# Patient Record
Sex: FEMALE | Race: WHITE | NOT HISPANIC OR LATINO
[De-identification: names, ages, dates, MRNs, and addresses within clinical notes are randomized per-mention and may not be internally consistent; named-entity substitution may affect disease eponyms.]

---

## 2017-05-01 ENCOUNTER — OTHER (OUTPATIENT)
Age: 77
End: 2017-05-01

## 2017-05-09 ENCOUNTER — APPOINTMENT (OUTPATIENT)
Dept: ORTHOPEDIC SURGERY | Facility: CLINIC | Age: 77
End: 2017-05-09

## 2017-05-09 VITALS
DIASTOLIC BLOOD PRESSURE: 86 MMHG | HEIGHT: 64 IN | WEIGHT: 168 LBS | BODY MASS INDEX: 28.68 KG/M2 | SYSTOLIC BLOOD PRESSURE: 147 MMHG | HEART RATE: 65 BPM

## 2017-05-09 RX ORDER — SIMVASTATIN 20 MG/1
20 TABLET, FILM COATED ORAL
Qty: 90 | Refills: 0 | Status: ACTIVE | COMMUNITY
Start: 2017-02-02

## 2017-05-15 ENCOUNTER — FORM ENCOUNTER (OUTPATIENT)
Age: 77
End: 2017-05-15

## 2017-05-16 ENCOUNTER — OUTPATIENT (OUTPATIENT)
Dept: OUTPATIENT SERVICES | Facility: HOSPITAL | Age: 77
LOS: 1 days | End: 2017-05-16
Payer: MEDICARE

## 2017-05-16 ENCOUNTER — APPOINTMENT (OUTPATIENT)
Dept: ULTRASOUND IMAGING | Facility: CLINIC | Age: 77
End: 2017-05-16

## 2017-05-16 DIAGNOSIS — Z00.8 ENCOUNTER FOR OTHER GENERAL EXAMINATION: ICD-10-CM

## 2017-05-16 DIAGNOSIS — Z96.652 PRESENCE OF LEFT ARTIFICIAL KNEE JOINT: Chronic | ICD-10-CM

## 2017-05-16 DIAGNOSIS — Z98.89 OTHER SPECIFIED POSTPROCEDURAL STATES: Chronic | ICD-10-CM

## 2017-05-16 DIAGNOSIS — Z41.1 ENCOUNTER FOR COSMETIC SURGERY: Chronic | ICD-10-CM

## 2017-05-16 DIAGNOSIS — Z90.49 ACQUIRED ABSENCE OF OTHER SPECIFIED PARTS OF DIGESTIVE TRACT: Chronic | ICD-10-CM

## 2017-05-16 DIAGNOSIS — R22.1 LOCALIZED SWELLING, MASS AND LUMP, NECK: Chronic | ICD-10-CM

## 2017-05-16 PROCEDURE — 93970 EXTREMITY STUDY: CPT

## 2017-05-23 ENCOUNTER — OTHER (OUTPATIENT)
Age: 77
End: 2017-05-23

## 2017-07-24 ENCOUNTER — APPOINTMENT (OUTPATIENT)
Dept: OBGYN | Facility: CLINIC | Age: 77
End: 2017-07-24

## 2017-07-24 VITALS
BODY MASS INDEX: 29.19 KG/M2 | WEIGHT: 171 LBS | DIASTOLIC BLOOD PRESSURE: 97 MMHG | HEIGHT: 64 IN | SYSTOLIC BLOOD PRESSURE: 184 MMHG

## 2017-07-28 DIAGNOSIS — N76.0 ACUTE VAGINITIS: ICD-10-CM

## 2017-07-28 DIAGNOSIS — B96.89 ACUTE VAGINITIS: ICD-10-CM

## 2017-07-28 LAB
BACTERIA GENITAL AEROBE CULT: ABNORMAL
CYTOLOGY CVX/VAG DOC THIN PREP: NORMAL

## 2017-08-22 ENCOUNTER — FORM ENCOUNTER (OUTPATIENT)
Age: 77
End: 2017-08-22

## 2017-08-23 ENCOUNTER — OUTPATIENT (OUTPATIENT)
Dept: OUTPATIENT SERVICES | Facility: HOSPITAL | Age: 77
LOS: 1 days | End: 2017-08-23
Payer: MEDICARE

## 2017-08-23 VITALS
RESPIRATION RATE: 16 BRPM | DIASTOLIC BLOOD PRESSURE: 90 MMHG | TEMPERATURE: 98 F | HEART RATE: 63 BPM | HEIGHT: 64 IN | WEIGHT: 169.76 LBS | SYSTOLIC BLOOD PRESSURE: 179 MMHG

## 2017-08-23 DIAGNOSIS — E78.5 HYPERLIPIDEMIA, UNSPECIFIED: ICD-10-CM

## 2017-08-23 DIAGNOSIS — Z96.652 PRESENCE OF LEFT ARTIFICIAL KNEE JOINT: Chronic | ICD-10-CM

## 2017-08-23 DIAGNOSIS — R22.1 LOCALIZED SWELLING, MASS AND LUMP, NECK: Chronic | ICD-10-CM

## 2017-08-23 DIAGNOSIS — Z01.818 ENCOUNTER FOR OTHER PREPROCEDURAL EXAMINATION: ICD-10-CM

## 2017-08-23 DIAGNOSIS — Z98.89 OTHER SPECIFIED POSTPROCEDURAL STATES: Chronic | ICD-10-CM

## 2017-08-23 DIAGNOSIS — Z41.1 ENCOUNTER FOR COSMETIC SURGERY: Chronic | ICD-10-CM

## 2017-08-23 DIAGNOSIS — Z90.49 ACQUIRED ABSENCE OF OTHER SPECIFIED PARTS OF DIGESTIVE TRACT: Chronic | ICD-10-CM

## 2017-08-23 DIAGNOSIS — M17.11 UNILATERAL PRIMARY OSTEOARTHRITIS, RIGHT KNEE: ICD-10-CM

## 2017-08-23 DIAGNOSIS — I10 ESSENTIAL (PRIMARY) HYPERTENSION: ICD-10-CM

## 2017-08-23 LAB
ANION GAP SERPL CALC-SCNC: 15 MMOL/L — SIGNIFICANT CHANGE UP (ref 5–17)
APTT BLD: 41.1 SEC — HIGH (ref 27.5–37.4)
BLD GP AB SCN SERPL QL: SIGNIFICANT CHANGE UP
BUN SERPL-MCNC: 23 MG/DL — HIGH (ref 8–20)
CALCIUM SERPL-MCNC: 10.8 MG/DL — HIGH (ref 8.6–10.2)
CHLORIDE SERPL-SCNC: 97 MMOL/L — LOW (ref 98–107)
CO2 SERPL-SCNC: 27 MMOL/L — SIGNIFICANT CHANGE UP (ref 22–29)
CREAT SERPL-MCNC: 0.6 MG/DL — SIGNIFICANT CHANGE UP (ref 0.5–1.3)
GLUCOSE SERPL-MCNC: 89 MG/DL — SIGNIFICANT CHANGE UP (ref 70–115)
HCT VFR BLD CALC: 41 % — SIGNIFICANT CHANGE UP (ref 37–47)
HGB BLD-MCNC: 14.3 G/DL — SIGNIFICANT CHANGE UP (ref 12–16)
INR BLD: 1.01 RATIO — SIGNIFICANT CHANGE UP (ref 0.88–1.16)
MCHC RBC-ENTMCNC: 30.2 PG — SIGNIFICANT CHANGE UP (ref 27–31)
MCHC RBC-ENTMCNC: 34.9 G/DL — SIGNIFICANT CHANGE UP (ref 32–36)
MCV RBC AUTO: 86.7 FL — SIGNIFICANT CHANGE UP (ref 81–99)
MRSA PCR RESULT.: SIGNIFICANT CHANGE UP
PLATELET # BLD AUTO: 193 K/UL — SIGNIFICANT CHANGE UP (ref 150–400)
POTASSIUM SERPL-MCNC: 5.3 MMOL/L — SIGNIFICANT CHANGE UP (ref 3.5–5.3)
POTASSIUM SERPL-SCNC: 5.3 MMOL/L — SIGNIFICANT CHANGE UP (ref 3.5–5.3)
PROTHROM AB SERPL-ACNC: 11.1 SEC — SIGNIFICANT CHANGE UP (ref 9.8–12.7)
RBC # BLD: 4.73 M/UL — SIGNIFICANT CHANGE UP (ref 4.4–5.2)
RBC # FLD: 12.5 % — SIGNIFICANT CHANGE UP (ref 11–15.6)
S AUREUS DNA NOSE QL NAA+PROBE: SIGNIFICANT CHANGE UP
SODIUM SERPL-SCNC: 139 MMOL/L — SIGNIFICANT CHANGE UP (ref 135–145)
TYPE + AB SCN PNL BLD: SIGNIFICANT CHANGE UP
WBC # BLD: 9 K/UL — SIGNIFICANT CHANGE UP (ref 4.8–10.8)
WBC # FLD AUTO: 9 K/UL — SIGNIFICANT CHANGE UP (ref 4.8–10.8)

## 2017-08-23 PROCEDURE — 71020: CPT | Mod: 26

## 2017-08-23 PROCEDURE — 93010 ELECTROCARDIOGRAM REPORT: CPT

## 2017-08-23 PROCEDURE — 36415 COLL VENOUS BLD VENIPUNCTURE: CPT

## 2017-08-23 PROCEDURE — 71046 X-RAY EXAM CHEST 2 VIEWS: CPT

## 2017-08-23 PROCEDURE — 85730 THROMBOPLASTIN TIME PARTIAL: CPT

## 2017-08-23 PROCEDURE — 87641 MR-STAPH DNA AMP PROBE: CPT

## 2017-08-23 PROCEDURE — 85027 COMPLETE CBC AUTOMATED: CPT

## 2017-08-23 PROCEDURE — 80048 BASIC METABOLIC PNL TOTAL CA: CPT

## 2017-08-23 PROCEDURE — G0463: CPT

## 2017-08-23 PROCEDURE — 85610 PROTHROMBIN TIME: CPT

## 2017-08-23 PROCEDURE — 93005 ELECTROCARDIOGRAM TRACING: CPT

## 2017-08-23 PROCEDURE — 87640 STAPH A DNA AMP PROBE: CPT

## 2017-08-23 PROCEDURE — 86900 BLOOD TYPING SEROLOGIC ABO: CPT

## 2017-08-23 PROCEDURE — 86850 RBC ANTIBODY SCREEN: CPT

## 2017-08-23 PROCEDURE — 86901 BLOOD TYPING SEROLOGIC RH(D): CPT

## 2017-08-23 NOTE — H&P PST ADULT - PSH
H/O total knee replacement, left  2003 revision 2014  History of foot surgery  left foot due to polio, 1953  Neck mass  excision of neck mass 1942  S/P cholecystectomy    S/P dilation and curettage  2001, 1965, 1972, 1978  S/P rhinoplasty

## 2017-08-23 NOTE — H&P PST ADULT - HISTORY OF PRESENT ILLNESS
76 year old female with right knee pain for the last 4-5 years which progressively got worse, she had steroid injection in the past but now its not helping.

## 2017-08-23 NOTE — PATIENT PROFILE ADULT. - PMH
DVT (deep venous thrombosis)  2016, was on xarelto for 6 months  Hiatal hernia    Hyperlipidemia    Hypertension    Osteoarthritis    Polio    TIA (transient ischemic attack)  2004

## 2017-08-23 NOTE — H&P PST ADULT - FAMILY HISTORY
Grandparent  Still living? Unknown  Family history of diabetes mellitus, Age at diagnosis: Age Unknown     Aunt  Still living? No  Family history of diabetes mellitus, Age at diagnosis: Age Unknown     Sibling  Still living? Yes, Estimated age: Age Unknown  Family history of heart disease, Age at diagnosis: Age Unknown

## 2017-08-23 NOTE — H&P PST ADULT - NSANTHOSAYNRD_GEN_A_CORE
No. MARK screening performed.  STOP BANG Legend: 0-2 = LOW Risk; 3-4 = INTERMEDIATE Risk; 5-8 = HIGH Risk

## 2017-08-23 NOTE — H&P PST ADULT - PMH
DVT (deep venous thrombosis)  2016, was on xarelto for 6 months  Hiatal hernia    Hyperlipidemia    Hypertension    Osteoarthritis    Polio DVT (deep venous thrombosis)  2016, was on xarelto for 6 months  Hiatal hernia    Hyperlipidemia    Hypertension    Osteoarthritis    Polio    TIA (transient ischemic attack)  2004

## 2017-09-13 ENCOUNTER — APPOINTMENT (OUTPATIENT)
Dept: ORTHOPEDIC SURGERY | Facility: HOSPITAL | Age: 77
End: 2017-09-13

## 2017-09-28 ENCOUNTER — APPOINTMENT (OUTPATIENT)
Dept: ORTHOPEDIC SURGERY | Facility: CLINIC | Age: 77
End: 2017-09-28

## 2017-10-25 ENCOUNTER — APPOINTMENT (OUTPATIENT)
Dept: ORTHOPEDIC SURGERY | Facility: CLINIC | Age: 77
End: 2017-10-25

## 2017-11-08 ENCOUNTER — OUTPATIENT (OUTPATIENT)
Dept: OUTPATIENT SERVICES | Facility: HOSPITAL | Age: 77
LOS: 1 days | End: 2017-11-08
Payer: MEDICARE

## 2017-11-08 VITALS
SYSTOLIC BLOOD PRESSURE: 159 MMHG | RESPIRATION RATE: 16 BRPM | TEMPERATURE: 97 F | DIASTOLIC BLOOD PRESSURE: 86 MMHG | HEART RATE: 70 BPM | HEIGHT: 63 IN | WEIGHT: 171.52 LBS

## 2017-11-08 DIAGNOSIS — R22.1 LOCALIZED SWELLING, MASS AND LUMP, NECK: Chronic | ICD-10-CM

## 2017-11-08 DIAGNOSIS — Z01.818 ENCOUNTER FOR OTHER PREPROCEDURAL EXAMINATION: ICD-10-CM

## 2017-11-08 DIAGNOSIS — M17.11 UNILATERAL PRIMARY OSTEOARTHRITIS, RIGHT KNEE: ICD-10-CM

## 2017-11-08 DIAGNOSIS — Z90.49 ACQUIRED ABSENCE OF OTHER SPECIFIED PARTS OF DIGESTIVE TRACT: Chronic | ICD-10-CM

## 2017-11-08 DIAGNOSIS — Z96.652 PRESENCE OF LEFT ARTIFICIAL KNEE JOINT: Chronic | ICD-10-CM

## 2017-11-08 DIAGNOSIS — Z98.89 OTHER SPECIFIED POSTPROCEDURAL STATES: Chronic | ICD-10-CM

## 2017-11-08 DIAGNOSIS — Z41.1 ENCOUNTER FOR COSMETIC SURGERY: Chronic | ICD-10-CM

## 2017-11-08 DIAGNOSIS — I10 ESSENTIAL (PRIMARY) HYPERTENSION: ICD-10-CM

## 2017-11-08 LAB
ALBUMIN SERPL ELPH-MCNC: 4.6 G/DL — SIGNIFICANT CHANGE UP (ref 3.3–5.2)
ALP SERPL-CCNC: 68 U/L — SIGNIFICANT CHANGE UP (ref 40–120)
ALT FLD-CCNC: 18 U/L — SIGNIFICANT CHANGE UP
ANION GAP SERPL CALC-SCNC: 15 MMOL/L — SIGNIFICANT CHANGE UP (ref 5–17)
APTT BLD: 38.5 SEC — HIGH (ref 27.5–37.4)
AST SERPL-CCNC: 24 U/L — SIGNIFICANT CHANGE UP
BASOPHILS # BLD AUTO: 0 K/UL — SIGNIFICANT CHANGE UP (ref 0–0.2)
BASOPHILS NFR BLD AUTO: 0.2 % — SIGNIFICANT CHANGE UP (ref 0–2)
BILIRUB SERPL-MCNC: 0.7 MG/DL — SIGNIFICANT CHANGE UP (ref 0.4–2)
BLD GP AB SCN SERPL QL: SIGNIFICANT CHANGE UP
BUN SERPL-MCNC: 26 MG/DL — HIGH (ref 8–20)
CALCIUM SERPL-MCNC: 10.1 MG/DL — SIGNIFICANT CHANGE UP (ref 8.6–10.2)
CHLORIDE SERPL-SCNC: 95 MMOL/L — LOW (ref 98–107)
CO2 SERPL-SCNC: 25 MMOL/L — SIGNIFICANT CHANGE UP (ref 22–29)
CREAT SERPL-MCNC: 0.7 MG/DL — SIGNIFICANT CHANGE UP (ref 0.5–1.3)
EOSINOPHIL # BLD AUTO: 0.1 K/UL — SIGNIFICANT CHANGE UP (ref 0–0.5)
EOSINOPHIL NFR BLD AUTO: 1.3 % — SIGNIFICANT CHANGE UP (ref 0–6)
GLUCOSE SERPL-MCNC: 80 MG/DL — SIGNIFICANT CHANGE UP (ref 70–115)
HCT VFR BLD CALC: 39.6 % — SIGNIFICANT CHANGE UP (ref 37–47)
HGB BLD-MCNC: 13.7 G/DL — SIGNIFICANT CHANGE UP (ref 12–16)
INR BLD: 1.06 RATIO — SIGNIFICANT CHANGE UP (ref 0.88–1.16)
LYMPHOCYTES # BLD AUTO: 2.1 K/UL — SIGNIFICANT CHANGE UP (ref 1–4.8)
LYMPHOCYTES # BLD AUTO: 22.6 % — SIGNIFICANT CHANGE UP (ref 20–55)
MCHC RBC-ENTMCNC: 30.4 PG — SIGNIFICANT CHANGE UP (ref 27–31)
MCHC RBC-ENTMCNC: 34.6 G/DL — SIGNIFICANT CHANGE UP (ref 32–36)
MCV RBC AUTO: 87.8 FL — SIGNIFICANT CHANGE UP (ref 81–99)
MONOCYTES # BLD AUTO: 0.8 K/UL — SIGNIFICANT CHANGE UP (ref 0–0.8)
MONOCYTES NFR BLD AUTO: 8.5 % — SIGNIFICANT CHANGE UP (ref 3–10)
MRSA PCR RESULT.: SIGNIFICANT CHANGE UP
NEUTROPHILS # BLD AUTO: 6.3 K/UL — SIGNIFICANT CHANGE UP (ref 1.8–8)
NEUTROPHILS NFR BLD AUTO: 67.3 % — SIGNIFICANT CHANGE UP (ref 37–73)
PLATELET # BLD AUTO: 188 K/UL — SIGNIFICANT CHANGE UP (ref 150–400)
POTASSIUM SERPL-MCNC: 4.7 MMOL/L — SIGNIFICANT CHANGE UP (ref 3.5–5.3)
POTASSIUM SERPL-SCNC: 4.7 MMOL/L — SIGNIFICANT CHANGE UP (ref 3.5–5.3)
PROT SERPL-MCNC: 7.1 G/DL — SIGNIFICANT CHANGE UP (ref 6.6–8.7)
PROTHROM AB SERPL-ACNC: 11.7 SEC — SIGNIFICANT CHANGE UP (ref 9.8–12.7)
RBC # BLD: 4.51 M/UL — SIGNIFICANT CHANGE UP (ref 4.4–5.2)
RBC # FLD: 13.1 % — SIGNIFICANT CHANGE UP (ref 11–15.6)
S AUREUS DNA NOSE QL NAA+PROBE: SIGNIFICANT CHANGE UP
SODIUM SERPL-SCNC: 135 MMOL/L — SIGNIFICANT CHANGE UP (ref 135–145)
TYPE + AB SCN PNL BLD: SIGNIFICANT CHANGE UP
WBC # BLD: 9.3 K/UL — SIGNIFICANT CHANGE UP (ref 4.8–10.8)
WBC # FLD AUTO: 9.3 K/UL — SIGNIFICANT CHANGE UP (ref 4.8–10.8)

## 2017-11-08 PROCEDURE — 36415 COLL VENOUS BLD VENIPUNCTURE: CPT

## 2017-11-08 PROCEDURE — 85610 PROTHROMBIN TIME: CPT

## 2017-11-08 PROCEDURE — 85027 COMPLETE CBC AUTOMATED: CPT

## 2017-11-08 PROCEDURE — 86901 BLOOD TYPING SEROLOGIC RH(D): CPT

## 2017-11-08 PROCEDURE — 85730 THROMBOPLASTIN TIME PARTIAL: CPT

## 2017-11-08 PROCEDURE — 86850 RBC ANTIBODY SCREEN: CPT

## 2017-11-08 PROCEDURE — 80053 COMPREHEN METABOLIC PANEL: CPT

## 2017-11-08 PROCEDURE — 87640 STAPH A DNA AMP PROBE: CPT

## 2017-11-08 PROCEDURE — 93005 ELECTROCARDIOGRAM TRACING: CPT

## 2017-11-08 PROCEDURE — 93010 ELECTROCARDIOGRAM REPORT: CPT

## 2017-11-08 PROCEDURE — 86900 BLOOD TYPING SEROLOGIC ABO: CPT

## 2017-11-08 PROCEDURE — G0463: CPT

## 2017-11-08 RX ORDER — SODIUM CHLORIDE 9 MG/ML
3 INJECTION INTRAMUSCULAR; INTRAVENOUS; SUBCUTANEOUS EVERY 8 HOURS
Qty: 0 | Refills: 0 | Status: DISCONTINUED | OUTPATIENT
Start: 2017-11-29 | End: 2017-12-01

## 2017-11-08 NOTE — PATIENT PROFILE ADULT. - LEARNING ASSESSMENT (PATIENT) ADDITIONAL COMMENTS
pre-op instructions, surgical wash, MRSA/MSSA & pain management reviewed. Pt verbalized understanding

## 2017-11-09 ENCOUNTER — OTHER (OUTPATIENT)
Age: 77
End: 2017-11-09

## 2017-11-15 ENCOUNTER — OTHER (OUTPATIENT)
Age: 77
End: 2017-11-15

## 2017-11-15 RX ORDER — ACETAMINOPHEN 500 MG
1000 TABLET ORAL ONCE
Qty: 0 | Refills: 0 | Status: DISCONTINUED | OUTPATIENT
Start: 2017-11-29 | End: 2017-12-01

## 2017-11-15 RX ORDER — CELECOXIB 200 MG/1
400 CAPSULE ORAL ONCE
Qty: 0 | Refills: 0 | Status: COMPLETED | OUTPATIENT
Start: 2017-11-29 | End: 2017-11-29

## 2017-11-15 RX ORDER — SCOPALAMINE 1 MG/3D
1.5 PATCH, EXTENDED RELEASE TRANSDERMAL ONCE
Qty: 0 | Refills: 0 | Status: COMPLETED | OUTPATIENT
Start: 2017-11-29 | End: 2017-11-29

## 2017-11-15 RX ORDER — GABAPENTIN 400 MG/1
300 CAPSULE ORAL ONCE
Qty: 0 | Refills: 0 | Status: COMPLETED | OUTPATIENT
Start: 2017-11-29 | End: 2017-11-29

## 2017-11-15 RX ORDER — CEFAZOLIN SODIUM 1 G
2000 VIAL (EA) INJECTION ONCE
Qty: 0 | Refills: 0 | Status: DISCONTINUED | OUTPATIENT
Start: 2017-11-29 | End: 2017-12-01

## 2017-11-29 ENCOUNTER — TRANSCRIPTION ENCOUNTER (OUTPATIENT)
Age: 77
End: 2017-11-29

## 2017-11-29 ENCOUNTER — RESULT REVIEW (OUTPATIENT)
Age: 77
End: 2017-11-29

## 2017-11-29 ENCOUNTER — INPATIENT (INPATIENT)
Facility: HOSPITAL | Age: 77
LOS: 1 days | Discharge: HOME | DRG: 470 | End: 2017-12-01
Attending: ORTHOPAEDIC SURGERY | Admitting: ORTHOPAEDIC SURGERY
Payer: MEDICARE

## 2017-11-29 ENCOUNTER — APPOINTMENT (OUTPATIENT)
Dept: ORTHOPEDIC SURGERY | Facility: HOSPITAL | Age: 77
End: 2017-11-29

## 2017-11-29 VITALS
RESPIRATION RATE: 14 BRPM | HEIGHT: 64 IN | SYSTOLIC BLOOD PRESSURE: 138 MMHG | WEIGHT: 169.98 LBS | DIASTOLIC BLOOD PRESSURE: 59 MMHG | TEMPERATURE: 99 F | OXYGEN SATURATION: 100 % | HEART RATE: 67 BPM

## 2017-11-29 DIAGNOSIS — M17.11 UNILATERAL PRIMARY OSTEOARTHRITIS, RIGHT KNEE: ICD-10-CM

## 2017-11-29 DIAGNOSIS — Z98.89 OTHER SPECIFIED POSTPROCEDURAL STATES: Chronic | ICD-10-CM

## 2017-11-29 DIAGNOSIS — R22.1 LOCALIZED SWELLING, MASS AND LUMP, NECK: Chronic | ICD-10-CM

## 2017-11-29 DIAGNOSIS — Z96.652 PRESENCE OF LEFT ARTIFICIAL KNEE JOINT: Chronic | ICD-10-CM

## 2017-11-29 DIAGNOSIS — Z90.49 ACQUIRED ABSENCE OF OTHER SPECIFIED PARTS OF DIGESTIVE TRACT: Chronic | ICD-10-CM

## 2017-11-29 DIAGNOSIS — Z41.1 ENCOUNTER FOR COSMETIC SURGERY: Chronic | ICD-10-CM

## 2017-11-29 LAB
BLD GP AB SCN SERPL QL: SIGNIFICANT CHANGE UP
TYPE + AB SCN PNL BLD: SIGNIFICANT CHANGE UP

## 2017-11-29 PROCEDURE — 88311 DECALCIFY TISSUE: CPT | Mod: 26

## 2017-11-29 PROCEDURE — 73560 X-RAY EXAM OF KNEE 1 OR 2: CPT | Mod: 26,RT

## 2017-11-29 PROCEDURE — 20985 CPTR-ASST DIR MS PX: CPT | Mod: AS

## 2017-11-29 PROCEDURE — 27447 TOTAL KNEE ARTHROPLASTY: CPT | Mod: AS,RT

## 2017-11-29 PROCEDURE — 27447 TOTAL KNEE ARTHROPLASTY: CPT | Mod: RT

## 2017-11-29 PROCEDURE — 88305 TISSUE EXAM BY PATHOLOGIST: CPT | Mod: 26

## 2017-11-29 PROCEDURE — 20985 CPTR-ASST DIR MS PX: CPT

## 2017-11-29 RX ORDER — ACETAMINOPHEN 500 MG
1000 TABLET ORAL
Qty: 0 | Refills: 0 | Status: COMPLETED | OUTPATIENT
Start: 2017-11-29 | End: 2017-11-29

## 2017-11-29 RX ORDER — ASPIRIN/CALCIUM CARB/MAGNESIUM 324 MG
81 TABLET ORAL DAILY
Qty: 0 | Refills: 0 | Status: DISCONTINUED | OUTPATIENT
Start: 2017-11-30 | End: 2017-12-01

## 2017-11-29 RX ORDER — ONDANSETRON 8 MG/1
4 TABLET, FILM COATED ORAL EVERY 4 HOURS
Qty: 0 | Refills: 0 | Status: DISCONTINUED | OUTPATIENT
Start: 2017-11-29 | End: 2017-12-01

## 2017-11-29 RX ORDER — ACETAMINOPHEN 500 MG
650 TABLET ORAL EVERY 6 HOURS
Qty: 0 | Refills: 0 | Status: DISCONTINUED | OUTPATIENT
Start: 2017-11-29 | End: 2017-12-01

## 2017-11-29 RX ORDER — LEVOTHYROXINE SODIUM 125 MCG
25 TABLET ORAL DAILY
Qty: 0 | Refills: 0 | Status: DISCONTINUED | OUTPATIENT
Start: 2017-11-29 | End: 2017-11-30

## 2017-11-29 RX ORDER — SODIUM CHLORIDE 9 MG/ML
1000 INJECTION INTRAMUSCULAR; INTRAVENOUS; SUBCUTANEOUS
Qty: 0 | Refills: 0 | Status: DISCONTINUED | OUTPATIENT
Start: 2017-11-29 | End: 2017-11-30

## 2017-11-29 RX ORDER — MAGNESIUM HYDROXIDE 400 MG/1
30 TABLET, CHEWABLE ORAL DAILY
Qty: 0 | Refills: 0 | Status: DISCONTINUED | OUTPATIENT
Start: 2017-11-29 | End: 2017-12-01

## 2017-11-29 RX ORDER — SIMVASTATIN 20 MG/1
20 TABLET, FILM COATED ORAL AT BEDTIME
Qty: 0 | Refills: 0 | Status: DISCONTINUED | OUTPATIENT
Start: 2017-11-29 | End: 2017-12-01

## 2017-11-29 RX ORDER — LOSARTAN POTASSIUM 100 MG/1
100 TABLET, FILM COATED ORAL DAILY
Qty: 0 | Refills: 0 | Status: DISCONTINUED | OUTPATIENT
Start: 2017-11-30 | End: 2017-11-30

## 2017-11-29 RX ORDER — TRIAMTERENE/HYDROCHLOROTHIAZID 75 MG-50MG
1 TABLET ORAL DAILY
Qty: 0 | Refills: 0 | Status: DISCONTINUED | OUTPATIENT
Start: 2017-11-30 | End: 2017-11-30

## 2017-11-29 RX ORDER — FENTANYL CITRATE 50 UG/ML
50 INJECTION INTRAVENOUS
Qty: 0 | Refills: 0 | Status: DISCONTINUED | OUTPATIENT
Start: 2017-11-29 | End: 2017-11-30

## 2017-11-29 RX ORDER — VANCOMYCIN HCL 1 G
1000 VIAL (EA) INTRAVENOUS ONCE
Qty: 0 | Refills: 0 | Status: COMPLETED | OUTPATIENT
Start: 2017-11-29 | End: 2017-11-29

## 2017-11-29 RX ORDER — CHOLECALCIFEROL (VITAMIN D3) 125 MCG
2000 CAPSULE ORAL DAILY
Qty: 0 | Refills: 0 | Status: DISCONTINUED | OUTPATIENT
Start: 2017-11-30 | End: 2017-12-01

## 2017-11-29 RX ORDER — ONDANSETRON 8 MG/1
4 TABLET, FILM COATED ORAL ONCE
Qty: 0 | Refills: 0 | Status: DISCONTINUED | OUTPATIENT
Start: 2017-11-29 | End: 2017-11-30

## 2017-11-29 RX ORDER — CEFAZOLIN SODIUM 1 G
2000 VIAL (EA) INJECTION
Qty: 0 | Refills: 0 | Status: COMPLETED | OUTPATIENT
Start: 2017-11-29 | End: 2017-11-30

## 2017-11-29 RX ORDER — VANCOMYCIN HCL 1 G
1000 VIAL (EA) INTRAVENOUS
Qty: 0 | Refills: 0 | Status: COMPLETED | OUTPATIENT
Start: 2017-11-29 | End: 2017-11-30

## 2017-11-29 RX ORDER — HYDROMORPHONE HYDROCHLORIDE 2 MG/ML
2 INJECTION INTRAMUSCULAR; INTRAVENOUS; SUBCUTANEOUS
Qty: 0 | Refills: 0 | Status: DISCONTINUED | OUTPATIENT
Start: 2017-11-29 | End: 2017-12-01

## 2017-11-29 RX ORDER — SODIUM CHLORIDE 9 MG/ML
1000 INJECTION, SOLUTION INTRAVENOUS
Qty: 0 | Refills: 0 | Status: DISCONTINUED | OUTPATIENT
Start: 2017-11-29 | End: 2017-11-30

## 2017-11-29 RX ORDER — SENNA PLUS 8.6 MG/1
2 TABLET ORAL AT BEDTIME
Qty: 0 | Refills: 0 | Status: DISCONTINUED | OUTPATIENT
Start: 2017-11-29 | End: 2017-12-01

## 2017-11-29 RX ORDER — HYDROMORPHONE HYDROCHLORIDE 2 MG/ML
4 INJECTION INTRAMUSCULAR; INTRAVENOUS; SUBCUTANEOUS
Qty: 0 | Refills: 0 | Status: DISCONTINUED | OUTPATIENT
Start: 2017-11-29 | End: 2017-12-01

## 2017-11-29 RX ORDER — HYDROMORPHONE HYDROCHLORIDE 2 MG/ML
0.5 INJECTION INTRAMUSCULAR; INTRAVENOUS; SUBCUTANEOUS
Qty: 0 | Refills: 0 | Status: DISCONTINUED | OUTPATIENT
Start: 2017-11-29 | End: 2017-12-01

## 2017-11-29 RX ORDER — ENOXAPARIN SODIUM 100 MG/ML
30 INJECTION SUBCUTANEOUS EVERY 12 HOURS
Qty: 0 | Refills: 0 | Status: DISCONTINUED | OUTPATIENT
Start: 2017-11-30 | End: 2017-12-01

## 2017-11-29 RX ADMIN — SCOPALAMINE 1.5 MILLIGRAM(S): 1 PATCH, EXTENDED RELEASE TRANSDERMAL at 10:01

## 2017-11-29 RX ADMIN — CELECOXIB 400 MILLIGRAM(S): 200 CAPSULE ORAL at 10:01

## 2017-11-29 RX ADMIN — Medication 1000 MILLIGRAM(S): at 22:12

## 2017-11-29 RX ADMIN — Medication 100 MILLIGRAM(S): at 21:15

## 2017-11-29 RX ADMIN — GABAPENTIN 300 MILLIGRAM(S): 400 CAPSULE ORAL at 10:01

## 2017-11-29 RX ADMIN — Medication 250 MILLIGRAM(S): at 10:13

## 2017-11-29 RX ADMIN — Medication 400 MILLIGRAM(S): at 22:12

## 2017-11-29 NOTE — BRIEF OPERATIVE NOTE - PROCEDURE
<<-----Click on this checkbox to enter Procedure Total knee arthroplasty  11/29/2017  Right computer assisted tKR  Active  LUCILA

## 2017-11-29 NOTE — PROGRESS NOTE ADULT - SUBJECTIVE AND OBJECTIVE BOX
PA - Note    Ortho Post Op Check    Name: PRADEEP DEL TORO    MR #: [id number]    Procedure: Right Total Knee Arthroplasty   Surgeon: Sharad Snell    Pt comfortable without complaints, pain controlled, found asleep in PACU, able to wake, doing well, hasn't had PT today  Denies CP, SOB, N/V, numbness/tingling     General Exam:  Vital Signs Last 24 Hrs  T(C): 36.7 (11-29-17 @ 21:15), Max: 36.7 (11-29-17 @ 21:15)  T(F): 98.1 (11-29-17 @ 21:15), Max: 98.1 (11-29-17 @ 21:15)  HR: 70 (11-29-17 @ 21:15) (57 - 70)  BP: 127/60 (11-29-17 @ 21:15) (119/48 - 132/52)  BP(mean): --  RR: 16 (11-29-17 @ 21:15) (15 - 22)  SpO2: 99% (11-29-17 @ 21:15) (99% - 100%)    General: Pt Alert and oriented, NAD, controlled pain.  Dressings C/D/I. No bleeding.  Pulses: 2+ dorsalis pedis pulse. Cap refill < 2 sec.  Sensation: Grossly intact to light touch without deficit.  Motor: + ROM of toes, + Dorsal/Plantar Flexion of foot    Post-op X-Ray:  EXAM:  KNEE-RIGHT                          PROCEDURE DATE:  11/29/2017          INTERPRETATION:  HISTORY: Postoperative  knee replacement.    Two views of the right knee are submitted.    Evaluation demonstrates the presence of a tricompartmental knee   replacement with the femoral, tibial and patellar components in proper   anatomic alignment. There is no fracture .       Impression:  Knee prosthetic components in proper anatomical alignment.                      KRIS BARRERA M.D., ATTENDING RADIOLOGIST  This document has been electronically signed. Nov 29 2017  8:42PM      A/P: 77yFemale POD#0 s/p Right Total Knee Arthroplasty  - Stable  - Pain Control  - DVT ppx: Lovenox, Aspirin, SCDs  - Post op abx: Ancef, Vancomycin  - PT eval pending  - Weight bearing status: WBAT  - D/C Planning (Rehab vs Home)

## 2017-11-30 ENCOUNTER — TRANSCRIPTION ENCOUNTER (OUTPATIENT)
Age: 77
End: 2017-11-30

## 2017-11-30 DIAGNOSIS — E78.5 HYPERLIPIDEMIA, UNSPECIFIED: ICD-10-CM

## 2017-11-30 DIAGNOSIS — I82.402 ACUTE EMBOLISM AND THROMBOSIS OF UNSPECIFIED DEEP VEINS OF LEFT LOWER EXTREMITY: ICD-10-CM

## 2017-11-30 DIAGNOSIS — Z29.9 ENCOUNTER FOR PROPHYLACTIC MEASURES, UNSPECIFIED: ICD-10-CM

## 2017-11-30 DIAGNOSIS — E03.9 HYPOTHYROIDISM, UNSPECIFIED: ICD-10-CM

## 2017-11-30 DIAGNOSIS — I10 ESSENTIAL (PRIMARY) HYPERTENSION: ICD-10-CM

## 2017-11-30 DIAGNOSIS — D62 ACUTE POSTHEMORRHAGIC ANEMIA: ICD-10-CM

## 2017-11-30 LAB
ANION GAP SERPL CALC-SCNC: 12 MMOL/L — SIGNIFICANT CHANGE UP (ref 5–17)
BUN SERPL-MCNC: 17 MG/DL — SIGNIFICANT CHANGE UP (ref 8–20)
CALCIUM SERPL-MCNC: 8.8 MG/DL — SIGNIFICANT CHANGE UP (ref 8.6–10.2)
CHLORIDE SERPL-SCNC: 103 MMOL/L — SIGNIFICANT CHANGE UP (ref 98–107)
CO2 SERPL-SCNC: 23 MMOL/L — SIGNIFICANT CHANGE UP (ref 22–29)
CREAT SERPL-MCNC: 0.61 MG/DL — SIGNIFICANT CHANGE UP (ref 0.5–1.3)
GLUCOSE SERPL-MCNC: 111 MG/DL — SIGNIFICANT CHANGE UP (ref 70–115)
HCT VFR BLD CALC: 32.8 % — LOW (ref 37–47)
HGB BLD-MCNC: 11.2 G/DL — LOW (ref 12–16)
MCHC RBC-ENTMCNC: 29.9 PG — SIGNIFICANT CHANGE UP (ref 27–31)
MCHC RBC-ENTMCNC: 34.1 G/DL — SIGNIFICANT CHANGE UP (ref 32–36)
MCV RBC AUTO: 87.5 FL — SIGNIFICANT CHANGE UP (ref 81–99)
PLATELET # BLD AUTO: 159 K/UL — SIGNIFICANT CHANGE UP (ref 150–400)
POTASSIUM SERPL-MCNC: 4 MMOL/L — SIGNIFICANT CHANGE UP (ref 3.5–5.3)
POTASSIUM SERPL-SCNC: 4 MMOL/L — SIGNIFICANT CHANGE UP (ref 3.5–5.3)
RBC # BLD: 3.75 M/UL — LOW (ref 4.4–5.2)
RBC # FLD: 12.9 % — SIGNIFICANT CHANGE UP (ref 11–15.6)
SODIUM SERPL-SCNC: 138 MMOL/L — SIGNIFICANT CHANGE UP (ref 135–145)
WBC # BLD: 5.7 K/UL — SIGNIFICANT CHANGE UP (ref 4.8–10.8)
WBC # FLD AUTO: 5.7 K/UL — SIGNIFICANT CHANGE UP (ref 4.8–10.8)

## 2017-11-30 PROCEDURE — 99222 1ST HOSP IP/OBS MODERATE 55: CPT | Mod: GC

## 2017-11-30 PROCEDURE — 99222 1ST HOSP IP/OBS MODERATE 55: CPT

## 2017-11-30 RX ORDER — LEVOTHYROXINE SODIUM 125 MCG
25 TABLET ORAL DAILY
Qty: 0 | Refills: 0 | Status: DISCONTINUED | OUTPATIENT
Start: 2017-11-30 | End: 2017-12-01

## 2017-11-30 RX ORDER — LEVOTHYROXINE SODIUM 125 MCG
25 TABLET ORAL
Qty: 0 | Refills: 0 | Status: DISCONTINUED | OUTPATIENT
Start: 2017-11-30 | End: 2017-12-01

## 2017-11-30 RX ORDER — LEVOTHYROXINE SODIUM 125 MCG
25 TABLET ORAL ONCE
Qty: 0 | Refills: 0 | Status: COMPLETED | OUTPATIENT
Start: 2017-11-30 | End: 2017-11-30

## 2017-11-30 RX ORDER — ENOXAPARIN SODIUM 100 MG/ML
30 INJECTION SUBCUTANEOUS
Qty: 0 | Refills: 0 | COMMUNITY
Start: 2017-11-30 | End: 2017-12-28

## 2017-11-30 RX ORDER — SIMVASTATIN 20 MG/1
20 TABLET, FILM COATED ORAL AT BEDTIME
Qty: 0 | Refills: 0 | Status: DISCONTINUED | OUTPATIENT
Start: 2017-11-30 | End: 2017-11-30

## 2017-11-30 RX ORDER — SODIUM CHLORIDE 9 MG/ML
1000 INJECTION INTRAMUSCULAR; INTRAVENOUS; SUBCUTANEOUS
Qty: 0 | Refills: 0 | Status: DISCONTINUED | OUTPATIENT
Start: 2017-11-30 | End: 2017-12-01

## 2017-11-30 RX ORDER — TRIAMTERENE/HYDROCHLOROTHIAZID 75 MG-50MG
1 TABLET ORAL DAILY
Qty: 0 | Refills: 0 | Status: DISCONTINUED | OUTPATIENT
Start: 2017-12-02 | End: 2017-12-01

## 2017-11-30 RX ORDER — ENOXAPARIN SODIUM 100 MG/ML
30 INJECTION SUBCUTANEOUS
Qty: 0 | Refills: 0 | COMMUNITY
Start: 2017-11-30 | End: 2017-12-14

## 2017-11-30 RX ORDER — LEVOTHYROXINE SODIUM 125 MCG
25 TABLET ORAL DAILY
Qty: 0 | Refills: 0 | Status: DISCONTINUED | OUTPATIENT
Start: 2017-11-30 | End: 2017-11-30

## 2017-11-30 RX ORDER — LOSARTAN POTASSIUM 100 MG/1
100 TABLET, FILM COATED ORAL DAILY
Qty: 0 | Refills: 0 | Status: DISCONTINUED | OUTPATIENT
Start: 2017-11-30 | End: 2017-12-01

## 2017-11-30 RX ADMIN — Medication 650 MILLIGRAM(S): at 18:32

## 2017-11-30 RX ADMIN — LOSARTAN POTASSIUM 100 MILLIGRAM(S): 100 TABLET, FILM COATED ORAL at 05:22

## 2017-11-30 RX ADMIN — Medication 650 MILLIGRAM(S): at 19:30

## 2017-11-30 RX ADMIN — Medication 25 MICROGRAM(S): at 12:24

## 2017-11-30 RX ADMIN — Medication 100 MILLIGRAM(S): at 03:07

## 2017-11-30 RX ADMIN — SIMVASTATIN 20 MILLIGRAM(S): 20 TABLET, FILM COATED ORAL at 21:41

## 2017-11-30 RX ADMIN — Medication 250 MILLIGRAM(S): at 03:07

## 2017-11-30 RX ADMIN — SODIUM CHLORIDE 3 MILLILITER(S): 9 INJECTION INTRAMUSCULAR; INTRAVENOUS; SUBCUTANEOUS at 05:16

## 2017-11-30 RX ADMIN — LOSARTAN POTASSIUM 100 MILLIGRAM(S): 100 TABLET, FILM COATED ORAL at 12:55

## 2017-11-30 RX ADMIN — ENOXAPARIN SODIUM 30 MILLIGRAM(S): 100 INJECTION SUBCUTANEOUS at 05:22

## 2017-11-30 RX ADMIN — Medication 1 CAPSULE(S): at 05:22

## 2017-11-30 RX ADMIN — Medication 650 MILLIGRAM(S): at 13:20

## 2017-11-30 RX ADMIN — Medication 81 MILLIGRAM(S): at 12:24

## 2017-11-30 RX ADMIN — Medication 2000 UNIT(S): at 12:29

## 2017-11-30 RX ADMIN — ENOXAPARIN SODIUM 30 MILLIGRAM(S): 100 INJECTION SUBCUTANEOUS at 18:31

## 2017-11-30 RX ADMIN — Medication 650 MILLIGRAM(S): at 12:24

## 2017-11-30 NOTE — PHYSICAL THERAPY INITIAL EVALUATION ADULT - ACTIVE RANGE OF MOTION EXAMINATION, REHAB EVAL
right shoulder AROM up to 90 degrees; right knee AROM 10 to 75 degrees/bilateral  lower extremity Active ROM was WFL (within functional limits)/deficits as listed below/bilateral upper extremity Active ROM was WFL (within functional limits)

## 2017-11-30 NOTE — DISCHARGE NOTE ADULT - PLAN OF CARE
Decrease Pain, Improve Ambulation and Activities of Daily Living The patient will be seen in the office between 2-3 weeks for wound check.  Patient may shower after post-op day #5. The dressing is to be removed on day #10 (12/10/2017).  The patient will contact the office if the wound becomes red, has increasing pain, develops bleeding or discharge, an injury occurs, or has other concerns. The patient will continue PT consistent with total knee replacement. The patient will continue LOVENOX and ASPIRIN 81mg Daily for 4 weeks and then resume her normal dosage of Eliqius for DVTP. The patient will take the prescribed medication for pain control and titrate according to prescription and patient needs. The patient is FULL weight bearing. Elevation of the lower leg is recommended to reduce swelling. The patient will be seen in the office between 2-3 weeks for wound check.  Patient may shower after post-op day #5, 11/29/2017. The dressing is to be removed on day #10 (12/10/2017).  The patient will contact the office if the wound becomes red, has increasing pain, develops bleeding or discharge, an injury occurs, or has other concerns. The patient will continue PT consistent with total knee replacement. The patient will continue LOVENOX and ASPIRIN 81mg Daily for 4 weeks. The patient will take the prescribed medication for pain control and titrate according to prescription and patient needs. The patient is FULL weight bearing. Elevation of the lower leg is recommended to reduce swelling.

## 2017-11-30 NOTE — DISCHARGE NOTE ADULT - HOSPITAL COURSE
The patient underwent a RIGHT TOTAL KNEE REPLACEMENT on 11/29/2017. The patient received antibiotics consistent with SCIP guidelines. The patient underwent the procedure and had no intra-operative complications. Post-operatively, the patient was seen by medicine and PT. The patient received LOVENOX and ASPIRIN for DVTP. The patient received pain medications per orthopedic pain management protocol and the pain was appropriately controlled. The patient did not have any post-operative medical complications. The patient was discharged in stable condition.

## 2017-11-30 NOTE — PHYSICAL THERAPY INITIAL EVALUATION ADULT - GAIT DEVIATIONS NOTED, PT EVAL
+right knee buckling with each step/decreased stride length/decreased weight-shifting ability/decreased charmaine/increased time in double stance/decreased step length

## 2017-11-30 NOTE — CONSULT NOTE ADULT - ASSESSMENT
78 y/o female with PMH HTN, HLD, hiatal hernia, DVT completed AC, polio, presents with right knee pain [id number], progressively worse, s/p injections with limited relief, controlled with Tylenol and Advil prn, s/p right TKA POD #1.

## 2017-11-30 NOTE — PROGRESS NOTE ADULT - SUBJECTIVE AND OBJECTIVE BOX
PRADEEP DEL TORO    [id number]    History: Patient seen and eval at bedside. Patient is doing well and is comfortable. The patient's pain is controlled using the prescribed pain medications. The patient is participating in physical therapy. Denies nausea, vomiting, chest pain, shortness of breath, abdominal pain or fever. No new complaints.                          11.2   5.7   )-----------( 159      ( 30 Nov 2017 06:51 )             32.8       PE: NAD, alert, awake  Right LE:   Knee dressing C/D/I, no drainage, no bleeding,   EHL/TA/FHL/GS intact, DP pulse intact vi doppler  Gross sensation to LT intact distally, Calf soft, NT B/L    Primary Orthopedic Assessment:  • s/p RIGHT total knee replacement POD#1    Plan:   ·	DVT prophylaxis as prescribed lov/asa, including use of compression devices and ankle pumps  ·	Continue physical therapy: Weightbearing as tolerated of the right lower extremity with assistance of a walker  ·	Incentive spirometry encouraged  ·	Pain control as clinically indicated  ·	Discharge planning: likely home tomorrow pending med and PT clearance  ·	D/w RN to DC hand IV  ·	f/u chem labs PRADEEP DEL TORO    [id number]    History: Patient seen and eval at bedside. Patient is doing well and is comfortable. The patient's pain is controlled using the prescribed pain medications. The patient is participating in physical therapy. Denies nausea, vomiting, chest pain, shortness of breath, abdominal pain or fever. No new complaints.    Vital Signs Last 24 Hrs  T(C): 36.6 (30 Nov 2017 04:27), Max: 37.2 (29 Nov 2017 09:38)  T(F): 97.9 (30 Nov 2017 04:27), Max: 99 (29 Nov 2017 09:38)  HR: 64 (30 Nov 2017 04:27) (54 - 70)  BP: 123/69 (30 Nov 2017 04:27) (117/65 - 138/59)  RR: 19 (30 Nov 2017 04:27) (14 - 22)  SpO2: 96% (30 Nov 2017 04:27) (96% - 100%)                        11.2   5.7   )-----------( 159      ( 30 Nov 2017 06:51 )             32.8     PE: NAD, alert, awake  Right LE:   Knee dressing C/D/I, no drainage, no bleeding,   EHL/TA/FHL/GS intact, DP pulse intact vi doppler  Gross sensation to LT intact distally, Calf soft, NT B/L    Primary Orthopedic Assessment:  • s/p RIGHT total knee replacement POD#1    Plan:   ·	DVT prophylaxis as prescribed lov/asa, including use of compression devices and ankle pumps  ·	Continue physical therapy: Weightbearing as tolerated of the right lower extremity with assistance of a walker  ·	Incentive spirometry encouraged  ·	Pain control as clinically indicated  ·	Discharge planning: likely home tomorrow pending med and PT clearance  ·	D/w RN to DC hand IV  ·	f/u chem labs

## 2017-11-30 NOTE — DISCHARGE NOTE ADULT - CARE PROVIDER_API CALL
Sharad Snell (MD), Orthopaedic Surgery  [address]  [place] [address]  Phone: [phone]  Fax: [phone]

## 2017-11-30 NOTE — DISCHARGE NOTE ADULT - PATIENT PORTAL LINK FT
“You can access the [employer] Patient Portal, offered by [facility], by registering with the following website: [email or web address]”

## 2017-11-30 NOTE — PROGRESS NOTE ADULT - SUBJECTIVE AND OBJECTIVE BOX
PRADEEP DEL TORO    [id number]    History: 77y Female is status post right total knee arthroplasty, POD # 1. Patient is doing well and is comfortable. The patient's pain is controlled using the prescribed pain medications. The patient is participating in physical therapy. Denies nausea, vomiting, chest pain, shortness of breath, abdominal pain or fever. No new complaints.                          11.2   5.7   )-----------( 159      ( 30 Nov 2017 06:51 )             32.8     MEDICATIONS  (STANDING):  acetaminophen   Tablet. 650 milliGRAM(s) Oral every 6 hours  acetaminophen  IVPB. 1000 milliGRAM(s) IV Intermittent once  aspirin  chewable 81 milliGRAM(s) Oral daily  ceFAZolin   IVPB 2000 milliGRAM(s) IV Intermittent once  cholecalciferol 2000 Unit(s) Oral daily  enoxaparin Injectable 30 milliGRAM(s) SubCutaneous every 12 hours  levothyroxine 25 MICROGram(s) Oral daily  losartan 100 milliGRAM(s) Oral daily  simvastatin 20 milliGRAM(s) Oral at bedtime  sodium chloride 0.9% lock flush 3 milliLiter(s) IV Push every 8 hours  sodium chloride 0.9%. 1000 milliLiter(s) (120 mL/Hr) IV Continuous <Continuous>  triamterene 37.5 mG/hydrochlorothiazide 25 mG Capsule 1 Capsule(s) Oral daily    MEDICATIONS  (PRN):  aluminum hydroxide/magnesium hydroxide/simethicone Suspension 30 milliLiter(s) Oral four times a day PRN Indigestion  HYDROmorphone   Tablet 2 milliGRAM(s) Oral every 3 hours PRN Moderate Pain (4 - 6)  HYDROmorphone   Tablet 4 milliGRAM(s) Oral every 3 hours PRN Severe Pain (7 - 10)  HYDROmorphone  Injectable 0.5 milliGRAM(s) IV Push every 3 hours PRN breakthrough pain control  magnesium hydroxide Suspension 30 milliLiter(s) Oral daily PRN Constipation  ondansetron Injectable 4 milliGRAM(s) IV Push every 4 hours PRN Nausea and/or Vomiting  senna 2 Tablet(s) Oral at bedtime PRN Constipation      Physical exam: The right knee dressing is clean, dry and intact. No drainage or discharge.  The calf is supple nontender. Passive range of motion is acceptable to due postoperative pain. No calf tenderness. Sensation to light touch is grossly intact distally. Motor function distally is 5/5. Extensor hallucis longus and flexor hallucis longus are intact. No foot drop. 2+ dorsalis pedis pulse. Capillary refill is less than 2 seconds. No cyanosis.    Primary Orthopedic Assessment:  • s/p RIGHT total knee replacement    Plan:   • DVT prophylaxis as prescribed - Lovenox x 4 weeks due to history of DVT, including use of compression devices and ankle pumps  • Continue physical therapy  • Weightbearing as tolerated of the right lower extremity with assistance of a walker  • Incentive spirometry encouraged  • Pain control as clinically indicated  • Discharge planning – anticipated discharge is Home vs subacute rehabilitation

## 2017-11-30 NOTE — CONSULT NOTE ADULT - SUBJECTIVE AND OBJECTIVE BOX
PMD: Dr. Anaya    CC: Right knee pain    HPI:  78 yo female      PAST MEDICAL & SURGICAL HISTORY:  TIA (transient ischemic attack): 2004  DVT (deep venous thrombosis): 2016, was on xarelto for 6 months  Polio  Hyperlipidemia  Hiatal hernia  Hypertension  Osteoarthritis  Neck mass: excision of neck mass 1942  History of foot surgery: left foot due to polio, 1953  S/P rhinoplasty  H/O total knee replacement, left: 2003 revision 2014  S/P dilation and curettage: 2001, 1965, 1972, 1978  S/P cholecystectomy    FAMILY HISTORY:  Family history of heart disease (Sibling)  Family history of diabetes mellitus (Grandparent, Aunt)    SOCIAL HISTORY:  Lives with  Tobacco -   ETOH -   Drug use -     ALLERGIES:  Cipro (hallucination)  Ampicillin (GI upset)  Narcotics (Severe vomiting and dizziness)    INTOLERANCES:  Augmentin (Abdominal pain, Diarrhea)    HOME MEDICATIONS:  MVI 1tab PO daily  Aleve 220mg PO [id number] PRN  Vitamin D3 2000IU PO daily  Dyazide 25/37.5mg PO daily  Synthroid 25mcg PO daily  Simvastatin 20mg PO qHS  Benicar 40mg PO daily prn  Ca+D daily  Co Q10 100mg PO daily  ASA 81mg PO daily    MEDICATIONS  (STANDING):  acetaminophen   Tablet. 650 milliGRAM(s) Oral every 6 hours  acetaminophen  IVPB. 1000 milliGRAM(s) IV Intermittent once  aspirin  chewable 81 milliGRAM(s) Oral daily  ceFAZolin   IVPB 2000 milliGRAM(s) IV Intermittent once  cholecalciferol 2000 Unit(s) Oral daily  enoxaparin Injectable 30 milliGRAM(s) SubCutaneous every 12 hours  levothyroxine 25 MICROGram(s) Oral daily  losartan 100 milliGRAM(s) Oral daily  simvastatin 20 milliGRAM(s) Oral at bedtime  sodium chloride 0.9% lock flush 3 milliLiter(s) IV Push every 8 hours  sodium chloride 0.9%. 1000 milliLiter(s) (120 mL/Hr) IV Continuous <Continuous>  triamterene 37.5 mG/hydrochlorothiazide 25 mG Capsule 1 Capsule(s) Oral daily    MEDICATIONS  (PRN):  aluminum hydroxide/magnesium hydroxide/simethicone Suspension 30 milliLiter(s) Oral four times a day PRN Indigestion  HYDROmorphone   Tablet 2 milliGRAM(s) Oral every 3 hours PRN Moderate Pain (4 - 6)  HYDROmorphone   Tablet 4 milliGRAM(s) Oral every 3 hours PRN Severe Pain (7 - 10)  HYDROmorphone  Injectable 0.5 milliGRAM(s) IV Push every 3 hours PRN breakthrough pain control  magnesium hydroxide Suspension 30 milliLiter(s) Oral daily PRN Constipation  ondansetron Injectable 4 milliGRAM(s) IV Push every 4 hours PRN Nausea and/or Vomiting  senna 2 Tablet(s) Oral at bedtime PRN Constipation      REVIEW OF SYSTEMS      General:	    Skin/Breast:  	  Ophthalmologic:  	  ENMT:	    Respiratory and Thorax:  	  Cardiovascular:	    Gastrointestinal:	    Genitourinary:	    Musculoskeletal:	    Neurological:	    Psychiatric:	    Hematology/Lymphatics:	    Endocrine:	    Allergic/Immunologic:	      Vital Signs Last 24 Hrs  T(C): 36.6 (30 Nov 2017 04:27), Max: 37.2 (29 Nov 2017 09:38)  T(F): 97.9 (30 Nov 2017 04:27), Max: 99 (29 Nov 2017 09:38)  HR: 64 (30 Nov 2017 04:27) (54 - 70)  BP: 123/69 (30 Nov 2017 04:27) (117/65 - 138/59)  BP(mean): --  RR: 19 (30 Nov 2017 04:27) (14 - 22)  SpO2: 96% (30 Nov 2017 04:27) (96% - 100%)    PHYSICAL EXAM:      Constitutional:    Eyes:    ENMT:    Neck:    Breasts:    Back:    Respiratory:    Cardiovascular:    Gastrointestinal:    Genitourinary:    Rectal:    Extremities:    Vascular:    Neurological:    Skin:    Lymph Nodes:    Musculoskeletal:    Psychiatric:        LABS:                        11.2   5.7   )-----------( 159      ( 30 Nov 2017 06:51 )             32.8     11-30    138  |  103  |  17.0  ----------------------------<  111  4.0   |  23.0  |  0.61    Ca    8.8      30 Nov 2017 06:51            RADIOLOGY & ADDITIONAL STUDIES:  < from: Xray Knee 1 or 2 Views, Right (11.29.17 @ 17:11) >   EXAM:  KNEE-RIGHT                          PROCEDURE DATE:  11/29/2017          INTERPRETATION:  HISTORY: Postoperative  knee replacement.    Two views of the right knee are submitted.    Evaluation demonstrates the presence of a tricompartmental knee   replacement with the femoral, tibial and patellar components in proper   anatomic alignment. There is no fracture .       Impression:  Knee prosthetic components in proper anatomical alignment.                      KRIS BARRERA M.D., ATTENDING RADIOLOGIST  This document has been electronically signed. Nov 30 2017  7:06AM                < end of copied text > PMD: Dr. Anaya  Urology: Dr. Zuñiga    CC: Right knee pain    HPI:  78 y/o female with PMH HTN, HLD, hiatal hernia, DVT completed AC, polio, presents with right knee pain [id number], progressively worse, s/p injections with limited relief, controlled with Tylenol and Advil prn, s/p right TKA POD #1.      PAST MEDICAL & SURGICAL HISTORY:  TIA (transient ischemic attack): 2004  DVT (deep venous thrombosis): 2016, was on xarelto for 6 months  Polio  Hyperlipidemia  Hiatal hernia  Hypertension  Osteoarthritis  Neck mass: excision of neck mass 1942  History of foot surgery: left foot due to polio, 1953  S/P rhinoplasty  H/O total knee replacement, left: 2003 revision 2014  S/P dilation and curettage: 2001, 1965, 1972, 1978  S/P cholecystectomy    FAMILY HISTORY:  Family history of heart disease (Sibling)  Family history of diabetes mellitus (Grandparent, Aunt)    SOCIAL HISTORY:    Lives with daughter  Tobacco - Denies  ETOH - Denies  Drug use - Denies    ALLERGIES:  Cipro/Levaquin (hallucination)  Ampicillin (GI upset)  Narcotics (Severe vomiting and dizziness)    INTOLERANCES:  Augmentin (Abdominal pain, Diarrhea)    HOME MEDICATIONS:  MVI 1tab PO daily  Aleve 220mg PO [id number] PRN  Vitamin D3 2000IU PO daily  Dyazide 37.5/25mg PO daily  Synthroid 25mcg PO daily  Simvastatin 20mg PO qHS  Benicar 40mg PO daily  Ca+D daily  Co Q10 100mg PO daily  ASA 81mg PO daily    MEDICATIONS  (STANDING):  acetaminophen   Tablet. 650 milliGRAM(s) Oral every 6 hours  acetaminophen  IVPB. 1000 milliGRAM(s) IV Intermittent once  aspirin  chewable 81 milliGRAM(s) Oral daily  ceFAZolin   IVPB 2000 milliGRAM(s) IV Intermittent once  cholecalciferol 2000 Unit(s) Oral daily  enoxaparin Injectable 30 milliGRAM(s) SubCutaneous every 12 hours  levothyroxine 25 MICROGram(s) Oral daily  losartan 100 milliGRAM(s) Oral daily  simvastatin 20 milliGRAM(s) Oral at bedtime  sodium chloride 0.9% lock flush 3 milliLiter(s) IV Push every 8 hours  sodium chloride 0.9%. 1000 milliLiter(s) (120 mL/Hr) IV Continuous <Continuous>  triamterene 37.5 mG/hydrochlorothiazide 25 mG Capsule 1 Capsule(s) Oral daily    MEDICATIONS  (PRN):  aluminum hydroxide/magnesium hydroxide/simethicone Suspension 30 milliLiter(s) Oral four times a day PRN Indigestion  HYDROmorphone   Tablet 2 milliGRAM(s) Oral every 3 hours PRN Moderate Pain (4 - 6)  HYDROmorphone   Tablet 4 milliGRAM(s) Oral every 3 hours PRN Severe Pain (7 - 10)  HYDROmorphone  Injectable 0.5 milliGRAM(s) IV Push every 3 hours PRN breakthrough pain control  magnesium hydroxide Suspension 30 milliLiter(s) Oral daily PRN Constipation  ondansetron Injectable 4 milliGRAM(s) IV Push every 4 hours PRN Nausea and/or Vomiting  senna 2 Tablet(s) Oral at bedtime PRN Constipation    REVIEW OF SYSTEMS:  CONSTITUTIONAL: No fever, weight loss, or fatigue  RESPIRATORY: No cough, wheezing, or chills; No shortness of breath  CARDIOVASCULAR: No chest pain, palpitations, dizziness, or leg swelling  GASTROINTESTINAL: No abdominal or epigastric pain. No nausea or vomiting; No diarrhea or constipation.   GENITOURINARY: No dysuria, frequency, hematuria, or incontinence  NEUROLOGICAL: No headaches, memory loss, loss of strength, numbness, or tremors  SKIN: No itching, burning, rashes, or lesions   MUSCULOSKELETAL: No joint swelling; No muscle or back pain; Right knee pain  PSYCHIATRIC: No depression, anxiety, mood swings, or difficulty sleeping      Vital Signs Last 24 Hrs  T(C): 36.6 (30 Nov 2017 04:27), Max: 37.2 (29 Nov 2017 09:38)  T(F): 97.9 (30 Nov 2017 04:27), Max: 99 (29 Nov 2017 09:38)  HR: 64 (30 Nov 2017 04:27) (54 - 70)  BP: 123/69 (30 Nov 2017 04:27) (117/65 - 138/59)  BP(mean): --  RR: 19 (30 Nov 2017 04:27) (14 - 22)  SpO2: 96% (30 Nov 2017 04:27) (96% - 100%)    PHYSICAL EXAM:  GENERAL: NAD, well-groomed, well-developed  HEAD:  Atraumatic, Normocephalic  NECK: Supple, No JVD, Normal thyroid  NERVOUS SYSTEM:  Alert & Oriented X3, Good concentration; Motor Strength 5/5 B/L upper and lower extremities  CHEST/LUNG: Clear to auscultation bilaterally; No rales, rhonchi, wheezing, or rubs  HEART: Regular rate and rhythm; No murmurs, rubs, or gallops  ABDOMEN: Soft, Nontender, Nondistended; Bowel sounds present  EXTREMITIES:  2+ Peripheral Pulses, No clubbing, cyanosis, or edema; Right knee with clean dressing and ACE wrap    LABS:                        11.2   5.7   )-----------( 159      ( 30 Nov 2017 06:51 )             32.8     11-30    138  |  103  |  17.0  ----------------------------<  111  4.0   |  23.0  |  0.61    Ca    8.8      30 Nov 2017 06:51            RADIOLOGY & ADDITIONAL STUDIES:  < from: Xray Knee 1 or 2 Views, Right (11.29.17 @ 17:11) >   EXAM:  KNEE-RIGHT                          PROCEDURE DATE:  11/29/2017          INTERPRETATION:  HISTORY: Postoperative  knee replacement.    Two views of the right knee are submitted.    Evaluation demonstrates the presence of a tricompartmental knee   replacement with the femoral, tibial and patellar components in proper   anatomic alignment. There is no fracture .       Impression:  Knee prosthetic components in proper anatomical alignment.                      KRIS BARRERA M.D., ATTENDING RADIOLOGIST  This document has been electronically signed. Nov 30 2017  7:06AM                < end of copied text >

## 2017-11-30 NOTE — CONSULT NOTE ADULT - SUBJECTIVE AND OBJECTIVE BOX
HPI:  Ms. Angulo is a 77 year old female with a PMHx noted below who presented to [facility] on 11/29 for elective right TKR after failing conservative measures (injections and PO pain medications) as an outpatient. Per patient, the pain has been ongoing and worsening over the last 2 years. She underwent right TKR by Dr. Snell with EBL = 50cc on 11/29. WBAT to right lower extremity. Started on Lovenox for DVT PPx post-operatively.    Today,    REVIEW OF SYSTEMS  Constitutional - No fever, No fatigue  HEENT - No visual disturbances, No difficulty hearing,  No neck pain  Respiratory - No cough, No wheezing, No shortness of breath  Cardiovascular - No chest pain, No palpitations  Gastrointestinal - No abdominal pain, No nausea, No vomiting, No diarrhea, No constipation  Genitourinary - No dysuria, No frequency, No hematuria, No incontinence  Neurological - No headaches, No loss of strength, No numbness  Skin - No itching, No rashes  Endocrine - No temperature intolerance  Musculoskeletal - No joint pain, No joint swelling, No muscle pain  Psychiatric - No depression, No anxiety  All other review of systems negative    PAST MEDICAL & SURGICAL HISTORY  Polio  Hiatal hernia  Hypothyroidism  H/O left deep vein thrombosis (completed treatement with AC)  Hyperlipidemia,  Essential hypertension  S/P foot surgery  S/P rhinoplasty  s/p left total knee replacement  S/P dilation and curettage  S/P cholecystectomy    SOCIAL HISTORY  Retired  Smoking - Denied  EtOH - Denied   Drugs - Denied    FUNCTIONAL HISTORY  _______ hand dominant  Lives with children in a private home with 2 EKTA without HR   Prior to hospitalization utilized RW for assistance with ambulation    CURRENT FUNCTIONAL STATUS  Bed mobility - Min A with bed rails  Transfers - Min/Mod A  Gait - 5' using RW requiring Min A  ADL's -    FAMILY HISTORY   Reviewed and non-contributory    ALLERGIES  ampicillin  Augmentin  Cipro     VITALS  T(C): 37.5 (11-30-17 @ 09:10)  T(F): 99.5 (11-30-17 @ 09:10), Max: 99.5 (11-30-17 @ 09:10)  HR: 93 (11-30-17 @ 09:10) (54 - 93)  BP: 125/58 (11-30-17 @ 09:10) (117/65 - 132/52)  RR:  (15 - 22)  SpO2:  (96% - 100%)  Wt(kg): --    PHYSICAL EXAM    RECENT LABS/IMAGING             11.2   5.7   )-----------( 159      ( 30 Nov 2017 06:51 )             32.8     138  |  103  |  17.0  ----------------------------<  111  4.0   |  23.0  |  0.61    Ca    8.8      30 Nov 2017 06:51    MEDICATIONS   MEDICATIONS  (STANDING):  acetaminophen   Tablet. 650 milliGRAM(s) Oral every 6 hours  acetaminophen  IVPB. 1000 milliGRAM(s) IV Intermittent once  aspirin  chewable 81 milliGRAM(s) Oral daily  ceFAZolin   IVPB 2000 milliGRAM(s) IV Intermittent once  cholecalciferol 2000 Unit(s) Oral daily  enoxaparin Injectable 30 milliGRAM(s) SubCutaneous every 12 hours  losartan 100 milliGRAM(s) Oral daily  simvastatin 20 milliGRAM(s) Oral at bedtime  sodium chloride 0.9%. 1000 milliLiter(s) (100 mL/Hr) IV Continuous <Continuous>    MEDICATIONS  (PRN):  aluminum hydroxide/magnesium hydroxide/simethicone Suspension 30 milliLiter(s) Oral four times a day PRN Indigestion  HYDROmorphone   Tablet 2 milliGRAM(s) Oral every 3 hours PRN Moderate Pain (4 - 6)  HYDROmorphone   Tablet 4 milliGRAM(s) Oral every 3 hours PRN Severe Pain (7 - 10)  HYDROmorphone  Injectable 0.5 milliGRAM(s) IV Push every 3 hours PRN breakthrough pain control  magnesium hydroxide Suspension 30 milliLiter(s) Oral daily PRN Constipation  ondansetron Injectable 4 milliGRAM(s) IV Push every 4 hours PRN Nausea and/or Vomiting  senna 2 Tablet(s) Oral at bedtime PRN Constipation    ASSESSMENT/PLAN  76 y/o female with right knee osteoarthritis s/p right TKR. She is now with functional, gait, ADL impairments.    Disposition -  PT - ROM, Bed mobility, Transfers, Ambulation with assistive device  OT - ADLs, ROM  Precautions - Falls, Cardiac  Pain control - Dilaudid PRN, Tylenol YANNICK  DVT Prophylaxis - Lovenox, SCD's  Weight bearing status - WBAT   Skin - Turn Q2hrs  Diet - Regular/thins HPI:  Ms. Angulo is a 77 year old female with a PMHx noted below who presented to [facility] on 11/29 for elective right TKR after failing conservative measures (injections and PO pain medications) as an outpatient. Per patient, the pain has been ongoing and worsening over the last 2 years. She underwent right TKR by Dr. Snell with EBL = 50cc on 11/29. WBAT to right lower extremity. Started on Lovenox for DVT PPx post-operatively.    Today, she reports that she has some pain in the right knee and feels like the ACE wrap is tight. She denies numbness/tingling. Worked with therapy today and tolerated well.    REVIEW OF SYSTEMS  Constitutional - No fever, No fatigue  HEENT - No visual disturbances  Respiratory - No cough, No wheezing, No shortness of breath  Cardiovascular - No chest pain, No palpitations  Gastrointestinal - No abdominal pain, No nausea, No vomiting, No diarrhea, No constipation  Genitourinary - No dysuria, No frequency, No hematuria, No incontinence  Neurological - No loss of strength, No numbness  Skin - No itching, No rashes  Endocrine - No temperature intolerance  Musculoskeletal - +joint pain, No joint swelling, +muscle pain  Psychiatric - No depression, No anxiety  All other review of systems negative    PAST MEDICAL & SURGICAL HISTORY  Polio  Hypothyroidism  H/O left deep vein thrombosis (about 3 years ago, completed treatement with AC)  Hyperlipidemia  Essential hypertension  S/P left foot surgery  S/P rhinoplasty  s/p left total knee replacement - 3 years ago  S/P dilation and curettage  S/P cholecystectomy    SOCIAL HISTORY    Retired  Smoking - Denied  EtOH - Denied   Drugs - Denied    FUNCTIONAL HISTORY  Right hand dominant  Lives alone in a private home with 2 EKTA without HR and flight of stairs inside but does not have to use   Prior to hospitalization utilized RW for assistance with ambulation, initially used SAC but right knee pain progressed requiring RW use    CURRENT FUNCTIONAL STATUS  Bed mobility - Min A with bed rails  Transfers - Min/Mod A  Gait - 5' using RW requiring Min A    FAMILY HISTORY   Reviewed and non-contributory    ALLERGIES  ampicillin  Augmentin  Cipro     VITALS  T(C): 37.5 (11-30-17 @ 09:10)  T(F): 99.5 (11-30-17 @ 09:10), Max: 99.5 (11-30-17 @ 09:10)  HR: 93 (11-30-17 @ 09:10) (54 - 93)  BP: 125/58 (11-30-17 @ 09:10) (117/65 - 132/52)  RR:  (15 - 22)  SpO2:  (96% - 100%)  Wt(kg): --    PHYSICAL EXAM  Constitutional - NAD, Comfortable  HEENT - NCAT, EOMI  Neck - Supple  Chest - CTA bilaterally  Cardiovascular - RRR, S1S2  Abdomen - BS+, Soft, NTND  Extremities - Right knee ACE wrap  Neurologic Exam -                    Cognitive - Awake, Alert, Oriented to self, place, date, year, situation     Cranial Nerves - CN 2-12 grossly intact     Motor -                     LEFT    UE - ShAB 5/5, EF 5/5, EE 5/5, WE 5/5,  5/5                    RIGHT UE - ShAB 5/5, EF 5/5, EE 5/5, WE 5/5,  5/5                    LEFT    LE - HF 4/5, KE 5/5, DF 5/5, PF 5/5                    RIGHT LE - HF 1/5, DF 4/5, PF 5/5        Sensory - Intact to light touch diffusely     Reflexes - DTR intact and symmetrical  Skin - Right knee ACE wrap  Psychiatric - Affect WNL    RECENT LABS/IMAGING             11.2   5.7   )-----------( 159      ( 30 Nov 2017 06:51 )             32.8     138  |  103  |  17.0  ----------------------------<  111  4.0   |  23.0  |  0.61    Ca    8.8      30 Nov 2017 06:51    MEDICATIONS   MEDICATIONS  (STANDING):  acetaminophen   Tablet. 650 milliGRAM(s) Oral every 6 hours  acetaminophen  IVPB. 1000 milliGRAM(s) IV Intermittent once  aspirin  chewable 81 milliGRAM(s) Oral daily  ceFAZolin   IVPB 2000 milliGRAM(s) IV Intermittent once  cholecalciferol 2000 Unit(s) Oral daily  enoxaparin Injectable 30 milliGRAM(s) SubCutaneous every 12 hours  losartan 100 milliGRAM(s) Oral daily  simvastatin 20 milliGRAM(s) Oral at bedtime  sodium chloride 0.9%. 1000 milliLiter(s) (100 mL/Hr) IV Continuous <Continuous>    MEDICATIONS  (PRN):  aluminum hydroxide/magnesium hydroxide/simethicone Suspension 30 milliLiter(s) Oral four times a day PRN Indigestion  HYDROmorphone   Tablet 2 milliGRAM(s) Oral every 3 hours PRN Moderate Pain (4 - 6)  HYDROmorphone   Tablet 4 milliGRAM(s) Oral every 3 hours PRN Severe Pain (7 - 10)  HYDROmorphone  Injectable 0.5 milliGRAM(s) IV Push every 3 hours PRN breakthrough pain control  magnesium hydroxide Suspension 30 milliLiter(s) Oral daily PRN Constipation  ondansetron Injectable 4 milliGRAM(s) IV Push every 4 hours PRN Nausea and/or Vomiting  senna 2 Tablet(s) Oral at bedtime PRN Constipation    ASSESSMENT/PLAN  76 y/o female with right knee osteoarthritis s/p right TKR. She is now with functional, gait, ADL impairments.    Disposition -   PT - ROM, Bed mobility, Transfers, Ambulation with assistive device  OT - ADLs, ROM  Precautions - Falls, Cardiac  Pain control - Dilaudid PRN, Tylenol YANNICK  DVT Prophylaxis - Lovenox, SCD's  Weight bearing status - WBAT right lower extremity  Skin - Turn Q2hrs  Diet - Regular/thins HPI:  Ms. Angulo is a 77 year old female with a PMHx noted below who presented to [facility] on 11/29 for elective right TKR after failing conservative measures (injections and PO pain medications) as an outpatient. Per patient, the pain has been ongoing and worsening over the last 2 years. She underwent right TKR by Dr. Snell with EBL = 50cc on 11/29. WBAT to right lower extremity. Started on Lovenox for DVT PPx post-operatively.    Today, she reports that she has some pain in the right knee and feels like the ACE wrap is tight. She denies numbness/tingling. Worked with therapy today and tolerated well.    REVIEW OF SYSTEMS  Constitutional - No fever, No fatigue  HEENT - No visual disturbances  Respiratory - No cough, No wheezing, No shortness of breath  Cardiovascular - No chest pain, No palpitations  Gastrointestinal - No abdominal pain, No nausea, No vomiting, No diarrhea, No constipation  Genitourinary - No dysuria, No frequency, No hematuria, No incontinence  Neurological - No loss of strength, No numbness  Skin - No itching, No rashes  Endocrine - No temperature intolerance  Musculoskeletal - +joint pain, No joint swelling, +muscle pain  Psychiatric - No depression, No anxiety  All other review of systems negative    PAST MEDICAL & SURGICAL HISTORY  Polio  Hypothyroidism  H/O left deep vein thrombosis (about 3 years ago, completed treatement with AC)  Hyperlipidemia  Essential hypertension  S/P left foot surgery  S/P rhinoplasty  s/p left total knee replacement - 3 years ago  S/P dilation and curettage  S/P cholecystectomy    SOCIAL HISTORY    Retired  Smoking - Denied  EtOH - Denied   Drugs - Denied    FUNCTIONAL HISTORY  Right hand dominant  Lives alone in a private home with 2 EKTA without HR and flight of stairs inside but does not have to use   Prior to hospitalization utilized RW for assistance with ambulation, initially used SAC but right knee pain progressed requiring RW use    CURRENT FUNCTIONAL STATUS  Bed mobility - Min A with bed rails  Transfers - Min/Mod A  Gait - 5' using RW requiring Min A    FAMILY HISTORY   Reviewed and non-contributory    ALLERGIES  ampicillin  Augmentin  Cipro     VITALS  T(C): 37.5 (11-30-17 @ 09:10)  T(F): 99.5 (11-30-17 @ 09:10), Max: 99.5 (11-30-17 @ 09:10)  HR: 93 (11-30-17 @ 09:10) (54 - 93)  BP: 125/58 (11-30-17 @ 09:10) (117/65 - 132/52)  RR:  (15 - 22)  SpO2:  (96% - 100%)  Wt(kg): --    PHYSICAL EXAM  Constitutional - NAD, Comfortable  HEENT - NCAT, EOMI  Neck - Supple  Chest - CTA bilaterally  Cardiovascular - RRR, S1S2  Abdomen - BS+, Soft, NTND  Extremities - Right knee ACE wrap  Neurologic Exam -                    Cognitive - Awake, Alert, Oriented to self, place, date, year, situation     Cranial Nerves - CN 2-12 grossly intact     Motor -                     LEFT    UE - ShAB 5/5, EF 5/5, EE 5/5, WE 5/5,  5/5                    RIGHT UE - ShAB 5/5, EF 5/5, EE 5/5, WE 5/5,  5/5                    LEFT    LE - HF 4/5, KE 5/5, DF 5/5, PF 5/5                    RIGHT LE - HF 2/5, DF 4/5, PF 5/5        Sensory - Intact to light touch diffusely     Reflexes - DTR intact and symmetrical  Skin - Right knee ACE wrap  Psychiatric - Affect WNL    RECENT LABS/IMAGING             11.2   5.7   )-----------( 159      ( 30 Nov 2017 06:51 )             32.8     138  |  103  |  17.0  ----------------------------<  111  4.0   |  23.0  |  0.61    Ca    8.8      30 Nov 2017 06:51    MEDICATIONS   MEDICATIONS  (STANDING):  acetaminophen   Tablet. 650 milliGRAM(s) Oral every 6 hours  acetaminophen  IVPB. 1000 milliGRAM(s) IV Intermittent once  aspirin  chewable 81 milliGRAM(s) Oral daily  ceFAZolin   IVPB 2000 milliGRAM(s) IV Intermittent once  cholecalciferol 2000 Unit(s) Oral daily  enoxaparin Injectable 30 milliGRAM(s) SubCutaneous every 12 hours  losartan 100 milliGRAM(s) Oral daily  simvastatin 20 milliGRAM(s) Oral at bedtime  sodium chloride 0.9%. 1000 milliLiter(s) (100 mL/Hr) IV Continuous <Continuous>    MEDICATIONS  (PRN):  aluminum hydroxide/magnesium hydroxide/simethicone Suspension 30 milliLiter(s) Oral four times a day PRN Indigestion  HYDROmorphone   Tablet 2 milliGRAM(s) Oral every 3 hours PRN Moderate Pain (4 - 6)  HYDROmorphone   Tablet 4 milliGRAM(s) Oral every 3 hours PRN Severe Pain (7 - 10)  HYDROmorphone  Injectable 0.5 milliGRAM(s) IV Push every 3 hours PRN breakthrough pain control  magnesium hydroxide Suspension 30 milliLiter(s) Oral daily PRN Constipation  ondansetron Injectable 4 milliGRAM(s) IV Push every 4 hours PRN Nausea and/or Vomiting  senna 2 Tablet(s) Oral at bedtime PRN Constipation    ASSESSMENT/PLAN  78 y/o female with right knee osteoarthritis s/p right TKR. She is now with functional, gait, ADL impairments.    Disposition - When medically cleared, recommend acute inpatient rehabilitation for the functional deficits consisting of 3 hours of therapy/day & 24 hour RN/daily PMR physician for comorbid medical management.   PT - ROM, Bed mobility, Transfers, Ambulation with assistive device  OT - ADLs, ROM  Precautions - Falls, Cardiac  Pain control - Dilaudid PRN, Tylenol YANNICK  DVT Prophylaxis - Lovenox, SCD's  Weight bearing status - WBAT right lower extremity  Skin - Turn Q2hrs  Diet - Regular/thins HPI:  Ms. Angulo is a 77 year old female with a PMHx noted below who presented to [facility] on 11/29 for elective right TKR after failing conservative measures (injections and PO pain medications) as an outpatient. Per patient, the pain has been ongoing and worsening over the last 2 years. She underwent right TKR by Dr. Snell with EBL = 50cc on 11/29. WBAT to right lower extremity. Started on Lovenox for DVT PPx post-operatively.    Today, she reports that she has some pain in the right knee and feels like the ACE wrap is tight. She denies numbness/tingling. Worked with therapy today and tolerated well.    REVIEW OF SYSTEMS  Constitutional - No fever, No fatigue  HEENT - No visual disturbances  Respiratory - No cough, No wheezing, No shortness of breath  Cardiovascular - No chest pain, No palpitations  Gastrointestinal - No abdominal pain,   Genitourinary - No dysuria, No frequency, No hematuria, No incontinence  Neurological - No loss of strength, No numbness  Skin - No itching, No rashes  Musculoskeletal - +joint pain, No joint swelling, +muscle pain  Psychiatric - No depression,  All other review of systems negative    PAST MEDICAL & SURGICAL HISTORY  Polio  Hypothyroidism  H/O left deep vein thrombosis (about 3 years ago, completed treatement with AC)  Hyperlipidemia  Essential hypertension  S/P left foot surgery  S/P rhinoplasty  s/p left total knee replacement - 3 years ago  S/P dilation and curettage  S/P cholecystectomy    SOCIAL HISTORY    Retired  Smoking - Denied  EtOH - Denied   Drugs - Denied    FUNCTIONAL HISTORY  Right hand dominant  Lives alone in a private home with 2 EKTA without HR and flight of stairs inside but does not have to use   Prior to hospitalization utilized RW for assistance with ambulation, initially used SAC but right knee pain progressed requiring RW use    CURRENT FUNCTIONAL STATUS  Bed mobility - Min A with bed rails  Transfers - Min/Mod A  Gait - 5' using RW requiring Min A    FAMILY HISTORY   Reviewed and non-contributory    ALLERGIES  ampicillin  Augmentin  Cipro     VITALS  T(C): 37.5 (11-30-17 @ 09:10)  T(F): 99.5 (11-30-17 @ 09:10), Max: 99.5 (11-30-17 @ 09:10)  HR: 93 (11-30-17 @ 09:10) (54 - 93)  BP: 125/58 (11-30-17 @ 09:10) (117/65 - 132/52)  RR:  (15 - 22)  SpO2:  (96% - 100%)  Wt(kg): --    PHYSICAL EXAM  Constitutional - NAD, Comfortable  HEENT - NCAT, EOMI  Neck - Supple  Chest - CTA bilaterally  Cardiovascular - RRR, S1S2  Abdomen - BS+, Soft, NTND  Extremities - Right knee ACE wrap  Neurologic Exam -                    Cognitive - Awake, Alert, Oriented to self, place, date, year, situation     Cranial Nerves - CN 2-12 grossly intact     Motor -                     LEFT    UE - ShAB 5/5, EF 5/5, EE 5/5, WE 5/5,  5/5                    RIGHT UE - ShAB 5/5, EF 5/5, EE 5/5, WE 5/5,  5/5                    LEFT    LE - HF 4/5, KE 5/5, DF 5/5, PF 5/5                    RIGHT LE - HF 2/5, DF 4/5, PF 5/5        Sensory - Intact to light touch diffusely     Reflexes - DTR intact and symmetrical  Skin - Right knee ACE wrap  Psychiatric - Affect WNL    RECENT LABS/IMAGING             11.2   5.7   )-----------( 159      ( 30 Nov 2017 06:51 )             32.8     138  |  103  |  17.0  ----------------------------<  111  4.0   |  23.0  |  0.61    Ca    8.8      30 Nov 2017 06:51    MEDICATIONS   MEDICATIONS  (STANDING):  acetaminophen   Tablet. 650 milliGRAM(s) Oral every 6 hours  acetaminophen  IVPB. 1000 milliGRAM(s) IV Intermittent once  aspirin  chewable 81 milliGRAM(s) Oral daily  ceFAZolin   IVPB 2000 milliGRAM(s) IV Intermittent once  cholecalciferol 2000 Unit(s) Oral daily  enoxaparin Injectable 30 milliGRAM(s) SubCutaneous every 12 hours  losartan 100 milliGRAM(s) Oral daily  simvastatin 20 milliGRAM(s) Oral at bedtime  sodium chloride 0.9%. 1000 milliLiter(s) (100 mL/Hr) IV Continuous <Continuous>    MEDICATIONS  (PRN):  aluminum hydroxide/magnesium hydroxide/simethicone Suspension 30 milliLiter(s) Oral four times a day PRN Indigestion  HYDROmorphone   Tablet 2 milliGRAM(s) Oral every 3 hours PRN Moderate Pain (4 - 6)  HYDROmorphone   Tablet 4 milliGRAM(s) Oral every 3 hours PRN Severe Pain (7 - 10)  HYDROmorphone  Injectable 0.5 milliGRAM(s) IV Push every 3 hours PRN breakthrough pain control  magnesium hydroxide Suspension 30 milliLiter(s) Oral daily PRN Constipation  ondansetron Injectable 4 milliGRAM(s) IV Push every 4 hours PRN Nausea and/or Vomiting  senna 2 Tablet(s) Oral at bedtime PRN Constipation    ASSESSMENT/PLAN  78 y/o female with right knee osteoarthritis s/p right TKR. She is now with functional, gait, ADL impairments.    Disposition - When medically cleared, recommend acute inpatient rehabilitation for the functional deficits consisting of 3 hours of therapy/day & 24 hour RN/daily PMR physician for comorbid medical management.   PT - ROM, Bed mobility, Transfers, Ambulation with assistive device  OT - ADLs, ROM  Precautions - Falls, Cardiac  Pain control - Dilaudid PRN, Tylenol YANNICK  DVT Prophylaxis - Lovenox, SCD's  Weight bearing status - WBAT right lower extremity  Skin - Turn Q2hrs  Diet - Regular/thins

## 2017-11-30 NOTE — DISCHARGE NOTE ADULT - MEDICATION SUMMARY - MEDICATIONS TO TAKE
I will START or STAY ON the medications listed below when I get home from the hospital:    Aspirin Enteric Coated 81 mg oral delayed release tablet  -- 1 tab(s) by mouth once a day  -- Indication: For Home med    Benicar 40 mg oral tablet  --  by mouth , As Needed  -- Indication: For Home med    enoxaparin  -- 30 milligram(s) subcutaneous 2 times a day  -- Indication: For Dvtp    simvastatin 20 mg oral tablet  -- 1 tab(s) by mouth once a day (at bedtime)  -- Indication: For Home med    hydrochlorothiazide-triamterene 25 mg-37.5 mg oral capsule  -- 1 cap(s) by mouth once a day  -- Indication: For Home med    Co Q-10 100 mg oral capsule  -- 1 cap(s) by mouth once a day  -- Indication: For Home med    Synthroid 25 mcg (0.025 mg) oral tablet  -- 1 tab(s) by mouth once a day  Mondays and thursdays double the dose  -- Indication: For Home med    Calcium 600+D 600 mg-200 units oral tablet  --  by mouth once a day  -- Indication: For Home med    multivitamin  -- 1   once a day  -- Indication: For Home med    Vitamin D3 2000 intl units oral capsule  -- 1 cap(s) by mouth once a day  -- Indication: For Home med

## 2017-11-30 NOTE — CONSULT NOTE ADULT - PROBLEM SELECTOR RECOMMENDATION 9
s/p Right TKA POD #1  Pain control.  PT eval.  Antibiotics, wound care, and DVT prophylaxis as per Ortho.  Incentive spirometry.  Avoid opioid induced constipation.

## 2017-11-30 NOTE — CONSULT NOTE ADULT - ATTENDING COMMENTS
77 year old female with medical comorbidities as above, now with right TKA for OA after failing conservative treatment.  Patient did better with this afternoon session with PT  Recommend acute rehab - PT/OT when medically stable.   May be transferred when medically stable and bed available  Thank you
Thank you  for the courtesy of this consult . Will follow .

## 2017-11-30 NOTE — DISCHARGE NOTE ADULT - NS AS ACTIVITY OBS
Showering allowed/Walking-Outdoors allowed/Stairs allowed/Walking-Indoors allowed/Do not make important decisions/Do not drive or operate machinery/No Heavy lifting/straining

## 2017-11-30 NOTE — DISCHARGE NOTE ADULT - CARE PLAN
Principal Discharge DX:	Primary osteoarthritis of right knee  Goal:	Decrease Pain, Improve Ambulation and Activities of Daily Living  Instructions for follow-up, activity and diet:	The patient will be seen in the office between 2-3 weeks for wound check.  Patient may shower after post-op day #5. The dressing is to be removed on day #10 (12/10/2017).  The patient will contact the office if the wound becomes red, has increasing pain, develops bleeding or discharge, an injury occurs, or has other concerns. The patient will continue PT consistent with total knee replacement. The patient will continue LOVENOX and ASPIRIN 81mg Daily for 4 weeks and then resume her normal dosage of Eliqius for DVTP. The patient will take the prescribed medication for pain control and titrate according to prescription and patient needs. The patient is FULL weight bearing. Elevation of the lower leg is recommended to reduce swelling. Principal Discharge DX:	Primary osteoarthritis of right knee  Goal:	Decrease Pain, Improve Ambulation and Activities of Daily Living  Instructions for follow-up, activity and diet:	The patient will be seen in the office between 2-3 weeks for wound check.  Patient may shower after post-op day #5, 11/29/2017. The dressing is to be removed on day #10 (12/10/2017).  The patient will contact the office if the wound becomes red, has increasing pain, develops bleeding or discharge, an injury occurs, or has other concerns. The patient will continue PT consistent with total knee replacement. The patient will continue LOVENOX and ASPIRIN 81mg Daily for 4 weeks. The patient will take the prescribed medication for pain control and titrate according to prescription and patient needs. The patient is FULL weight bearing. Elevation of the lower leg is recommended to reduce swelling.

## 2017-12-01 ENCOUNTER — INPATIENT (INPATIENT)
Facility: HOSPITAL | Age: 77
LOS: 10 days | Discharge: HOME | DRG: 561 | End: 2017-12-12
Attending: SPECIALIST | Admitting: SPECIALIST
Payer: MEDICARE

## 2017-12-01 VITALS
DIASTOLIC BLOOD PRESSURE: 62 MMHG | TEMPERATURE: 99 F | SYSTOLIC BLOOD PRESSURE: 113 MMHG | RESPIRATION RATE: 18 BRPM | HEART RATE: 62 BPM | OXYGEN SATURATION: 98 %

## 2017-12-01 VITALS
OXYGEN SATURATION: 98 % | HEIGHT: 64 IN | WEIGHT: 159.84 LBS | SYSTOLIC BLOOD PRESSURE: 114 MMHG | DIASTOLIC BLOOD PRESSURE: 72 MMHG | HEART RATE: 68 BPM | TEMPERATURE: 99 F | RESPIRATION RATE: 18 BRPM

## 2017-12-01 DIAGNOSIS — Z90.49 ACQUIRED ABSENCE OF OTHER SPECIFIED PARTS OF DIGESTIVE TRACT: Chronic | ICD-10-CM

## 2017-12-01 DIAGNOSIS — Z98.89 OTHER SPECIFIED POSTPROCEDURAL STATES: Chronic | ICD-10-CM

## 2017-12-01 DIAGNOSIS — Z41.1 ENCOUNTER FOR COSMETIC SURGERY: Chronic | ICD-10-CM

## 2017-12-01 DIAGNOSIS — Z51.89 ENCOUNTER FOR OTHER SPECIFIED AFTERCARE: ICD-10-CM

## 2017-12-01 DIAGNOSIS — Z96.652 PRESENCE OF LEFT ARTIFICIAL KNEE JOINT: Chronic | ICD-10-CM

## 2017-12-01 DIAGNOSIS — R22.1 LOCALIZED SWELLING, MASS AND LUMP, NECK: Chronic | ICD-10-CM

## 2017-12-01 PROCEDURE — 99232 SBSQ HOSP IP/OBS MODERATE 35: CPT

## 2017-12-01 RX ORDER — LEVOTHYROXINE SODIUM 125 MCG
25 TABLET ORAL
Qty: 0 | Refills: 0 | Status: DISCONTINUED | OUTPATIENT
Start: 2017-12-01 | End: 2017-12-04

## 2017-12-01 RX ORDER — ENOXAPARIN SODIUM 100 MG/ML
30 INJECTION SUBCUTANEOUS EVERY 12 HOURS
Qty: 0 | Refills: 0 | Status: DISCONTINUED | OUTPATIENT
Start: 2017-12-01 | End: 2017-12-07

## 2017-12-01 RX ORDER — TRAMADOL HYDROCHLORIDE 50 MG/1
50 TABLET ORAL EVERY 4 HOURS
Qty: 0 | Refills: 0 | Status: DISCONTINUED | OUTPATIENT
Start: 2017-12-01 | End: 2017-12-01

## 2017-12-01 RX ORDER — LOSARTAN POTASSIUM 100 MG/1
100 TABLET, FILM COATED ORAL DAILY
Qty: 0 | Refills: 0 | Status: DISCONTINUED | OUTPATIENT
Start: 2017-12-01 | End: 2017-12-11

## 2017-12-01 RX ORDER — KETOROLAC TROMETHAMINE 30 MG/ML
15 SYRINGE (ML) INJECTION ONCE
Qty: 0 | Refills: 0 | Status: DISCONTINUED | OUTPATIENT
Start: 2017-12-01 | End: 2017-12-01

## 2017-12-01 RX ORDER — CELECOXIB 200 MG/1
200 CAPSULE ORAL ONCE
Qty: 0 | Refills: 0 | Status: COMPLETED | OUTPATIENT
Start: 2017-12-01 | End: 2017-12-01

## 2017-12-01 RX ORDER — TRIAMTERENE/HYDROCHLOROTHIAZID 75 MG-50MG
1 TABLET ORAL DAILY
Qty: 0 | Refills: 0 | Status: DISCONTINUED | OUTPATIENT
Start: 2017-12-01 | End: 2017-12-12

## 2017-12-01 RX ORDER — LIDOCAINE 4 G/100G
1 CREAM TOPICAL DAILY
Qty: 0 | Refills: 0 | Status: DISCONTINUED | OUTPATIENT
Start: 2017-12-01 | End: 2017-12-12

## 2017-12-01 RX ORDER — CELECOXIB 200 MG/1
1 CAPSULE ORAL
Qty: 42 | Refills: 0 | OUTPATIENT
Start: 2017-12-01 | End: 2017-12-22

## 2017-12-01 RX ORDER — LEVOTHYROXINE SODIUM 125 MCG
25 TABLET ORAL DAILY
Qty: 0 | Refills: 0 | Status: DISCONTINUED | OUTPATIENT
Start: 2017-12-01 | End: 2017-12-04

## 2017-12-01 RX ORDER — ASPIRIN/CALCIUM CARB/MAGNESIUM 324 MG
1 TABLET ORAL
Qty: 56 | Refills: 0 | OUTPATIENT
Start: 2017-12-01 | End: 2017-12-29

## 2017-12-01 RX ORDER — SIMVASTATIN 20 MG/1
20 TABLET, FILM COATED ORAL AT BEDTIME
Qty: 0 | Refills: 0 | Status: DISCONTINUED | OUTPATIENT
Start: 2017-12-01 | End: 2017-12-09

## 2017-12-01 RX ORDER — ASPIRIN/CALCIUM CARB/MAGNESIUM 324 MG
81 TABLET ORAL DAILY
Qty: 0 | Refills: 0 | Status: DISCONTINUED | OUTPATIENT
Start: 2017-12-01 | End: 2017-12-07

## 2017-12-01 RX ORDER — ACETAMINOPHEN 500 MG
650 TABLET ORAL EVERY 6 HOURS
Qty: 0 | Refills: 0 | Status: DISCONTINUED | OUTPATIENT
Start: 2017-12-01 | End: 2017-12-02

## 2017-12-01 RX ORDER — CELECOXIB 200 MG/1
200 CAPSULE ORAL ONCE
Qty: 0 | Refills: 0 | Status: COMPLETED | OUTPATIENT
Start: 2017-12-01 | End: 2017-12-02

## 2017-12-01 RX ORDER — TRAMADOL HYDROCHLORIDE 50 MG/1
25 TABLET ORAL EVERY 4 HOURS
Qty: 0 | Refills: 0 | Status: DISCONTINUED | OUTPATIENT
Start: 2017-12-01 | End: 2017-12-01

## 2017-12-01 RX ADMIN — ENOXAPARIN SODIUM 30 MILLIGRAM(S): 100 INJECTION SUBCUTANEOUS at 21:19

## 2017-12-01 RX ADMIN — Medication 2000 UNIT(S): at 12:33

## 2017-12-01 RX ADMIN — Medication 25 MICROGRAM(S): at 06:05

## 2017-12-01 RX ADMIN — Medication 650 MILLIGRAM(S): at 13:30

## 2017-12-01 RX ADMIN — LOSARTAN POTASSIUM 100 MILLIGRAM(S): 100 TABLET, FILM COATED ORAL at 06:05

## 2017-12-01 RX ADMIN — Medication 81 MILLIGRAM(S): at 12:33

## 2017-12-01 RX ADMIN — Medication 650 MILLIGRAM(S): at 06:05

## 2017-12-01 RX ADMIN — CELECOXIB 200 MILLIGRAM(S): 200 CAPSULE ORAL at 12:33

## 2017-12-01 RX ADMIN — Medication 650 MILLIGRAM(S): at 12:32

## 2017-12-01 RX ADMIN — SIMVASTATIN 20 MILLIGRAM(S): 20 TABLET, FILM COATED ORAL at 21:19

## 2017-12-01 RX ADMIN — ENOXAPARIN SODIUM 30 MILLIGRAM(S): 100 INJECTION SUBCUTANEOUS at 06:05

## 2017-12-01 RX ADMIN — CELECOXIB 200 MILLIGRAM(S): 200 CAPSULE ORAL at 13:30

## 2017-12-01 NOTE — PROGRESS NOTE ADULT - ASSESSMENT
78 y/o female with PMH HTN, HLD, hiatal hernia, DVT completed AC, polio, presents with right knee pain [id number], progressively worse, s/p injections with limited relief, controlled with Tylenol and Advil prn, s/p right TKA POD #2.     Problem/Recommendation - 1:  Problem: Primary osteoarthritis of right knee. Recommendation: s/p Right TKA   Pain control.  PT eval.  Antibiotics, wound care, and DVT prophylaxis as per Ortho.  Incentive spirometry.  Avoid opioid induced constipation.     Problem/Recommendation - 2:  ·  Problem: Essential hypertension.  Recommendation: Continue home meds with parameters      Problem/Recommendation - 3:  ·  Problem: Hyperlipidemia, unspecified hyperlipidemia type.  Recommendation: Continue Statin.      Problem/Recommendation - 4:  ·  Problem: Deep vein thrombosis (DVT) of left lower extremity, unspecified chronicity, unspecified vein.  Recommendation: H/o DVT 2015 - treated with 6 months AC. Agree with Lovenox for DVT prophylaxis      Problem/Recommendation - 5:  ·  Problem: Acute blood loss as cause of postoperative anemia.  Recommendation: Asymptomatic.  Monitor CBC.      Problem/Recommendation - 6:  Problem: Prophylactic measure. Recommendation: Continue Lovenox.     Problem/Recommendation - 7:  Problem: Acquired hypothyroidism. Recommendation: Patient takes 0.025 mcg daily except M /Th 0.050 MCG - continue same dose .

## 2017-12-01 NOTE — PROGRESS NOTE ADULT - SUBJECTIVE AND OBJECTIVE BOX
PRADEEP DEL TORO    [id number]    History: 77y Female is status post right total knee arthroplasty on 11/29/2017, POD # 02. Patient is doing well c/o pain has not been OOB. The patient's pain is controlled using the prescribed pain medications, pt will not take narcotics.   Denies nausea, vomiting, chest pain, shortness of breath, abdominal pain or fever. No new complaints.        Vital Signs Last 24 Hrs  T(C): 36.9 (01 Dec 2017 04:56), Max: 37.5 (30 Nov 2017 09:10)  T(F): 98.4 (01 Dec 2017 04:56), Max: 99.5 (30 Nov 2017 09:10)  HR: 71 (01 Dec 2017 04:56) (63 - 93)  BP: 121/62 (01 Dec 2017 04:56) (110/58 - 125/58)  BP(mean): --  RR: 18 (01 Dec 2017 04:56) (18 - 18)  SpO2: 97% (01 Dec 2017 04:56) (96% - 99%)      Physical exam: The right knee dressing is clean, dry and intact. No drainage or discharge.  Dressing removed, No erythema is noted. No blistering. No ecchymosis.  New dressing placed.  The calf is supple nontender. Passive range of motion is acceptable to due postoperative pain. No calf tenderness. Sensation to light touch is grossly intact distally. Motor function distally is 5/5. Extensor hallucis longus and flexor hallucis longus are intact. No foot drop. 2+ dorsalis pedis pulse. Capillary refill is less than 2 seconds. No cyanosis.    Primary Orthopedic Assessment:  • s/p RIGHT total knee replacement pod #2    Plan:   • DVT prophylaxis lovenox, including use of compression devices and ankle pumps  • Continue physical therapy  • Weightbearing as tolerated of the right lower extremity with assistance of a walker  • Incentive spirometry encouraged  • Pain control as clinically indicated  • Discharge planning – anticipated discharge is  acute rehabilitation today/tomorrow

## 2017-12-01 NOTE — PROGRESS NOTE ADULT - SUBJECTIVE AND OBJECTIVE BOX
Patient seen and examined . S/p  R TKA  , POD # 2    CC : R knee pain           PAST MEDICAL & SURGICAL HISTORY:  TIA (transient ischemic attack): 2004  DVT (deep venous thrombosis): 2016, was on xarelto for 6 months  Polio  Hyperlipidemia  Hiatal hernia  Hypertension  Osteoarthritis  Neck mass: excision of neck mass 1942  History of foot surgery: left foot due to polio, 1953  S/P rhinoplasty  H/O total knee replacement, left: 2003 revision 2014  S/P dilation and curettage: 2001, 1965, 1972, 1978  S/P cholecystectomy      MEDICATIONS  (STANDING):  acetaminophen   Tablet. 650 milliGRAM(s) Oral every 6 hours  acetaminophen  IVPB. 1000 milliGRAM(s) IV Intermittent once  aspirin  chewable 81 milliGRAM(s) Oral daily  ceFAZolin   IVPB 2000 milliGRAM(s) IV Intermittent once  cholecalciferol 2000 Unit(s) Oral daily  enoxaparin Injectable 30 milliGRAM(s) SubCutaneous every 12 hours  ketorolac   Injectable 15 milliGRAM(s) IV Push once  levothyroxine 25 MICROGram(s) Oral daily  levothyroxine 25 MICROGram(s) Oral <User Schedule>  losartan 100 milliGRAM(s) Oral daily  simvastatin 20 milliGRAM(s) Oral at bedtime  sodium chloride 0.9% lock flush 3 milliLiter(s) IV Push every 8 hours  sodium chloride 0.9%. 1000 milliLiter(s) (100 mL/Hr) IV Continuous <Continuous>    MEDICATIONS  (PRN):  aluminum hydroxide/magnesium hydroxide/simethicone Suspension 30 milliLiter(s) Oral four times a day PRN Indigestion  HYDROmorphone   Tablet 2 milliGRAM(s) Oral every 3 hours PRN Moderate Pain (4 - 6)  HYDROmorphone   Tablet 4 milliGRAM(s) Oral every 3 hours PRN Severe Pain (7 - 10)  HYDROmorphone  Injectable 0.5 milliGRAM(s) IV Push every 3 hours PRN breakthrough pain control  magnesium hydroxide Suspension 30 milliLiter(s) Oral daily PRN Constipation  ondansetron Injectable 4 milliGRAM(s) IV Push every 4 hours PRN Nausea and/or Vomiting  senna 2 Tablet(s) Oral at bedtime PRN Constipation      LABS:                          11.2   5.7   )-----------( 159      ( 30 Nov 2017 06:51 )             32.8     11-30    138  |  103  |  17.0  ----------------------------<  111  4.0   |  23.0  |  0.61    Ca    8.8      30 Nov 2017 06:51        REVIEW OF SYSTEMS:    CONSTITUTIONAL: No fever, weight loss, or fatigue  EYES: No eye pain, visual disturbances, or discharge  ENMT:  No difficulty hearing, tinnitus, vertigo; No sinus or throat pain  NECK: No pain or stiffness  RESPIRATORY: No cough, wheezing, chills or hemoptysis; No shortness of breath  CARDIOVASCULAR: No chest pain, palpitations, dizziness, or leg swelling  GASTROINTESTINAL: No abdominal or epigastric pain. No nausea, vomiting, or hematemesis; No diarrhea or constipation. No melena or hematochezia.  GENITOURINARY: No dysuria, frequency, hematuria, or incontinence  NEUROLOGICAL: No headaches, memory loss, loss of strength, numbness, or tremors  SKIN: No itching, burning, rashes, or lesions   LYMPH NODES: No enlarged glands  ENDOCRINE: No heat or cold intolerance; No hair loss  MUSCULOSKELETAL: R knee pain   PSYCHIATRIC: No depression, anxiety, mood swings, or difficulty sleeping  HEME/LYMPH: No easy bruising, or bleeding gums  ALLERGY AND IMMUNOLOGIC: No hives or eczema    Vital Signs Last 24 Hrs  T(C): 37.1 (01 Dec 2017 08:41), Max: 37.1 (01 Dec 2017 08:41)  T(F): 98.7 (01 Dec 2017 08:41), Max: 98.7 (01 Dec 2017 08:41)  HR: 55 (01 Dec 2017 08:41) (55 - 93)  BP: 115/59 (01 Dec 2017 08:41) (110/58 - 121/62)  BP(mean): --  RR: 18 (01 Dec 2017 08:41) (18 - 18)  SpO2: 98% (01 Dec 2017 08:41) (96% - 98%)  PHYSICAL EXAM:    GENERAL: NAD, well-groomed, well-developed  HEAD:  Atraumatic, Normocephalic  EYES: EOMI, PERRLA, conjunctiva and sclera clear  NECK: Supple, No JVD, Normal thyroid  NERVOUS SYSTEM:  Alert & Oriented X3, no focal deficit  CHEST/LUNG: CTA b/l ,  no  rales, rhonchi, wheezing, or rubs  HEART: Regular rate and rhythm; No murmurs, rubs, or gallops  ABDOMEN: Soft, Nontender, Nondistended; Bowel sounds present  EXTREMITIES:  2+ Peripheral Pulses, No clubbing, cyanosis, or edema , R knee dressing + , clean and dry   LYMPH: No lymphadenopathy noted  SKIN: No rashes or lesions Patient seen and examined . S/p  R TKA  , POD # 2. C/O R knee pain increases with ambulation , no other complaints .    CC : R knee pain     PAST MEDICAL & SURGICAL HISTORY:  TIA (transient ischemic attack): 2004  DVT (deep venous thrombosis): 2016, was on xarelto for 6 months  Polio  Hyperlipidemia  Hiatal hernia  Hypertension  Osteoarthritis  Neck mass: excision of neck mass 1942  History of foot surgery: left foot due to polio, 1953  S/P rhinoplasty  H/O total knee replacement, left: 2003 revision 2014  S/P dilation and curettage: 2001, 1965, 1972, 1978  S/P cholecystectomy      MEDICATIONS  (STANDING):  acetaminophen   Tablet. 650 milliGRAM(s) Oral every 6 hours  acetaminophen  IVPB. 1000 milliGRAM(s) IV Intermittent once  aspirin  chewable 81 milliGRAM(s) Oral daily  ceFAZolin   IVPB 2000 milliGRAM(s) IV Intermittent once  cholecalciferol 2000 Unit(s) Oral daily  enoxaparin Injectable 30 milliGRAM(s) SubCutaneous every 12 hours  ketorolac   Injectable 15 milliGRAM(s) IV Push once  levothyroxine 25 MICROGram(s) Oral daily  levothyroxine 25 MICROGram(s) Oral <User Schedule>  losartan 100 milliGRAM(s) Oral daily  simvastatin 20 milliGRAM(s) Oral at bedtime  sodium chloride 0.9% lock flush 3 milliLiter(s) IV Push every 8 hours  sodium chloride 0.9%. 1000 milliLiter(s) (100 mL/Hr) IV Continuous <Continuous>    MEDICATIONS  (PRN):  aluminum hydroxide/magnesium hydroxide/simethicone Suspension 30 milliLiter(s) Oral four times a day PRN Indigestion  HYDROmorphone   Tablet 2 milliGRAM(s) Oral every 3 hours PRN Moderate Pain (4 - 6)  HYDROmorphone   Tablet 4 milliGRAM(s) Oral every 3 hours PRN Severe Pain (7 - 10)  HYDROmorphone  Injectable 0.5 milliGRAM(s) IV Push every 3 hours PRN breakthrough pain control  magnesium hydroxide Suspension 30 milliLiter(s) Oral daily PRN Constipation  ondansetron Injectable 4 milliGRAM(s) IV Push every 4 hours PRN Nausea and/or Vomiting  senna 2 Tablet(s) Oral at bedtime PRN Constipation      LABS:                          11.2   5.7   )-----------( 159      ( 30 Nov 2017 06:51 )             32.8     11-30    138  |  103  |  17.0  ----------------------------<  111  4.0   |  23.0  |  0.61    Ca    8.8      30 Nov 2017 06:51        REVIEW OF SYSTEMS:    CONSTITUTIONAL: No fever, weight loss, or fatigue  EYES: No eye pain, visual disturbances, or discharge  ENMT:  No difficulty hearing, tinnitus, vertigo; No sinus or throat pain  NECK: No pain or stiffness  RESPIRATORY: No cough, wheezing, chills or hemoptysis; No shortness of breath  CARDIOVASCULAR: No chest pain, palpitations, dizziness, or leg swelling  GASTROINTESTINAL: No abdominal or epigastric pain. No nausea, vomiting, or hematemesis; No diarrhea or constipation. No melena or hematochezia.  GENITOURINARY: No dysuria, frequency, hematuria, or incontinence  NEUROLOGICAL: No headaches, memory loss, loss of strength, numbness, or tremors  SKIN: No itching, burning, rashes, or lesions   LYMPH NODES: No enlarged glands  ENDOCRINE: No heat or cold intolerance; No hair loss  MUSCULOSKELETAL: R knee pain increases with activity   PSYCHIATRIC: No depression, anxiety, mood swings, or difficulty sleeping  HEME/LYMPH: No easy bruising, or bleeding gums  ALLERGY AND IMMUNOLOGIC: No hives or eczema    Vital Signs Last 24 Hrs  T(C): 37.1 (01 Dec 2017 08:41), Max: 37.1 (01 Dec 2017 08:41)  T(F): 98.7 (01 Dec 2017 08:41), Max: 98.7 (01 Dec 2017 08:41)  HR: 55 (01 Dec 2017 08:41) (55 - 93)  BP: 115/59 (01 Dec 2017 08:41) (110/58 - 121/62)  BP(mean): --  RR: 18 (01 Dec 2017 08:41) (18 - 18)  SpO2: 98% (01 Dec 2017 08:41) (96% - 98%)  PHYSICAL EXAM:    GENERAL: NAD, well-groomed, well-developed  HEAD:  Atraumatic, Normocephalic  EYES: EOMI, PERRLA, conjunctiva and sclera clear  NECK: Supple, No JVD, Normal thyroid  NERVOUS SYSTEM:  Alert & Oriented X3, no focal deficit  CHEST/LUNG: CTA b/l ,  no  rales, rhonchi, wheezing, or rubs  HEART: Regular rate and rhythm; No murmurs, rubs, or gallops  ABDOMEN: Soft, Nontender, Nondistended; Bowel sounds present  EXTREMITIES:  2+ Peripheral Pulses, No clubbing, cyanosis, or edema , R knee dressing + , clean and dry , b/l ankle swelling L > R chronic   LYMPH: No lymphadenopathy noted  SKIN: No rashes or lesions

## 2017-12-02 PROCEDURE — 99222 1ST HOSP IP/OBS MODERATE 55: CPT | Mod: AI,GC

## 2017-12-02 RX ORDER — ACETAMINOPHEN 500 MG
975 TABLET ORAL THREE TIMES A DAY
Qty: 0 | Refills: 0 | Status: DISCONTINUED | OUTPATIENT
Start: 2017-12-02 | End: 2017-12-12

## 2017-12-02 RX ADMIN — CELECOXIB 200 MILLIGRAM(S): 200 CAPSULE ORAL at 05:59

## 2017-12-02 RX ADMIN — Medication 975 MILLIGRAM(S): at 13:30

## 2017-12-02 RX ADMIN — CELECOXIB 200 MILLIGRAM(S): 200 CAPSULE ORAL at 06:30

## 2017-12-02 RX ADMIN — LOSARTAN POTASSIUM 100 MILLIGRAM(S): 100 TABLET, FILM COATED ORAL at 05:58

## 2017-12-02 RX ADMIN — LIDOCAINE 1 PATCH: 4 CREAM TOPICAL at 11:54

## 2017-12-02 RX ADMIN — SIMVASTATIN 20 MILLIGRAM(S): 20 TABLET, FILM COATED ORAL at 21:30

## 2017-12-02 RX ADMIN — Medication 81 MILLIGRAM(S): at 11:53

## 2017-12-02 RX ADMIN — Medication 1 TABLET(S): at 11:53

## 2017-12-02 RX ADMIN — ENOXAPARIN SODIUM 30 MILLIGRAM(S): 100 INJECTION SUBCUTANEOUS at 05:58

## 2017-12-02 RX ADMIN — ENOXAPARIN SODIUM 30 MILLIGRAM(S): 100 INJECTION SUBCUTANEOUS at 17:33

## 2017-12-02 RX ADMIN — Medication 975 MILLIGRAM(S): at 14:00

## 2017-12-02 RX ADMIN — Medication 1 CAPSULE(S): at 05:58

## 2017-12-02 RX ADMIN — Medication 25 MICROGRAM(S): at 05:58

## 2017-12-02 RX ADMIN — LIDOCAINE 1 PATCH: 4 CREAM TOPICAL at 23:40

## 2017-12-02 NOTE — PROGRESS NOTE ADULT - SUBJECTIVE AND OBJECTIVE BOX
HPI:77F admitted 11/29 for elective right TKR after failing conservative measures. Right knee pain ongoing and worsening over the last 2 years. s/p right TKR by Dr. Snell with EBL = 50cc on 11/29.    PMHx: Polio, Hypothyroidism, H/O left deep vein thrombosis (about 3 years ago, completed treatement with Xarelto), Hyperlipidemia, Hypertension    INTERVAL SUBJECTIVE & REVIEW OF SYMPTOMS:  No new issues overnight. Does not feel that she needs OT as she can do all those things by herself  Refused blood work this am, and also does not want lovenox, prefers eliquis. 2. c/o dysuria and feels that she has a UTI and wants abx      REVIEW OF SYSTEMS  [ x  ] Constitutional WNL  [ x  ] Cardio WNL  [ x  ] Resp WNL  [ x  ] GI WNL  [ x  ]  WNL  [   ] Heme WNL  [   ] Skin WNL  [   ] MSK WNL  [   ] Neuro WNL  [   ] Cognitive WNL  [   ] Psych WNL    VITAL SIGNS  Vital Signs Last 24 Hrs  T(C): 36.9 (02 Dec 2017 05:52), Max: 37.3 (01 Dec 2017 17:20)  T(F): 98.5 (02 Dec 2017 05:52), Max: 99.2 (01 Dec 2017 17:20)  HR: 63 (02 Dec 2017 05:52) (62 - 69)  BP: 124/69 (02 Dec 2017 05:52) (113/62 - 125/76)  BP(mean): --  RR: 18 (02 Dec 2017 05:52) (17 - 18)  SpO2: 96% (02 Dec 2017 05:52) (96% - 100%)    PHYSICAL EXAM  General: NAD  Cardio: S1S2+  Resp: clear  Abdomen: soft  Extrem: No calf tenderness  Skin: right knee honey comb dressing  Functional Exam: Eval today    MEDICATIONS  (STANDING):  aspirin enteric coated 81 milliGRAM(s) Oral daily  calcium carbonate 1250 mG + Vitamin D (OsCal 500 + D) 1 Tablet(s) Oral daily  enoxaparin Injectable 30 milliGRAM(s) SubCutaneous every 12 hours  levothyroxine 25 MICROGram(s) Oral daily  levothyroxine 25 MICROGram(s) Oral <User Schedule>  lidocaine   Patch 1 Patch Transdermal daily  losartan 100 milliGRAM(s) Oral daily  multivitamin 1 Tablet(s) Oral daily  simvastatin 20 milliGRAM(s) Oral at bedtime  triamterene 37.5 mG/hydrochlorothiazide 25 mG Capsule 1 Capsule(s) Oral daily    MEDICATIONS  (PRN):  acetaminophen   Tablet. 650 milliGRAM(s) Oral every 6 hours PRN Mild Pain (1 - 3)  bisacodyl 10 milliGRAM(s) Oral daily PRN No bowel movement GREATER THAN 2 day  sodium biphosphate Rectal Enema 1 Enema Rectal daily PRN No bowel movement GREATER THAN 2 day        RECENT LABS:  Patient refused am labs    RADIOLOGY/OTHER RESULTS:    A/P:  77 year old female with knee OA now s/p right TKA after failing conservative treatment. Patient with functional deficits following recent surgery    Begin full rehab program:PT/OT    DVT prophylaxis: on lovenox  q12h. Continue ASA 81MG( Patient with prior h/o DVT of left leg)    Pain: tylenol prn, lidoderm patch    Hyperlipidemia: on simvastatin    HTN: On dyazide    Hypothyroidism: on synthroid.     Bladder/bowel : toilet schedule prn, dulcolax prn    Diet: regular with thin, oscal, MVI

## 2017-12-03 DIAGNOSIS — M17.11 UNILATERAL PRIMARY OSTEOARTHRITIS, RIGHT KNEE: ICD-10-CM

## 2017-12-03 DIAGNOSIS — I10 ESSENTIAL (PRIMARY) HYPERTENSION: ICD-10-CM

## 2017-12-03 DIAGNOSIS — Z86.718 PERSONAL HISTORY OF OTHER VENOUS THROMBOSIS AND EMBOLISM: ICD-10-CM

## 2017-12-03 LAB
APPEARANCE UR: CLEAR — SIGNIFICANT CHANGE UP
BILIRUB UR-MCNC: NEGATIVE — SIGNIFICANT CHANGE UP
COLOR SPEC: YELLOW — SIGNIFICANT CHANGE UP
DIFF PNL FLD: NEGATIVE — SIGNIFICANT CHANGE UP
EPI CELLS # UR: SIGNIFICANT CHANGE UP
GLUCOSE UR QL: NEGATIVE MG/DL — SIGNIFICANT CHANGE UP
KETONES UR-MCNC: NEGATIVE — SIGNIFICANT CHANGE UP
LEUKOCYTE ESTERASE UR-ACNC: NEGATIVE — SIGNIFICANT CHANGE UP
NITRITE UR-MCNC: NEGATIVE — SIGNIFICANT CHANGE UP
PH UR: 6.5 — SIGNIFICANT CHANGE UP (ref 5–8)
PROT UR-MCNC: 15 MG/DL
RBC CASTS # UR COMP ASSIST: NEGATIVE /HPF — SIGNIFICANT CHANGE UP (ref 0–4)
SP GR SPEC: 1.01 — SIGNIFICANT CHANGE UP (ref 1.01–1.02)
UROBILINOGEN FLD QL: NEGATIVE MG/DL — SIGNIFICANT CHANGE UP
WBC UR QL: SIGNIFICANT CHANGE UP

## 2017-12-03 PROCEDURE — 99232 SBSQ HOSP IP/OBS MODERATE 35: CPT

## 2017-12-03 RX ORDER — CELECOXIB 200 MG/1
100 CAPSULE ORAL
Qty: 0 | Refills: 0 | Status: COMPLETED | OUTPATIENT
Start: 2017-12-03 | End: 2017-12-07

## 2017-12-03 RX ADMIN — LOSARTAN POTASSIUM 100 MILLIGRAM(S): 100 TABLET, FILM COATED ORAL at 05:48

## 2017-12-03 RX ADMIN — Medication 1 CAPSULE(S): at 05:48

## 2017-12-03 RX ADMIN — ENOXAPARIN SODIUM 30 MILLIGRAM(S): 100 INJECTION SUBCUTANEOUS at 16:44

## 2017-12-03 RX ADMIN — Medication 1 TABLET(S): at 11:31

## 2017-12-03 RX ADMIN — Medication 25 MICROGRAM(S): at 05:48

## 2017-12-03 RX ADMIN — CELECOXIB 100 MILLIGRAM(S): 200 CAPSULE ORAL at 12:36

## 2017-12-03 RX ADMIN — Medication 975 MILLIGRAM(S): at 05:49

## 2017-12-03 RX ADMIN — Medication 81 MILLIGRAM(S): at 11:32

## 2017-12-03 RX ADMIN — Medication 975 MILLIGRAM(S): at 06:19

## 2017-12-03 RX ADMIN — SIMVASTATIN 20 MILLIGRAM(S): 20 TABLET, FILM COATED ORAL at 22:22

## 2017-12-03 RX ADMIN — LIDOCAINE 1 PATCH: 4 CREAM TOPICAL at 16:50

## 2017-12-03 RX ADMIN — LIDOCAINE 1 PATCH: 4 CREAM TOPICAL at 07:27

## 2017-12-03 RX ADMIN — CELECOXIB 100 MILLIGRAM(S): 200 CAPSULE ORAL at 13:35

## 2017-12-03 RX ADMIN — ENOXAPARIN SODIUM 30 MILLIGRAM(S): 100 INJECTION SUBCUTANEOUS at 05:48

## 2017-12-03 NOTE — PROGRESS NOTE ADULT - SUBJECTIVE AND OBJECTIVE BOX
HPI:77F admitted 11/29 for elective right TKR after failing conservative measures. Right knee pain ongoing and worsening over the last 2 years. s/p right TKR by Dr. Snell with EBL = 50cc on 11/29.    PMHx: Polio, Hypothyroidism, H/O left deep vein thrombosis (about 3 years ago, completed treatement with Xarelto), Hyperlipidemia, Hypertension    INTERVAL SUBJECTIVE & REVIEW OF SYMPTOMS:  No new issues overnight. Denies any knee pain. Slept well. denies dysuria    REVIEW OF SYSTEMS  [ x  ] Constitutional WNL  [ x  ] Cardio WNL  [ x  ] Resp WNL  [ x  ] GI WNL  [ x  ]  WNL  [   ] Heme WNL  [   ] Skin WNL  [   ] MSK WNL  [   ] Neuro WNL  [   ] Cognitive WNL  [   ] Psych WNL    Vital Signs Last 24 Hrs  T(C): 36.7 (03 Dec 2017 05:59), Max: 36.9 (02 Dec 2017 14:34)  T(F): 98.1 (03 Dec 2017 05:59), Max: 98.4 (02 Dec 2017 14:34)  HR: 62 (03 Dec 2017 05:59) (59 - 62)  BP: 139/68 (03 Dec 2017 05:59) (105/69 - 139/68)  BP(mean): --  RR: 18 (03 Dec 2017 05:59) (16 - 18)  SpO2: 97% (03 Dec 2017 05:59) (96% - 98%)    PHYSICAL EXAM  General: NAD  Cardio: S1S2+  Resp: clear  Abdomen: soft  Extrem: No calf tenderness  Skin: right knee honey comb dressing  Functional Exam: Eval today    MEDICATIONS  (STANDING):  aspirin enteric coated 81 milliGRAM(s) Oral daily  calcium carbonate 1250 mG + Vitamin D (OsCal 500 + D) 1 Tablet(s) Oral daily  celecoxib 100 milliGRAM(s) Oral with breakfast  enoxaparin Injectable 30 milliGRAM(s) SubCutaneous every 12 hours  levothyroxine 25 MICROGram(s) Oral daily  levothyroxine 25 MICROGram(s) Oral <User Schedule>  lidocaine   Patch 1 Patch Transdermal daily  losartan 100 milliGRAM(s) Oral daily  multivitamin 1 Tablet(s) Oral daily  simvastatin 20 milliGRAM(s) Oral at bedtime  triamterene 37.5 mG/hydrochlorothiazide 25 mG Capsule 1 Capsule(s) Oral daily    MEDICATIONS  (PRN):  acetaminophen   Tablet. 975 milliGRAM(s) Oral three times a day PRN Mild Pain (1 - 3)  bisacodyl 10 milliGRAM(s) Oral daily PRN No bowel movement GREATER THAN 2 day  sodium biphosphate Rectal Enema 1 Enema Rectal daily PRN No bowel movement GREATER THAN 2 day      RECENT LABS:      RADIOLOGY/OTHER RESULTS:    A/P:  77 year old female with knee OA now s/p right TKA after failing conservative treatment. Patient with functional deficits following recent surgery    Continue full rehab program:PT/OT    DVT prophylaxis: on lovenox  q12h. Continue ASA 81MG( Patient with prior h/o DVT of left leg).     Pain: tylenol prn, lidoderm patch, Celebrex 12/3    Hyperlipidemia: on simvastatin    HTN: On dyazide    Hypothyroidism: on synthroid.     Bladder/bowel : toilet schedule prn, dulcolax prn    Diet: regular with thin, oscal, MVI HPI:77F admitted 11/29 for elective right TKR after failing conservative measures. Right knee pain ongoing and worsening over the last 2 years. s/p right TKR by Dr. Snell with EBL = 50cc on 11/29.    PMHx: Polio, Hypothyroidism, H/O left deep vein thrombosis (about 3 years ago, completed treatement with Xarelto), Hyperlipidemia, Hypertension    INTERVAL SUBJECTIVE & REVIEW OF SYMPTOMS:  No new issues overnight. Denies any knee pain. Slept well. denies dysuria    REVIEW OF SYSTEMS  [ x  ] Constitutional WNL  [ x  ] Cardio WNL  [ x  ] Resp WNL  [ x  ] GI WNL  [ x  ]  WNL  [   ] Heme WNL  [   ] Skin WNL  [   ] MSK WNL  [   ] Neuro WNL  [   ] Cognitive WNL  [   ] Psych WNL    Vital Signs Last 24 Hrs  T(C): 36.7 (03 Dec 2017 05:59), Max: 36.9 (02 Dec 2017 14:34)  T(F): 98.1 (03 Dec 2017 05:59), Max: 98.4 (02 Dec 2017 14:34)  HR: 62 (03 Dec 2017 05:59) (59 - 62)  BP: 139/68 (03 Dec 2017 05:59) (105/69 - 139/68)  BP(mean): --  RR: 18 (03 Dec 2017 05:59) (16 - 18)  SpO2: 97% (03 Dec 2017 05:59) (96% - 98%)    PHYSICAL EXAM  General: NAD  Cardio: S1S2+  Resp: clear  Abdomen: soft  Extrem: No calf tenderness  Skin: right knee honey comb dressing  Functional Exam: Eval today    MEDICATIONS  (STANDING):  aspirin enteric coated 81 milliGRAM(s) Oral daily  calcium carbonate 1250 mG + Vitamin D (OsCal 500 + D) 1 Tablet(s) Oral daily  celecoxib 100 milliGRAM(s) Oral with breakfast  enoxaparin Injectable 30 milliGRAM(s) SubCutaneous every 12 hours  levothyroxine 25 MICROGram(s) Oral daily  levothyroxine 25 MICROGram(s) Oral <User Schedule>  lidocaine   Patch 1 Patch Transdermal daily  losartan 100 milliGRAM(s) Oral daily  multivitamin 1 Tablet(s) Oral daily  simvastatin 20 milliGRAM(s) Oral at bedtime  triamterene 37.5 mG/hydrochlorothiazide 25 mG Capsule 1 Capsule(s) Oral daily    MEDICATIONS  (PRN):  acetaminophen   Tablet. 975 milliGRAM(s) Oral three times a day PRN Mild Pain (1 - 3)  bisacodyl 10 milliGRAM(s) Oral daily PRN No bowel movement GREATER THAN 2 day  sodium biphosphate Rectal Enema 1 Enema Rectal daily PRN No bowel movement GREATER THAN 2 day      RECENT LABS:  UA: negative      RADIOLOGY/OTHER RESULTS:    A/P:  77 year old female with knee OA now s/p right TKA after failing conservative treatment. Patient with functional deficits following recent surgery    Continue full rehab program:PT/OT    DVT prophylaxis: on lovenox  q12h. Continue ASA 81MG( Patient with prior h/o DVT of left leg).     Pain: tylenol prn, lidoderm patch, Celebrex 12/3    Hyperlipidemia: on simvastatin    HTN: On dyazide    Hypothyroidism: on synthroid.     Bladder/bowel : toilet schedule prn, dulcolax prn    Diet: regular with thin, oscal, MVI

## 2017-12-03 NOTE — PROGRESS NOTE ADULT - SUBJECTIVE AND OBJECTIVE BOX
Patient is a 77y old  Female s/p right knee  replacement seen in rehab , c/p discomfort on the right leg today   no other complaints tolerating PT     Vital Signs Last 24 Hrs  T(C): 36.1 (03 Dec 2017 11:36), Max: 36.9 (02 Dec 2017 14:34)  T(F): 97 (03 Dec 2017 11:36), Max: 98.4 (02 Dec 2017 14:34)  HR: 57 (03 Dec 2017 11:36) (57 - 62)  BP: 126/67 (03 Dec 2017 11:36) (105/69 - 139/68)  BP(mean): --  RR: 18 (03 Dec 2017 11:36) (16 - 18)  SpO2: 95% (03 Dec 2017 11:36) (95% - 98%)    PHYSICAL EXAM:    GENERAL: NAD, well-groomed, well-developed  HEART: Regular rate and rhythm; No murmurs, rubs, or gallops  ABDOMEN: Soft, Nontender, Nondistended; Bowel sounds present  EXTREMITIES:  2+ Peripheral Pulses, No clubbing, cyanosis, or edema  SKIN: No rashes or lesions      LABS:            Urinalysis Basic - ( 03 Dec 2017 00:37 )    Color: Yellow / Appearance: Clear / S.010 / pH: x  Gluc: x / Ketone: Negative  / Bili: Negative / Urobili: Negative mg/dL   Blood: x / Protein: 15 mg/dL / Nitrite: Negative   Leuk Esterase: Negative / RBC: Negative /HPF / WBC 0-2   Sq Epi: x / Non Sq Epi: Occasional / Bacteria: x        RADIOLOGY & ADDITIONAL TESTS:

## 2017-12-03 NOTE — PROGRESS NOTE ADULT - ASSESSMENT
77F admitted 11/29 for elective right TKR after failing conservative measures. Right knee pain ongoing and worsening over the last 2 years. s/p right TKR by Dr. Snell now in rehab

## 2017-12-04 LAB
ANION GAP SERPL CALC-SCNC: 12 MMOL/L — SIGNIFICANT CHANGE UP (ref 5–17)
BUN SERPL-MCNC: 27 MG/DL — HIGH (ref 8–20)
CALCIUM SERPL-MCNC: 9 MG/DL — SIGNIFICANT CHANGE UP (ref 8.6–10.2)
CHLORIDE SERPL-SCNC: 102 MMOL/L — SIGNIFICANT CHANGE UP (ref 98–107)
CO2 SERPL-SCNC: 25 MMOL/L — SIGNIFICANT CHANGE UP (ref 22–29)
CREAT SERPL-MCNC: 0.66 MG/DL — SIGNIFICANT CHANGE UP (ref 0.5–1.3)
GLUCOSE SERPL-MCNC: 101 MG/DL — SIGNIFICANT CHANGE UP (ref 70–115)
HCT VFR BLD CALC: 28.6 % — LOW (ref 37–47)
HGB BLD-MCNC: 9.6 G/DL — LOW (ref 12–16)
MCHC RBC-ENTMCNC: 29.7 PG — SIGNIFICANT CHANGE UP (ref 27–31)
MCHC RBC-ENTMCNC: 33.6 G/DL — SIGNIFICANT CHANGE UP (ref 32–36)
MCV RBC AUTO: 88.5 FL — SIGNIFICANT CHANGE UP (ref 81–99)
PLATELET # BLD AUTO: 203 K/UL — SIGNIFICANT CHANGE UP (ref 150–400)
POTASSIUM SERPL-MCNC: 4.2 MMOL/L — SIGNIFICANT CHANGE UP (ref 3.5–5.3)
POTASSIUM SERPL-SCNC: 4.2 MMOL/L — SIGNIFICANT CHANGE UP (ref 3.5–5.3)
RBC # BLD: 3.23 M/UL — LOW (ref 4.4–5.2)
RBC # FLD: 12.9 % — SIGNIFICANT CHANGE UP (ref 11–15.6)
SODIUM SERPL-SCNC: 139 MMOL/L — SIGNIFICANT CHANGE UP (ref 135–145)
WBC # BLD: 6.1 K/UL — SIGNIFICANT CHANGE UP (ref 4.8–10.8)
WBC # FLD AUTO: 6.1 K/UL — SIGNIFICANT CHANGE UP (ref 4.8–10.8)

## 2017-12-04 PROCEDURE — 99232 SBSQ HOSP IP/OBS MODERATE 35: CPT

## 2017-12-04 RX ORDER — LEVOTHYROXINE SODIUM 125 MCG
50 TABLET ORAL
Qty: 0 | Refills: 0 | Status: DISCONTINUED | OUTPATIENT
Start: 2017-12-04 | End: 2017-12-07

## 2017-12-04 RX ORDER — LEVOTHYROXINE SODIUM 125 MCG
25 TABLET ORAL
Qty: 0 | Refills: 0 | Status: DISCONTINUED | OUTPATIENT
Start: 2017-12-04 | End: 2017-12-07

## 2017-12-04 RX ADMIN — LOSARTAN POTASSIUM 100 MILLIGRAM(S): 100 TABLET, FILM COATED ORAL at 06:40

## 2017-12-04 RX ADMIN — CELECOXIB 100 MILLIGRAM(S): 200 CAPSULE ORAL at 09:57

## 2017-12-04 RX ADMIN — Medication 81 MILLIGRAM(S): at 11:28

## 2017-12-04 RX ADMIN — Medication 1 CAPSULE(S): at 06:40

## 2017-12-04 RX ADMIN — CELECOXIB 100 MILLIGRAM(S): 200 CAPSULE ORAL at 08:19

## 2017-12-04 RX ADMIN — Medication 50 MICROGRAM(S): at 06:44

## 2017-12-04 RX ADMIN — Medication 1 TABLET(S): at 11:29

## 2017-12-04 RX ADMIN — Medication 975 MILLIGRAM(S): at 09:20

## 2017-12-04 RX ADMIN — Medication 975 MILLIGRAM(S): at 10:00

## 2017-12-04 RX ADMIN — SIMVASTATIN 20 MILLIGRAM(S): 20 TABLET, FILM COATED ORAL at 21:34

## 2017-12-04 RX ADMIN — LIDOCAINE 1 PATCH: 4 CREAM TOPICAL at 08:19

## 2017-12-04 RX ADMIN — LIDOCAINE 1 PATCH: 4 CREAM TOPICAL at 20:00

## 2017-12-04 RX ADMIN — ENOXAPARIN SODIUM 30 MILLIGRAM(S): 100 INJECTION SUBCUTANEOUS at 06:40

## 2017-12-04 RX ADMIN — ENOXAPARIN SODIUM 30 MILLIGRAM(S): 100 INJECTION SUBCUTANEOUS at 17:15

## 2017-12-04 NOTE — PROGRESS NOTE ADULT - SUBJECTIVE AND OBJECTIVE BOX
Patient is a 77y old  Female s/p right knee  replacement seen in rehab , c/p discomfort on the right leg today   no other complaints tolerating PT/OT/pain mgmt  DVT prophylaxis- as per ortho  Abx as per SCIP  Incentive spirometry  Prophylaxis of opoid induced constipation     PAST MEDICAL & SURGICAL HISTORY:  TIA (transient ischemic attack):   DVT (deep venous thrombosis): , was on xarelto for 6 months  Polio  Hyperlipidemia  Hiatal hernia  Hypertension  Osteoarthritis  Neck mass: excision of neck mass   History of foot surgery: left foot due to polio,   S/P rhinoplasty  H/O total knee replacement, left:  revision   S/P dilation and curettage: , , ,   S/P cholecystectomy      MEDICATIONS  (STANDING):  aspirin enteric coated 81 milliGRAM(s) Oral daily  calcium carbonate 1250 mG + Vitamin D (OsCal 500 + D) 1 Tablet(s) Oral daily  celecoxib 100 milliGRAM(s) Oral with breakfast  enoxaparin Injectable 30 milliGRAM(s) SubCutaneous every 12 hours  levothyroxine 25 MICROGram(s) Oral <User Schedule>  levothyroxine 50 MICROGram(s) Oral <User Schedule>  lidocaine   Patch 1 Patch Transdermal daily  losartan 100 milliGRAM(s) Oral daily  multivitamin 1 Tablet(s) Oral daily  simvastatin 20 milliGRAM(s) Oral at bedtime  triamterene 37.5 mG/hydrochlorothiazide 25 mG Capsule 1 Capsule(s) Oral daily    MEDICATIONS  (PRN):  acetaminophen   Tablet. 975 milliGRAM(s) Oral three times a day PRN Mild Pain (1 - 3)  bisacodyl 10 milliGRAM(s) Oral daily PRN No bowel movement GREATER THAN 2 day  sodium biphosphate Rectal Enema 1 Enema Rectal daily PRN No bowel movement GREATER THAN 2 day      LABS:                          9.6    6.1   )-----------( 203      ( 04 Dec 2017 05:36 )             28.6     12-04    139  |  102  |  27.0<H>  ----------------------------<  101  4.2   |  25.0  |  0.66    Ca    9.0      04 Dec 2017 05:36        Urinalysis Basic - ( 03 Dec 2017 00:37 )    Color: Yellow / Appearance: Clear / S.010 / pH: x  Gluc: x / Ketone: Negative  / Bili: Negative / Urobili: Negative mg/dL   Blood: x / Protein: 15 mg/dL / Nitrite: Negative   Leuk Esterase: Negative / RBC: Negative /HPF / WBC 0-2   Sq Epi: x / Non Sq Epi: Occasional / Bacteria: x        RADIOLOGY & ADDITIONAL TESTS:      REVIEW OF SYSTEMS:    CONSTITUTIONAL: No fever, weight loss, or fatigue  EYES: No eye pain, visual disturbances, or discharge  ENMT:  No difficulty hearing, tinnitus, vertigo; No sinus or throat pain  NECK: No pain or stiffness  RESPIRATORY: No cough, wheezing, chills or hemoptysis; No shortness of breath  CARDIOVASCULAR: No chest pain, palpitations, dizziness, or leg swelling  GASTROINTESTINAL: No abdominal or epigastric pain. No nausea, vomiting, or hematemesis; No diarrhea or constipation. No melena or hematochezia.  GENITOURINARY: No dysuria, frequency, hematuria, or incontinence  NEUROLOGICAL: No headaches, memory loss, loss of strength, numbness, or tremors  SKIN: No itching, burning, rashes, or lesions   LYMPH NODES: No enlarged glands  ENDOCRINE: No heat or cold intolerance; No hair loss  MUSCULOSKELETAL:   PSYCHIATRIC: No depression, anxiety, mood swings, or difficulty sleeping  HEME/LYMPH: No easy bruising, or bleeding gums  ALLERGY AND IMMUNOLOGIC: No hives or eczema    Vital Signs Last 24 Hrs  T(C): 36.4 (04 Dec 2017 06:16), Max: 36.6 (03 Dec 2017 16:48)  T(F): 97.6 (04 Dec 2017 06:16), Max: 97.8 (03 Dec 2017 16:48)  HR: 65 (04 Dec 2017 06:16) (61 - 65)  BP: 152/80 (04 Dec 2017 06:16) (122/62 - 152/80)  BP(mean): --  RR: 18 (04 Dec 2017 06:16) (18 - 18)  SpO2: 98% (04 Dec 2017 06:16) (97% - 98%)  PHYSICAL EXAM:    GENERAL: NAD, well-groomed, well-developed  HEAD:  Atraumatic, Normocephalic  EYES: EOMI, PERRLA, conjunctiva and sclera clear  NECK: Supple, No JVD, Normal thyroid  NERVOUS SYSTEM:  Alert & Oriented X3, no focal deficit  CHEST/LUNG: CTA b/l ,  no  rales, rhonchi, wheezing, or rubs  HEART: Regular rate and rhythm; No murmurs, rubs, or gallops  ABDOMEN: Soft, Nontender, Nondistended; Bowel sounds present  EXTREMITIES:  2+ Peripheral Pulses, No clubbing, cyanosis, or edema  LYMPH: No lymphadenopathy noted  SKIN: No rashes or lesions Patient is a 77y old  Female s/p right knee  replacement . Doing well , pain well controlled , had episode of lightheadedness after physical therapy , now laying in the bed - asymptomatic .    CC: R knee pain - well controlled , episode of lightheadedness after physical therapy - now resolved     PMH :   TIA (transient ischemic attack):   DVT (deep venous thrombosis): , was on Xarelto  for 6 months  Polio  Hyperlipidemia  Hiatal hernia  Hypertension  Osteoarthritis  Neck mass: excision of neck mass   History of foot surgery: left foot due to polio,   S/P rhinoplasty  H/O total knee replacement, left:  revision   S/P dilation and curettage: , , ,   S/P cholecystectomy      MEDICATIONS  (STANDING):  aspirin enteric coated 81 milliGRAM(s) Oral daily  calcium carbonate 1250 mG + Vitamin D (OsCal 500 + D) 1 Tablet(s) Oral daily  celecoxib 100 milliGRAM(s) Oral with breakfast  enoxaparin Injectable 30 milliGRAM(s) SubCutaneous every 12 hours  levothyroxine 25 MICROGram(s) Oral <User Schedule>  levothyroxine 50 MICROGram(s) Oral <User Schedule>  lidocaine   Patch 1 Patch Transdermal daily  losartan 100 milliGRAM(s) Oral daily  multivitamin 1 Tablet(s) Oral daily  simvastatin 20 milliGRAM(s) Oral at bedtime  triamterene 37.5 mG/hydrochlorothiazide 25 mG Capsule 1 Capsule(s) Oral daily    MEDICATIONS  (PRN):  acetaminophen   Tablet. 975 milliGRAM(s) Oral three times a day PRN Mild Pain (1 - 3)  bisacodyl 10 milliGRAM(s) Oral daily PRN No bowel movement GREATER THAN 2 day  sodium biphosphate Rectal Enema 1 Enema Rectal daily PRN No bowel movement GREATER THAN 2 day      LABS:                          9.6    6.1   )-----------( 203      ( 04 Dec 2017 05:36 )             28.6     12-04    139  |  102  |  27.0<H>  ----------------------------<  101  4.2   |  25.0  |  0.66    Ca    9.0      04 Dec 2017 05:36        Urinalysis Basic - ( 03 Dec 2017 00:37 )    Color: Yellow / Appearance: Clear / S.010 / pH: x  Gluc: x / Ketone: Negative  / Bili: Negative / Urobili: Negative mg/dL   Blood: x / Protein: 15 mg/dL / Nitrite: Negative   Leuk Esterase: Negative / RBC: Negative /HPF / WBC 0-2   Sq Epi: x / Non Sq Epi: Occasional / Bacteria: x        REVIEW OF SYSTEMS:    R knee pain well controlled , lightheadedness episode after physical therapy - now resolved   Vital Signs Last 24 Hrs  T(C): 36.4 (04 Dec 2017 06:16), Max: 36.6 (03 Dec 2017 16:48)  T(F): 97.6 (04 Dec 2017 06:16), Max: 97.8 (03 Dec 2017 16:48)  HR: 65 (04 Dec 2017 06:16) (61 - 65)  BP: 152/80 (04 Dec 2017 06:16) (122/62 - 152/80)  BP(mean): --  RR: 18 (04 Dec 2017 06:16) (18 - 18)  SpO2: 98% (04 Dec 2017 06:16) (97% - 98%)    PHYSICAL EXAM:    GENERAL: NAD, well-groomed, well-developed  HEAD:  Atraumatic, Normocephalic  EYES: EOMI, PERRLA, conjunctiva and sclera clear  NECK: Supple, No JVD, Normal thyroid  NERVOUS SYSTEM:  Alert & Oriented X3, no focal deficit  CHEST/LUNG: CTA b/l ,  no  rales, rhonchi, wheezing, or rubs  HEART: Regular rate and rhythm; No murmurs, rubs, or gallops  ABDOMEN: Soft, Nontender, Nondistended; Bowel sounds present  EXTREMITIES:  2+ Peripheral Pulses, No clubbing, cyanosis, or edema, R knee dressing + , ecchymose  LYMPH: No lymphadenopathy noted  SKIN: No rashes or lesions

## 2017-12-04 NOTE — PROGRESS NOTE ADULT - ASSESSMENT
77F admitted 11/29 for elective right TKR after failing conservative measures. Right knee pain ongoing and worsening over the last 2 years. s/p right TKR by Dr. Snell now in rehab      Problem/Plan - 1:  ·  Problem: Primary osteoarthritis of right knee.  Plan: s/p knee arthroplasty , cont PT , pain meds   rehab.      Problem/Plan - 2:  ·  Problem: History of thrombosis of lower extremity.  Plan: DVT prophylaxis per ortho protocol.      Problem/Plan - 3:  ·  Problem: Essential hypertension.  Plan: cont losartan with holding parameters. 77F admitted 11/29 for elective right TKR after failing conservative measures. Right knee pain ongoing and worsening over the last 2 years. s/p right TKR by Dr. Snell now in rehab      Problem/Plan - 1:  ·  Problem: Primary osteoarthritis of right knee.  Plan: s/p knee arthroplasty , cont PT , pain meds   rehab.      Problem/Plan - 2:  ·  Problem: History of thrombosis of lower extremity.  Plan: DVT prophylaxis per ortho protocol - on Lovenox as per ortho      Problem/Plan - 3:  ·  Problem: Essential hypertension.  Plan: cont losartan with holding parameters.     Problem / Plan - 4: Episode of lightheadedness - patient advised to increase PO fluids .

## 2017-12-04 NOTE — PROGRESS NOTE ADULT - SUBJECTIVE AND OBJECTIVE BOX
HPI:77F admitted 11/29 for elective right TKR after failing conservative measures. Right knee pain ongoing and worsening over the last 2 years. s/p right TKR by Dr. Snell with EBL = 50cc on 11/29.    PMHx: Polio, Hypothyroidism, H/O left deep vein thrombosis (about 3 years ago, completed treatement with Xarelto), Hyperlipidemia, Hypertension    INTERVAL SUBJECTIVE & REVIEW OF SYMPTOMS:  Chart reviewed. States that she is doing well, but limited due to right knee pain    REVIEW OF SYSTEMS  [ x  ] Constitutional WNL  [ x  ] Cardio WNL  [ x  ] Resp WNL  [ x  ] GI WNL  [ x  ]  WNL  [   ] Heme WNL  [   ] Skin WNL  [   ] MSK WNL  [   ] Neuro WNL  [   ] Cognitive WNL  [   ] Psych WNL    Vital Signs Last 24 Hrs  T(C): 36.4 (04 Dec 2017 06:16), Max: 36.6 (03 Dec 2017 16:48)  T(F): 97.6 (04 Dec 2017 06:16), Max: 97.8 (03 Dec 2017 16:48)  HR: 65 (04 Dec 2017 06:16) (57 - 65)  BP: 152/80 (04 Dec 2017 06:16) (122/62 - 152/80)  BP(mean): --  RR: 18 (04 Dec 2017 06:16) (18 - 18)  SpO2: 98% (04 Dec 2017 06:16) (95% - 98%)    PHYSICAL EXAM  General: NAD  Cardio: S1S2+  Resp: clear  Abdomen: soft  Extrem: No calf tenderness  Skin: right knee honey comb dressing. Significant LE post op swelling and ecchymosis    Functional Exam:   Transfers: mod assist  Gait: min assist with RW  Tub transfer: min assist    MEDICATIONS  (STANDING):    MEDICATIONS  (STANDING):  aspirin enteric coated 81 milliGRAM(s) Oral daily  calcium carbonate 1250 mG + Vitamin D (OsCal 500 + D) 1 Tablet(s) Oral daily  celecoxib 100 milliGRAM(s) Oral with breakfast  enoxaparin Injectable 30 milliGRAM(s) SubCutaneous every 12 hours  levothyroxine 25 MICROGram(s) Oral <User Schedule>  levothyroxine 50 MICROGram(s) Oral <User Schedule>  lidocaine   Patch 1 Patch Transdermal daily  losartan 100 milliGRAM(s) Oral daily  multivitamin 1 Tablet(s) Oral daily  simvastatin 20 milliGRAM(s) Oral at bedtime  triamterene 37.5 mG/hydrochlorothiazide 25 mG Capsule 1 Capsule(s) Oral daily    MEDICATIONS  (PRN):  acetaminophen   Tablet. 975 milliGRAM(s) Oral three times a day PRN Mild Pain (1 - 3)  bisacodyl 10 milliGRAM(s) Oral daily PRN No bowel movement GREATER THAN 2 day  sodium biphosphate Rectal Enema 1 Enema Rectal daily PRN No bowel movement GREATER THAN 2 day      RECENT LABS:                        9.6    6.1   )-----------( 203      ( 04 Dec 2017 05:36 )             28.6     12-04    139  |  102  |  27.0<H>  ----------------------------<  101  4.2   |  25.0  |  0.66    Ca    9.0      04 Dec 2017 05:36          UA: negative      RADIOLOGY/OTHER RESULTS:    A/P:  77 year old female with knee OA now s/p right TKA after failing conservative treatment. Patient with functional deficits following recent surgery    Continue full rehab program:PT/OT    DVT prophylaxis: on lovenox  q12h. Continue ASA 81MG( Patient with prior h/o DVT of left leg). Patient states that she will not take lovenox at discharge and wants     Pain: tylenol prn, lidoderm patch, Celebrex 12/3    Hyperlipidemia: on simvastatin    HTN: On dyazide    Hypothyroidism: on synthroid.     Bladder/bowel : toilet schedule prn, dulcolax prn    Diet: regular with thin, oscal, MVI    Anemia: post op - will monitor. Patient asymptomatic at this time

## 2017-12-05 ENCOUNTER — OTHER (OUTPATIENT)
Age: 77
End: 2017-12-05

## 2017-12-05 PROCEDURE — 99232 SBSQ HOSP IP/OBS MODERATE 35: CPT

## 2017-12-05 RX ORDER — FERROUS SULFATE 325(65) MG
325 TABLET ORAL
Qty: 0 | Refills: 0 | Status: DISCONTINUED | OUTPATIENT
Start: 2017-12-05 | End: 2017-12-12

## 2017-12-05 RX ADMIN — CELECOXIB 100 MILLIGRAM(S): 200 CAPSULE ORAL at 00:00

## 2017-12-05 RX ADMIN — Medication 1 TABLET(S): at 11:22

## 2017-12-05 RX ADMIN — LIDOCAINE 1 PATCH: 4 CREAM TOPICAL at 20:37

## 2017-12-05 RX ADMIN — Medication 25 MICROGRAM(S): at 08:16

## 2017-12-05 RX ADMIN — SIMVASTATIN 20 MILLIGRAM(S): 20 TABLET, FILM COATED ORAL at 17:46

## 2017-12-05 RX ADMIN — Medication 1 CAPSULE(S): at 06:15

## 2017-12-05 RX ADMIN — LIDOCAINE 1 PATCH: 4 CREAM TOPICAL at 08:16

## 2017-12-05 RX ADMIN — Medication 975 MILLIGRAM(S): at 07:59

## 2017-12-05 RX ADMIN — Medication 325 MILLIGRAM(S): at 11:23

## 2017-12-05 RX ADMIN — Medication 975 MILLIGRAM(S): at 06:59

## 2017-12-05 RX ADMIN — ENOXAPARIN SODIUM 30 MILLIGRAM(S): 100 INJECTION SUBCUTANEOUS at 17:46

## 2017-12-05 RX ADMIN — CELECOXIB 100 MILLIGRAM(S): 200 CAPSULE ORAL at 08:16

## 2017-12-05 RX ADMIN — Medication 81 MILLIGRAM(S): at 11:22

## 2017-12-05 RX ADMIN — ENOXAPARIN SODIUM 30 MILLIGRAM(S): 100 INJECTION SUBCUTANEOUS at 06:15

## 2017-12-05 RX ADMIN — LOSARTAN POTASSIUM 100 MILLIGRAM(S): 100 TABLET, FILM COATED ORAL at 06:15

## 2017-12-05 NOTE — PROGRESS NOTE ADULT - SUBJECTIVE AND OBJECTIVE BOX
CC: "Right knee replacement" (01 Dec 2017 17:58)        INTERVAL HPI/OVERNIGHT EVENTS: C/O anxiety over pain, unable to give self injections for home lovenox. Denies chest pain, SOB, dizziness, lighthesdedness, nausea, vomiting, fever, chills.     Vital Signs Last 24 Hrs  T(C): 36.6 (05 Dec 2017 05:33), Max: 36.8 (04 Dec 2017 12:20)  T(F): 97.8 (05 Dec 2017 05:33), Max: 98.2 (04 Dec 2017 12:20)  HR: 58 (05 Dec 2017 05:33) (56 - 58)  BP: 131/72 (05 Dec 2017 05:33) (112/62 - 131/72)  BP(mean): --  RR: 18 (05 Dec 2017 05:33) (16 - 18)  SpO2: 97% (05 Dec 2017 05:33) (97% - 99%)  I&O's Detail    04 Dec 2017 07:01  -  05 Dec 2017 07:00  --------------------------------------------------------  IN:  Total IN: 0 mL    OUT:    Voided: 1 mL  Total OUT: 1 mL    Total NET: -1 mL      PHYSICAL EXAM:    GENERAL: NAD, well-groomed, well-developed  HEAD:  Atraumatic, Normocephalic  EYES: EOMI, PERRLA, conjunctiva and sclera clear  NECK: Supple, No JVD, Normal thyroid  NERVOUS SYSTEM:  Alert & Oriented X3, no focal deficit  CHEST/LUNG: CTA b/l ,  no  rales, rhonchi, wheezing, or rubs  HEART: Regular rate and rhythm; No murmurs, rubs, or gallops  ABDOMEN: Soft, Nontender, Nondistended; Bowel sounds present  EXTREMITIES:  2+ Peripheral Pulses, No clubbing, cyanosis, or edema, R knee dressing + , ecchymosis   LYMPH: No lymphadenopathy noted  SKIN: No rashes or lesions                                9.6    6.1   )-----------( 203      ( 04 Dec 2017 05:36 )             28.6     04 Dec 2017 05:36    139    |  102    |  27.0   ----------------------------<  101    4.2     |  25.0   |  0.66     Ca    9.0        04 Dec 2017 05:36        CAPILLARY BLOOD GLUCOSE              MEDICATIONS  (STANDING):  aspirin enteric coated 81 milliGRAM(s) Oral daily  calcium carbonate 1250 mG + Vitamin D (OsCal 500 + D) 1 Tablet(s) Oral daily  celecoxib 100 milliGRAM(s) Oral with breakfast  enoxaparin Injectable 30 milliGRAM(s) SubCutaneous every 12 hours  ferrous    sulfate 325 milliGRAM(s) Oral with lunch  levothyroxine 25 MICROGram(s) Oral <User Schedule>  levothyroxine 50 MICROGram(s) Oral <User Schedule>  lidocaine   Patch 1 Patch Transdermal daily  losartan 100 milliGRAM(s) Oral daily  multivitamin 1 Tablet(s) Oral daily  simvastatin 20 milliGRAM(s) Oral at bedtime  triamterene 37.5 mG/hydrochlorothiazide 25 mG Capsule 1 Capsule(s) Oral daily    MEDICATIONS  (PRN):  acetaminophen   Tablet. 975 milliGRAM(s) Oral three times a day PRN Mild Pain (1 - 3)  bisacodyl 10 milliGRAM(s) Oral daily PRN No bowel movement GREATER THAN 2 day  sodium biphosphate Rectal Enema 1 Enema Rectal daily PRN No bowel movement GREATER THAN 2 day      RADIOLOGY & ADDITIONAL TESTS: Patient seen and examined , pain well controlled , feels anxious , no other complaints .    CC: R knee pain well controlled     INTERVAL HPI/OVERNIGHT EVENTS: C/O anxiety over pain, unable to give self injections for home lovenox. Denies chest pain, SOB, dizziness, lightheadedness, nausea, vomiting, fever, chills.     Vital Signs Last 24 Hrs  T(C): 36.6 (05 Dec 2017 05:33), Max: 36.8 (04 Dec 2017 12:20)  T(F): 97.8 (05 Dec 2017 05:33), Max: 98.2 (04 Dec 2017 12:20)  HR: 58 (05 Dec 2017 05:33) (56 - 58)  BP: 131/72 (05 Dec 2017 05:33) (112/62 - 131/72)  BP(mean): --  RR: 18 (05 Dec 2017 05:33) (16 - 18)  SpO2: 97% (05 Dec 2017 05:33) (97% - 99%)  I&O's Detail    04 Dec 2017 07:01  -  05 Dec 2017 07:00  --------------------------------------------------------  IN:  Total IN: 0 mL    OUT:    Voided: 1 mL  Total OUT: 1 mL    Total NET: -1 mL      PHYSICAL EXAM:    GENERAL: NAD, well-groomed, well-developed  HEAD:  Atraumatic, Normocephalic  EYES: EOMI, PERRLA, conjunctiva and sclera clear  NECK: Supple, No JVD, Normal thyroid  NERVOUS SYSTEM:  Alert & Oriented X3, no focal deficit  CHEST/LUNG: CTA b/l ,  no  rales, rhonchi, wheezing, or rubs  HEART: Regular rate and rhythm; No murmurs, rubs, or gallops  ABDOMEN: Soft, Nontender, Nondistended; Bowel sounds present  EXTREMITIES:  2+ Peripheral Pulses, No clubbing, cyanosis, or edema, R knee dressing + , ecchymosis   LYMPH: No lymphadenopathy noted  SKIN: No rashes or lesions                                9.6    6.1   )-----------( 203      ( 04 Dec 2017 05:36 )             28.6     04 Dec 2017 05:36    139    |  102    |  27.0   ----------------------------<  101    4.2     |  25.0   |  0.66     Ca    9.0        04 Dec 2017 05:36        CAPILLARY BLOOD GLUCOSE              MEDICATIONS  (STANDING):  aspirin enteric coated 81 milliGRAM(s) Oral daily  calcium carbonate 1250 mG + Vitamin D (OsCal 500 + D) 1 Tablet(s) Oral daily  celecoxib 100 milliGRAM(s) Oral with breakfast  enoxaparin Injectable 30 milliGRAM(s) SubCutaneous every 12 hours  ferrous    sulfate 325 milliGRAM(s) Oral with lunch  levothyroxine 25 MICROGram(s) Oral <User Schedule>  levothyroxine 50 MICROGram(s) Oral <User Schedule>  lidocaine   Patch 1 Patch Transdermal daily  losartan 100 milliGRAM(s) Oral daily  multivitamin 1 Tablet(s) Oral daily  simvastatin 20 milliGRAM(s) Oral at bedtime  triamterene 37.5 mG/hydrochlorothiazide 25 mG Capsule 1 Capsule(s) Oral daily    MEDICATIONS  (PRN):  acetaminophen   Tablet. 975 milliGRAM(s) Oral three times a day PRN Mild Pain (1 - 3)  bisacodyl 10 milliGRAM(s) Oral daily PRN No bowel movement GREATER THAN 2 day  sodium biphosphate Rectal Enema 1 Enema Rectal daily PRN No bowel movement GREATER THAN 2 day      RADIOLOGY & ADDITIONAL TESTS:

## 2017-12-05 NOTE — PROGRESS NOTE ADULT - SUBJECTIVE AND OBJECTIVE BOX
HPI:77F admitted 11/29 for elective right TKR after failing conservative measures. Right knee pain ongoing and worsening over the last 2 years. s/p right TKR by Dr. Snell with EBL = 50cc on 11/29.    PMHx: Polio, Hypothyroidism, H/O left deep vein thrombosis (about 3 years ago, completed treatement with Xarelto), Hyperlipidemia, Hypertension    INTERVAL SUBJECTIVE & REVIEW OF SYMPTOMS:  Patient states that  she feels achy in all her joints - this happens to her at times when it rains.   ROS: Denies HA/CP/palpitations/abdominal pain/ dysuria    Vital Signs Last 24 Hrs  T(C): 36.6 (05 Dec 2017 05:33), Max: 36.8 (04 Dec 2017 12:20)  T(F): 97.8 (05 Dec 2017 05:33), Max: 98.2 (04 Dec 2017 12:20)  HR: 58 (05 Dec 2017 05:33) (56 - 58)  BP: 131/72 (05 Dec 2017 05:33) (112/62 - 131/72)  BP(mean): --  RR: 18 (05 Dec 2017 05:33) (16 - 18)  SpO2: 97% (05 Dec 2017 05:33) (97% - 99%)      PHYSICAL EXAM  General: NAD  Cardio: S1S2+  Resp: clear  Abdomen: soft  Extrem: No calf tenderness  Skin: right knee honey comb dressing. Significant LE post op swelling and ecchymosis.     Functional Exam:   Transfers: mod assist  Gait: min assist with RW  Tub transfer: min assist    MEDICATIONS  (STANDING):    MEDICATIONS  (STANDING):  aspirin enteric coated 81 milliGRAM(s) Oral daily  calcium carbonate 1250 mG + Vitamin D (OsCal 500 + D) 1 Tablet(s) Oral daily  celecoxib 100 milliGRAM(s) Oral with breakfast  enoxaparin Injectable 30 milliGRAM(s) SubCutaneous every 12 hours  levothyroxine 25 MICROGram(s) Oral <User Schedule>  levothyroxine 50 MICROGram(s) Oral <User Schedule>  lidocaine   Patch 1 Patch Transdermal daily  losartan 100 milliGRAM(s) Oral daily  multivitamin 1 Tablet(s) Oral daily  simvastatin 20 milliGRAM(s) Oral at bedtime  triamterene 37.5 mG/hydrochlorothiazide 25 mG Capsule 1 Capsule(s) Oral daily    MEDICATIONS  (PRN):  acetaminophen   Tablet. 975 milliGRAM(s) Oral three times a day PRN Mild Pain (1 - 3)  bisacodyl 10 milliGRAM(s) Oral daily PRN No bowel movement GREATER THAN 2 day  sodium biphosphate Rectal Enema 1 Enema Rectal daily PRN No bowel movement GREATER THAN 2 day    RECENT LABS:                        9.6    6.1   )-----------( 203      ( 04 Dec 2017 05:36 )             28.6     12-04    139  |  102  |  27.0<H>  ----------------------------<  101  4.2   |  25.0  |  0.66    Ca    9.0      04 Dec 2017 05:36          UA: negative      RADIOLOGY/OTHER RESULTS:    A/P:  77 year old female with knee OA now s/p right TKA after failing conservative treatment. Patient with functional deficits following recent surgery    Continue full rehab program:PT/OT    DVT prophylaxis: on lovenox  q12h. Continue ASA 81MG( Patient with prior h/o DVT of left leg). Patient states that she will not take lovenox at discharge and wants     Pain: tylenol prn, lidoderm patch, Celebrex 12/3    Hyperlipidemia: on simvastatin    HTN: On dyazide    Hypothyroidism: on synthroid.     Bladder/bowel : toilet schedule prn, dulcolax prn    Diet: regular with thin, oscal, MVI    Anemia: post op - will monitor. Patient asymptomatic at this time. discussed with patient that feosol will be added HPI:77F admitted 11/29 for elective right TKR after failing conservative measures. Right knee pain ongoing and worsening over the last 2 years. s/p right TKR by Dr. Snell with EBL = 50cc on 11/29.    PMHx: Polio, Hypothyroidism, H/O left deep vein thrombosis (about 3 years ago, completed treatement with Xarelto), Hyperlipidemia, Hypertension    INTERVAL SUBJECTIVE & REVIEW OF SYMPTOMS:  Patient states that  she feels achy in all her joints - this happens to her at times when it rains.   ROS: Denies HA/CP/palpitations/abdominal pain/ dysuria    Vital Signs Last 24 Hrs  T(C): 36.6 (05 Dec 2017 05:33), Max: 36.8 (04 Dec 2017 12:20)  T(F): 97.8 (05 Dec 2017 05:33), Max: 98.2 (04 Dec 2017 12:20)  HR: 58 (05 Dec 2017 05:33) (56 - 58)  BP: 131/72 (05 Dec 2017 05:33) (112/62 - 131/72)  BP(mean): --  RR: 18 (05 Dec 2017 05:33) (16 - 18)  SpO2: 97% (05 Dec 2017 05:33) (97% - 99%)      PHYSICAL EXAM  General: NAD  Cardio: S1S2+  Resp: clear  Abdomen: soft  Extrem: No calf tenderness  Skin: right knee honey comb dressing. Significant LE post op swelling and ecchymosis.     Functional Exam:   Transfers: mod assist  Gait: min assist with RW  Tub transfer: min assist    MEDICATIONS  (STANDING):    MEDICATIONS  (STANDING):  aspirin enteric coated 81 milliGRAM(s) Oral daily  calcium carbonate 1250 mG + Vitamin D (OsCal 500 + D) 1 Tablet(s) Oral daily  celecoxib 100 milliGRAM(s) Oral with breakfast  enoxaparin Injectable 30 milliGRAM(s) SubCutaneous every 12 hours  levothyroxine 25 MICROGram(s) Oral <User Schedule>  levothyroxine 50 MICROGram(s) Oral <User Schedule>  lidocaine   Patch 1 Patch Transdermal daily  losartan 100 milliGRAM(s) Oral daily  multivitamin 1 Tablet(s) Oral daily  simvastatin 20 milliGRAM(s) Oral at bedtime  triamterene 37.5 mG/hydrochlorothiazide 25 mG Capsule 1 Capsule(s) Oral daily    MEDICATIONS  (PRN):  acetaminophen   Tablet. 975 milliGRAM(s) Oral three times a day PRN Mild Pain (1 - 3)  bisacodyl 10 milliGRAM(s) Oral daily PRN No bowel movement GREATER THAN 2 day  sodium biphosphate Rectal Enema 1 Enema Rectal daily PRN No bowel movement GREATER THAN 2 day    RECENT LABS:                        9.6    6.1   )-----------( 203      ( 04 Dec 2017 05:36 )             28.6     12-04    139  |  102  |  27.0<H>  ----------------------------<  101  4.2   |  25.0  |  0.66    Ca    9.0      04 Dec 2017 05:36          UA: negative      RADIOLOGY/OTHER RESULTS:    A/P:  77 year old female with knee OA now s/p right TKA after failing conservative treatment. Patient with functional deficits following recent surgery    Continue full rehab program:PT/OT    DVT prophylaxis: on lovenox  q12h. Continue ASA 81MG( Patient with prior h/o DVT of left leg). Patient states that she will not take lovenox at discharge as unable to give herself the injections and no one else available.     Pain: tylenol prn, lidoderm patch, Celebrex 12/3    Hyperlipidemia: on simvastatin    HTN: On dyazide    Hypothyroidism: on synthroid.     Bladder/bowel : toilet schedule prn, dulcolax prn    Diet: regular with thin, oscal, MVI    Anemia: post op - will monitor. Patient asymptomatic at this time. discussed with patient that feosol will be added     Medical fu noted

## 2017-12-05 NOTE — PROGRESS NOTE ADULT - ASSESSMENT
77F admitted 11/29 for elective right TKR after failing conservative measures. Right knee pain ongoing and worsening over the last 2 years. s/p right TKR by Dr. Snell now in rehab      Problem/Plan - 1:  ·  Problem: Primary osteoarthritis of right knee.  Plan: s/p knee arthroplasty , cont PT , pain meds   rehab.      Problem/Plan - 2:  ·  Problem: History of thrombosis of lower extremity.  Plan: DVT prophylaxis per ortho protocol - on Lovenox as per ortho, however patient unwilling to self inject, Dr. Beltrán to discuss with Ortho options for discharge     Problem/Plan - 3:  ·  Problem: Essential hypertension.  Plan: cont losartan with holding parameters.     Problem / Plan - 4: Episode of lightheadedness - possibly related to anxiety. Discussed relaxation techniques, deep breathing.

## 2017-12-06 PROCEDURE — 99232 SBSQ HOSP IP/OBS MODERATE 35: CPT

## 2017-12-06 RX ADMIN — SIMVASTATIN 20 MILLIGRAM(S): 20 TABLET, FILM COATED ORAL at 17:33

## 2017-12-06 RX ADMIN — ENOXAPARIN SODIUM 30 MILLIGRAM(S): 100 INJECTION SUBCUTANEOUS at 05:58

## 2017-12-06 RX ADMIN — LIDOCAINE 1 PATCH: 4 CREAM TOPICAL at 11:56

## 2017-12-06 RX ADMIN — CELECOXIB 100 MILLIGRAM(S): 200 CAPSULE ORAL at 08:41

## 2017-12-06 RX ADMIN — Medication 325 MILLIGRAM(S): at 12:57

## 2017-12-06 RX ADMIN — Medication 1 TABLET(S): at 12:57

## 2017-12-06 RX ADMIN — ENOXAPARIN SODIUM 30 MILLIGRAM(S): 100 INJECTION SUBCUTANEOUS at 17:06

## 2017-12-06 RX ADMIN — LOSARTAN POTASSIUM 100 MILLIGRAM(S): 100 TABLET, FILM COATED ORAL at 05:58

## 2017-12-06 RX ADMIN — CELECOXIB 100 MILLIGRAM(S): 200 CAPSULE ORAL at 09:30

## 2017-12-06 RX ADMIN — Medication 25 MICROGRAM(S): at 05:58

## 2017-12-06 RX ADMIN — Medication 81 MILLIGRAM(S): at 12:57

## 2017-12-06 RX ADMIN — Medication 975 MILLIGRAM(S): at 05:59

## 2017-12-06 RX ADMIN — Medication 975 MILLIGRAM(S): at 06:33

## 2017-12-06 RX ADMIN — Medication 1 CAPSULE(S): at 05:58

## 2017-12-06 NOTE — PROGRESS NOTE ADULT - SUBJECTIVE AND OBJECTIVE BOX
HPI:77F admitted 11/29 for elective right TKR after failing conservative measures. Right knee pain ongoing and worsening over the last 2 years. s/p right TKR by Dr. Snell with EBL = 50cc on 11/29.    PMHx: Polio, Hypothyroidism, H/O left deep vein thrombosis (about 3 years ago, completed treatement with Xarelto), Hyperlipidemia, Hypertension    INTERVAL SUBJECTIVE & REVIEW OF SYMPTOMS:  No new issues overnight. Pain right knee - mainly at night  ROS: No HA/CP/dyspnea/abdominal pain/ nausea/ numbness    Vital Signs Last 24 Hrs  T(C): 36.6 (06 Dec 2017 05:33), Max: 36.7 (05 Dec 2017 12:35)  T(F): 97.8 (06 Dec 2017 05:33), Max: 98 (05 Dec 2017 12:35)  HR: 64 (06 Dec 2017 05:56) (58 - 64)  BP: 142/67 (06 Dec 2017 05:33) (140/69 - 146/83)  BP(mean): --  RR: 18 (06 Dec 2017 05:33) (18 - 18)  SpO2: 95% (06 Dec 2017 05:33) (95% - 99%)      PHYSICAL EXAM  General: NAD  Cardio: S1S2+  Resp: clear  Abdomen: soft  Extrem: No calf tenderness  Skin: right knee honey comb dressing. Significant LE post op swelling and ecchymosis.     Functional Exam:   Transfers: mod/min assist  Gait: min assist with RW  Tub transfer: min assist    MEDICATIONS  (STANDING):    MEDICATIONS  (STANDING):  aspirin enteric coated 81 milliGRAM(s) Oral daily  calcium carbonate 1250 mG + Vitamin D (OsCal 500 + D) 1 Tablet(s) Oral daily  celecoxib 100 milliGRAM(s) Oral with breakfast  enoxaparin Injectable 30 milliGRAM(s) SubCutaneous every 12 hours  ferrous    sulfate 325 milliGRAM(s) Oral with lunch  levothyroxine 25 MICROGram(s) Oral <User Schedule>  levothyroxine 50 MICROGram(s) Oral <User Schedule>  lidocaine   Patch 1 Patch Transdermal daily  losartan 100 milliGRAM(s) Oral daily  multivitamin 1 Tablet(s) Oral daily  simvastatin 20 milliGRAM(s) Oral at bedtime  triamterene 37.5 mG/hydrochlorothiazide 25 mG Capsule 1 Capsule(s) Oral daily    MEDICATIONS  (PRN):  acetaminophen   Tablet. 975 milliGRAM(s) Oral three times a day PRN Mild Pain (1 - 3)  bisacodyl 10 milliGRAM(s) Oral daily PRN No bowel movement GREATER THAN 2 day  sodium biphosphate Rectal Enema 1 Enema Rectal daily PRN No bowel movement GREATER THAN 2 day      RECENT LABS:                        9.6    6.1   )-----------( 203      ( 04 Dec 2017 05:36 )             28.6     12-04    139  |  102  |  27.0<H>  ----------------------------<  101  4.2   |  25.0  |  0.66    Ca    9.0      04 Dec 2017 05:36    UA: negative      RADIOLOGY/OTHER RESULTS:    A/P:  77 year old female with knee OA now s/p right TKA after failing conservative treatment. Patient with functional deficits following recent surgery    Continue full rehab program:PT/OT    DVT prophylaxis: on lovenox  q12h. Continue ASA 81MG( Patient with prior h/o DVT of left leg). Patient states that she will not take lovenox at discharge as unable to give herself the injections and no one else available.     Pain: tylenol prn, lidoderm patch, Celebrex 12/3    Hyperlipidemia: on simvastatin    HTN: On dyazide    Hypothyroidism: on synthroid.     Bladder/bowel : toilet schedule prn, dulcolax prn    Diet: regular with thin, oscal, MVI    Anemia: post op -feosol     Medical fu noted

## 2017-12-07 PROCEDURE — 99232 SBSQ HOSP IP/OBS MODERATE 35: CPT

## 2017-12-07 RX ORDER — LEVOTHYROXINE SODIUM 125 MCG
50 TABLET ORAL
Qty: 0 | Refills: 0 | Status: DISCONTINUED | OUTPATIENT
Start: 2017-12-07 | End: 2017-12-12

## 2017-12-07 RX ORDER — LEVOTHYROXINE SODIUM 125 MCG
25 TABLET ORAL
Qty: 0 | Refills: 0 | Status: DISCONTINUED | OUTPATIENT
Start: 2017-12-07 | End: 2017-12-12

## 2017-12-07 RX ORDER — RIVAROXABAN 15 MG-20MG
10 KIT ORAL DAILY
Qty: 0 | Refills: 0 | Status: DISCONTINUED | OUTPATIENT
Start: 2017-12-08 | End: 2017-12-12

## 2017-12-07 RX ADMIN — CELECOXIB 100 MILLIGRAM(S): 200 CAPSULE ORAL at 08:53

## 2017-12-07 RX ADMIN — Medication 1 TABLET(S): at 12:12

## 2017-12-07 RX ADMIN — ENOXAPARIN SODIUM 30 MILLIGRAM(S): 100 INJECTION SUBCUTANEOUS at 17:38

## 2017-12-07 RX ADMIN — LOSARTAN POTASSIUM 100 MILLIGRAM(S): 100 TABLET, FILM COATED ORAL at 05:37

## 2017-12-07 RX ADMIN — Medication 1 CAPSULE(S): at 05:37

## 2017-12-07 RX ADMIN — Medication 81 MILLIGRAM(S): at 12:12

## 2017-12-07 RX ADMIN — LIDOCAINE 1 PATCH: 4 CREAM TOPICAL at 00:54

## 2017-12-07 RX ADMIN — Medication 50 MICROGRAM(S): at 05:41

## 2017-12-07 RX ADMIN — LIDOCAINE 1 PATCH: 4 CREAM TOPICAL at 20:44

## 2017-12-07 RX ADMIN — Medication 975 MILLIGRAM(S): at 09:30

## 2017-12-07 RX ADMIN — SIMVASTATIN 20 MILLIGRAM(S): 20 TABLET, FILM COATED ORAL at 17:38

## 2017-12-07 RX ADMIN — Medication 975 MILLIGRAM(S): at 08:53

## 2017-12-07 RX ADMIN — LIDOCAINE 1 PATCH: 4 CREAM TOPICAL at 08:53

## 2017-12-07 RX ADMIN — CELECOXIB 100 MILLIGRAM(S): 200 CAPSULE ORAL at 09:30

## 2017-12-07 RX ADMIN — Medication 325 MILLIGRAM(S): at 12:12

## 2017-12-07 RX ADMIN — ENOXAPARIN SODIUM 30 MILLIGRAM(S): 100 INJECTION SUBCUTANEOUS at 05:37

## 2017-12-07 NOTE — PROGRESS NOTE ADULT - SUBJECTIVE AND OBJECTIVE BOX
HPI:77F admitted 11/29 for elective right TKR after failing conservative measures. Right knee pain ongoing and worsening over the last 2 years. s/p right TKR by Dr. Snell with EBL = 50cc on 11/29.    PMHx: Polio, Hypothyroidism, H/O left deep vein thrombosis (about 3 years ago, completed treatement with Xarelto), Hyperlipidemia, Hypertension    INTERVAL SUBJECTIVE & REVIEW OF SYMPTOMS:  No new issues overnight. Pain right knee improving slowly.    ROS: No HA/CP/dyspnea/abdominal pain/ nausea/ numbness. Denies urinary frequency    Vital Signs Last 24 Hrs  T(C): 36.1 (07 Dec 2017 05:19), Max: 36.2 (06 Dec 2017 11:53)  T(F): 96.9 (07 Dec 2017 05:19), Max: 97.2 (06 Dec 2017 11:53)  HR: 60 (07 Dec 2017 05:19) (50 - 60)  BP: 154/67 (07 Dec 2017 05:19) (143/70 - 161/78)  BP(mean): --  RR: 18 (07 Dec 2017 05:19) (16 - 18)  SpO2: 95% (07 Dec 2017 05:19) (95% - 100%)    PHYSICAL EXAM  General: NAD  Cardio: S1S2+  Resp: clear  Abdomen: soft  Extrem: No calf tenderness  Skin: right knee honey comb dressing.  LE post op swelling and ecchymosis improving     Functional Exam:   Transfers: mod/min assist  Gait: min assist with RW  Tub transfer: min assist    MEDICATIONS  (STANDING):    MEDICATIONS  (STANDING):  aspirin enteric coated 81 milliGRAM(s) Oral daily  calcium carbonate 1250 mG + Vitamin D (OsCal 500 + D) 1 Tablet(s) Oral daily  celecoxib 100 milliGRAM(s) Oral with breakfast  enoxaparin Injectable 30 milliGRAM(s) SubCutaneous every 12 hours  ferrous    sulfate 325 milliGRAM(s) Oral with lunch  levothyroxine 50 MICROGram(s) Oral <User Schedule>  levothyroxine 25 MICROGram(s) Oral <User Schedule>  lidocaine   Patch 1 Patch Transdermal daily  losartan 100 milliGRAM(s) Oral daily  multivitamin 1 Tablet(s) Oral daily  simvastatin 20 milliGRAM(s) Oral at bedtime  triamterene 37.5 mG/hydrochlorothiazide 25 mG Capsule 1 Capsule(s) Oral daily    MEDICATIONS  (PRN):  acetaminophen   Tablet. 975 milliGRAM(s) Oral three times a day PRN Mild Pain (1 - 3)  bisacodyl 10 milliGRAM(s) Oral daily PRN No bowel movement GREATER THAN 2 day  sodium biphosphate Rectal Enema 1 Enema Rectal daily PRN No bowel movement GREATER THAN 2 day      RECENT LABS:                        9.6    6.1   )-----------( 203      ( 04 Dec 2017 05:36 )             28.6     12-04    139  |  102  |  27.0<H>  ----------------------------<  101  4.2   |  25.0  |  0.66    Ca    9.0      04 Dec 2017 05:36    UA: negative      RADIOLOGY/OTHER RESULTS:    A/P:  77 year old female with knee OA now s/p right TKA after failing conservative treatment. Patient with functional deficits following recent surgery    Continue full rehab program:PT/OT    DVT prophylaxis: on lovenox  q12h. Continue ASA 81MG( Patient with prior h/o DVT of left leg). Discussed with Dr. nSell regarding patient unable/refusal for lovenox teaching. Agreed on eliquis 10mg once daily for 4 weeks. will start in am and dc ASA after today's dose    Pain: tylenol prn, lidoderm patch, Celebrex 12/3    Hyperlipidemia: on simvastatin    HTN: On dyazide    Hypothyroidism: on synthroid.     Bladder/bowel : toilet schedule prn, dulcolax prn    Diet: regular with thin, oscal, MVI    Anemia: post op -feosol

## 2017-12-08 PROCEDURE — 99232 SBSQ HOSP IP/OBS MODERATE 35: CPT

## 2017-12-08 RX ADMIN — LIDOCAINE 1 PATCH: 4 CREAM TOPICAL at 21:42

## 2017-12-08 RX ADMIN — Medication 1 TABLET(S): at 12:03

## 2017-12-08 RX ADMIN — SIMVASTATIN 20 MILLIGRAM(S): 20 TABLET, FILM COATED ORAL at 21:45

## 2017-12-08 RX ADMIN — RIVAROXABAN 10 MILLIGRAM(S): KIT at 12:03

## 2017-12-08 RX ADMIN — Medication 975 MILLIGRAM(S): at 06:56

## 2017-12-08 RX ADMIN — Medication 25 MICROGRAM(S): at 06:27

## 2017-12-08 RX ADMIN — LIDOCAINE 1 PATCH: 4 CREAM TOPICAL at 08:17

## 2017-12-08 RX ADMIN — Medication 1 CAPSULE(S): at 06:27

## 2017-12-08 RX ADMIN — LOSARTAN POTASSIUM 100 MILLIGRAM(S): 100 TABLET, FILM COATED ORAL at 08:17

## 2017-12-08 RX ADMIN — Medication 325 MILLIGRAM(S): at 12:03

## 2017-12-08 RX ADMIN — Medication 975 MILLIGRAM(S): at 06:27

## 2017-12-08 NOTE — PROGRESS NOTE ADULT - SUBJECTIVE AND OBJECTIVE BOX
HPI:77F admitted 11/29 for elective right TKR after failing conservative measures. Right knee pain ongoing and worsening over the last 2 years. s/p right TKR by Dr. Snell with EBL = 50cc on 11/29.    PMHx: Polio, Hypothyroidism, H/O left deep vein thrombosis (about 3 years ago, completed treatement with Xarelto), Hyperlipidemia, Hypertension    INTERVAL SUBJECTIVE & REVIEW OF SYMPTOMS:  Feels achy in all her joints. Otherwise feels well    ROS: as above  Denies HA/CP/dyspnea  Denies abdominal pain/nausea/ denies constipation    Vital Signs Last 24 Hrs  T(C): 36.1 (08 Dec 2017 06:20), Max: 36.4 (07 Dec 2017 20:07)  T(F): 97 (08 Dec 2017 06:20), Max: 97.5 (07 Dec 2017 20:07)  HR: 56 (08 Dec 2017 06:20) (48 - 59)  BP: 130/61 (08 Dec 2017 06:20) (100/63 - 152/70)  BP(mean): --  RR: 17 (08 Dec 2017 06:20) (17 - 18)  SpO2: 99% (08 Dec 2017 06:20) (97% - 99%)      PHYSICAL EXAM  General: NAD  Cardio: S1S2+  Resp: clear  Abdomen: soft,   Extrem: No calf tenderness  Skin: right knee honey comb dressing.  LE post op swelling and ecchymosis improving     Functional Exam:   Transfers: mod/min assist  Gait: min assist with RW  Tub transfer: min assist    MEDICATIONS  (STANDING):    MEDICATIONS  (STANDING):  calcium carbonate 1250 mG + Vitamin D (OsCal 500 + D) 1 Tablet(s) Oral daily  ferrous    sulfate 325 milliGRAM(s) Oral with lunch  levothyroxine 50 MICROGram(s) Oral <User Schedule>  levothyroxine 25 MICROGram(s) Oral <User Schedule>  lidocaine   Patch 1 Patch Transdermal daily  losartan 100 milliGRAM(s) Oral daily  multivitamin 1 Tablet(s) Oral daily  rivaroxaban 10 milliGRAM(s) Oral daily  simvastatin 20 milliGRAM(s) Oral at bedtime  triamterene 37.5 mG/hydrochlorothiazide 25 mG Capsule 1 Capsule(s) Oral daily    MEDICATIONS  (PRN):  acetaminophen   Tablet. 975 milliGRAM(s) Oral three times a day PRN Mild Pain (1 - 3)  bisacodyl 10 milliGRAM(s) Oral daily PRN No bowel movement GREATER THAN 2 day  sodium biphosphate Rectal Enema 1 Enema Rectal daily PRN No bowel movement GREATER THAN 2 day      RECENT LABS:                        9.6    6.1   )-----------( 203      ( 04 Dec 2017 05:36 )             28.6     12-04    139  |  102  |  27.0<H>  ----------------------------<  101  4.2   |  25.0  |  0.66    Ca    9.0      04 Dec 2017 05:36    UA: negative      RADIOLOGY/OTHER RESULTS:    A/P:  77 year old female with knee OA now s/p right TKA after failing conservative treatment.     Continue full rehab program: PT/OT    DVT prophylaxis: Eliquis 10mg once daily for 4 weeks.( started 12/8). Lovenox, ASA d/c     Pain: tylenol prn, lidoderm patch, d/c Celebrex     Hyperlipidemia: on simvastatin    HTN: On dyazide    Hypothyroidism: on synthroid.     Bladder/bowel : toilet schedule prn, dulcolax prn    Diet: regular with thin, oscal, MVI    Anemia: post op -feosol

## 2017-12-09 PROCEDURE — 99232 SBSQ HOSP IP/OBS MODERATE 35: CPT

## 2017-12-09 RX ORDER — SIMVASTATIN 20 MG/1
20 TABLET, FILM COATED ORAL
Qty: 0 | Refills: 0 | Status: DISCONTINUED | OUTPATIENT
Start: 2017-12-09 | End: 2017-12-12

## 2017-12-09 RX ADMIN — Medication 1 TABLET(S): at 11:11

## 2017-12-09 RX ADMIN — Medication 25 MICROGRAM(S): at 06:01

## 2017-12-09 RX ADMIN — Medication 975 MILLIGRAM(S): at 17:13

## 2017-12-09 RX ADMIN — Medication 1 CAPSULE(S): at 06:01

## 2017-12-09 RX ADMIN — LIDOCAINE 1 PATCH: 4 CREAM TOPICAL at 21:11

## 2017-12-09 RX ADMIN — SIMVASTATIN 20 MILLIGRAM(S): 20 TABLET, FILM COATED ORAL at 17:13

## 2017-12-09 RX ADMIN — LOSARTAN POTASSIUM 100 MILLIGRAM(S): 100 TABLET, FILM COATED ORAL at 07:58

## 2017-12-09 RX ADMIN — Medication 325 MILLIGRAM(S): at 11:15

## 2017-12-09 RX ADMIN — LIDOCAINE 1 PATCH: 4 CREAM TOPICAL at 07:58

## 2017-12-09 RX ADMIN — Medication 975 MILLIGRAM(S): at 06:28

## 2017-12-09 RX ADMIN — Medication 975 MILLIGRAM(S): at 06:01

## 2017-12-09 RX ADMIN — RIVAROXABAN 10 MILLIGRAM(S): KIT at 11:11

## 2017-12-09 NOTE — PROGRESS NOTE ADULT - SUBJECTIVE AND OBJECTIVE BOX
Patient complains of right knee pain and swelling.  Medications for pain are helping and is using ice for knee.    REVIEW OF SYSTEMS  Constitutional - No fever,  +fatigue  HEENT - No vertigo, No neck pain  Neurological - No headaches, No memory loss, +loss of strength, No numbness, No tremors  Skin - No rashes, No lesions   Musculoskeletal - +joint pain, +joint swelling, +muscle pain  Psychiatric - No depression, +anxiety    VITALS  T(C): 36.2 (12-09-17 @ 07:56), Max: 36.2 (12-09-17 @ 07:56)  HR: 58 (12-09-17 @ 07:56) (56 - 59)  BP: 128/75 (12-09-17 @ 07:56) (128/59 - 129/65)  RR: 18 (12-09-17 @ 07:56) (17 - 18)  SpO2: 96% (12-09-17 @ 07:56) (96% - 100%)  Wt(kg): --       MEDICATIONS   acetaminophen   Tablet. 975 milliGRAM(s) three times a day PRN  bisacodyl 10 milliGRAM(s) daily PRN  calcium carbonate 1250 mG + Vitamin D (OsCal 500 + D) 1 Tablet(s) daily  ferrous    sulfate 325 milliGRAM(s) with lunch  levothyroxine 50 MICROGram(s) <User Schedule>  levothyroxine 25 MICROGram(s) <User Schedule>  lidocaine   Patch 1 Patch daily  losartan 100 milliGRAM(s) daily  multivitamin 1 Tablet(s) daily  rivaroxaban 10 milliGRAM(s) daily  simvastatin 20 milliGRAM(s) <User Schedule>  sodium biphosphate Rectal Enema 1 Enema daily PRN  triamterene 37.5 mG/hydrochlorothiazide 25 mG Capsule 1 Capsule(s) daily      ---------  PHYSICAL EXAM  Constitutional - NAD, Comfortable  Pulm - Breathing comfortably, No wheezing  Extremities - No C/C, Right knee incision - +Dressing/CDI, Right knee swelling, no erythema, No calf tenderness  Neurologic Exam -                    Cognitive - AAOx3     Communication - Fluent     Motor - Right knee flexion deficits     Sensory - Intact to LT  Psychiatric - Mood WNL, Affect WNL    ASSESSMENT/PLAN  77y Female with functional deficits after right TKR  CAD - Zocor  HTN - Cozaar, Dyazide  Pain - Tylenol PRN, Lidoderms  DVT PPX - Xarelto    Continue 3hrs a day of comprehensive rehab program.

## 2017-12-10 PROCEDURE — 99232 SBSQ HOSP IP/OBS MODERATE 35: CPT

## 2017-12-10 RX ADMIN — SIMVASTATIN 20 MILLIGRAM(S): 20 TABLET, FILM COATED ORAL at 17:34

## 2017-12-10 RX ADMIN — RIVAROXABAN 10 MILLIGRAM(S): KIT at 12:00

## 2017-12-10 RX ADMIN — Medication 975 MILLIGRAM(S): at 08:32

## 2017-12-10 RX ADMIN — Medication 325 MILLIGRAM(S): at 12:00

## 2017-12-10 RX ADMIN — Medication 975 MILLIGRAM(S): at 09:30

## 2017-12-10 RX ADMIN — Medication 25 MICROGRAM(S): at 06:14

## 2017-12-10 RX ADMIN — LIDOCAINE 1 PATCH: 4 CREAM TOPICAL at 20:00

## 2017-12-10 RX ADMIN — LOSARTAN POTASSIUM 100 MILLIGRAM(S): 100 TABLET, FILM COATED ORAL at 08:32

## 2017-12-10 RX ADMIN — LIDOCAINE 1 PATCH: 4 CREAM TOPICAL at 08:32

## 2017-12-10 RX ADMIN — Medication 1 TABLET(S): at 12:00

## 2017-12-10 RX ADMIN — Medication 1 CAPSULE(S): at 06:13

## 2017-12-10 NOTE — CHART NOTE - NSCHARTNOTEFT_GEN_A_CORE
Source: Patient [ X]  Family [ ]   other [ ]    Current Diet: DASH/TLC    Patient reports [ ] nausea  [ ] vomiting [ ] diarrhea [ ] constipation  [ ]chewing problems [ ] swallowing issues  [ ] other:     PO intake:  < 50% [ ]   50-75%  [ ]   %  [ X]  other :    Source for PO intake [ X] Patient [ ] family [X ] chart [ ] staff [ ] other    Current Weight: No new weight available.    Pertinent Medications: MEDICATIONS  (STANDING):  calcium carbonate 1250 mG + Vitamin D (OsCal 500 + D) 1 Tablet(s) Oral daily  ferrous    sulfate 325 milliGRAM(s) Oral with lunch  levothyroxine 50 MICROGram(s) Oral <User Schedule>  levothyroxine 25 MICROGram(s) Oral <User Schedule>  lidocaine   Patch 1 Patch Transdermal daily  losartan 100 milliGRAM(s) Oral daily  multivitamin 1 Tablet(s) Oral daily  rivaroxaban 10 milliGRAM(s) Oral daily  simvastatin 20 milliGRAM(s) Oral <User Schedule>  triamterene 37.5 mG/hydrochlorothiazide 25 mG Capsule 1 Capsule(s) Oral daily    MEDICATIONS  (PRN):  acetaminophen   Tablet. 975 milliGRAM(s) Oral three times a day PRN Mild Pain (1 - 3)  bisacodyl 10 milliGRAM(s) Oral daily PRN No bowel movement GREATER THAN 2 day  sodium biphosphate Rectal Enema 1 Enema Rectal daily PRN No bowel movement GREATER THAN 2 day    Pertinent Labs: No new labs available.    Skin: Surgical wound C/D/I    Nutrition focused physical exam conducted - found signs of malnutrition [X ]absent [ ]present    Subcutaneous fat loss: [ ] Orbital fat pads region, [ ]Buccal fat region, [ ]Triceps region,  [ ]Ribs region    Muscle wasting: [ ]Temples region, [ ]Clavicle region, [ ]Shoulder region, [ ]Scapula region, [ ]Interosseous region,  [ ]thigh region, [ ]Calf region    Estimated Needs:   [X ] no change since previous assessment  [ ] recalculated:     Current Nutrition Diagnosis: Pt currently with good po intake, tolerating diet well at low nutritional risk.    Recommendations: Continue current nutrition plan of care.    Monitoring and Evaluation:   [X ] PO intake [X ] Tolerance to diet prescription [X] Weights  [X] Follow up per protocol [] Labs:

## 2017-12-11 PROCEDURE — 99232 SBSQ HOSP IP/OBS MODERATE 35: CPT

## 2017-12-11 RX ORDER — RIVAROXABAN 15 MG-20MG
1 KIT ORAL
Qty: 23 | Refills: 0
Start: 2017-12-11 | End: 2018-01-02

## 2017-12-11 RX ORDER — ACETAMINOPHEN 500 MG
3 TABLET ORAL
Qty: 0 | Refills: 0 | DISCHARGE
Start: 2017-12-11

## 2017-12-11 RX ORDER — ACETAMINOPHEN 500 MG
3 TABLET ORAL
Qty: 0 | Refills: 0 | COMMUNITY
Start: 2017-12-11

## 2017-12-11 RX ORDER — FERROUS SULFATE 325(65) MG
1 TABLET ORAL
Qty: 28 | Refills: 0
Start: 2017-12-11 | End: 2018-01-07

## 2017-12-11 RX ORDER — LOSARTAN POTASSIUM 100 MG/1
100 TABLET, FILM COATED ORAL
Qty: 0 | Refills: 0 | Status: DISCONTINUED | OUTPATIENT
Start: 2017-12-11 | End: 2017-12-12

## 2017-12-11 RX ORDER — LIDOCAINE 4 G/100G
1 CREAM TOPICAL
Qty: 14 | Refills: 0
Start: 2017-12-11

## 2017-12-11 RX ADMIN — LOSARTAN POTASSIUM 100 MILLIGRAM(S): 100 TABLET, FILM COATED ORAL at 12:09

## 2017-12-11 RX ADMIN — Medication 325 MILLIGRAM(S): at 12:09

## 2017-12-11 RX ADMIN — LIDOCAINE 1 PATCH: 4 CREAM TOPICAL at 22:43

## 2017-12-11 RX ADMIN — Medication 1 CAPSULE(S): at 05:56

## 2017-12-11 RX ADMIN — Medication 975 MILLIGRAM(S): at 06:51

## 2017-12-11 RX ADMIN — Medication 975 MILLIGRAM(S): at 06:00

## 2017-12-11 RX ADMIN — RIVAROXABAN 10 MILLIGRAM(S): KIT at 12:09

## 2017-12-11 RX ADMIN — Medication 1 TABLET(S): at 12:09

## 2017-12-11 RX ADMIN — SIMVASTATIN 20 MILLIGRAM(S): 20 TABLET, FILM COATED ORAL at 17:25

## 2017-12-11 RX ADMIN — LIDOCAINE 1 PATCH: 4 CREAM TOPICAL at 10:51

## 2017-12-11 RX ADMIN — Medication 50 MICROGRAM(S): at 05:55

## 2017-12-11 NOTE — PROGRESS NOTE ADULT - SUBJECTIVE AND OBJECTIVE BOX
HPI:77F admitted 11/29 for elective right TKR after failing conservative measures. Right knee pain ongoing and worsening over the last 2 years. s/p right TKR by Dr. Snell with EBL = 50cc on 11/29.    PMHx: Polio, Hypothyroidism, H/O left deep vein thrombosis (about 3 years ago, completed treatement with Xarelto), Hyperlipidemia, Hypertension    INTERVAL SUBJECTIVE & REVIEW OF SYMPTOMS:  No new issues overnight, would like losartan to be given around 900am. Right knee pain improving slowly    ROS: as above  Denies HA/CP/dyspnea  Denies abdominal pain/nausea/ denies constipation    Vital Signs Last 24 Hrs  T(C): 35.7 (11 Dec 2017 05:50), Max: 36.3 (10 Dec 2017 19:51)  T(F): 96.2 (11 Dec 2017 05:50), Max: 97.4 (10 Dec 2017 19:51)  HR: 60 (11 Dec 2017 05:50) (58 - 60)  BP: 143/72 (11 Dec 2017 05:50) (136/77 - 143/72)  BP(mean): --  RR: 18 (11 Dec 2017 05:50) (18 - 18)  SpO2: 98% (11 Dec 2017 05:50) (98% - 99%)    PHYSICAL EXAM  General: NAD  Cardio: S1S2+  Resp: clear  Abdomen: soft,   Extrem: No calf tenderness  Skin: right knee honey comb dressing.  LE post op swelling and ecchymosis improving     Functional Exam:   Transfers: supervision  Gait: supervision with RW, stairs: supervision  ADLs: supervision    MEDICATIONS  (STANDING):    MEDICATIONS  (STANDING):  calcium carbonate 1250 mG + Vitamin D (OsCal 500 + D) 1 Tablet(s) Oral daily  ferrous    sulfate 325 milliGRAM(s) Oral with lunch  levothyroxine 50 MICROGram(s) Oral <User Schedule>  levothyroxine 25 MICROGram(s) Oral <User Schedule>  lidocaine   Patch 1 Patch Transdermal daily  losartan 100 milliGRAM(s) Oral <User Schedule>  multivitamin 1 Tablet(s) Oral daily  rivaroxaban 10 milliGRAM(s) Oral daily  simvastatin 20 milliGRAM(s) Oral <User Schedule>  triamterene 37.5 mG/hydrochlorothiazide 25 mG Capsule 1 Capsule(s) Oral daily    MEDICATIONS  (PRN):  acetaminophen   Tablet. 975 milliGRAM(s) Oral three times a day PRN Mild Pain (1 - 3)  bisacodyl 10 milliGRAM(s) Oral daily PRN No bowel movement GREATER THAN 2 day  sodium biphosphate Rectal Enema 1 Enema Rectal daily PRN No bowel movement GREATER THAN 2 day      RECENT LABS:                        9.6    6.1   )-----------( 203      ( 04 Dec 2017 05:36 )             28.6     12-04    139  |  102  |  27.0<H>  ----------------------------<  101  4.2   |  25.0  |  0.66    Ca    9.0      04 Dec 2017 05:36    UA: negative      RADIOLOGY/OTHER RESULTS:    A/P:  77 year old female with knee OA now s/p right TKA after failing conservative treatment.     Continue full rehab program: PT/OT    DVT prophylaxis: Eliquis 10mg once daily for 4 weeks.( started 12/8).    Pain: tylenol prn, lidoderm patch, d/c Celebrex     Hyperlipidemia: on simvastatin    HTN: On dyazide/losartan    Hypothyroidism: on synthroid.     Bladder/bowel : toilet schedule prn, dulcolax prn    Diet: regular with thin, oscal, MVI    Anemia: post op -feosol     D/C home in am

## 2017-12-12 VITALS
SYSTOLIC BLOOD PRESSURE: 114 MMHG | RESPIRATION RATE: 18 BRPM | OXYGEN SATURATION: 97 % | DIASTOLIC BLOOD PRESSURE: 65 MMHG | TEMPERATURE: 97 F | HEART RATE: 64 BPM

## 2017-12-12 PROCEDURE — 99238 HOSP IP/OBS DSCHRG MGMT 30/<: CPT

## 2017-12-12 RX ADMIN — Medication 975 MILLIGRAM(S): at 12:16

## 2017-12-12 RX ADMIN — Medication 1 TABLET(S): at 12:16

## 2017-12-12 RX ADMIN — RIVAROXABAN 10 MILLIGRAM(S): KIT at 12:16

## 2017-12-12 RX ADMIN — Medication 975 MILLIGRAM(S): at 12:50

## 2017-12-12 RX ADMIN — LIDOCAINE 1 PATCH: 4 CREAM TOPICAL at 08:16

## 2017-12-12 RX ADMIN — Medication 1 CAPSULE(S): at 05:42

## 2017-12-12 RX ADMIN — LOSARTAN POTASSIUM 100 MILLIGRAM(S): 100 TABLET, FILM COATED ORAL at 09:48

## 2017-12-12 RX ADMIN — Medication 25 MICROGRAM(S): at 05:42

## 2017-12-12 RX ADMIN — Medication 325 MILLIGRAM(S): at 12:16

## 2017-12-12 NOTE — PROGRESS NOTE ADULT - SUBJECTIVE AND OBJECTIVE BOX
HPI:77F admitted 11/29 for elective right TKR after failing conservative measures. Right knee pain ongoing and worsening over the last 2 years. s/p right TKR by Dr. Snell with EBL = 50cc on 11/29.    PMHx: Polio, Hypothyroidism, H/O left deep vein thrombosis (about 3 years ago, completed treatement with Xarelto), Hyperlipidemia, Hypertension    INTERVAL SUBJECTIVE & REVIEW OF SYMPTOMS:  Patient states that she feels well.     ROS: as above  Denies HA/CP/dyspnea  Denies abdominal pain/nausea/ denies constipation    Vital Signs Last 24 Hrs  T(C): 36.6 (12 Dec 2017 05:37), Max: 36.6 (12 Dec 2017 05:37)  T(F): 97.8 (12 Dec 2017 05:37), Max: 97.8 (12 Dec 2017 05:37)  HR: 60 (12 Dec 2017 05:37) (60 - 65)  BP: 136/80 (12 Dec 2017 05:37) (113/76 - 152/74)  BP(mean): --  RR: 17 (12 Dec 2017 05:37) (17 - 18)  SpO2: 93% (12 Dec 2017 05:37) (93% - 99%)    PHYSICAL EXAM  General: NAD  Cardio: S1S2+  Resp: clear  Abdomen: soft,   Extrem: No calf tenderness  Skin: right knee honey comb dressing.  LE post op swelling and ecchymosis improving     Functional Exam:   mod I with ADLs and ambulation      MEDICATIONS  (STANDING):    MEDICATIONS  (STANDING):  calcium carbonate 1250 mG + Vitamin D (OsCal 500 + D) 1 Tablet(s) Oral daily  ferrous    sulfate 325 milliGRAM(s) Oral with lunch  levothyroxine 50 MICROGram(s) Oral <User Schedule>  levothyroxine 25 MICROGram(s) Oral <User Schedule>  lidocaine   Patch 1 Patch Transdermal daily  losartan 100 milliGRAM(s) Oral <User Schedule>  multivitamin 1 Tablet(s) Oral daily  rivaroxaban 10 milliGRAM(s) Oral daily  simvastatin 20 milliGRAM(s) Oral <User Schedule>  triamterene 37.5 mG/hydrochlorothiazide 25 mG Capsule 1 Capsule(s) Oral daily    MEDICATIONS  (PRN):  acetaminophen   Tablet. 975 milliGRAM(s) Oral three times a day PRN Mild Pain (1 - 3)  bisacodyl 10 milliGRAM(s) Oral daily PRN No bowel movement GREATER THAN 2 day  sodium biphosphate Rectal Enema 1 Enema Rectal daily PRN No bowel movement GREATER THAN 2 day      RECENT LABS:                        9.6    6.1   )-----------( 203      ( 04 Dec 2017 05:36 )             28.6     12-04    139  |  102  |  27.0<H>  ----------------------------<  101  4.2   |  25.0  |  0.66    Ca    9.0      04 Dec 2017 05:36    UA: negative      RADIOLOGY/OTHER RESULTS:    A/P:  77 year old female with knee OA now s/p right TKA after failing conservative treatment.     Continue full rehab program: PT/OT    DVT prophylaxis: Eliquis 10mg once daily for 4 weeks.( started 12/8).    Pain: tylenol prn, lidoderm patch, d/c Celebrex     Hyperlipidemia: on simvastatin    HTN: On dyazide/losartan    Hypothyroidism: on synthroid.     Bladder/bowel : toilet schedule prn, dulcolax prn    Diet: regular with thin, oscal, MVI    Anemia: post op -feosol     D/C home today with home care.  fu with PCP, ortho in 7-10 days HPI:77F admitted 11/29 for elective right TKR after failing conservative measures. Right knee pain ongoing and worsening over the last 2 years. s/p right TKR by Dr. Snell with EBL = 50cc on 11/29.    PMHx: Polio, Hypothyroidism, H/O left deep vein thrombosis (about 3 years ago, completed treatement with Xarelto), Hyperlipidemia, Hypertension    INTERVAL SUBJECTIVE & REVIEW OF SYMPTOMS:  Patient states that she feels well.     ROS: as above  Denies HA/CP/dyspnea  Denies abdominal pain/nausea/ denies constipation    Vital Signs Last 24 Hrs  T(C): 36.6 (12 Dec 2017 05:37), Max: 36.6 (12 Dec 2017 05:37)  T(F): 97.8 (12 Dec 2017 05:37), Max: 97.8 (12 Dec 2017 05:37)  HR: 60 (12 Dec 2017 05:37) (60 - 65)  BP: 136/80 (12 Dec 2017 05:37) (113/76 - 152/74)  BP(mean): --  RR: 17 (12 Dec 2017 05:37) (17 - 18)  SpO2: 93% (12 Dec 2017 05:37) (93% - 99%)    PHYSICAL EXAM  General: NAD  Cardio: S1S2+  Resp: clear  Abdomen: soft,   Extrem: No calf tenderness  Skin: right knee honey comb dressing.  LE post op swelling and ecchymosis improving     Functional Exam:   mod I with ADLs and ambulation      MEDICATIONS  (STANDING):    MEDICATIONS  (STANDING):  calcium carbonate 1250 mG + Vitamin D (OsCal 500 + D) 1 Tablet(s) Oral daily  ferrous    sulfate 325 milliGRAM(s) Oral with lunch  levothyroxine 50 MICROGram(s) Oral <User Schedule>  levothyroxine 25 MICROGram(s) Oral <User Schedule>  lidocaine   Patch 1 Patch Transdermal daily  losartan 100 milliGRAM(s) Oral <User Schedule>  multivitamin 1 Tablet(s) Oral daily  rivaroxaban 10 milliGRAM(s) Oral daily  simvastatin 20 milliGRAM(s) Oral <User Schedule>  triamterene 37.5 mG/hydrochlorothiazide 25 mG Capsule 1 Capsule(s) Oral daily    MEDICATIONS  (PRN):  acetaminophen   Tablet. 975 milliGRAM(s) Oral three times a day PRN Mild Pain (1 - 3)  bisacodyl 10 milliGRAM(s) Oral daily PRN No bowel movement GREATER THAN 2 day  sodium biphosphate Rectal Enema 1 Enema Rectal daily PRN No bowel movement GREATER THAN 2 day      RECENT LABS:                        9.6    6.1   )-----------( 203      ( 04 Dec 2017 05:36 )             28.6     12-04    139  |  102  |  27.0<H>  ----------------------------<  101  4.2   |  25.0  |  0.66    Ca    9.0      04 Dec 2017 05:36    UA: negative      RADIOLOGY/OTHER RESULTS:    A/P:  77 year old female with knee OA now s/p right TKA after failing conservative treatment.     Continue full rehab program: PT/OT    DVT prophylaxis: Eliquis 10mg once daily for 4 weeks.( started 12/8).    Pain: tylenol prn, lidoderm patch, off Celebrex     Hyperlipidemia: on simvastatin    HTN: On dyazide/losartan    Hypothyroidism: on synthroid.     Bladder/bowel : toilet schedule prn, dulcolax prn    Diet: regular with thin, oscal, MVI    Anemia: post op -feosol     D/C home today with home care.  fu with PCP, ortho in 7-10 days HPI:77F admitted 11/29 for elective right TKR after failing conservative measures. Right knee pain ongoing and worsening over the last 2 years. s/p right TKR by Dr. Snell with EBL = 50cc on 11/29.    PMHx: Polio, Hypothyroidism, H/O left deep vein thrombosis (about 3 years ago, completed treatement with Xarelto), Hyperlipidemia, Hypertension    INTERVAL SUBJECTIVE & REVIEW OF SYMPTOMS:  Patient states that she feels well.     ROS: as above  Denies HA/CP/dyspnea  Denies abdominal pain/nausea/ denies constipation    Vital Signs Last 24 Hrs  T(C): 36.6 (12 Dec 2017 05:37), Max: 36.6 (12 Dec 2017 05:37)  T(F): 97.8 (12 Dec 2017 05:37), Max: 97.8 (12 Dec 2017 05:37)  HR: 60 (12 Dec 2017 05:37) (60 - 65)  BP: 136/80 (12 Dec 2017 05:37) (113/76 - 152/74)  BP(mean): --  RR: 17 (12 Dec 2017 05:37) (17 - 18)  SpO2: 93% (12 Dec 2017 05:37) (93% - 99%)    PHYSICAL EXAM  General: NAD  Cardio: S1S2+  Resp: clear  Abdomen: soft,   Extrem: No calf tenderness  Skin: right knee honey comb dressing.  LE post op swelling and ecchymosis improving     Functional Exam:   mod I with ADLs and ambulation      MEDICATIONS  (STANDING):    MEDICATIONS  (STANDING):  calcium carbonate 1250 mG + Vitamin D (OsCal 500 + D) 1 Tablet(s) Oral daily  ferrous    sulfate 325 milliGRAM(s) Oral with lunch  levothyroxine 50 MICROGram(s) Oral <User Schedule>  levothyroxine 25 MICROGram(s) Oral <User Schedule>  lidocaine   Patch 1 Patch Transdermal daily  losartan 100 milliGRAM(s) Oral <User Schedule>  multivitamin 1 Tablet(s) Oral daily  rivaroxaban 10 milliGRAM(s) Oral daily  simvastatin 20 milliGRAM(s) Oral <User Schedule>  triamterene 37.5 mG/hydrochlorothiazide 25 mG Capsule 1 Capsule(s) Oral daily    MEDICATIONS  (PRN):  acetaminophen   Tablet. 975 milliGRAM(s) Oral three times a day PRN Mild Pain (1 - 3)  bisacodyl 10 milliGRAM(s) Oral daily PRN No bowel movement GREATER THAN 2 day  sodium biphosphate Rectal Enema 1 Enema Rectal daily PRN No bowel movement GREATER THAN 2 day      RECENT LABS:                        9.6    6.1   )-----------( 203      ( 04 Dec 2017 05:36 )             28.6     12-04    139  |  102  |  27.0<H>  ----------------------------<  101  4.2   |  25.0  |  0.66    Ca    9.0      04 Dec 2017 05:36    UA: negative      RADIOLOGY/OTHER RESULTS:    A/P:  77 year old female with knee OA now s/p right TKA after failing conservative treatment.     Continue full rehab program: PT/OT. Honey comb dressing removed today    DVT prophylaxis: Eliquis 10mg once daily for 4 weeks.( started 12/8).    Pain: tylenol prn, lidoderm patch, off Celebrex     Hyperlipidemia: on simvastatin    HTN: On dyazide/losartan    Hypothyroidism: on synthroid.     Bladder/bowel : toilet schedule prn, dulcolax prn    Diet: regular with thin, oscal, MVI    Anemia: post op -feosol     D/C home today with home care.  fu with PCP, ortho in 7-10 days

## 2017-12-12 NOTE — PROGRESS NOTE ADULT - PROVIDER SPECIALTY LIST ADULT
Hospitalist
Rehab Medicine

## 2017-12-14 ENCOUNTER — OTHER (OUTPATIENT)
Age: 77
End: 2017-12-14

## 2017-12-14 ENCOUNTER — APPOINTMENT (OUTPATIENT)
Dept: ORTHOPEDIC SURGERY | Facility: CLINIC | Age: 77
End: 2017-12-14

## 2017-12-15 LAB — SURGICAL PATHOLOGY FINAL REPORT - CH: SIGNIFICANT CHANGE UP

## 2017-12-18 ENCOUNTER — OTHER (OUTPATIENT)
Age: 77
End: 2017-12-18

## 2017-12-18 ENCOUNTER — APPOINTMENT (OUTPATIENT)
Dept: ORTHOPEDIC SURGERY | Facility: CLINIC | Age: 77
End: 2017-12-18
Payer: MEDICARE

## 2017-12-18 VITALS
SYSTOLIC BLOOD PRESSURE: 148 MMHG | HEIGHT: 64 IN | HEART RATE: 66 BPM | DIASTOLIC BLOOD PRESSURE: 91 MMHG | WEIGHT: 171 LBS | BODY MASS INDEX: 29.19 KG/M2

## 2017-12-18 DIAGNOSIS — Z96.651 AFTERCARE FOLLOWING JOINT REPLACEMENT SURGERY: ICD-10-CM

## 2017-12-18 DIAGNOSIS — Z96.659 PRESENCE OF UNSPECIFIED ARTIFICIAL KNEE JOINT: ICD-10-CM

## 2017-12-18 DIAGNOSIS — Z47.1 AFTERCARE FOLLOWING JOINT REPLACEMENT SURGERY: ICD-10-CM

## 2017-12-18 PROCEDURE — 99024 POSTOP FOLLOW-UP VISIT: CPT

## 2017-12-18 PROCEDURE — 73562 X-RAY EXAM OF KNEE 3: CPT | Mod: RT

## 2017-12-18 RX ORDER — CLINDAMYCIN PHOSPHATE 20 MG/G
2 CREAM VAGINAL
Qty: 40 | Refills: 0 | Status: DISCONTINUED | COMMUNITY
Start: 2017-07-31 | End: 2017-12-18

## 2017-12-19 PROCEDURE — 97163 PT EVAL HIGH COMPLEX 45 MIN: CPT

## 2017-12-19 PROCEDURE — 80048 BASIC METABOLIC PNL TOTAL CA: CPT

## 2017-12-19 PROCEDURE — 85027 COMPLETE CBC AUTOMATED: CPT

## 2017-12-19 PROCEDURE — C1776: CPT

## 2017-12-19 PROCEDURE — 97166 OT EVAL MOD COMPLEX 45 MIN: CPT

## 2017-12-19 PROCEDURE — 97530 THERAPEUTIC ACTIVITIES: CPT

## 2017-12-19 PROCEDURE — 97112 NEUROMUSCULAR REEDUCATION: CPT

## 2017-12-19 PROCEDURE — 97535 SELF CARE MNGMENT TRAINING: CPT

## 2017-12-19 PROCEDURE — 88311 DECALCIFY TISSUE: CPT

## 2017-12-19 PROCEDURE — C1713: CPT

## 2017-12-19 PROCEDURE — 97116 GAIT TRAINING THERAPY: CPT

## 2017-12-19 PROCEDURE — 36415 COLL VENOUS BLD VENIPUNCTURE: CPT

## 2017-12-19 PROCEDURE — 73560 X-RAY EXAM OF KNEE 1 OR 2: CPT

## 2017-12-19 PROCEDURE — 81001 URINALYSIS AUTO W/SCOPE: CPT

## 2017-12-19 PROCEDURE — 97110 THERAPEUTIC EXERCISES: CPT

## 2017-12-19 PROCEDURE — 86850 RBC ANTIBODY SCREEN: CPT

## 2017-12-19 PROCEDURE — 88305 TISSUE EXAM BY PATHOLOGIST: CPT

## 2017-12-19 PROCEDURE — 86900 BLOOD TYPING SEROLOGIC ABO: CPT

## 2017-12-19 PROCEDURE — 86901 BLOOD TYPING SEROLOGIC RH(D): CPT

## 2017-12-21 ENCOUNTER — APPOINTMENT (OUTPATIENT)
Dept: ORTHOPEDIC SURGERY | Facility: CLINIC | Age: 77
End: 2017-12-21

## 2018-01-03 ENCOUNTER — OTHER (OUTPATIENT)
Age: 78
End: 2018-01-03

## 2018-01-10 ENCOUNTER — APPOINTMENT (OUTPATIENT)
Dept: ORTHOPEDIC SURGERY | Facility: CLINIC | Age: 78
End: 2018-01-10

## 2018-03-19 ENCOUNTER — OTHER (OUTPATIENT)
Age: 78
End: 2018-03-19

## 2018-03-22 ENCOUNTER — OTHER (OUTPATIENT)
Age: 78
End: 2018-03-22

## 2018-03-26 ENCOUNTER — APPOINTMENT (OUTPATIENT)
Dept: ORTHOPEDIC SURGERY | Facility: CLINIC | Age: 78
End: 2018-03-26
Payer: MEDICARE

## 2018-03-26 VITALS
SYSTOLIC BLOOD PRESSURE: 182 MMHG | WEIGHT: 171 LBS | BODY MASS INDEX: 29.19 KG/M2 | HEIGHT: 64 IN | HEART RATE: 64 BPM | DIASTOLIC BLOOD PRESSURE: 81 MMHG

## 2018-03-26 PROCEDURE — 99213 OFFICE O/P EST LOW 20 MIN: CPT

## 2018-03-26 PROCEDURE — 73562 X-RAY EXAM OF KNEE 3: CPT | Mod: 50

## 2018-05-22 ENCOUNTER — APPOINTMENT (OUTPATIENT)
Dept: ORTHOPEDIC SURGERY | Facility: CLINIC | Age: 78
End: 2018-05-22
Payer: MEDICARE

## 2018-05-22 VITALS
SYSTOLIC BLOOD PRESSURE: 135 MMHG | HEART RATE: 61 BPM | WEIGHT: 170 LBS | DIASTOLIC BLOOD PRESSURE: 81 MMHG | HEIGHT: 64 IN | BODY MASS INDEX: 29.02 KG/M2

## 2018-05-22 DIAGNOSIS — Z96.652 PRESENCE OF LEFT ARTIFICIAL KNEE JOINT: ICD-10-CM

## 2018-05-22 PROCEDURE — 72100 X-RAY EXAM L-S SPINE 2/3 VWS: CPT

## 2018-05-22 PROCEDURE — 99214 OFFICE O/P EST MOD 30 MIN: CPT

## 2018-07-23 ENCOUNTER — APPOINTMENT (OUTPATIENT)
Dept: NEUROLOGY | Facility: CLINIC | Age: 78
End: 2018-07-23
Payer: MEDICARE

## 2018-07-23 VITALS
DIASTOLIC BLOOD PRESSURE: 86 MMHG | WEIGHT: 156 LBS | BODY MASS INDEX: 26.63 KG/M2 | HEIGHT: 64 IN | SYSTOLIC BLOOD PRESSURE: 160 MMHG

## 2018-07-23 DIAGNOSIS — Z86.39 PERSONAL HISTORY OF OTHER ENDOCRINE, NUTRITIONAL AND METABOLIC DISEASE: ICD-10-CM

## 2018-07-23 PROCEDURE — 99213 OFFICE O/P EST LOW 20 MIN: CPT

## 2018-08-13 ENCOUNTER — APPOINTMENT (OUTPATIENT)
Dept: OBGYN | Facility: CLINIC | Age: 78
End: 2018-08-13
Payer: MEDICARE

## 2018-08-13 VITALS
HEART RATE: 64 BPM | HEIGHT: 64 IN | DIASTOLIC BLOOD PRESSURE: 90 MMHG | SYSTOLIC BLOOD PRESSURE: 166 MMHG | WEIGHT: 156 LBS | BODY MASS INDEX: 26.63 KG/M2

## 2018-08-13 DIAGNOSIS — Z01.419 ENCOUNTER FOR GYNECOLOGICAL EXAMINATION (GENERAL) (ROUTINE) W/OUT ABNORMAL FINDINGS: ICD-10-CM

## 2018-08-13 DIAGNOSIS — M85.80 OTHER SPECIFIED DISORDERS OF BONE DENSITY AND STRUCTURE, UNSPECIFIED SITE: ICD-10-CM

## 2018-08-13 PROCEDURE — 99213 OFFICE O/P EST LOW 20 MIN: CPT

## 2018-08-13 PROCEDURE — 82270 OCCULT BLOOD FECES: CPT

## 2018-10-03 PROBLEM — I82.409 ACUTE EMBOLISM AND THROMBOSIS OF UNSPECIFIED DEEP VEINS OF UNSPECIFIED LOWER EXTREMITY: Chronic | Status: ACTIVE | Noted: 2017-08-23

## 2018-10-03 PROBLEM — G45.9 TRANSIENT CEREBRAL ISCHEMIC ATTACK, UNSPECIFIED: Chronic | Status: ACTIVE | Noted: 2017-08-23

## 2018-11-05 ENCOUNTER — OTHER (OUTPATIENT)
Age: 78
End: 2018-11-05

## 2018-11-05 DIAGNOSIS — M25.551 PAIN IN RIGHT HIP: ICD-10-CM

## 2018-11-05 DIAGNOSIS — M25.552 PAIN IN RIGHT HIP: ICD-10-CM

## 2018-11-12 ENCOUNTER — APPOINTMENT (OUTPATIENT)
Dept: ORTHOPEDIC SURGERY | Facility: CLINIC | Age: 78
End: 2018-11-12

## 2018-12-21 ENCOUNTER — OTHER (OUTPATIENT)
Age: 78
End: 2018-12-21

## 2018-12-23 ENCOUNTER — EMERGENCY (EMERGENCY)
Facility: HOSPITAL | Age: 78
LOS: 1 days | Discharge: HOME | End: 2018-12-23
Attending: EMERGENCY MEDICINE
Payer: MEDICARE

## 2018-12-23 VITALS
RESPIRATION RATE: 18 BRPM | HEART RATE: 62 BPM | SYSTOLIC BLOOD PRESSURE: 176 MMHG | WEIGHT: 167.99 LBS | DIASTOLIC BLOOD PRESSURE: 91 MMHG | HEIGHT: 64 IN | TEMPERATURE: 98 F | OXYGEN SATURATION: 96 %

## 2018-12-23 DIAGNOSIS — Z98.89 OTHER SPECIFIED POSTPROCEDURAL STATES: Chronic | ICD-10-CM

## 2018-12-23 DIAGNOSIS — R22.1 LOCALIZED SWELLING, MASS AND LUMP, NECK: Chronic | ICD-10-CM

## 2018-12-23 DIAGNOSIS — Z90.49 ACQUIRED ABSENCE OF OTHER SPECIFIED PARTS OF DIGESTIVE TRACT: Chronic | ICD-10-CM

## 2018-12-23 DIAGNOSIS — Z96.652 PRESENCE OF LEFT ARTIFICIAL KNEE JOINT: Chronic | ICD-10-CM

## 2018-12-23 DIAGNOSIS — Z41.1 ENCOUNTER FOR COSMETIC SURGERY: Chronic | ICD-10-CM

## 2018-12-23 LAB
ALBUMIN SERPL ELPH-MCNC: 4.1 G/DL — SIGNIFICANT CHANGE UP (ref 3.3–5.2)
ALP SERPL-CCNC: 55 U/L — SIGNIFICANT CHANGE UP (ref 40–120)
ALT FLD-CCNC: 15 U/L — SIGNIFICANT CHANGE UP
ANION GAP SERPL CALC-SCNC: 12 MMOL/L — SIGNIFICANT CHANGE UP (ref 5–17)
APTT BLD: 35.8 SEC — SIGNIFICANT CHANGE UP (ref 27.5–36.3)
AST SERPL-CCNC: 21 U/L — SIGNIFICANT CHANGE UP
BASOPHILS # BLD AUTO: 0 K/UL — SIGNIFICANT CHANGE UP (ref 0–0.2)
BASOPHILS NFR BLD AUTO: 0.4 % — SIGNIFICANT CHANGE UP (ref 0–2)
BILIRUB SERPL-MCNC: 0.9 MG/DL — SIGNIFICANT CHANGE UP (ref 0.4–2)
BUN SERPL-MCNC: 17 MG/DL — SIGNIFICANT CHANGE UP (ref 8–20)
CALCIUM SERPL-MCNC: 9.4 MG/DL — SIGNIFICANT CHANGE UP (ref 8.6–10.2)
CHLORIDE SERPL-SCNC: 102 MMOL/L — SIGNIFICANT CHANGE UP (ref 98–107)
CO2 SERPL-SCNC: 24 MMOL/L — SIGNIFICANT CHANGE UP (ref 22–29)
CREAT SERPL-MCNC: 0.54 MG/DL — SIGNIFICANT CHANGE UP (ref 0.5–1.3)
EOSINOPHIL # BLD AUTO: 0.1 K/UL — SIGNIFICANT CHANGE UP (ref 0–0.5)
EOSINOPHIL NFR BLD AUTO: 1.2 % — SIGNIFICANT CHANGE UP (ref 0–6)
GLUCOSE SERPL-MCNC: 97 MG/DL — SIGNIFICANT CHANGE UP (ref 70–115)
HCT VFR BLD CALC: 36.4 % — LOW (ref 37–47)
HGB BLD-MCNC: 12.3 G/DL — SIGNIFICANT CHANGE UP (ref 12–16)
INR BLD: 1.11 RATIO — SIGNIFICANT CHANGE UP (ref 0.88–1.16)
LYMPHOCYTES # BLD AUTO: 1.5 K/UL — SIGNIFICANT CHANGE UP (ref 1–4.8)
LYMPHOCYTES # BLD AUTO: 17.9 % — LOW (ref 20–55)
MCHC RBC-ENTMCNC: 30.5 PG — SIGNIFICANT CHANGE UP (ref 27–31)
MCHC RBC-ENTMCNC: 33.8 G/DL — SIGNIFICANT CHANGE UP (ref 32–36)
MCV RBC AUTO: 90.3 FL — SIGNIFICANT CHANGE UP (ref 81–99)
MONOCYTES # BLD AUTO: 0.9 K/UL — HIGH (ref 0–0.8)
MONOCYTES NFR BLD AUTO: 10.9 % — HIGH (ref 3–10)
NEUTROPHILS # BLD AUTO: 5.8 K/UL — SIGNIFICANT CHANGE UP (ref 1.8–8)
NEUTROPHILS NFR BLD AUTO: 69.4 % — SIGNIFICANT CHANGE UP (ref 37–73)
PLATELET # BLD AUTO: 175 K/UL — SIGNIFICANT CHANGE UP (ref 150–400)
POTASSIUM SERPL-MCNC: 4.8 MMOL/L — SIGNIFICANT CHANGE UP (ref 3.5–5.3)
POTASSIUM SERPL-SCNC: 4.8 MMOL/L — SIGNIFICANT CHANGE UP (ref 3.5–5.3)
PROT SERPL-MCNC: 6.4 G/DL — LOW (ref 6.6–8.7)
PROTHROM AB SERPL-ACNC: 12.8 SEC — SIGNIFICANT CHANGE UP (ref 10–12.9)
RBC # BLD: 4.03 M/UL — LOW (ref 4.4–5.2)
RBC # FLD: 12.7 % — SIGNIFICANT CHANGE UP (ref 11–15.6)
SODIUM SERPL-SCNC: 138 MMOL/L — SIGNIFICANT CHANGE UP (ref 135–145)
WBC # BLD: 8.4 K/UL — SIGNIFICANT CHANGE UP (ref 4.8–10.8)
WBC # FLD AUTO: 8.4 K/UL — SIGNIFICANT CHANGE UP (ref 4.8–10.8)

## 2018-12-23 PROCEDURE — 99284 EMERGENCY DEPT VISIT MOD MDM: CPT

## 2018-12-23 PROCEDURE — 73521 X-RAY EXAM HIPS BI 2 VIEWS: CPT | Mod: 26

## 2018-12-23 PROCEDURE — 73564 X-RAY EXAM KNEE 4 OR MORE: CPT | Mod: 26,50

## 2018-12-23 PROCEDURE — 73700 CT LOWER EXTREMITY W/O DYE: CPT | Mod: 26,LT

## 2018-12-23 RX ORDER — LOSARTAN POTASSIUM 100 MG/1
100 TABLET, FILM COATED ORAL DAILY
Qty: 0 | Refills: 0 | Status: DISCONTINUED | OUTPATIENT
Start: 2018-12-23 | End: 2018-12-28

## 2018-12-23 RX ORDER — KETOROLAC TROMETHAMINE 30 MG/ML
15 SYRINGE (ML) INJECTION ONCE
Qty: 0 | Refills: 0 | Status: DISCONTINUED | OUTPATIENT
Start: 2018-12-23 | End: 2018-12-23

## 2018-12-23 RX ORDER — SIMVASTATIN 20 MG/1
20 TABLET, FILM COATED ORAL AT BEDTIME
Qty: 0 | Refills: 0 | Status: DISCONTINUED | OUTPATIENT
Start: 2018-12-23 | End: 2018-12-28

## 2018-12-23 RX ORDER — TRIAMTERENE/HYDROCHLOROTHIAZID 75 MG-50MG
1 TABLET ORAL ONCE
Qty: 0 | Refills: 0 | Status: COMPLETED | OUTPATIENT
Start: 2018-12-23 | End: 2018-12-23

## 2018-12-23 RX ORDER — IBUPROFEN 200 MG
600 TABLET ORAL EVERY 6 HOURS
Qty: 0 | Refills: 0 | Status: DISCONTINUED | OUTPATIENT
Start: 2018-12-23 | End: 2018-12-28

## 2018-12-23 RX ORDER — IBUPROFEN 200 MG
600 TABLET ORAL ONCE
Qty: 0 | Refills: 0 | Status: COMPLETED | OUTPATIENT
Start: 2018-12-23 | End: 2018-12-23

## 2018-12-23 RX ORDER — LEVOTHYROXINE SODIUM 125 MCG
50 TABLET ORAL DAILY
Qty: 0 | Refills: 0 | Status: DISCONTINUED | OUTPATIENT
Start: 2018-12-23 | End: 2018-12-24

## 2018-12-23 RX ADMIN — Medication 600 MILLIGRAM(S): at 15:00

## 2018-12-23 RX ADMIN — SIMVASTATIN 20 MILLIGRAM(S): 20 TABLET, FILM COATED ORAL at 22:51

## 2018-12-23 RX ADMIN — Medication 600 MILLIGRAM(S): at 14:22

## 2018-12-23 NOTE — ED ADULT NURSE REASSESSMENT NOTE - NS ED NURSE REASSESS COMMENT FT1
patient refusing Iv - and refusing to have iv contrast done - states I don't like it and I don't like how I feel when I get it- MD made aware

## 2018-12-23 NOTE — ED PROVIDER NOTE - OBJECTIVE STATEMENT
Patient with PMH DVT (formerly on Xarleto x 6 months in 2016), HTN, HLD, TIA, OA w/ b/l TKR, polio with resulting truncal weakness presents complaining of bilateral knee and hip pain s/p a mechanical fall which occurred this morning around 8 am when she tripped over a chair when she was walking to her bathroom. She fell backwards, landing on her left knee and left hip and then hitting her head minimally on the tile floor. She did not lose consciousness, but was not able to get up on her own. She states her left knee took the brunt of the fall and is very swollen and painful to move. She denies HA, dizziness, blurry vision, nausea, vomiting or back pain. She only takes 81 mg of ASA. She notes she has severe arthritis and is scheduled for an evaluation for possible THR on the left with Dr. Snell on 1/3. She refuses narcotic medications due to hallucinations and vomiting.

## 2018-12-23 NOTE — ED PROVIDER NOTE - PROGRESS NOTE DETAILS
PT called to see patient who walks with a walker at home. Unable to reach PT. Patient still uncomfortable trying to walk, still doesn't want medication stronger that ibuprofen. I explained PT cannot see her today and she will be held in the ED until PT can see her in the morning to ensure a safe discharge. Patient ambulated with walker and me about 15 steps, bending both hips and knees, but complains of pain with weight bearing of the left knee. More evolved ecchymosis, but no increase in edema. Will CT with IV contrast to r/o expanding hematoma/hemoarthrosis and keep in the ED for PT to see in the morning. Regular home medications ordered. Patient refuses IV for IV contrast and IV contrast for CT scan to evaluate for expanding hematoma and active bleeding. Will obtain CT without contrast. Pt cleared for d/c with home care services

## 2018-12-23 NOTE — ED PROVIDER NOTE - NSCAREINITIATED _GEN_ER
Giulia Zhang(Attending) gait disturbance, visual disturbance/difficulty walking difficulty walking/gait disturbance, visual disturbance/paresthesias paresthesias/weakness/gait disturbance, visual disturbance/tremors/difficulty walking

## 2018-12-23 NOTE — ED ADULT NURSE NOTE - NSIMPLEMENTINTERV_GEN_ALL_ED
Implemented All Fall Risk Interventions:  [place] to call system. Call bell, personal items and telephone within reach. Instruct patient to call for assistance. Room bathroom lighting operational. Non-slip footwear when patient is off stretcher. Physically safe environment: no spills, clutter or unnecessary equipment. Stretcher in lowest position, wheels locked, appropriate side rails in place. Provide visual cue, wrist band, yellow gown, etc. Monitor gait and stability. Monitor for mental status changes and reorient to person, place, and time. Review medications for side effects contributing to fall risk. Reinforce activity limits and safety measures with patient and family.

## 2018-12-23 NOTE — ED ADULT NURSE REASSESSMENT NOTE - NS ED NURSE REASSESS COMMENT FT1
assumed care of patient from ongoing RN, charting as noted, returned from ct. Pt denies pain at this time, resting comfortably in bed, VSS. Patient refusing IV, MD aware. Awiaitng PT in AM assumed care of patient from ongoing RN, charting as noted, returned from ct. Pt denies pain at this time, resting comfortably in bed, VSS. Patient refusing IV, MD aware. Awaiting PT in AM. CT scans of knees done, patient has a hx of knee surgery, swelling noted to left knee. Bilateral pedal pulses +. Patient is alert and oriented x4, NAD noted at this time. Plan of care discussed with patient who verbalized understanding. MEal provided to patient. Safety maintained.

## 2018-12-23 NOTE — ED ADULT TRIAGE NOTE - CHIEF COMPLAINT QUOTE
Pt BIBA A&Ox3 c/o left knee pain and back pain s/p trip and fall, states she turned and lost her balance. Pt denies hitting head or LOC. Pt on ASA 81mg. No obvious trauma or injury present.

## 2018-12-23 NOTE — ED PROVIDER NOTE - MEDICAL DECISION MAKING DETAILS
Patient s/p mechanical fall at home, did not lose consciousness, complaining of left hip and knee pain, not on blood thinners. Left knee with edema and ecchymosis decreased ROM 2/2 pain. Will xr bilateral knees, pelvis and hips and check basic labs, give toradol for pain and reassess.

## 2018-12-23 NOTE — ED PROVIDER NOTE - PHYSICAL EXAMINATION
Const: Awake, alert and oriented. In no acute distress. Well appearing.  HEENT: NC/AT. No palpable skull defects, no ecchymosis or hematomas. Moist mucous membranes. Poor dentition.  Eyes: No scleral icterus. EOMI.  Neck:. Soft and supple. No midline TTP, no palpable step offs. Full ROM without pain.  Cardiac: Regular rate and regular rhythm. +S1/S2. No murmurs. Peripheral pulses 2+ and symmetric. No LE edema.  Resp: Speaking in full sentences. No evidence of respiratory distress. No wheezes, rales or rhonchi.  Abd: Soft, non-tender, non-distended. Normal bowel sounds in all 4 quadrants. No guarding or rebound.  Back: Scoliosis. Spine non-tender. No CVAT.  Skin: No rashes, abrasions or lacerations.  MSK: No chest wall TTP or crepitus. Pelvis stable. Left greater trochanteric TTP, left knee with large edema and ecchymosis, no abrasions, decreased ROM 2/2 pain. Right knee with no joint line TTP, full painless ROM.   Neuro: Awake, alert & oriented x 3. Normal sensation bilateral lower extremities.

## 2018-12-23 NOTE — ED PROVIDER NOTE - NS ED ROS FT
Const: Denies fever, chills  HEENT: Denies blurry vision, sore throat  Neck: Denies neck pain/stiffness  Resp: Denies coughing, SOB  Cardiovascular: Denies CP, palpitations, LE edema  GI: Denies nausea, vomiting, abdominal pain  : Denies urinary frequency/urgency/dysuria, hematuria  MSK: + bilateral knee pain, + bilateral hip pain. Denies back pain  Neuro: Denies LOC, HA, dizziness, numbness, weakness  Skin: Denies rashes.

## 2018-12-24 VITALS
OXYGEN SATURATION: 98 % | HEART RATE: 61 BPM | TEMPERATURE: 98 F | SYSTOLIC BLOOD PRESSURE: 142 MMHG | RESPIRATION RATE: 19 BRPM | DIASTOLIC BLOOD PRESSURE: 66 MMHG

## 2018-12-24 LAB
APPEARANCE UR: ABNORMAL
BACTERIA # UR AUTO: ABNORMAL
BILIRUB UR-MCNC: NEGATIVE — SIGNIFICANT CHANGE UP
COLOR SPEC: YELLOW — SIGNIFICANT CHANGE UP
DIFF PNL FLD: ABNORMAL
EPI CELLS # UR: ABNORMAL
GLUCOSE UR QL: NEGATIVE MG/DL — SIGNIFICANT CHANGE UP
KETONES UR-MCNC: NEGATIVE — SIGNIFICANT CHANGE UP
LEUKOCYTE ESTERASE UR-ACNC: ABNORMAL
NITRITE UR-MCNC: NEGATIVE — SIGNIFICANT CHANGE UP
PH UR: 7 — SIGNIFICANT CHANGE UP (ref 5–8)
PROT UR-MCNC: 15 MG/DL
RBC CASTS # UR COMP ASSIST: SIGNIFICANT CHANGE UP /HPF (ref 0–4)
SP GR SPEC: 1.01 — SIGNIFICANT CHANGE UP (ref 1.01–1.02)
UROBILINOGEN FLD QL: NEGATIVE MG/DL — SIGNIFICANT CHANGE UP
WBC UR QL: SIGNIFICANT CHANGE UP

## 2018-12-24 PROCEDURE — 73700 CT LOWER EXTREMITY W/O DYE: CPT

## 2018-12-24 PROCEDURE — 73521 X-RAY EXAM HIPS BI 2 VIEWS: CPT

## 2018-12-24 PROCEDURE — 81001 URINALYSIS AUTO W/SCOPE: CPT

## 2018-12-24 PROCEDURE — 85610 PROTHROMBIN TIME: CPT

## 2018-12-24 PROCEDURE — 99284 EMERGENCY DEPT VISIT MOD MDM: CPT

## 2018-12-24 PROCEDURE — 73564 X-RAY EXAM KNEE 4 OR MORE: CPT

## 2018-12-24 PROCEDURE — 36415 COLL VENOUS BLD VENIPUNCTURE: CPT

## 2018-12-24 PROCEDURE — 85027 COMPLETE CBC AUTOMATED: CPT

## 2018-12-24 PROCEDURE — 85730 THROMBOPLASTIN TIME PARTIAL: CPT

## 2018-12-24 PROCEDURE — 93005 ELECTROCARDIOGRAM TRACING: CPT

## 2018-12-24 PROCEDURE — 80053 COMPREHEN METABOLIC PANEL: CPT

## 2018-12-24 RX ORDER — LEVOTHYROXINE SODIUM 125 MCG
25 TABLET ORAL ONCE
Qty: 0 | Refills: 0 | Status: DISCONTINUED | OUTPATIENT
Start: 2018-12-24 | End: 2018-12-24

## 2018-12-24 RX ORDER — LEVOTHYROXINE SODIUM 125 MCG
50 TABLET ORAL ONCE
Qty: 0 | Refills: 0 | Status: COMPLETED | OUTPATIENT
Start: 2018-12-24 | End: 2018-12-24

## 2018-12-24 RX ADMIN — Medication 600 MILLIGRAM(S): at 00:11

## 2018-12-24 RX ADMIN — Medication 50 MICROGRAM(S): at 10:37

## 2018-12-24 NOTE — PHYSICAL THERAPY INITIAL EVALUATION ADULT - LIGHT TOUCH SENSATION, LUE, REHAB EVAL
Discrepancies:     In Epic, not on [facility]:  1. Meformin 500 mg-Take 2 tabs (1000 mg) by mouth two times daily.    Clarify:  1. If patient is on Novolog SS-not listed in either [facility]/Epic  2. Latanoprost .005% apply 1 gtt both eyes qd [facility], Bimatroprost .001% place 1 gtt into both eyes at HS.    Called patient and went through all the discrepancies. They have been updated on both Epic and [facility].   Patient would like to know if provider would dismiss her from home care services, she feels she is getting better.    Routed to provider to advise and sign form.  Elodia Perez RN  [facility]      within normal limits

## 2018-12-24 NOTE — ED ADULT NURSE REASSESSMENT NOTE - NS ED NURSE REASSESS COMMENT FT1
Patient noted to currently be ont he bedpan denies c/o of pain or discomfort at this time ; pt aware of pending PT consult and eval, safety maintained will continue to assess safety maintained

## 2018-12-24 NOTE — PROVIDER CONTACT NOTE (OTHER) - ASSESSMENT
Pt with no c/o pain before, 8/10 left knee pain during or after RX.  Pt will benefit from PT to maximize functional independence.  Will continue to follow.  Pt left supine in bed in no apparent distress and call bell within reach. Nurse aware.

## 2018-12-24 NOTE — PHYSICAL THERAPY INITIAL EVALUATION ADULT - ADDITIONAL COMMENTS
Pt lives in a basement apartment with no steps to enter.  Dtr and son-in-law live upstairs, dtr works, son-in-law is disabled but able to assist.  PTA, Modified Independent with all ADLs and self care.  Amb with RW.

## 2018-12-27 ENCOUNTER — OTHER (OUTPATIENT)
Age: 78
End: 2018-12-27

## 2019-01-03 ENCOUNTER — OTHER (OUTPATIENT)
Age: 79
End: 2019-01-03

## 2019-01-07 ENCOUNTER — OTHER (OUTPATIENT)
Age: 79
End: 2019-01-07

## 2019-01-14 ENCOUNTER — APPOINTMENT (OUTPATIENT)
Dept: ORTHOPEDIC SURGERY | Facility: CLINIC | Age: 79
End: 2019-01-14
Payer: MEDICARE

## 2019-01-14 VITALS
HEART RATE: 71 BPM | SYSTOLIC BLOOD PRESSURE: 167 MMHG | WEIGHT: 165 LBS | HEIGHT: 64 IN | BODY MASS INDEX: 28.17 KG/M2 | DIASTOLIC BLOOD PRESSURE: 93 MMHG

## 2019-01-14 DIAGNOSIS — Z96.651 PRESENCE OF RIGHT ARTIFICIAL KNEE JOINT: ICD-10-CM

## 2019-01-14 PROCEDURE — 73521 X-RAY EXAM HIPS BI 2 VIEWS: CPT

## 2019-01-14 PROCEDURE — 73562 X-RAY EXAM OF KNEE 3: CPT | Mod: 50

## 2019-01-14 PROCEDURE — 99214 OFFICE O/P EST MOD 30 MIN: CPT

## 2019-01-14 NOTE — HISTORY OF PRESENT ILLNESS
[de-identified] : The patient is a 78 year old female being seen for evaluation of her bilateral hips and knees. The patient is status post revision left TKR from November of 2014 and right TKR from November of 2017. She reports on December 23 she tripped falling off a high stool injuring her bilateral knees. She was taken to [facility] for evaluation. She had her bilateral hips x-rayed bilateral knees x-rayed and a CT scan of the left knee. All were negative for fracture. She presents today complaining of bilateral hip pain. When asked to point specifically to where the pain originates, patient points to the lower lumbar region. She reports pain will originate over the right paralumbar region with radiation into the left side. She denies radiation into the groin. She denies paresthesias. In addition she was noting bilateral knee pain. She reported significant bruising over the left knee after her fall. This is gradually resolving. She does continue to note problems with her low back. She is unable to stand for long periods of time. She has been taking Tylenol and Advil with no significant relief of her symptoms.

## 2019-01-14 NOTE — ADDENDUM
[FreeTextEntry1] : This note was authored by Saeed Brambila working as a medical scribe for Dr. Sharad Snell. The note was reviewed, edited, and revised by Dr. Sharad Snell whom is in agreement with the exam findings, imaging findings, and treatment plan. 01/14/2019.

## 2019-01-14 NOTE — PHYSICAL EXAM
[de-identified] : The patient appears well nourished  and in no apparent distress.  The patient is alert and oriented to person, place, and time.   Affect and mood appear normal. The head is normocephalic and atraumatic.  The eyes reveal normal sclera and extra ocular muscles are intact. The tongue is midline with no apparent lesions.  Skin shows normal turgor with no evidence of eczema or psoriasis.  No respiratory distress noted.  Sensation grossly intact.\par   [de-identified] : Exam of the left hip shows no pain with ROM\par Exam of the right hip shows no pain with ROM\par Exam of the left knee shows full extension, 120 degrees of flexion.\par Exam of the right knee shows -5 to 120 degrees of flexion. 5/5 motor strength bilaterally distally. Sensation intact distally.  [de-identified] : Xray- AP pelvis and 2 views bilateral hips shows mild arthritis of bilateral hips.\par \par Xray- 3 views of the left knee - well aligned and well fixed left knee replacement.\par  \par Xray- 3 views of the right knee - well aligned and well fixed right knee replacement.

## 2019-01-14 NOTE — REASON FOR VISIT
[Follow-Up Visit] : a follow-up visit for [Other: ____] : [unfilled] [FreeTextEntry2] : S/P Right computer-assisted TKR, DOS: 11/29/17.\par S/P Revision Left TKR, DOS: 11/14/14. \par

## 2019-01-14 NOTE — DISCUSSION/SUMMARY
[de-identified] : The patient is a 78 year old female 4-1/2 years s/p left revision TKR, 1 year and 3 months s/p right TKR with mild arthritis of bilateral hips. Conservative options were discussed. She was recommended to follow up with Dr. Anderson as most of her pain is likely related to severe degenerative scoliosis. Follow up in 2 years for radiographic surveillance of her knees

## 2019-01-18 ENCOUNTER — OTHER (OUTPATIENT)
Age: 79
End: 2019-01-18

## 2019-02-25 ENCOUNTER — APPOINTMENT (OUTPATIENT)
Dept: ORTHOPEDIC SURGERY | Facility: CLINIC | Age: 79
End: 2019-02-25
Payer: MEDICARE

## 2019-02-25 VITALS
BODY MASS INDEX: 28.17 KG/M2 | SYSTOLIC BLOOD PRESSURE: 166 MMHG | WEIGHT: 165 LBS | HEIGHT: 64 IN | DIASTOLIC BLOOD PRESSURE: 82 MMHG | HEART RATE: 61 BPM

## 2019-02-25 DIAGNOSIS — K44.9 GASTRO-ESOPHAGEAL REFLUX DISEASE W/OUT ESOPHAGITIS: ICD-10-CM

## 2019-02-25 DIAGNOSIS — M47.816 SPONDYLOSIS W/OUT MYELOPATHY OR RADICULOPATHY, LUMBAR REGION: ICD-10-CM

## 2019-02-25 DIAGNOSIS — R26.81 UNSTEADINESS ON FEET: ICD-10-CM

## 2019-02-25 DIAGNOSIS — K21.9 GASTRO-ESOPHAGEAL REFLUX DISEASE W/OUT ESOPHAGITIS: ICD-10-CM

## 2019-02-25 DIAGNOSIS — M25.511 PAIN IN RIGHT SHOULDER: ICD-10-CM

## 2019-02-25 DIAGNOSIS — M25.512 PAIN IN RIGHT SHOULDER: ICD-10-CM

## 2019-02-25 PROCEDURE — 72100 X-RAY EXAM L-S SPINE 2/3 VWS: CPT | Mod: TC

## 2019-02-25 PROCEDURE — 99214 OFFICE O/P EST MOD 30 MIN: CPT

## 2019-02-25 RX ORDER — RIVAROXABAN 2.5 MG/1
TABLET, FILM COATED ORAL
Refills: 0 | Status: COMPLETED | COMMUNITY
End: 2018-01-25

## 2019-02-25 NOTE — DISCUSSION/SUMMARY
[de-identified] : 78-year-old female with degenerative scoliosis, lumbar degenerative disc disease and gait and balance changes.  Will obtain MRIs of cervical thoracic and lumbar spine to evaluate for myelomalacia changes causing her unsteady gait and frequent falls. She was provided a prescription for physical therapy for her gait and balance training, upper and lower extremity strengthening. She will continue with Tylenol as needed for pain. She'll followup after MRI is complete

## 2019-02-25 NOTE — PHYSICAL EXAM
[de-identified] : CONSTITUTIONAL: Patient is a very pleasant individual who is well-nourished and appears stated age. \par PSYCHIATRIC: Alert and oriented times three and in no apparent distress, and participates with orthopedic evaluation well.\par HEAD: Atraumatic and nonsyndromic in appearance.\par EENT: No thyromegaly, EOMI.\par RESPIRATORY: Respiratory rate is regular, not dyspneic on examination.\par LYMPHATICS: There is no cervical or axillary lymphadenopathy.\par INTEGUMENTARY: Skin is clean, dry, and intact about the bilateral upper and lower extremities and cervical and lumbar spine. \par VASCULAR: There is brisk capillary refill about the bilateral upper and lower extremities and radial pulses are 2/4. b/l LE edema +3\par NEUROLOGIC: Negative L'hirmitte, negative Spurling's sign. There are no pathologic reflexes. There is no decrease in sensation of the bilateral upper and lower extremities on Wartenberg pinwheel examination. Deep tendon reflexes are well-maintained at +2/4 of the bilateral upper and lower extremities and are symmetric.\par MUSCULOSKELETAL: Scoliosis posture Manual motor strength is well maintained in the bilateral upper and lower extremities. Cervical and lumbar range of motion is well maintained. Pt walks with a walker.  Normal secondary orthopaedic exam of bilateral shoulders, elbows and hands. Elbow flexion and extension, wrist extension, finger flexion and abduction are well maintained. Negative tension sign and straight leg raise bilaterally. Quad extension, ankle dorsiflexion, EHL, plantar flexion, and ankle eversion are well preserved. Normal secondary orthopaedic exam of bilateral hips, greater trochanteric area, knees and ankle.  \par \par  [de-identified] : xrays of lumbar spine with degenerative scoliosis and multiple level of degenerative changes to spine

## 2019-02-25 NOTE — HISTORY OF PRESENT ILLNESS
[de-identified] : 78-year-old female presents with complaints of multiple joint pain. She states the entire back  pains describes as constant aches, b/l shoulders, bilateral hips, b/l knees.  she was evaluated in Dec 2018 due to a fall and was negative for fx.  She also localizes pain to bilateral lower extremity with swelling. She notes having difficulty with balance and sustaining frequent falls. Her pain is exacerbated with generalized activities and she is unable to walk for prolonged period of time due to pain. She walks with a walker hx bilateral TKRs. she reports having a DVT to the LLE which she was on xarelto.  she c/o of LE swelling.  She has been taking Tylenol for pain without any improvement and unable to take anti-inflammatories due to her history of hiatal hernia but does take advil intermittently without any pain control [Worsening] : worsening [9] : a current pain level of 9/10 [Constant] : ~He/She~ states the symptoms seem to be constant [Bending] : worsened by bending [Walking] : worsened by walking [Weight Bearing] : worsened by weight bearing [Rest] : relieved by rest [Ataxia] : ataxia [Incontinence] : no incontinence [Loss of Dexterity] : loss of dexterity [Urinary Ret.] : no urinary retention

## 2019-03-14 ENCOUNTER — OTHER (OUTPATIENT)
Age: 79
End: 2019-03-14

## 2019-03-26 ENCOUNTER — APPOINTMENT (OUTPATIENT)
Dept: ORTHOPEDIC SURGERY | Facility: CLINIC | Age: 79
End: 2019-03-26
Payer: MEDICARE

## 2019-03-26 VITALS
HEART RATE: 85 BPM | SYSTOLIC BLOOD PRESSURE: 106 MMHG | HEIGHT: 64 IN | WEIGHT: 165 LBS | BODY MASS INDEX: 28.17 KG/M2 | DIASTOLIC BLOOD PRESSURE: 67 MMHG

## 2019-03-26 DIAGNOSIS — M54.2 CERVICALGIA: ICD-10-CM

## 2019-03-26 DIAGNOSIS — E78.00 PURE HYPERCHOLESTEROLEMIA, UNSPECIFIED: ICD-10-CM

## 2019-03-26 DIAGNOSIS — A80.9 ACUTE POLIOMYELITIS, UNSPECIFIED: ICD-10-CM

## 2019-03-26 DIAGNOSIS — Z86.79 PERSONAL HISTORY OF OTHER DISEASES OF THE CIRCULATORY SYSTEM: ICD-10-CM

## 2019-03-26 DIAGNOSIS — M41.50 OTHER SECONDARY SCOLIOSIS, SITE UNSPECIFIED: ICD-10-CM

## 2019-03-26 PROCEDURE — 99214 OFFICE O/P EST MOD 30 MIN: CPT

## 2019-03-26 NOTE — ADDENDUM
[FreeTextEntry1] : Documented by Ryan Ocampo acting as a scribe for Dr. Mauro Anderson on 03/26/2019 . All medical record entries made by the Scribe were at my, Dr. Mauro Anderson, direction and personally dictated by me on 03/26/2019 . I have reviewed the chart and agree that the record accurately reflects my personal performance of the history, physical exam, assessment and plan. I have also personally directed, reviewed, and agreed with the chart.

## 2019-03-26 NOTE — HISTORY OF PRESENT ILLNESS
[de-identified] : 78 year old F presents with lumbar spine pain. She states she is in a lot of pain from her neck down. No c/o numbness or tingling. Her most pain is in the right side of her neck. She has not tried PT. Hemp oil provides pain relief for her neck. PSHx: b/l knee replacement, left knee twice. [Ataxia] : no ataxia [Incontinence] : no incontinence [Loss of Dexterity] : good dexterity [Urinary Ret.] : no urinary retention

## 2019-03-26 NOTE — PHYSICAL EXAM
[Poor Appearance] : well-appearing [Acute Distress] : not in acute distress [Obese] : not obese [de-identified] : CONSTITUTIONAL: Patient is a very pleasant individual who is well-nourished and appears stated age. \par PSYCHIATRIC: Alert and oriented times three and in no apparent distress, and participates with orthopedic evaluation well.\par HEAD: Atraumatic and nonsyndromic in appearance.\par EENT: No thyromegaly, EOMI.\par RESPIRATORY: Respiratory rate is regular, not dyspneic on examination.\par LYMPHATICS: There is no cervical or axillary lymphadenopathy.\par INTEGUMENTARY: Skin is clean, dry, and intact about the bilateral upper and lower extremities and cervical and lumbar spine. \par VASCULAR: There is brisk capillary refill about the bilateral upper and lower extremities and radial pulses are 2/4. \par NEUROLOGIC: Negative L'hirmitte, negative Spurling’s sign. There are no pathologic reflexes. There is no decrease in sensation of the bilateral upper and lower extremities on Wartenberg pinwheel examination. Deep tendon reflexes are well-maintained at +2/4 of the bilateral upper and lower extremities and are symmetric.\par MUSCULOSKELETAL: There is no visible muscular atrophy. Manual motor strength is well maintained in the bilateral upper and lower extremities. Cervical and lumbar range of motion is well maintained. Normal secondary orthopaedic exam of bilateral shoulders, elbows and hands. Elbow flexion and extension, wrist extension, finger flexion and abduction are well maintained.  Negative tension sign and straight leg raise bilaterally. Pt ambulates in a non-myelopathic manner. Quad extension, ankle dorsiflexion, EHL, plantar flexion, and ankle eversion are well preserved. Normal secondary orthopaedic exam of bilateral hips, greater trochanteric area, knees and ankles \par \par Mechanically orientated cervical, thoracic, and lumbar pain. mild neurogenic claudication.  [de-identified] : Cervical thoracic and lumbar spine MRI's show age appropriate degenerative changes and spondylosis. No myelomalacia changes visualized. In lumbar spine, she has lumbar stenosis L2, L3, L4.

## 2019-03-26 NOTE — DISCUSSION/SUMMARY
[de-identified] : I do not think she is a very good candidate for lumbar surgery because of risk of incomplete resolution of signs and symptoms and potential for further surgery. I have recommended that the pt continue with a conservative treatment plan. Pt has been referred to physical therapy for her neck and low back for decreased pain modalities, core strengthening modalities, soft tissue modalities including STIM, and physical modalities. Pt will be referred to pain management for injection therapy for her neck and low back. The patient will follow up in 4-6 months for a repeat clinical assessment.

## 2019-07-22 ENCOUNTER — APPOINTMENT (OUTPATIENT)
Dept: NEUROLOGY | Facility: CLINIC | Age: 79
End: 2019-07-22

## 2019-08-19 ENCOUNTER — APPOINTMENT (OUTPATIENT)
Dept: OBGYN | Facility: CLINIC | Age: 79
End: 2019-08-19
Payer: MEDICARE

## 2019-08-19 VITALS — DIASTOLIC BLOOD PRESSURE: 79 MMHG | HEIGHT: 64 IN | SYSTOLIC BLOOD PRESSURE: 134 MMHG | HEART RATE: 64 BPM

## 2019-08-19 DIAGNOSIS — Z78.0 ASYMPTOMATIC MENOPAUSAL STATE: ICD-10-CM

## 2019-08-19 DIAGNOSIS — Z12.31 ENCOUNTER FOR SCREENING MAMMOGRAM FOR MALIGNANT NEOPLASM OF BREAST: ICD-10-CM

## 2019-08-19 DIAGNOSIS — Z00.00 ENCOUNTER FOR GENERAL ADULT MEDICAL EXAMINATION W/OUT ABNORMAL FINDINGS: ICD-10-CM

## 2019-08-19 PROCEDURE — 82270 OCCULT BLOOD FECES: CPT

## 2019-08-19 PROCEDURE — G0101: CPT

## 2019-08-19 NOTE — PHYSICAL EXAM
[Alert] : alert [Awake] : awake [Acute Distress] : no acute distress [Mass] : no breast mass [Nipple Discharge] : no nipple discharge [Axillary LAD] : no axillary lymphadenopathy [Soft] : soft [Oriented x3] : oriented to person, place, and time [Tender] : non tender [Normal] : uterus [No Bleeding] : there was no active vaginal bleeding [Occult Blood] : occult blood test from digital rectal exam was negative [Uterine Adnexae] : were not tender and not enlarged [RRR, No Murmurs] : RRR, no murmurs [CTAB] : CTAB

## 2019-08-22 LAB — CYTOLOGY CVX/VAG DOC THIN PREP: ABNORMAL

## 2019-11-01 ENCOUNTER — EMERGENCY (EMERGENCY)
Facility: HOSPITAL | Age: 79
LOS: 1 days | Discharge: HOME | End: 2019-11-01
Attending: EMERGENCY MEDICINE
Payer: MEDICARE

## 2019-11-01 VITALS
WEIGHT: 169.98 LBS | SYSTOLIC BLOOD PRESSURE: 143 MMHG | HEART RATE: 73 BPM | DIASTOLIC BLOOD PRESSURE: 86 MMHG | HEIGHT: 64 IN | OXYGEN SATURATION: 100 % | RESPIRATION RATE: 20 BRPM | TEMPERATURE: 99 F

## 2019-11-01 DIAGNOSIS — Z98.89 OTHER SPECIFIED POSTPROCEDURAL STATES: Chronic | ICD-10-CM

## 2019-11-01 DIAGNOSIS — Z41.1 ENCOUNTER FOR COSMETIC SURGERY: Chronic | ICD-10-CM

## 2019-11-01 DIAGNOSIS — Z90.49 ACQUIRED ABSENCE OF OTHER SPECIFIED PARTS OF DIGESTIVE TRACT: Chronic | ICD-10-CM

## 2019-11-01 DIAGNOSIS — Z96.652 PRESENCE OF LEFT ARTIFICIAL KNEE JOINT: Chronic | ICD-10-CM

## 2019-11-01 DIAGNOSIS — R22.1 LOCALIZED SWELLING, MASS AND LUMP, NECK: Chronic | ICD-10-CM

## 2019-11-01 LAB
ALBUMIN SERPL ELPH-MCNC: 2.5 G/DL — LOW (ref 3.3–5.2)
ALP SERPL-CCNC: 146 U/L — HIGH (ref 40–120)
ALT FLD-CCNC: 111 U/L — HIGH
ANION GAP SERPL CALC-SCNC: 13 MMOL/L — SIGNIFICANT CHANGE UP (ref 5–17)
APPEARANCE UR: ABNORMAL
AST SERPL-CCNC: 82 U/L — HIGH
BASOPHILS # BLD AUTO: 0.02 K/UL — SIGNIFICANT CHANGE UP (ref 0–0.2)
BASOPHILS NFR BLD AUTO: 0.1 % — SIGNIFICANT CHANGE UP (ref 0–2)
BILIRUB SERPL-MCNC: 0.9 MG/DL — SIGNIFICANT CHANGE UP (ref 0.4–2)
BILIRUB UR-MCNC: NEGATIVE — SIGNIFICANT CHANGE UP
BUN SERPL-MCNC: 15 MG/DL — SIGNIFICANT CHANGE UP (ref 8–20)
CALCIUM SERPL-MCNC: 9.1 MG/DL — SIGNIFICANT CHANGE UP (ref 8.6–10.2)
CHLORIDE SERPL-SCNC: 95 MMOL/L — LOW (ref 98–107)
CO2 SERPL-SCNC: 24 MMOL/L — SIGNIFICANT CHANGE UP (ref 22–29)
COLOR SPEC: YELLOW — SIGNIFICANT CHANGE UP
CREAT SERPL-MCNC: 0.54 MG/DL — SIGNIFICANT CHANGE UP (ref 0.5–1.3)
DIFF PNL FLD: ABNORMAL
EOSINOPHIL # BLD AUTO: 0.01 K/UL — SIGNIFICANT CHANGE UP (ref 0–0.5)
EOSINOPHIL NFR BLD AUTO: 0.1 % — SIGNIFICANT CHANGE UP (ref 0–6)
EPI CELLS # UR: ABNORMAL
GLUCOSE SERPL-MCNC: 116 MG/DL — HIGH (ref 70–115)
GLUCOSE UR QL: NEGATIVE MG/DL — SIGNIFICANT CHANGE UP
HCT VFR BLD CALC: 30.3 % — LOW (ref 34.5–45)
HGB BLD-MCNC: 10.6 G/DL — LOW (ref 11.5–15.5)
IMM GRANULOCYTES NFR BLD AUTO: 0.7 % — SIGNIFICANT CHANGE UP (ref 0–1.5)
KETONES UR-MCNC: NEGATIVE — SIGNIFICANT CHANGE UP
LEUKOCYTE ESTERASE UR-ACNC: ABNORMAL
LYMPHOCYTES # BLD AUTO: 1.34 K/UL — SIGNIFICANT CHANGE UP (ref 1–3.3)
LYMPHOCYTES # BLD AUTO: 9.2 % — LOW (ref 13–44)
MCHC RBC-ENTMCNC: 30.2 PG — SIGNIFICANT CHANGE UP (ref 27–34)
MCHC RBC-ENTMCNC: 35 GM/DL — SIGNIFICANT CHANGE UP (ref 32–36)
MCV RBC AUTO: 86.3 FL — SIGNIFICANT CHANGE UP (ref 80–100)
MONOCYTES # BLD AUTO: 1.21 K/UL — HIGH (ref 0–0.9)
MONOCYTES NFR BLD AUTO: 8.3 % — SIGNIFICANT CHANGE UP (ref 2–14)
NEUTROPHILS # BLD AUTO: 11.85 K/UL — HIGH (ref 1.8–7.4)
NEUTROPHILS NFR BLD AUTO: 81.6 % — HIGH (ref 43–77)
NITRITE UR-MCNC: NEGATIVE — SIGNIFICANT CHANGE UP
PH UR: 6.5 — SIGNIFICANT CHANGE UP (ref 5–8)
PLATELET # BLD AUTO: 312 K/UL — SIGNIFICANT CHANGE UP (ref 150–400)
POTASSIUM SERPL-MCNC: 4 MMOL/L — SIGNIFICANT CHANGE UP (ref 3.5–5.3)
POTASSIUM SERPL-SCNC: 4 MMOL/L — SIGNIFICANT CHANGE UP (ref 3.5–5.3)
PROT SERPL-MCNC: 6.5 G/DL — LOW (ref 6.6–8.7)
PROT UR-MCNC: 30 MG/DL
RBC # BLD: 3.51 M/UL — LOW (ref 3.8–5.2)
RBC # FLD: 12.4 % — SIGNIFICANT CHANGE UP (ref 10.3–14.5)
RBC CASTS # UR COMP ASSIST: SIGNIFICANT CHANGE UP /HPF (ref 0–4)
SODIUM SERPL-SCNC: 132 MMOL/L — LOW (ref 135–145)
SP GR SPEC: 1.01 — SIGNIFICANT CHANGE UP (ref 1.01–1.02)
UROBILINOGEN FLD QL: NEGATIVE MG/DL — SIGNIFICANT CHANGE UP
WBC # BLD: 14.53 K/UL — HIGH (ref 3.8–10.5)
WBC # FLD AUTO: 14.53 K/UL — HIGH (ref 3.8–10.5)
WBC UR QL: SIGNIFICANT CHANGE UP

## 2019-11-01 PROCEDURE — 71045 X-RAY EXAM CHEST 1 VIEW: CPT

## 2019-11-01 PROCEDURE — 96375 TX/PRO/DX INJ NEW DRUG ADDON: CPT

## 2019-11-01 PROCEDURE — 93970 EXTREMITY STUDY: CPT | Mod: 26

## 2019-11-01 PROCEDURE — 99284 EMERGENCY DEPT VISIT MOD MDM: CPT

## 2019-11-01 PROCEDURE — 72170 X-RAY EXAM OF PELVIS: CPT | Mod: 26

## 2019-11-01 PROCEDURE — 80053 COMPREHEN METABOLIC PANEL: CPT

## 2019-11-01 PROCEDURE — 36415 COLL VENOUS BLD VENIPUNCTURE: CPT

## 2019-11-01 PROCEDURE — 99284 EMERGENCY DEPT VISIT MOD MDM: CPT | Mod: 25

## 2019-11-01 PROCEDURE — 71045 X-RAY EXAM CHEST 1 VIEW: CPT | Mod: 26

## 2019-11-01 PROCEDURE — 96374 THER/PROPH/DIAG INJ IV PUSH: CPT

## 2019-11-01 PROCEDURE — 73030 X-RAY EXAM OF SHOULDER: CPT

## 2019-11-01 PROCEDURE — 93970 EXTREMITY STUDY: CPT

## 2019-11-01 PROCEDURE — 85027 COMPLETE CBC AUTOMATED: CPT

## 2019-11-01 PROCEDURE — 73030 X-RAY EXAM OF SHOULDER: CPT | Mod: 26,LT

## 2019-11-01 PROCEDURE — 72170 X-RAY EXAM OF PELVIS: CPT

## 2019-11-01 PROCEDURE — 81001 URINALYSIS AUTO W/SCOPE: CPT

## 2019-11-01 RX ORDER — KETOROLAC TROMETHAMINE 30 MG/ML
15 SYRINGE (ML) INJECTION ONCE
Refills: 0 | Status: DISCONTINUED | OUTPATIENT
Start: 2019-11-01 | End: 2019-11-01

## 2019-11-01 RX ORDER — DEXAMETHASONE 0.5 MG/5ML
10 ELIXIR ORAL ONCE
Refills: 0 | Status: COMPLETED | OUTPATIENT
Start: 2019-11-01 | End: 2019-11-01

## 2019-11-01 RX ORDER — ACETAMINOPHEN 500 MG
1000 TABLET ORAL ONCE
Refills: 0 | Status: COMPLETED | OUTPATIENT
Start: 2019-11-01 | End: 2019-11-01

## 2019-11-01 RX ORDER — FAMOTIDINE 10 MG/ML
20 INJECTION INTRAVENOUS ONCE
Refills: 0 | Status: COMPLETED | OUTPATIENT
Start: 2019-11-01 | End: 2019-11-01

## 2019-11-01 RX ADMIN — Medication 102 MILLIGRAM(S): at 19:26

## 2019-11-01 RX ADMIN — Medication 15 MILLIGRAM(S): at 19:28

## 2019-11-01 RX ADMIN — FAMOTIDINE 20 MILLIGRAM(S): 10 INJECTION INTRAVENOUS at 19:23

## 2019-11-01 RX ADMIN — Medication 400 MILLIGRAM(S): at 19:31

## 2019-11-01 NOTE — ED PROVIDER NOTE - OBJECTIVE STATEMENT
Patient is a 79 year old female with history of DVT, chronic pain, OA presenting with "pain all over" Pt notes she always has pain in her neck and joints 2/2 OA. Usually takes tylenol. Becomes too nauseated for opiates. Stopped tylenol 2 days ago 2/2 pain in stomach. Today she lipped off her bed hitting her left shoulder. No head trauma. This caused exacerbation of her chronic pain and she called EMS. No fevers, chills, abd pain. No chest pain or sob. No Orthopnea. + cough and congestion for the past several day. Does not see pain management. Lives at home with her family. No HHA

## 2019-11-01 NOTE — ED ADULT TRIAGE NOTE - STATUS:
Low glycemic index diet  Exercise 30 minutes most days of the week  Make sure you get results on any labs or tests we ordered today  We discussed medications and how to take them as prescribed  Sleep 6-8 hours each night if possible  If you have not signed up for LookIt[employer], please activate your code ASAP from your After Visit Summary today    LDL goal <100  LDL goal if heart disease <70  HDL goal >60  Triglyceride goal <150  BP goal =<130/80  Fasting glucose <100    Contact office if your depression worsens   Go to ER if start to develop feelings for suicide  Take medication as prescribed  If you are having trouble tolerating your medication, contact office before abruptly stopping it       [employer]

## 2019-11-01 NOTE — ED PROVIDER NOTE - MUSCULOSKELETAL, MLM
b/l symmetric LE edema without redness, warmth, or calf ttp. Pain with ROM of all extremities. No ttp. Strong radial pulses b/l

## 2019-11-01 NOTE — ED ADULT TRIAGE NOTE - CHIEF COMPLAINT QUOTE
Pt presents to ED c/o severe chronic pain d/t osteoarthritis over a week today becoming worse, states she is allergic to opioids and other pain medications, also states she is more swollen than usual, AOx4

## 2019-11-01 NOTE — ED PROVIDER NOTE - CLINICAL SUMMARY MEDICAL DECISION MAKING FREE TEXT BOX
pt with acute on chronic pain 2/2 OA. will obtain labs, xrays of chest, shoulder, pelvis, sono of legs. pain control. reassess

## 2019-11-01 NOTE — ED PROVIDER NOTE - PROGRESS NOTE DETAILS
AJM; pt feeling improved. moving extremities well. tolerating PO. + leukocytosis but no signs of infection. possible 2/2 pain. stable for dc home with pmd follow up

## 2019-11-03 NOTE — ED POST DISCHARGE NOTE - RESULT SUMMARY
abnormal pelvis x-ray. unable to reach PT by phone. A letter was sent. may require additional imaging

## 2019-11-03 NOTE — ED POST DISCHARGE NOTE - ADDITIONAL DOCUMENTATION
Pt with renal mass which is being watch- denies abdominal pain. Pt instructed to f/u with PMD for repeat CT

## 2020-01-10 ENCOUNTER — OTHER (OUTPATIENT)
Age: 80
End: 2020-01-10

## 2020-01-16 ENCOUNTER — APPOINTMENT (OUTPATIENT)
Dept: NEUROLOGY | Facility: CLINIC | Age: 80
End: 2020-01-16

## 2020-07-15 ENCOUNTER — APPOINTMENT (OUTPATIENT)
Dept: UROLOGY | Facility: CLINIC | Age: 80
End: 2020-07-15
Payer: MEDICARE

## 2020-07-15 VITALS — HEART RATE: 64 BPM | TEMPERATURE: 98.2 F | SYSTOLIC BLOOD PRESSURE: 164 MMHG | DIASTOLIC BLOOD PRESSURE: 78 MMHG

## 2020-07-15 PROCEDURE — 99204 OFFICE O/P NEW MOD 45 MIN: CPT

## 2020-07-15 NOTE — LETTER BODY
[Dear  ___] : Dear  [unfilled], [Courtesy Letter:] : I had the pleasure of seeing your patient, [unfilled], in my office today. [Please see my note below.] : Please see my note below. [Sincerely,] : Sincerely, [FreeTextEntry3] : Ed\par \par Gary Aguila MD\par [facility]\par  of Urology\par [employer] [facility]\par

## 2020-07-15 NOTE — PHYSICAL EXAM
[General Appearance - Well Developed] : well developed [Normal Appearance] : normal appearance [General Appearance - Well Nourished] : well nourished [Well Groomed] : well groomed [General Appearance - In No Acute Distress] : no acute distress [Respiration, Rhythm And Depth] : normal respiratory rhythm and effort [Edema] : no peripheral edema [Exaggerated Use Of Accessory Muscles For Inspiration] : no accessory muscle use [Normal Station and Gait] : the gait and station were normal for the patient's age [No Focal Deficits] : no focal deficits [] : no rash [Affect] : the affect was normal [Oriented To Time, Place, And Person] : oriented to person, place, and time [Mood] : the mood was normal [Not Anxious] : not anxious

## 2020-07-15 NOTE — ASSESSMENT
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

## 2020-07-15 NOTE — HISTORY OF PRESENT ILLNESS
[FreeTextEntry1] : Patient here for recurrent UTI.  Has occurred in the last few years. 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 It apparently was a benign tumor. no flank pain.  Has a history of microhematuria.  no fevers or chills.  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

## 2020-07-21 ENCOUNTER — APPOINTMENT (OUTPATIENT)
Dept: ULTRASOUND IMAGING | Facility: CLINIC | Age: 80
End: 2020-07-21
Payer: MEDICARE

## 2020-07-21 ENCOUNTER — OUTPATIENT (OUTPATIENT)
Dept: OUTPATIENT SERVICES | Facility: HOSPITAL | Age: 80
LOS: 1 days | End: 2020-07-21
Payer: MEDICARE

## 2020-07-21 DIAGNOSIS — Z41.1 ENCOUNTER FOR COSMETIC SURGERY: Chronic | ICD-10-CM

## 2020-07-21 DIAGNOSIS — R22.1 LOCALIZED SWELLING, MASS AND LUMP, NECK: Chronic | ICD-10-CM

## 2020-07-21 DIAGNOSIS — Z96.652 PRESENCE OF LEFT ARTIFICIAL KNEE JOINT: Chronic | ICD-10-CM

## 2020-07-21 DIAGNOSIS — D17.71 BENIGN LIPOMATOUS NEOPLASM OF KIDNEY: ICD-10-CM

## 2020-07-21 DIAGNOSIS — Z98.89 OTHER SPECIFIED POSTPROCEDURAL STATES: Chronic | ICD-10-CM

## 2020-07-21 DIAGNOSIS — Z90.49 ACQUIRED ABSENCE OF OTHER SPECIFIED PARTS OF DIGESTIVE TRACT: Chronic | ICD-10-CM

## 2020-07-21 DIAGNOSIS — Z00.00 ENCOUNTER FOR GENERAL ADULT MEDICAL EXAMINATION WITHOUT ABNORMAL FINDINGS: ICD-10-CM

## 2020-07-21 PROCEDURE — 76775 US EXAM ABDO BACK WALL LIM: CPT

## 2020-07-21 PROCEDURE — 76775 US EXAM ABDO BACK WALL LIM: CPT | Mod: 26

## 2020-07-29 ENCOUNTER — APPOINTMENT (OUTPATIENT)
Dept: UROLOGY | Facility: CLINIC | Age: 80
End: 2020-07-29
Payer: MEDICARE

## 2020-07-29 VITALS — HEART RATE: 64 BPM | TEMPERATURE: 98 F | DIASTOLIC BLOOD PRESSURE: 82 MMHG | SYSTOLIC BLOOD PRESSURE: 163 MMHG

## 2020-07-29 PROCEDURE — 99213 OFFICE O/P EST LOW 20 MIN: CPT

## 2020-07-29 NOTE — HISTORY OF PRESENT ILLNESS
[FreeTextEntry1] : Patient presents in follow up.  no fevers or chills.  has some right side pain.  no hematuria or dysuria.  [id number]\par

## 2020-07-29 NOTE — PHYSICAL EXAM
[General Appearance - Well Developed] : well developed [Normal Appearance] : normal appearance [General Appearance - Well Nourished] : well nourished [Well Groomed] : well groomed [General Appearance - In No Acute Distress] : no acute distress [Skin Color & Pigmentation] : normal skin color and pigmentation [FreeTextEntry1] : Left ankle swelling.   [] : no respiratory distress [Exaggerated Use Of Accessory Muscles For Inspiration] : no accessory muscle use [Oriented To Time, Place, And Person] : oriented to person, place, and time [Respiration, Rhythm And Depth] : normal respiratory rhythm and effort [Not Anxious] : not anxious [Mood] : the mood was normal [Affect] : the affect was normal [Normal Station and Gait] : the gait and station were normal for the patient's age

## 2020-07-29 NOTE — ASSESSMENT
[FreeTextEntry1] : Impression:\par severe hydro left kidney \par lasix study pending.\par Incomplete bladder emptying. \par \par Plan;\par \par review lasix study\par followup with UA, PVR in 2 weeks.  \par Double voiding

## 2020-08-20 ENCOUNTER — OUTPATIENT (OUTPATIENT)
Dept: OUTPATIENT SERVICES | Facility: HOSPITAL | Age: 80
LOS: 1 days | End: 2020-08-20
Payer: MEDICARE

## 2020-08-20 DIAGNOSIS — Z96.652 PRESENCE OF LEFT ARTIFICIAL KNEE JOINT: Chronic | ICD-10-CM

## 2020-08-20 DIAGNOSIS — Z98.89 OTHER SPECIFIED POSTPROCEDURAL STATES: Chronic | ICD-10-CM

## 2020-08-20 DIAGNOSIS — Z41.1 ENCOUNTER FOR COSMETIC SURGERY: Chronic | ICD-10-CM

## 2020-08-20 DIAGNOSIS — R22.1 LOCALIZED SWELLING, MASS AND LUMP, NECK: Chronic | ICD-10-CM

## 2020-08-20 DIAGNOSIS — N13.30 UNSPECIFIED HYDRONEPHROSIS: ICD-10-CM

## 2020-08-20 DIAGNOSIS — Z90.49 ACQUIRED ABSENCE OF OTHER SPECIFIED PARTS OF DIGESTIVE TRACT: Chronic | ICD-10-CM

## 2020-08-20 PROCEDURE — A9562: CPT

## 2020-08-20 PROCEDURE — 78708 K FLOW/FUNCT IMAGE W/DRUG: CPT | Mod: 26

## 2020-08-20 PROCEDURE — 78708 K FLOW/FUNCT IMAGE W/DRUG: CPT

## 2020-08-20 RX ORDER — FUROSEMIDE 40 MG
40 TABLET ORAL ONCE
Refills: 0 | Status: COMPLETED | OUTPATIENT
Start: 2020-08-20 | End: 2020-08-20

## 2020-08-20 RX ORDER — SODIUM CHLORIDE 9 MG/ML
680 INJECTION INTRAMUSCULAR; INTRAVENOUS; SUBCUTANEOUS ONCE
Refills: 0 | Status: COMPLETED | OUTPATIENT
Start: 2020-08-20 | End: 2020-08-20

## 2020-08-20 RX ADMIN — SODIUM CHLORIDE 680 MILLILITER(S): 9 INJECTION INTRAMUSCULAR; INTRAVENOUS; SUBCUTANEOUS at 11:40

## 2020-08-20 RX ADMIN — Medication 40 MILLIGRAM(S): at 12:53

## 2020-09-02 NOTE — ED ADULT NURSE NOTE - NS ED NURSE DISCH DISPOSITION
"Nutrition-Related Diabetes Education    Time Spent: 10 minutes  Learners: Pt    Current HbA1c: 11.5  Is patient aware of their A1c and their goal A1c? Yes  Home diabetes medication(s): insulin, metformin    Nutrition Education with handouts: "Healthy Meals for Diabetes" added to discharge attachments    Comments: Pt not following a particular diet PTA, reports he eats "whatever tastes good." Encouraged adherence to diabetic diet and reviewed foods containing CHO, appropriate serving sizes, and healthy plate. Pt voiced understanding.    Barriers to Learning: Motivation to make dietary changes    Follow up: Yes  Please consult as needed.  Thank you!  " Discharged

## 2020-09-16 ENCOUNTER — APPOINTMENT (OUTPATIENT)
Dept: UROLOGY | Facility: CLINIC | Age: 80
End: 2020-09-16

## 2020-09-30 ENCOUNTER — APPOINTMENT (OUTPATIENT)
Dept: UROLOGY | Facility: CLINIC | Age: 80
End: 2020-09-30

## 2020-10-07 ENCOUNTER — APPOINTMENT (OUTPATIENT)
Dept: UROLOGY | Facility: CLINIC | Age: 80
End: 2020-10-07
Payer: MEDICARE

## 2020-10-07 VITALS
HEART RATE: 65 BPM | SYSTOLIC BLOOD PRESSURE: 155 MMHG | WEIGHT: 166 LBS | TEMPERATURE: 97.3 F | HEIGHT: 64 IN | BODY MASS INDEX: 28.34 KG/M2 | DIASTOLIC BLOOD PRESSURE: 83 MMHG | RESPIRATION RATE: 16 BRPM

## 2020-10-07 DIAGNOSIS — N13.5 CROSSING VESSEL AND STRICTURE OF URETER W/OUT HYDRONEPHROSIS: ICD-10-CM

## 2020-10-07 PROCEDURE — 99213 OFFICE O/P EST LOW 20 MIN: CPT

## 2020-10-07 NOTE — HISTORY OF PRESENT ILLNESS
[FreeTextEntry1] : Patient presents in follow up.  no dysuria or frequency.  She feels she does not want to have anything christofer.  no hematuria. no voiding issues.

## 2020-10-07 NOTE — LETTER BODY
[Dear  ___] : Dear  [unfilled], [Courtesy Letter:] : I had the pleasure of seeing your patient, [unfilled], in my office today. [Please see my note below.] : Please see my note below. [Sincerely,] : Sincerely, [FreeTextEntry3] : Ed\par \par Gary Aguila MD\par [facility]\par  of Urology\par [employer] at [facility]\par

## 2020-10-07 NOTE — ASSESSMENT
[FreeTextEntry1] : Impression:\par \par renal scan reviewed.  \par left obstruction\par \par patient does not want intervention.\par \par Plan:\par \par follow up with BMP in 3 months.

## 2020-11-01 ENCOUNTER — INPATIENT (INPATIENT)
Facility: HOSPITAL | Age: 80
LOS: 15 days | Discharge: HOME | DRG: 177 | End: 2020-11-17
Attending: HOSPITALIST | Admitting: HOSPITALIST
Payer: MEDICARE

## 2020-11-01 VITALS
SYSTOLIC BLOOD PRESSURE: 146 MMHG | HEIGHT: 64 IN | OXYGEN SATURATION: 95 % | HEART RATE: 74 BPM | TEMPERATURE: 98 F | DIASTOLIC BLOOD PRESSURE: 80 MMHG | RESPIRATION RATE: 18 BRPM

## 2020-11-01 DIAGNOSIS — Z96.652 PRESENCE OF LEFT ARTIFICIAL KNEE JOINT: Chronic | ICD-10-CM

## 2020-11-01 DIAGNOSIS — Z98.89 OTHER SPECIFIED POSTPROCEDURAL STATES: Chronic | ICD-10-CM

## 2020-11-01 DIAGNOSIS — Z90.49 ACQUIRED ABSENCE OF OTHER SPECIFIED PARTS OF DIGESTIVE TRACT: Chronic | ICD-10-CM

## 2020-11-01 DIAGNOSIS — R22.1 LOCALIZED SWELLING, MASS AND LUMP, NECK: Chronic | ICD-10-CM

## 2020-11-01 DIAGNOSIS — Z41.1 ENCOUNTER FOR COSMETIC SURGERY: Chronic | ICD-10-CM

## 2020-11-01 LAB
ALBUMIN SERPL ELPH-MCNC: 3.7 G/DL — SIGNIFICANT CHANGE UP (ref 3.3–5.2)
ALP SERPL-CCNC: 52 U/L — SIGNIFICANT CHANGE UP (ref 40–120)
ALT FLD-CCNC: 38 U/L — HIGH
ANION GAP SERPL CALC-SCNC: 20 MMOL/L — HIGH (ref 5–17)
APTT BLD: 29.9 SEC — SIGNIFICANT CHANGE UP (ref 27.5–35.5)
AST SERPL-CCNC: 63 U/L — HIGH
BASOPHILS # BLD AUTO: 0.01 K/UL — SIGNIFICANT CHANGE UP (ref 0–0.2)
BASOPHILS NFR BLD AUTO: 0.1 % — SIGNIFICANT CHANGE UP (ref 0–2)
BILIRUB SERPL-MCNC: 0.5 MG/DL — SIGNIFICANT CHANGE UP (ref 0.4–2)
BUN SERPL-MCNC: 48 MG/DL — HIGH (ref 8–20)
CALCIUM SERPL-MCNC: 9.3 MG/DL — SIGNIFICANT CHANGE UP (ref 8.6–10.2)
CHLORIDE SERPL-SCNC: 93 MMOL/L — LOW (ref 98–107)
CO2 SERPL-SCNC: 19 MMOL/L — LOW (ref 22–29)
CREAT SERPL-MCNC: 1.71 MG/DL — HIGH (ref 0.5–1.3)
EOSINOPHIL # BLD AUTO: 0.17 K/UL — SIGNIFICANT CHANGE UP (ref 0–0.5)
EOSINOPHIL NFR BLD AUTO: 2.1 % — SIGNIFICANT CHANGE UP (ref 0–6)
GLUCOSE SERPL-MCNC: 166 MG/DL — HIGH (ref 70–99)
HCT VFR BLD CALC: 38.7 % — SIGNIFICANT CHANGE UP (ref 34.5–45)
HGB BLD-MCNC: 13.2 G/DL — SIGNIFICANT CHANGE UP (ref 11.5–15.5)
IMM GRANULOCYTES NFR BLD AUTO: 0.5 % — SIGNIFICANT CHANGE UP (ref 0–1.5)
INR BLD: 1.28 RATIO — HIGH (ref 0.88–1.16)
LACTATE BLDV-MCNC: 2.8 MMOL/L — HIGH (ref 0.5–2)
LYMPHOCYTES # BLD AUTO: 0.61 K/UL — LOW (ref 1–3.3)
LYMPHOCYTES # BLD AUTO: 7.5 % — LOW (ref 13–44)
MCHC RBC-ENTMCNC: 30.3 PG — SIGNIFICANT CHANGE UP (ref 27–34)
MCHC RBC-ENTMCNC: 34.1 GM/DL — SIGNIFICANT CHANGE UP (ref 32–36)
MCV RBC AUTO: 88.8 FL — SIGNIFICANT CHANGE UP (ref 80–100)
MONOCYTES # BLD AUTO: 0.73 K/UL — SIGNIFICANT CHANGE UP (ref 0–0.9)
MONOCYTES NFR BLD AUTO: 8.9 % — SIGNIFICANT CHANGE UP (ref 2–14)
NEUTROPHILS # BLD AUTO: 6.6 K/UL — SIGNIFICANT CHANGE UP (ref 1.8–7.4)
NEUTROPHILS NFR BLD AUTO: 80.9 % — HIGH (ref 43–77)
PLATELET # BLD AUTO: 125 K/UL — LOW (ref 150–400)
POTASSIUM SERPL-MCNC: 4.4 MMOL/L — SIGNIFICANT CHANGE UP (ref 3.5–5.3)
POTASSIUM SERPL-SCNC: 4.4 MMOL/L — SIGNIFICANT CHANGE UP (ref 3.5–5.3)
PROT SERPL-MCNC: 6.8 G/DL — SIGNIFICANT CHANGE UP (ref 6.6–8.7)
PROTHROM AB SERPL-ACNC: 14.7 SEC — HIGH (ref 10.6–13.6)
RBC # BLD: 4.36 M/UL — SIGNIFICANT CHANGE UP (ref 3.8–5.2)
RBC # FLD: 12.5 % — SIGNIFICANT CHANGE UP (ref 10.3–14.5)
SODIUM SERPL-SCNC: 132 MMOL/L — LOW (ref 135–145)
WBC # BLD: 8.16 K/UL — SIGNIFICANT CHANGE UP (ref 3.8–10.5)
WBC # FLD AUTO: 8.16 K/UL — SIGNIFICANT CHANGE UP (ref 3.8–10.5)

## 2020-11-01 PROCEDURE — 99223 1ST HOSP IP/OBS HIGH 75: CPT | Mod: CS

## 2020-11-01 PROCEDURE — 99291 CRITICAL CARE FIRST HOUR: CPT | Mod: CS

## 2020-11-01 PROCEDURE — 71045 X-RAY EXAM CHEST 1 VIEW: CPT | Mod: 26

## 2020-11-01 PROCEDURE — 93010 ELECTROCARDIOGRAM REPORT: CPT

## 2020-11-01 RX ORDER — SODIUM CHLORIDE 9 MG/ML
3 INJECTION INTRAMUSCULAR; INTRAVENOUS; SUBCUTANEOUS EVERY 8 HOURS
Refills: 0 | Status: DISCONTINUED | OUTPATIENT
Start: 2020-11-01 | End: 2020-11-17

## 2020-11-01 RX ORDER — SODIUM CHLORIDE 9 MG/ML
1000 INJECTION INTRAMUSCULAR; INTRAVENOUS; SUBCUTANEOUS ONCE
Refills: 0 | Status: DISCONTINUED | OUTPATIENT
Start: 2020-11-01 | End: 2020-11-01

## 2020-11-01 RX ORDER — ACETAMINOPHEN 500 MG
650 TABLET ORAL ONCE
Refills: 0 | Status: COMPLETED | OUTPATIENT
Start: 2020-11-01 | End: 2020-11-01

## 2020-11-01 RX ORDER — KETOROLAC TROMETHAMINE 30 MG/ML
15 SYRINGE (ML) INJECTION ONCE
Refills: 0 | Status: DISCONTINUED | OUTPATIENT
Start: 2020-11-01 | End: 2020-11-01

## 2020-11-01 RX ORDER — SODIUM CHLORIDE 9 MG/ML
2200 INJECTION INTRAMUSCULAR; INTRAVENOUS; SUBCUTANEOUS ONCE
Refills: 0 | Status: COMPLETED | OUTPATIENT
Start: 2020-11-01 | End: 2020-11-01

## 2020-11-01 RX ADMIN — Medication 15 MILLIGRAM(S): at 22:57

## 2020-11-01 RX ADMIN — SODIUM CHLORIDE 2200 MILLILITER(S): 9 INJECTION INTRAMUSCULAR; INTRAVENOUS; SUBCUTANEOUS at 22:58

## 2020-11-01 RX ADMIN — SODIUM CHLORIDE 3 MILLILITER(S): 9 INJECTION INTRAMUSCULAR; INTRAVENOUS; SUBCUTANEOUS at 22:00

## 2020-11-01 RX ADMIN — Medication 650 MILLIGRAM(S): at 22:57

## 2020-11-01 NOTE — H&P ADULT - ASSESSMENT
80F hx HTN, HLD, hypothyroid, prior DVT (no longer on AC) presents with cough, fever, body aches, gen weakness found to have covid19, ELISA.     #COVID 19  -pt came back positive for covid 19 after positive symptoms and recent contact  -not currently hypoxic will hold off on steroids   -ID consult  -check ferritin, d-dimer, crp, esr, procalcitonin  -hold of on abx for now  -pt very weak, will c/w IV hydration  -pt consult      #Metabolic acidosis 2/2 elevated lactate  -likely 2/2 covid, however given increasing lactate with covid will check for other source  -pt has fever, but otherwise not meeting sepsis criteria  -f/u ct chest, abdomen, pelvix  -check ua  -f/u blood cultures  -will hold off on abx for now as likely due to viral infection  -c/w maintenance IVF  -continue to trend lactate    #ELISA  -likely 2/2 dehydration vs covid  -c/w ivf as above  -continue to trend creatinine  -avoid nephrotoxins    #Hyponatremia  -likely 2/2 decreased po intake  -c/w maitenance fluids  -goal correctin 8-10 meq in 24 hours    #HTN  -will hold hctz and losartan in setting of ELISA  -bp acceptable  -consider non-diuretic if becomes elevated    #HLD  -c/w statin    #hypothyroid  -c/w synthroid 25 mcg

## 2020-11-01 NOTE — ED PROVIDER NOTE - OBJECTIVE STATEMENT
79 y/o F pt with significant PMHx of DVT, hiatal hernia, HLD, HTN, OA, and TIA presents to the ED c/o join pain, neck pain, and back pain secondary to severe arthritis. Pt further reports productive cough, minimal appetite, congestion, and rhinorrhea. PSHx of knee replacements. Takes Tylenol but it "bothers" her hiatal hernia. PCP is Dr. Anaya. Denies ever receiving Cortizone injections. Pt states she does not want to receive Prednisone or opiates. No further complaints at this time.

## 2020-11-01 NOTE — ED ADULT NURSE NOTE - OBJECTIVE STATEMENT
Pt A&Ox4. As per pt for the past 2 days pt has been complaining of weakness and pain.  Pt has hx of arthritis.  PT also complaining of diarrhea x2days.  Pt stated that she has been feeling increasingly weak. As per pt she has aides at home one of which tested positive for covid. No SOB. Will continue to monitor

## 2020-11-01 NOTE — ED PROVIDER NOTE - PROGRESS NOTE DETAILS
Spoke to pt with daughter at bedside. She states that mother has been weak and has trouble ambulating at home. Couple episodes of incontinence because of generalized weakness. Offered possible rehab as treatment modality. Endorses that she has aides in the house daily. One of the aides were exposed to someone who has COVID.

## 2020-11-01 NOTE — H&P ADULT - NSICDXPASTMEDICALHX_GEN_ALL_CORE_FT
PAST MEDICAL HISTORY:  DVT (deep venous thrombosis) 2016, was on xarelto for 6 months    Hiatal hernia     Hyperlipidemia     Hypertension     Osteoarthritis     Polio     TIA (transient ischemic attack) 2004

## 2020-11-01 NOTE — ED PROVIDER NOTE - CARE PLAN
Principal Discharge DX:	Dehydration  Secondary Diagnosis:	Acute renal failure, unspecified acute renal failure type

## 2020-11-01 NOTE — H&P ADULT - NSICDXPASTSURGICALHX_GEN_ALL_CORE_FT
PAST SURGICAL HISTORY:  H/O total knee replacement, left 2003 revision 2014    History of foot surgery left foot due to polio, 1953    Neck mass excision of neck mass 1942    S/P cholecystectomy     S/P dilation and curettage 2001, 1965, 1972, 1978    S/P rhinoplasty

## 2020-11-01 NOTE — H&P ADULT - NSICDXFAMILYHX_GEN_ALL_CORE_FT
FAMILY HISTORY:  Sibling  Still living? Yes, Estimated age: Age Unknown  Family history of heart disease, Age at diagnosis: Age Unknown    Grandparent  Still living? Unknown  Family history of diabetes mellitus, Age at diagnosis: Age Unknown    Aunt  Still living? No  Family history of diabetes mellitus, Age at diagnosis: Age Unknown

## 2020-11-01 NOTE — ED ADULT TRIAGE NOTE - CHIEF COMPLAINT QUOTE
Patient c/o arthritic pain and diarrhea x2 days. daughter called 911, pt states she did not wish to be brought in. alert and oriented x4, c/o cold symptoms with phlegm production.

## 2020-11-01 NOTE — H&P ADULT - HISTORY OF PRESENT ILLNESS
80F hx HTN, HLD, hypothyroid, prior DVT (no longer on AC) presents with cough, fever, body aches, gen weakness. Patient states that 4 days ago developed fever and diffuse body aches. She then started develop productive cough. She states she's has some soft stool as well. Her symptoms continued to progress to point where have difficulty ambulating and felt very weak today prompting her to come to ED. 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 She denies chest pain, abdominal pain 80F hx HTN, HLD, hypothyroid, prior DVT (no longer on AC) presents with cough, fever, body aches, gen weakness. Patient states that 4 days ago developed fever and diffuse body aches. She then started develop productive cough. She states she's hasn't been able to eat or drink much and has some soft stool as well. Her symptoms continued to progress to point where have difficulty ambulating and felt very weak today prompting her to come to ED. 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 She denies chest pain, abdominal pain, vomiting or headache.    In ED, T- 100.9, patient breathing 97% on RA. Lactate was 2.7, but increased to 2.9 after 2L bolus.

## 2020-11-01 NOTE — H&P ADULT - NSHPLABSRESULTS_GEN_ALL_CORE
13.2   8.16  )-----------( 125      ( 01 Nov 2020 22:46 )             38.7     11-01    132<L>  |  93<L>  |  48.0<H>  ----------------------------<  166<H>  4.4   |  19.0<L>  |  1.71<H>    Ca    9.3      01 Nov 2020 22:46    TPro  6.8  /  Alb  3.7  /  TBili  0.5  /  DBili  x   /  AST  63<H>  /  ALT  38<H>  /  AlkPhos  52  11-01          PT/INR - ( 01 Nov 2020 23:17 )   PT: 14.7 sec;   INR: 1.28 ratio       PTT - ( 01 Nov 2020 23:17 )  PTT:29.9 sec    Lactate Trend    CAPILLARY BLOOD GLUCOSE    RADIOLOGY, EKG & ADDITIONAL TESTS: Reviewed.     CXR- possible rll haziness, otherwise clear

## 2020-11-01 NOTE — H&P ADULT - NSHPREVIEWOFSYSTEMS_GEN_ALL_CORE
REVIEW OF SYSTEMS:    CONSTITUTIONAL: +weakness +fever  EYES/ENT: No visual changes;  No vertigo or throat pain   NECK: No pain or stiffness  RESPIRATORY: No wheezing, hemoptysis; No shortness of breath +cough  CARDIOVASCULAR: No chest pain or palpitations  GASTROINTESTINAL: No abdominal or epigastric pain. No nausea, vomiting, or hematemesis; No constipation. No melena or hematochezia. +decreased po intake  GENITOURINARY: No dysuria, frequency or hematuria  NEUROLOGICAL: No numbness or weakness  SKIN: No itching, burning, rashes, or lesions   All other review of systems is negative unless indicated above.

## 2020-11-01 NOTE — H&P ADULT - NSHPPHYSICALEXAM_GEN_ALL_CORE
Vital Signs Last 24 Hrs  T(C): 38.3 (01 Nov 2020 22:24), Max: 38.3 (01 Nov 2020 22:24)  T(F): 100.9 (01 Nov 2020 22:24), Max: 100.9 (01 Nov 2020 22:24)  HR: 84 (01 Nov 2020 23:24) (74 - 84)  BP: 141/75 (01 Nov 2020 23:24) (141/72 - 148/85)  BP(mean): --  RR: 18 (01 Nov 2020 23:24) (18 - 18)  SpO2: 97% (01 Nov 2020 23:24) (95% - 98%)    GENERAL: NAD, lethargic  HEENT:  Atraumatic, Normocephalic, MMM, no oropharyngeal lesions  EYES: EOMI, PERRLA, conjunctiva and sclera clear  NECK: Supple, No JVD, no throat tenderness.  CHEST/LUNG: Clear to auscultation bilaterally; No wheezes, rales, or rhonchi  HEART: Regular rate and rhythm; No murmurs, rubs, or gallops  ABDOMEN: Soft, Nontender, Nondistended; Bowel sounds present  EXTREMITIES:  2+ Peripheral Pulses, No clubbing, cyanosis, or edema  PSYCH: AAOx3, normal affect  NEUROLOGY: moves all extremities spontaneously. no sensory deficits  SKIN: No rashes or lesions

## 2020-11-02 DIAGNOSIS — E86.0 DEHYDRATION: ICD-10-CM

## 2020-11-02 LAB
24R-OH-CALCIDIOL SERPL-MCNC: 73 NG/ML — SIGNIFICANT CHANGE UP (ref 30–80)
ALBUMIN SERPL ELPH-MCNC: 3.2 G/DL — LOW (ref 3.3–5.2)
ALP SERPL-CCNC: 47 U/L — SIGNIFICANT CHANGE UP (ref 40–120)
ALT FLD-CCNC: 35 U/L — HIGH
ANION GAP SERPL CALC-SCNC: 15 MMOL/L — SIGNIFICANT CHANGE UP (ref 5–17)
APPEARANCE UR: CLEAR — SIGNIFICANT CHANGE UP
AST SERPL-CCNC: 72 U/L — HIGH
BACTERIA # UR AUTO: ABNORMAL
BASOPHILS # BLD AUTO: 0.01 K/UL — SIGNIFICANT CHANGE UP (ref 0–0.2)
BASOPHILS NFR BLD AUTO: 0.2 % — SIGNIFICANT CHANGE UP (ref 0–2)
BILIRUB SERPL-MCNC: 0.5 MG/DL — SIGNIFICANT CHANGE UP (ref 0.4–2)
BILIRUB UR-MCNC: NEGATIVE — SIGNIFICANT CHANGE UP
BUN SERPL-MCNC: 41 MG/DL — HIGH (ref 8–20)
CALCIUM SERPL-MCNC: 8.2 MG/DL — LOW (ref 8.6–10.2)
CHLORIDE SERPL-SCNC: 99 MMOL/L — SIGNIFICANT CHANGE UP (ref 98–107)
CO2 SERPL-SCNC: 22 MMOL/L — SIGNIFICANT CHANGE UP (ref 22–29)
COLOR SPEC: YELLOW — SIGNIFICANT CHANGE UP
COMMENT - URINE: SIGNIFICANT CHANGE UP
CREAT ?TM UR-MCNC: 72 MG/DL — SIGNIFICANT CHANGE UP
CREAT SERPL-MCNC: 1.14 MG/DL — SIGNIFICANT CHANGE UP (ref 0.5–1.3)
CRP SERPL-MCNC: 5.91 MG/DL — HIGH (ref 0–0.4)
D DIMER BLD IA.RAPID-MCNC: 2552 NG/ML DDU — HIGH
DIFF PNL FLD: ABNORMAL
EOSINOPHIL # BLD AUTO: 0 K/UL — SIGNIFICANT CHANGE UP (ref 0–0.5)
EOSINOPHIL NFR BLD AUTO: 0 % — SIGNIFICANT CHANGE UP (ref 0–6)
EPI CELLS # UR: SIGNIFICANT CHANGE UP
ERYTHROCYTE [SEDIMENTATION RATE] IN BLOOD: 35 MM/HR — HIGH (ref 0–20)
FERRITIN SERPL-MCNC: 609 NG/ML — HIGH (ref 15–150)
GLUCOSE SERPL-MCNC: 114 MG/DL — HIGH (ref 70–99)
GLUCOSE UR QL: NEGATIVE MG/DL — SIGNIFICANT CHANGE UP
HCT VFR BLD CALC: 35 % — SIGNIFICANT CHANGE UP (ref 34.5–45)
HGB BLD-MCNC: 12 G/DL — SIGNIFICANT CHANGE UP (ref 11.5–15.5)
IMM GRANULOCYTES NFR BLD AUTO: 0.4 % — SIGNIFICANT CHANGE UP (ref 0–1.5)
KETONES UR-MCNC: ABNORMAL
LACTATE BLDV-MCNC: 2.1 MMOL/L — HIGH (ref 0.5–2)
LACTATE BLDV-MCNC: 2.9 MMOL/L — HIGH (ref 0.5–2)
LEUKOCYTE ESTERASE UR-ACNC: ABNORMAL
LYMPHOCYTES # BLD AUTO: 1.28 K/UL — SIGNIFICANT CHANGE UP (ref 1–3.3)
LYMPHOCYTES # BLD AUTO: 25.4 % — SIGNIFICANT CHANGE UP (ref 13–44)
MAGNESIUM SERPL-MCNC: 1.6 MG/DL — LOW (ref 1.8–2.6)
MCHC RBC-ENTMCNC: 30.3 PG — SIGNIFICANT CHANGE UP (ref 27–34)
MCHC RBC-ENTMCNC: 34.3 GM/DL — SIGNIFICANT CHANGE UP (ref 32–36)
MCV RBC AUTO: 88.4 FL — SIGNIFICANT CHANGE UP (ref 80–100)
MONOCYTES # BLD AUTO: 0.3 K/UL — SIGNIFICANT CHANGE UP (ref 0–0.9)
MONOCYTES NFR BLD AUTO: 6 % — SIGNIFICANT CHANGE UP (ref 2–14)
NEUTROPHILS # BLD AUTO: 3.43 K/UL — SIGNIFICANT CHANGE UP (ref 1.8–7.4)
NEUTROPHILS NFR BLD AUTO: 68 % — SIGNIFICANT CHANGE UP (ref 43–77)
NITRITE UR-MCNC: NEGATIVE — SIGNIFICANT CHANGE UP
PH UR: 6 — SIGNIFICANT CHANGE UP (ref 5–8)
PHOSPHATE SERPL-MCNC: 2.2 MG/DL — LOW (ref 2.4–4.7)
PLATELET # BLD AUTO: 121 K/UL — LOW (ref 150–400)
POTASSIUM SERPL-MCNC: 3.4 MMOL/L — LOW (ref 3.5–5.3)
POTASSIUM SERPL-SCNC: 3.4 MMOL/L — LOW (ref 3.5–5.3)
PROCALCITONIN SERPL-MCNC: 0.22 NG/ML — HIGH (ref 0.02–0.1)
PROT SERPL-MCNC: 5.8 G/DL — LOW (ref 6.6–8.7)
PROT UR-MCNC: 30 MG/DL
RAPID RVP RESULT: DETECTED
RBC # BLD: 3.96 M/UL — SIGNIFICANT CHANGE UP (ref 3.8–5.2)
RBC # FLD: 12.3 % — SIGNIFICANT CHANGE UP (ref 10.3–14.5)
RBC CASTS # UR COMP ASSIST: ABNORMAL /HPF (ref 0–4)
SARS-COV-2 RNA SPEC QL NAA+PROBE: DETECTED
SARS-COV-2 RNA SPEC QL NAA+PROBE: DETECTED
SODIUM SERPL-SCNC: 136 MMOL/L — SIGNIFICANT CHANGE UP (ref 135–145)
SODIUM UR-SCNC: 51 MMOL/L — SIGNIFICANT CHANGE UP
SP GR SPEC: 1.02 — SIGNIFICANT CHANGE UP (ref 1.01–1.02)
TSH SERPL-MCNC: 1.99 UIU/ML — SIGNIFICANT CHANGE UP (ref 0.27–4.2)
UROBILINOGEN FLD QL: NEGATIVE MG/DL — SIGNIFICANT CHANGE UP
WBC # BLD: 5.04 K/UL — SIGNIFICANT CHANGE UP (ref 3.8–10.5)
WBC # FLD AUTO: 5.04 K/UL — SIGNIFICANT CHANGE UP (ref 3.8–10.5)
WBC UR QL: ABNORMAL

## 2020-11-02 PROCEDURE — 99222 1ST HOSP IP/OBS MODERATE 55: CPT | Mod: CS

## 2020-11-02 PROCEDURE — 99233 SBSQ HOSP IP/OBS HIGH 50: CPT | Mod: CS

## 2020-11-02 PROCEDURE — 74176 CT ABD & PELVIS W/O CONTRAST: CPT | Mod: 26

## 2020-11-02 PROCEDURE — 71250 CT THORAX DX C-: CPT | Mod: 26

## 2020-11-02 RX ORDER — FLUTICASONE PROPIONATE 50 MCG
1 SPRAY, SUSPENSION NASAL
Refills: 0 | Status: DISCONTINUED | OUTPATIENT
Start: 2020-11-02 | End: 2020-11-08

## 2020-11-02 RX ORDER — SIMVASTATIN 20 MG/1
20 TABLET, FILM COATED ORAL AT BEDTIME
Refills: 0 | Status: DISCONTINUED | OUTPATIENT
Start: 2020-11-02 | End: 2020-11-17

## 2020-11-02 RX ORDER — MULTIVIT-MIN/FERROUS GLUCONATE 9 MG/15 ML
1 LIQUID (ML) ORAL DAILY
Refills: 0 | Status: DISCONTINUED | OUTPATIENT
Start: 2020-11-02 | End: 2020-11-17

## 2020-11-02 RX ORDER — MAGNESIUM SULFATE 500 MG/ML
2 VIAL (ML) INJECTION ONCE
Refills: 0 | Status: DISCONTINUED | OUTPATIENT
Start: 2020-11-02 | End: 2020-11-02

## 2020-11-02 RX ORDER — ACETAMINOPHEN 500 MG
650 TABLET ORAL EVERY 6 HOURS
Refills: 0 | Status: DISCONTINUED | OUTPATIENT
Start: 2020-11-02 | End: 2020-11-03

## 2020-11-02 RX ORDER — ASCORBIC ACID 60 MG
500 TABLET,CHEWABLE ORAL
Refills: 0 | Status: DISCONTINUED | OUTPATIENT
Start: 2020-11-02 | End: 2020-11-08

## 2020-11-02 RX ORDER — SODIUM CHLORIDE 9 MG/ML
1000 INJECTION INTRAMUSCULAR; INTRAVENOUS; SUBCUTANEOUS
Refills: 0 | Status: DISCONTINUED | OUTPATIENT
Start: 2020-11-02 | End: 2020-11-03

## 2020-11-02 RX ORDER — ZINC SULFATE TAB 220 MG (50 MG ZINC EQUIVALENT) 220 (50 ZN) MG
220 TAB ORAL DAILY
Refills: 0 | Status: COMPLETED | OUTPATIENT
Start: 2020-11-02 | End: 2020-11-11

## 2020-11-02 RX ORDER — CHOLECALCIFEROL (VITAMIN D3) 125 MCG
2000 CAPSULE ORAL DAILY
Refills: 0 | Status: DISCONTINUED | OUTPATIENT
Start: 2020-11-02 | End: 2020-11-17

## 2020-11-02 RX ORDER — HEPARIN SODIUM 5000 [USP'U]/ML
5000 INJECTION INTRAVENOUS; SUBCUTANEOUS EVERY 8 HOURS
Refills: 0 | Status: DISCONTINUED | OUTPATIENT
Start: 2020-11-02 | End: 2020-11-16

## 2020-11-02 RX ORDER — SODIUM,POTASSIUM PHOSPHATES 278-250MG
1 POWDER IN PACKET (EA) ORAL
Refills: 0 | Status: COMPLETED | OUTPATIENT
Start: 2020-11-02 | End: 2020-11-03

## 2020-11-02 RX ORDER — MAGNESIUM OXIDE 400 MG ORAL TABLET 241.3 MG
400 TABLET ORAL
Refills: 0 | Status: COMPLETED | OUTPATIENT
Start: 2020-11-02 | End: 2020-11-03

## 2020-11-02 RX ORDER — LEVOTHYROXINE SODIUM 125 MCG
25 TABLET ORAL DAILY
Refills: 0 | Status: DISCONTINUED | OUTPATIENT
Start: 2020-11-02 | End: 2020-11-17

## 2020-11-02 RX ORDER — INFLUENZA VIRUS VACCINE 15; 15; 15; 15 UG/.5ML; UG/.5ML; UG/.5ML; UG/.5ML
0.5 SUSPENSION INTRAMUSCULAR ONCE
Refills: 0 | Status: COMPLETED | OUTPATIENT
Start: 2020-11-02 | End: 2020-11-17

## 2020-11-02 RX ORDER — POTASSIUM CHLORIDE 20 MEQ
40 PACKET (EA) ORAL ONCE
Refills: 0 | Status: COMPLETED | OUTPATIENT
Start: 2020-11-02 | End: 2020-11-02

## 2020-11-02 RX ADMIN — Medication 40 MILLIEQUIVALENT(S): at 17:37

## 2020-11-02 RX ADMIN — Medication 1 SPRAY(S): at 17:37

## 2020-11-02 RX ADMIN — SODIUM CHLORIDE 3 MILLILITER(S): 9 INJECTION INTRAMUSCULAR; INTRAVENOUS; SUBCUTANEOUS at 12:27

## 2020-11-02 RX ADMIN — Medication 2000 UNIT(S): at 12:26

## 2020-11-02 RX ADMIN — ZINC SULFATE TAB 220 MG (50 MG ZINC EQUIVALENT) 220 MILLIGRAM(S): 220 (50 ZN) TAB at 12:26

## 2020-11-02 RX ADMIN — HEPARIN SODIUM 5000 UNIT(S): 5000 INJECTION INTRAVENOUS; SUBCUTANEOUS at 12:26

## 2020-11-02 RX ADMIN — Medication 650 MILLIGRAM(S): at 03:52

## 2020-11-02 RX ADMIN — HEPARIN SODIUM 5000 UNIT(S): 5000 INJECTION INTRAVENOUS; SUBCUTANEOUS at 06:26

## 2020-11-02 RX ADMIN — SODIUM CHLORIDE 125 MILLILITER(S): 9 INJECTION INTRAMUSCULAR; INTRAVENOUS; SUBCUTANEOUS at 06:32

## 2020-11-02 RX ADMIN — HEPARIN SODIUM 5000 UNIT(S): 5000 INJECTION INTRAVENOUS; SUBCUTANEOUS at 21:27

## 2020-11-02 RX ADMIN — Medication 1 TABLET(S): at 12:26

## 2020-11-02 RX ADMIN — Medication 500 MILLIGRAM(S): at 17:37

## 2020-11-02 RX ADMIN — SODIUM CHLORIDE 3 MILLILITER(S): 9 INJECTION INTRAMUSCULAR; INTRAVENOUS; SUBCUTANEOUS at 05:59

## 2020-11-02 RX ADMIN — SIMVASTATIN 20 MILLIGRAM(S): 20 TABLET, FILM COATED ORAL at 21:27

## 2020-11-02 RX ADMIN — Medication 15 MILLIGRAM(S): at 03:52

## 2020-11-02 RX ADMIN — Medication 1 PACKET(S): at 17:36

## 2020-11-02 RX ADMIN — Medication 25 MICROGRAM(S): at 06:26

## 2020-11-02 RX ADMIN — MAGNESIUM OXIDE 400 MG ORAL TABLET 400 MILLIGRAM(S): 241.3 TABLET ORAL at 17:36

## 2020-11-02 NOTE — CONSULT NOTE ADULT - SUBJECTIVE AND OBJECTIVE BOX
[facility]  INFECTIOUS DISEASES AND INTERNAL MEDICINE at [place]  =======================================================  Travis Lees MD  Diplomates American Board of Internal Medicine and Infectious Diseases  Tel  [phone]  Fax [phone]  =======================================================    [facility]-[id number]  [place]O   HPI:  This 80F hx HTN, HLD, hypothyroid, prior DVT (no longer on AC) presents with cough, fever, body aches, gen weakness. Patient states that 4 days ago developed fever and diffuse body aches. She then started develop productive cough. She states she's hasn't been able to eat or drink much and has some soft stool as well. Her symptoms continued to progress to point where have difficulty ambulating and felt very weak today prompting her to come to ED. 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 She denies chest pain, abdominal pain, vomiting or headache.  In ED, T- 100.9, patient breathing 97% on RA. Lactate was 2.7, but increased to 2.9 after 2L bolus.  (01 Nov 2020 23:55)    patient has no travels. no other sick contacts.     Seen in isolation room. reports some mild cough. No real SOB.   cough is nonproductive.   Not currently requiring oxygen.       I have personally reviewed the labs and data; pertinent labs and data are listed in this note; please see below.   =======================================================  Past Medical & Surgical Hx:  =====================  PAST MEDICAL & SURGICAL HISTORY:  TIA (transient ischemic attack)  2004    DVT (deep venous thrombosis)  2016, was on xarelto for 6 months    Polio  Hyperlipidemia  Hiatal hernia  Hypertension  Osteoarthritis  Neck mass  excision of neck mass 1942  History of foot surgery  left foot due to polio, 1953  S/P rhinoplasty  H/O total knee replacement, left  2003 revision 2014  S/P dilation and curettage  2001, 1965, 1972, 1978  S/P cholecystectomy    Problem List:  ==========  HEALTH ISSUES - PROBLEM Dx:        Social Hx:  =======  no toxic habits currently    FAMILY HISTORY:  Family history of heart disease (Sibling)    Family history of diabetes mellitus (Grandparent, Aunt)    no significant family history of immunosuppressive disorders in mother or father   =======================================================  REVIEW OF SYSTEMS:  CONSTITUTIONAL:  No Fever or chills  HEENT:  No diplopia or blurred vision.  No earache, sore throat or runny nose.  CARDIOVASCULAR:  No pressure, squeezing, strangling, tightness, heaviness or aching about the chest, neck, axilla or epigastrium.  RESPIRATORY:  COUGH  GASTROINTESTINAL:  No nausea, vomiting or diarrhea.  GENITOURINARY:  No dysuria, frequency or urgency. No Blood in urine  MUSCULOSKELETAL:  no joint aches, no muscle pain  SKIN:  No change in skin, hair or nails.  NEUROLOGIC:  No Headaches, seizures or weakness.  PSYCHIATRIC:  No disorder of thought or mood.  ENDOCRINE:  No heat or cold intolerance  HEMATOLOGICAL:  No easy bruising or bleeding.   =======================================================  Allergies    all narcotics give severe vomiting and dizziness (Other; Vomiting)  ampicillin (Stomach Upset)  Bactrim (Rash)  Cipro (Other)  Levaquin (Unknown)    Intolerances    Augmentin (Diarrhea)  Antibiotics:    Other medications:  ascorbic acid 500 milliGRAM(s) Oral two times a day  cholecalciferol 2000 Unit(s) Oral daily  fluticasone propionate 50 MICROgram(s)/spray Nasal Spray 1 Spray(s) Both Nostrils two times a day  heparin   Injectable 5000 Unit(s) SubCutaneous every 8 hours  influenza   Vaccine 0.5 milliLiter(s) IntraMuscular once  levothyroxine 25 MICROGram(s) Oral daily  magnesium oxide 400 milliGRAM(s) Oral three times a day with meals  multivitamin/minerals 1 Tablet(s) Oral daily  potassium chloride    Tablet ER 40 milliEquivalent(s) Oral once  potassium phosphate / sodium phosphate Powder (PHOS-NaK) 1 Packet(s) Oral two times a day  simvastatin 20 milliGRAM(s) Oral at bedtime  sodium chloride 0.9% lock flush 3 milliLiter(s) IV Push every 8 hours  sodium chloride 0.9%. 1000 milliLiter(s) IV Continuous <Continuous>  zinc sulfate 220 milliGRAM(s) Oral daily       ======================================================  Physical Exam:  ============  T(F): 99.1 (02 Nov 2020 12:00), Max: 100.9 (01 Nov 2020 22:24)  HR: 96 (02 Nov 2020 12:00)  BP: 157/84 (02 Nov 2020 12:00)  RR: 18 (02 Nov 2020 12:00)  SpO2: 98% (02 Nov 2020 12:00) (95% - 98%)  temp max in last 48H T(F): , Max: 100.9 (11-01-20 @ 22:24)Height (cm): 162.6 (11-01-20 @ 21:28)    General:  No acute distress.  Eye: Pupils are equal, round and reactive to light, Extraocular movements are intact, Normal conjunctiva.  HENT: Normocephalic, Oral mucosa is moist, No pharyngeal erythema, No sinus tenderness.  Neck: Supple, No lymphadenopathy.  Respiratory: Lungs WITH FAIR air entry  Cardiovascular: Normal rate, Regular rhythm,   Gastrointestinal: Soft, Non-tender, Non-distended, Normal bowel sounds.  Genitourinary: No costovertebral angle tenderness.  Lymphatics: No lymphadenopathy neck,   Musculoskeletal: Normal range of motion, Normal strength.  Integumentary: No rash.  Neurologic: Alert, Oriented, No focal deficits, Cranial Nerves II-XII are grossly intact.  Psychiatric: Appropriate mood & affect.    =======================================================  Labs:                        12.0   5.04  )-----------( 121      ( 02 Nov 2020 10:52 )             35.0      11-02    136  |  99  |  41.0<H>  ----------------------------<  114<H>  3.4<L>   |  22.0  |  1.14    Ca    8.2<L>      02 Nov 2020 10:52  Phos  2.2     11-02  Mg     1.6     11-02    TPro  5.8<L>  /  Alb  3.2<L>  /  TBili  0.5  /  DBili  x   /  AST  72<H>  /  ALT  35<H>  /  AlkPhos  47  11-02      Creatinine, Serum: 1.14 mg/dL (11-02-20 @ 10:52)  Creatinine, Serum: 1.71 mg/dL (11-01-20 @ 22:46)    C-Reactive Protein, Serum: 5.91 mg/dL (11-02-20 @ 10:52)    Sedimentation Rate, Erythrocyte: 35 mm/hr (11-02-20 @ 10:52)    Procalcitonin, Serum: 0.22 ng/mL (11-02-20 @ 10:52)      COVID-19 PCR: Detected (11-01-20 @ 23:24)       < from: CT Chest No Cont (11.02.20 @ 04:08) >   EXAM:  CT ABDOMEN AND PELVIS                         EXAM:  CT CHEST                          PROCEDURE DATE:  11/02/2020          INTERPRETATION:  CT of the chest, abdomen and pelvis    Indication: Cough and elevated lactate    Technique: Axial images were obtained from the thoracic inlet through pubic symphysis without oral or IV contrast. Reformatted coronal and sagittal images were submitted.    Comparison: CT of March 18, 2015    FINDINGS:    Evaluation of the solid abdominal organs is limited without contrast.    The visualized neck, axilla and subcutaneous tissues are unremarkable.    The tracheobronchial tree is patent centrally.  There is no mediastinal hematoma.  The great vessels are not enlarged. Coronary atherosclerosis. Thereis no significant mediastinal or hilar adenopathy.    The heart is not enlarged.  There is no pericardial effusion.    Lungs: Patchy areas of groundglass opacity bilaterally suggesting atypical viral infection including Covid-19.    Pleura: Unremarkable.  There is no free air or focal collection.  No free fluid.    Liver: Normal.  Spleen: Normal.  Gallbladder: Cholecystectomy.  Biliary tree: Left peripelvic cysts. No hydroureter.  Adrenal glands: Normal.  Pancreas: Fatty atrophy.  Kidneys: Left peripelvic cysts or severe hydronephrosis with transition point at the renal pelvic junction.    Bowel:  There is no small bowel obstruction. The appendix is not visualized. The colon is under distended. There is no significant fecal load.    Bladder: Moderately distended.  Pelvic organs: No pelvic masses.    There is no significant adenopathy.  Vasculature: The aorta is not dilated. Moderate atherosclerotic vascular calcification.    Retroperitoneum: There is no mass.  Bones: Heterotopic ossification about the bilateral shoulders. Mild levoscoliosis of the thoracolumbar spine.  Subcutaneous tissues: Unremarkable.    IMPRESSION:    Patchy areas of bilateral groundglass opacity suggests COVID-19 infection.  No acute findings in the abdomen or pelvis.  Cholecystectomy.           INDER ARORA MD; Attending Radiologist  This document has been electronically signed. Nov 2 2020  4:39AM    < end of copied text >

## 2020-11-02 NOTE — PROGRESS NOTE ADULT - SUBJECTIVE AND OBJECTIVE BOX
CC: covid19, generalized weakness , anson (01 Nov 2020 23:55)    HPI:  80F hx HTN, HLD, hypothyroid, prior DVT (no longer on AC) presents with cough, fever, body aches, generalized weakness.   In ED, T- 100.9, patient breathing 97% on RA. Lactate was 2.7, but increased to 2.9 after 2L bolus.  (01 Nov 2020 23:55)    INTERVAL HPI/OVERNIGHT EVENTS: Patient seen and examined lying in bed.  Patient denies any headache, dizziness, SOB, CP, abdominal pain, nausea, vomiting, dysuria.  Other ROS reviewed and are negative.    Vital Signs Last 24 Hrs  T(C): 37.3 (02 Nov 2020 12:00), Max: 38.3 (01 Nov 2020 22:24)  T(F): 99.1 (02 Nov 2020 12:00), Max: 100.9 (01 Nov 2020 22:24)  HR: 96 (02 Nov 2020 12:00) (57 - 96)  BP: 157/84 (02 Nov 2020 12:00) (124/63 - 157/84)  BP(mean): --  RR: 18 (02 Nov 2020 12:00) (18 - 18)  SpO2: 98% (02 Nov 2020 12:00) (95% - 98%)  I&O's Detail    PHYSICAL EXAM:  GENERAL: NAD  HEAD:  Atraumatic, Normocephalic  NECK: Supple, No JVD, Normal thyroid  NERVOUS SYSTEM:  Alert & Oriented X3, Good concentration; generalized weakness  CHEST/LUNG: Clear to auscultation bilaterally; No rales, rhonchi, wheezing, or rubs  HEART: Regular rate and rhythm; No murmurs, rubs, or gallops  ABDOMEN: Soft, Nontender, Nondistended; Bowel sounds present  EXTREMITIES:  2+ Peripheral Pulses, No clubbing, cyanosis, or edema                              12.0   5.04  )-----------( 121      ( 02 Nov 2020 10:52 )             35.0     02 Nov 2020 10:52    136    |  99     |  41.0   ----------------------------<  114    3.4     |  22.0   |  1.14     Ca    8.2        02 Nov 2020 10:52  Phos  2.2       02 Nov 2020 10:52  Mg     1.6       02 Nov 2020 10:52    TPro  5.8    /  Alb  3.2    /  TBili  0.5    /  DBili  x      /  AST  72     /  ALT  35     /  AlkPhos  47     02 Nov 2020 10:52    PT/INR - ( 01 Nov 2020 23:17 )   PT: 14.7 sec;   INR: 1.28 ratio         PTT - ( 01 Nov 2020 23:17 )  PTT:29.9 sec      LIVER FUNCTIONS - ( 02 Nov 2020 10:52 )  Alb: 3.2 g/dL / Pro: 5.8 g/dL / ALK PHOS: 47 U/L / ALT: 35 U/L / AST: 72 U/L / GGT: x               MEDICATIONS  (STANDING):  ascorbic acid 500 milliGRAM(s) Oral two times a day  cholecalciferol 2000 Unit(s) Oral daily  fluticasone propionate 50 MICROgram(s)/spray Nasal Spray 1 Spray(s) Both Nostrils two times a day  heparin   Injectable 5000 Unit(s) SubCutaneous every 8 hours  influenza   Vaccine 0.5 milliLiter(s) IntraMuscular once  levothyroxine 25 MICROGram(s) Oral daily  magnesium oxide 400 milliGRAM(s) Oral three times a day with meals  multivitamin/minerals 1 Tablet(s) Oral daily  potassium chloride    Tablet ER 40 milliEquivalent(s) Oral once  potassium phosphate / sodium phosphate Powder (PHOS-NaK) 1 Packet(s) Oral two times a day  simvastatin 20 milliGRAM(s) Oral at bedtime  sodium chloride 0.9% lock flush 3 milliLiter(s) IV Push every 8 hours  sodium chloride 0.9%. 1000 milliLiter(s) (125 mL/Hr) IV Continuous <Continuous>  zinc sulfate 220 milliGRAM(s) Oral daily    MEDICATIONS  (PRN):  acetaminophen   Tablet .. 650 milliGRAM(s) Oral every 6 hours PRN Temp greater or equal to 38C (100.4F), Mild Pain (1 - 3), Moderate Pain (4 - 6)      RADIOLOGY & ADDITIONAL TESTS:  < from: CT Abdomen and Pelvis No Cont (11.02.20 @ 04:08) >   EXAM:  CT ABDOMEN AND PELVIS                         EXAM:  CT CHEST                          PROCEDURE DATE:  11/02/2020          INTERPRETATION:  CT of the chest, abdomen and pelvis    Indication: Cough and elevated lactate    Technique: Axial images were obtained from the thoracic inlet through pubic symphysis without oral or IV contrast. Reformatted coronal and sagittal images were submitted.    Comparison: CT of March 18, 2015    FINDINGS:    Evaluation of the solid abdominal organs is limited without contrast.    The visualized neck, axilla and subcutaneous tissues are unremarkable.    The tracheobronchial tree is patent centrally.  There is no mediastinal hematoma.  The great vessels are not enlarged. Coronary atherosclerosis. Thereis no significant mediastinal or hilar adenopathy.    The heart is not enlarged.  There is no pericardial effusion.    Lungs: Patchy areas of groundglass opacity bilaterally suggesting atypical viral infection including Covid-19.    Pleura: Unremarkable.  There is no free air or focal collection.  No free fluid.    Liver: Normal.  Spleen: Normal.  Gallbladder: Cholecystectomy.  Biliary tree: Left peripelvic cysts. No hydroureter.  Adrenal glands: Normal.  Pancreas: Fatty atrophy.  Kidneys: Left peripelvic cysts or severe hydronephrosis with transition point at the renal pelvic junction.    Bowel:  There is no small bowel obstruction. The appendix is not visualized. The colon is under distended. There is no significant fecal load.    Bladder: Moderately distended.  Pelvic organs: No pelvic masses.    There is no significant adenopathy.  Vasculature: The aorta is not dilated. Moderate atherosclerotic vascular calcification.    Retroperitoneum: There is no mass.  Bones: Heterotopic ossification about the bilateral shoulders. Mild levoscoliosis of the thoracolumbar spine.  Subcutaneous tissues: Unremarkable.    IMPRESSION:    Patchy areas of bilateral groundglass opacity suggests COVID-19 infection.  No acute findings in the abdomen or pelvis.  Cholecystectomy.              INDER ARORA MD; Attending Radiologist  This document has been electronically signed. Nov 2 2020  4:39AM    < end of copied text >   CC: covid19, generalized weakness , anson (01 Nov 2020 23:55)    HPI:  80F hx HTN, HLD, hypothyroid, prior DVT (no longer on AC) presents with cough, fever, body aches, generalized weakness.   In ED, T- 100.9, patient breathing 97% on RA. Lactate was 2.7, but increased to 2.9 after 2L bolus.  (01 Nov 2020 23:55)    INTERVAL HPI/OVERNIGHT EVENTS: Patient seen and examined lying in bed.  Patient denies any headache, dizziness, SOB, CP, abdominal pain, nausea, vomiting, dysuria.  Other ROS reviewed and are negative.    Vital Signs Last 24 Hrs  T(C): 37.3 (02 Nov 2020 12:00), Max: 38.3 (01 Nov 2020 22:24)  T(F): 99.1 (02 Nov 2020 12:00), Max: 100.9 (01 Nov 2020 22:24)  HR: 96 (02 Nov 2020 12:00) (57 - 96)  BP: 157/84 (02 Nov 2020 12:00) (124/63 - 157/84)   RR: 18 (02 Nov 2020 12:00) (18 - 18)  SpO2: 98% (02 Nov 2020 12:00) (95% - 98%)  I&O's Detail    PHYSICAL EXAM:  GENERAL: NAD  HEAD:  Atraumatic, Normocephalic  NECK: Supple, No JVD, Normal thyroid  NERVOUS SYSTEM:  Alert & Oriented X3, Good concentration; generalized weakness  CHEST/LUNG: Clear to auscultation bilaterally; No rales, rhonchi, wheezing, or rubs  HEART: Regular rate and rhythm; No murmurs, rubs, or gallops  ABDOMEN: Soft, Nontender, Nondistended; Bowel sounds present  EXTREMITIES:  2+ Peripheral Pulses, No clubbing, cyanosis, or edema                              12.0   5.04  )-----------( 121      ( 02 Nov 2020 10:52 )             35.0     02 Nov 2020 10:52    136    |  99     |  41.0   ----------------------------<  114    3.4     |  22.0   |  1.14     Ca    8.2        02 Nov 2020 10:52  Phos  2.2       02 Nov 2020 10:52  Mg     1.6       02 Nov 2020 10:52    TPro  5.8    /  Alb  3.2    /  TBili  0.5    /  DBili  x      /  AST  72     /  ALT  35     /  AlkPhos  47     02 Nov 2020 10:52    PT/INR - ( 01 Nov 2020 23:17 )   PT: 14.7 sec;   INR: 1.28 ratio         PTT - ( 01 Nov 2020 23:17 )  PTT:29.9 sec      LIVER FUNCTIONS - ( 02 Nov 2020 10:52 )  Alb: 3.2 g/dL / Pro: 5.8 g/dL / ALK PHOS: 47 U/L / ALT: 35 U/L / AST: 72 U/L / GGT: x               MEDICATIONS  (STANDING):  ascorbic acid 500 milliGRAM(s) Oral two times a day  cholecalciferol 2000 Unit(s) Oral daily  fluticasone propionate 50 MICROgram(s)/spray Nasal Spray 1 Spray(s) Both Nostrils two times a day  heparin   Injectable 5000 Unit(s) SubCutaneous every 8 hours  influenza   Vaccine 0.5 milliLiter(s) IntraMuscular once  levothyroxine 25 MICROGram(s) Oral daily  magnesium oxide 400 milliGRAM(s) Oral three times a day with meals  multivitamin/minerals 1 Tablet(s) Oral daily  potassium chloride    Tablet ER 40 milliEquivalent(s) Oral once  potassium phosphate / sodium phosphate Powder (PHOS-NaK) 1 Packet(s) Oral two times a day  simvastatin 20 milliGRAM(s) Oral at bedtime  sodium chloride 0.9% lock flush 3 milliLiter(s) IV Push every 8 hours  sodium chloride 0.9%. 1000 milliLiter(s) (125 mL/Hr) IV Continuous <Continuous>  zinc sulfate 220 milliGRAM(s) Oral daily    MEDICATIONS  (PRN):  acetaminophen   Tablet .. 650 milliGRAM(s) Oral every 6 hours PRN Temp greater or equal to 38C (100.4F), Mild Pain (1 - 3), Moderate Pain (4 - 6)      RADIOLOGY & ADDITIONAL TESTS:  < from: CT Abdomen and Pelvis No Cont (11.02.20 @ 04:08) >   EXAM:  CT ABDOMEN AND PELVIS                         EXAM:  CT CHEST                          PROCEDURE DATE:  11/02/2020          INTERPRETATION:  CT of the chest, abdomen and pelvis    Indication: Cough and elevated lactate    Technique: Axial images were obtained from the thoracic inlet through pubic symphysis without oral or IV contrast. Reformatted coronal and sagittal images were submitted.    Comparison: CT of March 18, 2015    FINDINGS:    Evaluation of the solid abdominal organs is limited without contrast.    The visualized neck, axilla and subcutaneous tissues are unremarkable.    The tracheobronchial tree is patent centrally.  There is no mediastinal hematoma.  The great vessels are not enlarged. Coronary atherosclerosis. There is no significant mediastinal or hilar adenopathy.    The heart is not enlarged.  There is no pericardial effusion.    Lungs: Patchy areas of groundglass opacity bilaterally suggesting atypical viral infection including Covid-19.    Pleura: Unremarkable.  There is no free air or focal collection.  No free fluid.    Liver: Normal.  Spleen: Normal.  Gallbladder: Cholecystectomy.  Biliary tree: Left peripelvic cysts. No hydroureter.  Adrenal glands: Normal.  Pancreas: Fatty atrophy.  Kidneys: Left peripelvic cysts or severe hydronephrosis with transition point at the renal pelvic junction.    Bowel:  There is no small bowel obstruction. The appendix is not visualized. The colon is under distended. There is no significant fecal load.    Bladder: Moderately distended.  Pelvic organs: No pelvic masses.    There is no significant adenopathy.  Vasculature: The aorta is not dilated. Moderate atherosclerotic vascular calcification.    Retroperitoneum: There is no mass.  Bones: Heterotopic ossification about the bilateral shoulders. Mild levoscoliosis of the thoracolumbar spine.  Subcutaneous tissues: Unremarkable.    IMPRESSION:    Patchy areas of bilateral groundglass opacity suggests COVID-19 infection.  No acute findings in the abdomen or pelvis.  Cholecystectomy.              INDER ARORA MD; Attending Radiologist  This document has been electronically signed. Nov 2 2020  4:39AM    < end of copied text >

## 2020-11-02 NOTE — PHYSICAL THERAPY INITIAL EVALUATION ADULT - GENERAL OBSERVATIONS, REHAB EVAL
pt received semi barr position in bed, NAD, agreeable to PT pt received semi barr position in bed, NAD, agreeable to PT, primafit, cardiac monitor

## 2020-11-02 NOTE — PHYSICAL THERAPY INITIAL EVALUATION ADULT - CRITERIA FOR SKILLED THERAPEUTIC INTERVENTIONS
predicted duration of therapy intervention/functional limitations in following categories/anticipated discharge recommendation/rehab potential/therapy frequency/impairments found

## 2020-11-02 NOTE — PROGRESS NOTE ADULT - ATTENDING COMMENTS
80 year old female with PMH HTN, Dyslipidemia, LLE DV and Hypothyroidism presented with cough, fever and bodyaches x4 days and admitted for COVID pneumonia, KI and hyponatremia.  No hypoxia so far. ELISA and hyponatremia improved  Add Flonase for 'sinus issues'

## 2020-11-02 NOTE — PHYSICAL THERAPY INITIAL EVALUATION ADULT - ADDITIONAL COMMENTS
Pt lives in a basement apartment with no steps to enter.  Dtr and son-in-law live upstairs, dtr works, son-in-law is disabled but able to assist.  PTA, Modified Independent with all ADLs and self care.  Amb with RW. has HHA a few hours a day. owns and uses a RW

## 2020-11-02 NOTE — CONSULT NOTE ADULT - ASSESSMENT
This 80F hx HTN, HLD, hypothyroid, prior DVT (no longer on AC) presents with cough, fever, body aches, gen weakness. Patient states that 4 days ago developed fever and diffuse body aches. She then started develop productive cough. She states she's hasn't been able to eat or drink much and has some soft stool as well. Her symptoms continued to progress to point where have difficulty ambulating and felt very weak today prompting her to come to ED. 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 She denies chest pain, abdominal pain, vomiting or headache.  In ED, T- 100.9, patient breathing 97% on RA. Lactate was 2.7, but increased to 2.9 after 2L bolus.  (01 Nov 2020 23:55)    patient has no travels. no other sick contacts.     Seen in isolation room. reports some mild cough. No real SOB.   cough is nonproductive.   Not currently requiring oxygen.         Impression:  COVID-19 Pneumonia  multifocal Pneumonia  cough  fevers      Plan:  - Clinically stable  - DEFER remdesevir for now      Continue supportive care measures  - continue Oxygen PRN     continue to trend inflammatory markers.   If procalcitonin starts to rise, may need to consider treating for secondary bacterial infections    - trend CBC with diff, CMP with LFT's  - trend Inflammatory markers (ESR, CRP, Ferritin, LDH,  procalcitonin)  q48h.  D dimer, lactate, troponin, CK, PT/PTT x 1      Will follow with you.       IF stable in next 48 hours, would consider discharge to community.

## 2020-11-02 NOTE — PROGRESS NOTE ADULT - ASSESSMENT
80F hx HTN, HLD, hypothyroid, prior DVT (no longer on AC) presents with cough, fever, body aches, generalized weakness found to have covid19, ELISA.     #COVID 19  -pt came back positive for covid 19 after positive symptoms and recent contact  -not currently hypoxic will hold off on steroids and Remdesivir   -ID consulted  -Monitor ferritin, d-dimer, crp, esr, procalcitonin; check RVP to r/o other viral infection  -hold off on abx for now  -PT consulted      #Metabolic acidosis 2/2 elevated lactate  -likely 2/2 covid, however given increasing lactate with covid will check for other source  -pt has fever, but otherwise not meeting sepsis criteria  -ct chest, abdomen, pelvis - c/w covid  -f/u blood cultures  -will hold off on abx for now as likely due to viral infection  -continue to trend lactate - improving    #ELISA  -likely 2/2 dehydration vs covid  -S/p ivf  - improving  -monitor BMP.  -avoid nephrotoxins    #Hyponatremia  -likely 2/2 decreased po intake  -S/p IVF   - Monitor bmp    #HTN  -will hold hctz and losartan in setting of ELISA  -Will start Amlodipine 5mg given elevated bp.    #HLD  -c/w statin    #hypothyroid  -c/w synthroid 25 mcg    -check TSH    #Hypomagnesemia/hypokalemia/hypophosphatemia  - Supplemented    #DVT ppx  - Heparin SQ

## 2020-11-02 NOTE — PHYSICAL THERAPY INITIAL EVALUATION ADULT - BED MOBILITY LIMITATIONS, REHAB EVAL
pt declining further mobility due to fatigue despite encouragement, pt able to maintain sitting position for ~3.5minutes

## 2020-11-03 LAB
ALBUMIN SERPL ELPH-MCNC: 2.9 G/DL — LOW (ref 3.3–5.2)
ALP SERPL-CCNC: 46 U/L — SIGNIFICANT CHANGE UP (ref 40–120)
ALT FLD-CCNC: 33 U/L — HIGH
ANION GAP SERPL CALC-SCNC: 13 MMOL/L — SIGNIFICANT CHANGE UP (ref 5–17)
AST SERPL-CCNC: 78 U/L — HIGH
BASOPHILS # BLD AUTO: 0.01 K/UL — SIGNIFICANT CHANGE UP (ref 0–0.2)
BASOPHILS NFR BLD AUTO: 0.2 % — SIGNIFICANT CHANGE UP (ref 0–2)
BILIRUB SERPL-MCNC: 0.5 MG/DL — SIGNIFICANT CHANGE UP (ref 0.4–2)
BUN SERPL-MCNC: 24 MG/DL — HIGH (ref 8–20)
CALCIUM SERPL-MCNC: 8.4 MG/DL — LOW (ref 8.6–10.2)
CHLORIDE SERPL-SCNC: 102 MMOL/L — SIGNIFICANT CHANGE UP (ref 98–107)
CO2 SERPL-SCNC: 19 MMOL/L — LOW (ref 22–29)
CREAT SERPL-MCNC: 0.77 MG/DL — SIGNIFICANT CHANGE UP (ref 0.5–1.3)
EOSINOPHIL # BLD AUTO: 0.02 K/UL — SIGNIFICANT CHANGE UP (ref 0–0.5)
EOSINOPHIL NFR BLD AUTO: 0.4 % — SIGNIFICANT CHANGE UP (ref 0–6)
GLUCOSE SERPL-MCNC: 94 MG/DL — SIGNIFICANT CHANGE UP (ref 70–99)
HCT VFR BLD CALC: 37 % — SIGNIFICANT CHANGE UP (ref 34.5–45)
HGB BLD-MCNC: 12.5 G/DL — SIGNIFICANT CHANGE UP (ref 11.5–15.5)
IMM GRANULOCYTES NFR BLD AUTO: 1.3 % — SIGNIFICANT CHANGE UP (ref 0–1.5)
LYMPHOCYTES # BLD AUTO: 1.23 K/UL — SIGNIFICANT CHANGE UP (ref 1–3.3)
LYMPHOCYTES # BLD AUTO: 26.6 % — SIGNIFICANT CHANGE UP (ref 13–44)
MCHC RBC-ENTMCNC: 29.8 PG — SIGNIFICANT CHANGE UP (ref 27–34)
MCHC RBC-ENTMCNC: 33.8 GM/DL — SIGNIFICANT CHANGE UP (ref 32–36)
MCV RBC AUTO: 88.1 FL — SIGNIFICANT CHANGE UP (ref 80–100)
MONOCYTES # BLD AUTO: 0.31 K/UL — SIGNIFICANT CHANGE UP (ref 0–0.9)
MONOCYTES NFR BLD AUTO: 6.7 % — SIGNIFICANT CHANGE UP (ref 2–14)
NEUTROPHILS # BLD AUTO: 3 K/UL — SIGNIFICANT CHANGE UP (ref 1.8–7.4)
NEUTROPHILS NFR BLD AUTO: 64.8 % — SIGNIFICANT CHANGE UP (ref 43–77)
PLATELET # BLD AUTO: 74 K/UL — LOW (ref 150–400)
POTASSIUM SERPL-MCNC: 4.1 MMOL/L — SIGNIFICANT CHANGE UP (ref 3.5–5.3)
POTASSIUM SERPL-SCNC: 4.1 MMOL/L — SIGNIFICANT CHANGE UP (ref 3.5–5.3)
PROT SERPL-MCNC: 6 G/DL — LOW (ref 6.6–8.7)
RBC # BLD: 4.2 M/UL — SIGNIFICANT CHANGE UP (ref 3.8–5.2)
RBC # FLD: 12.3 % — SIGNIFICANT CHANGE UP (ref 10.3–14.5)
SODIUM SERPL-SCNC: 134 MMOL/L — LOW (ref 135–145)
UUN UR-MCNC: 662 MG/DL — SIGNIFICANT CHANGE UP
WBC # BLD: 4.63 K/UL — SIGNIFICANT CHANGE UP (ref 3.8–10.5)
WBC # FLD AUTO: 4.63 K/UL — SIGNIFICANT CHANGE UP (ref 3.8–10.5)

## 2020-11-03 PROCEDURE — 99233 SBSQ HOSP IP/OBS HIGH 50: CPT | Mod: CS

## 2020-11-03 PROCEDURE — 99232 SBSQ HOSP IP/OBS MODERATE 35: CPT | Mod: CS

## 2020-11-03 RX ORDER — ACETAMINOPHEN 500 MG
975 TABLET ORAL EVERY 8 HOURS
Refills: 0 | Status: DISCONTINUED | OUTPATIENT
Start: 2020-11-03 | End: 2020-11-17

## 2020-11-03 RX ORDER — ACETAMINOPHEN 500 MG
650 TABLET ORAL EVERY 6 HOURS
Refills: 0 | Status: DISCONTINUED | OUTPATIENT
Start: 2020-11-03 | End: 2020-11-17

## 2020-11-03 RX ADMIN — Medication 500 MILLIGRAM(S): at 05:17

## 2020-11-03 RX ADMIN — ZINC SULFATE TAB 220 MG (50 MG ZINC EQUIVALENT) 220 MILLIGRAM(S): 220 (50 ZN) TAB at 13:08

## 2020-11-03 RX ADMIN — HEPARIN SODIUM 5000 UNIT(S): 5000 INJECTION INTRAVENOUS; SUBCUTANEOUS at 05:17

## 2020-11-03 RX ADMIN — Medication 1 PACKET(S): at 05:17

## 2020-11-03 RX ADMIN — MAGNESIUM OXIDE 400 MG ORAL TABLET 400 MILLIGRAM(S): 241.3 TABLET ORAL at 10:05

## 2020-11-03 RX ADMIN — Medication 1 TABLET(S): at 13:08

## 2020-11-03 RX ADMIN — HEPARIN SODIUM 5000 UNIT(S): 5000 INJECTION INTRAVENOUS; SUBCUTANEOUS at 13:08

## 2020-11-03 RX ADMIN — Medication 650 MILLIGRAM(S): at 10:22

## 2020-11-03 RX ADMIN — Medication 25 MICROGRAM(S): at 05:17

## 2020-11-03 RX ADMIN — Medication 2000 UNIT(S): at 13:08

## 2020-11-03 RX ADMIN — Medication 650 MILLIGRAM(S): at 01:11

## 2020-11-03 RX ADMIN — Medication 1 SPRAY(S): at 05:17

## 2020-11-03 RX ADMIN — Medication 500 MILLIGRAM(S): at 17:54

## 2020-11-03 RX ADMIN — MAGNESIUM OXIDE 400 MG ORAL TABLET 400 MILLIGRAM(S): 241.3 TABLET ORAL at 13:08

## 2020-11-03 RX ADMIN — SODIUM CHLORIDE 3 MILLILITER(S): 9 INJECTION INTRAMUSCULAR; INTRAVENOUS; SUBCUTANEOUS at 21:42

## 2020-11-03 RX ADMIN — SIMVASTATIN 20 MILLIGRAM(S): 20 TABLET, FILM COATED ORAL at 22:17

## 2020-11-03 RX ADMIN — HEPARIN SODIUM 5000 UNIT(S): 5000 INJECTION INTRAVENOUS; SUBCUTANEOUS at 22:16

## 2020-11-03 NOTE — PROGRESS NOTE ADULT - ASSESSMENT
80F hx HTN, HLD, hypothyroid, prior DVT (no longer on AC) presents with cough, fever, body aches, generalized weakness found to have covid19, ELISA.     #COVID 19  -pt came back positive for covid 19 after positive symptoms and recent contact  -not currently hypoxic will hold off on steroids and Remdesivir   -ID consult appreciated - if remains asymptomatic discharge in 48 hours  -Monitor ferritin, d-dimer, crp, esr, procalcitonin;   -hold off on abx for now  -PT consulted      #Metabolic acidosis 2/2 elevated lactate - resolved  -likely 2/2 covid, however given increasing lactate with covid will check for other source  -pt has fever, but otherwise not meeting sepsis criteria  -ct chest, abdomen, pelvis - c/w covid  -f/u blood cultures  -will hold off on abx for now as likely due to viral infection  -continue to trend lactate - improving    #ELISA - resolved  -likely 2/2 dehydration vs covid  -S/p ivf  - improving  -monitor BMP.  -avoid nephrotoxins    #Hyponatremia - improved  -likely 2/2 decreased po intake  -S/p IVF; discontinued  - Monitor bmp    #HTN  -will hold hctz and losartan in setting of ELISA  - Continue Amlodipine 5mg      #HLD  -c/w statin    #hypothyroid - normal TSH  -c/w synthroid 25 mcg        #Hypomagnesemia/hypokalemia/hypophosphatemia  - Supplemented    OA   - Tylenol 975 PO q8    #DVT ppx  - Heparin SQ      Disposition - If remains asymptomatic consider discharge in 48 hours. Family requesting [facility]    Discussed with son Scott ([phone])

## 2020-11-03 NOTE — PROGRESS NOTE ADULT - SUBJECTIVE AND OBJECTIVE BOX
HOSPITALIST PROGRESS NOTE    PRADEEP FONTAINECARTER  [id number]  80yFemale    Patient is a 80y old  Female who presents with a chief complaint of covid19, generalized weakness , elisa (2020 15:38)      SUBJECTIVE:   Chart reviewed since last visit.  Patient seen and examined at bedside earlier in day ~ 845am for COVID, ELISA, hyponatremia  Yelling expletives initially then calmed down (upset over yesterdays encounter when provider suggested that generalized bodyaches may be due to COVID-19 - patient stated that due to OA, but doesnt take any medications besides ASA occasional due to hiatal hernia; eventually agreeable to having Tylenol)  Patient speaking to son on Phone during initial part of interview - provider also discussed with son who was profusely apologetic over his mother behaviour - wants her to go to Rehab upon discharge)  Denies any fever or chills, dyspnea or chest pain.  Denies any nausea, vomiting, abdominal pain or diarrhea      OBJECTIVE:  Vital Signs Last 24 Hrs  T(C): 36.4 (2020 16:47), Max: 37.3 (2020 21:57)  T(F): 97.6 (2020 16:47), Max: 99.1 (2020 21:57)  HR: 68 (2020 18:08) (65 - 77)  BP: 112/62 (2020 16:47) (112/62 - 141/73)   RR: 20 (2020 16:47) (18 - 20)  SpO2: 97% (2020 18:08) (93% - 100%)    PHYSICAL EXAMINATION  General: Obese, lying in bed, comfortable  HEENT:  dark teeth  NECK:  supple  CVS: regular rate and rhythm S1 S2  RESP:  CTAB  GI:  Soft nondistended nontender BS+  : No suprapubic tenderness  MSK:  No edema. FROM. Joint swelling. Bilateral TKR scars well healed  CNS:  No gross focal or global deficit noted  INTEG:  warm dry skin  PSYCH:  Labile mood    MONITOR:  CAPILLARY BLOOD GLUCOSE            I&O's Summary    2020 07:01  -  2020 07:00  --------------------------------------------------------  IN: 1340 mL / OUT: 750 mL / NET: 590 mL                            12.5   4.63  )-----------( 74       ( 2020 09:01 )             37.0     PT/INR - ( 2020 23:17 )   PT: 14.7 sec;   INR: 1.28 ratio         PTT - ( 2020 23:17 )  PTT:29.9 sec      134<L>  |  102  |  24.0<H>  ----------------------------<  94  4.1   |  19.0<L>  |  0.77    Ca    8.4<L>      2020 09:01  Phos  2.2     1102  Mg     1.6         TPro  6.0<L>  /  Alb  2.9<L>  /  TBili  0.5  /  DBili  x   /  AST  78<H>  /  ALT  33<H>  /  AlkPhos  46  11-03        Urinalysis Basic - ( 2020 21:04 )    Color: Yellow / Appearance: Clear / S.020 / pH: x  Gluc: x / Ketone: Trace  / Bili: Negative / Urobili: Negative mg/dL   Blood: x / Protein: 30 mg/dL / Nitrite: Negative   Leuk Esterase: Moderate / RBC: 6-10 /HPF / WBC 26-50   Sq Epi: x / Non Sq Epi: Few / Bacteria: Occasional        Culture:    TTE:    RADIOLOGY        MEDICATIONS  (STANDING):  ascorbic acid 500 milliGRAM(s) Oral two times a day  cholecalciferol 2000 Unit(s) Oral daily  fluticasone propionate 50 MICROgram(s)/spray Nasal Spray 1 Spray(s) Both Nostrils two times a day  heparin   Injectable 5000 Unit(s) SubCutaneous every 8 hours  influenza   Vaccine 0.5 milliLiter(s) IntraMuscular once  levothyroxine 25 MICROGram(s) Oral daily  multivitamin/minerals 1 Tablet(s) Oral daily  simvastatin 20 milliGRAM(s) Oral at bedtime  sodium chloride 0.9% lock flush 3 milliLiter(s) IV Push every 8 hours  zinc sulfate 220 milliGRAM(s) Oral daily      MEDICATIONS  (PRN):

## 2020-11-03 NOTE — PROGRESS NOTE ADULT - ASSESSMENT
This 80F hx HTN, HLD, hypothyroid, prior DVT (no longer on AC) presents with cough, fever, body aches, gen weakness. Patient states that 4 days ago developed fever and diffuse body aches. She then started develop productive cough. She states she's hasn't been able to eat or drink much and has some soft stool as well. Her symptoms continued to progress to point where have difficulty ambulating and felt very weak today prompting her to come to ED. 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 She denies chest pain, abdominal pain, vomiting or headache.  In ED, T- 100.9, patient breathing 97% on RA. Lactate was 2.7, but increased to 2.9 after 2L bolus.  (01 Nov 2020 23:55)    patient has no travels. no other sick contacts.     Seen in isolation room. reports some mild cough. No real SOB.   cough is nonproductive.   Not currently requiring oxygen.       Impression:  COVID-19 Pneumonia  multifocal Pneumonia  cough  fevers  abnormal LFTs    Plan:  - Clinically stable  - DEFER remdesevir for now      Continue supportive care measures  - continue Oxygen PRN     LFT mildly elevated  - likely from COVID-19, will watch.     continue to trend inflammatory markers.   If procalcitonin starts to rise, may need to consider treating for secondary bacterial infections    - trend CBC with diff, CMP with LFT's  - trend Inflammatory markers (ESR, CRP, Ferritin, LDH,  procalcitonin)  q48h.  D dimer, lactate, troponin, CK, PT/PTT x 1      Will follow with you.       IF stable in next 48 hours, would consider discharge to community.

## 2020-11-03 NOTE — PROGRESS NOTE ADULT - SUBJECTIVE AND OBJECTIVE BOX
[facility]  INFECTIOUS DISEASES AND INTERNAL MEDICINE at [place]  =======================================================  Travis Lees MD  Diplomates American Board of Internal Medicine and Infectious Diseases  Tel  [phone]  Fax [phone]  =======================================================    [facility]-[id number]  PRADEEP DEL TORO   follow up for COVID-19    no new complaints.   remains off oxygen.   still with fevers this AM  LFTs reviewed. mild elevated      =======================================================  REVIEW OF SYSTEMS:  CONSTITUTIONAL:  No Fever or chills  HEENT:  No diplopia or blurred vision.  No earache, sore throat or runny nose.  CARDIOVASCULAR:  No pressure, squeezing, strangling, tightness, heaviness or aching about the chest, neck, axilla or epigastrium.  RESPIRATORY:  COUGH  GASTROINTESTINAL:  No nausea, vomiting or diarrhea.  GENITOURINARY:  No dysuria, frequency or urgency. No Blood in urine  MUSCULOSKELETAL:  no joint aches, no muscle pain  SKIN:  No change in skin, hair or nails.  NEUROLOGIC:  No Headaches, seizures or weakness.  PSYCHIATRIC:  No disorder of thought or mood.  ENDOCRINE:  No heat or cold intolerance  HEMATOLOGICAL:  No easy bruising or bleeding.   =======================================================  Allergies    all narcotics give severe vomiting and dizziness (Other; Vomiting)  ampicillin (Stomach Upset)  Bactrim (Rash)  Cipro (Other)  Levaquin (Unknown)       ======================================================  Physical Exam:  ============     General:  No acute distress.  Eye: Pupils are equal, round and reactive to light, Extraocular movements are intact, Normal conjunctiva.  HENT: Normocephalic, Oral mucosa is moist, No pharyngeal erythema, No sinus tenderness.  Neck: Supple, No lymphadenopathy.  Respiratory: Lungs WITH FAIR air entry  Cardiovascular: Normal rate, Regular rhythm,   Gastrointestinal: Soft, Non-tender, Non-distended, Normal bowel sounds.  Genitourinary: No costovertebral angle tenderness.  Lymphatics: No lymphadenopathy neck,   Musculoskeletal: Normal range of motion, Normal strength.  Integumentary: No rash.  Neurologic: Alert, Oriented, No focal deficits, Cranial Nerves II-XII are grossly intact.  Psychiatric: Appropriate mood & affect.    =======================================================    Vitals:  ============  T(F): 96.9 (03 Nov 2020 11:47), Max: 101.3 (02 Nov 2020 17:56)  HR: 66 (03 Nov 2020 15:56)  BP: 125/67 (03 Nov 2020 11:47)  RR: 20 (03 Nov 2020 11:47)  SpO2: 97% (03 Nov 2020 15:56) (93% - 100%)  temp max in last 48H T(F): , Max: 101.3 (11-02-20 @ 17:56)    =======================================================  Current Antibiotics:    Other medications:  ascorbic acid 500 milliGRAM(s) Oral two times a day  cholecalciferol 2000 Unit(s) Oral daily  fluticasone propionate 50 MICROgram(s)/spray Nasal Spray 1 Spray(s) Both Nostrils two times a day  heparin   Injectable 5000 Unit(s) SubCutaneous every 8 hours  influenza   Vaccine 0.5 milliLiter(s) IntraMuscular once  levothyroxine 25 MICROGram(s) Oral daily  multivitamin/minerals 1 Tablet(s) Oral daily  simvastatin 20 milliGRAM(s) Oral at bedtime  sodium chloride 0.9% lock flush 3 milliLiter(s) IV Push every 8 hours  sodium chloride 0.9%. 1000 milliLiter(s) IV Continuous <Continuous>  zinc sulfate 220 milliGRAM(s) Oral daily      =======================================================  Labs:                        12.5   4.63  )-----------( 74       ( 03 Nov 2020 09:01 )             37.0      11-03    134<L>  |  102  |  24.0<H>  ----------------------------<  94  4.1   |  19.0<L>  |  0.77    Ca    8.4<L>      03 Nov 2020 09:01  Phos  2.2     11-02  Mg     1.6     11-02    TPro  6.0<L>  /  Alb  2.9<L>  /  TBili  0.5  /  DBili  x   /  AST  78<H>  /  ALT  33<H>  /  AlkPhos  46  11-03      Creatinine, Serum: 0.77 mg/dL (11-03-20 @ 09:01)  Creatinine, Serum: 1.14 mg/dL (11-02-20 @ 10:52)  Creatinine, Serum: 1.71 mg/dL (11-01-20 @ 22:46)    Procalcitonin, Serum: 0.22 ng/mL (11-02-20 @ 10:52)    D-Dimer Assay, Quantitative: 2552 ng/mL DDU (11-02-20 @ 10:52)    Ferritin, Serum: 609 ng/mL (11-02-20 @ 10:52)    C-Reactive Protein, Serum: 5.91 mg/dL (11-02-20 @ 10:52)    WBC Count: 4.63 K/uL (11-03-20 @ 09:01)  WBC Count: 5.04 K/uL (11-02-20 @ 10:52)  WBC Count: 8.16 K/uL (11-01-20 @ 22:46)    Rapid RVP Result: Detected (11-02-20 @ 13:22)    COVID-19 PCR: Detected (11-01-20 @ 23:24)      Alkaline Phosphatase, Serum: 46 U/L (11-03-20 @ 09:01)  Alkaline Phosphatase, Serum: 47 U/L (11-02-20 @ 10:52)  Alkaline Phosphatase, Serum: 52 U/L (11-01-20 @ 22:46)  Alanine Aminotransferase (ALT/SGPT): 33 U/L (11-03-20 @ 09:01)  Alanine Aminotransferase (ALT/SGPT): 35 U/L (11-02-20 @ 10:52)  Alanine Aminotransferase (ALT/SGPT): 38 U/L (11-01-20 @ 22:46)  Aspartate Aminotransferase (AST/SGOT): 78 U/L (11-03-20 @ 09:01)  Aspartate Aminotransferase (AST/SGOT): 72 U/L (11-02-20 @ 10:52)  Aspartate Aminotransferase (AST/SGOT): 63 U/L (11-01-20 @ 22:46)  Bilirubin Total, Serum: 0.5 mg/dL (11-03-20 @ 09:01)  Bilirubin Total, Serum: 0.5 mg/dL (11-02-20 @ 10:52)  Bilirubin Total, Serum: 0.5 mg/dL (11-01-20 @ 22:46)      < from: CT Chest No Cont (11.02.20 @ 04:08) >   EXAM:  CT ABDOMEN AND PELVIS                         EXAM:  CT CHEST                          PROCEDURE DATE:  11/02/2020          INTERPRETATION:  CT of the chest, abdomen and pelvis    Indication: Cough and elevated lactate    Technique: Axial images were obtained from the thoracic inlet through pubic symphysis without oral or IV contrast. Reformatted coronal and sagittal images were submitted.    Comparison: CT of March 18, 2015    FINDINGS:    Evaluation of the solid abdominal organs is limited without contrast.    The visualized neck, axilla and subcutaneous tissues are unremarkable.    The tracheobronchial tree is patent centrally.  There is no mediastinal hematoma.  The great vessels are not enlarged. Coronary atherosclerosis. Thereis no significant mediastinal or hilar adenopathy.    The heart is not enlarged.  There is no pericardial effusion.    Lungs: Patchy areas of groundglass opacity bilaterally suggesting atypical viral infection including Covid-19.    Pleura: Unremarkable.  There is no free air or focal collection.  No free fluid.    Liver: Normal.  Spleen: Normal.  Gallbladder: Cholecystectomy.  Biliary tree: Left peripelvic cysts. No hydroureter.  Adrenal glands: Normal.  Pancreas: Fatty atrophy.  Kidneys: Left peripelvic cysts or severe hydronephrosis with transition point at the renal pelvic junction.    Bowel:  There is no small bowel obstruction. The appendix is not visualized. The colon is under distended. There is no significant fecal load.    Bladder: Moderately distended.  Pelvic organs: No pelvic masses.    There is no significant adenopathy.  Vasculature: The aorta is not dilated. Moderate atherosclerotic vascular calcification.    Retroperitoneum: There is no mass.  Bones: Heterotopic ossification about the bilateral shoulders. Mild levoscoliosis of the thoracolumbar spine.  Subcutaneous tissues: Unremarkable.    IMPRESSION:    Patchy areas of bilateral groundglass opacity suggests COVID-19 infection.  No acute findings in the abdomen or pelvis.  Cholecystectomy.           INDER ARORA MD; Attending Radiologist  This document has been electronically signed. Nov 2 2020  4:39AM    < end of copied text >

## 2020-11-04 PROCEDURE — 99232 SBSQ HOSP IP/OBS MODERATE 35: CPT | Mod: CS

## 2020-11-04 PROCEDURE — 99233 SBSQ HOSP IP/OBS HIGH 50: CPT | Mod: CS

## 2020-11-04 RX ORDER — CEFTRIAXONE 500 MG/1
1000 INJECTION, POWDER, FOR SOLUTION INTRAMUSCULAR; INTRAVENOUS EVERY 24 HOURS
Refills: 0 | Status: COMPLETED | OUTPATIENT
Start: 2020-11-04 | End: 2020-11-09

## 2020-11-04 RX ADMIN — HEPARIN SODIUM 5000 UNIT(S): 5000 INJECTION INTRAVENOUS; SUBCUTANEOUS at 21:17

## 2020-11-04 RX ADMIN — CEFTRIAXONE 100 MILLIGRAM(S): 500 INJECTION, POWDER, FOR SOLUTION INTRAMUSCULAR; INTRAVENOUS at 17:41

## 2020-11-04 RX ADMIN — SODIUM CHLORIDE 3 MILLILITER(S): 9 INJECTION INTRAMUSCULAR; INTRAVENOUS; SUBCUTANEOUS at 05:19

## 2020-11-04 RX ADMIN — SODIUM CHLORIDE 3 MILLILITER(S): 9 INJECTION INTRAMUSCULAR; INTRAVENOUS; SUBCUTANEOUS at 21:17

## 2020-11-04 RX ADMIN — ZINC SULFATE TAB 220 MG (50 MG ZINC EQUIVALENT) 220 MILLIGRAM(S): 220 (50 ZN) TAB at 13:00

## 2020-11-04 RX ADMIN — HEPARIN SODIUM 5000 UNIT(S): 5000 INJECTION INTRAVENOUS; SUBCUTANEOUS at 13:05

## 2020-11-04 RX ADMIN — Medication 1 TABLET(S): at 13:00

## 2020-11-04 RX ADMIN — Medication 25 MICROGRAM(S): at 05:29

## 2020-11-04 RX ADMIN — SODIUM CHLORIDE 3 MILLILITER(S): 9 INJECTION INTRAMUSCULAR; INTRAVENOUS; SUBCUTANEOUS at 13:06

## 2020-11-04 RX ADMIN — Medication 500 MILLIGRAM(S): at 05:28

## 2020-11-04 RX ADMIN — SIMVASTATIN 20 MILLIGRAM(S): 20 TABLET, FILM COATED ORAL at 21:17

## 2020-11-04 RX ADMIN — HEPARIN SODIUM 5000 UNIT(S): 5000 INJECTION INTRAVENOUS; SUBCUTANEOUS at 05:29

## 2020-11-04 RX ADMIN — Medication 975 MILLIGRAM(S): at 13:01

## 2020-11-04 RX ADMIN — Medication 975 MILLIGRAM(S): at 13:40

## 2020-11-04 RX ADMIN — Medication 2000 UNIT(S): at 13:00

## 2020-11-04 NOTE — PROGRESS NOTE ADULT - SUBJECTIVE AND OBJECTIVE BOX
HOSPITALIST PROGRESS NOTE    PRADEEP DEL TORO  [id number]  80yFemale    Patient is a 80y old  Female who presents with a chief complaint of covid19, generalized weakness , anson (03 Nov 2020 20:08)      SUBJECTIVE:   Chart reviewed since last visit.  Patient seen and examined at bedside for COVID  Denies any dyspnea, fever or chills      OBJECTIVE:  Vital Signs Last 24 Hrs  T(C): 36.2 (04 Nov 2020 17:42), Max: 37.5 (04 Nov 2020 05:20)  T(F): 97.1 (04 Nov 2020 17:42), Max: 99.5 (04 Nov 2020 05:20)  HR: 58 (04 Nov 2020 17:42) (58 - 68)  BP: 121/70 (04 Nov 2020 17:42) (121/70 - 134/68)   RR: 18 (04 Nov 2020 17:42) (18 - 19)  SpO2: 98% (04 Nov 2020 17:42) (92% - 98%)    PHYSICAL EXAMINATION  General: Obese, lying in bed, comfortable  HEENT:  dark teeth  NECK:  supple  CVS: regular rate and rhythm S1 S2  RESP:  CTAB  GI:  Soft nondistended nontender BS+  : No suprapubic tenderness  MSK:  No edema. FROM. Joint swelling. Bilateral TKR scars well healed  CNS:  No gross focal or global deficit noted  INTEG:  warm dry skin  PSYCH:  Labile mood    MONITOR:  CAPILLARY BLOOD GLUCOSE            I&O's Summary    03 Nov 2020 07:01  -  04 Nov 2020 07:00  --------------------------------------------------------  IN: 0 mL / OUT: 900 mL / NET: -900 mL                            12.5   4.63  )-----------( 74       ( 03 Nov 2020 09:01 )             37.0       11-03    134<L>  |  102  |  24.0<H>  ----------------------------<  94  4.1   |  19.0<L>  |  0.77    Ca    8.4<L>      03 Nov 2020 09:01    TPro  6.0<L>  /  Alb  2.9<L>  /  TBili  0.5  /  DBili  x   /  AST  78<H>  /  ALT  33<H>  /  AlkPhos  46  11-03            Culture:    TTE:    RADIOLOGY        MEDICATIONS  (STANDING):  ascorbic acid 500 milliGRAM(s) Oral two times a day  cefTRIAXone   IVPB 1000 milliGRAM(s) IV Intermittent every 24 hours  cholecalciferol 2000 Unit(s) Oral daily  fluticasone propionate 50 MICROgram(s)/spray Nasal Spray 1 Spray(s) Both Nostrils two times a day  heparin   Injectable 5000 Unit(s) SubCutaneous every 8 hours  influenza   Vaccine 0.5 milliLiter(s) IntraMuscular once  levothyroxine 25 MICROGram(s) Oral daily  multivitamin/minerals 1 Tablet(s) Oral daily  simvastatin 20 milliGRAM(s) Oral at bedtime  sodium chloride 0.9% lock flush 3 milliLiter(s) IV Push every 8 hours  zinc sulfate 220 milliGRAM(s) Oral daily      MEDICATIONS  (PRN):  acetaminophen   Tablet .. 650 milliGRAM(s) Oral every 6 hours PRN Temp greater or equal to 38C (100.4F), Mild Pain (1 - 3)  acetaminophen   Tablet .. 975 milliGRAM(s) Oral every 8 hours PRN Moderate Pain (4 - 6)

## 2020-11-04 NOTE — PROGRESS NOTE ADULT - ASSESSMENT
80F hx HTN, HLD, hypothyroid, prior DVT (no longer on AC) presents with cough, fever, body aches, generalized weakness found to have covid19, ELISA.     #COVID 19  -pt came back positive for covid 19 after positive symptoms and recent contact  -not currently hypoxic will hold off on steroids and Remdesivir   -ID consult appreciated - if remains asymptomatic discharge in 48 hours  -Monitor ferritin, d-dimer, crp, esr, procalcitonin;   -PT consulted     UTI  - Rocephin    #Metabolic acidosis 2/2 elevated lactate - resolved  -likely 2/2 covid, however given increasing lactate with covid will check for other source  -pt has fever, but otherwise not meeting sepsis criteria  -ct chest, abdomen, pelvis - c/w covid  -f/u blood cultures  -will hold off on abx for now as likely due to viral infection  -continue to trend lactate - improving    #ELISA - resolved  -likely 2/2 dehydration vs covid  -S/p ivf  - improving  -monitor BMP.  -avoid nephrotoxins    #Hyponatremia - improved  -likely 2/2 decreased po intake  -S/p IVF; discontinued  - Monitor bmp    #HTN  -will hold hctz and losartan in setting of ELISA  - Continue Amlodipine 5mg      #HLD  -c/w statin    #hypothyroid - normal TSH  -c/w synthroid 25 mcg        #Hypomagnesemia/hypokalemia/hypophosphatemia  - Supplemented    OA   - Tylenol 975 PO q8    #DVT ppx  - Heparin SQ      Disposition - If remains asymptomatic consider discharge in  24hours. Family requesting ANITHA

## 2020-11-04 NOTE — PROGRESS NOTE ADULT - ASSESSMENT
This 80F hx HTN, HLD, hypothyroid, prior DVT (no longer on AC) presents with cough, fever, body aches, gen weakness. Patient states that 4 days ago developed fever and diffuse body aches. She then started develop productive cough. She states she's hasn't been able to eat or drink much and has some soft stool as well. Her symptoms continued to progress to point where have difficulty ambulating and felt very weak today prompting her to come to ED. 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 She denies chest pain, abdominal pain, vomiting or headache.  In ED, T- 100.9, patient breathing 97% on RA. Lactate was 2.7, but increased to 2.9 after 2L bolus.  (01 Nov 2020 23:55)    patient has no travels. no other sick contacts.     Seen in isolation room. reports some mild cough. No real SOB.   cough is nonproductive.   Not currently requiring oxygen.       Impression:  COVID-19 Pneumonia  multifocal Pneumonia  cough  fevers  abnormal LFTs    Plan:  - Clinically stable  - DEFER remdesevir for now      Continue supportive care measures  - continue Oxygen PRN     LFT mildly elevated  - likely from COVID-19, will watch.     continue to trend inflammatory markers.   If procalcitonin starts to rise, may need to consider treating for secondary bacterial infections    - trend CBC with diff, CMP with LFT's  - trend Inflammatory markers (ESR, CRP, Ferritin, LDH,  procalcitonin)  q48h.  D dimer, lactate, troponin, CK, PT/PTT x 1    Will follow with you.

## 2020-11-04 NOTE — PROGRESS NOTE ADULT - SUBJECTIVE AND OBJECTIVE BOX
[facility]  INFECTIOUS DISEASES AND INTERNAL MEDICINE at [place]  =======================================================  Travis Lees MD  Diplomates American Board of Internal Medicine and Infectious Diseases  Tel  [phone]  Fax [phone]  =======================================================    [facility]-[id number]  PRADEEP DEL TORO   follow up for COVID-19    no new complaints.   remains off oxygen.     =======================================================  REVIEW OF SYSTEMS:  CONSTITUTIONAL:  No Fever or chills  HEENT:  No diplopia or blurred vision.  No earache, sore throat or runny nose.  CARDIOVASCULAR:  No pressure, squeezing, strangling, tightness, heaviness or aching about the chest, neck, axilla or epigastrium.  RESPIRATORY:  COUGH  GASTROINTESTINAL:  No nausea, vomiting or diarrhea.  GENITOURINARY:  No dysuria, frequency or urgency. No Blood in urine  MUSCULOSKELETAL:  no joint aches, no muscle pain  SKIN:  No change in skin, hair or nails.  NEUROLOGIC:  No Headaches, seizures or weakness.  PSYCHIATRIC:  No disorder of thought or mood.  ENDOCRINE:  No heat or cold intolerance  HEMATOLOGICAL:  No easy bruising or bleeding.   =======================================================  Allergies  all narcotics give severe vomiting and dizziness (Other; Vomiting)  ampicillin (Stomach Upset)  Bactrim (Rash)  Cipro (Other)  Levaquin (Unknown)     ======================================================  Physical Exam:  ============     General:  No acute distress.  Eye: Pupils are equal, round and reactive to light, Extraocular movements are intact, Normal conjunctiva.  HENT: Normocephalic, Oral mucosa is moist, No pharyngeal erythema, No sinus tenderness.  Neck: Supple, No lymphadenopathy.  Respiratory: Lungs WITH FAIR air entry  Cardiovascular: Normal rate, Regular rhythm,   Gastrointestinal: Soft, Non-tender, Non-distended, Normal bowel sounds.  Genitourinary: No costovertebral angle tenderness.  Lymphatics: No lymphadenopathy neck,   Musculoskeletal: Normal range of motion, Normal strength.  Integumentary: No rash.  Neurologic: Alert, Oriented, No focal deficits, Cranial Nerves II-XII are grossly intact.  Psychiatric: Appropriate mood & affect.    =======================================================  Vitals:  ============  T(F): 98.2 (04 Nov 2020 09:47), Max: 99.5 (04 Nov 2020 05:20)  HR: 64 (04 Nov 2020 09:47)  BP: 132/75 (04 Nov 2020 09:47)  RR: 18 (04 Nov 2020 09:47)  SpO2: 96% (04 Nov 2020 09:47) (92% - 97%)  temp max in last 48H T(F): , Max: 101.3 (11-02-20 @ 17:56)    =======================================================  Current Antibiotics:    Other medications:  ascorbic acid 500 milliGRAM(s) Oral two times a day  cholecalciferol 2000 Unit(s) Oral daily  fluticasone propionate 50 MICROgram(s)/spray Nasal Spray 1 Spray(s) Both Nostrils two times a day  heparin   Injectable 5000 Unit(s) SubCutaneous every 8 hours  influenza   Vaccine 0.5 milliLiter(s) IntraMuscular once  levothyroxine 25 MICROGram(s) Oral daily  multivitamin/minerals 1 Tablet(s) Oral daily  simvastatin 20 milliGRAM(s) Oral at bedtime  sodium chloride 0.9% lock flush 3 milliLiter(s) IV Push every 8 hours  zinc sulfate 220 milliGRAM(s) Oral daily      =======================================================  Labs:                        12.5   4.63  )-----------( 74       ( 03 Nov 2020 09:01 )             37.0      11-03    134<L>  |  102  |  24.0<H>  ----------------------------<  94  4.1   |  19.0<L>  |  0.77    Ca    8.4<L>      03 Nov 2020 09:01    TPro  6.0<L>  /  Alb  2.9<L>  /  TBili  0.5  /  DBili  x   /  AST  78<H>  /  ALT  33<H>  /  AlkPhos  46  11-03      Culture - Blood (collected 11-01-20 @ 22:47)  Source: .Blood Blood-Peripheral    Culture - Blood (collected 11-01-20 @ 22:47)  Source: .Blood Blood-Peripheral      Creatinine, Serum: 0.77 mg/dL (11-03-20 @ 09:01)  Creatinine, Serum: 1.14 mg/dL (11-02-20 @ 10:52)  Creatinine, Serum: 1.71 mg/dL (11-01-20 @ 22:46)    Procalcitonin, Serum: 0.22 ng/mL (11-02-20 @ 10:52)    D-Dimer Assay, Quantitative: 2552 ng/mL DDU (11-02-20 @ 10:52)    Ferritin, Serum: 609 ng/mL (11-02-20 @ 10:52)    C-Reactive Protein, Serum: 5.91 mg/dL (11-02-20 @ 10:52)    WBC Count: 4.63 K/uL (11-03-20 @ 09:01)  WBC Count: 5.04 K/uL (11-02-20 @ 10:52)  WBC Count: 8.16 K/uL (11-01-20 @ 22:46)    Rapid RVP Result: Detected (11-02-20 @ 13:22)    COVID-19 PCR: Detected (11-01-20 @ 23:24)    Alkaline Phosphatase, Serum: 46 U/L (11-03-20 @ 09:01)  Alkaline Phosphatase, Serum: 47 U/L (11-02-20 @ 10:52)  Alkaline Phosphatase, Serum: 52 U/L (11-01-20 @ 22:46)  Alanine Aminotransferase (ALT/SGPT): 33 U/L (11-03-20 @ 09:01)  Alanine Aminotransferase (ALT/SGPT): 35 U/L (11-02-20 @ 10:52)  Alanine Aminotransferase (ALT/SGPT): 38 U/L (11-01-20 @ 22:46)  Aspartate Aminotransferase (AST/SGOT): 78 U/L (11-03-20 @ 09:01)  Aspartate Aminotransferase (AST/SGOT): 72 U/L (11-02-20 @ 10:52)  Aspartate Aminotransferase (AST/SGOT): 63 U/L (11-01-20 @ 22:46)  Bilirubin Total, Serum: 0.5 mg/dL (11-03-20 @ 09:01)  Bilirubin Total, Serum: 0.5 mg/dL (11-02-20 @ 10:52)  Bilirubin Total, Serum: 0.5 mg/dL (11-01-20 @ 22:46)        < from: CT Chest No Cont (11.02.20 @ 04:08) >   EXAM:  CT ABDOMEN AND PELVIS                         EXAM:  CT CHEST                          PROCEDURE DATE:  11/02/2020          INTERPRETATION:  CT of the chest, abdomen and pelvis    Indication: Cough and elevated lactate    Technique: Axial images were obtained from the thoracic inlet through pubic symphysis without oral or IV contrast. Reformatted coronal and sagittal images were submitted.    Comparison: CT of March 18, 2015    FINDINGS:    Evaluation of the solid abdominal organs is limited without contrast.    The visualized neck, axilla and subcutaneous tissues are unremarkable.    The tracheobronchial tree is patent centrally.  There is no mediastinal hematoma.  The great vessels are not enlarged. Coronary atherosclerosis. Thereis no significant mediastinal or hilar adenopathy.    The heart is not enlarged.  There is no pericardial effusion.    Lungs: Patchy areas of groundglass opacity bilaterally suggesting atypical viral infection including Covid-19.    Pleura: Unremarkable.  There is no free air or focal collection.  No free fluid.    Liver: Normal.  Spleen: Normal.  Gallbladder: Cholecystectomy.  Biliary tree: Left peripelvic cysts. No hydroureter.  Adrenal glands: Normal.  Pancreas: Fatty atrophy.  Kidneys: Left peripelvic cysts or severe hydronephrosis with transition point at the renal pelvic junction.    Bowel:  There is no small bowel obstruction. The appendix is not visualized. The colon is under distended. There is no significant fecal load.    Bladder: Moderately distended.  Pelvic organs: No pelvic masses.    There is no significant adenopathy.  Vasculature: The aorta is not dilated. Moderate atherosclerotic vascular calcification.    Retroperitoneum: There is no mass.  Bones: Heterotopic ossification about the bilateral shoulders. Mild levoscoliosis of the thoracolumbar spine.  Subcutaneous tissues: Unremarkable.    IMPRESSION:    Patchy areas of bilateral groundglass opacity suggests COVID-19 infection.  No acute findings in the abdomen or pelvis.  Cholecystectomy.           INEDR ARORA MD; Attending Radiologist  This document has been electronically signed. Nov 2 2020  4:39AM    < end of copied text >

## 2020-11-05 PROCEDURE — 99232 SBSQ HOSP IP/OBS MODERATE 35: CPT | Mod: CS

## 2020-11-05 RX ADMIN — Medication 25 MICROGRAM(S): at 05:38

## 2020-11-05 RX ADMIN — HEPARIN SODIUM 5000 UNIT(S): 5000 INJECTION INTRAVENOUS; SUBCUTANEOUS at 05:39

## 2020-11-05 RX ADMIN — SIMVASTATIN 20 MILLIGRAM(S): 20 TABLET, FILM COATED ORAL at 21:42

## 2020-11-05 RX ADMIN — Medication 975 MILLIGRAM(S): at 14:00

## 2020-11-05 RX ADMIN — SODIUM CHLORIDE 3 MILLILITER(S): 9 INJECTION INTRAMUSCULAR; INTRAVENOUS; SUBCUTANEOUS at 05:33

## 2020-11-05 RX ADMIN — SODIUM CHLORIDE 3 MILLILITER(S): 9 INJECTION INTRAMUSCULAR; INTRAVENOUS; SUBCUTANEOUS at 14:00

## 2020-11-05 RX ADMIN — ZINC SULFATE TAB 220 MG (50 MG ZINC EQUIVALENT) 220 MILLIGRAM(S): 220 (50 ZN) TAB at 13:02

## 2020-11-05 RX ADMIN — Medication 1 TABLET(S): at 13:02

## 2020-11-05 RX ADMIN — Medication 1 SPRAY(S): at 17:26

## 2020-11-05 RX ADMIN — CEFTRIAXONE 100 MILLIGRAM(S): 500 INJECTION, POWDER, FOR SOLUTION INTRAMUSCULAR; INTRAVENOUS at 17:26

## 2020-11-05 RX ADMIN — Medication 975 MILLIGRAM(S): at 13:12

## 2020-11-05 RX ADMIN — Medication 2000 UNIT(S): at 13:02

## 2020-11-05 RX ADMIN — HEPARIN SODIUM 5000 UNIT(S): 5000 INJECTION INTRAVENOUS; SUBCUTANEOUS at 21:42

## 2020-11-05 RX ADMIN — SODIUM CHLORIDE 3 MILLILITER(S): 9 INJECTION INTRAMUSCULAR; INTRAVENOUS; SUBCUTANEOUS at 21:06

## 2020-11-05 RX ADMIN — HEPARIN SODIUM 5000 UNIT(S): 5000 INJECTION INTRAVENOUS; SUBCUTANEOUS at 13:03

## 2020-11-05 NOTE — PROGRESS NOTE ADULT - ASSESSMENT
80F hx HTN, HLD, hypothyroid, prior DVT (no longer on AC) presents with cough, fever, body aches, generalized weakness found to have covid19, ELISA.     #COVID 19  -pt came back positive for covid 19 after positive symptoms and recent contact  -not currently hypoxic will hold off on steroids and Remdesivir   -ID consult appreciated - if remains asymptomatic discharge in 48 hours  -Monitor ferritin, d-dimer, crp, esr, procalcitonin;   -PT consulted     UTI - improved symptomatically  - Rocephin x 3 days.     #Metabolic acidosis 2/2 elevated lactate - resolved  -likely 2/2 covid, however given increasing lactate with covid will check for other source  -pt has fever, but otherwise not meeting sepsis criteria  -ct chest, abdomen, pelvis - c/w covid  -f/u blood cultures  -continue to trend lactate - improving    #ELISA - resolved  -likely 2/2 dehydration vs covid  -S/p ivf  - improving  -monitor BMP.  -avoid nephrotoxins    #Hyponatremia - improved  -likely 2/2 decreased po intake  -S/p IVF; discontinued  - Monitor bmp    #HTN  -will hold hctz and losartan in setting of ELISA  - Continue Amlodipine 5mg      #HLD  -c/w statin    #hypothyroid - normal TSH  -c/w synthroid 25 mcg        #Hypomagnesemia/hypokalemia/hypophosphatemia  - Supplemented    OA   - Tylenol 975 PO q8    #DVT ppx  - Heparin SQ      Disposition - Cleared by ID for discharge; PT recommendations for and Family requesting ANITHA; however patient wants to go home

## 2020-11-05 NOTE — PROGRESS NOTE ADULT - ASSESSMENT
This 80F hx HTN, HLD, hypothyroid, prior DVT (no longer on AC) presents with cough, fever, body aches, gen weakness. Patient states that 4 days ago developed fever and diffuse body aches. She then started develop productive cough. She states she's hasn't been able to eat or drink much and has some soft stool as well. Her symptoms continued to progress to point where have difficulty ambulating and felt very weak today prompting her to come to ED. 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 She denies chest pain, abdominal pain, vomiting or headache.  In ED, T- 100.9, patient breathing 97% on RA. Lactate was 2.7, but increased to 2.9 after 2L bolus.  (01 Nov 2020 23:55)    patient has no travels. no other sick contacts.     Seen in isolation room. reports some mild cough. No real SOB.   cough is nonproductive.   Not currently requiring oxygen.       Impression:  COVID-19 Pneumonia  multifocal Pneumonia  cough  fevers - resolved  abnormal LFTs    Plan:  - Clinically stable  - DEFER remdesevir       Continue supportive care measures  - continue Oxygen PRN     LFT mildly elevated  - likely from COVID-19, will watch.          - consider discharge planning.

## 2020-11-05 NOTE — PROGRESS NOTE ADULT - SUBJECTIVE AND OBJECTIVE BOX
[facility]  INFECTIOUS DISEASES AND INTERNAL MEDICINE at [place]  =======================================================  Travis Lees MD  Diplomates American Board of Internal Medicine and Infectious Diseases  Tel  [phone]  Fax [phone]  =======================================================    [facility]-[id number]  PRADEEP DEL TORO   follow up for COVID-19    no new complaints.   remains off oxygen.     feeling energetic    =======================================================  REVIEW OF SYSTEMS:  CONSTITUTIONAL:  No Fever or chills  HEENT:  No diplopia or blurred vision.  No earache, sore throat or runny nose.  CARDIOVASCULAR:  No pressure, squeezing, strangling, tightness, heaviness or aching about the chest, neck, axilla or epigastrium.  RESPIRATORY:  COUGH  GASTROINTESTINAL:  No nausea, vomiting or diarrhea.  GENITOURINARY:  No dysuria, frequency or urgency. No Blood in urine  MUSCULOSKELETAL:  no joint aches, no muscle pain  SKIN:  No change in skin, hair or nails.  NEUROLOGIC:  No Headaches, seizures or weakness.  PSYCHIATRIC:  No disorder of thought or mood.  ENDOCRINE:  No heat or cold intolerance  HEMATOLOGICAL:  No easy bruising or bleeding.   =======================================================  Allergies  all narcotics give severe vomiting and dizziness (Other; Vomiting)  ampicillin (Stomach Upset)  Bactrim (Rash)  Cipro (Other)  Levaquin (Unknown)     ======================================================  Physical Exam:  ============     General:  No acute distress.  Eye: Pupils are equal, round and reactive to light, Extraocular movements are intact, Normal conjunctiva.  HENT: Normocephalic, Oral mucosa is moist, No pharyngeal erythema, No sinus tenderness.  Neck: Supple, No lymphadenopathy.  Respiratory: Lungs WITH FAIR air entry  Cardiovascular: Normal rate, Regular rhythm,   Gastrointestinal: Soft, Non-tender, Non-distended, Normal bowel sounds.  Genitourinary: No costovertebral angle tenderness.  Lymphatics: No lymphadenopathy neck,   Musculoskeletal: Normal range of motion, Normal strength.  Integumentary: No rash.  Neurologic: Alert, Oriented, No focal deficits, Cranial Nerves II-XII are grossly intact.  Psychiatric: Appropriate mood & affect.    =======================================================      Vitals:  ============  T(F): 98 (05 Nov 2020 05:50), Max: 98 (05 Nov 2020 05:50)  HR: 69 (05 Nov 2020 05:50)  BP: 135/66 (05 Nov 2020 05:50)  RR: 18 (05 Nov 2020 05:50)  SpO2: 95% (05 Nov 2020 05:50) (95% - 98%)  temp max in last 48H T(F): , Max: 99.5 (11-04-20 @ 05:20)    =======================================================  Current Antibiotics:  cefTRIAXone   IVPB 1000 milliGRAM(s) IV Intermittent every 24 hours    Other medications:  ascorbic acid 500 milliGRAM(s) Oral two times a day  cholecalciferol 2000 Unit(s) Oral daily  fluticasone propionate 50 MICROgram(s)/spray Nasal Spray 1 Spray(s) Both Nostrils two times a day  heparin   Injectable 5000 Unit(s) SubCutaneous every 8 hours  influenza   Vaccine 0.5 milliLiter(s) IntraMuscular once  levothyroxine 25 MICROGram(s) Oral daily  multivitamin/minerals 1 Tablet(s) Oral daily  simvastatin 20 milliGRAM(s) Oral at bedtime  sodium chloride 0.9% lock flush 3 milliLiter(s) IV Push every 8 hours  zinc sulfate 220 milliGRAM(s) Oral daily      =======================================================  Labs:             Culture - Blood (collected 11-01-20 @ 22:47)  Source: .Blood Blood-Peripheral    Culture - Blood (collected 11-01-20 @ 22:47)  Source: .Blood Blood-Peripheral      Creatinine, Serum: 0.77 mg/dL (11-03-20 @ 09:01)  Creatinine, Serum: 1.14 mg/dL (11-02-20 @ 10:52)  Creatinine, Serum: 1.71 mg/dL (11-01-20 @ 22:46)    Procalcitonin, Serum: 0.22 ng/mL (11-02-20 @ 10:52)    D-Dimer Assay, Quantitative: 2552 ng/mL DDU (11-02-20 @ 10:52)    Ferritin, Serum: 609 ng/mL (11-02-20 @ 10:52)    C-Reactive Protein, Serum: 5.91 mg/dL (11-02-20 @ 10:52)    WBC Count: 4.63 K/uL (11-03-20 @ 09:01)  WBC Count: 5.04 K/uL (11-02-20 @ 10:52)  WBC Count: 8.16 K/uL (11-01-20 @ 22:46)    Rapid RVP Result: Detected (11-02-20 @ 13:22)    COVID-19 PCR: Detected (11-01-20 @ 23:24)      Alkaline Phosphatase, Serum: 46 U/L (11-03-20 @ 09:01)  Alkaline Phosphatase, Serum: 47 U/L (11-02-20 @ 10:52)  Alkaline Phosphatase, Serum: 52 U/L (11-01-20 @ 22:46)  Alanine Aminotransferase (ALT/SGPT): 33 U/L (11-03-20 @ 09:01)  Alanine Aminotransferase (ALT/SGPT): 35 U/L (11-02-20 @ 10:52)  Alanine Aminotransferase (ALT/SGPT): 38 U/L (11-01-20 @ 22:46)  Aspartate Aminotransferase (AST/SGOT): 78 U/L (11-03-20 @ 09:01)  Aspartate Aminotransferase (AST/SGOT): 72 U/L (11-02-20 @ 10:52)  Aspartate Aminotransferase (AST/SGOT): 63 U/L (11-01-20 @ 22:46)  Bilirubin Total, Serum: 0.5 mg/dL (11-03-20 @ 09:01)  Bilirubin Total, Serum: 0.5 mg/dL (11-02-20 @ 10:52)  Bilirubin Total, Serum: 0.5 mg/dL (11-01-20 @ 22:46)

## 2020-11-06 LAB
CULTURE RESULTS: SIGNIFICANT CHANGE UP
CULTURE RESULTS: SIGNIFICANT CHANGE UP
SARS-COV-2 RNA SPEC QL NAA+PROBE: DETECTED
SPECIMEN SOURCE: SIGNIFICANT CHANGE UP
SPECIMEN SOURCE: SIGNIFICANT CHANGE UP

## 2020-11-06 PROCEDURE — 99232 SBSQ HOSP IP/OBS MODERATE 35: CPT | Mod: CS

## 2020-11-06 RX ADMIN — HEPARIN SODIUM 5000 UNIT(S): 5000 INJECTION INTRAVENOUS; SUBCUTANEOUS at 05:17

## 2020-11-06 RX ADMIN — SIMVASTATIN 20 MILLIGRAM(S): 20 TABLET, FILM COATED ORAL at 22:37

## 2020-11-06 RX ADMIN — Medication 2000 UNIT(S): at 10:42

## 2020-11-06 RX ADMIN — Medication 1 TABLET(S): at 10:43

## 2020-11-06 RX ADMIN — ZINC SULFATE TAB 220 MG (50 MG ZINC EQUIVALENT) 220 MILLIGRAM(S): 220 (50 ZN) TAB at 10:43

## 2020-11-06 RX ADMIN — SODIUM CHLORIDE 3 MILLILITER(S): 9 INJECTION INTRAMUSCULAR; INTRAVENOUS; SUBCUTANEOUS at 13:12

## 2020-11-06 RX ADMIN — CEFTRIAXONE 100 MILLIGRAM(S): 500 INJECTION, POWDER, FOR SOLUTION INTRAMUSCULAR; INTRAVENOUS at 17:00

## 2020-11-06 RX ADMIN — SODIUM CHLORIDE 3 MILLILITER(S): 9 INJECTION INTRAMUSCULAR; INTRAVENOUS; SUBCUTANEOUS at 22:27

## 2020-11-06 RX ADMIN — Medication 25 MICROGRAM(S): at 05:18

## 2020-11-06 RX ADMIN — SODIUM CHLORIDE 3 MILLILITER(S): 9 INJECTION INTRAMUSCULAR; INTRAVENOUS; SUBCUTANEOUS at 05:18

## 2020-11-06 RX ADMIN — HEPARIN SODIUM 5000 UNIT(S): 5000 INJECTION INTRAVENOUS; SUBCUTANEOUS at 13:12

## 2020-11-06 RX ADMIN — HEPARIN SODIUM 5000 UNIT(S): 5000 INJECTION INTRAVENOUS; SUBCUTANEOUS at 22:37

## 2020-11-06 NOTE — PROGRESS NOTE ADULT - ASSESSMENT
This 80F hx HTN, HLD, hypothyroid, prior DVT (no longer on AC) presents with cough, fever, body aches, gen weakness. Patient states that 4 days ago developed fever and diffuse body aches. She then started develop productive cough. She states she's hasn't been able to eat or drink much and has some soft stool as well. Her symptoms continued to progress to point where have difficulty ambulating and felt very weak today prompting her to come to ED. 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 She denies chest pain, abdominal pain, vomiting or headache.  In ED, T- 100.9, patient breathing 97% on RA. Lactate was 2.7, but increased to 2.9 after 2L bolus.  (01 Nov 2020 23:55)    patient has no travels. no other sick contacts.     Seen in isolation room. reports some mild cough. No real SOB.   cough is nonproductive.   Not currently requiring oxygen.       Impression:  COVID-19 Pneumonia  multifocal Pneumonia  cough  fevers - resolved  abnormal LFTs  abnormal UA    Plan:  - Clinically stable from pulm standpoint, not on oxygen, DEFER remdesevir     Continue supportive care measures  - continue Oxygen PRN     LFT mildly elevated  - likely from COVID-19, will watch.       Abnormal UA  - possible UTI  - on ceftriaxone  - awaiting cx results  - plan for 5 days Abx        Clinically improving  consider discharge soon

## 2020-11-06 NOTE — PROGRESS NOTE ADULT - ASSESSMENT
80F hx HTN, HLD, hypothyroid, prior DVT (no longer on AC) presents with cough, fever, body aches, generalized weakness found to have covid19, ELISA.     #COVID 19  -pt came back positive for covid 19 after positive symptoms and recent contact  -not currently hypoxic will hold off on steroids and Remdesivir   -ID consult appreciated - if remains asymptomatic discharge   -Monitor ferritin, d-dimer, crp, esr, procalcitonin;   -PT consulted with recommendations for  ANITHA     UTI - improved symptomatically. Blood cultures negative  - Rocephin x 3 days.     #Metabolic acidosis 2/2 elevated lactate - resolved  -likely 2/2 covid, however given increasing lactate with covid will check for other source  -pt has fever, but otherwise not meeting sepsis criteria  -ct chest, abdomen, pelvis - c/w covid  -f/u blood cultures  -continue to trend lactate - improving    #ELISA - resolved  -likely 2/2 dehydration vs covid  -S/p ivf  - improving  -monitor BMP.  -avoid nephrotoxins    #Hyponatremia - improved  -likely 2/2 decreased po intake  -S/p IVF; discontinued  - Monitor bmp    #HTN  -will hold hctz and losartan in setting of ELISA  - Continue Amlodipine 5mg      #HLD  -c/w statin    #hypothyroid - normal TSH  -c/w synthroid 25 mcg        #Hypomagnesemia/hypokalemia/hypophosphatemia  - Supplemented    OA   - Tylenol 975 PO q8    #DVT ppx  - Heparin SQ      Disposition - Cleared by ID for discharge; PT recommendations for and Family requesting ANITHA; however patient wants to go home

## 2020-11-06 NOTE — PROGRESS NOTE ADULT - SUBJECTIVE AND OBJECTIVE BOX
[facility]  INFECTIOUS DISEASES AND INTERNAL MEDICINE at [place]  =======================================================  Travis Lees MD  Diplomates American Board of Internal Medicine and Infectious Diseases  Tel  [phone]  Fax [phone]  =======================================================    [id number]  PRADEEP DEL TORO   follow up for COVID-19, UTI    still with some dysuria.   feeling better overall  no SOB    =======================================================  REVIEW OF SYSTEMS:  CONSTITUTIONAL:  No Fever or chills  HEENT:  No diplopia or blurred vision.  No earache, sore throat or runny nose.  CARDIOVASCULAR:  No pressure, squeezing, strangling, tightness, heaviness or aching about the chest, neck, axilla or epigastrium.  RESPIRATORY:  COUGH  GASTROINTESTINAL:  No nausea, vomiting or diarrhea.  GENITOURINARY:  No dysuria, frequency or urgency. No Blood in urine  MUSCULOSKELETAL:  no joint aches, no muscle pain  SKIN:  No change in skin, hair or nails.  NEUROLOGIC:  No Headaches, seizures or weakness.  PSYCHIATRIC:  No disorder of thought or mood.  ENDOCRINE:  No heat or cold intolerance  HEMATOLOGICAL:  No easy bruising or bleeding.   =======================================================  Allergies  all narcotics give severe vomiting and dizziness (Other; Vomiting)  ampicillin (Stomach Upset)  Bactrim (Rash)  Cipro (Other)  Levaquin (Unknown)     ======================================================  Physical Exam:  ============     General:  No acute distress.  Eye: Pupils are equal, round and reactive to light, Extraocular movements are intact, Normal conjunctiva.  HENT: Normocephalic, Oral mucosa is moist, No pharyngeal erythema, No sinus tenderness.  Neck: Supple, No lymphadenopathy.  Respiratory: Lungs WITH FAIR air entry  Cardiovascular: Normal rate, Regular rhythm,   Gastrointestinal: Soft, Non-tender, Non-distended, Normal bowel sounds.  Genitourinary: No costovertebral angle tenderness.  Lymphatics: No lymphadenopathy neck,   Musculoskeletal: Normal range of motion, Normal strength.  Integumentary: No rash.  Neurologic: Alert, Oriented, No focal deficits, Cranial Nerves II-XII are grossly intact.  Psychiatric: Appropriate mood & affect.    =======================================================      Vitals:  ============  T(F): 98.2 (06 Nov 2020 10:36), Max: 98.8 (05 Nov 2020 21:54)  HR: 72 (06 Nov 2020 10:36)  BP: 124/80 (06 Nov 2020 10:36)  RR: 18 (06 Nov 2020 10:36)  SpO2: 95% (06 Nov 2020 10:36) (95% - 97%)  temp max in last 48H T(F): , Max: 98.8 (11-05-20 @ 21:54)    =======================================================  Current Antibiotics:  cefTRIAXone   IVPB 1000 milliGRAM(s) IV Intermittent every 24 hours    Other medications:  ascorbic acid 500 milliGRAM(s) Oral two times a day  cholecalciferol 2000 Unit(s) Oral daily  fluticasone propionate 50 MICROgram(s)/spray Nasal Spray 1 Spray(s) Both Nostrils two times a day  heparin   Injectable 5000 Unit(s) SubCutaneous every 8 hours  influenza   Vaccine 0.5 milliLiter(s) IntraMuscular once  levothyroxine 25 MICROGram(s) Oral daily  multivitamin/minerals 1 Tablet(s) Oral daily  simvastatin 20 milliGRAM(s) Oral at bedtime  sodium chloride 0.9% lock flush 3 milliLiter(s) IV Push every 8 hours  zinc sulfate 220 milliGRAM(s) Oral daily      =======================================================  Labs:            Culture - Urine (collected 11-04-20 @ 22:17)  Source: .Urine Catheterized    Culture - Blood (collected 11-01-20 @ 22:47)  Source: .Blood Blood-Peripheral    Culture - Blood (collected 11-01-20 @ 22:47)  Source: .Blood Blood-Peripheral      Creatinine, Serum: 0.77 mg/dL (11-03-20 @ 09:01)  Creatinine, Serum: 1.14 mg/dL (11-02-20 @ 10:52)  Creatinine, Serum: 1.71 mg/dL (11-01-20 @ 22:46)    Procalcitonin, Serum: 0.22 ng/mL (11-02-20 @ 10:52)    D-Dimer Assay, Quantitative: 2552 ng/mL DDU (11-02-20 @ 10:52)    Ferritin, Serum: 609 ng/mL (11-02-20 @ 10:52)    C-Reactive Protein, Serum: 5.91 mg/dL (11-02-20 @ 10:52)    WBC Count: 4.63 K/uL (11-03-20 @ 09:01)  WBC Count: 5.04 K/uL (11-02-20 @ 10:52)  WBC Count: 8.16 K/uL (11-01-20 @ 22:46)    Rapid RVP Result: Detected (11-02-20 @ 13:22)    COVID-19 PCR: Detected (11-01-20 @ 23:24)      Alkaline Phosphatase, Serum: 46 U/L (11-03-20 @ 09:01)  Alanine Aminotransferase (ALT/SGPT): 33 U/L (11-03-20 @ 09:01)  Aspartate Aminotransferase (AST/SGOT): 78 U/L (11-03-20 @ 09:01)  Bilirubin Total, Serum: 0.5 mg/dL (11-03-20 @ 09:01)

## 2020-11-06 NOTE — PROGRESS NOTE ADULT - SUBJECTIVE AND OBJECTIVE BOX
HOSPITALIST PROGRESS NOTE    PRADEEP DEL TORO  [id number]  80yFemale    Patient is a 80y old  Female who presents with a chief complaint of covid19, generalized weakness , anson (06 Nov 2020 13:16)      SUBJECTIVE:   Chart reviewed since last visit.  Patient seen and examined at bedside for COVID, UTI, OA    Denies any fever, chills, dyspnea, chest pain or dizziness.  States her OA is acting up - doesn't want to try any medication for pain except Tylenol    Doesn't want to go to Rehab      OBJECTIVE:  Vital Signs Last 24 Hrs  T(C): 36.8 (06 Nov 2020 10:36), Max: 37.1 (05 Nov 2020 21:54)  T(F): 98.2 (06 Nov 2020 10:36), Max: 98.8 (05 Nov 2020 21:54)  HR: 72 (06 Nov 2020 10:36) (65 - 72)  BP: 124/80 (06 Nov 2020 10:36) (115/67 - 140/72)   RR: 18 (06 Nov 2020 10:36) (18 - 18)  SpO2: 95% (06 Nov 2020 10:36) (95% - 97%)    PHYSICAL EXAMINATION  General: Obese, lying in bed, comfortable  HEENT:  dark teeth  NECK:  supple  CVS: regular rate and rhythm S1 S2  RESP:  CTAB  GI:  Soft nondistended nontender BS+  : No suprapubic tenderness  MSK:  No edema. FROM. Joint swelling. Bilateral TKR scars well healed  CNS:  No gross focal or global deficit noted  INTEG:  warm dry skin  PSYCH:  Labile mood    MONITOR:  CAPILLARY BLOOD GLUCOSE            I&O's Summary    05 Nov 2020 07:01  -  06 Nov 2020 07:00  --------------------------------------------------------  IN: 0 mL / OUT: 250 mL / NET: -250 mL                        Culture:    Culture - Urine (11.04.20 @ 22:17)   Specimen Source: .Urine Catheterized   Culture Results:   >100,000 CFU/ml Escherichia coli         ulture - Blood (11.01.20 @ 22:47)   Specimen Source: .Blood Blood-Peripheral   Culture Results:   No growth at 48 hours     Culture - Blood (11.01.20 @ 22:47)   Specimen Source: .Blood Blood-Peripheral   Culture Results:   No growth at 48 hours       TTE:    RADIOLOGY        MEDICATIONS  (STANDING):  ascorbic acid 500 milliGRAM(s) Oral two times a day  cefTRIAXone   IVPB 1000 milliGRAM(s) IV Intermittent every 24 hours  cholecalciferol 2000 Unit(s) Oral daily  fluticasone propionate 50 MICROgram(s)/spray Nasal Spray 1 Spray(s) Both Nostrils two times a day  heparin   Injectable 5000 Unit(s) SubCutaneous every 8 hours  influenza   Vaccine 0.5 milliLiter(s) IntraMuscular once  levothyroxine 25 MICROGram(s) Oral daily  multivitamin/minerals 1 Tablet(s) Oral daily  simvastatin 20 milliGRAM(s) Oral at bedtime  sodium chloride 0.9% lock flush 3 milliLiter(s) IV Push every 8 hours  zinc sulfate 220 milliGRAM(s) Oral daily      MEDICATIONS  (PRN):  acetaminophen   Tablet .. 650 milliGRAM(s) Oral every 6 hours PRN Temp greater or equal to 38C (100.4F), Mild Pain (1 - 3)  acetaminophen   Tablet .. 975 milliGRAM(s) Oral every 8 hours PRN Moderate Pain (4 - 6)

## 2020-11-06 NOTE — DIETITIAN INITIAL EVALUATION ADULT. - OTHER INFO
Patient is a 80y old  Female who presents with a chief complaint of covid19, generalized weakness, ELISA. Pt with poor po intake. Unable to perform NFPE. Weight hx not known. Will provide gelatein BID.

## 2020-11-07 PROCEDURE — 99232 SBSQ HOSP IP/OBS MODERATE 35: CPT | Mod: CS

## 2020-11-07 RX ORDER — GUAIFENESIN/DEXTROMETHORPHAN 600MG-30MG
10 TABLET, EXTENDED RELEASE 12 HR ORAL ONCE
Refills: 0 | Status: COMPLETED | OUTPATIENT
Start: 2020-11-07 | End: 2020-11-08

## 2020-11-07 RX ADMIN — Medication 25 MICROGRAM(S): at 05:15

## 2020-11-07 RX ADMIN — ZINC SULFATE TAB 220 MG (50 MG ZINC EQUIVALENT) 220 MILLIGRAM(S): 220 (50 ZN) TAB at 11:22

## 2020-11-07 RX ADMIN — Medication 2000 UNIT(S): at 11:21

## 2020-11-07 RX ADMIN — HEPARIN SODIUM 5000 UNIT(S): 5000 INJECTION INTRAVENOUS; SUBCUTANEOUS at 22:39

## 2020-11-07 RX ADMIN — SODIUM CHLORIDE 3 MILLILITER(S): 9 INJECTION INTRAMUSCULAR; INTRAVENOUS; SUBCUTANEOUS at 22:11

## 2020-11-07 RX ADMIN — CEFTRIAXONE 100 MILLIGRAM(S): 500 INJECTION, POWDER, FOR SOLUTION INTRAMUSCULAR; INTRAVENOUS at 16:53

## 2020-11-07 RX ADMIN — HEPARIN SODIUM 5000 UNIT(S): 5000 INJECTION INTRAVENOUS; SUBCUTANEOUS at 11:21

## 2020-11-07 RX ADMIN — Medication 975 MILLIGRAM(S): at 11:22

## 2020-11-07 RX ADMIN — SODIUM CHLORIDE 3 MILLILITER(S): 9 INJECTION INTRAMUSCULAR; INTRAVENOUS; SUBCUTANEOUS at 05:11

## 2020-11-07 RX ADMIN — Medication 1 TABLET(S): at 11:22

## 2020-11-07 RX ADMIN — HEPARIN SODIUM 5000 UNIT(S): 5000 INJECTION INTRAVENOUS; SUBCUTANEOUS at 05:15

## 2020-11-07 RX ADMIN — Medication 975 MILLIGRAM(S): at 12:23

## 2020-11-07 RX ADMIN — SIMVASTATIN 20 MILLIGRAM(S): 20 TABLET, FILM COATED ORAL at 22:39

## 2020-11-07 RX ADMIN — SODIUM CHLORIDE 3 MILLILITER(S): 9 INJECTION INTRAMUSCULAR; INTRAVENOUS; SUBCUTANEOUS at 11:22

## 2020-11-07 NOTE — PROGRESS NOTE ADULT - SUBJECTIVE AND OBJECTIVE BOX
HOSPITALIST PROGRESS NOTE    PRADEEP DEL TORO  [id number]  80yFemale    Patient is a 80y old  Female who presents with a chief complaint of covid19, generalized weakness , anson (06 Nov 2020 14:31)      SUBJECTIVE:   Chart reviewed since last visit.  Patient seen and examined at bedside for COVID, UTI  Angry, yelling about not wanting Nasonex as dries up her nostril - urged not to yell and be polite and that Flonase and not Nasonex was ordered. Will discontinue Flonase.      OBJECTIVE:  Vital Signs Last 24 Hrs  T(C): 36.7 (07 Nov 2020 10:26), Max: 37.2 (07 Nov 2020 04:50)  T(F): 98.1 (07 Nov 2020 10:26), Max: 98.9 (07 Nov 2020 04:50)  HR: 61 (07 Nov 2020 10:26) (61 - 68)  BP: 156/74 (07 Nov 2020 10:26) (120/73 - 156/74)   RR: 18 (07 Nov 2020 10:26) (18 - 20)  SpO2: 96% (07 Nov 2020 10:26) (93% - 96%)    PHYSICAL EXAMINATION  General: Obese, lying in bed, comfortable  HEENT:  dark teeth  NECK:  supple  CVS: regular rate and rhythm S1 S2  RESP:  CTAB  GI:  Soft nondistended nontender BS+  : No suprapubic tenderness  MSK:  No edema. FROM. Joint swelling. Bilateral TKR scars well healed  CNS:  No gross focal or global deficit noted  INTEG:  warm dry skin  PSYCH:  Labile mood       MONITOR:  CAPILLARY BLOOD GLUCOSE            I&O's Summary    06 Nov 2020 07:01  -  07 Nov 2020 07:00  --------------------------------------------------------  IN: 0 mL / OUT: 250 mL / NET: -250 mL         Culture:  COVID-19 PCR: Detected: known positive           TTE:    RADIOLOGY        MEDICATIONS  (STANDING):  ascorbic acid 500 milliGRAM(s) Oral two times a day  cefTRIAXone   IVPB 1000 milliGRAM(s) IV Intermittent every 24 hours  cholecalciferol 2000 Unit(s) Oral daily  fluticasone propionate 50 MICROgram(s)/spray Nasal Spray 1 Spray(s) Both Nostrils two times a day  heparin   Injectable 5000 Unit(s) SubCutaneous every 8 hours  influenza   Vaccine 0.5 milliLiter(s) IntraMuscular once  levothyroxine 25 MICROGram(s) Oral daily  multivitamin/minerals 1 Tablet(s) Oral daily  simvastatin 20 milliGRAM(s) Oral at bedtime  sodium chloride 0.9% lock flush 3 milliLiter(s) IV Push every 8 hours  zinc sulfate 220 milliGRAM(s) Oral daily      MEDICATIONS  (PRN):  acetaminophen   Tablet .. 650 milliGRAM(s) Oral every 6 hours PRN Temp greater or equal to 38C (100.4F), Mild Pain (1 - 3)  acetaminophen   Tablet .. 975 milliGRAM(s) Oral every 8 hours PRN Moderate Pain (4 - 6)

## 2020-11-07 NOTE — PROGRESS NOTE ADULT - ASSESSMENT
80F hx HTN, HLD, hypothyroid, prior DVT (no longer on AC) presents with cough, fever, body aches, generalized weakness found to have covid19, ELISA.     #COVID 19  -pt came back positive for covid 19 after positive symptoms and recent contact  -not currently hypoxic will hold off on steroids and Remdesivir   -ID consult appreciated - if remains asymptomatic discharge   -Monitor ferritin, d-dimer, crp, esr, procalcitonin;   -PT consulted with recommendations for  ANITAH;      UTI - improved symptomatically. Blood cultures negative. Urine cultures with E. coli  - Rocephin x 5 days.     #Metabolic acidosis 2/2 elevated lactate - resolved  -likely 2/2 covid, however given increasing lactate with covid will check for other source  -pt has fever, but otherwise not meeting sepsis criteria  -ct chest, abdomen, pelvis - c/w covid  -f/u blood cultures  -continue to trend lactate - improving    #ELISA - resolved  -likely 2/2 dehydration vs covid  -S/p ivf  - improving  -monitor BMP.  -avoid nephrotoxins    #Hyponatremia - improved  -likely 2/2 decreased po intake  -S/p IVF; discontinued  - Monitor bmp    #HTN  -will hold hctz and losartan in setting of ELISA  - Continue Amlodipine 5mg      #HLD  -c/w statin    #hypothyroid - normal TSH  -c/w synthroid 25 mcg        #Hypomagnesemia/hypokalemia/hypophosphatemia  - Supplemented    OA   - Tylenol 975 PO q8    #DVT ppx  - Heparin SQ      Disposition - Cleared by ID for discharge; PT recommendations for and Family requesting ANITHA; however patient wants to go home

## 2020-11-08 LAB
ANION GAP SERPL CALC-SCNC: 11 MMOL/L — SIGNIFICANT CHANGE UP (ref 5–17)
BUN SERPL-MCNC: 18 MG/DL — SIGNIFICANT CHANGE UP (ref 8–20)
CALCIUM SERPL-MCNC: 8.9 MG/DL — SIGNIFICANT CHANGE UP (ref 8.6–10.2)
CHLORIDE SERPL-SCNC: 105 MMOL/L — SIGNIFICANT CHANGE UP (ref 98–107)
CO2 SERPL-SCNC: 21 MMOL/L — LOW (ref 22–29)
CREAT SERPL-MCNC: 0.54 MG/DL — SIGNIFICANT CHANGE UP (ref 0.5–1.3)
GLUCOSE SERPL-MCNC: 114 MG/DL — HIGH (ref 70–99)
HCT VFR BLD CALC: 37.5 % — SIGNIFICANT CHANGE UP (ref 34.5–45)
HGB BLD-MCNC: 12.6 G/DL — SIGNIFICANT CHANGE UP (ref 11.5–15.5)
MCHC RBC-ENTMCNC: 30.1 PG — SIGNIFICANT CHANGE UP (ref 27–34)
MCHC RBC-ENTMCNC: 33.6 GM/DL — SIGNIFICANT CHANGE UP (ref 32–36)
MCV RBC AUTO: 89.7 FL — SIGNIFICANT CHANGE UP (ref 80–100)
PLATELET # BLD AUTO: 262 K/UL — SIGNIFICANT CHANGE UP (ref 150–400)
POTASSIUM SERPL-MCNC: 3.9 MMOL/L — SIGNIFICANT CHANGE UP (ref 3.5–5.3)
POTASSIUM SERPL-SCNC: 3.9 MMOL/L — SIGNIFICANT CHANGE UP (ref 3.5–5.3)
RBC # BLD: 4.18 M/UL — SIGNIFICANT CHANGE UP (ref 3.8–5.2)
RBC # FLD: 12.6 % — SIGNIFICANT CHANGE UP (ref 10.3–14.5)
SODIUM SERPL-SCNC: 137 MMOL/L — SIGNIFICANT CHANGE UP (ref 135–145)
WBC # BLD: 5.37 K/UL — SIGNIFICANT CHANGE UP (ref 3.8–10.5)
WBC # FLD AUTO: 5.37 K/UL — SIGNIFICANT CHANGE UP (ref 3.8–10.5)

## 2020-11-08 PROCEDURE — 99232 SBSQ HOSP IP/OBS MODERATE 35: CPT | Mod: CS

## 2020-11-08 RX ORDER — TRIAMTERENE/HYDROCHLOROTHIAZID 75 MG-50MG
1 TABLET ORAL DAILY
Refills: 0 | Status: DISCONTINUED | OUTPATIENT
Start: 2020-11-08 | End: 2020-11-17

## 2020-11-08 RX ORDER — LOSARTAN POTASSIUM 100 MG/1
100 TABLET, FILM COATED ORAL DAILY
Refills: 0 | Status: DISCONTINUED | OUTPATIENT
Start: 2020-11-08 | End: 2020-11-17

## 2020-11-08 RX ADMIN — Medication 1 TABLET(S): at 12:02

## 2020-11-08 RX ADMIN — Medication 25 MICROGRAM(S): at 05:44

## 2020-11-08 RX ADMIN — ZINC SULFATE TAB 220 MG (50 MG ZINC EQUIVALENT) 220 MILLIGRAM(S): 220 (50 ZN) TAB at 12:02

## 2020-11-08 RX ADMIN — SODIUM CHLORIDE 3 MILLILITER(S): 9 INJECTION INTRAMUSCULAR; INTRAVENOUS; SUBCUTANEOUS at 22:43

## 2020-11-08 RX ADMIN — SODIUM CHLORIDE 3 MILLILITER(S): 9 INJECTION INTRAMUSCULAR; INTRAVENOUS; SUBCUTANEOUS at 20:19

## 2020-11-08 RX ADMIN — LOSARTAN POTASSIUM 100 MILLIGRAM(S): 100 TABLET, FILM COATED ORAL at 17:56

## 2020-11-08 RX ADMIN — Medication 2000 UNIT(S): at 12:01

## 2020-11-08 RX ADMIN — HEPARIN SODIUM 5000 UNIT(S): 5000 INJECTION INTRAVENOUS; SUBCUTANEOUS at 22:35

## 2020-11-08 RX ADMIN — Medication 10 MILLILITER(S): at 05:44

## 2020-11-08 RX ADMIN — HEPARIN SODIUM 5000 UNIT(S): 5000 INJECTION INTRAVENOUS; SUBCUTANEOUS at 12:01

## 2020-11-08 RX ADMIN — CEFTRIAXONE 100 MILLIGRAM(S): 500 INJECTION, POWDER, FOR SOLUTION INTRAMUSCULAR; INTRAVENOUS at 17:50

## 2020-11-08 RX ADMIN — SIMVASTATIN 20 MILLIGRAM(S): 20 TABLET, FILM COATED ORAL at 22:35

## 2020-11-08 RX ADMIN — HEPARIN SODIUM 5000 UNIT(S): 5000 INJECTION INTRAVENOUS; SUBCUTANEOUS at 05:44

## 2020-11-08 RX ADMIN — SODIUM CHLORIDE 3 MILLILITER(S): 9 INJECTION INTRAMUSCULAR; INTRAVENOUS; SUBCUTANEOUS at 12:02

## 2020-11-08 NOTE — PROGRESS NOTE ADULT - SUBJECTIVE AND OBJECTIVE BOX
HOSPITALIST PROGRESS NOTE    PRADEEP DEL TORO  [id number]  80yFemale    Patient is a 80y old  Female who presents with a chief complaint of covid19, generalized weakness , anson (07 Nov 2020 12:30)      SUBJECTIVE:   Chart reviewed since last visit.  Patient seen and examined at bedside for COVID-19, UTI  Complaining of cough - wants medication: amenable to Hycodan  Denies any fever, chills, dyspnea, dizziness or chest pain  Denies any nausea, vomiting or dysuria      OBJECTIVE:  Vital Signs Last 24 Hrs  T(C): 36.7 (08 Nov 2020 12:00), Max: 36.8 (07 Nov 2020 22:43)  T(F): 98.1 (08 Nov 2020 12:00), Max: 98.3 (07 Nov 2020 22:43)  HR: 71 (08 Nov 2020 12:00) (60 - 71)  BP: 177/78 (08 Nov 2020 12:00) (129/74 - 177/78)   RR: 18 (08 Nov 2020 12:00) (18 - 18)  SpO2: 96% (08 Nov 2020 12:00) (95% - 97%)    PHYSICAL EXAMINATION  General: Obese, lying in bed, comfortable  HEENT:  dark teeth  NECK:  supple  CVS: regular rate and rhythm S1 S2  RESP:  CTAB  GI:  Soft nondistended nontender BS+  : No suprapubic tenderness  MSK:  No edema. FROM. Joint swelling. Bilateral TKR scars well healed  CNS:  No gross focal or global deficit noted  INTEG:  warm dry skin  PSYCH:  Labile mood        MONITOR:  CAPILLARY BLOOD GLUCOSE            I&O's Summary                          12.6   5.37  )-----------( 262      ( 08 Nov 2020 11:59 )             37.5       11-08    137  |  105  |  18.0  ----------------------------<  114<H>  3.9   |  21.0<L>  |  0.54    Ca    8.9      08 Nov 2020 11:59              Culture:  Culture - Urine (11.04.20 @ 22:17)   - Amikacin: S <=16   - Amoxicillin/Clavulanic Acid: S <=8/4   - Ampicillin: S <=8 These ampicillin results predict results for amoxicillin   - Ampicillin/Sulbactam: S <=4/2 Enterobacter, Citrobacter, and Serratia may develop resistance during prolonged therapy (3-4 days)   - Aztreonam: S <=4   - Cefazolin: S <=2 (MIC_CL_COM_ENTERIC_CEFAZU) For uncomplicated UTI with K. pneumoniae, E. coli, or P. mirablis: EDWIN <=16 is sensitive and EDWIN >=32 is resistant. This also predicts results for oral agents cefaclor, cefdinir, cefpodoxime, cefprozil, cefuroxime axetil, cephalexin and locarbef for uncomplicated UTI. Note that some isolates may be susceptible to these agents while testing resistant to cefazolin.   - Cefepime: S <=2   - Cefoxitin: S <=8   - Ceftriaxone: S <=1 Enterobacter, Citrobacter, and Serratia may develop resistance during prolonged therapy   - Ciprofloxacin: S <=0.25   - Ertapenem: S <=0.5   - Gentamicin: S <=2   - Imipenem: S <=1   - Levofloxacin: S <=0.5   - Meropenem: S <=1   - Nitrofurantoin: S <=32 Should not be used to treat pyelonephritis   - Piperacillin/Tazobactam: S <=8   - Tigecycline: S <=2   - Tobramycin: S <=2   - Trimethoprim/Sulfamethoxazole: S <=0.5/9.5   Specimen Source: .Urine Catheterized   Culture Results:   >100,000 CFU/ml Escherichia coli   TTE:    RADIOLOGY        MEDICATIONS  (STANDING):  cefTRIAXone   IVPB 1000 milliGRAM(s) IV Intermittent every 24 hours  cholecalciferol 2000 Unit(s) Oral daily  heparin   Injectable 5000 Unit(s) SubCutaneous every 8 hours  influenza   Vaccine 0.5 milliLiter(s) IntraMuscular once  levothyroxine 25 MICROGram(s) Oral daily  losartan 100 milliGRAM(s) Oral daily  multivitamin/minerals 1 Tablet(s) Oral daily  simvastatin 20 milliGRAM(s) Oral at bedtime  sodium chloride 0.9% lock flush 3 milliLiter(s) IV Push every 8 hours  triamterene 37.5 mG/hydrochlorothiazide 25 mG Tablet 1 Tablet(s) Oral daily  zinc sulfate 220 milliGRAM(s) Oral daily      MEDICATIONS  (PRN):  acetaminophen   Tablet .. 650 milliGRAM(s) Oral every 6 hours PRN Temp greater or equal to 38C (100.4F), Mild Pain (1 - 3)  acetaminophen   Tablet .. 975 milliGRAM(s) Oral every 8 hours PRN Moderate Pain (4 - 6)  hydrocodone/homatropine Syrup 5 milliLiter(s) Oral every 4 hours PRN Cough

## 2020-11-08 NOTE — PROGRESS NOTE ADULT - ASSESSMENT
80F hx HTN, HLD, hypothyroid, prior DVT (no longer on AC) presents with cough, fever, body aches, generalized weakness found to have covid19, ELISA.     #COVID 19  -pt came back positive for covid 19 after positive symptoms and recent contact  -not currently hypoxic will hold off on steroids and Remdesivir   - Added Hycodan for Cough  -ID consult appreciated - if remains asymptomatic discharge   -Monitor ferritin, d-dimer, crp, esr, procalcitonin;   -PT consulted with recommendations for  ANITHA;      UTI - improved symptomatically. Blood cultures negative. Urine cultures with E. coli  - Rocephin x 5 days.     #Metabolic acidosis 2/2 elevated lactate - resolved     #ELISA - resolved  - Resume home Dyazide and ARB (Losartan instead of Olmesartan)    #Hyponatremia - improved  -likely 2/2 decreased po intake  -S/p IVF; discontinued  - Monitor bmp    #HTN  - Continue Amlodipine 5mg    - Resume home diuretics and ARB    #Dyslipidemia  - Continue statin    #hypothyroid - normal TSH  - Continue  synthroid 25 mcg      #Hypomagnesemia/hypokalemia/hypophosphatemia  - Supplemented    OA   - Tylenol 975 PO q8 PRN. Refusing other medications like NSAID (hiatal hernia), opioids as well as Gabapentin    #DVT ppx  - Heparin SQ      Disposition - Cleared by ID for discharge; PT recommendations for and Family requesting ANITHA; however patient wants to go home. Unable to get HHA due to positive COVID status

## 2020-11-09 LAB
SARS-COV-2 RNA SPEC QL NAA+PROBE: DETECTED
SARS-COV-2 RNA SPEC QL NAA+PROBE: SIGNIFICANT CHANGE UP

## 2020-11-09 PROCEDURE — 99232 SBSQ HOSP IP/OBS MODERATE 35: CPT | Mod: CS

## 2020-11-09 PROCEDURE — 99233 SBSQ HOSP IP/OBS HIGH 50: CPT | Mod: CS

## 2020-11-09 RX ADMIN — Medication 975 MILLIGRAM(S): at 13:49

## 2020-11-09 RX ADMIN — SODIUM CHLORIDE 3 MILLILITER(S): 9 INJECTION INTRAMUSCULAR; INTRAVENOUS; SUBCUTANEOUS at 21:10

## 2020-11-09 RX ADMIN — HEPARIN SODIUM 5000 UNIT(S): 5000 INJECTION INTRAVENOUS; SUBCUTANEOUS at 05:52

## 2020-11-09 RX ADMIN — CEFTRIAXONE 100 MILLIGRAM(S): 500 INJECTION, POWDER, FOR SOLUTION INTRAMUSCULAR; INTRAVENOUS at 17:27

## 2020-11-09 RX ADMIN — Medication 25 MICROGRAM(S): at 05:52

## 2020-11-09 RX ADMIN — SODIUM CHLORIDE 3 MILLILITER(S): 9 INJECTION INTRAMUSCULAR; INTRAVENOUS; SUBCUTANEOUS at 15:32

## 2020-11-09 RX ADMIN — HEPARIN SODIUM 5000 UNIT(S): 5000 INJECTION INTRAVENOUS; SUBCUTANEOUS at 21:04

## 2020-11-09 RX ADMIN — Medication 1 TABLET(S): at 09:30

## 2020-11-09 RX ADMIN — LOSARTAN POTASSIUM 100 MILLIGRAM(S): 100 TABLET, FILM COATED ORAL at 05:52

## 2020-11-09 RX ADMIN — Medication 2000 UNIT(S): at 09:30

## 2020-11-09 RX ADMIN — Medication 1 TABLET(S): at 05:52

## 2020-11-09 RX ADMIN — SODIUM CHLORIDE 3 MILLILITER(S): 9 INJECTION INTRAMUSCULAR; INTRAVENOUS; SUBCUTANEOUS at 05:47

## 2020-11-09 RX ADMIN — HEPARIN SODIUM 5000 UNIT(S): 5000 INJECTION INTRAVENOUS; SUBCUTANEOUS at 15:06

## 2020-11-09 RX ADMIN — ZINC SULFATE TAB 220 MG (50 MG ZINC EQUIVALENT) 220 MILLIGRAM(S): 220 (50 ZN) TAB at 09:30

## 2020-11-09 RX ADMIN — SIMVASTATIN 20 MILLIGRAM(S): 20 TABLET, FILM COATED ORAL at 21:04

## 2020-11-09 NOTE — PROGRESS NOTE ADULT - ASSESSMENT
80F hx HTN, HLD, hypothyroid, prior DVT (no longer on AC) presenting with Covid-19 and ELISA.     #COVID-19  -repeat PCR negative. Will repeat as negative x2 needed for ANITHA  -continue Hycodan for Cough  -ID following, preparing for dischrge   -Monitor ferritin, d-dimer, crp, esr, procalcitonin;   -PT consulted with recommendations for  ANITHA;      UTI  - improved symptomatically. Blood cultures negative. Urine cultures with E. coli  - s/p Rocephin x 5 days.     #Metabolic acidosis 2/2 elevated lactate - resolved     #ELISA - resolved  - Resume home Dyazide and ARB (Losartan instead of Olmesartan)    #Hyponatremia - resolved  - likely 2/2 decreased po intake  - s/p IVF; discontinued  - will Monitor bmp    #HTN  - Continue Amlodipine 5mg    - Resume home diuretics and ARB    #Dyslipidemia  - Continue statin    #hypothyroid - normal TSH  - Continue  synthroid 25 mcg      #Hypomagnesemia/hypokalemia/hypophosphatemia  - Supplemented    OA   - Tylenol 975 PO q8 PRN. Refusing other medications like NSAID (hiatal hernia), opioids as well as Gabapentin    #DVT ppx  - Heparin SQ      Disposition - Cleared by ID for discharge; PT recommendations for and Family requesting ANITHA; however patient wants to go home. Awaiting second Covid test for pending ANITHA placement and d/c isolation precautions.

## 2020-11-09 NOTE — PROGRESS NOTE ADULT - ASSESSMENT
This 80F hx HTN, HLD, hypothyroid, prior DVT (no longer on AC) presents with cough, fever, body aches, gen weakness. Patient states that 4 days ago developed fever and diffuse body aches. She then started develop productive cough. She states she's hasn't been able to eat or drink much and has some soft stool as well. Her symptoms continued to progress to point where have difficulty ambulating and felt very weak today prompting her to come to ED. 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 She denies chest pain, abdominal pain, vomiting or headache.  In ED, T- 100.9, patient breathing 97% on RA. Lactate was 2.7, but increased to 2.9 after 2L bolus.  (01 Nov 2020 23:55)    patient has no travels. no other sick contacts.     Seen in isolation room. reports some mild cough. No real SOB.   cough is nonproductive.   Not currently requiring oxygen.       Impression:  COVID-19 Pneumonia  multifocal Pneumonia  cough  fevers - resolved  abnormal LFTs  abnormal UA    Plan:  - Clinically stable from pulm standpoint, not on oxygen, DEFER remdesevir     Continue supportive care measures  - continue Oxygen PRN     LFT mildly elevated  - likely from COVID-19, will watch.       Abnormal UA  - possible UTI  - on ceftriaxone  - awaiting cx results  - urine culture with no growth.   now completed course of Ceftriaxone      Clinically improving    Consider discharge to community from ID point of view

## 2020-11-09 NOTE — PROGRESS NOTE ADULT - SUBJECTIVE AND OBJECTIVE BOX
[facility]  INFECTIOUS DISEASES AND INTERNAL MEDICINE at [place]  =======================================================  Travis Lees MD  Diplomates American Board of Internal Medicine and Infectious Diseases  Tel  [phone]  Fax [phone]  =======================================================    [id number]  PRADEEP DEL TORO   follow up for COVID-19, UTI    still with some dysuria.    no SOB    =======================================================  REVIEW OF SYSTEMS:  CONSTITUTIONAL:  No Fever or chills  HEENT:  No diplopia or blurred vision.  No earache, sore throat or runny nose.  CARDIOVASCULAR:  No pressure, squeezing, strangling, tightness, heaviness or aching about the chest, neck, axilla or epigastrium.  RESPIRATORY:  COUGH  GASTROINTESTINAL:  No nausea, vomiting or diarrhea.  GENITOURINARY:  No dysuria, frequency or urgency. No Blood in urine  MUSCULOSKELETAL:  no joint aches, no muscle pain  SKIN:  No change in skin, hair or nails.  NEUROLOGIC:  No Headaches, seizures or weakness.  PSYCHIATRIC:  No disorder of thought or mood.  ENDOCRINE:  No heat or cold intolerance  HEMATOLOGICAL:  No easy bruising or bleeding.   =======================================================  Allergies  all narcotics give severe vomiting and dizziness (Other; Vomiting)  ampicillin (Stomach Upset)  Bactrim (Rash)  Cipro (Other)  Levaquin (Unknown)     ======================================================  Physical Exam:  ============     General:  No acute distress.  Eye: Pupils are equal, round and reactive to light, Extraocular movements are intact, Normal conjunctiva.  HENT: Normocephalic, Oral mucosa is moist, No pharyngeal erythema, No sinus tenderness.  Neck: Supple, No lymphadenopathy.  Respiratory: Lungs WITH FAIR air entry  Cardiovascular: Normal rate, Regular rhythm,   Gastrointestinal: Soft, Non-tender, Non-distended, Normal bowel sounds.  Genitourinary: No costovertebral angle tenderness.  Lymphatics: No lymphadenopathy neck,   Musculoskeletal: Normal range of motion, Normal strength.  Integumentary: No rash.  Neurologic: Alert, Oriented, No focal deficits, Cranial Nerves II-XII are grossly intact.  Psychiatric: Appropriate mood & affect.    =======================================================   Vitals:  ============  T(F): 97.9 (09 Nov 2020 05:09), Max: 98 (08 Nov 2020 22:33)  HR: 68 (09 Nov 2020 05:09)  BP: 136/76 (09 Nov 2020 05:09)  RR: 18 (09 Nov 2020 05:09)  SpO2: 97% (09 Nov 2020 05:09) (97% - 99%)  temp max in last 48H T(F): , Max: 98.3 (11-07-20 @ 22:43)    =======================================================  Current Antibiotics:  cefTRIAXone   IVPB 1000 milliGRAM(s) IV Intermittent every 24 hours    Other medications:  cholecalciferol 2000 Unit(s) Oral daily  heparin   Injectable 5000 Unit(s) SubCutaneous every 8 hours  influenza   Vaccine 0.5 milliLiter(s) IntraMuscular once  levothyroxine 25 MICROGram(s) Oral daily  losartan 100 milliGRAM(s) Oral daily  multivitamin/minerals 1 Tablet(s) Oral daily  simvastatin 20 milliGRAM(s) Oral at bedtime  sodium chloride 0.9% lock flush 3 milliLiter(s) IV Push every 8 hours  triamterene 37.5 mG/hydrochlorothiazide 25 mG Tablet 1 Tablet(s) Oral daily  zinc sulfate 220 milliGRAM(s) Oral daily      =======================================================  Labs:                        12.6   5.37  )-----------( 262      ( 08 Nov 2020 11:59 )             37.5      11-08    137  |  105  |  18.0  ----------------------------<  114<H>  3.9   |  21.0<L>  |  0.54    Ca    8.9      08 Nov 2020 11:59        Culture - Urine (collected 11-04-20 @ 22:17)  Source: .Urine Catheterized  Final Report (11-07-20 @ 11:01):    >100,000 CFU/ml Escherichia coli  Organism: Escherichia coli (11-07-20 @ 11:01)  Organism: Escherichia coli (11-07-20 @ 11:01)    Sensitivities:      -  Amikacin: S <=16      -  Amoxicillin/Clavulanic Acid: S <=8/4      -  Ampicillin: S <=8 These ampicillin results predict results for amoxicillin      -  Ampicillin/Sulbactam: S <=4/2 Enterobacter, Citrobacter, and Serratia may develop resistance during prolonged therapy (3-4 days)      -  Aztreonam: S <=4      -  Cefazolin: S <=2 (MIC_CL_COM_ENTERIC_CEFAZU) For uncomplicated UTI with K. pneumoniae, E. coli, or P. mirablis: EDWIN <=16 is sensitive and EDWIN >=32 is resistant. This also predicts results for oral agents cefaclor, cefdinir, cefpodoxime, cefprozil, cefuroxime axetil, cephalexin and locarbef for uncomplicated UTI. Note that some isolates may be susceptible to these agents while testing resistant to cefazolin.      -  Cefepime: S <=2      -  Cefoxitin: S <=8      -  Ceftriaxone: S <=1 Enterobacter, Citrobacter, and Serratia may develop resistance during prolonged therapy      -  Ciprofloxacin: S <=0.25      -  Ertapenem: S <=0.5      -  Gentamicin: S <=2      -  Imipenem: S <=1      -  Levofloxacin: S <=0.5      -  Meropenem: S <=1      -  Nitrofurantoin: S <=32 Should not be used to treat pyelonephritis      -  Piperacillin/Tazobactam: S <=8      -  Tigecycline: S <=2      -  Tobramycin: S <=2      -  Trimethoprim/Sulfamethoxazole: S <=0.5/9.5      Method Type: EDWIN    Culture - Blood (collected 11-01-20 @ 22:47)  Source: .Blood Blood-Peripheral  Final Report (11-06-20 @ 23:03):    No growth at 5 days.    Culture - Blood (collected 11-01-20 @ 22:47)  Source: .Blood Blood-Peripheral  Final Report (11-06-20 @ 23:03):    No growth at 5 days.      Creatinine, Serum: 0.54 mg/dL (11-08-20 @ 11:59)    Procalcitonin, Serum: 0.22 ng/mL (11-02-20 @ 10:52)    D-Dimer Assay, Quantitative: 2552 ng/mL DDU (11-02-20 @ 10:52)    Ferritin, Serum: 609 ng/mL (11-02-20 @ 10:52)    C-Reactive Protein, Serum: 5.91 mg/dL (11-02-20 @ 10:52)    WBC Count: 5.37 K/uL (11-08-20 @ 11:59)    Rapid RVP Result: Detected (11-02-20 @ 13:22)    COVID-19 PCR: NotDetec (11-08-20 @ 23:52)  COVID-19 PCR: Detected (11-06-20 @ 12:07)  COVID-19 PCR: Detected (11-01-20 @ 23:24)

## 2020-11-10 LAB — SARS-COV-2 RNA SPEC QL NAA+PROBE: SIGNIFICANT CHANGE UP

## 2020-11-10 PROCEDURE — 99233 SBSQ HOSP IP/OBS HIGH 50: CPT | Mod: CS

## 2020-11-10 RX ADMIN — Medication 975 MILLIGRAM(S): at 16:00

## 2020-11-10 RX ADMIN — SODIUM CHLORIDE 3 MILLILITER(S): 9 INJECTION INTRAMUSCULAR; INTRAVENOUS; SUBCUTANEOUS at 13:23

## 2020-11-10 RX ADMIN — Medication 1 TABLET(S): at 09:52

## 2020-11-10 RX ADMIN — LOSARTAN POTASSIUM 100 MILLIGRAM(S): 100 TABLET, FILM COATED ORAL at 05:30

## 2020-11-10 RX ADMIN — HEPARIN SODIUM 5000 UNIT(S): 5000 INJECTION INTRAVENOUS; SUBCUTANEOUS at 21:07

## 2020-11-10 RX ADMIN — HEPARIN SODIUM 5000 UNIT(S): 5000 INJECTION INTRAVENOUS; SUBCUTANEOUS at 05:29

## 2020-11-10 RX ADMIN — HEPARIN SODIUM 5000 UNIT(S): 5000 INJECTION INTRAVENOUS; SUBCUTANEOUS at 14:16

## 2020-11-10 RX ADMIN — SODIUM CHLORIDE 3 MILLILITER(S): 9 INJECTION INTRAMUSCULAR; INTRAVENOUS; SUBCUTANEOUS at 21:07

## 2020-11-10 RX ADMIN — Medication 1 TABLET(S): at 05:30

## 2020-11-10 RX ADMIN — Medication 2000 UNIT(S): at 09:52

## 2020-11-10 RX ADMIN — Medication 25 MICROGRAM(S): at 05:30

## 2020-11-10 RX ADMIN — ZINC SULFATE TAB 220 MG (50 MG ZINC EQUIVALENT) 220 MILLIGRAM(S): 220 (50 ZN) TAB at 09:52

## 2020-11-10 RX ADMIN — SIMVASTATIN 20 MILLIGRAM(S): 20 TABLET, FILM COATED ORAL at 21:08

## 2020-11-10 RX ADMIN — SODIUM CHLORIDE 3 MILLILITER(S): 9 INJECTION INTRAMUSCULAR; INTRAVENOUS; SUBCUTANEOUS at 05:28

## 2020-11-10 RX ADMIN — Medication 975 MILLIGRAM(S): at 14:49

## 2020-11-10 NOTE — PROGRESS NOTE ADULT - ASSESSMENT
80F hx HTN, HLD, hypothyroid, prior DVT (no longer on AC) presenting with Covid-19 and ELISA.     #COVID-19  -repeat PCR positive. Will repeat in 2 days as negative x2 needed for ANITHA  -continue Hycodan for Cough  -ID following, stable for discharge from ID perspective   -Monitoring ferritin, d-dimer, crp, esr, procalcitonin;   -PT consulted with recommendations for ANITHA;      UTI  - improved symptomatically. Blood cultures negative. Urine cultures with E. coli  - s/p Rocephin x 5 days.     #Metabolic acidosis 2/2 elevated lactate - resolved     #ELISA - resolved  - Resume home Dyazide and ARB (Losartan instead of Olmesartan)    #Hyponatremia - resolved  - likely 2/2 decreased po intake  - will Monitor bmp    #HTN  - Continue Amlodipine 5mg    - Resume home diuretics and ARB    #Dyslipidemia  - Continue statin    #hypothyroid - normal TSH  - Continue  synthroid 25 mcg      #Hypomagnesemia/hypokalemia/hypophosphatemia  - Supplemented, will monitor    OA   - Tylenol 975 PO q8 PRN. Refusing other medications like NSAID (hiatal hernia), opioids as well as Gabapentin    #DVT ppx  - Heparin SQ      Disposition - Cleared by ID for discharge; PT recommendations for and Family requesting ANITHA; however patient wants to go home. Awaiting negative Covid test for pending ANITHA placement and d/c isolation precautions.

## 2020-11-10 NOTE — PROGRESS NOTE ADULT - SUBJECTIVE AND OBJECTIVE BOX
Patient is a 80y old  Female who presents with a chief complaint of covid19, generalized weakness , anson (09 Nov 2020 23:00)      INTERVAL HPI/OVERNIGHT EVENTS:  Patient seen awake in bed. She reports feeling angry because she wants to go home and not to [facility]. Provider explained to patient that she needs to test negative before her home aides can resume care. Patient reports feeling well, denies chest pain or SOB or cough, reports tolerating room air and walked around in room with PT yesterday. Overnight covid test positive. Denies dysuria or hematuria. Interviewed in PPE and isolation.    MEDICATIONS  (STANDING):  cholecalciferol 2000 Unit(s) Oral daily  heparin   Injectable 5000 Unit(s) SubCutaneous every 8 hours  influenza   Vaccine 0.5 milliLiter(s) IntraMuscular once  levothyroxine 25 MICROGram(s) Oral daily  losartan 100 milliGRAM(s) Oral daily  multivitamin/minerals 1 Tablet(s) Oral daily  simvastatin 20 milliGRAM(s) Oral at bedtime  sodium chloride 0.9% lock flush 3 milliLiter(s) IV Push every 8 hours  triamterene 37.5 mG/hydrochlorothiazide 25 mG Tablet 1 Tablet(s) Oral daily  zinc sulfate 220 milliGRAM(s) Oral daily    MEDICATIONS  (PRN):  acetaminophen   Tablet .. 650 milliGRAM(s) Oral every 6 hours PRN Temp greater or equal to 38C (100.4F), Mild Pain (1 - 3)  acetaminophen   Tablet .. 975 milliGRAM(s) Oral every 8 hours PRN Moderate Pain (4 - 6)  hydrocodone/homatropine Syrup 5 milliLiter(s) Oral every 4 hours PRN Cough      Allergies    all narcotics give severe vomitting and dizziness (Other; Vomiting)  ampicillin (Stomach Upset)  Bactrim (Rash)  Cipro (Other)  Levaquin (Unknown)    Intolerances    Augmentin (Diarrhea)        REVIEW OF SYSTEMS:  CONSTITUTIONAL: No fever, weight loss, or fatigue  EYES: No eye pain, visual disturbances, or discharge  ENMT:  No difficulty hearing, tinnitus, vertigo; No sinus or throat pain  NECK: No pain or stiffness  RESPIRATORY: No cough, wheezing, chills or hemoptysis; No shortness of breath  CARDIOVASCULAR: No chest pain, palpitations, or lightheadedness  GASTROINTESTINAL: No abdominal or epigastric pain. No nausea, vomiting, or hematemesis; No diarrhea or constipation. No melena or hematochezia.  GENITOURINARY: No dysuria, frequency, hematuria, or incontinence  NEUROLOGICAL: No headaches, vertigo, memory loss, loss of strength, numbness, or tremors  SKIN: No itching, burning, rashes, or lesions   LYMPH NODES: No enlarged glands  ENDOCRINE: No heat or cold intolerance; No hair loss; No polydipsia or polyuria  MUSCULOSKELETAL: No back pain      PHYSICAL EXAM:  GENERAL: NAD, well-groomed, well-developed  HEAD:  Atraumatic, Normocephalic  EYES: EOMI, PERRLA, conjunctiva and sclera clear  ENMT: Moist mucous membranes, Good dentition   NECK: Supple, No JVD appreciated  NERVOUS SYSTEM:  Alert & Oriented X3, Good concentration; Bilateral LE mobile, sensation to light touch intact  CHEST/LUNG: Clear to auscultation bilaterally; No rales, rhonchi, wheezing, or rubs  HEART: Regular rate and rhythm; No murmurs, rubs, or gallops  ABDOMEN: Soft, Nontender, Nondistended; Bowel sounds present  EXTREMITIES:  2+ Peripheral Pulses, No clubbing or cyanosis  LYMPH: No lymphadenopathy noted  SKIN: No rashes or lesions appreciated      Vital Signs Last 24 Hrs  T(C): 36.8 (10 Nov 2020 17:10), Max: 36.9 (10 Nov 2020 11:17)  T(F): 98.2 (10 Nov 2020 17:10), Max: 98.5 (10 Nov 2020 11:17)  HR: 61 (10 Nov 2020 17:10) (60 - 83)  BP: 141/72 (10 Nov 2020 17:10) (129/66 - 145/78)  BP(mean): --  RR: 18 (10 Nov 2020 17:10) (18 - 18)  SpO2: 97% (10 Nov 2020 17:10) (94% - 97%)    LABS:              CAPILLARY BLOOD GLUCOSE               [x] Consultant(s) Notes Reviewed

## 2020-11-11 LAB — SARS-COV-2 RNA SPEC QL NAA+PROBE: DETECTED

## 2020-11-11 PROCEDURE — 99233 SBSQ HOSP IP/OBS HIGH 50: CPT | Mod: CS

## 2020-11-11 RX ADMIN — Medication 25 MICROGRAM(S): at 05:06

## 2020-11-11 RX ADMIN — HEPARIN SODIUM 5000 UNIT(S): 5000 INJECTION INTRAVENOUS; SUBCUTANEOUS at 13:08

## 2020-11-11 RX ADMIN — SIMVASTATIN 20 MILLIGRAM(S): 20 TABLET, FILM COATED ORAL at 21:54

## 2020-11-11 RX ADMIN — Medication 975 MILLIGRAM(S): at 13:10

## 2020-11-11 RX ADMIN — HEPARIN SODIUM 5000 UNIT(S): 5000 INJECTION INTRAVENOUS; SUBCUTANEOUS at 05:06

## 2020-11-11 RX ADMIN — SODIUM CHLORIDE 3 MILLILITER(S): 9 INJECTION INTRAMUSCULAR; INTRAVENOUS; SUBCUTANEOUS at 12:01

## 2020-11-11 RX ADMIN — SODIUM CHLORIDE 3 MILLILITER(S): 9 INJECTION INTRAMUSCULAR; INTRAVENOUS; SUBCUTANEOUS at 05:07

## 2020-11-11 RX ADMIN — LOSARTAN POTASSIUM 100 MILLIGRAM(S): 100 TABLET, FILM COATED ORAL at 05:06

## 2020-11-11 RX ADMIN — Medication 975 MILLIGRAM(S): at 14:10

## 2020-11-11 RX ADMIN — Medication 1 TABLET(S): at 05:06

## 2020-11-11 RX ADMIN — HEPARIN SODIUM 5000 UNIT(S): 5000 INJECTION INTRAVENOUS; SUBCUTANEOUS at 21:54

## 2020-11-11 RX ADMIN — Medication 1 TABLET(S): at 13:08

## 2020-11-11 RX ADMIN — Medication 2000 UNIT(S): at 13:08

## 2020-11-11 RX ADMIN — SODIUM CHLORIDE 3 MILLILITER(S): 9 INJECTION INTRAMUSCULAR; INTRAVENOUS; SUBCUTANEOUS at 21:54

## 2020-11-11 RX ADMIN — ZINC SULFATE TAB 220 MG (50 MG ZINC EQUIVALENT) 220 MILLIGRAM(S): 220 (50 ZN) TAB at 13:08

## 2020-11-11 NOTE — PROGRESS NOTE ADULT - ASSESSMENT
80F hx HTN, HLD, hypothyroid, prior DVT (no longer on AC) presenting with Covid-19 and ELISA.     #COVID-19  -repeat PCR positive. Will repeat in 2 days as negative x2 needed for [facility]  -continue Hycodan for Cough  -ID following, stable for discharge from ID perspective   -Monitoring ferritin, d-dimer, crp, esr, procalcitonin;   -PT consulted with recommendations for [facility];      UTI  - improved symptomatically. Blood cultures negative. Urine cultures with E. coli  - s/p Rocephin x 5 days.     #Metabolic acidosis 2/2 elevated lactate - resolved     #ELISA - resolved  - Resume home Dyazide and ARB (Losartan instead of Olmesartan)    #Hyponatremia - resolved  - likely 2/2 decreased po intake  - will Monitor bmp    #HTN  - Continue Amlodipine 5mg    - Resume home diuretics and ARB    #Dyslipidemia  - Continue statin    #hypothyroid - normal TSH  - Continue  synthroid 25 mcg      #Hypomagnesemia/hypokalemia/hypophosphatemia  - Supplemented, will monitor    OA   - Tylenol 975 PO q8 PRN. Refusing other medications like NSAID (hiatal hernia), opioids as well as Gabapentin    #DVT ppx  - Heparin SQ      Disposition - Cleared by ID for discharge; PT recommendations for and Family requesting [facility]; however patient wants to go home. Awaiting negative Covid test for pending [facility] placement and d/c isolation precautions.     80F hx HTN, HLD, hypothyroid, prior DVT (no longer on AC) presenting with Covid-19 and ELISA.     #COVID-19  -repeat PCR positive. Given alternating positive and negative tests, will repeat tomorrow as negative x2 needed for [facility] or home care  -continue Hycodan for Cough  -ID following, stable for discharge from ID perspective   -Monitoring ferritin, d-dimer, crp, esr, procalcitonin;   -PT consulted with recommendations for [facility];      UTI  - improved symptomatically. Blood cultures negative. Urine cultures with E. coli  - s/p Rocephin x 5 days.     #Metabolic acidosis 2/2 elevated lactate - resolved     #ELISA - resolved  - Resume home Dyazide and ARB (Losartan instead of Olmesartan)    #Hyponatremia - resolved  - likely 2/2 decreased po intake  - will Monitor bmp    #HTN  - Continue Amlodipine 5mg    - Resume home diuretics and ARB    #Dyslipidemia  - Continue statin    #hypothyroid - normal TSH  - Continue  synthroid 25 mcg      #Hypomagnesemia/hypokalemia/hypophosphatemia  - Supplemented, will monitor    OA   - Tylenol 975 PO q8 PRN. Refusing other medications like NSAID (hiatal hernia), opioids as well as Gabapentin    #DVT ppx  - Heparin SQ      Disposition - Cleared by ID for discharge; PT recommendations for and Family requesting [facility]; however patient wants to go home. Awaiting negative Covid test for pending [facility] placement and d/c isolation precautions.

## 2020-11-11 NOTE — PROGRESS NOTE ADULT - SUBJECTIVE AND OBJECTIVE BOX
Patient is a 80y old  Female who presents with a chief complaint of covid19, generalized weakness , anson (10 Nov 2020 19:27)      INTERVAL HPI/OVERNIGHT EVENTS:      MEDICATIONS  (STANDING):  cholecalciferol 2000 Unit(s) Oral daily  heparin   Injectable 5000 Unit(s) SubCutaneous every 8 hours  influenza   Vaccine 0.5 milliLiter(s) IntraMuscular once  levothyroxine 25 MICROGram(s) Oral daily  losartan 100 milliGRAM(s) Oral daily  multivitamin/minerals 1 Tablet(s) Oral daily  simvastatin 20 milliGRAM(s) Oral at bedtime  sodium chloride 0.9% lock flush 3 milliLiter(s) IV Push every 8 hours  triamterene 37.5 mG/hydrochlorothiazide 25 mG Tablet 1 Tablet(s) Oral daily    MEDICATIONS  (PRN):  acetaminophen   Tablet .. 650 milliGRAM(s) Oral every 6 hours PRN Temp greater or equal to 38C (100.4F), Mild Pain (1 - 3)  acetaminophen   Tablet .. 975 milliGRAM(s) Oral every 8 hours PRN Moderate Pain (4 - 6)  hydrocodone/homatropine Syrup 5 milliLiter(s) Oral every 4 hours PRN Cough      Allergies    all narcotics give severe vomitting and dizziness (Other; Vomiting)  ampicillin (Stomach Upset)  Bactrim (Rash)  Cipro (Other)  Levaquin (Unknown)    Intolerances    Augmentin (Diarrhea)      REVIEW OF SYSTEMS:  CONSTITUTIONAL: No fever, weight loss, or fatigue  EYES: No eye pain, visual disturbances, or discharge  ENMT:  No difficulty hearing, tinnitus, vertigo; No sinus or throat pain  NECK: No pain or stiffness  RESPIRATORY: No cough, wheezing, chills or hemoptysis; No shortness of breath  CARDIOVASCULAR: No chest pain, palpitations, or lightheadedness  GASTROINTESTINAL: No abdominal or epigastric pain. No nausea, vomiting, or hematemesis; No diarrhea or constipation. No melena or hematochezia.  GENITOURINARY: No dysuria, frequency, hematuria, or incontinence  NEUROLOGICAL: No headaches, vertigo, memory loss, loss of strength, numbness, or tremors  SKIN: No itching, burning, rashes, or lesions   LYMPH NODES: No enlarged glands  ENDOCRINE: No heat or cold intolerance; No hair loss; No polydipsia or polyuria  MUSCULOSKELETAL: No back pain  PSYCHIATRIC: No depression, anxiety, or mood swings  HEME/LYMPH: No easy bruising, or bleeding gums  ALLERGY AND IMMUNOLOGIC: No hives or eczema    PHYSICAL EXAM:  GENERAL: NAD, well-groomed, well-developed  HEAD:  Atraumatic, Normocephalic  EYES: EOMI, PERRLA, conjunctiva and sclera clear  ENMT: Moist mucous membranes, Good dentition, No lesions; No tonsillar erythema, exudates, or enlargement   NECK: Supple, No JVD appreciated, Normal thyroid  NERVOUS SYSTEM:  Alert & Oriented X3, Good concentration; Bilateral LE mobile, sensation to light touch intact  CHEST/LUNG: Clear to auscultation bilaterally; No rales, rhonchi, wheezing, or rubs  HEART: Regular rate and rhythm; No murmurs, rubs, or gallops  ABDOMEN: Soft, Nontender, Nondistended; Bowel sounds present  EXTREMITIES:  2+ Peripheral Pulses, No clubbing or cyanosis  LYMPH: No lymphadenopathy noted  SKIN: No rashes or lesions  INCISION:  Dressing dry and intact    Vital Signs Last 24 Hrs  T(C): 37.1 (11 Nov 2020 22:41), Max: 37.1 (11 Nov 2020 22:41)  T(F): 98.7 (11 Nov 2020 22:41), Max: 98.7 (11 Nov 2020 22:41)  HR: 66 (11 Nov 2020 22:41) (60 - 66)  BP: 151/66 (11 Nov 2020 22:41) (136/62 - 153/84)  BP(mean): --  RR: 19 (11 Nov 2020 22:41) (18 - 20)  SpO2: 97% (11 Nov 2020 22:41) (96% - 99%)    LABS:              CAPILLARY BLOOD GLUCOSE               [x] Consultant(s) Notes Reviewed Patient is a 80y old  Female who presents with a chief complaint of covid19, generalized weakness , anson (10 Nov 2020 19:27)      INTERVAL HPI/OVERNIGHT EVENTS:  Patient seen awake in bed. Patient reports feeling well, denies chest pain or SOB or cough, reports tolerating room air and walked around in room with PT yesterday. Overnight covid test positive. Denies dysuria or hematuria. Interviewed in PPE and isolation.      MEDICATIONS  (STANDING):  cholecalciferol 2000 Unit(s) Oral daily  heparin   Injectable 5000 Unit(s) SubCutaneous every 8 hours  influenza   Vaccine 0.5 milliLiter(s) IntraMuscular once  levothyroxine 25 MICROGram(s) Oral daily  losartan 100 milliGRAM(s) Oral daily  multivitamin/minerals 1 Tablet(s) Oral daily  simvastatin 20 milliGRAM(s) Oral at bedtime  sodium chloride 0.9% lock flush 3 milliLiter(s) IV Push every 8 hours  triamterene 37.5 mG/hydrochlorothiazide 25 mG Tablet 1 Tablet(s) Oral daily    MEDICATIONS  (PRN):  acetaminophen   Tablet .. 650 milliGRAM(s) Oral every 6 hours PRN Temp greater or equal to 38C (100.4F), Mild Pain (1 - 3)  acetaminophen   Tablet .. 975 milliGRAM(s) Oral every 8 hours PRN Moderate Pain (4 - 6)  hydrocodone/homatropine Syrup 5 milliLiter(s) Oral every 4 hours PRN Cough      Allergies    all narcotics give severe vomitting and dizziness (Other; Vomiting)  ampicillin (Stomach Upset)  Bactrim (Rash)  Cipro (Other)  Levaquin (Unknown)    Intolerances    Augmentin (Diarrhea)        REVIEW OF SYSTEMS:  CONSTITUTIONAL: No fever, weight loss, or fatigue  EYES: No eye pain, visual disturbances, or discharge  ENMT:  No difficulty hearing, tinnitus, vertigo; No sinus or throat pain  NECK: No pain or stiffness  RESPIRATORY: No cough, wheezing, chills or hemoptysis; No shortness of breath  CARDIOVASCULAR: No chest pain, palpitations, or lightheadedness  GASTROINTESTINAL: No abdominal or epigastric pain. No nausea, vomiting, or hematemesis; No diarrhea or constipation. No melena or hematochezia.  GENITOURINARY: No dysuria, frequency, hematuria, or incontinence  NEUROLOGICAL: No headaches, vertigo, memory loss, loss of strength, numbness, or tremors  SKIN: No itching, burning, rashes, or lesions   LYMPH NODES: No enlarged glands  ENDOCRINE: No heat or cold intolerance; No hair loss; No polydipsia or polyuria  MUSCULOSKELETAL: No back pain      PHYSICAL EXAM:  GENERAL: NAD, well-groomed, well-developed  HEAD:  Atraumatic, Normocephalic  EYES: EOMI, PERRLA, conjunctiva and sclera clear  ENMT: Moist mucous membranes, Good dentition   NECK: Supple, No JVD appreciated  NERVOUS SYSTEM:  Alert & Oriented X3, Good concentration; Bilateral LE mobile, sensation to light touch intact  CHEST/LUNG: Clear to auscultation bilaterally; No rales, rhonchi, wheezing, or rubs  HEART: Regular rate and rhythm; No murmurs, rubs, or gallops  ABDOMEN: Soft, Nontender, Nondistended; Bowel sounds present  EXTREMITIES:  2+ Peripheral Pulses, No clubbing or cyanosis  LYMPH: No lymphadenopathy noted  SKIN: No rashes or lesions appreciated      Vital Signs Last 24 Hrs  T(C): 37.1 (11 Nov 2020 22:41), Max: 37.1 (11 Nov 2020 22:41)  T(F): 98.7 (11 Nov 2020 22:41), Max: 98.7 (11 Nov 2020 22:41)  HR: 66 (11 Nov 2020 22:41) (60 - 66)  BP: 151/66 (11 Nov 2020 22:41) (136/62 - 153/84)  BP(mean): --  RR: 19 (11 Nov 2020 22:41) (18 - 20)  SpO2: 97% (11 Nov 2020 22:41) (96% - 99%)    LABS:              CAPILLARY BLOOD GLUCOSE               [x] Consultant(s) Notes Reviewed

## 2020-11-12 LAB
ALBUMIN SERPL ELPH-MCNC: 2.9 G/DL — LOW (ref 3.3–5.2)
ALP SERPL-CCNC: 63 U/L — SIGNIFICANT CHANGE UP (ref 40–120)
ALT FLD-CCNC: 49 U/L — HIGH
ANION GAP SERPL CALC-SCNC: 14 MMOL/L — SIGNIFICANT CHANGE UP (ref 5–17)
ANISOCYTOSIS BLD QL: SLIGHT — SIGNIFICANT CHANGE UP
AST SERPL-CCNC: 31 U/L — SIGNIFICANT CHANGE UP
BASOPHILS # BLD AUTO: 0 K/UL — SIGNIFICANT CHANGE UP (ref 0–0.2)
BASOPHILS NFR BLD AUTO: 0 % — SIGNIFICANT CHANGE UP (ref 0–2)
BILIRUB SERPL-MCNC: 0.4 MG/DL — SIGNIFICANT CHANGE UP (ref 0.4–2)
BUN SERPL-MCNC: 34 MG/DL — HIGH (ref 8–20)
CALCIUM SERPL-MCNC: 9.4 MG/DL — SIGNIFICANT CHANGE UP (ref 8.6–10.2)
CHLORIDE SERPL-SCNC: 104 MMOL/L — SIGNIFICANT CHANGE UP (ref 98–107)
CO2 SERPL-SCNC: 17 MMOL/L — LOW (ref 22–29)
CREAT SERPL-MCNC: 0.59 MG/DL — SIGNIFICANT CHANGE UP (ref 0.5–1.3)
ELLIPTOCYTES BLD QL SMEAR: SLIGHT — SIGNIFICANT CHANGE UP
EOSINOPHIL # BLD AUTO: 0.12 K/UL — SIGNIFICANT CHANGE UP (ref 0–0.5)
EOSINOPHIL NFR BLD AUTO: 1.8 % — SIGNIFICANT CHANGE UP (ref 0–6)
GLUCOSE SERPL-MCNC: 94 MG/DL — SIGNIFICANT CHANGE UP (ref 70–99)
HCT VFR BLD CALC: 34.8 % — SIGNIFICANT CHANGE UP (ref 34.5–45)
HGB BLD-MCNC: 11.2 G/DL — LOW (ref 11.5–15.5)
LYMPHOCYTES # BLD AUTO: 1.32 K/UL — SIGNIFICANT CHANGE UP (ref 1–3.3)
LYMPHOCYTES # BLD AUTO: 19.5 % — SIGNIFICANT CHANGE UP (ref 13–44)
MACROCYTES BLD QL: SLIGHT — SIGNIFICANT CHANGE UP
MAGNESIUM SERPL-MCNC: 1.7 MG/DL — LOW (ref 1.8–2.6)
MANUAL SMEAR VERIFICATION: SIGNIFICANT CHANGE UP
MCHC RBC-ENTMCNC: 29.5 PG — SIGNIFICANT CHANGE UP (ref 27–34)
MCHC RBC-ENTMCNC: 32.2 GM/DL — SIGNIFICANT CHANGE UP (ref 32–36)
MCV RBC AUTO: 91.6 FL — SIGNIFICANT CHANGE UP (ref 80–100)
MICROCYTES BLD QL: SLIGHT — SIGNIFICANT CHANGE UP
MONOCYTES # BLD AUTO: 0.59 K/UL — SIGNIFICANT CHANGE UP (ref 0–0.9)
MONOCYTES NFR BLD AUTO: 8.8 % — SIGNIFICANT CHANGE UP (ref 2–14)
MYELOCYTES NFR BLD: 0.9 % — HIGH (ref 0–0)
NEUTROPHILS # BLD AUTO: 4.48 K/UL — SIGNIFICANT CHANGE UP (ref 1.8–7.4)
NEUTROPHILS NFR BLD AUTO: 66.4 % — SIGNIFICANT CHANGE UP (ref 43–77)
OVALOCYTES BLD QL SMEAR: SLIGHT — SIGNIFICANT CHANGE UP
PLAT MORPH BLD: NORMAL — SIGNIFICANT CHANGE UP
PLATELET # BLD AUTO: 176 K/UL — SIGNIFICANT CHANGE UP (ref 150–400)
PLATELET CLUMP BLD QL SMEAR: SLIGHT
PLATELET COUNT - ESTIMATE: NORMAL — SIGNIFICANT CHANGE UP
POIKILOCYTOSIS BLD QL AUTO: SLIGHT — SIGNIFICANT CHANGE UP
POLYCHROMASIA BLD QL SMEAR: SLIGHT — SIGNIFICANT CHANGE UP
POTASSIUM SERPL-MCNC: 5 MMOL/L — SIGNIFICANT CHANGE UP (ref 3.5–5.3)
POTASSIUM SERPL-SCNC: 5 MMOL/L — SIGNIFICANT CHANGE UP (ref 3.5–5.3)
PROT SERPL-MCNC: 6.3 G/DL — LOW (ref 6.6–8.7)
RBC # BLD: 3.8 M/UL — SIGNIFICANT CHANGE UP (ref 3.8–5.2)
RBC # FLD: 12.9 % — SIGNIFICANT CHANGE UP (ref 10.3–14.5)
RBC BLD AUTO: NORMAL — SIGNIFICANT CHANGE UP
SODIUM SERPL-SCNC: 135 MMOL/L — SIGNIFICANT CHANGE UP (ref 135–145)
VARIANT LYMPHS # BLD: 2.6 % — SIGNIFICANT CHANGE UP (ref 0–6)
WBC # BLD: 6.75 K/UL — SIGNIFICANT CHANGE UP (ref 3.8–10.5)
WBC # FLD AUTO: 6.75 K/UL — SIGNIFICANT CHANGE UP (ref 3.8–10.5)

## 2020-11-12 PROCEDURE — 99232 SBSQ HOSP IP/OBS MODERATE 35: CPT | Mod: CS

## 2020-11-12 RX ORDER — MAGNESIUM OXIDE 400 MG ORAL TABLET 241.3 MG
400 TABLET ORAL ONCE
Refills: 0 | Status: COMPLETED | OUTPATIENT
Start: 2020-11-12 | End: 2020-11-12

## 2020-11-12 RX ADMIN — LOSARTAN POTASSIUM 100 MILLIGRAM(S): 100 TABLET, FILM COATED ORAL at 09:21

## 2020-11-12 RX ADMIN — SIMVASTATIN 20 MILLIGRAM(S): 20 TABLET, FILM COATED ORAL at 19:18

## 2020-11-12 RX ADMIN — SODIUM CHLORIDE 3 MILLILITER(S): 9 INJECTION INTRAMUSCULAR; INTRAVENOUS; SUBCUTANEOUS at 05:31

## 2020-11-12 RX ADMIN — HEPARIN SODIUM 5000 UNIT(S): 5000 INJECTION INTRAVENOUS; SUBCUTANEOUS at 13:09

## 2020-11-12 RX ADMIN — HEPARIN SODIUM 5000 UNIT(S): 5000 INJECTION INTRAVENOUS; SUBCUTANEOUS at 05:31

## 2020-11-12 RX ADMIN — Medication 975 MILLIGRAM(S): at 13:09

## 2020-11-12 RX ADMIN — MAGNESIUM OXIDE 400 MG ORAL TABLET 400 MILLIGRAM(S): 241.3 TABLET ORAL at 18:51

## 2020-11-12 RX ADMIN — Medication 975 MILLIGRAM(S): at 13:44

## 2020-11-12 RX ADMIN — Medication 1 TABLET(S): at 09:21

## 2020-11-12 RX ADMIN — HEPARIN SODIUM 5000 UNIT(S): 5000 INJECTION INTRAVENOUS; SUBCUTANEOUS at 22:13

## 2020-11-12 RX ADMIN — SODIUM CHLORIDE 3 MILLILITER(S): 9 INJECTION INTRAMUSCULAR; INTRAVENOUS; SUBCUTANEOUS at 22:15

## 2020-11-12 RX ADMIN — Medication 25 MICROGRAM(S): at 05:31

## 2020-11-12 RX ADMIN — Medication 2000 UNIT(S): at 09:21

## 2020-11-12 RX ADMIN — SODIUM CHLORIDE 3 MILLILITER(S): 9 INJECTION INTRAMUSCULAR; INTRAVENOUS; SUBCUTANEOUS at 12:42

## 2020-11-12 NOTE — PROGRESS NOTE ADULT - SUBJECTIVE AND OBJECTIVE BOX
Patient is a 80y old  Female who presents with a chief complaint of covid19, generalized weakness , anson (10 Nov 2020 19:27)      INTERVAL HPI/OVERNIGHT EVENTS:  Patient seen awake in bed. Patient reports feeling well, denies chest pain or SOB or cough, reports tolerating room air and walked around in room with PT yesterday. Overnight covid test positive. Denies dysuria or hematuria. Interviewed in PPE and isolation. Nurse reports pt complaining of burning pain on urination.      MEDICATIONS  (STANDING):  cholecalciferol 2000 Unit(s) Oral daily  heparin   Injectable 5000 Unit(s) SubCutaneous every 8 hours  influenza   Vaccine 0.5 milliLiter(s) IntraMuscular once  levothyroxine 25 MICROGram(s) Oral daily  losartan 100 milliGRAM(s) Oral daily  multivitamin/minerals 1 Tablet(s) Oral daily  simvastatin 20 milliGRAM(s) Oral at bedtime  sodium chloride 0.9% lock flush 3 milliLiter(s) IV Push every 8 hours  triamterene 37.5 mG/hydrochlorothiazide 25 mG Tablet 1 Tablet(s) Oral daily    MEDICATIONS  (PRN):  acetaminophen   Tablet .. 650 milliGRAM(s) Oral every 6 hours PRN Temp greater or equal to 38C (100.4F), Mild Pain (1 - 3)  acetaminophen   Tablet .. 975 milliGRAM(s) Oral every 8 hours PRN Moderate Pain (4 - 6)  hydrocodone/homatropine Syrup 5 milliLiter(s) Oral every 4 hours PRN Cough      Allergies    all narcotics give severe vomitting and dizziness (Other; Vomiting)  ampicillin (Stomach Upset)  Bactrim (Rash)  Cipro (Other)  Levaquin (Unknown)    Intolerances    Augmentin (Diarrhea)        REVIEW OF SYSTEMS:  CONSTITUTIONAL: No fever, weight loss, or fatigue  EYES: No eye pain, visual disturbances, or discharge  ENMT:  No difficulty hearing, tinnitus, vertigo; No sinus or throat pain  NECK: No pain or stiffness  RESPIRATORY: No cough, wheezing, chills or hemoptysis; No shortness of breath  CARDIOVASCULAR: No chest pain, palpitations, or lightheadedness  GASTROINTESTINAL: No abdominal or epigastric pain. No nausea, vomiting, or hematemesis; No diarrhea or constipation. No melena or hematochezia.  GENITOURINARY: No dysuria, frequency, hematuria, or incontinence  NEUROLOGICAL: No headaches, vertigo, memory loss, loss of strength, numbness, or tremors  SKIN: No itching, burning, rashes, or lesions   LYMPH NODES: No enlarged glands  ENDOCRINE: No heat or cold intolerance; No hair loss; No polydipsia or polyuria  MUSCULOSKELETAL: No back pain      PHYSICAL EXAM:  GENERAL: NAD, well-groomed, well-developed  HEAD:  Atraumatic, Normocephalic  EYES: EOMI, PERRLA, conjunctiva and sclera clear  ENMT: Moist mucous membranes, Good dentition   NECK: Supple, No JVD appreciated  NERVOUS SYSTEM:  Alert & Oriented X3, Good concentration; Bilateral LE mobile, sensation to light touch intact  CHEST/LUNG: Clear to auscultation bilaterally; No rales, rhonchi, wheezing, or rubs  HEART: Regular rate and rhythm; No murmurs, rubs, or gallops  ABDOMEN: Soft, Nontender, Nondistended; Bowel sounds present  EXTREMITIES:  2+ Peripheral Pulses, No clubbing or cyanosis  LYMPH: No lymphadenopathy noted  SKIN: No rashes or lesions appreciated          Vital Signs Last 24 Hrs  T(C): 36.1 (12 Nov 2020 22:11), Max: 36.4 (12 Nov 2020 16:40)  T(F): 97 (12 Nov 2020 22:11), Max: 97.6 (12 Nov 2020 16:40)  HR: 65 (12 Nov 2020 22:11) (61 - 67)  BP: 131/71 (12 Nov 2020 22:11) (97/63 - 153/77)  BP(mean): --  RR: 20 (12 Nov 2020 22:11) (18 - 20)  SpO2: 97% (12 Nov 2020 22:11) (94% - 97%)    LABS:                        11.2   6.75  )-----------( 176      ( 12 Nov 2020 08:40 )             34.8     12 Nov 2020 08:40    135    |  104    |  34.0   ----------------------------<  94     5.0     |  17.0   |  0.59     Ca    9.4        12 Nov 2020 08:40  Mg     1.7       12 Nov 2020 08:40    TPro  6.3    /  Alb  2.9    /  TBili  0.4    /  DBili  x      /  AST  31     /  ALT  49     /  AlkPhos  63     12 Nov 2020 08:40        CAPILLARY BLOOD GLUCOSE               [x] Consultant(s) Notes Reviewed

## 2020-11-12 NOTE — PROGRESS NOTE ADULT - ASSESSMENT
80F hx HTN, HLD, hypothyroid, prior DVT (no longer on AC) presenting with Covid-19 and ELSIA.     #COVID-19  -repeat PCR positive. Given alternating positive and negative tests, will repeat tomorrow as negative x2 needed for [facility] or home care  -continue Hycodan for Cough  -ID following, stable for discharge from ID perspective   -Monitoring ferritin, d-dimer, crp, esr, procalcitonin;   -PT consulted with recommendations for [facility];      UTI  - Blood cultures negative. Urine cultures with E. coli  - repeat UA ordered given pt concern, and inconsistent reporting of symptoms  - s/p Rocephin x 5 days.     #Metabolic acidosis 2/2 elevated lactate - resolved     #ELISA - resolved  - Resume home Dyazide and ARB (Losartan instead of Olmesartan)    #Hyponatremia - resolved  - likely 2/2 decreased po intake  - will Monitor bmp    #HTN  - Continue Amlodipine 5mg    - Resume home diuretics and ARB    #Dyslipidemia  - Continue statin    #hypothyroid - normal TSH  - Continue  synthroid 25 mcg      #Hypomagnesemia/hypokalemia/hypophosphatemia  - Supplemented, will monitor    OA   - Tylenol 975 PO q8 PRN. Refusing other medications like NSAID (hiatal hernia), opioids as well as Gabapentin    #DVT ppx  - Heparin SQ      Disposition - Cleared by ID for discharge; PT recommendations for and Family requesting [facility]; however patient wants to go home. Awaiting negative Covid test for pending [facility] placement and d/c isolation precautions.    Estimated LOS - 48 hours

## 2020-11-13 LAB
SARS-COV-2 RNA SPEC QL NAA+PROBE: DETECTED
SARS-COV-2 RNA SPEC QL NAA+PROBE: DETECTED

## 2020-11-13 PROCEDURE — 99232 SBSQ HOSP IP/OBS MODERATE 35: CPT | Mod: CS

## 2020-11-13 RX ORDER — FLUTICASONE PROPIONATE 50 MCG
1 SPRAY, SUSPENSION NASAL
Refills: 0 | Status: DISCONTINUED | OUTPATIENT
Start: 2020-11-13 | End: 2020-11-14

## 2020-11-13 RX ADMIN — Medication 650 MILLIGRAM(S): at 15:00

## 2020-11-13 RX ADMIN — HEPARIN SODIUM 5000 UNIT(S): 5000 INJECTION INTRAVENOUS; SUBCUTANEOUS at 20:28

## 2020-11-13 RX ADMIN — SODIUM CHLORIDE 3 MILLILITER(S): 9 INJECTION INTRAMUSCULAR; INTRAVENOUS; SUBCUTANEOUS at 09:08

## 2020-11-13 RX ADMIN — Medication 2000 UNIT(S): at 09:07

## 2020-11-13 RX ADMIN — Medication 1 SPRAY(S): at 22:10

## 2020-11-13 RX ADMIN — Medication 1 TABLET(S): at 09:09

## 2020-11-13 RX ADMIN — HEPARIN SODIUM 5000 UNIT(S): 5000 INJECTION INTRAVENOUS; SUBCUTANEOUS at 05:30

## 2020-11-13 RX ADMIN — LOSARTAN POTASSIUM 100 MILLIGRAM(S): 100 TABLET, FILM COATED ORAL at 09:07

## 2020-11-13 RX ADMIN — Medication 25 MICROGRAM(S): at 05:30

## 2020-11-13 RX ADMIN — Medication 1 TABLET(S): at 09:07

## 2020-11-13 RX ADMIN — HEPARIN SODIUM 5000 UNIT(S): 5000 INJECTION INTRAVENOUS; SUBCUTANEOUS at 13:59

## 2020-11-13 RX ADMIN — Medication 650 MILLIGRAM(S): at 13:59

## 2020-11-13 RX ADMIN — SODIUM CHLORIDE 3 MILLILITER(S): 9 INJECTION INTRAMUSCULAR; INTRAVENOUS; SUBCUTANEOUS at 14:01

## 2020-11-13 RX ADMIN — SIMVASTATIN 20 MILLIGRAM(S): 20 TABLET, FILM COATED ORAL at 20:28

## 2020-11-13 RX ADMIN — SODIUM CHLORIDE 3 MILLILITER(S): 9 INJECTION INTRAMUSCULAR; INTRAVENOUS; SUBCUTANEOUS at 20:28

## 2020-11-13 NOTE — PROGRESS NOTE ADULT - ASSESSMENT
80F hx HTN, HLD, hypothyroid, prior DVT (no longer on AC) presenting with Covid-19 and ELISA.     #COVID-19  -repeat PCR positive. Given alternating positive and negative tests, will repeat tomorrow as negative x2 needed for [facility] or home care  -continue Hycodan for Cough  -ID following, stable for discharge from ID perspective   -Monitoring ferritin, d-dimer, crp, esr, procalcitonin;   -PT consulted with recommendations for [facility];      UTI  - Blood cultures negative. Urine cultures with E. coli  - repeat UA ordered given pt concern, and inconsistent reporting of symptoms  - s/p Rocephin x 5 days.     #Metabolic acidosis 2/2 elevated lactate - resolved     #ELISA - resolved  - Resume home Dyazide and ARB (Losartan instead of Olmesartan)    #Hyponatremia - resolved  - likely 2/2 decreased po intake  - will Monitor bmp    #HTN  - Continue Amlodipine 5mg    - Resume home diuretics and ARB    #Dyslipidemia  - Continue statin    #hypothyroid - normal TSH  - Continue  synthroid 25 mcg      #Hypomagnesemia/hypokalemia/hypophosphatemia  - Supplemented, will monitor    OA   - Tylenol 975 PO q8 PRN. Refusing other medications like NSAID (hiatal hernia), opioids as well as Gabapentin    #DVT ppx  - Heparin SQ      Disposition - Cleared by ID for discharge; PT recommendations for and Family requesting [facility]; however patient wants to go home. Awaiting negative Covid test for pending [facility] placement and d/c isolation precautions.    Estimated LOS - 48 hours 80F hx HTN, HLD, hypothyroid, prior DVT (no longer on AC) presenting with Covid-19 and ELISA.     #COVID-19  -repeat PCR positive. Given alternating positive and negative tests, will repeat tomorrow. Will check with care coordinator if 1 or 2 negative tests is needed.  -continue Hycodan for Cough  -ID following, stable for discharge from ID perspective   -Monitoring ferritin, d-dimer, crp, esr, procalcitonin;   -PT consulted with recommendations for [facility];      UTI  - Blood cultures negative. Urine cultures with E. coli  - repeat UA ordered given pt concern, and inconsistent reporting of symptoms  - s/p Rocephin x 5 days    Sinusitis  - nasal saline PRN, pt refusing flonase    #Metabolic acidosis 2/2 elevated lactate - resolved     #ELISA - resolved  - Resume home Dyazide and ARB (Losartan instead of Olmesartan)    #Hyponatremia - resolved  - likely 2/2 decreased po intake  - will Monitor bmp    #HTN  - Continue Amlodipine 5mg    - Resume home diuretics and ARB    #Dyslipidemia  - Continue statin    #hypothyroid - normal TSH  - Continue  synthroid 25 mcg      #Hypomagnesemia/hypokalemia/hypophosphatemia  - Supplemented, will monitor    OA   - Tylenol 975 PO q8 PRN. Refusing other medications like NSAID (hiatal hernia), opioids as well as Gabapentin    #DVT ppx  - Heparin SQ      Disposition - Cleared by ID for discharge; PT recommendations for and Family requesting [facility]; however patient wants to go home. Awaiting negative Covid test for pending [facility] placement and d/c isolation precautions. If pt refuses [facility], will plan for home health aide and home PT    Estimated LOS - 48 hours more 80F hx HTN, HLD, hypothyroid, prior DVT (no longer on AC) presenting with Covid-19 and ELISA.     #COVID-19  -repeat PCR positive. Given alternating positive and negative tests, will repeat tomorrow. Will check with care coordinator if 1 or 2 negative tests is needed.  -continue Hycodan for Cough  -ID following, stable for discharge from ID perspective   -Monitoring ferritin, d-dimer, crp, esr, procalcitonin;   -PT consulted with recommendations for [facility]  - refusing lab draw, will discuss in future     UTI  - Blood cultures negative. Urine cultures with E. coli  - repeat UA ordered given pt concern, and inconsistent reporting of symptoms  - s/p Rocephin x 5 days    Sinusitis  - nasal saline PRN, pt refusing flonase    #Metabolic acidosis 2/2 elevated lactate - resolved     #ELISA - resolved  - Resume home Dyazide and ARB (Losartan instead of Olmesartan)    #Hyponatremia - resolved  - likely 2/2 decreased po intake  - will Monitor bmp    #HTN  - Continue Amlodipine 5mg    - Resume home diuretics and ARB    #Dyslipidemia  - Continue statin    #hypothyroid - normal TSH  - Continue  synthroid 25 mcg      #Hypomagnesemia/hypokalemia/hypophosphatemia  - Supplemented, will monitor    OA   - Tylenol 975 PO q8 PRN. Refusing other medications like NSAID (hiatal hernia), opioids as well as Gabapentin    #DVT ppx  - Heparin SQ      Disposition - Cleared by ID for discharge; PT recommendations for and Family requesting [facility]; however patient wants to go home. Awaiting negative Covid test for pending [facility] placement and d/c isolation precautions. If pt refuses [facility], will plan for home health aide and home PT    Estimated LOS - 48 hours more

## 2020-11-13 NOTE — CHART NOTE - NSCHARTNOTEFT_GEN_A_CORE
Source: Patient [ ]  Family [ ]   other [x ]    Current Diet: Diet, DASH/TLC:   Sodium & Cholesterol Restricted (11-02-20 @ 02:00)      Patient reports [ ] nausea  [ ] vomiting [ ] diarrhea [ ] constipation  [ ]chewing problems [ ] swallowing issues  [ ] other:     PO intake:  < 50% [ ]   50-75%  [ ]   %  [x ]  other :    Source for PO intake [ ] Patient [ ] family [ x] chart [ ] staff [ ] other    Current Weight:   (11/7) 171 lbs  (11/3) 177 lbs  ? accuracy of weights, no edema documented, continue to trend and maintain strict Is&Os     Pertinent Medications: MEDICATIONS  (STANDING):  cholecalciferol 2000 Unit(s) Oral daily  heparin   Injectable 5000 Unit(s) SubCutaneous every 8 hours  influenza   Vaccine 0.5 milliLiter(s) IntraMuscular once  levothyroxine 25 MICROGram(s) Oral daily  losartan 100 milliGRAM(s) Oral daily  multivitamin/minerals 1 Tablet(s) Oral daily  simvastatin 20 milliGRAM(s) Oral at bedtime  sodium chloride 0.9% lock flush 3 milliLiter(s) IV Push every 8 hours  triamterene 37.5 mG/hydrochlorothiazide 25 mG Tablet 1 Tablet(s) Oral daily    MEDICATIONS  (PRN):  acetaminophen   Tablet .. 650 milliGRAM(s) Oral every 6 hours PRN Temp greater or equal to 38C (100.4F), Mild Pain (1 - 3)  acetaminophen   Tablet .. 975 milliGRAM(s) Oral every 8 hours PRN Moderate Pain (4 - 6)  hydrocodone/homatropine Syrup 5 milliLiter(s) Oral every 4 hours PRN Cough    Pertinent Labs: CBC Full  -  ( 12 Nov 2020 08:40 )  WBC Count : 6.75 K/uL  RBC Count : 3.80 M/uL  Hemoglobin : 11.2 g/dL  Hematocrit : 34.8 %  Platelet Count - Automated : 176 K/uL  Mean Cell Volume : 91.6 fl  Mean Cell Hemoglobin : 29.5 pg  Mean Cell Hemoglobin Concentration : 32.2 gm/dL  Auto Neutrophil # : 4.48 K/uL  Auto Lymphocyte # : 1.32 K/uL  Auto Monocyte # : 0.59 K/uL  Auto Eosinophil # : 0.12 K/uL  Auto Basophil # : 0.00 K/uL  Auto Neutrophil % : 66.4 %  Auto Lymphocyte % : 19.5 %  Auto Monocyte % : 8.8 %  Auto Eosinophil % : 1.8 %  Auto Basophil % : 0.0 %    Skin: Intact per documentation    Nutrition focused physical exam not conducted at this time due to COVID isolation precautions- found signs of malnutrition [ ]absent [ ]present    Subcutaneous fat loss: [ ] Orbital fat pads region, [ ]Buccal fat region, [ ]Triceps region,  [ ]Ribs region    Muscle wasting: [ ]Temples region, [ ]Clavicle region, [ ]Shoulder region, [ ]Scapula region, [ ]Interosseous region,  [ ]thigh region, [ ]Calf region    Estimated Needs:   [ x] no change since previous assessment  [ ] recalculated:     Current Nutrition Diagnosis: Pt remains at nutrition risk secondary to increased nutrient needs related to increased physiological demand for nutrient as evidenced by UTI and COVID-19+. Pt with good po intake per documentation, consuming % of meals. Last BM 11/12 noted.     Recommendations:   1) Continue diet as tolerated.   2) Add Ensure Enlive TID to maximize nutrition status and provide an additional 350 kcal and 20g protein per serving.  3) Continue MVI and vitamin D supplementation, add vitamin C 500mg daily.  4) Encourage po intake, monitor diet tolerance, and provide assistance at meals as needed.   5) Obtain daily weights to monitor trends.     Monitoring and Evaluation:   [x ] PO intake [x ] Tolerance to diet prescription [X] Weights  [X] Follow up per protocol [X] Labs

## 2020-11-13 NOTE — PROGRESS NOTE ADULT - SUBJECTIVE AND OBJECTIVE BOX
Patient is a 80y old  Female who presents with a chief complaint of covid19, generalized weakness , anson (12 Nov 2020 23:59)      INTERVAL HPI/OVERNIGHT EVENTS:      MEDICATIONS  (STANDING):  cholecalciferol 2000 Unit(s) Oral daily  fluticasone propionate 50 MICROgram(s)/spray Nasal Spray 1 Spray(s) Both Nostrils two times a day  heparin   Injectable 5000 Unit(s) SubCutaneous every 8 hours  influenza   Vaccine 0.5 milliLiter(s) IntraMuscular once  levothyroxine 25 MICROGram(s) Oral daily  losartan 100 milliGRAM(s) Oral daily  multivitamin/minerals 1 Tablet(s) Oral daily  simvastatin 20 milliGRAM(s) Oral at bedtime  sodium chloride 0.9% lock flush 3 milliLiter(s) IV Push every 8 hours  triamterene 37.5 mG/hydrochlorothiazide 25 mG Tablet 1 Tablet(s) Oral daily    MEDICATIONS  (PRN):  acetaminophen   Tablet .. 650 milliGRAM(s) Oral every 6 hours PRN Temp greater or equal to 38C (100.4F), Mild Pain (1 - 3)  acetaminophen   Tablet .. 975 milliGRAM(s) Oral every 8 hours PRN Moderate Pain (4 - 6)  hydrocodone/homatropine Syrup 5 milliLiter(s) Oral every 4 hours PRN Cough      Allergies    all narcotics give severe vomitting and dizziness (Other; Vomiting)  ampicillin (Stomach Upset)  Bactrim (Rash)  Cipro (Other)  Levaquin (Unknown)    Intolerances    Augmentin (Diarrhea)      REVIEW OF SYSTEMS:  CONSTITUTIONAL: No fever, weight loss, or fatigue  EYES: No eye pain, visual disturbances, or discharge  ENMT:  No difficulty hearing, tinnitus, vertigo; No sinus or throat pain  NECK: No pain or stiffness  RESPIRATORY: No cough, wheezing, chills or hemoptysis; No shortness of breath  CARDIOVASCULAR: No chest pain, palpitations, or lightheadedness  GASTROINTESTINAL: No abdominal or epigastric pain. No nausea, vomiting, or hematemesis; No diarrhea or constipation. No melena or hematochezia.  GENITOURINARY: No dysuria, frequency, hematuria, or incontinence  NEUROLOGICAL: No headaches, vertigo, memory loss, loss of strength, numbness, or tremors  SKIN: No itching, burning, rashes, or lesions   LYMPH NODES: No enlarged glands  ENDOCRINE: No heat or cold intolerance; No hair loss; No polydipsia or polyuria  MUSCULOSKELETAL: No back pain  PSYCHIATRIC: No depression, anxiety, or mood swings  HEME/LYMPH: No easy bruising, or bleeding gums  ALLERGY AND IMMUNOLOGIC: No hives or eczema    PHYSICAL EXAM:  GENERAL: NAD, well-groomed, well-developed  HEAD:  Atraumatic, Normocephalic  EYES: EOMI, PERRLA, conjunctiva and sclera clear  ENMT: Moist mucous membranes, Good dentition, No lesions; No tonsillar erythema, exudates, or enlargement   NECK: Supple, No JVD appreciated, Normal thyroid  NERVOUS SYSTEM:  Alert & Oriented X3, Good concentration; Bilateral LE mobile, sensation to light touch intact  CHEST/LUNG: Clear to auscultation bilaterally; No rales, rhonchi, wheezing, or rubs  HEART: Regular rate and rhythm; No murmurs, rubs, or gallops  ABDOMEN: Soft, Nontender, Nondistended; Bowel sounds present  EXTREMITIES:  2+ Peripheral Pulses, No clubbing or cyanosis  LYMPH: No lymphadenopathy noted  SKIN: No rashes or lesions  INCISION:  Dressing dry and intact    Vital Signs Last 24 Hrs  T(C): 36.6 (13 Nov 2020 18:43), Max: 36.8 (13 Nov 2020 10:30)  T(F): 97.9 (13 Nov 2020 18:43), Max: 98.2 (13 Nov 2020 10:30)  HR: 72 (13 Nov 2020 18:43) (58 - 72)  BP: 147/83 (13 Nov 2020 18:43) (108/65 - 147/83)  BP(mean): --  RR: 18 (13 Nov 2020 18:43) (17 - 18)  SpO2: 100% (13 Nov 2020 18:43) (96% - 100%)    LABS:      Ca    9.4        12 Nov 2020 08:40          CAPILLARY BLOOD GLUCOSE               [x] Consultant(s) Notes Reviewed Patient is a 80y old  Female who presents with a chief complaint of covid19, generalized weakness , anson (12 Nov 2020 23:59)      INTERVAL HPI/OVERNIGHT EVENTS:  Patient seen awake in room. She reports ambulating with PT. Denies F/C, denies N/V/D. Denies cough or SOB. Interivew in isolation and with full PPE.      MEDICATIONS  (STANDING):  cholecalciferol 2000 Unit(s) Oral daily  fluticasone propionate 50 MICROgram(s)/spray Nasal Spray 1 Spray(s) Both Nostrils two times a day  heparin   Injectable 5000 Unit(s) SubCutaneous every 8 hours  influenza   Vaccine 0.5 milliLiter(s) IntraMuscular once  levothyroxine 25 MICROGram(s) Oral daily  losartan 100 milliGRAM(s) Oral daily  multivitamin/minerals 1 Tablet(s) Oral daily  simvastatin 20 milliGRAM(s) Oral at bedtime  sodium chloride 0.9% lock flush 3 milliLiter(s) IV Push every 8 hours  triamterene 37.5 mG/hydrochlorothiazide 25 mG Tablet 1 Tablet(s) Oral daily    MEDICATIONS  (PRN):  acetaminophen   Tablet .. 650 milliGRAM(s) Oral every 6 hours PRN Temp greater or equal to 38C (100.4F), Mild Pain (1 - 3)  acetaminophen   Tablet .. 975 milliGRAM(s) Oral every 8 hours PRN Moderate Pain (4 - 6)  hydrocodone/homatropine Syrup 5 milliLiter(s) Oral every 4 hours PRN Cough      Allergies    all narcotics give severe vomitting and dizziness (Other; Vomiting)  ampicillin (Stomach Upset)  Bactrim (Rash)  Cipro (Other)  Levaquin (Unknown)    Intolerances    Augmentin (Diarrhea)      REVIEW OF SYSTEMS:  CONSTITUTIONAL: No fever, weight loss, or fatigue  EYES: No eye pain, visual disturbances, or discharge  ENMT:  No difficulty hearing, tinnitus, vertigo; No sinus or throat pain  NECK: No pain or stiffness  RESPIRATORY: No cough, wheezing, chills or hemoptysis; No shortness of breath  CARDIOVASCULAR: No chest pain, palpitations, or lightheadedness  GASTROINTESTINAL: No abdominal or epigastric pain. No nausea, vomiting, or hematemesis; No diarrhea or constipation. No melena or hematochezia.  GENITOURINARY: No dysuria, frequency, hematuria, or incontinence  NEUROLOGICAL: No headaches, vertigo, memory loss, loss of strength, numbness, or tremors  SKIN: No itching, burning, rashes, or lesions   LYMPH NODES: No enlarged glands  ENDOCRINE: No heat or cold intolerance; No hair loss; No polydipsia or polyuria  MUSCULOSKELETAL: No back pain      PHYSICAL EXAM:  GENERAL: NAD, well-groomed, well-developed  HEAD:  Atraumatic, Normocephalic  EYES: EOMI, PERRLA, conjunctiva and sclera clear  ENMT: Moist mucous membranes, Good dentition   NECK: Supple, No JVD appreciated  NERVOUS SYSTEM:  Alert & Oriented X3, Good concentration; Bilateral LE mobile, sensation to light touch intact  CHEST/LUNG: Clear to auscultation bilaterally; No rales, rhonchi, wheezing, or rubs  HEART: Regular rate and rhythm; No murmurs, rubs, or gallops  ABDOMEN: Soft, Nontender, Nondistended; Bowel sounds present  EXTREMITIES:  2+ Peripheral Pulses, No clubbing or cyanosis  LYMPH: No lymphadenopathy noted  SKIN: No rashes or lesions appreciated      Vital Signs Last 24 Hrs  T(C): 36.6 (13 Nov 2020 18:43), Max: 36.8 (13 Nov 2020 10:30)  T(F): 97.9 (13 Nov 2020 18:43), Max: 98.2 (13 Nov 2020 10:30)  HR: 72 (13 Nov 2020 18:43) (58 - 72)  BP: 147/83 (13 Nov 2020 18:43) (108/65 - 147/83)  BP(mean): --  RR: 18 (13 Nov 2020 18:43) (17 - 18)  SpO2: 100% (13 Nov 2020 18:43) (96% - 100%)    LABS:      Ca    9.4        12 Nov 2020 08:40          CAPILLARY BLOOD GLUCOSE               [x] Consultant(s) Notes Reviewed

## 2020-11-14 LAB
APPEARANCE UR: CLEAR — SIGNIFICANT CHANGE UP
BACTERIA # UR AUTO: ABNORMAL
BILIRUB UR-MCNC: NEGATIVE — SIGNIFICANT CHANGE UP
COLOR SPEC: YELLOW — SIGNIFICANT CHANGE UP
DIFF PNL FLD: ABNORMAL
EPI CELLS # UR: SIGNIFICANT CHANGE UP
GLUCOSE UR QL: NEGATIVE MG/DL — SIGNIFICANT CHANGE UP
KETONES UR-MCNC: NEGATIVE — SIGNIFICANT CHANGE UP
LEUKOCYTE ESTERASE UR-ACNC: ABNORMAL
NITRITE UR-MCNC: NEGATIVE — SIGNIFICANT CHANGE UP
PH UR: 6 — SIGNIFICANT CHANGE UP (ref 5–8)
PROT UR-MCNC: 15 MG/DL
RBC CASTS # UR COMP ASSIST: ABNORMAL /HPF (ref 0–4)
SARS-COV-2 RNA SPEC QL NAA+PROBE: SIGNIFICANT CHANGE UP
SP GR SPEC: 1.01 — SIGNIFICANT CHANGE UP (ref 1.01–1.02)
UROBILINOGEN FLD QL: NEGATIVE MG/DL — SIGNIFICANT CHANGE UP
WBC UR QL: ABNORMAL

## 2020-11-14 PROCEDURE — 99233 SBSQ HOSP IP/OBS HIGH 50: CPT | Mod: CS

## 2020-11-14 RX ORDER — SODIUM CHLORIDE 0.65 %
1 AEROSOL, SPRAY (ML) NASAL THREE TIMES A DAY
Refills: 0 | Status: DISCONTINUED | OUTPATIENT
Start: 2020-11-14 | End: 2020-11-17

## 2020-11-14 RX ADMIN — SODIUM CHLORIDE 3 MILLILITER(S): 9 INJECTION INTRAMUSCULAR; INTRAVENOUS; SUBCUTANEOUS at 21:04

## 2020-11-14 RX ADMIN — HEPARIN SODIUM 5000 UNIT(S): 5000 INJECTION INTRAVENOUS; SUBCUTANEOUS at 12:40

## 2020-11-14 RX ADMIN — Medication 1 TABLET(S): at 08:42

## 2020-11-14 RX ADMIN — HEPARIN SODIUM 5000 UNIT(S): 5000 INJECTION INTRAVENOUS; SUBCUTANEOUS at 21:02

## 2020-11-14 RX ADMIN — SODIUM CHLORIDE 3 MILLILITER(S): 9 INJECTION INTRAMUSCULAR; INTRAVENOUS; SUBCUTANEOUS at 13:09

## 2020-11-14 RX ADMIN — SODIUM CHLORIDE 3 MILLILITER(S): 9 INJECTION INTRAMUSCULAR; INTRAVENOUS; SUBCUTANEOUS at 05:24

## 2020-11-14 RX ADMIN — HEPARIN SODIUM 5000 UNIT(S): 5000 INJECTION INTRAVENOUS; SUBCUTANEOUS at 06:03

## 2020-11-14 RX ADMIN — Medication 1 TABLET(S): at 12:44

## 2020-11-14 RX ADMIN — LOSARTAN POTASSIUM 100 MILLIGRAM(S): 100 TABLET, FILM COATED ORAL at 08:42

## 2020-11-14 RX ADMIN — SIMVASTATIN 20 MILLIGRAM(S): 20 TABLET, FILM COATED ORAL at 18:49

## 2020-11-14 RX ADMIN — Medication 2000 UNIT(S): at 12:43

## 2020-11-14 RX ADMIN — Medication 25 MICROGRAM(S): at 06:03

## 2020-11-14 RX ADMIN — Medication 975 MILLIGRAM(S): at 13:42

## 2020-11-14 RX ADMIN — Medication 975 MILLIGRAM(S): at 12:42

## 2020-11-14 NOTE — PROGRESS NOTE ADULT - SUBJECTIVE AND OBJECTIVE BOX
Patient is a 80y old  Female who presents with a chief complaint of covid19, generalized weakness , anson (2020 23:57)    Patient seen and examined at bedside.     ALLERGIES:  all narcotics give severe vomitting and dizziness (Other; Vomiting)  ampicillin (Stomach Upset)  Augmentin (Diarrhea)  Bactrim (Rash)  Cipro (Other)  Levaquin (Unknown)    MEDICATIONS  (STANDING):  cholecalciferol 2000 Unit(s) Oral daily  heparin   Injectable 5000 Unit(s) SubCutaneous every 8 hours  influenza   Vaccine 0.5 milliLiter(s) IntraMuscular once  levothyroxine 25 MICROGram(s) Oral daily  losartan 100 milliGRAM(s) Oral daily  multivitamin/minerals 1 Tablet(s) Oral daily  simvastatin 20 milliGRAM(s) Oral at bedtime  sodium chloride 0.9% lock flush 3 milliLiter(s) IV Push every 8 hours  triamterene 37.5 mG/hydrochlorothiazide 25 mG Tablet 1 Tablet(s) Oral daily    MEDICATIONS  (PRN):  acetaminophen   Tablet .. 650 milliGRAM(s) Oral every 6 hours PRN Temp greater or equal to 38C (100.4F), Mild Pain (1 - 3)  acetaminophen   Tablet .. 975 milliGRAM(s) Oral every 8 hours PRN Moderate Pain (4 - 6)  hydrocodone/homatropine Syrup 5 milliLiter(s) Oral every 4 hours PRN Cough  sodium chloride 0.65% Nasal 1 Spray(s) Both Nostrils three times a day PRN Nasal Congestion    Vital Signs Last 24 Hrs  T(F): 97.6 (2020 08:09), Max: 98.2 (2020 10:30)  HR: 62 (2020 08:09) (58 - 72)  BP: 138/64 (2020 08:09) (108/65 - 147/83)  RR: 18 (2020 08:09) (17 - 18)  SpO2: 96% (2020 08:09) (93% - 100%)  I&O's Summary    2020 07:01  -  2020 07:00  --------------------------------------------------------  IN: 0 mL / OUT: 650 mL / NET: -650 mL    PHYSICAL EXAM:  General: NAD, A/O x 3  ENT: MMM, no thrush  Neck: Supple, No JVD  Lungs: Clear to auscultation bilaterally, good air entry, non-labored breathing  Cardio: +s1/s2  Abdomen: Soft, Nontender, Nondistended; Bowel sounds present  Extremities: No calf tenderness    LABS:                        11.2   6.75  )-----------( 176      ( 2020 08:40 )             34.8         135  |  104  |  34.0  ----------------------------<  94  5.0   |  17.0  |  0.59    Ca    9.4      2020 08:40  Mg     1.7         TPro  6.3  /  Alb  2.9  /  TBili  0.4  /  DBili  x   /  AST  31  /  ALT  49  /  AlkPhos  63      eGFR if Non African American: 88 mL/min/1.73M2 (20 @ 08:40)  eGFR if : 102 mL/min/1.73M2 (20 @ 08:40)    Urinalysis Basic - ( 2020 02:31 )  Color: Yellow / Appearance: Clear / S.015 / pH: x  Gluc: x / Ketone: Negative  / Bili: Negative / Urobili: Negative mg/dL   Blood: x / Protein: 15 mg/dL / Nitrite: Negative   Leuk Esterase: Moderate / RBC: 3-5 /HPF / WBC 11-25   Sq Epi: x / Non Sq Epi: Occasional / Bacteria: Few    RADIOLOGY & ADDITIONAL TESTS:  < from: CT Chest/ Abdomen and Pelvis No Cont (20 @ 04:08) >  IMPRESSION:  Patchy areas of bilateral groundglass opacity suggests COVID-19 infection.  No acute findings in the abdomen or pelvis.  Cholecystectomy.  < end of copied text >    < from: Xray Chest 1 View-PORTABLE IMMEDIATE (20 @ 23:37) >  IMPRESSION: Noacute cardiopulmonary disease process.  < end of copied text >

## 2020-11-14 NOTE — PROGRESS NOTE ADULT - ASSESSMENT
80F hx HTN, HLD, hypothyroid, prior DVT (no longer on AC) presenting with Covid-19 and ELISA.     #COVID-19  - w/ debiliation post covid   - hycodan prn for cough   - pcr remains to be positive   - ID Consult appreciated    - PT Consult appreciated- awaiting negative test for ANITHA   - isolation precautions     #UTI  - s/p rocephin x 5 days   - ID consult appreciated   - urine culture 11/4- Ecoli >[id number]    #Sinusitis  - nasal saline PRN    #Hyponatremia - resolved  - likely 2/2 decreased po intake  - will Monitor bmp    #HTN- Essential  - maxzide, losartan  - monitor blood pressure     #HLD  - statin    #hypothyroid   - synthroid     #OA   - Tylenol PRN    #DVT prophylaxis  - Heparin SC

## 2020-11-15 PROCEDURE — 99233 SBSQ HOSP IP/OBS HIGH 50: CPT | Mod: CS

## 2020-11-15 RX ORDER — DIPHENHYDRAMINE HCL 50 MG
50 CAPSULE ORAL ONCE
Refills: 0 | Status: COMPLETED | OUTPATIENT
Start: 2020-11-15 | End: 2020-11-15

## 2020-11-15 RX ADMIN — Medication 1 TABLET(S): at 13:59

## 2020-11-15 RX ADMIN — Medication 975 MILLIGRAM(S): at 14:47

## 2020-11-15 RX ADMIN — LOSARTAN POTASSIUM 100 MILLIGRAM(S): 100 TABLET, FILM COATED ORAL at 09:47

## 2020-11-15 RX ADMIN — Medication 25 MICROGRAM(S): at 05:24

## 2020-11-15 RX ADMIN — HEPARIN SODIUM 5000 UNIT(S): 5000 INJECTION INTRAVENOUS; SUBCUTANEOUS at 21:24

## 2020-11-15 RX ADMIN — Medication 50 MILLIGRAM(S): at 06:14

## 2020-11-15 RX ADMIN — Medication 975 MILLIGRAM(S): at 15:34

## 2020-11-15 RX ADMIN — SODIUM CHLORIDE 3 MILLILITER(S): 9 INJECTION INTRAMUSCULAR; INTRAVENOUS; SUBCUTANEOUS at 05:29

## 2020-11-15 RX ADMIN — SODIUM CHLORIDE 3 MILLILITER(S): 9 INJECTION INTRAMUSCULAR; INTRAVENOUS; SUBCUTANEOUS at 14:00

## 2020-11-15 RX ADMIN — SIMVASTATIN 20 MILLIGRAM(S): 20 TABLET, FILM COATED ORAL at 18:33

## 2020-11-15 RX ADMIN — SODIUM CHLORIDE 3 MILLILITER(S): 9 INJECTION INTRAMUSCULAR; INTRAVENOUS; SUBCUTANEOUS at 21:25

## 2020-11-15 RX ADMIN — Medication 2000 UNIT(S): at 13:59

## 2020-11-15 RX ADMIN — HEPARIN SODIUM 5000 UNIT(S): 5000 INJECTION INTRAVENOUS; SUBCUTANEOUS at 05:24

## 2020-11-15 RX ADMIN — HEPARIN SODIUM 5000 UNIT(S): 5000 INJECTION INTRAVENOUS; SUBCUTANEOUS at 13:59

## 2020-11-15 RX ADMIN — Medication 1 TABLET(S): at 09:47

## 2020-11-15 NOTE — PROGRESS NOTE ADULT - ASSESSMENT
80F hx HTN, HLD, hypothyroid, prior DVT (no longer on AC) presenting with Covid-19 and ELISA.     #COVID-19  - w/ debiliation post covid   - hycodan prn for cough   - pcr remains to be positive   - ID Consult appreciated    - PT Consult appreciated- awaiting negative test for ANITHA   - isolation precautions     #UTI  - s/p rocephin x 5 days   - ID consult appreciated   - urine culture 11/4- Ecoli >[id number]    #Sinusitis  - nasal saline PRN    #Hyponatremia - resolved  - likely 2/2 decreased po intake  - will Monitor bmp    #HTN- Essential  - maxzide, losartan  - monitor blood pressure     #HLD  - statin    #hypothyroid   - synthroid     #OA   - Tylenol PRN    #DVT prophylaxis  - Heparin SC 80F hx HTN, HLD, hypothyroid, prior DVT (no longer on AC) presenting with Covid-19 and ELISA.     #COVID-19  - w/ debiliation post covid   - hycodan prn for cough   - pcr remains to be positive   - ID Consult appreciated    - PT Consult appreciated- awaiting negative test for ANITHA   - isolation precautions     #UTI  - s/p rocephin x 5 days   - ID consult appreciated   - urine culture 11/4- Ecoli >[id number]    #Sinusitis  - nasal saline PRN    #Hyponatremia - resolved  - likely 2/2 decreased po intake  - will Monitor bmp    #HTN- Essential  - maxzide, losartan  - monitor blood pressure     #HLD  - statin    #hypothyroid   - synthroid     #OA   - Tylenol PRN    #DVT prophylaxis  - Heparin SC    Dispo- home with home pt and aides when set up by sw/ case mgmt

## 2020-11-15 NOTE — PROGRESS NOTE ADULT - SUBJECTIVE AND OBJECTIVE BOX
Patient is a 80y old  Female who presents with a chief complaint of covid19, generalized weakness , anson (2020 09:32)    Patient seen and examined at bedside.     ALLERGIES:  all narcotics give severe vomitting and dizziness (Other; Vomiting)  ampicillin (Stomach Upset)  Augmentin (Diarrhea)  Bactrim (Rash)  Cipro (Other)  Levaquin (Unknown)    MEDICATIONS  (STANDING):  cholecalciferol 2000 Unit(s) Oral daily  heparin   Injectable 5000 Unit(s) SubCutaneous every 8 hours  influenza   Vaccine 0.5 milliLiter(s) IntraMuscular once  levothyroxine 25 MICROGram(s) Oral daily  losartan 100 milliGRAM(s) Oral daily  multivitamin/minerals 1 Tablet(s) Oral daily  simvastatin 20 milliGRAM(s) Oral at bedtime  sodium chloride 0.9% lock flush 3 milliLiter(s) IV Push every 8 hours  triamterene 37.5 mG/hydrochlorothiazide 25 mG Tablet 1 Tablet(s) Oral daily    MEDICATIONS  (PRN):  acetaminophen   Tablet .. 650 milliGRAM(s) Oral every 6 hours PRN Temp greater or equal to 38C (100.4F), Mild Pain (1 - 3)  acetaminophen   Tablet .. 975 milliGRAM(s) Oral every 8 hours PRN Moderate Pain (4 - 6)  hydrocodone/homatropine Syrup 5 milliLiter(s) Oral every 4 hours PRN Cough  sodium chloride 0.65% Nasal 1 Spray(s) Both Nostrils three times a day PRN Nasal Congestion    Vital Signs Last 24 Hrs  T(F): 98 (15 Nov 2020 08:18), Max: 98.2 (15 Nov 2020 05:25)  HR: 53 (15 Nov 2020 08:18) (53 - 64)  BP: 129/77 (15 Nov 2020 08:18) (115/62 - 129/77)  RR: 19 (15 Nov 2020 08:18) (18 - 19)  SpO2: 94% (15 Nov 2020 08:18) (94% - 95%)  I&O's Summary    2020 07:01  -  15 Nov 2020 07:00  --------------------------------------------------------  IN: 0 mL / OUT: 900 mL / NET: -900 mL      PHYSICAL EXAM:  General: NAD, A/O x 3  ENT: MMM, no thrush  Neck: Supple, No JVD  Lungs: Clear to auscultation bilaterally, good air entry, non-labored breathing  Cardio: +s1/s2  Abdomen: Soft, Nontender, Nondistended; Bowel sounds present  Extremities: No calf tenderness      LABS:  - patient refusing labs    Urinalysis Basic - ( 2020 02:31 )  Color: Yellow / Appearance: Clear / S.015 / pH: x  Gluc: x / Ketone: Negative  / Bili: Negative / Urobili: Negative mg/dL   Blood: x / Protein: 15 mg/dL / Nitrite: Negative   Leuk Esterase: Moderate / RBC: 3-5 /HPF / WBC 11-25   Sq Epi: x / Non Sq Epi: Occasional / Bacteria: Few    RADIOLOGY & ADDITIONAL TESTS:  < from: CT Chest/ Abdomen and Pelvis No Cont (20 @ 04:08) >  IMPRESSION:  Patchy areas of bilateral groundglass opacity suggests COVID-19 infection.  No acute findings in the abdomen or pelvis.  Cholecystectomy.  < end of copied text >    < from: Xray Chest 1 View-PORTABLE IMMEDIATE (20 @ 23:37) >  IMPRESSION: Noacute cardiopulmonary disease process.  < end of copied text >

## 2020-11-16 ENCOUNTER — TRANSCRIPTION ENCOUNTER (OUTPATIENT)
Age: 80
End: 2020-11-16

## 2020-11-16 PROCEDURE — 99239 HOSP IP/OBS DSCHRG MGMT >30: CPT | Mod: CS

## 2020-11-16 RX ORDER — ACETAMINOPHEN 500 MG
3 TABLET ORAL
Qty: 0 | Refills: 0 | DISCHARGE
Start: 2020-11-16

## 2020-11-16 RX ORDER — RIVAROXABAN 15 MG-20MG
1 KIT ORAL
Qty: 30 | Refills: 0
Start: 2020-11-16

## 2020-11-16 RX ORDER — MULTIVIT-MIN/FERROUS GLUCONATE 9 MG/15 ML
1 LIQUID (ML) ORAL
Qty: 0 | Refills: 0 | DISCHARGE
Start: 2020-11-16

## 2020-11-16 RX ORDER — SODIUM CHLORIDE 0.65 %
2 AEROSOL, SPRAY (ML) NASAL
Qty: 1 | Refills: 0
Start: 2020-11-16

## 2020-11-16 RX ORDER — CHOLECALCIFEROL (VITAMIN D3) 125 MCG
2000 CAPSULE ORAL
Qty: 0 | Refills: 0 | DISCHARGE
Start: 2020-11-16

## 2020-11-16 RX ORDER — RIVAROXABAN 15 MG-20MG
10 KIT ORAL DAILY
Refills: 0 | Status: DISCONTINUED | OUTPATIENT
Start: 2020-11-16 | End: 2020-11-17

## 2020-11-16 RX ADMIN — SODIUM CHLORIDE 3 MILLILITER(S): 9 INJECTION INTRAMUSCULAR; INTRAVENOUS; SUBCUTANEOUS at 13:17

## 2020-11-16 RX ADMIN — Medication 975 MILLIGRAM(S): at 14:00

## 2020-11-16 RX ADMIN — SODIUM CHLORIDE 3 MILLILITER(S): 9 INJECTION INTRAMUSCULAR; INTRAVENOUS; SUBCUTANEOUS at 06:12

## 2020-11-16 RX ADMIN — LOSARTAN POTASSIUM 100 MILLIGRAM(S): 100 TABLET, FILM COATED ORAL at 09:28

## 2020-11-16 RX ADMIN — Medication 25 MICROGRAM(S): at 06:11

## 2020-11-16 RX ADMIN — SIMVASTATIN 20 MILLIGRAM(S): 20 TABLET, FILM COATED ORAL at 22:38

## 2020-11-16 RX ADMIN — HEPARIN SODIUM 5000 UNIT(S): 5000 INJECTION INTRAVENOUS; SUBCUTANEOUS at 06:11

## 2020-11-16 RX ADMIN — Medication 2000 UNIT(S): at 13:16

## 2020-11-16 RX ADMIN — HEPARIN SODIUM 5000 UNIT(S): 5000 INJECTION INTRAVENOUS; SUBCUTANEOUS at 13:17

## 2020-11-16 RX ADMIN — Medication 975 MILLIGRAM(S): at 13:19

## 2020-11-16 RX ADMIN — SODIUM CHLORIDE 3 MILLILITER(S): 9 INJECTION INTRAMUSCULAR; INTRAVENOUS; SUBCUTANEOUS at 22:00

## 2020-11-16 RX ADMIN — Medication 1 TABLET(S): at 09:28

## 2020-11-16 RX ADMIN — Medication 1 TABLET(S): at 13:17

## 2020-11-16 NOTE — DISCHARGE NOTE PROVIDER - NSDCMRMEDTOKEN_GEN_ALL_CORE_FT
acetaminophen 325 mg oral tablet: 3 tab(s) orally every 8 hours, As needed,  Pain (4 - 6)  Benicar 40 mg oral tablet: 40 milligram(s) orally once a day  hydrochlorothiazide-triamterene 25 mg-37.5 mg oral capsule: 1 cap(s) orally once a day  Multiple Vitamins with Minerals oral tablet: 1 tab(s) orally once a day  simvastatin 20 mg oral tablet: 1 tab(s) orally once a day (at bedtime)  sodium chloride 0.65% nasal spray: 2 spray(s) nasal 4 times a day, As Needed   Synthroid 25 mcg (0.025 mg) oral tablet: 1 tab(s) orally once a day  Mondays and thursdays double the dose   acetaminophen 325 mg oral tablet: 3 tab(s) orally every 8 hours, As needed,  Pain (4 - 6)  Benicar 40 mg oral tablet: 40 milligram(s) orally once a day  hydrochlorothiazide-triamterene 25 mg-37.5 mg oral capsule: 1 cap(s) orally once a day  Multiple Vitamins with Minerals oral tablet: 1 tab(s) orally once a day  rivaroxaban 10 mg oral tablet: 1 tab(s) orally once a day  simvastatin 20 mg oral tablet: 1 tab(s) orally once a day (at bedtime)  sodium chloride 0.65% nasal spray: 2 spray(s) nasal 4 times a day, As Needed   Synthroid 25 mcg (0.025 mg) oral tablet: 1 tab(s) orally once a day  Mondays and thursdays double the dose

## 2020-11-16 NOTE — DISCHARGE NOTE NURSING/CASE MANAGEMENT/SOCIAL WORK - NSDCVIVACCINE_GEN_ALL_CORE_FT
Influenza , 2014/11/0  , Gisel Chamberlain (RN)  Pneumococcal , 2009/10/0  , Gisel Chamberlain (JACE)   Influenza , 2014/11/0  , Gisel Chamberlain (RN)  Influenza , 2020/11/17 16:50 , Juan J Quintanilla (RN)  Pneumococcal , 2009/10/0  , Gisel Chamberlain (RN)

## 2020-11-16 NOTE — DISCHARGE NOTE NURSING/CASE MANAGEMENT/SOCIAL WORK - NSDCCRNAME_GEN_ALL_CORE_FT
[facility] [phone] & [facility] [phone]

## 2020-11-16 NOTE — DISCHARGE NOTE PROVIDER - CARE PROVIDER_API CALL
Niels Anaya AND MIS AMAYA [place] [address] [place] [address]  Phone: [phone]  Fax: [phone]  Follow Up Time:

## 2020-11-16 NOTE — DISCHARGE NOTE PROVIDER - NSDCACTIVITY_GEN_ALL_CORE
Stairs allowed/Showering allowed/Do not drive or operate machinery/Walking - Outdoors allowed/Bathing allowed/Walking - Indoors allowed/No heavy lifting/straining

## 2020-11-16 NOTE — DISCHARGE NOTE PROVIDER - HOSPITAL COURSE
80F hx HTN, HLD, hypothyroid, prior DVT (no longer on AC) presents with cough, fever, body aches, generalized weakness found to have covid19, ELISA.     Patient wasn't  currently hypoxic so held off on steroids and Remdesivir. Treated with Hycodan for Cough. She has been stable but unable to be discharge due to positive COVID PCR, until 11/14/2020 and being discharged home with A. Her ELISA resolved and she was resumed on her home Dyazide and ARB for HTN; amlodipine added. She did have mild hyponatremia at admission which has also resolved. She also had some Hypomagnesemia/hypokalemia/hypophosphatemia which were replaced.    During her stay she complained of dysuria and was treated with Rocephin x 5 days for culture proven E. coli UTI. Symptoms resolved. Continued on statin for  dyslipidemia and Synthroid for hypothyroidism - normal TSH.    Significant complaints of OA; treated with Tylenol 975 PO q8 PRN. Refusing other medications like NSAID (hiatal hernia), opioids as well as Gabapentin       Medically stable for discharge home with home care. 41 minutes

## 2020-11-16 NOTE — DISCHARGE NOTE PROVIDER - NSDCCPCAREPLAN_GEN_ALL_CORE_FT
PRINCIPAL DISCHARGE DIAGNOSIS  Diagnosis: COVID-19 virus infection  Assessment and Plan of Treatment: Continue Xarelto x 1 month      SECONDARY DISCHARGE DIAGNOSES  Diagnosis: E-coli UTI  Assessment and Plan of Treatment: Completed treatment    Diagnosis: Hypothyroidism  Assessment and Plan of Treatment: Continue home medications    Diagnosis: Hypomagnesemia  Assessment and Plan of Treatment: replaced    Diagnosis: Dehydration  Assessment and Plan of Treatment: Resolved    Diagnosis: Hypokalemia  Assessment and Plan of Treatment: Replaced    Diagnosis: HTN (hypertension)  Assessment and Plan of Treatment: Continue diet and medications as prescribed    Diagnosis: Osteoarthritis  Assessment and Plan of Treatment: Continue Tylenol    Diagnosis: Acute renal failure, unspecified acute renal failure type  Assessment and Plan of Treatment: Resolved    Diagnosis: Hyponatremia  Assessment and Plan of Treatment: Resolved

## 2020-11-16 NOTE — DISCHARGE NOTE NURSING/CASE MANAGEMENT/SOCIAL WORK - PATIENT PORTAL LINK FT
You can access the FollowMyHealth Patient Portal offered by [facility] by registering at the following website: [email or web address]. By joining [facility] FollowMyHealth portal, you will also be able to view your health information using other applications (apps) compatible with our system.

## 2020-11-17 VITALS
TEMPERATURE: 98 F | DIASTOLIC BLOOD PRESSURE: 73 MMHG | OXYGEN SATURATION: 99 % | SYSTOLIC BLOOD PRESSURE: 133 MMHG | RESPIRATION RATE: 18 BRPM | HEART RATE: 65 BPM

## 2020-11-17 PROCEDURE — 81001 URINALYSIS AUTO W/SCOPE: CPT

## 2020-11-17 PROCEDURE — 99285 EMERGENCY DEPT VISIT HI MDM: CPT | Mod: 25

## 2020-11-17 PROCEDURE — 97110 THERAPEUTIC EXERCISES: CPT

## 2020-11-17 PROCEDURE — 71045 X-RAY EXAM CHEST 1 VIEW: CPT

## 2020-11-17 PROCEDURE — 96374 THER/PROPH/DIAG INJ IV PUSH: CPT

## 2020-11-17 PROCEDURE — 97530 THERAPEUTIC ACTIVITIES: CPT

## 2020-11-17 PROCEDURE — 82570 ASSAY OF URINE CREATININE: CPT

## 2020-11-17 PROCEDURE — 85652 RBC SED RATE AUTOMATED: CPT

## 2020-11-17 PROCEDURE — 84145 PROCALCITONIN (PCT): CPT

## 2020-11-17 PROCEDURE — 85379 FIBRIN DEGRADATION QUANT: CPT

## 2020-11-17 PROCEDURE — 83605 ASSAY OF LACTIC ACID: CPT

## 2020-11-17 PROCEDURE — 82306 VITAMIN D 25 HYDROXY: CPT

## 2020-11-17 PROCEDURE — 85730 THROMBOPLASTIN TIME PARTIAL: CPT

## 2020-11-17 PROCEDURE — 84540 ASSAY OF URINE/UREA-N: CPT

## 2020-11-17 PROCEDURE — 90686 IIV4 VACC NO PRSV 0.5 ML IM: CPT

## 2020-11-17 PROCEDURE — 94640 AIRWAY INHALATION TREATMENT: CPT

## 2020-11-17 PROCEDURE — 85610 PROTHROMBIN TIME: CPT

## 2020-11-17 PROCEDURE — 99232 SBSQ HOSP IP/OBS MODERATE 35: CPT | Mod: CS

## 2020-11-17 PROCEDURE — 84443 ASSAY THYROID STIM HORMONE: CPT

## 2020-11-17 PROCEDURE — 85027 COMPLETE CBC AUTOMATED: CPT

## 2020-11-17 PROCEDURE — 87086 URINE CULTURE/COLONY COUNT: CPT

## 2020-11-17 PROCEDURE — 97163 PT EVAL HIGH COMPLEX 45 MIN: CPT

## 2020-11-17 PROCEDURE — 71250 CT THORAX DX C-: CPT

## 2020-11-17 PROCEDURE — 80048 BASIC METABOLIC PNL TOTAL CA: CPT

## 2020-11-17 PROCEDURE — 97116 GAIT TRAINING THERAPY: CPT

## 2020-11-17 PROCEDURE — 87186 SC STD MICRODIL/AGAR DIL: CPT

## 2020-11-17 PROCEDURE — 80053 COMPREHEN METABOLIC PANEL: CPT

## 2020-11-17 PROCEDURE — U0003: CPT

## 2020-11-17 PROCEDURE — 85025 COMPLETE CBC W/AUTO DIFF WBC: CPT

## 2020-11-17 PROCEDURE — 36415 COLL VENOUS BLD VENIPUNCTURE: CPT

## 2020-11-17 PROCEDURE — 82728 ASSAY OF FERRITIN: CPT

## 2020-11-17 PROCEDURE — 93005 ELECTROCARDIOGRAM TRACING: CPT

## 2020-11-17 PROCEDURE — 87040 BLOOD CULTURE FOR BACTERIA: CPT

## 2020-11-17 PROCEDURE — 84100 ASSAY OF PHOSPHORUS: CPT

## 2020-11-17 PROCEDURE — 0225U NFCT DS DNA&RNA 21 SARSCOV2: CPT

## 2020-11-17 PROCEDURE — 74176 CT ABD & PELVIS W/O CONTRAST: CPT

## 2020-11-17 PROCEDURE — 84300 ASSAY OF URINE SODIUM: CPT

## 2020-11-17 PROCEDURE — 83735 ASSAY OF MAGNESIUM: CPT

## 2020-11-17 PROCEDURE — 86140 C-REACTIVE PROTEIN: CPT

## 2020-11-17 RX ADMIN — SIMVASTATIN 20 MILLIGRAM(S): 20 TABLET, FILM COATED ORAL at 17:47

## 2020-11-17 RX ADMIN — INFLUENZA VIRUS VACCINE 0.5 MILLILITER(S): 15; 15; 15; 15 SUSPENSION INTRAMUSCULAR at 16:50

## 2020-11-17 RX ADMIN — Medication 1 TABLET(S): at 13:04

## 2020-11-17 RX ADMIN — Medication 975 MILLIGRAM(S): at 14:03

## 2020-11-17 RX ADMIN — Medication 1 TABLET(S): at 09:15

## 2020-11-17 RX ADMIN — Medication 2000 UNIT(S): at 13:04

## 2020-11-17 RX ADMIN — LOSARTAN POTASSIUM 100 MILLIGRAM(S): 100 TABLET, FILM COATED ORAL at 09:16

## 2020-11-17 RX ADMIN — Medication 25 MICROGRAM(S): at 05:03

## 2020-11-17 RX ADMIN — RIVAROXABAN 10 MILLIGRAM(S): KIT at 13:04

## 2020-11-17 RX ADMIN — SODIUM CHLORIDE 3 MILLILITER(S): 9 INJECTION INTRAMUSCULAR; INTRAVENOUS; SUBCUTANEOUS at 13:08

## 2020-11-17 RX ADMIN — Medication 975 MILLIGRAM(S): at 13:04

## 2020-11-17 RX ADMIN — SODIUM CHLORIDE 3 MILLILITER(S): 9 INJECTION INTRAMUSCULAR; INTRAVENOUS; SUBCUTANEOUS at 05:01

## 2020-11-17 NOTE — PROGRESS NOTE ADULT - NSHPATTENDINGPLANDISCUSS_GEN_ALL_CORE
Patient, JACE Gordon,   CCC GitzingerG
patient and team
patient and team
patient, [facility]
team
Patient, JACE Fleming and MARNI Streeter

## 2020-11-17 NOTE — PROGRESS NOTE ADULT - ASSESSMENT
80F hx HTN, HLD, hypothyroid, prior DVT (no longer on AC) presenting with Covid-19 and ELISA.     #COVID-19  - w/ debiliation post covid   - hycodan prn for cough   - pcr remains to be positive   - ID Consult appreciated    - PT Consult appreciated- awaiting negative test for ANITHA   - isolation precautions     #UTI  - s/p rocephin x 5 days   - ID consult appreciated   - urine culture 11/4- Ecoli >[id number]    #Sinusitis  - nasal saline PRN    #Hyponatremia - resolved  - likely 2/2 decreased po intake  - will Monitor bmp    #HTN- Essential  - maxzide, losartan  - monitor blood pressure     #HLD  - statin    #hypothyroid   - synthroid     #OA   - Tylenol PRN    #DVT prophylaxis  - Heparin SC    Dispo- home with home pt and aides

## 2020-11-17 NOTE — PROGRESS NOTE ADULT - REASON FOR ADMISSION
covid19, generalized weakness , anson

## 2020-11-17 NOTE — PROGRESS NOTE ADULT - SUBJECTIVE AND OBJECTIVE BOX
HOSPITALIST PROGRESS NOTE    PRADEEP DEL TORO  [id number]  80yFemale    Patient is a 80y old  Female who presents with a chief complaint of covid19, generalized weakness , anson (16 Nov 2020 14:23)      SUBJECTIVE:   Chart reviewed since last visit.  Patient seen and examined at bedside for COVID - discharged home yesterday - awaiting home care arrangements.  Denies any symptoms other than her chronic arthritis pain      OBJECTIVE:  Vital Signs Last 24 Hrs  T(C): 36.7 (17 Nov 2020 07:50), Max: 36.7 (17 Nov 2020 07:50)  T(F): 98.1 (17 Nov 2020 07:50), Max: 98.1 (17 Nov 2020 07:50)  HR: 63 (17 Nov 2020 07:50) (59 - 63)  BP: 109/69 (17 Nov 2020 07:50) (109/67 - 109/69)   RR: 20 (17 Nov 2020 07:50) (18 - 20)  SpO2: 96% (17 Nov 2020 04:58) (96% - 96%)    General: Obese, sitting in chair, comfortable  HEENT:  dark teeth  NECK:  supple  CVS: regular rate and rhythm S1 S2  RESP:  CTAB  GI:  Soft nondistended nontender BS+  : No suprapubic tenderness  MSK:  No edema. FROM. Joint swelling. Bilateral TKR scars well healed  CNS:  No gross focal or global deficit noted  INTEG:  warm dry skin  PSYCH:  Labile mood    MONITOR:  CAPILLARY BLOOD GLUCOSE            I&O's Summary    17 Nov 2020 07:01  -  17 Nov 2020 16:37  --------------------------------------------------------  IN: 0 mL / OUT: 500 mL / NET: -500 mL                        Culture:    TTE:    RADIOLOGY        MEDICATIONS  (STANDING):  cholecalciferol 2000 Unit(s) Oral daily  influenza   Vaccine 0.5 milliLiter(s) IntraMuscular once  levothyroxine 25 MICROGram(s) Oral daily  losartan 100 milliGRAM(s) Oral daily  multivitamin/minerals 1 Tablet(s) Oral daily  rivaroxaban 10 milliGRAM(s) Oral daily  simvastatin 20 milliGRAM(s) Oral at bedtime  sodium chloride 0.9% lock flush 3 milliLiter(s) IV Push every 8 hours  triamterene 37.5 mG/hydrochlorothiazide 25 mG Tablet 1 Tablet(s) Oral daily      MEDICATIONS  (PRN):  acetaminophen   Tablet .. 650 milliGRAM(s) Oral every 6 hours PRN Temp greater or equal to 38C (100.4F), Mild Pain (1 - 3)  acetaminophen   Tablet .. 975 milliGRAM(s) Oral every 8 hours PRN Moderate Pain (4 - 6)  sodium chloride 0.65% Nasal 1 Spray(s) Both Nostrils three times a day PRN Nasal Congestion

## 2020-12-08 NOTE — CHART NOTE - NSCHARTNOTEFT_GEN_A_CORE
Discussed with Dr. Tanner - Palliative consult currently not needed.   To d/c consult. Patient is being contacted for non-compliance with the [place].    Spoke to patient? Yes   Did patient report any medical issues or concerns? No     Did the patient report any technical difficulties with breana, portal or equipment? Yes, portal:  Yes, patient is having breana or portal difficulties. Provided portal support number and transferred to portal support.    Does patient want to continue in the program? Yes, patient instructed to continue to complete daily symptom tracker.       Informed patient to call back number if they have any questions, or to call 911 for emergencies.

## 2020-12-15 PROBLEM — N76.0 BACTERIAL VAGINITIS: Status: RESOLVED | Noted: 2017-07-31 | Resolved: 2020-12-15

## 2021-01-06 ENCOUNTER — APPOINTMENT (OUTPATIENT)
Dept: UROLOGY | Facility: CLINIC | Age: 81
End: 2021-01-06
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
## 2021-12-30 NOTE — PROGRESS NOTE ADULT - SUBJECTIVE AND OBJECTIVE BOX
HOSPITALIST PROGRESS NOTE    PRADEEP FONTAINECARTER  [id number]  80yFemale    Patient is a 80y old  Female who presents with a chief complaint of covid19, generalized weakness , anson (04 Nov 2020 22:28)      SUBJECTIVE:   Chart reviewed since last visit.  Patient seen and examined at bedside for COVID infection, UTI    Denies any dyspnea, chest pain, palpitations, fever or chills  Denies any abdominal pain, dysuria, nausea or vomiting    Weak and tired - doesnt want to go to Rehab wants to go home  Refuse PT earlier on - later with minimal involvement  Complaining of OA pain - agreed to hae Tylenol      OBJECTIVE:  Vital Signs Last 24 Hrs  T(C): 36.6 (05 Nov 2020 16:32), Max: 36.7 (05 Nov 2020 05:50)  T(F): 97.8 (05 Nov 2020 16:32), Max: 98 (05 Nov 2020 05:50)  HR: 71 (05 Nov 2020 16:32) (58 - 71)  BP: 115/67 (05 Nov 2020 16:32) (115/67 - 135/66)   RR: 18 (05 Nov 2020 16:32) (18 - 18)  SpO2: 97% (05 Nov 2020 16:32) (95% - 98%)    PHYSICAL EXAMINATION  General: Obese, lying in bed, comfortable  HEENT:  dark teeth  NECK:  supple  CVS: regular rate and rhythm S1 S2  RESP:  CTAB  GI:  Soft nondistended nontender BS+  : No suprapubic tenderness  MSK:  No edema. FROM. Joint swelling. Bilateral TKR scars well healed  CNS:  No gross focal or global deficit noted  INTEG:  warm dry skin  PSYCH:  Labile mood    MONITOR:  CAPILLARY BLOOD GLUCOSE            I&O's Summary                      Culture:  Culture - Blood (11.01.20 @ 22:47)   Specimen Source: .Blood Blood-Peripheral   Culture Results:   No growth at 48 hours       Culture - Blood (11.01.20 @ 22:47)   Specimen Source: .Blood Blood-Peripheral   Culture Results:   No growth at 48 hours   TTE:    RADIOLOGY        MEDICATIONS  (STANDING):  ascorbic acid 500 milliGRAM(s) Oral two times a day  cefTRIAXone   IVPB 1000 milliGRAM(s) IV Intermittent every 24 hours  cholecalciferol 2000 Unit(s) Oral daily  fluticasone propionate 50 MICROgram(s)/spray Nasal Spray 1 Spray(s) Both Nostrils two times a day  heparin   Injectable 5000 Unit(s) SubCutaneous every 8 hours  influenza   Vaccine 0.5 milliLiter(s) IntraMuscular once  levothyroxine 25 MICROGram(s) Oral daily  multivitamin/minerals 1 Tablet(s) Oral daily  simvastatin 20 milliGRAM(s) Oral at bedtime  sodium chloride 0.9% lock flush 3 milliLiter(s) IV Push every 8 hours  zinc sulfate 220 milliGRAM(s) Oral daily      MEDICATIONS  (PRN):  acetaminophen   Tablet .. 650 milliGRAM(s) Oral every 6 hours PRN Temp greater or equal to 38C (100.4F), Mild Pain (1 - 3)  acetaminophen   Tablet .. 975 milliGRAM(s) Oral every 8 hours PRN Moderate Pain (4 - 6)   Calcipotriene Counseling:  I discussed with the patient the risks of calcipotriene including but not limited to erythema, scaling, itching, and irritation.

## 2022-06-09 NOTE — PATIENT PROFILE ADULT. - PAIN LOCATION, PROFILE
[FreeTextEntry1] : 38-year-old male seen on 12/11/2019 for reevaluation of his chronic erectile dysfunction. He has had good response to Cialis 5 mg on an as-needed basis. He has tried to do a fill in the past but has not found this to be as effective. He denies any other urinary symptoms. He has microscopic hematuria today and this is asymptomatic. We will follow up with a urine culture and cytology today and I notified the patient of this.\par \par The patient tells me that he smokes a marijuana on a regular basis for chronic joint pain from an injury and also to help him with anxiety and sleep. We had a discussion as to alternatives to deal with anxiety and insomnia and I strongly recommended that he pursue these rather than continuing to use marijuana even with the medical clearance he has. He denies use of any regular cigarettes.\par \par Patient seen 4/5/2021 to revisit his ED. He told me that he has been using brand Cialis 5 mg, taking 2 PRN with good result. This works better than sildenafil. He has been hesitant to try the generic tadalafil for fear of less efficacy. Patient denies any LUTS, dysuria, hematuria. \par \par We also discussed self testicular examination and he confirms that he has continued to perform this regularly and has found no masses.  I reviewed this with him and recommended that he continue to do this on a regular basis.\par \par Patient seen TODAY 6/9/2022 to reassess. The tadalafil works well as 5 - 10 mg / dose and he requests a refill. He is out of medication. Urinates well with no issues, no FH cancer. \par UA  moderate WBC\par KHUSHI 13 off medication.\par \par No other medication and NKMA.  The patient denies fevers, chills, nausea and or vomiting and no unexplained weight loss. \par All pertinent parts of the patient PFSH (past medical, family and social histories), laboratory, radiological studies and physician notes were reviewed prior to starting the face to face portion of the telemedicine visit. Questionnaire results were discussed with patient.\par \par 
right knee

## 2022-08-15 NOTE — H&P PST ADULT - NSANTHSNORERD_ENT_A_CORE
For information on Fall & Injury Prevention, visit: [email or web address] OR  [email or web address] OR  https://www.cdc.gov/steadi/patient.html
No

## 2023-01-08 NOTE — INPATIENT CERTIFICATION FOR MEDICARE PATIENTS - RISKS OF ADVERSE EVENTS
[facility]  HOSPITAL MEDICINE PROGRESS NOTE   Patient: Anup Duarte  Today's Date: 1/8/2023    YOB: 1948  Admission Date: 12/13/2022    MRN: [id number]  Inpatient LOS: 26    Room: [address]  Hospital Day: Hospital Day: 27    Subjective   HISTORY AND SUBJECTIVE COMPLAINTS     Chief Complaint:   Encephalopathy    Interval History / Subjective:   Showed on NG tube last night and therefore removed  I saw him today around, breakfast still on the table, minimal intake  Reports no complaints, wants “real food”    Hospital Course:  75 yo M admitted to the ICU with encephalopathy.  The patient was treated for acute bacterial meningitis with a lumbar puncture that was grossly purulent.  ID was consulted to manage antimicrobials which have been completed during this hospitalization.  Although his mental status improved throughout the hospitalization, he required activation of his healthcare power of .  Nephrology was consulted for severe kidney disease that progressed to the point where the patient required renal replacement therapy.  The patient was on a restricted diet with an NG tube placed.  The patient began to make urine, and nephrology held his dialysis with the hope that he would not require ongoing outpatient dialysis.  The patient had difficulty eating enough calories throughout his hospitalization and was continually evaluated by speech therapy.  The patient was very reluctant to have a PEG placed as he felt he would be able to eat enough calories if he could be given food that he liked.  The  assisted with arranging placement for the patient.      ROS:  Pertinent systems negative except as above.    Objective   PHYSICAL EXAMINATION     Physical Examination:  Vitals:   Vitals:    01/07/23 1738 01/07/23 1745 01/07/23 1959 01/08/23 0217   BP: 126/89 125/47 129/63 132/70   BP Location: LUE - Left upper extremity  LUE - Left upper extremity LUE - Left upper  extremity   Patient Position:   Semi-Pérez's Left side lying   Pulse: 78 98 (!) 107 84   Resp:   16 15   Temp: 98.1 °F (36.7 °C)  98.4 °F (36.9 °C)    TempSrc: Oral  Oral    SpO2: 95%  92%    Weight:       Height:           Constitutional: nontoxic appearance, well-groomed, well-developed  Eyes: extraocular movements intact, pupils react to light appropriately, conjunctivae and eyelids appear normal  Ears, Nose, Mouth and Throat:  hearing appears normal, oropharynx moist and clear  Neck: no masses noted, thyroid does not appear enlarged  Respiratory: No use of accessory muscles, clear to auscultation bilaterally  Cardiovascular: Normal rate, normal rhythm, no lower extremity edema  Gastrointestinal: nontender, no hepatomegaly noted, no splenomegaly noted, +NG tube  Genitourinary: +Rios  Lymphatic: no lymphadenopathy noted in cervical or supraclavicular zones  Musculoskeletal: range of motion RUE, LUE, RLE, LLE intact, diminished strength RUE, LUE, RLE, LLE  Skin: no rashes or lesions noted, no tightening of skin noted  Neurologic: CN II-XII intact, sensation to touch intact  Psychiatric: alert and oriented to person, place. mood and affect appear normal      TEST RESULTS             Radiology: Imaging studies have been reviewed and pertinent findings discussed in the Assessment and Plan.  Results for orders placed or performed during the hospital encounter of 12/13/22 (from the past 48 hour(s))   XR TUBE CHECK    Impression    Findings/impression: Transesophageal enteric tube tip projects over the  left upper quadrant.    Some linear retrocardiac opacities are similar to prior.    Right central venous catheter with tip projecting over the expected  location of the superior cavoatrial junction/SVC.        ANCILLARY ORDERS     Diet:  Nursing To Pass Tray - Constant Supervision  Tube Feeding Diet Vital Af 1.2 Jesse/peptide Based 1.2 Calorie Formula; Plus Diet Tray; Dysphagia/mech Soft  Daily W Lunch; Nepro With  Carbsteady/renal Supplement Oral Nutrition Supplement  Telemetry: On  Consults:    PHARMACY TO DOSE AND MONITOR VANCOMYCIN  IP CONSULT TO INFECTIOUS DISEASES  IP CONSULT TO NEPHROLOGY  IP CONSULT TO NUTRITIONAL SERVICES - TUBE FEEDING  IP CONSULT TO CARDIOLOGY  IP CONSULT TO NEUROLOGY  IP CONSULT TO NEUROSURGERY  IP CONSULT TO OTOLARYNGOLOGY  IP CONSULT TO ELECTROPHYSIOLOGY  IP CONSULT TO SOCIAL WORK  IP CONSULT TO PSYCHOLOGY  Therapy Orders:   PT and OT Orders Placed this Encounter   Procedures   • Occupational Therapy   • Occupational Therapy   • Physical Therapy   • Physical Therapy       Advance Care Planning    ADVANCED DIRECTIVES     Code Status: Full Resuscitation          ASSESSMENT AND PLAN     Streptococcal meningitis, ventriculitis, mastoiditis  Acute toxic/metabolic encephalopathy, improved  Acute on chronic anemia  Acute hypoxic respiratory failure, resolved  CRISTINA on CKD 4, requiring RRT  Hypertension   Hyperlipidemia  Diabetes  CHF   Afib  Septic shock, acute, resolved  Dysphagia  Severe protein calorie malnutrition  - required ICU stay  - Neurosurgery on consult, recommending no surgical intervention  - Neurology consult  - received blood transfusion  - completed ceftriaxone/vancomycin IV 1/3, continue oral vancomycin b.i.d. through 1/10 per ID recommendation  - Amlodipine, hydralazine for hypertension  - Lantus, ssi for diabetes  - Continue Rios  - Nephrology consulted for RRT, planning for HD today, still making urine, may need ongoing dialysis on discharge  - EP evaluated, to arrange f/u  - Continue BB for afib, consider AC outpatient  - Therapy following  - remains on tube feeds and modified diet, appetite remains poor.  Replace NG tube today, discussed with RN.  - KALEIGH/DERIK assisting with discharge planning, to arrange for ongoing outpatient dialysis  - Activated HCPOA: Victor Manuel Duarte (son) called      Nutrition status: appropriate  Body mass index is 24.55 kg/m². - appropriate weight BMI  18.5-24  DVT Prophylaxis: Heparin 5000 units sub q tid         DISCHARGE PLANNING     The patient's treatment plans were discussed with patient and family and RN.     Recommendations for Discharge   SW     PT Post-acute facility with therapy needs   OT Post-acute facility with therapy needs   SLP        Anticipated discharge destination: Skilled Nursing Facility SNF  Expected Discharge Date:  01/10/2023  Barriers to Discharge: Pending Identification of a bed at an appropriate post-acute facility and/or Insurance authorization for the post-acute bed.        Luis Ortiz MD  Hospitalist  1/8/2023  1:42 PM        Concern for worsening infectious process

## 2023-09-14 ENCOUNTER — APPOINTMENT (OUTPATIENT)
Dept: UROLOGY | Facility: CLINIC | Age: 83
End: 2023-09-14
Payer: MEDICARE

## 2023-09-14 ENCOUNTER — RESULT REVIEW (OUTPATIENT)
Age: 83
End: 2023-09-14

## 2023-09-14 VITALS
HEIGHT: 64 IN | WEIGHT: 155 LBS | HEART RATE: 76 BPM | BODY MASS INDEX: 26.46 KG/M2 | SYSTOLIC BLOOD PRESSURE: 103 MMHG | DIASTOLIC BLOOD PRESSURE: 67 MMHG

## 2023-09-14 LAB
BILIRUB UR QL STRIP: NORMAL
CLARITY UR: CLEAR
COLLECTION METHOD: NORMAL
GLUCOSE UR-MCNC: NORMAL
HCG UR QL: 0.2 EU/DL
HGB UR QL STRIP.AUTO: ABNORMAL
KETONES UR-MCNC: NORMAL
LEUKOCYTE ESTERASE UR QL STRIP: ABNORMAL
NITRITE UR QL STRIP: NORMAL
PH UR STRIP: 6.5
PROT UR STRIP-MCNC: ABNORMAL
SP GR UR STRIP: 1.01

## 2023-09-14 PROCEDURE — 99214 OFFICE O/P EST MOD 30 MIN: CPT

## 2023-09-18 LAB — BACTERIA UR CULT: NORMAL

## 2023-09-19 ENCOUNTER — OUTPATIENT (OUTPATIENT)
Dept: OUTPATIENT SERVICES | Facility: HOSPITAL | Age: 83
LOS: 1 days | End: 2023-09-19
Payer: MEDICARE

## 2023-09-19 ENCOUNTER — APPOINTMENT (OUTPATIENT)
Dept: ULTRASOUND IMAGING | Facility: CLINIC | Age: 83
End: 2023-09-19
Payer: MEDICARE

## 2023-09-19 DIAGNOSIS — Z41.1 ENCOUNTER FOR COSMETIC SURGERY: Chronic | ICD-10-CM

## 2023-09-19 DIAGNOSIS — R22.1 LOCALIZED SWELLING, MASS AND LUMP, NECK: Chronic | ICD-10-CM

## 2023-09-19 DIAGNOSIS — Z98.89 OTHER SPECIFIED POSTPROCEDURAL STATES: Chronic | ICD-10-CM

## 2023-09-19 DIAGNOSIS — Z96.652 PRESENCE OF LEFT ARTIFICIAL KNEE JOINT: Chronic | ICD-10-CM

## 2023-09-19 DIAGNOSIS — Z90.49 ACQUIRED ABSENCE OF OTHER SPECIFIED PARTS OF DIGESTIVE TRACT: Chronic | ICD-10-CM

## 2023-09-19 DIAGNOSIS — D17.71 BENIGN LIPOMATOUS NEOPLASM OF KIDNEY: ICD-10-CM

## 2023-09-19 PROCEDURE — 76770 US EXAM ABDO BACK WALL COMP: CPT

## 2023-09-19 PROCEDURE — 76770 US EXAM ABDO BACK WALL COMP: CPT | Mod: 26

## 2023-09-28 ENCOUNTER — APPOINTMENT (OUTPATIENT)
Dept: UROLOGY | Facility: CLINIC | Age: 83
End: 2023-09-28
Payer: MEDICARE

## 2023-09-28 VITALS — HEART RATE: 80 BPM | SYSTOLIC BLOOD PRESSURE: 117 MMHG | DIASTOLIC BLOOD PRESSURE: 79 MMHG

## 2023-09-28 DIAGNOSIS — D17.71 BENIGN LIPOMATOUS NEOPLASM OF KIDNEY: ICD-10-CM

## 2023-09-28 DIAGNOSIS — N13.39 OTHER HYDRONEPHROSIS: ICD-10-CM

## 2023-09-28 DIAGNOSIS — N13.30 UNSPECIFIED HYDRONEPHROSIS: ICD-10-CM

## 2023-09-28 DIAGNOSIS — N39.0 URINARY TRACT INFECTION, SITE NOT SPECIFIED: ICD-10-CM

## 2023-09-28 PROCEDURE — 99214 OFFICE O/P EST MOD 30 MIN: CPT

## 2023-10-02 LAB — BACTERIA UR CULT: NORMAL

## 2023-11-02 ENCOUNTER — APPOINTMENT (OUTPATIENT)
Dept: UROLOGY | Facility: CLINIC | Age: 83
End: 2023-11-02

## 2023-12-26 ENCOUNTER — INPATIENT (INPATIENT)
Facility: HOSPITAL | Age: 83
LOS: 2 days | Discharge: NURSING, REHAB OR LONG-TERM CARE | DRG: 682 | End: 2023-12-29
Attending: STUDENT IN AN ORGANIZED HEALTH CARE EDUCATION/TRAINING PROGRAM | Admitting: INTERNAL MEDICINE
Payer: MEDICARE

## 2023-12-26 VITALS
HEART RATE: 69 BPM | WEIGHT: 154.1 LBS | TEMPERATURE: 98 F | RESPIRATION RATE: 20 BRPM | SYSTOLIC BLOOD PRESSURE: 110 MMHG | DIASTOLIC BLOOD PRESSURE: 60 MMHG | OXYGEN SATURATION: 98 %

## 2023-12-26 DIAGNOSIS — Z90.49 ACQUIRED ABSENCE OF OTHER SPECIFIED PARTS OF DIGESTIVE TRACT: Chronic | ICD-10-CM

## 2023-12-26 DIAGNOSIS — Z96.652 PRESENCE OF LEFT ARTIFICIAL KNEE JOINT: Chronic | ICD-10-CM

## 2023-12-26 DIAGNOSIS — A41.9 SEPSIS, UNSPECIFIED ORGANISM: ICD-10-CM

## 2023-12-26 DIAGNOSIS — Z98.89 OTHER SPECIFIED POSTPROCEDURAL STATES: Chronic | ICD-10-CM

## 2023-12-26 DIAGNOSIS — R22.1 LOCALIZED SWELLING, MASS AND LUMP, NECK: Chronic | ICD-10-CM

## 2023-12-26 DIAGNOSIS — Z41.1 ENCOUNTER FOR COSMETIC SURGERY: Chronic | ICD-10-CM

## 2023-12-26 LAB
ALBUMIN SERPL ELPH-MCNC: 2.6 G/DL — LOW (ref 3.3–5.2)
ALBUMIN SERPL ELPH-MCNC: 2.6 G/DL — LOW (ref 3.3–5.2)
ALP SERPL-CCNC: 78 U/L — SIGNIFICANT CHANGE UP (ref 40–120)
ALP SERPL-CCNC: 78 U/L — SIGNIFICANT CHANGE UP (ref 40–120)
ALT FLD-CCNC: 31 U/L — SIGNIFICANT CHANGE UP
ALT FLD-CCNC: 31 U/L — SIGNIFICANT CHANGE UP
ANION GAP SERPL CALC-SCNC: 18 MMOL/L — HIGH (ref 5–17)
ANION GAP SERPL CALC-SCNC: 18 MMOL/L — HIGH (ref 5–17)
ANISOCYTOSIS BLD QL: SLIGHT — SIGNIFICANT CHANGE UP
ANISOCYTOSIS BLD QL: SLIGHT — SIGNIFICANT CHANGE UP
APPEARANCE UR: ABNORMAL
APPEARANCE UR: ABNORMAL
APTT BLD: 30.4 SEC — SIGNIFICANT CHANGE UP (ref 24.5–35.6)
APTT BLD: 30.4 SEC — SIGNIFICANT CHANGE UP (ref 24.5–35.6)
AST SERPL-CCNC: 36 U/L — HIGH
AST SERPL-CCNC: 36 U/L — HIGH
BACTERIA # UR AUTO: ABNORMAL /HPF
BACTERIA # UR AUTO: ABNORMAL /HPF
BASE EXCESS BLDV CALC-SCNC: -13.9 MMOL/L — LOW (ref -2–3)
BASE EXCESS BLDV CALC-SCNC: -13.9 MMOL/L — LOW (ref -2–3)
BASOPHILS # BLD AUTO: 0 K/UL — SIGNIFICANT CHANGE UP (ref 0–0.2)
BASOPHILS # BLD AUTO: 0 K/UL — SIGNIFICANT CHANGE UP (ref 0–0.2)
BASOPHILS NFR BLD AUTO: 0 % — SIGNIFICANT CHANGE UP (ref 0–2)
BASOPHILS NFR BLD AUTO: 0 % — SIGNIFICANT CHANGE UP (ref 0–2)
BILIRUB SERPL-MCNC: 0.4 MG/DL — SIGNIFICANT CHANGE UP (ref 0.4–2)
BILIRUB SERPL-MCNC: 0.4 MG/DL — SIGNIFICANT CHANGE UP (ref 0.4–2)
BILIRUB UR-MCNC: NEGATIVE — SIGNIFICANT CHANGE UP
BILIRUB UR-MCNC: NEGATIVE — SIGNIFICANT CHANGE UP
BLD GP AB SCN SERPL QL: SIGNIFICANT CHANGE UP
BLD GP AB SCN SERPL QL: SIGNIFICANT CHANGE UP
BUN SERPL-MCNC: 68.8 MG/DL — HIGH (ref 8–20)
BUN SERPL-MCNC: 68.8 MG/DL — HIGH (ref 8–20)
BURR CELLS BLD QL SMEAR: PRESENT — SIGNIFICANT CHANGE UP
BURR CELLS BLD QL SMEAR: PRESENT — SIGNIFICANT CHANGE UP
CA-I SERPL-SCNC: 1.2 MMOL/L — SIGNIFICANT CHANGE UP (ref 1.15–1.33)
CA-I SERPL-SCNC: 1.2 MMOL/L — SIGNIFICANT CHANGE UP (ref 1.15–1.33)
CALCIUM SERPL-MCNC: 8.9 MG/DL — SIGNIFICANT CHANGE UP (ref 8.4–10.5)
CALCIUM SERPL-MCNC: 8.9 MG/DL — SIGNIFICANT CHANGE UP (ref 8.4–10.5)
CAST: 25 /LPF — HIGH (ref 0–4)
CAST: 25 /LPF — HIGH (ref 0–4)
CHLORIDE BLDV-SCNC: 101 MMOL/L — SIGNIFICANT CHANGE UP (ref 96–108)
CHLORIDE BLDV-SCNC: 101 MMOL/L — SIGNIFICANT CHANGE UP (ref 96–108)
CHLORIDE SERPL-SCNC: 96 MMOL/L — SIGNIFICANT CHANGE UP (ref 96–108)
CHLORIDE SERPL-SCNC: 96 MMOL/L — SIGNIFICANT CHANGE UP (ref 96–108)
CO2 SERPL-SCNC: 15 MMOL/L — LOW (ref 22–29)
CO2 SERPL-SCNC: 15 MMOL/L — LOW (ref 22–29)
COLOR SPEC: YELLOW — SIGNIFICANT CHANGE UP
COLOR SPEC: YELLOW — SIGNIFICANT CHANGE UP
CREAT SERPL-MCNC: 3.59 MG/DL — HIGH (ref 0.5–1.3)
CREAT SERPL-MCNC: 3.59 MG/DL — HIGH (ref 0.5–1.3)
DACRYOCYTES BLD QL SMEAR: SLIGHT — SIGNIFICANT CHANGE UP
DACRYOCYTES BLD QL SMEAR: SLIGHT — SIGNIFICANT CHANGE UP
DIFF PNL FLD: ABNORMAL
DIFF PNL FLD: ABNORMAL
EGFR: 12 ML/MIN/1.73M2 — LOW
EGFR: 12 ML/MIN/1.73M2 — LOW
EOSINOPHIL # BLD AUTO: 0.08 K/UL — SIGNIFICANT CHANGE UP (ref 0–0.5)
EOSINOPHIL # BLD AUTO: 0.08 K/UL — SIGNIFICANT CHANGE UP (ref 0–0.5)
EOSINOPHIL NFR BLD AUTO: 0.9 % — SIGNIFICANT CHANGE UP (ref 0–6)
EOSINOPHIL NFR BLD AUTO: 0.9 % — SIGNIFICANT CHANGE UP (ref 0–6)
GAS PNL BLDV: 128 MMOL/L — LOW (ref 136–145)
GAS PNL BLDV: 128 MMOL/L — LOW (ref 136–145)
GAS PNL BLDV: SIGNIFICANT CHANGE UP
GAS PNL BLDV: SIGNIFICANT CHANGE UP
GIANT PLATELETS BLD QL SMEAR: PRESENT — SIGNIFICANT CHANGE UP
GIANT PLATELETS BLD QL SMEAR: PRESENT — SIGNIFICANT CHANGE UP
GLUCOSE BLDV-MCNC: 180 MG/DL — HIGH (ref 70–99)
GLUCOSE BLDV-MCNC: 180 MG/DL — HIGH (ref 70–99)
GLUCOSE SERPL-MCNC: 176 MG/DL — HIGH (ref 70–99)
GLUCOSE SERPL-MCNC: 176 MG/DL — HIGH (ref 70–99)
GLUCOSE UR QL: NEGATIVE MG/DL — SIGNIFICANT CHANGE UP
GLUCOSE UR QL: NEGATIVE MG/DL — SIGNIFICANT CHANGE UP
HCO3 BLDV-SCNC: 14 MMOL/L — LOW (ref 22–29)
HCO3 BLDV-SCNC: 14 MMOL/L — LOW (ref 22–29)
HCT VFR BLD CALC: 28.6 % — LOW (ref 34.5–45)
HCT VFR BLD CALC: 28.6 % — LOW (ref 34.5–45)
HCT VFR BLDA CALC: 30 % — SIGNIFICANT CHANGE UP
HCT VFR BLDA CALC: 30 % — SIGNIFICANT CHANGE UP
HGB BLD CALC-MCNC: 9.9 G/DL — LOW (ref 11.7–16.1)
HGB BLD CALC-MCNC: 9.9 G/DL — LOW (ref 11.7–16.1)
HGB BLD-MCNC: 9.2 G/DL — LOW (ref 11.5–15.5)
HGB BLD-MCNC: 9.2 G/DL — LOW (ref 11.5–15.5)
INR BLD: 1.51 RATIO — HIGH (ref 0.85–1.18)
INR BLD: 1.51 RATIO — HIGH (ref 0.85–1.18)
KETONES UR-MCNC: NEGATIVE MG/DL — SIGNIFICANT CHANGE UP
KETONES UR-MCNC: NEGATIVE MG/DL — SIGNIFICANT CHANGE UP
LACTATE BLDV-MCNC: 2.7 MMOL/L — HIGH (ref 0.5–2)
LACTATE BLDV-MCNC: 2.7 MMOL/L — HIGH (ref 0.5–2)
LEUKOCYTE ESTERASE UR-ACNC: ABNORMAL
LEUKOCYTE ESTERASE UR-ACNC: ABNORMAL
LYMPHOCYTES # BLD AUTO: 2.44 K/UL — SIGNIFICANT CHANGE UP (ref 1–3.3)
LYMPHOCYTES # BLD AUTO: 2.44 K/UL — SIGNIFICANT CHANGE UP (ref 1–3.3)
LYMPHOCYTES # BLD AUTO: 27 % — SIGNIFICANT CHANGE UP (ref 13–44)
LYMPHOCYTES # BLD AUTO: 27 % — SIGNIFICANT CHANGE UP (ref 13–44)
MANUAL SMEAR VERIFICATION: SIGNIFICANT CHANGE UP
MANUAL SMEAR VERIFICATION: SIGNIFICANT CHANGE UP
MCHC RBC-ENTMCNC: 29.1 PG — SIGNIFICANT CHANGE UP (ref 27–34)
MCHC RBC-ENTMCNC: 29.1 PG — SIGNIFICANT CHANGE UP (ref 27–34)
MCHC RBC-ENTMCNC: 32.2 GM/DL — SIGNIFICANT CHANGE UP (ref 32–36)
MCHC RBC-ENTMCNC: 32.2 GM/DL — SIGNIFICANT CHANGE UP (ref 32–36)
MCV RBC AUTO: 90.5 FL — SIGNIFICANT CHANGE UP (ref 80–100)
MCV RBC AUTO: 90.5 FL — SIGNIFICANT CHANGE UP (ref 80–100)
MONOCYTES # BLD AUTO: 1.18 K/UL — HIGH (ref 0–0.9)
MONOCYTES # BLD AUTO: 1.18 K/UL — HIGH (ref 0–0.9)
MONOCYTES NFR BLD AUTO: 13 % — SIGNIFICANT CHANGE UP (ref 2–14)
MONOCYTES NFR BLD AUTO: 13 % — SIGNIFICANT CHANGE UP (ref 2–14)
NEUTROPHILS # BLD AUTO: 5.11 K/UL — SIGNIFICANT CHANGE UP (ref 1.8–7.4)
NEUTROPHILS # BLD AUTO: 5.11 K/UL — SIGNIFICANT CHANGE UP (ref 1.8–7.4)
NEUTROPHILS NFR BLD AUTO: 55.6 % — SIGNIFICANT CHANGE UP (ref 43–77)
NEUTROPHILS NFR BLD AUTO: 55.6 % — SIGNIFICANT CHANGE UP (ref 43–77)
NEUTS BAND # BLD: 0.9 % — SIGNIFICANT CHANGE UP (ref 0–8)
NEUTS BAND # BLD: 0.9 % — SIGNIFICANT CHANGE UP (ref 0–8)
NITRITE UR-MCNC: NEGATIVE — SIGNIFICANT CHANGE UP
NITRITE UR-MCNC: NEGATIVE — SIGNIFICANT CHANGE UP
OB PNL STL: NEGATIVE — SIGNIFICANT CHANGE UP
OB PNL STL: NEGATIVE — SIGNIFICANT CHANGE UP
OVALOCYTES BLD QL SMEAR: SLIGHT — SIGNIFICANT CHANGE UP
OVALOCYTES BLD QL SMEAR: SLIGHT — SIGNIFICANT CHANGE UP
PCO2 BLDV: 35 MMHG — LOW (ref 39–42)
PCO2 BLDV: 35 MMHG — LOW (ref 39–42)
PH BLDV: 7.21 — LOW (ref 7.32–7.43)
PH BLDV: 7.21 — LOW (ref 7.32–7.43)
PH UR: 6.5 — SIGNIFICANT CHANGE UP (ref 5–8)
PH UR: 6.5 — SIGNIFICANT CHANGE UP (ref 5–8)
PLAT MORPH BLD: NORMAL — SIGNIFICANT CHANGE UP
PLAT MORPH BLD: NORMAL — SIGNIFICANT CHANGE UP
PLATELET # BLD AUTO: 295 K/UL — SIGNIFICANT CHANGE UP (ref 150–400)
PLATELET # BLD AUTO: 295 K/UL — SIGNIFICANT CHANGE UP (ref 150–400)
PO2 BLDV: 42 MMHG — SIGNIFICANT CHANGE UP (ref 25–45)
PO2 BLDV: 42 MMHG — SIGNIFICANT CHANGE UP (ref 25–45)
POIKILOCYTOSIS BLD QL AUTO: SIGNIFICANT CHANGE UP
POIKILOCYTOSIS BLD QL AUTO: SIGNIFICANT CHANGE UP
POLYCHROMASIA BLD QL SMEAR: SLIGHT — SIGNIFICANT CHANGE UP
POLYCHROMASIA BLD QL SMEAR: SLIGHT — SIGNIFICANT CHANGE UP
POTASSIUM BLDV-SCNC: 3.7 MMOL/L — SIGNIFICANT CHANGE UP (ref 3.5–5.1)
POTASSIUM BLDV-SCNC: 3.7 MMOL/L — SIGNIFICANT CHANGE UP (ref 3.5–5.1)
POTASSIUM SERPL-MCNC: 3.9 MMOL/L — SIGNIFICANT CHANGE UP (ref 3.5–5.3)
POTASSIUM SERPL-MCNC: 3.9 MMOL/L — SIGNIFICANT CHANGE UP (ref 3.5–5.3)
POTASSIUM SERPL-SCNC: 3.9 MMOL/L — SIGNIFICANT CHANGE UP (ref 3.5–5.3)
POTASSIUM SERPL-SCNC: 3.9 MMOL/L — SIGNIFICANT CHANGE UP (ref 3.5–5.3)
PROT SERPL-MCNC: 6 G/DL — LOW (ref 6.6–8.7)
PROT SERPL-MCNC: 6 G/DL — LOW (ref 6.6–8.7)
PROT UR-MCNC: 300 MG/DL
PROT UR-MCNC: 300 MG/DL
PROTHROM AB SERPL-ACNC: 16.6 SEC — HIGH (ref 9.5–13)
PROTHROM AB SERPL-ACNC: 16.6 SEC — HIGH (ref 9.5–13)
RBC # BLD: 3.16 M/UL — LOW (ref 3.8–5.2)
RBC # BLD: 3.16 M/UL — LOW (ref 3.8–5.2)
RBC # FLD: 14.5 % — SIGNIFICANT CHANGE UP (ref 10.3–14.5)
RBC # FLD: 14.5 % — SIGNIFICANT CHANGE UP (ref 10.3–14.5)
RBC BLD AUTO: ABNORMAL
RBC BLD AUTO: ABNORMAL
RBC CASTS # UR COMP ASSIST: 78 /HPF — HIGH (ref 0–4)
RBC CASTS # UR COMP ASSIST: 78 /HPF — HIGH (ref 0–4)
SAO2 % BLDV: 64.4 % — SIGNIFICANT CHANGE UP
SAO2 % BLDV: 64.4 % — SIGNIFICANT CHANGE UP
SODIUM SERPL-SCNC: 129 MMOL/L — LOW (ref 135–145)
SODIUM SERPL-SCNC: 129 MMOL/L — LOW (ref 135–145)
SP GR SPEC: 1.01 — SIGNIFICANT CHANGE UP (ref 1–1.03)
SP GR SPEC: 1.01 — SIGNIFICANT CHANGE UP (ref 1–1.03)
SQUAMOUS # UR AUTO: 3 /HPF — SIGNIFICANT CHANGE UP (ref 0–5)
SQUAMOUS # UR AUTO: 3 /HPF — SIGNIFICANT CHANGE UP (ref 0–5)
UROBILINOGEN FLD QL: 1 MG/DL — SIGNIFICANT CHANGE UP (ref 0.2–1)
UROBILINOGEN FLD QL: 1 MG/DL — SIGNIFICANT CHANGE UP (ref 0.2–1)
VARIANT LYMPHS # BLD: 2.6 % — SIGNIFICANT CHANGE UP (ref 0–6)
VARIANT LYMPHS # BLD: 2.6 % — SIGNIFICANT CHANGE UP (ref 0–6)
WBC # BLD: 9.04 K/UL — SIGNIFICANT CHANGE UP (ref 3.8–10.5)
WBC # BLD: 9.04 K/UL — SIGNIFICANT CHANGE UP (ref 3.8–10.5)
WBC # FLD AUTO: 9.04 K/UL — SIGNIFICANT CHANGE UP (ref 3.8–10.5)
WBC # FLD AUTO: 9.04 K/UL — SIGNIFICANT CHANGE UP (ref 3.8–10.5)
WBC UR QL: >998 /HPF — HIGH (ref 0–5)
WBC UR QL: >998 /HPF — HIGH (ref 0–5)

## 2023-12-26 PROCEDURE — 99223 1ST HOSP IP/OBS HIGH 75: CPT

## 2023-12-26 PROCEDURE — 36000 PLACE NEEDLE IN VEIN: CPT

## 2023-12-26 PROCEDURE — 99285 EMERGENCY DEPT VISIT HI MDM: CPT | Mod: 25

## 2023-12-26 PROCEDURE — 74176 CT ABD & PELVIS W/O CONTRAST: CPT | Mod: 26,MA

## 2023-12-26 PROCEDURE — 99497 ADVNCD CARE PLAN 30 MIN: CPT | Mod: 25

## 2023-12-26 RX ORDER — SODIUM CHLORIDE 9 MG/ML
1000 INJECTION INTRAMUSCULAR; INTRAVENOUS; SUBCUTANEOUS ONCE
Refills: 0 | Status: COMPLETED | OUTPATIENT
Start: 2023-12-26 | End: 2023-12-26

## 2023-12-26 RX ORDER — CEFTRIAXONE 500 MG/1
1000 INJECTION, POWDER, FOR SOLUTION INTRAMUSCULAR; INTRAVENOUS ONCE
Refills: 0 | Status: DISCONTINUED | OUTPATIENT
Start: 2023-12-26 | End: 2023-12-26

## 2023-12-26 RX ORDER — LANOLIN ALCOHOL/MO/W.PET/CERES
3 CREAM (GRAM) TOPICAL AT BEDTIME
Refills: 0 | Status: DISCONTINUED | OUTPATIENT
Start: 2023-12-26 | End: 2023-12-29

## 2023-12-26 RX ORDER — ONDANSETRON 8 MG/1
4 TABLET, FILM COATED ORAL EVERY 8 HOURS
Refills: 0 | Status: DISCONTINUED | OUTPATIENT
Start: 2023-12-26 | End: 2023-12-29

## 2023-12-26 RX ORDER — MULTIVIT-MIN/FERROUS GLUCONATE 9 MG/15 ML
1 LIQUID (ML) ORAL DAILY
Refills: 0 | Status: DISCONTINUED | OUTPATIENT
Start: 2023-12-26 | End: 2023-12-29

## 2023-12-26 RX ORDER — LIDOCAINE 4 G/100G
1 CREAM TOPICAL DAILY
Refills: 0 | Status: DISCONTINUED | OUTPATIENT
Start: 2023-12-26 | End: 2023-12-29

## 2023-12-26 RX ORDER — ACETAMINOPHEN 500 MG
650 TABLET ORAL EVERY 6 HOURS
Refills: 0 | Status: DISCONTINUED | OUTPATIENT
Start: 2023-12-26 | End: 2023-12-29

## 2023-12-26 RX ORDER — HEPARIN SODIUM 5000 [USP'U]/ML
5000 INJECTION INTRAVENOUS; SUBCUTANEOUS EVERY 8 HOURS
Refills: 0 | Status: DISCONTINUED | OUTPATIENT
Start: 2023-12-26 | End: 2023-12-29

## 2023-12-26 RX ORDER — CEFTRIAXONE 500 MG/1
1000 INJECTION, POWDER, FOR SOLUTION INTRAMUSCULAR; INTRAVENOUS ONCE
Refills: 0 | Status: COMPLETED | OUTPATIENT
Start: 2023-12-26 | End: 2023-12-26

## 2023-12-26 RX ORDER — OLMESARTAN MEDOXOMIL 5 MG/1
40 TABLET, FILM COATED ORAL
Qty: 0 | Refills: 0 | DISCHARGE

## 2023-12-26 RX ORDER — CHOLECALCIFEROL (VITAMIN D3) 125 MCG
2000 CAPSULE ORAL DAILY
Refills: 0 | Status: DISCONTINUED | OUTPATIENT
Start: 2023-12-26 | End: 2023-12-29

## 2023-12-26 RX ORDER — SODIUM CHLORIDE 9 MG/ML
1000 INJECTION, SOLUTION INTRAVENOUS
Refills: 0 | Status: DISCONTINUED | OUTPATIENT
Start: 2023-12-26 | End: 2023-12-27

## 2023-12-26 RX ORDER — SIMVASTATIN 20 MG/1
20 TABLET, FILM COATED ORAL AT BEDTIME
Refills: 0 | Status: DISCONTINUED | OUTPATIENT
Start: 2023-12-26 | End: 2023-12-29

## 2023-12-26 RX ORDER — LEVOTHYROXINE SODIUM 125 MCG
1 TABLET ORAL
Refills: 0 | DISCHARGE

## 2023-12-26 RX ORDER — CEFTRIAXONE 500 MG/1
1000 INJECTION, POWDER, FOR SOLUTION INTRAMUSCULAR; INTRAVENOUS EVERY 24 HOURS
Refills: 0 | Status: DISCONTINUED | OUTPATIENT
Start: 2023-12-27 | End: 2023-12-28

## 2023-12-26 RX ADMIN — CEFTRIAXONE 1000 MILLIGRAM(S): 500 INJECTION, POWDER, FOR SOLUTION INTRAMUSCULAR; INTRAVENOUS at 16:47

## 2023-12-26 RX ADMIN — SODIUM CHLORIDE 1000 MILLILITER(S): 9 INJECTION INTRAMUSCULAR; INTRAVENOUS; SUBCUTANEOUS at 17:20

## 2023-12-26 RX ADMIN — SODIUM CHLORIDE 1000 MILLILITER(S): 9 INJECTION INTRAMUSCULAR; INTRAVENOUS; SUBCUTANEOUS at 12:06

## 2023-12-26 RX ADMIN — Medication 650 MILLIGRAM(S): at 22:55

## 2023-12-26 RX ADMIN — SIMVASTATIN 20 MILLIGRAM(S): 20 TABLET, FILM COATED ORAL at 22:56

## 2023-12-26 RX ADMIN — Medication 650 MILLIGRAM(S): at 23:45

## 2023-12-26 RX ADMIN — SODIUM CHLORIDE 1000 MILLILITER(S): 9 INJECTION INTRAMUSCULAR; INTRAVENOUS; SUBCUTANEOUS at 16:47

## 2023-12-26 RX ADMIN — SODIUM CHLORIDE 1000 MILLILITER(S): 9 INJECTION INTRAMUSCULAR; INTRAVENOUS; SUBCUTANEOUS at 13:33

## 2023-12-26 NOTE — H&P ADULT - NSICDXPASTSURGICALHX_GEN_ALL_CORE_FT
PAST SURGICAL HISTORY:  H/O total knee replacement, left 2003 revision 2014    History of foot surgery left foot due to polio, 1953    Neck mass excision of neck mass 1942    S/P cholecystectomy     S/P dilation and curettage 2001, 1965, 1972, 1978    S/P rhinoplasty     
detailed exam

## 2023-12-26 NOTE — ED CLERICAL - NS ED CLERK UNITS
TELE ISO TELE R/O CDIFF TELE [id number] [facility]/5TWR [id number] [facility]/5TWR

## 2023-12-26 NOTE — H&P ADULT - NSHPPHYSICALEXAM_GEN_ALL_CORE
Gen : Non toxic appearing, obese female lying in bed   HEENT : NCAT, MMM, No cervical lymphadenopathy, no JVD  CVS : S1S2, regular rate and rhythm, no murmurs  Resp : CTA b/l, no rhonchi or wheezes appreciated   Abd : Soft, moderately distended, BS +ve   MSK : b/l OA changes in all digits, Left knee mild effusion, b/l pitting edema   Neuro : CN II-XII intact, no focal motor or sensory deficits   Psych : Pleasant, no anxiety, irritable affect  Skin : b/l knee incisions wealing well

## 2023-12-26 NOTE — H&P ADULT - CONVERSATION DETAILS
d/w pt regarding any prior ACP discussions she has had with fan=merari or PCP. She does not recall. Wishes to defer to Hansa at bedside who reports she knows her mothers wishes and that she would no wish to be kept alive if it meant mechnical support. Pt verbalizes NOT wanting any Bipap or intubation. Pt and daughter wish for son Jame to make final decisions as he is a doctor    FOr now full code     ACzP time : 35 mins

## 2023-12-26 NOTE — CONSULT NOTE ADULT - SUBJECTIVE AND OBJECTIVE BOX
HPI:   83 year old female with HTN, HLD, Hypothyroidism, hiatal hernia, severe OA of hands , knees, Hip as wel as  h/o TIA and DVT (2016 no longer on AC), recent development of recurrent UTIs (follows with Urology as an outpt) presenting with generalized weakness. Approx. 1 week prior she had developed profuse diarrhea intermixed with blood. Per daughter at baseline pt has urinary incontinence and as such tries to drink max 1 cup of green tea to reduce need to have urinary issues. On arrival to the ER she was responsive and oriented but only with aggressive external stimulation (suspect may have been encephalopathic 2/2 renal failure). Following aggressive IVF in ER now back to baseline mental status, complinas of pain in lower back and knees (ch)        PAST MEDICAL & SURGICAL HISTORY:  Osteoarthritis      Hypertension      Hiatal hernia      Hyperlipidemia      Polio      DVT (deep venous thrombosis)  , was on xarelto for 6 months      TIA (transient ischemic attack)        S/P cholecystectomy      S/P dilation and curettage  , , ,       H/O total knee replacement, left  2003 revision 2014      S/P rhinoplasty      History of foot surgery  left foot due to polio,       Neck mass  excision of neck mass           REVIEW OF SYSTEMS      General:	    Skin/Breast:  	  Ophthalmologic:  	  ENMT:	    Respiratory and Thorax:  	  Cardiovascular:	    Gastrointestinal:	    Genitourinary:	    Musculoskeletal:	    Neurological:	    Psychiatric:	    Hematology/Lymphatics:	    Endocrine:	    Allergic/Immunologic:	    MEDICATIONS  (STANDING):  cholecalciferol 2000 Unit(s) Oral daily  lidocaine   4% Patch 1 Patch Transdermal daily  multivitamin/minerals 1 Tablet(s) Oral daily  simvastatin 20 milliGRAM(s) Oral at bedtime  sodium chloride 0.45% 1000 milliLiter(s) (125 mL/Hr) IV Continuous <Continuous>    MEDICATIONS  (PRN):  acetaminophen     Tablet .. 650 milliGRAM(s) Oral every 6 hours PRN Temp greater or equal to 38C (100.4F), Mild Pain (1 - 3)  aluminum hydroxide/magnesium hydroxide/simethicone Suspension 30 milliLiter(s) Oral every 4 hours PRN Dyspepsia  melatonin 3 milliGRAM(s) Oral at bedtime PRN Insomnia  ondansetron Injectable 4 milliGRAM(s) IV Push every 8 hours PRN Nausea and/or Vomiting      Allergies    ampicillin (Stomach Upset)  Bactrim (Rash)  Cipro (Other)  all narcotics give severe vomitting and dizziness (Other; Vomiting)  Levaquin (Unknown)    Intolerances    Augmentin (Diarrhea)      SOCIAL HISTORY:    FAMILY HISTORY:  Family history of diabetes mellitus (Grandparent, Aunt)    Family history of heart disease (Sibling)        Vital Signs Last 24 Hrs  T(C): 36.3 (26 Dec 2023 20:14), Max: 36.7 (26 Dec 2023 11:05)  T(F): 97.4 (26 Dec 2023 20:14), Max: 98 (26 Dec 2023 11:05)  HR: 76 (26 Dec 2023 20:14) (69 - 79)  BP: 104/43 (26 Dec 2023 20:14) (93/55 - 110/60)  BP(mean): --  RR: 20 (26 Dec 2023 16:21) (20 - 20)  SpO2: 100% (26 Dec 2023 16:21) (98% - 100%)    Parameters below as of 26 Dec 2023 16:21  Patient On (Oxygen Delivery Method): room air        PHYSICAL EXAM:      Constitutional:    Eyes:    ENMT:    Neck:    Breasts:    Back:    Respiratory:    Cardiovascular:    Gastrointestinal:    Genitourinary:    Rectal:    Extremities:    Vascular:    Neurological:    Skin:    Lymph Nodes:    Musculoskeletal:    Psychiatric:        LABS:                        9.2    9.04  )-----------( 295      ( 26 Dec 2023 11:07 )             28.6     12-    129<L>  |  96  |  68.8<H>  ----------------------------<  176<H>  3.9   |  15.0<L>  |  3.59<H>    Ca    8.9      26 Dec 2023 11:07    TPro  6.0<L>  /  Alb  2.6<L>  /  TBili  0.4  /  DBili  x   /  AST  36<H>  /  ALT  31  /  AlkPhos  78  12-    PT/INR - ( 26 Dec 2023 11:07 )   PT: 16.6 sec;   INR: 1.51 ratio         PTT - ( 26 Dec 2023 11:07 )  PTT:30.4 sec  Urinalysis Basic - ( 26 Dec 2023 13:48 )    Color: Yellow / Appearance: Turbid / S.015 / pH: x  Gluc: x / Ketone: Negative mg/dL  / Bili: Negative / Urobili: 1.0 mg/dL   Blood: x / Protein: 300 mg/dL / Nitrite: Negative   Leuk Esterase: Large / RBC: 78 /HPF / WBC >998 /HPF   Sq Epi: x / Non Sq Epi: 3 /HPF / Bacteria: Few /HPF        RADIOLOGY & ADDITIONAL STUDIES: HPI:   83 year old female with HTN, HLD, Hypothyroidism, hiatal hernia, severe OA of hands , knees, Hip as wel as  h/o TIA and DVT (2016 no longer on AC), recent development of recurrent UTIs (follows with Urology as an outpt) presenting with generalized weakness. Approx. 1 week prior she had developed profuse diarrhea intermixed with blood. Per daughter at baseline pt has urinary incontinence and as such tries to drink max 1 cup of green tea to reduce need to have urinary issues. On arrival to the ER she was responsive and oriented but only with aggressive external stimulation (suspect may have been encephalopathic 2/2 renal failure). Following aggressive IVF in ER now back to baseline mental status, complinas of pain in lower back and knees (ch)        PAST MEDICAL & SURGICAL HISTORY:  Osteoarthritis      Hypertension      Hiatal hernia      Hyperlipidemia      Polio      DVT (deep venous thrombosis)  , was on xarelto for 6 months      TIA (transient ischemic attack)        S/P cholecystectomy      S/P dilation and curettage  , , ,       H/O total knee replacement, left  2003 revision 2014      S/P rhinoplasty      History of foot surgery  left foot due to polio,       Neck mass  excision of neck mass       MEDICATIONS  (STANDING):  cholecalciferol 2000 Unit(s) Oral daily  lidocaine   4% Patch 1 Patch Transdermal daily  multivitamin/minerals 1 Tablet(s) Oral daily  simvastatin 20 milliGRAM(s) Oral at bedtime  sodium chloride 0.45% 1000 milliLiter(s) (125 mL/Hr) IV Continuous <Continuous>    MEDICATIONS  (PRN):  acetaminophen     Tablet .. 650 milliGRAM(s) Oral every 6 hours PRN Temp greater or equal to 38C (100.4F), Mild Pain (1 - 3)  aluminum hydroxide/magnesium hydroxide/simethicone Suspension 30 milliLiter(s) Oral every 4 hours PRN Dyspepsia  melatonin 3 milliGRAM(s) Oral at bedtime PRN Insomnia  ondansetron Injectable 4 milliGRAM(s) IV Push every 8 hours PRN Nausea and/or Vomiting      Allergies    ampicillin (Stomach Upset)  Bactrim (Rash)  Cipro (Other)  all narcotics give severe vomitting and dizziness (Other; Vomiting)  Levaquin (Unknown)    Intolerances    Augmentin (Diarrhea)      SOCIAL HISTORY:    FAMILY HISTORY:  Family history of diabetes mellitus (Grandparent, Aunt)    Family history of heart disease (Sibling)        Vital Signs Last 24 Hrs  T(C): 36.3 (26 Dec 2023 20:14), Max: 36.7 (26 Dec 2023 11:05)  T(F): 97.4 (26 Dec 2023 20:14), Max: 98 (26 Dec 2023 11:05)  HR: 76 (26 Dec 2023 20:14) (69 - 79)  BP: 104/43 (26 Dec 2023 20:14) (93/55 - 110/60)  BP(mean): --  RR: 20 (26 Dec 2023 16:21) (20 - 20)  SpO2: 100% (26 Dec 2023 16:21) (98% - 100%)    Parameters below as of 26 Dec 2023 16:21  Patient On (Oxygen Delivery Method): room air        PHYSICAL EXAM:  Constitutional: in no distress    Genitourinary: walters draining         LABS:                        9.2    9.04  )-----------( 295      ( 26 Dec 2023 11:07 )             28.6     12-    129<L>  |  96  |  68.8<H>  ----------------------------<  176<H>  3.9   |  15.0<L>  |  3.59<H>    Ca    8.9      26 Dec 2023 11:07    TPro  6.0<L>  /  Alb  2.6<L>  /  TBili  0.4  /  DBili  x   /  AST  36<H>  /  ALT  31  /  AlkPhos  78  12-26    PT/INR - ( 26 Dec 2023 11:07 )   PT: 16.6 sec;   INR: 1.51 ratio         PTT - ( 26 Dec 2023 11:07 )  PTT:30.4 sec  Urinalysis Basic - ( 26 Dec 2023 13:48 )    Color: Yellow / Appearance: Turbid / S.015 / pH: x  Gluc: x / Ketone: Negative mg/dL  / Bili: Negative / Urobili: 1.0 mg/dL   Blood: x / Protein: 300 mg/dL / Nitrite: Negative   Leuk Esterase: Large / RBC: 78 /HPF / WBC >998 /HPF   Sq Epi: x / Non Sq Epi: 3 /HPF / Bacteria: Few /HPF        RADIOLOGY & ADDITIONAL STUDIES:

## 2023-12-26 NOTE — ED ADULT NURSE REASSESSMENT NOTE - NS ED NURSE REASSESS COMMENT FT1
pt resting comfortably showing no signs of respiratory distress or pain, the pt is calm and cooperative, pt on cardiac monitor in NSR pt resting comfortably showing no signs of respiratory distress or pain, the pt is calm and cooperative, pt on cardiac monitor in afib

## 2023-12-26 NOTE — ED PROVIDER NOTE - NS ED ROS FT
Const:  + Fatigue, + lethargy. Denies fever, chills  HEENT: Denies blurry vision, sore throat  Neck: Denies neck pain/stiffness  Resp: Denies coughing, SOB  Cardiovascular: Denies CP, palpitations, LE edema  GI:  + Diarrhea (resolved),  + Bloody stool (resolved).  Denies nausea, vomiting, abdominal pain, constipation  : Denies urinary frequency/urgency/dysuria, hematuria  MSK: Denies back pain  Neuro: Denies HA, dizziness, numbness, weakness  Skin: Denies rashes.

## 2023-12-26 NOTE — CONSULT NOTE ADULT - ASSESSMENT
3 year old female with HTN, HLD, Hypothyroidism, hiatal hernia, severe OA of hands , knees, Hip as wel as  h/o TIA and DVT (2016 no longer on AC), recent development of recurrent UTIs (follows with Urology as an outpt) presenting with generalized weakness and persistent diarrhea. Patient follows with Dr. Aguila  as an outptient, last appointment was in september 2023 for followup for a UTI. Has known severe left hydronephrosis since 2020, with persistent UTI's and retention. Workup significant for a  CT of the abdomen and pelvis showing again Severe left-sided hydroureteronephrosis as noted on prior exams.Left hydroureter, without evidence of obstructive calculus. Labs with an anson of 3.59 likely from urinary retention with dehydration and + UA.     Thickened trabeculated urinary bladder with perivesical stranding, correlate for cystitis  Plan:  needs good IV hydration  continue walters catheter, should be discharged with the walters, no other urologic intervention is needed as of now for the hydronephrosis   monitor for post-obstructive diuresis  trend electrolytes closely  trend bun/creatinine  f/u urine culture, consider suppressive antibiotic therapy for UTI  consider ID consult  f/u with urology as an outpatient

## 2023-12-26 NOTE — ED ADULT NURSE REASSESSMENT NOTE - NS ED NURSE REASSESS COMMENT FT1
Assumed care of patient at this time from dayshift JACE Arita, patient is awake, calm, RR even and unlabored, denies sob, denies chest pain, nsr on cm, aware of plan of care, waiting for hr bed, bed locked in lowest position, call bell placed within reach. Assumed care of patient at this time from dayshift RN Juan J, charting as noted, patient is awake, calm, RR even and unlabored, denies sob, denies chest pain, nsr on cm, aware of plan of care, waiting for hr bed, bed locked in lowest position, call bell placed within reach.

## 2023-12-26 NOTE — H&P ADULT - ASSESSMENT
83 year old female with HTN, HLD, Hypothyroidism, hiatal hernia, severe OA of hands , knees, Hip as wel as  h/o TIA and DVT (2016 no longer on AC), recent development of recurrent UTIs (follows with Urology as an outpt) presenting with generalized weakness. Approx. 1 week prior she had developed profuse diarrhea intermixed with blood. Per daughter at baseline pt has urinary incontinence and as such tries to drink max 1 cup of green tea to reduce need to have urinary issues. On arrival to the ER she was responsive and oriented but only with aggressive external stimulation (suspect may have been encephalopathic 2/2 renal failure).       Admit to Medicine   Telemetry x 24 hours  Metabolic acidosis 2/2 ELISA with UTI   U A noted, Urine culture sent   Significant allergy history listed however has tolerated Ceftriaxone w.o issues   Continue Ceftriaxone empirically for now   s/p Walters. d/w Urology, will need to continue walters for ongoing decompression (left hydroureteronephrosis doees not apear acute)  s/p 3 NS. Change IVF to 1/2 SN with 75meq NaBicarb  Renal consult in am       Profuse diarrhea  Now improved since arrival to ER  Last Abx use for UTI > 1 month ago  GI panel and C Diff. F/u results       HLD  Continue Statin     HTN   Hold Olmesartan 20  Normotensive at present, Hydralazine 5 mg PRN SBP > 140    Morbid obesity   Weight loss counseling done   Send A1C   Abd US     h/o DVT   No longer on AC  Dopplers LE b/l       Diet : DASH w/constsent carb for now    DVT ppx L Heparin s/c         83 year old female with HTN, HLD, Hypothyroidism, hiatal hernia, severe OA of hands , knees, Hip as wel as  h/o TIA and DVT (2016 no longer on AC), recent development of recurrent UTIs (follows with Urology as an outpt) presenting with generalized weakness. Approx. 1 week prior she had developed profuse diarrhea intermixed with blood. Per daughter at baseline pt has urinary incontinence and as such tries to drink max 1 cup of green tea to reduce need to have urinary issues. On arrival to the ER she was responsive and oriented but only with aggressive external stimulation (suspect may have been encephalopathic 2/2 renal failure).       Admit to Medicine   Telemetry x 24 hours  Metabolic acidosis 2/2 ELISA with UTI   U A noted, Urine culture sent   Significant allergy history listed however has tolerated Ceftriaxone w.o issues   Continue Ceftriaxone empirically for now   s/p Walters. d/w Urology, will need to continue walters for ongoing decompression (left hydroureteronephrosis doees not apear acute)  s/p 3 NS. Change IVF to 1/2 SN with 75meq NaBicarb  Renal consult in am       Profuse diarrhea  Now improved since arrival to ER  Last Abx use for UTI > 1 month ago  GI panel and C Diff. F/u results     GERD with hiatal hernia  PPI Daily    HLD  Continue Statin     HTN   Hold Olmesartan 20  Normotensive at present, Hydralazine 5 mg PRN SBP > 140    Morbid obesity   Weight loss counseling done   Send A1C   Abd US     h/o DVT   No longer on AC  Dopplers LE b/l       Diet : DASH w/constsent carb for now    DVT ppx L Heparin s/c

## 2023-12-26 NOTE — ED PROCEDURE NOTE - PROCEDURE DATE TIME, MLM
26-Dec-2023 12:22
decreased weight-shifting ability/stand pivot/decreased step length/decreased safety awareness

## 2023-12-26 NOTE — H&P ADULT - HISTORY OF PRESENT ILLNESS
83 year old female with HTN, HLD, Hypothyroidism, hiatal hernia, severe OA of hands , knees, Hip as wel as  h/o TIA and DVT (2016 no longer on AC), recent development of recurrent UTIs (follows with Urology as an outpt) presenting with generalized weakness. Approx. 1 week prior she had developed profuse diarrhea intermixed with blood. Per daughter at baseline pt has urinary incontinence and as such tries to drink max 1 cup of green tea to reduce need to have urinary issues. On arrival to the ER she was responsive and oriented but only with aggressive external stimulation (suspect may have been encephalopathic 2/2 renal failure). Following aggressive IVF in ER now back to baseline mental status, complinas of pain in lower back and knees (ch)

## 2023-12-26 NOTE — ED ADULT NURSE NOTE - OBJECTIVE STATEMENT
Assumed care of pt at 1140. Pt A&Ox4 c/o weakness, the pt states that she has had lower back pain but denies N/V/D/CP/SOB/chills/fever, pt is pale resting comfortably showing no signs of respiratory distress or pain, the pt is calm and cooperative Assumed care of pt at 1140. Pt A&Ox4 c/o weakness, the pt states that she has had lower back pain but denies N/V/D/CP/SOB/chills/fever, pt is pale resting comfortably showing no signs of respiratory distress or pain, the pt is calm and cooperative, pt on cardiac monitor in NSR Assumed care of pt at 1140. Pt A&Ox4 c/o weakness, the pt states that she has had lower back pain but denies N/V/D/CP/SOB/chills/fever, pt is pale resting comfortably showing no signs of respiratory distress or pain, the pt is calm and cooperative, pt on cardiac monitor in afib

## 2023-12-26 NOTE — ED ADULT NURSE NOTE - EXTENSIONS OF SELF_ADULT
Called preferred pharmacy. Per pharmacist, she will contact Medicare, so that they can fax us a CMN form (they do not carry any CMN forms in store).    None

## 2023-12-26 NOTE — ED PROVIDER NOTE - CLINICAL SUMMARY MEDICAL DECISION MAKING FREE TEXT BOX
83-year-old female presents for lethargy after a diarrheal illness last week.  Abdomen soft, nontender, nondistended, yellow stool in rectal vault.  Patient blames bloody stool during that illness on hemorrhoids, but there were no hemorrhoids present on my rectal exam.  Fecal occult sent, will check labs, IV hydration, reassess.

## 2023-12-26 NOTE — H&P ADULT - NSHPLABSRESULTS_GEN_ALL_CORE
IMPRESSION:    Severe left-sided hydroureteronephrosis as noted on prior exams.  Left hydroureter, without evidence of obstructive calculus.    Thickened trabeculated urinary bladder with perivesical stranding,   correlate for cystitis.    Other findings as discussed above.

## 2023-12-26 NOTE — ED PROVIDER NOTE - PHYSICAL EXAMINATION
Const: Awake, alert and oriented. In no acute distress. Ill appearing. Having difficulty staying awake.  HEENT: NC/AT.  dry mucous membranes.  Eyes: No scleral icterus. EOMI.  Neck:. Soft and supple. Full ROM without pain.  Cardiac: Regular rate and regular rhythm. +S1/S2. No murmurs. Peripheral pulses 2+ and symmetric. No LE edema.  Resp: Speaking in full sentences. No evidence of respiratory distress. No wheezes, rales or rhonchi.  Abd: Soft, non-tender, non-distended. Normal bowel sounds in all 4 quadrants. No guarding or rebound.  Rectal: Female chaperone RN Ashley Lombardi. Normal external rectum without lesions or hemorrhoids. Normal tone. Normal yellowish stool in rectal vault without blood. No internal hemorrhoids appreciated. Normal rectal tone.   Back: Spine midline and non-tender. No CVAT.  Skin: Pale. No rashes, abrasions or lacerations.  Neuro: Awake, alert & oriented x 3. Moves all extremities symmetrically.

## 2023-12-26 NOTE — ED ADULT TRIAGE NOTE - CHIEF COMPLAINT QUOTE
Patient presents to ER C/O weakness X 1 week and lower back pain, denies chills/fever, no CP, resp even/unlabored, reports poor appetite.

## 2023-12-26 NOTE — ED PROVIDER NOTE - OBJECTIVE STATEMENT
83-year-old female with past medical history hypertension, TIA, DVT in 2016 (no longer on blood thinners) presents for generalized weakness.  She states that 1 week ago she had a diarrheal illness with profuse, bloody diarrhea, and since then has had decreased appetite and generalized fatigue.  She denies fevers, nausea, vomiting.  She is no longer having diarrhea.  She believes the blood was from hemorrhoids, has never had bloody diarrhea in the past.  She is not on any blood thinners, only takes 81 mg aspirin.  She is having difficulty staying awake during my interview.

## 2023-12-27 LAB
ALBUMIN SERPL ELPH-MCNC: 1.7 G/DL — LOW (ref 3.3–5.2)
ALBUMIN SERPL ELPH-MCNC: 1.7 G/DL — LOW (ref 3.3–5.2)
ALP SERPL-CCNC: 63 U/L — SIGNIFICANT CHANGE UP (ref 40–120)
ALP SERPL-CCNC: 63 U/L — SIGNIFICANT CHANGE UP (ref 40–120)
ALT FLD-CCNC: 36 U/L — HIGH
ALT FLD-CCNC: 36 U/L — HIGH
ANION GAP SERPL CALC-SCNC: 13 MMOL/L — SIGNIFICANT CHANGE UP (ref 5–17)
ANION GAP SERPL CALC-SCNC: 13 MMOL/L — SIGNIFICANT CHANGE UP (ref 5–17)
AST SERPL-CCNC: 50 U/L — HIGH
AST SERPL-CCNC: 50 U/L — HIGH
BILIRUB SERPL-MCNC: <0.2 MG/DL — LOW (ref 0.4–2)
BILIRUB SERPL-MCNC: <0.2 MG/DL — LOW (ref 0.4–2)
BUN SERPL-MCNC: 47 MG/DL — HIGH (ref 8–20)
BUN SERPL-MCNC: 47 MG/DL — HIGH (ref 8–20)
CALCIUM SERPL-MCNC: 7.9 MG/DL — LOW (ref 8.4–10.5)
CALCIUM SERPL-MCNC: 7.9 MG/DL — LOW (ref 8.4–10.5)
CHLORIDE SERPL-SCNC: 113 MMOL/L — HIGH (ref 96–108)
CHLORIDE SERPL-SCNC: 113 MMOL/L — HIGH (ref 96–108)
CO2 SERPL-SCNC: 14 MMOL/L — LOW (ref 22–29)
CO2 SERPL-SCNC: 14 MMOL/L — LOW (ref 22–29)
CREAT SERPL-MCNC: 1.5 MG/DL — HIGH (ref 0.5–1.3)
CREAT SERPL-MCNC: 1.5 MG/DL — HIGH (ref 0.5–1.3)
CULTURE RESULTS: NO GROWTH — SIGNIFICANT CHANGE UP
CULTURE RESULTS: NO GROWTH — SIGNIFICANT CHANGE UP
EGFR: 35 ML/MIN/1.73M2 — LOW
EGFR: 35 ML/MIN/1.73M2 — LOW
GLUCOSE SERPL-MCNC: 87 MG/DL — SIGNIFICANT CHANGE UP (ref 70–99)
GLUCOSE SERPL-MCNC: 87 MG/DL — SIGNIFICANT CHANGE UP (ref 70–99)
HCT VFR BLD CALC: 26.7 % — LOW (ref 34.5–45)
HCT VFR BLD CALC: 26.7 % — LOW (ref 34.5–45)
HGB BLD-MCNC: 8.3 G/DL — LOW (ref 11.5–15.5)
HGB BLD-MCNC: 8.3 G/DL — LOW (ref 11.5–15.5)
LACTATE SERPL-SCNC: 1.1 MMOL/L — SIGNIFICANT CHANGE UP (ref 0.5–2)
LACTATE SERPL-SCNC: 1.1 MMOL/L — SIGNIFICANT CHANGE UP (ref 0.5–2)
MAGNESIUM SERPL-MCNC: 1.2 MG/DL — LOW (ref 1.6–2.6)
MAGNESIUM SERPL-MCNC: 1.2 MG/DL — LOW (ref 1.6–2.6)
MCHC RBC-ENTMCNC: 27.9 PG — SIGNIFICANT CHANGE UP (ref 27–34)
MCHC RBC-ENTMCNC: 27.9 PG — SIGNIFICANT CHANGE UP (ref 27–34)
MCHC RBC-ENTMCNC: 31.1 GM/DL — LOW (ref 32–36)
MCHC RBC-ENTMCNC: 31.1 GM/DL — LOW (ref 32–36)
MCV RBC AUTO: 89.9 FL — SIGNIFICANT CHANGE UP (ref 80–100)
MCV RBC AUTO: 89.9 FL — SIGNIFICANT CHANGE UP (ref 80–100)
PLATELET # BLD AUTO: 284 K/UL — SIGNIFICANT CHANGE UP (ref 150–400)
PLATELET # BLD AUTO: 284 K/UL — SIGNIFICANT CHANGE UP (ref 150–400)
POTASSIUM SERPL-MCNC: 3.4 MMOL/L — LOW (ref 3.5–5.3)
POTASSIUM SERPL-MCNC: 3.4 MMOL/L — LOW (ref 3.5–5.3)
POTASSIUM SERPL-SCNC: 3.4 MMOL/L — LOW (ref 3.5–5.3)
POTASSIUM SERPL-SCNC: 3.4 MMOL/L — LOW (ref 3.5–5.3)
PROCALCITONIN SERPL-MCNC: 0.28 NG/ML — HIGH (ref 0.02–0.1)
PROCALCITONIN SERPL-MCNC: 0.28 NG/ML — HIGH (ref 0.02–0.1)
PROT SERPL-MCNC: 5.1 G/DL — LOW (ref 6.6–8.7)
PROT SERPL-MCNC: 5.1 G/DL — LOW (ref 6.6–8.7)
RBC # BLD: 2.97 M/UL — LOW (ref 3.8–5.2)
RBC # BLD: 2.97 M/UL — LOW (ref 3.8–5.2)
RBC # FLD: 14.4 % — SIGNIFICANT CHANGE UP (ref 10.3–14.5)
RBC # FLD: 14.4 % — SIGNIFICANT CHANGE UP (ref 10.3–14.5)
SODIUM SERPL-SCNC: 140 MMOL/L — SIGNIFICANT CHANGE UP (ref 135–145)
SODIUM SERPL-SCNC: 140 MMOL/L — SIGNIFICANT CHANGE UP (ref 135–145)
SPECIMEN SOURCE: SIGNIFICANT CHANGE UP
SPECIMEN SOURCE: SIGNIFICANT CHANGE UP
WBC # BLD: 6.8 K/UL — SIGNIFICANT CHANGE UP (ref 3.8–10.5)
WBC # BLD: 6.8 K/UL — SIGNIFICANT CHANGE UP (ref 3.8–10.5)
WBC # FLD AUTO: 6.8 K/UL — SIGNIFICANT CHANGE UP (ref 3.8–10.5)
WBC # FLD AUTO: 6.8 K/UL — SIGNIFICANT CHANGE UP (ref 3.8–10.5)

## 2023-12-27 PROCEDURE — 93970 EXTREMITY STUDY: CPT | Mod: 26

## 2023-12-27 PROCEDURE — 99233 SBSQ HOSP IP/OBS HIGH 50: CPT

## 2023-12-27 PROCEDURE — 76700 US EXAM ABDOM COMPLETE: CPT | Mod: 26

## 2023-12-27 RX ORDER — SODIUM CHLORIDE 9 MG/ML
1000 INJECTION INTRAMUSCULAR; INTRAVENOUS; SUBCUTANEOUS
Refills: 0 | Status: DISCONTINUED | OUTPATIENT
Start: 2023-12-27 | End: 2023-12-28

## 2023-12-27 RX ORDER — LEVOTHYROXINE SODIUM 125 MCG
50 TABLET ORAL
Refills: 0 | Status: DISCONTINUED | OUTPATIENT
Start: 2023-12-27 | End: 2023-12-29

## 2023-12-27 RX ORDER — MAGNESIUM SULFATE 500 MG/ML
2 VIAL (ML) INJECTION ONCE
Refills: 0 | Status: COMPLETED | OUTPATIENT
Start: 2023-12-27 | End: 2023-12-27

## 2023-12-27 RX ORDER — LEVOTHYROXINE SODIUM 125 MCG
25 TABLET ORAL
Refills: 0 | Status: DISCONTINUED | OUTPATIENT
Start: 2023-12-27 | End: 2023-12-29

## 2023-12-27 RX ADMIN — SODIUM CHLORIDE 75 MILLILITER(S): 9 INJECTION INTRAMUSCULAR; INTRAVENOUS; SUBCUTANEOUS at 16:00

## 2023-12-27 RX ADMIN — HEPARIN SODIUM 5000 UNIT(S): 5000 INJECTION INTRAVENOUS; SUBCUTANEOUS at 16:02

## 2023-12-27 RX ADMIN — HEPARIN SODIUM 5000 UNIT(S): 5000 INJECTION INTRAVENOUS; SUBCUTANEOUS at 21:50

## 2023-12-27 RX ADMIN — LIDOCAINE 1 PATCH: 4 CREAM TOPICAL at 16:01

## 2023-12-27 RX ADMIN — Medication 1 TABLET(S): at 16:06

## 2023-12-27 RX ADMIN — Medication 2000 UNIT(S): at 16:01

## 2023-12-27 RX ADMIN — Medication 25 GRAM(S): at 16:00

## 2023-12-27 RX ADMIN — Medication 25 MICROGRAM(S): at 21:50

## 2023-12-27 RX ADMIN — SIMVASTATIN 20 MILLIGRAM(S): 20 TABLET, FILM COATED ORAL at 21:50

## 2023-12-27 RX ADMIN — CEFTRIAXONE 1000 MILLIGRAM(S): 500 INJECTION, POWDER, FOR SOLUTION INTRAMUSCULAR; INTRAVENOUS at 02:44

## 2023-12-27 NOTE — ED ADULT NURSE NOTE - NSFALLHARMRISKINTERV_ED_ALL_ED
Additional History: The patient has no areas of concern today. Assistance OOB with selected safe patient handling equipment if applicable/Communicate risk of Fall with Harm to all staff, patient, and family/Provide visual cue: red socks, yellow wristband, yellow gown, etc/Reinforce activity limits and safety measures with patient and family/Bed in lowest position, wheels locked, appropriate side rails in place/Call bell, personal items and telephone in reach/Instruct patient to call for assistance before getting out of bed/chair/stretcher/Non-slip footwear applied when patient is off stretcher/[place] to call system/Physically safe environment - no spills, clutter or unnecessary equipment/Purposeful Proactive Rounding/Room/bathroom lighting operational, light cord in reach Assistance OOB with selected safe patient handling equipment if applicable/Communicate risk of Fall with Harm to all staff, patient, and family/Provide visual cue: red socks, yellow wristband, yellow gown, etc/Reinforce activity limits and safety measures with patient and family/Bed in lowest position, wheels locked, appropriate side rails in place/Call bell, personal items and telephone in reach/Instruct patient to call for assistance before getting out of bed/chair/stretcher/Non-slip footwear applied when patient is off stretcher/[place] to call system/Physically safe environment - no spills, clutter or unnecessary equipment/Purposeful Proactive Rounding/Room/bathroom lighting operational, light cord in reach

## 2023-12-27 NOTE — PROGRESS NOTE ADULT - SUBJECTIVE AND OBJECTIVE BOX
[facility] Division of Hospital Medicine  Simone Kline MD    Chief Complaint:  Patient is a 83y old  Female who presents with a chief complaint of     SUBJECTIVE / OVERNIGHT EVENTS:  Patient seen and examined at bedside. No acute events reported overnight. No new complaints.    MEDICATIONS  (STANDING):  cefTRIAXone Injectable. 1000 milliGRAM(s) IV Push every 24 hours  cholecalciferol 2000 Unit(s) Oral daily  heparin   Injectable 5000 Unit(s) SubCutaneous every 8 hours  lidocaine   4% Patch 1 Patch Transdermal daily  magnesium sulfate  IVPB 2 Gram(s) IV Intermittent once  multivitamin/minerals 1 Tablet(s) Oral daily  simvastatin 20 milliGRAM(s) Oral at bedtime  sodium chloride 0.9%. 1000 milliLiter(s) (75 mL/Hr) IV Continuous <Continuous>    MEDICATIONS  (PRN):  acetaminophen     Tablet .. 650 milliGRAM(s) Oral every 6 hours PRN Temp greater or equal to 38C (100.4F), Mild Pain (1 - 3)  aluminum hydroxide/magnesium hydroxide/simethicone Suspension 30 milliLiter(s) Oral every 4 hours PRN Dyspepsia  melatonin 3 milliGRAM(s) Oral at bedtime PRN Insomnia  ondansetron Injectable 4 milliGRAM(s) IV Push every 8 hours PRN Nausea and/or Vomiting        I&O's Summary    26 Dec 2023 07:01  -  27 Dec 2023 07:00  --------------------------------------------------------  IN: 0 mL / OUT: 890 mL / NET: -890 mL        PHYSICAL EXAM:  Vital Signs Last 24 Hrs  T(C): 36.6 (27 Dec 2023 07:27), Max: 36.7 (26 Dec 2023 11:05)  T(F): 97.9 (27 Dec 2023 07:27), Max: 98 (26 Dec 2023 11:05)  HR: 60 (27 Dec 2023 07:27) (60 - 79)  BP: 111/68 (27 Dec 2023 07:27) (93/55 - 115/62)  BP(mean): --  RR: 20 (27 Dec 2023 07:27) (20 - 20)  SpO2: 97% (27 Dec 2023 07:27) (96% - 100%)    Parameters below as of 27 Dec 2023 07:27  Patient On (Oxygen Delivery Method): room air            CONSTITUTIONAL: NAD  HEENT: NC/AT, PERRL, no JVD  RESPIRATORY: CTA bilaterally, normal effort  CARDIOVASCULAR: RRR, S1/S2+, no m/g/r  ABDOMEN: Nontender to palpation, normoactive bowel sounds, no rebound/guarding  MUSCULOSKELETAL: No edema, cyanosis or deformities.  PSYCH: Calm, affect appropriate.  NEUROLOGY: Awake, alert, no focal neurological deficits.   SKIN: No rashes; no palpable lesions  VASC: Distal pulses palpable    LABS:                        8.3    6.80  )-----------( 284      ( 27 Dec 2023 07:03 )             26.7     12-27    140  |  113<H>  |  47.0<H>  ----------------------------<  87  3.4<L>   |  14.0<L>  |  1.50<H>    Ca    7.9<L>      27 Dec 2023 07:03  Mg     1.2     12-27    TPro  5.1<L>  /  Alb  1.7<L>  /  TBili  <0.2<L>  /  DBili  x   /  AST  50<H>  /  ALT  36<H>  /  AlkPhos  63  12-27    PT/INR - ( 26 Dec 2023 11:07 )   PT: 16.6 sec;   INR: 1.51 ratio         PTT - ( 26 Dec 2023 11:07 )  PTT:30.4 sec      Urinalysis Basic - ( 27 Dec 2023 07:03 )    Color: x / Appearance: x / SG: x / pH: x  Gluc: 87 mg/dL / Ketone: x  / Bili: x / Urobili: x   Blood: x / Protein: x / Nitrite: x   Leuk Esterase: x / RBC: x / WBC x   Sq Epi: x / Non Sq Epi: x / Bacteria: x        CAPILLARY BLOOD GLUCOSE            RADIOLOGY & ADDITIONAL TESTS:  Results Reviewed:   Imaging Personally Reviewed:  Electrocardiogram Personally Reviewed:                                           [facility] Division of Hospital Medicine  Simone Kline MD    Chief Complaint:  Patient is a 83y old  Female who presents with a chief complaint of     SUBJECTIVE / OVERNIGHT EVENTS:  Patient seen and examined at bedside. No acute events reported overnight. No new complaints.    MEDICATIONS  (STANDING):  cefTRIAXone Injectable. 1000 milliGRAM(s) IV Push every 24 hours  cholecalciferol 2000 Unit(s) Oral daily  heparin   Injectable 5000 Unit(s) SubCutaneous every 8 hours  lidocaine   4% Patch 1 Patch Transdermal daily  magnesium sulfate  IVPB 2 Gram(s) IV Intermittent once  multivitamin/minerals 1 Tablet(s) Oral daily  simvastatin 20 milliGRAM(s) Oral at bedtime  sodium chloride 0.9%. 1000 milliLiter(s) (75 mL/Hr) IV Continuous <Continuous>    MEDICATIONS  (PRN):  acetaminophen     Tablet .. 650 milliGRAM(s) Oral every 6 hours PRN Temp greater or equal to 38C (100.4F), Mild Pain (1 - 3)  aluminum hydroxide/magnesium hydroxide/simethicone Suspension 30 milliLiter(s) Oral every 4 hours PRN Dyspepsia  melatonin 3 milliGRAM(s) Oral at bedtime PRN Insomnia  ondansetron Injectable 4 milliGRAM(s) IV Push every 8 hours PRN Nausea and/or Vomiting        I&O's Summary    26 Dec 2023 07:01  -  27 Dec 2023 07:00  --------------------------------------------------------  IN: 0 mL / OUT: 890 mL / NET: -890 mL        PHYSICAL EXAM:  Vital Signs Last 24 Hrs  T(C): 36.6 (27 Dec 2023 07:27), Max: 36.7 (26 Dec 2023 11:05)  T(F): 97.9 (27 Dec 2023 07:27), Max: 98 (26 Dec 2023 11:05)  HR: 60 (27 Dec 2023 07:27) (60 - 79)  BP: 111/68 (27 Dec 2023 07:27) (93/55 - 115/62)  BP(mean): --  RR: 20 (27 Dec 2023 07:27) (20 - 20)  SpO2: 97% (27 Dec 2023 07:27) (96% - 100%)    Parameters below as of 27 Dec 2023 07:27  Patient On (Oxygen Delivery Method): room air            CONSTITUTIONAL: NAD  HEENT: NC/AT, PERRL, no JVD  RESPIRATORY: CTA bilaterally, normal effort  CARDIOVASCULAR: RRR, S1/S2+, no m/g/r  ABDOMEN: Nontender to palpation, normoactive bowel sounds, no rebound/guarding  MUSCULOSKELETAL: No edema, cyanosis or deformities.  PSYCH: Calm, affect appropriate.  NEUROLOGY: Awake, alert, no focal neurological deficits.   SKIN: No rashes; no palpable lesions  VASC: Distal pulses palpable    LABS:                        8.3    6.80  )-----------( 284      ( 27 Dec 2023 07:03 )             26.7     12-27    140  |  113<H>  |  47.0<H>  ----------------------------<  87  3.4<L>   |  14.0<L>  |  1.50<H>    Ca    7.9<L>      27 Dec 2023 07:03  Mg     1.2     12-27    TPro  5.1<L>  /  Alb  1.7<L>  /  TBili  <0.2<L>  /  DBili  x   /  AST  50<H>  /  ALT  36<H>  /  AlkPhos  63  12-27    PT/INR - ( 26 Dec 2023 11:07 )   PT: 16.6 sec;   INR: 1.51 ratio         PTT - ( 26 Dec 2023 11:07 )  PTT:30.4 sec      Urinalysis Basic - ( 27 Dec 2023 07:03 )    Color: x / Appearance: x / SG: x / pH: x  Gluc: 87 mg/dL / Ketone: x  / Bili: x / Urobili: x   Blood: x / Protein: x / Nitrite: x   Leuk Esterase: x / RBC: x / WBC x   Sq Epi: x / Non Sq Epi: x / Bacteria: x        CAPILLARY BLOOD GLUCOSE            RADIOLOGY & ADDITIONAL TESTS:  Results Reviewed:   Imaging Personally Reviewed:  Electrocardiogram Personally Reviewed:

## 2023-12-27 NOTE — ED ADULT NURSE REASSESSMENT NOTE - NS ED NURSE REASSESS COMMENT FT1
RN attempted to contact provider listed for patient, no answer, patient has no complaints, no episodes of diarrhea noted on shift.

## 2023-12-28 LAB
ALBUMIN SERPL ELPH-MCNC: 1.5 G/DL — LOW (ref 3.3–5.2)
ALBUMIN SERPL ELPH-MCNC: 1.5 G/DL — LOW (ref 3.3–5.2)
ALP SERPL-CCNC: 66 U/L — SIGNIFICANT CHANGE UP (ref 40–120)
ALP SERPL-CCNC: 66 U/L — SIGNIFICANT CHANGE UP (ref 40–120)
ALT FLD-CCNC: 41 U/L — HIGH
ALT FLD-CCNC: 41 U/L — HIGH
ANION GAP SERPL CALC-SCNC: 11 MMOL/L — SIGNIFICANT CHANGE UP (ref 5–17)
ANION GAP SERPL CALC-SCNC: 11 MMOL/L — SIGNIFICANT CHANGE UP (ref 5–17)
AST SERPL-CCNC: 47 U/L — HIGH
AST SERPL-CCNC: 47 U/L — HIGH
BILIRUB SERPL-MCNC: 0.2 MG/DL — LOW (ref 0.4–2)
BILIRUB SERPL-MCNC: 0.2 MG/DL — LOW (ref 0.4–2)
BUN SERPL-MCNC: 30.8 MG/DL — HIGH (ref 8–20)
BUN SERPL-MCNC: 30.8 MG/DL — HIGH (ref 8–20)
CALCIUM SERPL-MCNC: 7.9 MG/DL — LOW (ref 8.4–10.5)
CALCIUM SERPL-MCNC: 7.9 MG/DL — LOW (ref 8.4–10.5)
CHLORIDE SERPL-SCNC: 109 MMOL/L — HIGH (ref 96–108)
CHLORIDE SERPL-SCNC: 109 MMOL/L — HIGH (ref 96–108)
CO2 SERPL-SCNC: 15 MMOL/L — LOW (ref 22–29)
CO2 SERPL-SCNC: 15 MMOL/L — LOW (ref 22–29)
CREAT SERPL-MCNC: 1.1 MG/DL — SIGNIFICANT CHANGE UP (ref 0.5–1.3)
CREAT SERPL-MCNC: 1.1 MG/DL — SIGNIFICANT CHANGE UP (ref 0.5–1.3)
EGFR: 50 ML/MIN/1.73M2 — LOW
EGFR: 50 ML/MIN/1.73M2 — LOW
GLUCOSE SERPL-MCNC: 95 MG/DL — SIGNIFICANT CHANGE UP (ref 70–99)
GLUCOSE SERPL-MCNC: 95 MG/DL — SIGNIFICANT CHANGE UP (ref 70–99)
HCT VFR BLD CALC: 27.9 % — LOW (ref 34.5–45)
HCT VFR BLD CALC: 27.9 % — LOW (ref 34.5–45)
HGB BLD-MCNC: 8.9 G/DL — LOW (ref 11.5–15.5)
HGB BLD-MCNC: 8.9 G/DL — LOW (ref 11.5–15.5)
MAGNESIUM SERPL-MCNC: 1.6 MG/DL — SIGNIFICANT CHANGE UP (ref 1.6–2.6)
MAGNESIUM SERPL-MCNC: 1.6 MG/DL — SIGNIFICANT CHANGE UP (ref 1.6–2.6)
MCHC RBC-ENTMCNC: 29.8 PG — SIGNIFICANT CHANGE UP (ref 27–34)
MCHC RBC-ENTMCNC: 29.8 PG — SIGNIFICANT CHANGE UP (ref 27–34)
MCHC RBC-ENTMCNC: 31.9 GM/DL — LOW (ref 32–36)
MCHC RBC-ENTMCNC: 31.9 GM/DL — LOW (ref 32–36)
MCV RBC AUTO: 93.3 FL — SIGNIFICANT CHANGE UP (ref 80–100)
MCV RBC AUTO: 93.3 FL — SIGNIFICANT CHANGE UP (ref 80–100)
PHOSPHATE SERPL-MCNC: 2.4 MG/DL — SIGNIFICANT CHANGE UP (ref 2.4–4.7)
PHOSPHATE SERPL-MCNC: 2.4 MG/DL — SIGNIFICANT CHANGE UP (ref 2.4–4.7)
PLATELET # BLD AUTO: 225 K/UL — SIGNIFICANT CHANGE UP (ref 150–400)
PLATELET # BLD AUTO: 225 K/UL — SIGNIFICANT CHANGE UP (ref 150–400)
POTASSIUM SERPL-MCNC: 3.8 MMOL/L — SIGNIFICANT CHANGE UP (ref 3.5–5.3)
POTASSIUM SERPL-MCNC: 3.8 MMOL/L — SIGNIFICANT CHANGE UP (ref 3.5–5.3)
POTASSIUM SERPL-SCNC: 3.8 MMOL/L — SIGNIFICANT CHANGE UP (ref 3.5–5.3)
POTASSIUM SERPL-SCNC: 3.8 MMOL/L — SIGNIFICANT CHANGE UP (ref 3.5–5.3)
PROT SERPL-MCNC: 5.2 G/DL — LOW (ref 6.6–8.7)
PROT SERPL-MCNC: 5.2 G/DL — LOW (ref 6.6–8.7)
RBC # BLD: 2.99 M/UL — LOW (ref 3.8–5.2)
RBC # BLD: 2.99 M/UL — LOW (ref 3.8–5.2)
RBC # FLD: 14.7 % — HIGH (ref 10.3–14.5)
RBC # FLD: 14.7 % — HIGH (ref 10.3–14.5)
SODIUM SERPL-SCNC: 135 MMOL/L — SIGNIFICANT CHANGE UP (ref 135–145)
SODIUM SERPL-SCNC: 135 MMOL/L — SIGNIFICANT CHANGE UP (ref 135–145)
WBC # BLD: 6.49 K/UL — SIGNIFICANT CHANGE UP (ref 3.8–10.5)
WBC # BLD: 6.49 K/UL — SIGNIFICANT CHANGE UP (ref 3.8–10.5)
WBC # FLD AUTO: 6.49 K/UL — SIGNIFICANT CHANGE UP (ref 3.8–10.5)
WBC # FLD AUTO: 6.49 K/UL — SIGNIFICANT CHANGE UP (ref 3.8–10.5)

## 2023-12-28 PROCEDURE — 99232 SBSQ HOSP IP/OBS MODERATE 35: CPT

## 2023-12-28 RX ORDER — SODIUM BICARBONATE 1 MEQ/ML
1300 SYRINGE (ML) INTRAVENOUS THREE TIMES A DAY
Refills: 0 | Status: DISCONTINUED | OUTPATIENT
Start: 2023-12-28 | End: 2023-12-29

## 2023-12-28 RX ORDER — MAGNESIUM SULFATE 500 MG/ML
2 VIAL (ML) INJECTION ONCE
Refills: 0 | Status: COMPLETED | OUTPATIENT
Start: 2023-12-28 | End: 2023-12-28

## 2023-12-28 RX ORDER — SODIUM BICARBONATE 1 MEQ/ML
0.09 SYRINGE (ML) INTRAVENOUS
Qty: 75 | Refills: 0 | Status: DISCONTINUED | OUTPATIENT
Start: 2023-12-28 | End: 2023-12-28

## 2023-12-28 RX ORDER — POLYETHYLENE GLYCOL 3350 17 G/17G
17 POWDER, FOR SOLUTION ORAL ONCE
Refills: 0 | Status: COMPLETED | OUTPATIENT
Start: 2023-12-28 | End: 2023-12-29

## 2023-12-28 RX ADMIN — Medication 1 TABLET(S): at 09:19

## 2023-12-28 RX ADMIN — Medication 1300 MILLIGRAM(S): at 14:32

## 2023-12-28 RX ADMIN — Medication 25 GRAM(S): at 09:19

## 2023-12-28 RX ADMIN — Medication 88 MEQ/KG/HR: at 12:10

## 2023-12-28 RX ADMIN — HEPARIN SODIUM 5000 UNIT(S): 5000 INJECTION INTRAVENOUS; SUBCUTANEOUS at 23:31

## 2023-12-28 RX ADMIN — Medication 50 MICROGRAM(S): at 06:18

## 2023-12-28 RX ADMIN — LIDOCAINE 1 PATCH: 4 CREAM TOPICAL at 04:00

## 2023-12-28 RX ADMIN — Medication 650 MILLIGRAM(S): at 21:08

## 2023-12-28 RX ADMIN — HEPARIN SODIUM 5000 UNIT(S): 5000 INJECTION INTRAVENOUS; SUBCUTANEOUS at 12:09

## 2023-12-28 RX ADMIN — Medication 650 MILLIGRAM(S): at 22:08

## 2023-12-28 RX ADMIN — Medication 650 MILLIGRAM(S): at 06:23

## 2023-12-28 RX ADMIN — Medication 1300 MILLIGRAM(S): at 23:28

## 2023-12-28 RX ADMIN — HEPARIN SODIUM 5000 UNIT(S): 5000 INJECTION INTRAVENOUS; SUBCUTANEOUS at 06:18

## 2023-12-28 RX ADMIN — Medication 2000 UNIT(S): at 09:19

## 2023-12-28 RX ADMIN — CEFTRIAXONE 1000 MILLIGRAM(S): 500 INJECTION, POWDER, FOR SOLUTION INTRAMUSCULAR; INTRAVENOUS at 06:18

## 2023-12-28 RX ADMIN — SIMVASTATIN 20 MILLIGRAM(S): 20 TABLET, FILM COATED ORAL at 23:28

## 2023-12-28 NOTE — PROGRESS NOTE ADULT - ASSESSMENT
83 year old female with HTN, HLD, Hypothyroidism, hiatal hernia, severe OA of hands , knees, Hip as wel as  h/o TIA and DVT (2016 no longer on AC), recent development of recurrent UTIs (follows with Urology as an outpt) presenting with generalized weakness. Approx. 1 week prior she had developed profuse diarrhea intermixed with blood. Per daughter at baseline pt has urinary incontinence and as such tries to drink max 1 cup of green tea to reduce need to have urinary issues. On arrival to the ER she was responsive and oriented but only with aggressive external stimulation (suspect may have been encephalopathic 2/2 renal failure).     metabolic acidosis  ELISA  chronic left hydronephrosis    s/p walters, maintain walters on dc    dc abx as negative cultures  - Significant allergy history listed however has tolerated Ceftriaxone w.o issues     Profuse diarrhea: resolved   - Now improved since arrival to ER  - C. diff/ gi pcr pending, no BMs. will d/c    GERD with hiatal hernia  - PPI    HLD  - Statin    HTN   - Hold Olmesartan 20  - Normotensive at present, Hydralazine 5 mg PRN SBP > 140    Hx of DVT  - No longer on AC  - Dopplers LE b/l negative       DVT ppx Heparin s/c     PT eval  dc planning  spoke to daughter at bedside       
 83 year old female with HTN, HLD, Hypothyroidism, hiatal hernia, severe OA of hands , knees, Hip as wel as  h/o TIA and DVT (2016 no longer on AC), recent development of recurrent UTIs (follows with Urology as an outpt) presenting with generalized weakness. Approx. 1 week prior she had developed profuse diarrhea intermixed with blood. Per daughter at baseline pt has urinary incontinence and as such tries to drink max 1 cup of green tea to reduce need to have urinary issues. On arrival to the ER she was responsive and oriented but only with aggressive external stimulation (suspect may have been encephalopathic 2/2 renal failure).     Metabolic acidosis 2/2 ELISA with UTI   - U A noted, Urine culture pending  - Significant allergy history listed however has tolerated Ceftriaxone w.o issues   - Continue Ceftriaxone, follow snesiitivites  - s/p Walters. d/w Urology, will need to continue walters for ongoing decompression (left hydroureteronephrosis doees not apear acute)  - Continue IVFs  - renal function improving, monitor BMP    Profuse diarrhea  - Now improved since arrival to ER  - Last Abx use for UTI > 1 month ago  - GI PCR pending  - C. diff pending, no BMs. will d/c    GERD with hiatal hernia  - PPI    HLD  - Statin    HTN   - Hold Olmesartan 20  - Normotensive at present, Hydralazine 5 mg PRN SBP > 140    Morbid obesity   - Weight loss counseling done   - Abd US     Hx of DVT  - No longer on AC  - Dopplers LE b/l pending       DVT ppx Heparin s/c         
84 yo female with chronic left hydronephrosis, ELISA  - renal fcn normalized  - cont walters for adequate bladder drainage  - may f/u as OP  - no  intervention required at this time

## 2023-12-28 NOTE — PROGRESS NOTE ADULT - SUBJECTIVE AND OBJECTIVE BOX
seen for ARF    no further diarrhea  tolerating diet  ros negative     MEDICATIONS  (STANDING):  cholecalciferol 2000 Unit(s) Oral daily  heparin   Injectable 5000 Unit(s) SubCutaneous every 8 hours  levothyroxine 25 MICROGram(s) Oral <User Schedule>  levothyroxine 50 MICROGram(s) Oral <User Schedule>  lidocaine   4% Patch 1 Patch Transdermal daily  multivitamin/minerals 1 Tablet(s) Oral daily  simvastatin 20 milliGRAM(s) Oral at bedtime  sodium bicarbonate  Infusion 0.094 mEq/kG/Hr (88 mL/Hr) IV Continuous <Continuous>    MEDICATIONS  (PRN):  acetaminophen     Tablet .. 650 milliGRAM(s) Oral every 6 hours PRN Temp greater or equal to 38C (100.4F), Mild Pain (1 - 3)  aluminum hydroxide/magnesium hydroxide/simethicone Suspension 30 milliLiter(s) Oral every 4 hours PRN Dyspepsia  melatonin 3 milliGRAM(s) Oral at bedtime PRN Insomnia  ondansetron Injectable 4 milliGRAM(s) IV Push every 8 hours PRN Nausea and/or Vomiting      Allergies    ampicillin (Stomach Upset)  Bactrim (Rash)  Cipro (Other)  all narcotics give severe vomitting and dizziness (Other; Vomiting)  Levaquin (Unknown)    Intolerances    Augmentin (Diarrhea)      Vital Signs Last 24 Hrs  T(C): 36.3 (28 Dec 2023 11:12), Max: 36.9 (27 Dec 2023 23:38)  T(F): 97.4 (28 Dec 2023 11:12), Max: 98.5 (27 Dec 2023 23:38)  HR: 69 (28 Dec 2023 11:12) (69 - 90)  BP: 122/71 (28 Dec 2023 11:12) (100/61 - 145/62)  BP(mean): --  RR: 16 (28 Dec 2023 11:12) (16 - 18)  SpO2: 98% (28 Dec 2023 11:12) (95% - 98%)    Parameters below as of 28 Dec 2023 11:12  Patient On (Oxygen Delivery Method): room air        PHYSICAL EXAM:    GENERAL: NAD  CHEST/LUNG: Clear to ausculation bilaterally;  HEART: Regular rate and rhythm; S1 S2;  ABDOMEN: Soft, Nontender,  Bowel sounds present  EXTREMITIES:  no edema  gu walters   NERVOUS SYSTEM:  Alert & Oriented X3, nonfocal gen weakness   LABS:                        8.9    6.49  )-----------( 225      ( 28 Dec 2023 03:05 )             27.9     12-28    135  |  109<H>  |  30.8<H>  ----------------------------<  95  3.8   |  15.0<L>  |  1.10    Ca    7.9<L>      28 Dec 2023 03:05  Phos  2.4     12-28  Mg     1.6     12-28    TPro  5.2<L>  /  Alb  1.5<L>  /  TBili  0.2<L>  /  DBili  x   /  AST  47<H>  /  ALT  41<H>  /  AlkPhos  66  12-28      Urinalysis Basic - ( 28 Dec 2023 03:05 )    Color: x / Appearance: x / SG: x / pH: x  Gluc: 95 mg/dL / Ketone: x  / Bili: x / Urobili: x   Blood: x / Protein: x / Nitrite: x   Leuk Esterase: x / RBC: x / WBC x   Sq Epi: x / Non Sq Epi: x / Bacteria: x        CAPILLARY BLOOD GLUCOSE            RADIOLOGY & ADDITIONAL TESTS:

## 2023-12-28 NOTE — PROGRESS NOTE ADULT - SUBJECTIVE AND OBJECTIVE BOX
Subjective:83yFemale with chronic left hydronephrosis, ELISA on admission.  Smart in place, draining well.    Smart: yellow    Vital Signs Last 24 Hrs  T(C): 36.8 (28 Dec 2023 07:58), Max: 36.9 (27 Dec 2023 23:38)  T(F): 98.3 (28 Dec 2023 07:58), Max: 98.5 (27 Dec 2023 23:38)  HR: 80 (28 Dec 2023 07:58) (74 - 94)  BP: 119/71 (28 Dec 2023 07:58) (100/61 - 145/62)  BP(mean): --  RR: 16 (28 Dec 2023 07:58) (16 - 18)  SpO2: 95% (28 Dec 2023 07:58) (93% - 96%)    Parameters below as of 28 Dec 2023 07:58  Patient On (Oxygen Delivery Method): room air      I&O's Detail      Labs:                        8.9    6.49  )-----------( 225      ( 28 Dec 2023 03:05 )             27.9     12-28    135  |  109<H>  |  30.8<H>  ----------------------------<  95  3.8   |  15.0<L>  |  1.10    Ca    7.9<L>      28 Dec 2023 03:05  Phos  2.4     12-28  Mg     1.6     12-28    TPro  5.2<L>  /  Alb  1.5<L>  /  TBili  0.2<L>  /  DBili  x   /  AST  47<H>  /  ALT  41<H>  /  AlkPhos  66  12-28    PT/INR - ( 26 Dec 2023 11:07 )   PT: 16.6 sec;   INR: 1.51 ratio         PTT - ( 26 Dec 2023 11:07 )  PTT:30.4 sec      Culture - Urine (collected 26 Dec 2023 13:48)  Source: Clean Catch Clean Catch (Midstream)  Final Report (27 Dec 2023 16:18):    No growth

## 2023-12-29 ENCOUNTER — TRANSCRIPTION ENCOUNTER (OUTPATIENT)
Age: 83
End: 2023-12-29

## 2023-12-29 VITALS
SYSTOLIC BLOOD PRESSURE: 134 MMHG | RESPIRATION RATE: 17 BRPM | DIASTOLIC BLOOD PRESSURE: 52 MMHG | TEMPERATURE: 98 F | HEART RATE: 60 BPM | OXYGEN SATURATION: 96 %

## 2023-12-29 LAB
ALBUMIN SERPL ELPH-MCNC: 1.9 G/DL — LOW (ref 3.3–5.2)
ALBUMIN SERPL ELPH-MCNC: 1.9 G/DL — LOW (ref 3.3–5.2)
ALP SERPL-CCNC: 87 U/L — SIGNIFICANT CHANGE UP (ref 40–120)
ALP SERPL-CCNC: 87 U/L — SIGNIFICANT CHANGE UP (ref 40–120)
ALT FLD-CCNC: 35 U/L — HIGH
ALT FLD-CCNC: 35 U/L — HIGH
ANION GAP SERPL CALC-SCNC: 13 MMOL/L — SIGNIFICANT CHANGE UP (ref 5–17)
ANION GAP SERPL CALC-SCNC: 13 MMOL/L — SIGNIFICANT CHANGE UP (ref 5–17)
AST SERPL-CCNC: 45 U/L — HIGH
AST SERPL-CCNC: 45 U/L — HIGH
BILIRUB SERPL-MCNC: 0.3 MG/DL — LOW (ref 0.4–2)
BILIRUB SERPL-MCNC: 0.3 MG/DL — LOW (ref 0.4–2)
BUN SERPL-MCNC: 24 MG/DL — HIGH (ref 8–20)
BUN SERPL-MCNC: 24 MG/DL — HIGH (ref 8–20)
CALCIUM SERPL-MCNC: 8.1 MG/DL — LOW (ref 8.4–10.5)
CALCIUM SERPL-MCNC: 8.1 MG/DL — LOW (ref 8.4–10.5)
CHLORIDE SERPL-SCNC: 105 MMOL/L — SIGNIFICANT CHANGE UP (ref 96–108)
CHLORIDE SERPL-SCNC: 105 MMOL/L — SIGNIFICANT CHANGE UP (ref 96–108)
CO2 SERPL-SCNC: 17 MMOL/L — LOW (ref 22–29)
CO2 SERPL-SCNC: 17 MMOL/L — LOW (ref 22–29)
CREAT SERPL-MCNC: 1.03 MG/DL — SIGNIFICANT CHANGE UP (ref 0.5–1.3)
CREAT SERPL-MCNC: 1.03 MG/DL — SIGNIFICANT CHANGE UP (ref 0.5–1.3)
EGFR: 55 ML/MIN/1.73M2 — LOW
EGFR: 55 ML/MIN/1.73M2 — LOW
GLUCOSE SERPL-MCNC: 100 MG/DL — HIGH (ref 70–99)
GLUCOSE SERPL-MCNC: 100 MG/DL — HIGH (ref 70–99)
HCT VFR BLD CALC: 30.6 % — LOW (ref 34.5–45)
HCT VFR BLD CALC: 30.6 % — LOW (ref 34.5–45)
HGB BLD-MCNC: 9.6 G/DL — LOW (ref 11.5–15.5)
HGB BLD-MCNC: 9.6 G/DL — LOW (ref 11.5–15.5)
MAGNESIUM SERPL-MCNC: 1.6 MG/DL — SIGNIFICANT CHANGE UP (ref 1.6–2.6)
MAGNESIUM SERPL-MCNC: 1.6 MG/DL — SIGNIFICANT CHANGE UP (ref 1.6–2.6)
MCHC RBC-ENTMCNC: 28.8 PG — SIGNIFICANT CHANGE UP (ref 27–34)
MCHC RBC-ENTMCNC: 28.8 PG — SIGNIFICANT CHANGE UP (ref 27–34)
MCHC RBC-ENTMCNC: 31.4 GM/DL — LOW (ref 32–36)
MCHC RBC-ENTMCNC: 31.4 GM/DL — LOW (ref 32–36)
MCV RBC AUTO: 91.9 FL — SIGNIFICANT CHANGE UP (ref 80–100)
MCV RBC AUTO: 91.9 FL — SIGNIFICANT CHANGE UP (ref 80–100)
PHOSPHATE SERPL-MCNC: 2.4 MG/DL — SIGNIFICANT CHANGE UP (ref 2.4–4.7)
PHOSPHATE SERPL-MCNC: 2.4 MG/DL — SIGNIFICANT CHANGE UP (ref 2.4–4.7)
PLATELET # BLD AUTO: 277 K/UL — SIGNIFICANT CHANGE UP (ref 150–400)
PLATELET # BLD AUTO: 277 K/UL — SIGNIFICANT CHANGE UP (ref 150–400)
POTASSIUM SERPL-MCNC: 5 MMOL/L — SIGNIFICANT CHANGE UP (ref 3.5–5.3)
POTASSIUM SERPL-MCNC: 5 MMOL/L — SIGNIFICANT CHANGE UP (ref 3.5–5.3)
POTASSIUM SERPL-SCNC: 5 MMOL/L — SIGNIFICANT CHANGE UP (ref 3.5–5.3)
POTASSIUM SERPL-SCNC: 5 MMOL/L — SIGNIFICANT CHANGE UP (ref 3.5–5.3)
PROT SERPL-MCNC: 5.4 G/DL — LOW (ref 6.6–8.7)
PROT SERPL-MCNC: 5.4 G/DL — LOW (ref 6.6–8.7)
RBC # BLD: 3.33 M/UL — LOW (ref 3.8–5.2)
RBC # BLD: 3.33 M/UL — LOW (ref 3.8–5.2)
RBC # FLD: 14.7 % — HIGH (ref 10.3–14.5)
RBC # FLD: 14.7 % — HIGH (ref 10.3–14.5)
SODIUM SERPL-SCNC: 135 MMOL/L — SIGNIFICANT CHANGE UP (ref 135–145)
SODIUM SERPL-SCNC: 135 MMOL/L — SIGNIFICANT CHANGE UP (ref 135–145)
WBC # BLD: 10.36 K/UL — SIGNIFICANT CHANGE UP (ref 3.8–10.5)
WBC # BLD: 10.36 K/UL — SIGNIFICANT CHANGE UP (ref 3.8–10.5)
WBC # FLD AUTO: 10.36 K/UL — SIGNIFICANT CHANGE UP (ref 3.8–10.5)
WBC # FLD AUTO: 10.36 K/UL — SIGNIFICANT CHANGE UP (ref 3.8–10.5)

## 2023-12-29 PROCEDURE — 86900 BLOOD TYPING SEROLOGIC ABO: CPT

## 2023-12-29 PROCEDURE — 84145 PROCALCITONIN (PCT): CPT

## 2023-12-29 PROCEDURE — 82947 ASSAY GLUCOSE BLOOD QUANT: CPT

## 2023-12-29 PROCEDURE — 82330 ASSAY OF CALCIUM: CPT

## 2023-12-29 PROCEDURE — 83605 ASSAY OF LACTIC ACID: CPT

## 2023-12-29 PROCEDURE — 36415 COLL VENOUS BLD VENIPUNCTURE: CPT

## 2023-12-29 PROCEDURE — 84295 ASSAY OF SERUM SODIUM: CPT

## 2023-12-29 PROCEDURE — 87086 URINE CULTURE/COLONY COUNT: CPT

## 2023-12-29 PROCEDURE — 85027 COMPLETE CBC AUTOMATED: CPT

## 2023-12-29 PROCEDURE — 93970 EXTREMITY STUDY: CPT

## 2023-12-29 PROCEDURE — 82272 OCCULT BLD FECES 1-3 TESTS: CPT

## 2023-12-29 PROCEDURE — 85025 COMPLETE CBC W/AUTO DIFF WBC: CPT

## 2023-12-29 PROCEDURE — 76700 US EXAM ABDOM COMPLETE: CPT

## 2023-12-29 PROCEDURE — 81001 URINALYSIS AUTO W/SCOPE: CPT

## 2023-12-29 PROCEDURE — 74176 CT ABD & PELVIS W/O CONTRAST: CPT | Mod: MA

## 2023-12-29 PROCEDURE — 84132 ASSAY OF SERUM POTASSIUM: CPT

## 2023-12-29 PROCEDURE — 84100 ASSAY OF PHOSPHORUS: CPT

## 2023-12-29 PROCEDURE — 85610 PROTHROMBIN TIME: CPT

## 2023-12-29 PROCEDURE — 82435 ASSAY OF BLOOD CHLORIDE: CPT

## 2023-12-29 PROCEDURE — 85014 HEMATOCRIT: CPT

## 2023-12-29 PROCEDURE — 96374 THER/PROPH/DIAG INJ IV PUSH: CPT

## 2023-12-29 PROCEDURE — 85018 HEMOGLOBIN: CPT

## 2023-12-29 PROCEDURE — 99285 EMERGENCY DEPT VISIT HI MDM: CPT

## 2023-12-29 PROCEDURE — 86850 RBC ANTIBODY SCREEN: CPT

## 2023-12-29 PROCEDURE — 86901 BLOOD TYPING SEROLOGIC RH(D): CPT

## 2023-12-29 PROCEDURE — 83735 ASSAY OF MAGNESIUM: CPT

## 2023-12-29 PROCEDURE — 97163 PT EVAL HIGH COMPLEX 45 MIN: CPT

## 2023-12-29 PROCEDURE — 85730 THROMBOPLASTIN TIME PARTIAL: CPT

## 2023-12-29 PROCEDURE — 80053 COMPREHEN METABOLIC PANEL: CPT

## 2023-12-29 PROCEDURE — 82803 BLOOD GASES ANY COMBINATION: CPT

## 2023-12-29 PROCEDURE — 99239 HOSP IP/OBS DSCHRG MGMT >30: CPT

## 2023-12-29 RX ORDER — PHENYLEPHRINE-SHARK LIVER OIL-MINERAL OIL-PETROLATUM RECTAL OINTMENT
1 OINTMENT (GRAM) RECTAL DAILY
Refills: 0 | Status: DISCONTINUED | OUTPATIENT
Start: 2023-12-29 | End: 2023-12-29

## 2023-12-29 RX ADMIN — Medication 1 TABLET(S): at 13:40

## 2023-12-29 RX ADMIN — HEPARIN SODIUM 5000 UNIT(S): 5000 INJECTION INTRAVENOUS; SUBCUTANEOUS at 13:40

## 2023-12-29 RX ADMIN — HEPARIN SODIUM 5000 UNIT(S): 5000 INJECTION INTRAVENOUS; SUBCUTANEOUS at 06:36

## 2023-12-29 RX ADMIN — LIDOCAINE 1 PATCH: 4 CREAM TOPICAL at 13:40

## 2023-12-29 RX ADMIN — Medication 2000 UNIT(S): at 13:40

## 2023-12-29 RX ADMIN — POLYETHYLENE GLYCOL 3350 17 GRAM(S): 17 POWDER, FOR SOLUTION ORAL at 06:36

## 2023-12-29 RX ADMIN — Medication 25 MICROGRAM(S): at 06:43

## 2023-12-29 RX ADMIN — Medication 1300 MILLIGRAM(S): at 13:40

## 2023-12-29 RX ADMIN — Medication 1300 MILLIGRAM(S): at 06:36

## 2023-12-29 NOTE — DISCHARGE NOTE NURSING/CASE MANAGEMENT/SOCIAL WORK - NSDCPEFALRISK_GEN_ALL_CORE
For information on Fall & Injury Prevention, visit: [email or web address] OR  [email or web address] OR  https://www.cdc.gov/steadi/patient.html For information on Fall & Injury Prevention, visit: [email or web address] OR  [email or web address] OR  https://www.cdc.gov/steadi/patient.html

## 2023-12-29 NOTE — PHYSICAL THERAPY INITIAL EVALUATION ADULT - DIAGNOSIS, PT EVAL
Pt demonstrates functional limitations in transfers and ambulation due to weakness, decreased balance, and pain.

## 2023-12-29 NOTE — DISCHARGE NOTE PROVIDER - PROVIDER TOKENS
PROVIDER:[TOKEN:[[id number]]] PROVIDER:[TOKEN:[[id number]]] PROVIDER:[TOKEN:[[id number]],FOLLOWUP:[1 week]] PROVIDER:[TOKEN:[[id number]],FOLLOWUP:[1 week]]

## 2023-12-29 NOTE — PHYSICAL THERAPY INITIAL EVALUATION ADULT - ADDITIONAL COMMENTS
Pt reports she lives in an apartment within her daughters private home, and pts granddaughter lives with her in the apartment.  Pt reports the home has 2 platform steps to enter with on HR and 0 interior steps.  Prior to this hospitalization pt required assistance for bathing and dressing ADLs.  Pt also required assistance for transfers, ambulation with RW, and pt used a chair-glider on the stairs to go up to her daughters part of the house.  Pt reports she had HHAs for 4 hours/day x 7 days/week. Pt states she has recently been requesting to increase her HHA hours.  DME: pt owns a RW, w/c, and chair-glider.

## 2023-12-29 NOTE — DISCHARGE NOTE NURSING/CASE MANAGEMENT/SOCIAL WORK - NSDCVIVACCINE_GEN_ALL_CORE_FT
Influenza, seasonal, injectable; 2014/11/0 ; Gisel Chamberlain (RN);   influenza, injectable, quadrivalent, preservative free; 17-Nov-2020 16:50; Juan J Quintanilla (RN); [employer]; [id number] (Exp. Date: 30-Jun-2021); IntraMuscular; Deltoid Left.; 0.5 milliLiter(s); VIS (VIS Published: 15-Aug-2019, VIS Presented: 17-Nov-2020);   pneumococcal polysaccharide PPV23; 2009/10/0 ; Gisel Chamberlain (RN);    Influenza, seasonal, injectable; 2014/11/0 ; Gisel Chamberlain (RN);   influenza, injectable, quadrivalent, preservative free; 17-Nov-2020 16:50; Juan J Quintanilla (RN); [employer]; [id number] (Exp. Date: 30-Jun-2021); IntraMuscular; Deltoid Left.; 0.5 milliLiter(s); VIS (VIS Published: 15-Aug-2019, VIS Presented: 17-Nov-2020);   pneumococcal polysaccharide PPV23; 2009/10/0 ; Gisel Chamberlain (RN);

## 2023-12-29 NOTE — DISCHARGE NOTE PROVIDER - CARE PROVIDER_API CALL
Gary Aguila  Urology  [address] [place] [address]  Phone: [phone]  Fax: [phone]  Follow Up Time:    Gary Aguila  Urology  [address] [place] [address]  Phone: [phone]  Fax: [phone]  Follow Up Time:    Gary Aguila  Urology  [address] [place] [address]  Phone: [phone]  Fax: [phone]  Follow Up Time: 1 week   Gary Aguila  Urology  [address] [place] [address]  Phone: [phone]  Fax: [phone]  Follow Up Time: 1 week

## 2023-12-29 NOTE — PHYSICAL THERAPY INITIAL EVALUATION ADULT - CRITERIA FOR SKILLED THERAPEUTIC INTERVENTIONS
ANITHA/impairments found/functional limitations in following categories/anticipated discharge recommendation

## 2023-12-29 NOTE — PHYSICAL THERAPY INITIAL EVALUATION ADULT - PERTINENT HX OF CURRENT PROBLEM, REHAB EVAL
PMH: HTN, HLD, hypothyroidism, hiatal hernia, OA, TIA, DVT, recurrent UTIs    Dx: acute renal failure, ELISA, metabolic acidosis

## 2023-12-29 NOTE — DISCHARGE NOTE PROVIDER - NSDCMRMEDTOKEN_GEN_ALL_CORE_FT
acetaminophen 325 mg oral tablet: 3 tab(s) orally every 8 hours, As needed,  Pain (4 - 6)  levothyroxine 25 mcg (0.025 mg) oral tablet: 1 tab(s) orally 5 times a week  levothyroxine 50 mcg (0.05 mg) oral tablet: 1 tab(s) orally 2 times a week  Multiple Vitamins with Minerals oral tablet: 1 tab(s) orally once a day  olmesartan 20 mg oral tablet: 1 tab(s) orally once a day  simvastatin 20 mg oral tablet: 1 tab(s) orally once a day (at bedtime)  Vitafusion Vitamin D3 Extra Strength Gummies 37.5 mcg (1500 intl units) oral tablet, chewable: 2 tab(s) chewed once a day

## 2023-12-29 NOTE — PHYSICAL THERAPY INITIAL EVALUATION ADULT - WEIGHT-BEARING RESTRICTIONS: STAND/SIT, REHAB EVAL
Detail Level: Zone Quality 431: Preventive Care And Screening: Unhealthy Alcohol Use - Screening: Patient screened for unhealthy alcohol use using a single question and scores less than 2 times per year Quality 226: Preventive Care And Screening: Tobacco Use: Screening And Cessation Intervention: Patient screened for tobacco use and is an ex/non-smoker Quality 130: Documentation Of Current Medications In The Medical Record: Current Medications Documented full weight-bearing

## 2023-12-29 NOTE — PHYSICAL THERAPY INITIAL EVALUATION ADULT - GAIT DEVIATIONS NOTED, PT EVAL
antalgic gait, shortened right step length/decreased charmaine/decreased step length/decreased weight-shifting ability

## 2023-12-29 NOTE — PHYSICAL THERAPY INITIAL EVALUATION ADULT - GENERAL OBSERVATIONS, REHAB EVAL
Pt received reclining in bedside chair, CBWR, chair alarm on, telemonitor intact, and walters in place. Pt agreeable to PT evaluation.

## 2023-12-29 NOTE — DISCHARGE NOTE PROVIDER - CARE PROVIDERS DIRECT ADDRESSES
[email or web address]

## 2023-12-29 NOTE — CDI QUERY NOTE - NSCDIOTHERTXTBX_GEN_ALL_CORE_HH
“encephalopathic 2/2 renal failure” was noted in hospitalist progress notes. Please specify the type of encephalopathy.  -	Metabolic Encephalopathy   -	Toxic metabolic encephalopathy  -	Other, please specify      Supporting Documentation:        H&P Adult-GOC- Attending [Charted Location: [facility]] [Authored: 26-Dec-2023 20:07]  On arrival to the ER she was responsive and oriented but only with aggressive external stimulation (suspect may have been encephalopathic 2/2 renal failure). Following aggressive IVF in ER now back to baseline mental status, complinas of pain in lower back and knees (ch)        Progress Note Adult-Internal Medicine Attending [Charted Location: [facility]] [Authored: 28-Dec-2023 12:31]  PHYSICAL EXAM:  NERVOUS SYSTEM:  Alert & Oriented X3, nonfocal gen weakness    Assessment and Plan:  On arrival to the ER she was responsive and oriented but only with aggressive external stimulation (suspect may have been encephalopathic 2/2 renal failure). “encephalopathic 2/2 renal failure” was noted in hospitalist progress notes. Please specify the type of encephalopathy.  -	Metabolic Encephalopathy   -	Toxic metabolic encephalopathy  -	Other, please specify      Supporting Documentation:        H&P Adult-GOC- Attending [Charted Location: [facility]] [Authored: 26-Dec-2023 20:07]  On arrival to the ER she was responsive and oriented but only with aggressive external stimulation (suspect may have been encephalopathic 2/2 renal failure). Following aggressive IVF in ER now back to baseline mental status, complinas of pain in lower back and knees (ch)        Progress Note Adult-Internal Medicine Attending [Charted Location: [facility]] [Authored: 28-Dec-2023 12:31]  PHYSICAL EXAM:  NERVOUS SYSTEM:  Alert & Oriented X3, nonfocal gen weakness    Assessment and Plan:  On arrival to the ER she was responsive and oriented but only with aggressive external stimulation (suspect may have been encephalopathic 2/2 renal failure).

## 2023-12-29 NOTE — DISCHARGE NOTE PROVIDER - NSDCCPCAREPLAN_GEN_ALL_CORE_FT
PRINCIPAL DISCHARGE DIAGNOSIS  Diagnosis: Hydroureteronephrosis  Assessment and Plan of Treatment: - Keep awlters in place  - Follow up with urology outpatient     PRINCIPAL DISCHARGE DIAGNOSIS  Diagnosis: Hydroureteronephrosis  Assessment and Plan of Treatment: - Keep walters in place  - Follow up with urology outpatient

## 2023-12-29 NOTE — DISCHARGE NOTE NURSING/CASE MANAGEMENT/SOCIAL WORK - NSDCCRNAME_GEN_ALL_CORE_FT
[facility]  [address] [place] [address]  Phone: [phone]   [facility]  [address] [place] [address]  Phone: [phone]   no recurring infections

## 2023-12-29 NOTE — DISCHARGE NOTE NURSING/CASE MANAGEMENT/SOCIAL WORK - PATIENT PORTAL LINK FT
You can access the FollowMyHealth Patient Portal offered by [facility] by registering at the following website: [email or web address]. By joining [employer]’s FollowMyHealth portal, you will also be able to view your health information using other applications (apps) compatible with our system. You can access the FollowMyHealth Patient Portal offered by [facility] by registering at the following website: [email or web address]. By joining [employer]’s FollowMyHealth portal, you will also be able to view your health information using other applications (apps) compatible with our system.

## 2023-12-29 NOTE — DISCHARGE NOTE NURSING/CASE MANAGEMENT/SOCIAL WORK - SOCIAL WORKER'S NAME
Miriam Mancilla [facility] [phone]   Miriam Mancilla [facility] [phone]

## 2023-12-29 NOTE — PHYSICAL THERAPY INITIAL EVALUATION ADULT - MANUAL MUSCLE TESTING RESULTS, REHAB EVAL
LLE: ankle dorsiflexion 4/5, knee extension 3+/5, hip flexors at least 3/5.  RLE: ankle dorsiflexion 5/5, knee extension 4+/5, hip flexors at least 3/5.

## 2023-12-29 NOTE — DISCHARGE NOTE PROVIDER - HOSPITAL COURSE
83F with PMHx of HTN, HLD, hypothyroidism, hiatal hernia, severe OA of hands, knees, hip, h/o TIA and DVT (2016 no longer on AC), recent development of recurrent UTIs (follows with Urology as an outpatient) admitted for severe chronic left-sided hydroureteronephrosis. Ultrasound abdomen and CT abd/pelvis revealed left hydroureteronephrosis. Ultrasound of LE was negative for DVT. Renal function normalized. Urology consulted, recommended to continue walters for adequate bladder drainage and no genitourinary intervention required at this time. Per urology, may follow up outpatient.

## 2024-03-31 ENCOUNTER — INPATIENT (INPATIENT)
Facility: HOSPITAL | Age: 84
LOS: 14 days | Discharge: HOME | DRG: 872 | End: 2024-04-15
Attending: STUDENT IN AN ORGANIZED HEALTH CARE EDUCATION/TRAINING PROGRAM | Admitting: INTERNAL MEDICINE
Payer: MEDICARE

## 2024-03-31 VITALS
OXYGEN SATURATION: 98 % | HEART RATE: 82 BPM | DIASTOLIC BLOOD PRESSURE: 72 MMHG | WEIGHT: 179.9 LBS | RESPIRATION RATE: 20 BRPM | SYSTOLIC BLOOD PRESSURE: 98 MMHG | TEMPERATURE: 101 F

## 2024-03-31 DIAGNOSIS — I48.91 UNSPECIFIED ATRIAL FIBRILLATION: ICD-10-CM

## 2024-03-31 DIAGNOSIS — E78.5 HYPERLIPIDEMIA, UNSPECIFIED: ICD-10-CM

## 2024-03-31 DIAGNOSIS — I10 ESSENTIAL (PRIMARY) HYPERTENSION: ICD-10-CM

## 2024-03-31 DIAGNOSIS — Z96.652 PRESENCE OF LEFT ARTIFICIAL KNEE JOINT: Chronic | ICD-10-CM

## 2024-03-31 DIAGNOSIS — Z41.1 ENCOUNTER FOR COSMETIC SURGERY: Chronic | ICD-10-CM

## 2024-03-31 DIAGNOSIS — R22.1 LOCALIZED SWELLING, MASS AND LUMP, NECK: Chronic | ICD-10-CM

## 2024-03-31 DIAGNOSIS — Z98.89 OTHER SPECIFIED POSTPROCEDURAL STATES: Chronic | ICD-10-CM

## 2024-03-31 DIAGNOSIS — Z90.49 ACQUIRED ABSENCE OF OTHER SPECIFIED PARTS OF DIGESTIVE TRACT: Chronic | ICD-10-CM

## 2024-03-31 DIAGNOSIS — A41.9 SEPSIS, UNSPECIFIED ORGANISM: ICD-10-CM

## 2024-03-31 LAB
ALBUMIN SERPL ELPH-MCNC: 2.9 G/DL — LOW (ref 3.3–5.2)
ALP SERPL-CCNC: 124 U/L — HIGH (ref 40–120)
ALT FLD-CCNC: 64 U/L — HIGH
ANION GAP SERPL CALC-SCNC: 17 MMOL/L — SIGNIFICANT CHANGE UP (ref 5–17)
APPEARANCE UR: ABNORMAL
APTT BLD: 39 SEC — HIGH (ref 24.5–35.6)
AST SERPL-CCNC: 65 U/L — HIGH
BACTERIA # UR AUTO: ABNORMAL /HPF
BASE EXCESS BLDV CALC-SCNC: -8.4 MMOL/L — LOW (ref -2–3)
BASOPHILS # BLD AUTO: 0.12 K/UL — SIGNIFICANT CHANGE UP (ref 0–0.2)
BASOPHILS NFR BLD AUTO: 0.9 % — SIGNIFICANT CHANGE UP (ref 0–2)
BILIRUB SERPL-MCNC: 0.3 MG/DL — LOW (ref 0.4–2)
BILIRUB UR-MCNC: NEGATIVE — SIGNIFICANT CHANGE UP
BUN SERPL-MCNC: 41.3 MG/DL — HIGH (ref 8–20)
BURR CELLS BLD QL SMEAR: PRESENT — SIGNIFICANT CHANGE UP
CA-I SERPL-SCNC: 1.17 MMOL/L — SIGNIFICANT CHANGE UP (ref 1.15–1.33)
CALCIUM SERPL-MCNC: 9.4 MG/DL — SIGNIFICANT CHANGE UP (ref 8.4–10.5)
CAST: 2 /LPF — SIGNIFICANT CHANGE UP (ref 0–4)
CHLORIDE BLDV-SCNC: 97 MMOL/L — SIGNIFICANT CHANGE UP (ref 96–108)
CHLORIDE SERPL-SCNC: 93 MMOL/L — LOW (ref 96–108)
CO2 SERPL-SCNC: 18 MMOL/L — LOW (ref 22–29)
COLOR SPEC: YELLOW — SIGNIFICANT CHANGE UP
CREAT SERPL-MCNC: 1.81 MG/DL — HIGH (ref 0.5–1.3)
DIFF PNL FLD: ABNORMAL
EGFR: 28 ML/MIN/1.73M2 — LOW
EOSINOPHIL # BLD AUTO: 0.12 K/UL — SIGNIFICANT CHANGE UP (ref 0–0.5)
EOSINOPHIL NFR BLD AUTO: 0.9 % — SIGNIFICANT CHANGE UP (ref 0–6)
FLUAV AG NPH QL: SIGNIFICANT CHANGE UP
FLUBV AG NPH QL: SIGNIFICANT CHANGE UP
GAS PNL BLDV: 126 MMOL/L — LOW (ref 136–145)
GAS PNL BLDV: SIGNIFICANT CHANGE UP
GIANT PLATELETS BLD QL SMEAR: PRESENT — SIGNIFICANT CHANGE UP
GLUCOSE BLDV-MCNC: 188 MG/DL — HIGH (ref 70–99)
GLUCOSE SERPL-MCNC: 183 MG/DL — HIGH (ref 70–99)
GLUCOSE UR QL: NEGATIVE MG/DL — SIGNIFICANT CHANGE UP
HCO3 BLDV-SCNC: 18 MMOL/L — LOW (ref 22–29)
HCT VFR BLD CALC: 33.4 % — LOW (ref 34.5–45)
HCT VFR BLDA CALC: 33 % — SIGNIFICANT CHANGE UP
HGB BLD CALC-MCNC: 10.9 G/DL — LOW (ref 11.7–16.1)
HGB BLD-MCNC: 10.9 G/DL — LOW (ref 11.5–15.5)
INR BLD: 1.7 RATIO — HIGH (ref 0.85–1.18)
KETONES UR-MCNC: NEGATIVE MG/DL — SIGNIFICANT CHANGE UP
LACTATE BLDV-MCNC: 3.2 MMOL/L — HIGH (ref 0.5–2)
LACTATE BLDV-MCNC: 5 MMOL/L — CRITICAL HIGH (ref 0.5–2)
LEUKOCYTE ESTERASE UR-ACNC: ABNORMAL
LYMPHOCYTES # BLD AUTO: 17.4 % — SIGNIFICANT CHANGE UP (ref 13–44)
LYMPHOCYTES # BLD AUTO: 2.38 K/UL — SIGNIFICANT CHANGE UP (ref 1–3.3)
MANUAL SMEAR VERIFICATION: SIGNIFICANT CHANGE UP
MCHC RBC-ENTMCNC: 28.6 PG — SIGNIFICANT CHANGE UP (ref 27–34)
MCHC RBC-ENTMCNC: 32.6 GM/DL — SIGNIFICANT CHANGE UP (ref 32–36)
MCV RBC AUTO: 87.7 FL — SIGNIFICANT CHANGE UP (ref 80–100)
MONOCYTES # BLD AUTO: 1.42 K/UL — HIGH (ref 0–0.9)
MONOCYTES NFR BLD AUTO: 10.4 % — SIGNIFICANT CHANGE UP (ref 2–14)
NEUTROPHILS # BLD AUTO: 9.52 K/UL — HIGH (ref 1.8–7.4)
NEUTROPHILS NFR BLD AUTO: 69.5 % — SIGNIFICANT CHANGE UP (ref 43–77)
NITRITE UR-MCNC: NEGATIVE — SIGNIFICANT CHANGE UP
PCO2 BLDV: 39 MMHG — SIGNIFICANT CHANGE UP (ref 39–42)
PH BLDV: 7.28 — LOW (ref 7.32–7.43)
PH UR: 6.5 — SIGNIFICANT CHANGE UP (ref 5–8)
PLAT MORPH BLD: NORMAL — SIGNIFICANT CHANGE UP
PLATELET # BLD AUTO: 433 K/UL — HIGH (ref 150–400)
PO2 BLDV: <42 MMHG — SIGNIFICANT CHANGE UP (ref 25–45)
POIKILOCYTOSIS BLD QL AUTO: SLIGHT — SIGNIFICANT CHANGE UP
POLYCHROMASIA BLD QL SMEAR: SLIGHT — SIGNIFICANT CHANGE UP
POTASSIUM BLDV-SCNC: 5.2 MMOL/L — HIGH (ref 3.5–5.1)
POTASSIUM SERPL-MCNC: 5.1 MMOL/L — SIGNIFICANT CHANGE UP (ref 3.5–5.3)
POTASSIUM SERPL-SCNC: 5.1 MMOL/L — SIGNIFICANT CHANGE UP (ref 3.5–5.3)
PROT SERPL-MCNC: 6.7 G/DL — SIGNIFICANT CHANGE UP (ref 6.6–8.7)
PROT UR-MCNC: 100 MG/DL
PROTHROM AB SERPL-ACNC: 18.6 SEC — HIGH (ref 9.5–13)
RBC # BLD: 3.81 M/UL — SIGNIFICANT CHANGE UP (ref 3.8–5.2)
RBC # FLD: 17.4 % — HIGH (ref 10.3–14.5)
RBC BLD AUTO: ABNORMAL
RBC CASTS # UR COMP ASSIST: 42 /HPF — HIGH (ref 0–4)
RSV RNA NPH QL NAA+NON-PROBE: SIGNIFICANT CHANGE UP
SAO2 % BLDV: 58.8 % — SIGNIFICANT CHANGE UP
SARS-COV-2 RNA SPEC QL NAA+PROBE: SIGNIFICANT CHANGE UP
SODIUM SERPL-SCNC: 128 MMOL/L — LOW (ref 135–145)
SP GR SPEC: 1.01 — SIGNIFICANT CHANGE UP (ref 1–1.03)
SQUAMOUS # UR AUTO: 5 /HPF — SIGNIFICANT CHANGE UP (ref 0–5)
UROBILINOGEN FLD QL: 1 MG/DL — SIGNIFICANT CHANGE UP (ref 0.2–1)
VARIANT LYMPHS # BLD: 0.9 % — SIGNIFICANT CHANGE UP (ref 0–6)
WBC # BLD: 13.7 K/UL — HIGH (ref 3.8–10.5)
WBC # FLD AUTO: 13.7 K/UL — HIGH (ref 3.8–10.5)
WBC UR QL: >998 /HPF — HIGH (ref 0–5)

## 2024-03-31 PROCEDURE — 71045 X-RAY EXAM CHEST 1 VIEW: CPT | Mod: 26

## 2024-03-31 PROCEDURE — 99285 EMERGENCY DEPT VISIT HI MDM: CPT

## 2024-03-31 PROCEDURE — 93010 ELECTROCARDIOGRAM REPORT: CPT

## 2024-03-31 RX ORDER — APIXABAN 2.5 MG/1
2.5 TABLET, FILM COATED ORAL
Refills: 0 | Status: DISCONTINUED | OUTPATIENT
Start: 2024-03-31 | End: 2024-04-01

## 2024-03-31 RX ORDER — VANCOMYCIN HCL 1 G
1000 VIAL (EA) INTRAVENOUS ONCE
Refills: 0 | Status: COMPLETED | OUTPATIENT
Start: 2024-03-31 | End: 2024-03-31

## 2024-03-31 RX ORDER — MULTIVIT-MIN/FERROUS GLUCONATE 9 MG/15 ML
1 LIQUID (ML) ORAL DAILY
Refills: 0 | Status: DISCONTINUED | OUTPATIENT
Start: 2024-03-31 | End: 2024-04-15

## 2024-03-31 RX ORDER — CEFTRIAXONE 500 MG/1
2000 INJECTION, POWDER, FOR SOLUTION INTRAMUSCULAR; INTRAVENOUS ONCE
Refills: 0 | Status: DISCONTINUED | OUTPATIENT
Start: 2024-03-31 | End: 2024-03-31

## 2024-03-31 RX ORDER — SODIUM CHLORIDE 9 MG/ML
1000 INJECTION INTRAMUSCULAR; INTRAVENOUS; SUBCUTANEOUS ONCE
Refills: 0 | Status: COMPLETED | OUTPATIENT
Start: 2024-03-31 | End: 2024-03-31

## 2024-03-31 RX ORDER — CHOLECALCIFEROL (VITAMIN D3) 125 MCG
2000 CAPSULE ORAL DAILY
Refills: 0 | Status: DISCONTINUED | OUTPATIENT
Start: 2024-03-31 | End: 2024-04-15

## 2024-03-31 RX ORDER — CEFTRIAXONE 500 MG/1
1000 INJECTION, POWDER, FOR SOLUTION INTRAMUSCULAR; INTRAVENOUS ONCE
Refills: 0 | Status: DISCONTINUED | OUTPATIENT
Start: 2024-03-31 | End: 2024-03-31

## 2024-03-31 RX ORDER — SODIUM CHLORIDE 9 MG/ML
1000 INJECTION, SOLUTION INTRAVENOUS ONCE
Refills: 0 | Status: COMPLETED | OUTPATIENT
Start: 2024-03-31 | End: 2024-04-01

## 2024-03-31 RX ORDER — SIMVASTATIN 20 MG/1
20 TABLET, FILM COATED ORAL AT BEDTIME
Refills: 0 | Status: DISCONTINUED | OUTPATIENT
Start: 2024-03-31 | End: 2024-04-15

## 2024-03-31 RX ORDER — CEFTRIAXONE 500 MG/1
2000 INJECTION, POWDER, FOR SOLUTION INTRAMUSCULAR; INTRAVENOUS ONCE
Refills: 0 | Status: COMPLETED | OUTPATIENT
Start: 2024-03-31 | End: 2024-03-31

## 2024-03-31 RX ORDER — DILTIAZEM HCL 120 MG
10 CAPSULE, EXT RELEASE 24 HR ORAL ONCE
Refills: 0 | Status: COMPLETED | OUTPATIENT
Start: 2024-03-31 | End: 2024-03-31

## 2024-03-31 RX ORDER — ACETAMINOPHEN 500 MG
1000 TABLET ORAL ONCE
Refills: 0 | Status: COMPLETED | OUTPATIENT
Start: 2024-03-31 | End: 2024-03-31

## 2024-03-31 RX ORDER — PHENYLEPHRINE HYDROCHLORIDE 10 MG/ML
0.1 INJECTION INTRAVENOUS
Qty: 40 | Refills: 0 | Status: DISCONTINUED | OUTPATIENT
Start: 2024-03-31 | End: 2024-04-01

## 2024-03-31 RX ADMIN — Medication 400 MILLIGRAM(S): at 20:34

## 2024-03-31 RX ADMIN — SODIUM CHLORIDE 1000 MILLILITER(S): 9 INJECTION INTRAMUSCULAR; INTRAVENOUS; SUBCUTANEOUS at 22:14

## 2024-03-31 RX ADMIN — PHENYLEPHRINE HYDROCHLORIDE 3.06 MICROGRAM(S)/KG/MIN: 10 INJECTION INTRAVENOUS at 21:25

## 2024-03-31 RX ADMIN — Medication 250 MILLIGRAM(S): at 21:27

## 2024-03-31 RX ADMIN — SODIUM CHLORIDE 1000 MILLILITER(S): 9 INJECTION INTRAMUSCULAR; INTRAVENOUS; SUBCUTANEOUS at 21:36

## 2024-03-31 RX ADMIN — SODIUM CHLORIDE 1000 MILLILITER(S): 9 INJECTION INTRAMUSCULAR; INTRAVENOUS; SUBCUTANEOUS at 22:49

## 2024-03-31 RX ADMIN — SODIUM CHLORIDE 1000 MILLILITER(S): 9 INJECTION INTRAMUSCULAR; INTRAVENOUS; SUBCUTANEOUS at 21:55

## 2024-03-31 RX ADMIN — Medication 10 MILLIGRAM(S): at 21:36

## 2024-03-31 RX ADMIN — CEFTRIAXONE 2000 MILLIGRAM(S): 500 INJECTION, POWDER, FOR SOLUTION INTRAMUSCULAR; INTRAVENOUS at 21:10

## 2024-03-31 NOTE — ED PROCEDURE NOTE - CPROC ED INFUS LINE DETAIL1
The location was identified, and the area was draped and prepped./The catheter was placed using sterile technique./Ultrasound guidance was used.
The location was identified, and the area was draped and prepped./The catheter was placed using sterile technique./Ultrasound guidance was used.

## 2024-03-31 NOTE — ED PROVIDER NOTE - CARE PLAN
1 Principal Discharge DX:	Septic shock  Secondary Diagnosis:	Atrial fibrillation with RVR  Secondary Diagnosis:	UTI (urinary tract infection)

## 2024-03-31 NOTE — H&P ADULT - CRITICAL CARE ATTENDING COMMENT
Pt seen w PA and agree w above. PMHx noted above.  Pt presents from SNF for weakness and ams.  Pt w recurrent UTI.  Upon eval in the ed, pt found to be hypotensive and a-fib w RVR. HR controlled w diltiazem and pt required neosynephrine for BP support. Pt after receiving 3L IVF now liberated from neosynephrine. continue abx.  f/u pan cx.Continue AC for a-fib.

## 2024-03-31 NOTE — ED PROVIDER NOTE - PROGRESS NOTE DETAILS
Judy REED: Patient signed out to me.  Additional liter of IV fluids given, attempted to return off pressors but patient came hypotensive again, pressors restarted.  Patient accepted to MICU.  Patient seen by cardiology for new A-fib with RVR.

## 2024-03-31 NOTE — ED PROVIDER NOTE - CLINICAL SUMMARY MEDICAL DECISION MAKING FREE TEXT BOX
Will do sepsis workup. Will check cbc, cmp, coags, cxr, UA. ECG. Will check vbg/lac, swab, cultures. Given IVF and 2g Ceftriaxone. Symptom management and reassess.

## 2024-03-31 NOTE — CONSULT NOTE ADULT - PROBLEM SELECTOR RECOMMENDATION 2
Assess for target organ damage (CKD, papilledema, encephalopathy) BP goal<130/80mmHg  - lifestyle modifications (DASH diet, daily exercise encouraged, weight loss, limit ETOH intake, avoid NSAIDs)   - VS as per unit protocol  - pharmacologic options: Thiazide (Chlorthalidone) with normal GFR, (Loops w/ GFR<30ml/min), Lopressor 25mg oral 2x daily with strict parameters  - low Na diet

## 2024-03-31 NOTE — ED ADULT NURSE NOTE - NSFALLHARMRISKINTERV_ED_ALL_ED

## 2024-03-31 NOTE — H&P ADULT - NSHPPHYSICALEXAM_GEN_ALL_CORE
Physical Examination:    General: No acute distress.  Alert and Interactive.     HEENT: Pupils equal, reactive to light.  Symmetric.    PULM: Clear to auscultation bilaterally    CVS: Irregular irregular     ABD: Soft, nondistended, nontender    EXT: No edema, nontender    SKIN: Warm and well perfused, no rashes noted.

## 2024-03-31 NOTE — ED PROCEDURE NOTE - ATTENDING CONTRIBUTION TO CARE
Peripheral IV access obtained by resident using ultrasound guidance  without difficulty.  Patient tolerated procedure well
Peripheral IV access obtained by resident using ultrasound guidance  without difficulty.  Patient tolerated procedure well

## 2024-03-31 NOTE — ED PROCEDURE NOTE - CPROC ED POST PROC CARE GUIDE1
Verbal/written post procedure instructions were given to patient/caregiver./Instructed patient/caregiver to follow-up with primary care physician.
Verbal/written post procedure instructions were given to patient/caregiver./Instructed patient/caregiver to follow-up with primary care physician.

## 2024-03-31 NOTE — ED ADULT NURSE NOTE - ED STAT RN HANDOFF DETAILS
HPI, labs, plan of care given to Elba RN, transport upstairs was delayed secondary to a multi trauma arrival, patient brought to icu bed 1 at appr 259, aniyare had pending cts, I picked patient up from ICU and brought patient to CT scan to complete Emergency Department course of treatment, transported back up to icu without incident, vitals at drop off at 320 91/74, 120 HR afib on monitor, pulse oximetry 100%, resp 22

## 2024-03-31 NOTE — CONSULT NOTE ADULT - NS ATTEND AMEND GEN_ALL_CORE FT
atrial fibrillation   initiate metoprolol 25 mg BID if BP tolerated.   anticoagulation with eliquis if no bleeding.   rate controlled.    will sign off . no further cardiac work up at this time,

## 2024-03-31 NOTE — H&P ADULT - ASSESSMENT
82 y/o female with pmhx of recurrent UTIs, hypertension, hyperlipidemia, hypothyroidism, hiatal hernia, OA, TIA and DVT (2016 no longer on AC) admitted to MICU for:     1. Urosepsis   2. Afib RVR   3. ELISA, mild hyponatremia   4. Lactic acidosis   5. Metabolic encephalopathy     Neuro: Mentation has improved since receiving abx and fluids.   Cardio: Heart rate seems to be improving with fluid resuscitation. Ordered additional liter fluid as she appears to be volume down. Titrating phenyleprine for MAP>65. Added midodrine to facilitate wean from vasopressor. Checking TSH, T4, troponin and lactate. Official echo in morning.   Resp: Supplemental O2 as needed. No consolidation appreciated on CXR. Ordered procal level. Pending CT chest   GI: No active issues. Advance diet as tolerated now that her mentation is improving.   : ELISA and hyponatremia likely from dehydration. Optimizing renal perfusion with above.   ID: Pan cultures sent. Received ceftriaxone and vanco in ED. Will broaden coverage with zosyn.   Endo: Resumed synthroid Maintain BS between 110-180. Consider stress dose steroids if pressor requirements are escalated.   Heme: Starting Eliquis   82 y/o female with pmhx of recurrent UTIs, hypertension, hyperlipidemia, hypothyroidism, hiatal hernia, OA, TIA and DVT (2016 no longer on AC) admitted to MICU for:     1. Urosepsis   2. Afib RVR   3. ELISA, mild hyponatremia   4. Lactic acidosis   5. Metabolic encephalopathy     Neuro: Mentation has improved since receiving abx and fluids. As per daughter, patient is usually A&Ox3.   Cardio: Heart rate seems to be improving with fluid resuscitation. Ordered additional liter fluid as she appears to be volume down. Titrating phenyleprine for MAP>65. Added midodrine to facilitate wean from vasopressor. Checking TSH, T4, troponin and lactate. Official echo in morning.   Resp: Supplemental O2 as needed. No consolidation appreciated on CXR. Ordered procal level. Pending CT chest   GI: No active issues. Advance diet as tolerated now that her mentation is improving. CT abdomen/pelvis ordered.   : ELISA and hyponatremia likely from dehydration. Optimizing renal perfusion with above.   ID: Pan cultures sent. Received ceftriaxone and vanco in ED. Will broaden coverage with zosyn. Trend wbc and fever curve.   Endo: Resumed synthroid. Maintain glucose levels between 110-180. Consider stress dose steroids if pressor requirements are escalated.   Heme: Starting Eliquis as per cardiology team recs. No active signs of bleeding. Monitor cbc/platelet count.     CRITICAL CARE TIME SPENT: 45 minutes    Time spent evaluating/treating patient with medical issues that pose a high risk for life threatening deterioration, and/or end-organ damage, reviewing data/labs/imaging, discussing case with multidisciplinary team, discussing plan/goals of care with patient/family. Non-inclusive of procedure time. Date of entry of this note is equal to the date of services rendered.     Case Discussed with ICU Attending, Dr. Mcginnis  84 y/o female with pmhx of recurrent UTIs, hypertension, hyperlipidemia, hypothyroidism, hiatal hernia, OA, TIA and DVT (2016 no longer on AC) admitted to MICU for:     1. Urosepsis   2. Afib RVR   3. ELISA, mild hyponatremia   4. Lactic acidosis   5. Metabolic encephalopathy     Neuro: Mentation has improved since receiving abx and fluids. As per daughter, patient is usually A&Ox3.   Cardio: Heart rate seems to be improving with fluid resuscitation. Ordered additional liter fluid as she appears to be volume down. Titrating phenyleprine for MAP>65. Added midodrine to facilitate wean from vasopressor. Checking TSH, T4, troponin and lactate. Official echo in morning.   Resp: Supplemental O2 as needed. No consolidation appreciated on CXR. Ordered procal level. Pending CT chest   GI: No active issues. Advance diet as tolerated now that her mentation is improving. CT abdomen/pelvis ordered.   : ELISA and hyponatremia likely from dehydration. Optimizing renal perfusion with above.   ID: Pan cultures sent. As per chart review, previous cultures with E.coli. No history of ESBL.Trend wbc and fever curve.   Endo: Resumed synthroid. Maintain glucose levels between 110-180. Consider stress dose steroids if pressor requirements are escalated.   Heme: Starting Eliquis as per cardiology team recs. No active signs of bleeding. Monitor cbc/platelet count.     CRITICAL CARE TIME SPENT: 45 minutes    Time spent evaluating/treating patient with medical issues that pose a high risk for life threatening deterioration, and/or end-organ damage, reviewing data/labs/imaging, discussing case with multidisciplinary team, discussing plan/goals of care with patient/family. Non-inclusive of procedure time. Date of entry of this note is equal to the date of services rendered.     Case Discussed with ICU Attending, Dr. Mcginnis  84 y/o female with pmhx of recurrent UTIs, hypertension, hyperlipidemia, hypothyroidism, hiatal hernia, OA, TIA and DVT (2016 no longer on AC) admitted to MICU for:     1. Urosepsis   2. Afib RVR   3. ELISA, mild hyponatremia   4. Lactic acidosis   5. Metabolic encephalopathy     Neuro: Mentation has improved since receiving abx and fluids. As per daughter, patient is usually A&Ox3.   Cardio: Heart rate seems to be improving with fluid resuscitation. Ordered additional liter fluid as she appears to be volume down. Titrating phenyleprine for MAP>65. Added midodrine to facilitate wean from vasopressor. Checking TSH, T4, BNP, troponin and lactate. Official echo in morning.   Resp: Supplemental O2 as needed. No consolidation appreciated on CXR. Ordered procal level. Pending CT chest   GI: No active issues. Advance diet as tolerated now that her mentation is improving. CT abdomen/pelvis ordered.   : ELISA and hyponatremia likely from dehydration. Optimizing renal perfusion with above.   ID: Pan cultures sent. As per chart review, previous cultures with E.coli. No history of ESBL.C/w broadspectrum abx.Trend wbc and fever curve.   Endo: Resumed synthroid. Maintain glucose levels between 110-180. Consider stress dose steroids if pressor requirements are escalated.   Heme: Starting Eliquis as per cardiology team recs. No active signs of bleeding.     CRITICAL CARE TIME SPENT: 45 minutes    Time spent evaluating/treating patient with medical issues that pose a high risk for life threatening deterioration, and/or end-organ damage, reviewing data/labs/imaging, discussing case with multidisciplinary team, discussing plan/goals of care with patient/family. Non-inclusive of procedure time. Date of entry of this note is equal to the date of services rendered.     Case Discussed with ICU Attending, Dr. Mcginnis  84 y/o female with pmhx of recurrent UTIs, chronic left sided hydronephrosis, hypertension, hyperlipidemia, hypothyroidism, hiatal hernia, OA, TIA and DVT (2016 no longer on AC) admitted to MICU for:     1. Urosepsis   2. Afib RVR   3. ELISA, mild hyponatremia   4. Lactic acidosis   5. Metabolic encephalopathy     Neuro: Mentation has improved since receiving abx and fluids. As per daughter, patient is usually A&Ox3.   Cardio: Heart rate seems to be improving with fluid resuscitation. Ordered additional liter fluid as she appears to be volume down. Titrating phenyleprine for MAP>65. Added midodrine to facilitate wean from vasopressor. Checking TSH, T4, BNP, troponin and lactate. Official echo in morning.   Resp: Supplemental O2 as needed. No consolidation appreciated on CXR. Ordered procal level. Pending CT chest   GI: No active issues. Advance diet as tolerated now that her mentation is improving. CT abdomen/pelvis ordered.   : ELISA and hyponatremia likely from dehydration. Optimizing renal perfusion with above. Hx of recurrent UTIs and chronic left sided hydronephrosis. Would obtain urology consult.   ID: Pan cultures sent. As per chart review, previous urine cultures with E.coli. No history of ESBL.C/w broadspectrum abx.Trend wbc and fever curve.   Endo: Resumed synthroid. Maintain glucose levels between 110-180. Consider stress dose steroids if pressor requirements are escalated.   Heme: Starting Eliquis as per cardiology team recs. No active signs of bleeding.     CRITICAL CARE TIME SPENT: 45 minutes    Time spent evaluating/treating patient with medical issues that pose a high risk for life threatening deterioration, and/or end-organ damage, reviewing data/labs/imaging, discussing case with multidisciplinary team, discussing plan/goals of care with patient/family. Non-inclusive of procedure time. Date of entry of this note is equal to the date of services rendered.     Case Discussed with ICU Attending, Dr. Mcginnis  82 y/o female with pmhx of recurrent UTIs, chronic left sided hydronephrosis, hypertension, hyperlipidemia, diabetes, hypothyroidism, hiatal hernia, OA, TIA and DVT (2016 no longer on AC) admitted to MICU for:     1. Urosepsis   2. Afib RVR   3. ELISA, mild hyponatremia   4. Lactic acidosis   5. Metabolic encephalopathy     Neuro: Mentation has improved since receiving abx and fluids. As per daughter, patient is usually A&Ox3.   Cardio: Heart rate seems to be improving with fluid resuscitation. Ordered additional liter fluid as she appears to be volume down. Titrating phenyleprine for MAP>65. Added midodrine to facilitate wean from vasopressor. Checking TSH, T4, BNP, troponin and lactate. Official echo in morning.   Resp: Supplemental O2 as needed. No consolidation appreciated on CXR. Ordered procal level. Pending CT chest   GI: No active issues. Advance diet as tolerated now that her mentation is improving. CT abdomen/pelvis ordered.   : ELISA and hyponatremia likely from dehydration. Optimizing renal perfusion with above. Hx of recurrent UTIs and chronic left sided hydronephrosis. Would obtain urology consult.   ID: Pan cultures sent. As per chart review, previous urine cultures with E.coli. No history of ESBL.C/w broadspectrum abx.Trend wbc and fever curve.   Endo: Resumed synthroid. Maintain glucose levels between 110-180. Consider stress dose steroids if pressor requirements are escalated.   Heme: Starting Eliquis as per cardiology team recs. No active signs of bleeding.     CRITICAL CARE TIME SPENT: 45 minutes    Time spent evaluating/treating patient with medical issues that pose a high risk for life threatening deterioration, and/or end-organ damage, reviewing data/labs/imaging, discussing case with multidisciplinary team, discussing plan/goals of care with patient/family. Non-inclusive of procedure time. Date of entry of this note is equal to the date of services rendered.     Case Discussed with ICU Attending, Dr. Mcginnis  82 y/o female with pmhx of recurrent UTIs, chronic left sided hydronephrosis, hypertension, hyperlipidemia, diabetes, hypothyroidism, hiatal hernia, OA, TIA and DVT (2016 no longer on AC) admitted to MICU for:     1. Urosepsis   2. Afib RVR   3. ELISA, mild hyponatremia   4. Lactic acidosis   5. Metabolic encephalopathy     Neuro: Mentation has improved since receiving abx and fluids. As per daughter, patient is usually A&Ox3.   Cardio: Heart rate seems to be improving with fluid resuscitation. Ordered additional liter fluid as she appears to be volume down. Titrating phenyleprine for MAP>65. Added midodrine to facilitate wean from vasopressor. Checking TSH, T4, BNP, troponin and lactate. Official echo in morning.   Resp: Supplemental O2 as needed. No consolidation appreciated on CXR. Ordered procal level. Pending CT chest   GI: No active issues. Advance diet as tolerated now that her mentation is improving. CT abdomen/pelvis ordered.   : ELISA and hyponatremia likely from dehydration. Optimizing renal perfusion with above. Hx of recurrent UTIs and chronic left sided hydronephrosis. Would obtain urology consult.   ID: Pan cultures sent. S/p vancomycin and ceftriaxone in ED. As per chart review, previous urine cultures with E.coli. No history of ESBL. Will continue with ceftriaxone for now as she has penicillin allergy and has tolerated it previously. Trend wbc and fever curve.   Endo: Resumed synthroid. Maintain glucose levels between 110-180. Consider stress dose steroids if pressor requirements are escalated.   Heme: Starting Eliquis as per cardiology team recs. No active signs of bleeding.     CRITICAL CARE TIME SPENT: 45 minutes    Time spent evaluating/treating patient with medical issues that pose a high risk for life threatening deterioration, and/or end-organ damage, reviewing data/labs/imaging, discussing case with multidisciplinary team, discussing plan/goals of care with patient/family. Non-inclusive of procedure time. Date of entry of this note is equal to the date of services rendered.     Case Discussed with ICU Attending, Dr. Mcginnis

## 2024-03-31 NOTE — ED PROVIDER NOTE - PHYSICAL EXAMINATION
Const: Awake, alert and oriented. In no acute distress. Well appearing.  HEENT: NC/AT. Moist mucous membranes.  Eyes: No scleral icterus. EOMI.  Neck:. Soft and supple. Full ROM without pain.  Cardiac: Irregular irreg rhythm. +S1/S2. No murmurs. Peripheral pulses 2+ and symmetric. No LE edema.  Resp: No evidence of respiratory distress. No wheezes, rales or rhonchi.  Abd: Soft, non-tender, non-distended. Normal bowel sounds in all 4 quadrants. No guarding or rebound.  Back: Spine midline and non-tender. No CVAT.  Neuro: no gross deficits, moving all extremities, normal speech  Skin: No rashes, abrasions or lacerations.

## 2024-03-31 NOTE — ED ADULT NURSE REASSESSMENT NOTE - NS ED NURSE REASSESS COMMENT FT1
patient awake and alert, daughter at bedside, patient afib on cardiac monitor, Phenylephrine infusing at 0.1mcg/kg/min via 20G in right a/c, has vancomycin running over an hour in iv in left arm, patient with wet cough, lung sounds diminished, abd soft, nontender, primafit in place, pateint with let sided deficits from prior stroke several weeks ago with some pressured speech. patient awake and alert, daughter at bedside, patient afib on cardiac monitor, Phenylephrine infusing at 0.1mcg/kg/min via 20G in right a/c, has vancomycin running over an hour in iv in left a/c, patient with wet cough, lung sounds diminished, abd soft, nontender, primafit in place, pateint with let sided deficits from prior stroke several weeks ago with some pressured speech.

## 2024-03-31 NOTE — H&P ADULT - HISTORY OF PRESENT ILLNESS
82 y/o female with pmhx of recurrent UTIs, hypertension, hyperlipidemia, hypothyroidism, hiatal hernia, OA, TIA and DVT (2016 no longer on AC) who presented to ED from nursing facility for fever, altered mental status and shortness of breath x 1 day. Upon arrival, she was found to be febrile (102.3 rectally), tachypneic and in afib RVR with rates to 160s-180s. Code sepsis activated. Was given 10mg IV push cardizem for afib, however afterwards became hypotensive to 70s requiring additional fluids and phenylephrine. Labs with leukocytosis, mild hyponatremia, ELISA and lactic acidosis. Admitted to MICU for urosepsis and afib RVR.  82 y/o female with pmhx of recurrent UTIs, hypertension, hyperlipidemia, hypothyroidism, hiatal hernia, OA, TIA and DVT (2016 no longer on AC) who presented to ED from nursing facility for fever, altered mental status and shortness of breath x 1 day. Upon arrival, she was found to be febrile (102.3 rectally), tachypneic and in afib RVR with rates to 160s-180s. Code sepsis activated. UA positive. Was given 10mg IV Cardizem for afib, however afterwards became hypotensive to 70s requiring additional fluids and phenylephrine. Labs with leukocytosis, mild hyponatremia, ELISA and lactic acidosis. Admitted to MICU for urosepsis and afib RVR.  84 y/o female with pmhx of recurrent UTIs, chronic left sided hydronephrosis, hypertension, hyperlipidemia, hypothyroidism, hiatal hernia, OA, TIA and DVT (2016 no longer on AC) who presented to ED from nursing facility for fever, altered mental status and shortness of breath x 1 day. Upon arrival, she was found to be febrile (102.3 rectally), tachypneic and in afib RVR with rates to 160s-180s. Code sepsis activated. UA positive. Was given 10mg IV Cardizem for afib, however afterwards became hypotensive to 70s requiring additional fluids and phenylephrine. Labs with leukocytosis, mild hyponatremia, ELISA and lactic acidosis. Admitted to MICU for urosepsis and afib RVR.  84 y/o female with pmhx of recurrent UTIs, chronic left sided hydronephrosis, hypertension, hyperlipidemia, diabetes, hypothyroidism, hiatal hernia, OA, TIA and DVT (2016 no longer on AC) who presented to ED from nursing facility for fever, altered mental status and shortness of breath x 1 day. Upon arrival, she was found to be febrile (102.3 rectally), tachypneic and in afib RVR with rates to 160s-180s. Code sepsis activated. UA positive. Was given 10mg IV Cardizem for afib, however afterwards became hypotensive to 70s requiring additional fluids and phenylephrine. Labs with leukocytosis, mild hyponatremia, ELISA and lactic acidosis. Admitted to MICU for urosepsis and afib RVR.

## 2024-03-31 NOTE — CONSULT NOTE ADULT - PROBLEM SELECTOR RECOMMENDATION 9
Admit to Tele, follow labs including TFTs, FLP, A1C, hsT-T and ECG, patient asymptomatic and stable w/o complaints  - [id number]= 6 (F, HTN, age, TIA)   - start Eliquis 2.5mg oral 2x daily, monitor daily CBC and Plt count  - Lopressor 25mg oral 2x daily for rate control, on Tele monitoring  - awaiting TTE in AM  - keep K>4.0 and Mg>2.0   - currently on IVF and IV Abx for sepsis management

## 2024-03-31 NOTE — ED PROVIDER NOTE - ATTENDING CONTRIBUTION TO CARE
83-year-old female presents the ED transferred from nursing home for further evaluation of elevated fever with altered mental status and   shortness of breath.   on arrival patient with fever 102.3 rectally tachypneic with noted heart rate 160s to 180s irregular irregular.  As per patient's daughter no reported history of history of A-fib but patient has had recurrent UTIs in the past on exam patient arousable tachypneic and ill-appearing, PERRL, mucosa membranes  dry,  neck supple heart tachycardic irregular regular, lungs tachypneic with few scattered rhonchi bilaterally,  abdomen soft no localized tenderness,  extremities no cyanosis, neuro no gross focal deficits.  Code sepsis activated will check labs panculture start empiric antibiotics patient's urine noted to be purulent,  will give fluids and attempt to control heart rate and consult MICU as needed guarding new onset A-fib in setting of sepsis

## 2024-03-31 NOTE — ED ADULT NURSE NOTE - OBJECTIVE STATEMENT
PT A&Ox2. +tachypnea. Skin is very dry and warm. PT brought in from [facility] for AMS, fever and hypoxia. PT at [facility] for CVA rehab. PT with very cloud dark urine. Stage 2 pressure wound noted to buttocks. PT tachycardic on arrival. RLE residual weakness  meeting sepsis protocol. In CC room

## 2024-03-31 NOTE — CONSULT NOTE ADULT - PROBLEM SELECTOR RECOMMENDATION 3
Low cholesterol diet, daily exercise and optimal weight management reinforced  - continue home Statin therapy, follow LFTs  - check FLP in AM    case d/w Dr. Youssef

## 2024-03-31 NOTE — ED ADULT TRIAGE NOTE - CHIEF COMPLAINT QUOTE
pt presents c/o altered mental status & difficulty breathing. daughter states pt is normally A&Ox3, today found altered & having a fever. hx of stroke. pt from [facility]

## 2024-04-01 ENCOUNTER — RESULT REVIEW (OUTPATIENT)
Age: 84
End: 2024-04-01

## 2024-04-01 LAB
ALBUMIN SERPL ELPH-MCNC: 2.2 G/DL — LOW (ref 3.3–5.2)
ALBUMIN SERPL ELPH-MCNC: 2.3 G/DL — LOW (ref 3.3–5.2)
ALP SERPL-CCNC: 100 U/L — SIGNIFICANT CHANGE UP (ref 40–120)
ALP SERPL-CCNC: 93 U/L — SIGNIFICANT CHANGE UP (ref 40–120)
ALT FLD-CCNC: 44 U/L — HIGH
ALT FLD-CCNC: 49 U/L — HIGH
ANION GAP SERPL CALC-SCNC: 14 MMOL/L — SIGNIFICANT CHANGE UP (ref 5–17)
ANION GAP SERPL CALC-SCNC: 16 MMOL/L — SIGNIFICANT CHANGE UP (ref 5–17)
ANISOCYTOSIS BLD QL: SLIGHT — SIGNIFICANT CHANGE UP
APTT BLD: 25.3 SEC — SIGNIFICANT CHANGE UP (ref 24.5–35.6)
AST SERPL-CCNC: 33 U/L — HIGH
AST SERPL-CCNC: 45 U/L — HIGH
BASOPHILS # BLD AUTO: 0 K/UL — SIGNIFICANT CHANGE UP (ref 0–0.2)
BASOPHILS # BLD AUTO: 0 K/UL — SIGNIFICANT CHANGE UP (ref 0–0.2)
BASOPHILS NFR BLD AUTO: 0 % — SIGNIFICANT CHANGE UP (ref 0–2)
BASOPHILS NFR BLD AUTO: 0 % — SIGNIFICANT CHANGE UP (ref 0–2)
BILIRUB SERPL-MCNC: 0.2 MG/DL — LOW (ref 0.4–2)
BILIRUB SERPL-MCNC: <0.2 MG/DL — LOW (ref 0.4–2)
BUN SERPL-MCNC: 31.9 MG/DL — HIGH (ref 8–20)
BUN SERPL-MCNC: 34 MG/DL — HIGH (ref 8–20)
BURR CELLS BLD QL SMEAR: PRESENT — SIGNIFICANT CHANGE UP
CALCIUM SERPL-MCNC: 8.4 MG/DL — SIGNIFICANT CHANGE UP (ref 8.4–10.5)
CALCIUM SERPL-MCNC: 8.7 MG/DL — SIGNIFICANT CHANGE UP (ref 8.4–10.5)
CHLORIDE SERPL-SCNC: 101 MMOL/L — SIGNIFICANT CHANGE UP (ref 96–108)
CHLORIDE SERPL-SCNC: 99 MMOL/L — SIGNIFICANT CHANGE UP (ref 96–108)
CO2 SERPL-SCNC: 16 MMOL/L — LOW (ref 22–29)
CO2 SERPL-SCNC: 18 MMOL/L — LOW (ref 22–29)
CREAT SERPL-MCNC: 1.12 MG/DL — SIGNIFICANT CHANGE UP (ref 0.5–1.3)
CREAT SERPL-MCNC: 1.33 MG/DL — HIGH (ref 0.5–1.3)
CULTURE RESULTS: SIGNIFICANT CHANGE UP
EGFR: 40 ML/MIN/1.73M2 — LOW
EGFR: 49 ML/MIN/1.73M2 — LOW
ELLIPTOCYTES BLD QL SMEAR: SLIGHT — SIGNIFICANT CHANGE UP
EOSINOPHIL # BLD AUTO: 0.11 K/UL — SIGNIFICANT CHANGE UP (ref 0–0.5)
EOSINOPHIL # BLD AUTO: 0.14 K/UL — SIGNIFICANT CHANGE UP (ref 0–0.5)
EOSINOPHIL NFR BLD AUTO: 0.9 % — SIGNIFICANT CHANGE UP (ref 0–6)
EOSINOPHIL NFR BLD AUTO: 0.9 % — SIGNIFICANT CHANGE UP (ref 0–6)
GLUCOSE BLDC GLUCOMTR-MCNC: 103 MG/DL — HIGH (ref 70–99)
GLUCOSE BLDC GLUCOMTR-MCNC: 110 MG/DL — HIGH (ref 70–99)
GLUCOSE BLDC GLUCOMTR-MCNC: 141 MG/DL — HIGH (ref 70–99)
GLUCOSE BLDC GLUCOMTR-MCNC: 145 MG/DL — HIGH (ref 70–99)
GLUCOSE BLDC GLUCOMTR-MCNC: 146 MG/DL — HIGH (ref 70–99)
GLUCOSE SERPL-MCNC: 131 MG/DL — HIGH (ref 70–99)
GLUCOSE SERPL-MCNC: 152 MG/DL — HIGH (ref 70–99)
HCT VFR BLD CALC: 29.7 % — LOW (ref 34.5–45)
HCT VFR BLD CALC: 30.8 % — LOW (ref 34.5–45)
HGB BLD-MCNC: 9.8 G/DL — LOW (ref 11.5–15.5)
HGB BLD-MCNC: 9.8 G/DL — LOW (ref 11.5–15.5)
INR BLD: 2.12 RATIO — HIGH (ref 0.85–1.18)
LACTATE SERPL-SCNC: 2.3 MMOL/L — HIGH (ref 0.5–2)
LACTATE SERPL-SCNC: 3.7 MMOL/L — HIGH (ref 0.5–2)
LYMPHOCYTES # BLD AUTO: 1.34 K/UL — SIGNIFICANT CHANGE UP (ref 1–3.3)
LYMPHOCYTES # BLD AUTO: 2.6 K/UL — SIGNIFICANT CHANGE UP (ref 1–3.3)
LYMPHOCYTES # BLD AUTO: 20.5 % — SIGNIFICANT CHANGE UP (ref 13–44)
LYMPHOCYTES # BLD AUTO: 8.7 % — LOW (ref 13–44)
MAGNESIUM SERPL-MCNC: 0.8 MG/DL — CRITICAL LOW (ref 1.6–2.6)
MAGNESIUM SERPL-MCNC: 2.2 MG/DL — SIGNIFICANT CHANGE UP (ref 1.8–2.6)
MANUAL SMEAR VERIFICATION: SIGNIFICANT CHANGE UP
MCHC RBC-ENTMCNC: 28.2 PG — SIGNIFICANT CHANGE UP (ref 27–34)
MCHC RBC-ENTMCNC: 28.6 PG — SIGNIFICANT CHANGE UP (ref 27–34)
MCHC RBC-ENTMCNC: 31.8 GM/DL — LOW (ref 32–36)
MCHC RBC-ENTMCNC: 33 GM/DL — SIGNIFICANT CHANGE UP (ref 32–36)
MCV RBC AUTO: 86.6 FL — SIGNIFICANT CHANGE UP (ref 80–100)
MCV RBC AUTO: 88.8 FL — SIGNIFICANT CHANGE UP (ref 80–100)
MICROCYTES BLD QL: SLIGHT — SIGNIFICANT CHANGE UP
MONOCYTES # BLD AUTO: 0.26 K/UL — SIGNIFICANT CHANGE UP (ref 0–0.9)
MONOCYTES # BLD AUTO: 0.79 K/UL — SIGNIFICANT CHANGE UP (ref 0–0.9)
MONOCYTES NFR BLD AUTO: 1.7 % — LOW (ref 2–14)
MONOCYTES NFR BLD AUTO: 6.2 % — SIGNIFICANT CHANGE UP (ref 2–14)
MRSA PCR RESULT.: SIGNIFICANT CHANGE UP
NEUTROPHILS # BLD AUTO: 13.42 K/UL — HIGH (ref 1.8–7.4)
NEUTROPHILS # BLD AUTO: 8.97 K/UL — HIGH (ref 1.8–7.4)
NEUTROPHILS NFR BLD AUTO: 66.1 % — SIGNIFICANT CHANGE UP (ref 43–77)
NEUTROPHILS NFR BLD AUTO: 87 % — HIGH (ref 43–77)
NT-PROBNP SERPL-SCNC: 857 PG/ML — HIGH (ref 0–300)
OVALOCYTES BLD QL SMEAR: SLIGHT — SIGNIFICANT CHANGE UP
PHOSPHATE SERPL-MCNC: 2.9 MG/DL — SIGNIFICANT CHANGE UP (ref 2.4–4.7)
PLAT MORPH BLD: NORMAL — SIGNIFICANT CHANGE UP
PLATELET # BLD AUTO: 338 K/UL — SIGNIFICANT CHANGE UP (ref 150–400)
PLATELET # BLD AUTO: 352 K/UL — SIGNIFICANT CHANGE UP (ref 150–400)
POIKILOCYTOSIS BLD QL AUTO: SIGNIFICANT CHANGE UP
POLYCHROMASIA BLD QL SMEAR: SLIGHT — SIGNIFICANT CHANGE UP
POTASSIUM SERPL-MCNC: 4.7 MMOL/L — SIGNIFICANT CHANGE UP (ref 3.5–5.3)
POTASSIUM SERPL-MCNC: 5 MMOL/L — SIGNIFICANT CHANGE UP (ref 3.5–5.3)
POTASSIUM SERPL-SCNC: 4.7 MMOL/L — SIGNIFICANT CHANGE UP (ref 3.5–5.3)
POTASSIUM SERPL-SCNC: 5 MMOL/L — SIGNIFICANT CHANGE UP (ref 3.5–5.3)
PROCALCITONIN SERPL-MCNC: 0.25 NG/ML — HIGH (ref 0.02–0.1)
PROT SERPL-MCNC: 5.6 G/DL — LOW (ref 6.6–8.7)
PROT SERPL-MCNC: 5.7 G/DL — LOW (ref 6.6–8.7)
PROTHROM AB SERPL-ACNC: 23 SEC — HIGH (ref 9.5–13)
RBC # BLD: 3.43 M/UL — LOW (ref 3.8–5.2)
RBC # BLD: 3.47 M/UL — LOW (ref 3.8–5.2)
RBC # FLD: 17.2 % — HIGH (ref 10.3–14.5)
RBC # FLD: 17.6 % — HIGH (ref 10.3–14.5)
RBC BLD AUTO: ABNORMAL
S AUREUS DNA NOSE QL NAA+PROBE: SIGNIFICANT CHANGE UP
SCHISTOCYTES BLD QL AUTO: SLIGHT — SIGNIFICANT CHANGE UP
SMUDGE CELLS # BLD: PRESENT — SIGNIFICANT CHANGE UP
SODIUM SERPL-SCNC: 131 MMOL/L — LOW (ref 135–145)
SODIUM SERPL-SCNC: 133 MMOL/L — LOW (ref 135–145)
SPECIMEN SOURCE: SIGNIFICANT CHANGE UP
T4 AB SER-ACNC: 6.1 UG/DL — SIGNIFICANT CHANGE UP (ref 4.5–12)
TROPONIN T, HIGH SENSITIVITY RESULT: 36 NG/L — SIGNIFICANT CHANGE UP (ref 0–51)
TSH SERPL-MCNC: 2.15 UIU/ML — SIGNIFICANT CHANGE UP (ref 0.27–4.2)
VARIANT LYMPHS # BLD: 1.7 % — SIGNIFICANT CHANGE UP (ref 0–6)
WBC # BLD: 12.7 K/UL — HIGH (ref 3.8–10.5)
WBC # BLD: 15.42 K/UL — HIGH (ref 3.8–10.5)
WBC # FLD AUTO: 12.7 K/UL — HIGH (ref 3.8–10.5)
WBC # FLD AUTO: 15.42 K/UL — HIGH (ref 3.8–10.5)

## 2024-04-01 PROCEDURE — 93306 TTE W/DOPPLER COMPLETE: CPT | Mod: 26

## 2024-04-01 PROCEDURE — 99233 SBSQ HOSP IP/OBS HIGH 50: CPT

## 2024-04-01 PROCEDURE — 99233 SBSQ HOSP IP/OBS HIGH 50: CPT | Mod: GC

## 2024-04-01 PROCEDURE — 99223 1ST HOSP IP/OBS HIGH 75: CPT | Mod: FS

## 2024-04-01 PROCEDURE — 71250 CT THORAX DX C-: CPT | Mod: 26

## 2024-04-01 PROCEDURE — 74176 CT ABD & PELVIS W/O CONTRAST: CPT | Mod: 26

## 2024-04-01 RX ORDER — SODIUM CHLORIDE 9 MG/ML
1000 INJECTION, SOLUTION INTRAVENOUS
Refills: 0 | Status: DISCONTINUED | OUTPATIENT
Start: 2024-04-01 | End: 2024-04-02

## 2024-04-01 RX ORDER — GUAIFENESIN/DEXTROMETHORPHAN 600MG-30MG
10 TABLET, EXTENDED RELEASE 12 HR ORAL EVERY 4 HOURS
Refills: 0 | Status: COMPLETED | OUTPATIENT
Start: 2024-04-01 | End: 2024-04-04

## 2024-04-01 RX ORDER — MEROPENEM 1 G/30ML
1000 INJECTION INTRAVENOUS EVERY 12 HOURS
Refills: 0 | Status: COMPLETED | OUTPATIENT
Start: 2024-04-02 | End: 2024-04-05

## 2024-04-01 RX ORDER — INSULIN LISPRO 100/ML
VIAL (ML) SUBCUTANEOUS
Refills: 0 | Status: DISCONTINUED | OUTPATIENT
Start: 2024-04-01 | End: 2024-04-15

## 2024-04-01 RX ORDER — CEFTRIAXONE 500 MG/1
1000 INJECTION, POWDER, FOR SOLUTION INTRAMUSCULAR; INTRAVENOUS EVERY 24 HOURS
Refills: 0 | Status: DISCONTINUED | OUTPATIENT
Start: 2024-04-01 | End: 2024-04-01

## 2024-04-01 RX ORDER — INFLUENZA VIRUS VACCINE 15; 15; 15; 15 UG/.5ML; UG/.5ML; UG/.5ML; UG/.5ML
0.7 SUSPENSION INTRAMUSCULAR ONCE
Refills: 0 | Status: DISCONTINUED | OUTPATIENT
Start: 2024-04-01 | End: 2024-04-15

## 2024-04-01 RX ORDER — DEXTROSE 50 % IN WATER 50 %
12.5 SYRINGE (ML) INTRAVENOUS ONCE
Refills: 0 | Status: DISCONTINUED | OUTPATIENT
Start: 2024-04-01 | End: 2024-04-15

## 2024-04-01 RX ORDER — DEXTROSE 50 % IN WATER 50 %
25 SYRINGE (ML) INTRAVENOUS ONCE
Refills: 0 | Status: DISCONTINUED | OUTPATIENT
Start: 2024-04-01 | End: 2024-04-15

## 2024-04-01 RX ORDER — APIXABAN 2.5 MG/1
2.5 TABLET, FILM COATED ORAL
Refills: 0 | Status: DISCONTINUED | OUTPATIENT
Start: 2024-04-01 | End: 2024-04-15

## 2024-04-01 RX ORDER — MIDODRINE HYDROCHLORIDE 2.5 MG/1
10 TABLET ORAL EVERY 8 HOURS
Refills: 0 | Status: DISCONTINUED | OUTPATIENT
Start: 2024-04-01 | End: 2024-04-05

## 2024-04-01 RX ORDER — PANTOPRAZOLE SODIUM 20 MG/1
40 TABLET, DELAYED RELEASE ORAL
Refills: 0 | Status: DISCONTINUED | OUTPATIENT
Start: 2024-04-01 | End: 2024-04-15

## 2024-04-01 RX ORDER — CEFEPIME 1 G/1
INJECTION, POWDER, FOR SOLUTION INTRAMUSCULAR; INTRAVENOUS
Refills: 0 | Status: DISCONTINUED | OUTPATIENT
Start: 2024-04-01 | End: 2024-04-01

## 2024-04-01 RX ORDER — INSULIN LISPRO 100/ML
VIAL (ML) SUBCUTANEOUS AT BEDTIME
Refills: 0 | Status: DISCONTINUED | OUTPATIENT
Start: 2024-04-01 | End: 2024-04-15

## 2024-04-01 RX ORDER — DILTIAZEM HCL 120 MG
20 CAPSULE, EXT RELEASE 24 HR ORAL ONCE
Refills: 0 | Status: COMPLETED | OUTPATIENT
Start: 2024-04-01 | End: 2024-04-01

## 2024-04-01 RX ORDER — MEROPENEM 1 G/30ML
1000 INJECTION INTRAVENOUS ONCE
Refills: 0 | Status: COMPLETED | OUTPATIENT
Start: 2024-04-01 | End: 2024-04-01

## 2024-04-01 RX ORDER — SODIUM CHLORIDE 9 MG/ML
1000 INJECTION, SOLUTION INTRAVENOUS ONCE
Refills: 0 | Status: COMPLETED | OUTPATIENT
Start: 2024-04-01 | End: 2024-04-01

## 2024-04-01 RX ORDER — GLUCAGON INJECTION, SOLUTION 0.5 MG/.1ML
1 INJECTION, SOLUTION SUBCUTANEOUS ONCE
Refills: 0 | Status: DISCONTINUED | OUTPATIENT
Start: 2024-04-01 | End: 2024-04-02

## 2024-04-01 RX ORDER — MEROPENEM 1 G/30ML
INJECTION INTRAVENOUS
Refills: 0 | Status: DISCONTINUED | OUTPATIENT
Start: 2024-04-01 | End: 2024-04-01

## 2024-04-01 RX ORDER — MEROPENEM 1 G/30ML
INJECTION INTRAVENOUS
Refills: 0 | Status: COMPLETED | OUTPATIENT
Start: 2024-04-01 | End: 2024-04-05

## 2024-04-01 RX ORDER — MAGNESIUM SULFATE 500 MG/ML
4 VIAL (ML) INJECTION ONCE
Refills: 0 | Status: COMPLETED | OUTPATIENT
Start: 2024-04-01 | End: 2024-04-01

## 2024-04-01 RX ORDER — LEVOTHYROXINE SODIUM 125 MCG
25 TABLET ORAL DAILY
Refills: 0 | Status: DISCONTINUED | OUTPATIENT
Start: 2024-04-01 | End: 2024-04-15

## 2024-04-01 RX ORDER — DEXTROSE 50 % IN WATER 50 %
15 SYRINGE (ML) INTRAVENOUS ONCE
Refills: 0 | Status: DISCONTINUED | OUTPATIENT
Start: 2024-04-01 | End: 2024-04-02

## 2024-04-01 RX ADMIN — Medication 1000 MILLIGRAM(S): at 00:07

## 2024-04-01 RX ADMIN — SIMVASTATIN 20 MILLIGRAM(S): 20 TABLET, FILM COATED ORAL at 21:39

## 2024-04-01 RX ADMIN — MEROPENEM 1000 MILLIGRAM(S): 1 INJECTION INTRAVENOUS at 16:19

## 2024-04-01 RX ADMIN — APIXABAN 2.5 MILLIGRAM(S): 2.5 TABLET, FILM COATED ORAL at 01:17

## 2024-04-01 RX ADMIN — SODIUM CHLORIDE 1000 MILLILITER(S): 9 INJECTION, SOLUTION INTRAVENOUS at 00:05

## 2024-04-01 RX ADMIN — APIXABAN 2.5 MILLIGRAM(S): 2.5 TABLET, FILM COATED ORAL at 17:28

## 2024-04-01 RX ADMIN — MIDODRINE HYDROCHLORIDE 10 MILLIGRAM(S): 2.5 TABLET ORAL at 13:12

## 2024-04-01 RX ADMIN — Medication 2000 UNIT(S): at 11:48

## 2024-04-01 RX ADMIN — MIDODRINE HYDROCHLORIDE 10 MILLIGRAM(S): 2.5 TABLET ORAL at 21:39

## 2024-04-01 RX ADMIN — Medication 1 TABLET(S): at 11:48

## 2024-04-01 RX ADMIN — Medication 25 GRAM(S): at 05:34

## 2024-04-01 RX ADMIN — MIDODRINE HYDROCHLORIDE 10 MILLIGRAM(S): 2.5 TABLET ORAL at 05:34

## 2024-04-01 RX ADMIN — SODIUM CHLORIDE 1000 MILLILITER(S): 9 INJECTION, SOLUTION INTRAVENOUS at 16:07

## 2024-04-01 RX ADMIN — Medication 20 MILLIGRAM(S): at 06:24

## 2024-04-01 RX ADMIN — Medication 25 MICROGRAM(S): at 05:34

## 2024-04-01 NOTE — PROGRESS NOTE ADULT - ASSESSMENT
Septic shock 2/2 UTI   - Weaned off pressor support w/ midodrine   - BP remains very soft, will resume gentle IVFs for today but if needed will increase midodrine   - Leukocytosis trending up today despite being on rocephin, has low grade fevers and remains in AF RVR w/ soft SBP in 80's to 90's despite midodrine therefore will broaden abx to merrem   - Retaining urine while in MICU, walters placed today and drained 1800cc of purulent urine   - Hx of recurring UTIs from urinary retention and follows with urology   - CT a/p reviewed and noted to have findings consistent w/ cystitis as well as a new moderate Rt and severe chronic L hydroureteronephrosis with out obvious cause of obstruction  - UA w/ pyuria and cultures pending   - Blood cultures pending   - Flu/rsv/covid negative   - MRSA pcr negative   -  could be due to sepsis vs AF rvr   - Urology consulted     New onset AF rvr likely 2/2 above   - Still in RVR   - c/w eliquis 2.5mg bid   - CHADSVASC = 6  - Not on rate controlling medication due to borderline hypotension   - Once BP can tolerate start on BB  - TSH normal   - Monitor on telemetry  - Cardiology consulted       AGMA 2/2 sepsis/LA and uremia  - Corrected AG 18 and Serum HCO3 low   - c/w IVLR for gentle hydration   - LA improving since admission   - Will broaden abx regimen and c/w gentle IVF and anticipate AG will improve      Anemia   - At baseline Hb of 9-10  - Hemodynamically unstable but from sepsis not anemia   - No concern for active bleeding at this time   - Smear reviewed and slight schistocytes reported; will repeat in AM to trend  - c/w protonix for GI cytoprotection   - Monitor CBC and transfuse for Hb<8      Mild transaminitis   - Likely 2/2 shock on admission   - Down trending since BP improved   - Benign abd exam and asymptomatic   - Normal Tbili and ALP  - Trend LFTs periodically       Recent ischemic cerebellar stroke   -    82 y/o female with pmhx of recurrent UTIs, chronic left sided hydronephrosis, hypertension, hyperlipidemia, diabetes, hypothyroidism, hiatal hernia, OA, TIA/CVA and DVT (2016 no longer on AC) who presented to ED from nursing facility for fever, altered mental status and shortness of breath x 1 day.  Pt admitted to micu for septic shock 2/2 UTI.  Hospital course complicated by AF rvr and cardio called but unable to place on rate control meds thus far due to hypotension.  Pt weaned off pressors this morning w/ midodrine and BP remains soft.  Pt retaining urine and required walters and drained purulent appearing urine.  Pt will be down graded to SDU.        Septic shock 2/2 UTI   - Weaned off pressor support w/ midodrine   - BP remains very soft, will resume gentle IVFs for today but if needed will increase midodrine   - Leukocytosis trending up today despite being on rocephin, has low grade fevers and remains in AF RVR w/ soft SBP in 80's to 90's despite midodrine therefore will broaden abx to merrem   - Retaining urine while in MICU, walters placed today and drained 1800cc of purulent urine   - Hx of recurring UTIs from urinary retention and follows with urology   - CT a/p reviewed and noted to have findings consistent w/ cystitis as well as a new moderate Rt and severe chronic L hydroureteronephrosis with out obvious cause of obstruction  - UA w/ pyuria and cultures pending   - Blood cultures pending   - Flu/rsv/covid negative   - MRSA pcr negative   -  could be due to sepsis vs AF rvr   - Urology consulted       New onset AF rvr likely 2/2 above   - Still in RVR   - c/w eliquis 2.5mg bid   - CHADSVASC = 6  - Not on rate controlling medication due to borderline hypotension   - Once BP can tolerate start on BB  - TSH normal   - Monitor on telemetry  - Cardiology consulted       AGMA 2/2 sepsis/LA and uremia  - Corrected AG 18 and Serum HCO3 low   - c/w IVLR for gentle hydration   - LA improving since admission   - Will broaden abx regimen and c/w gentle IVF and anticipate AG will improve      Anemia   - At baseline Hb of 9-10  - Hemodynamically unstable but from sepsis not anemia   - No concern for active bleeding at this time   - Smear reviewed and slight schistocytes reported; will repeat in AM to trend  - c/w protonix for GI cytoprotection   - Monitor CBC and transfuse for Hb<8      Mild transaminitis   - Likely 2/2 shock on admission   - Down trending since BP improved   - Benign abd exam and asymptomatic   - Normal Tbili and ALP  - Trend LFTs periodically       VTE ppx: on eliquis

## 2024-04-01 NOTE — PROGRESS NOTE ADULT - SUBJECTIVE AND OBJECTIVE BOX
MICU Downgrade / Hospital course:           SUBJECTIVE / OVERNIGHT EVENTS:  No acute events reported overnight.  This morning reported to be retaining urine and walters placed w/ 1800cc of purulent urine drained.  Pt c/o worsening productive cough.        I&O's Summary    31 Mar 2024 07:01  -  01 Apr 2024 07:00  --------------------------------------------------------  IN: 75 mL / OUT: 0 mL / NET: 75 mL    01 Apr 2024 07:01  -  01 Apr 2024 15:20  --------------------------------------------------------  IN: 25 mL / OUT: 2200 mL / NET: -2175 mL          PHYSICAL EXAM:  Vital Signs Last 24 Hrs  T(C): 37.3 (01 Apr 2024 13:00), Max: 39.1 (31 Mar 2024 20:24)  T(F): 99.1 (01 Apr 2024 13:00), Max: 102.3 (31 Mar 2024 20:24)  HR: 88 (01 Apr 2024 13:00) (82 - 155)  BP: 90/79 (01 Apr 2024 13:00) (74/49 - 132/63)  BP(mean): 85 (01 Apr 2024 13:00) (63 - 86)  RR: 24 (01 Apr 2024 13:00) (18 - 33)  SpO2: 99% (01 Apr 2024 13:00) (98% - 100%)    Parameters below as of 01 Apr 2024 12:00  Patient On (Oxygen Delivery Method): room air      GENERAL: pt examined bedside, laying comfortably in bed in NAD  HEENT: NC/AT, dry oral mucosa, clear conjunctiva, sclera nonicteric  RESPIRATORY: poor inspiratory effort, course rales b/l   CARDIOVASCULAR: irregularly irregular rate and rhythm, normal S1 and S2  ABDOMEN: soft, obese, NT/ND, normoactive bowel sounds, no rebound/guarding  EXTREMITIES: No cynaosis, no clubbing, 1+pedal edema and trace pretibial edema   PSYCH: affect appropriate and cooperative  NEUROLOGY: A+O x2, L-sided weakness (chronic)  SKIN: No rashes or no palpable lesions        LABS:                        9.8    15.42 )-----------( 352      ( 01 Apr 2024 13:19 )             29.7     04-01    133<L>  |  101  |  31.9<H>  ----------------------------<  131<H>  4.7   |  18.0<L>  |  1.12    Ca    8.7      01 Apr 2024 13:19  Phos  2.9     04-01  Mg     2.2     04-01    TPro  5.6<L>  /  Alb  2.3<L>  /  TBili  0.2<L>  /  DBili  x   /  AST  33<H>  /  ALT  44<H>  /  AlkPhos  93  04-01    PT/INR - ( 01 Apr 2024 03:45 )   PT: 23.0 sec;   INR: 2.12 ratio         PTT - ( 01 Apr 2024 03:45 )  PTT:25.3 sec      Urinalysis Basic - ( 01 Apr 2024 13:19 )    Color: x / Appearance: x / SG: x / pH: x  Gluc: 131 mg/dL / Ketone: x  / Bili: x / Urobili: x   Blood: x / Protein: x / Nitrite: x   Leuk Esterase: x / RBC: x / WBC x   Sq Epi: x / Non Sq Epi: x / Bacteria: x        CAPILLARY BLOOD GLUCOSE      POCT Blood Glucose.: 103 mg/dL (01 Apr 2024 11:50)  POCT Blood Glucose.: 110 mg/dL (01 Apr 2024 07:34)  POCT Blood Glucose.: 146 mg/dL (01 Apr 2024 03:50)        RADIOLOGY & ADDITIONAL TESTS:    < from: CT Abdomen and Pelvis No Cont (04.01.24 @ 03:18) >  IMPRESSION:  1.  Cystitis. Urinary bladder distended with fluid-fluid or fluid-debris   level, consider dilute intraluminal blood products or proteinaceous   debris. Correlate with urinalysis.  2.  New moderate right and severe chronic left hydroureteronephrosis   without specific cause of obstruction identified.  Bladder distention   possibly contributes to right-sided hydroureteronephrosis. Underlying   stricture or urothelial lesion is not excluded.    < end of copied text >      < from: CT Chest No Cont (04.01.24 @ 03:17) >  LUNGS AND AIRWAYS: Patent central airways.  Diffuse bronchial wall   thickening and few areas of scattered distal airway mucoid plugging.   Chronic and mild subpleural and basilar reticular opacities. Few   scattered sub-4 mm pulmonary nodules, largely stable. For example, stable   adjacent 2-3 mm nodules in the right upper lobe (4, 165). Areas of   atelectasis at the lung bases.  PLEURA: No pleural effusion.  MEDIASTINUM AND MARTY: No lymphadenopathy.    < end of copied text >      < from: TTE W or WO Ultrasound Enhancing Agent (04.01.24 @ 12:13) >  CONCLUSIONS:      1. Left ventricular systolic function is normal with an ejection fraction visually estimated at 60 to 65 %.   2. The left ventricular diastolic function is indeterminate.   3. Normal right ventricular cavity size and normal systolic function.   4. Mild mitral regurgitation.   5. Mild tricuspid regurgitation.   6. No prior echocardiogram is available for comparison.    < end of copied text >      MEDICATIONS  (STANDING):  apixaban 2.5 milliGRAM(s) Oral two times a day  cefTRIAXone Injectable. 1000 milliGRAM(s) IV Push every 24 hours  cholecalciferol 2000 Unit(s) Oral daily  dextrose 5%. 1000 milliLiter(s) (50 mL/Hr) IV Continuous <Continuous>  dextrose 5%. 1000 milliLiter(s) (100 mL/Hr) IV Continuous <Continuous>  dextrose 50% Injectable 12.5 Gram(s) IV Push once  dextrose 50% Injectable 25 Gram(s) IV Push once  dextrose 50% Injectable 25 Gram(s) IV Push once  glucagon  Injectable 1 milliGRAM(s) IntraMuscular once  influenza  Vaccine (HIGH DOSE) 0.7 milliLiter(s) IntraMuscular once  insulin lispro (ADMELOG) corrective regimen sliding scale   SubCutaneous at bedtime  insulin lispro (ADMELOG) corrective regimen sliding scale   SubCutaneous three times a day before meals  levothyroxine 25 MICROGram(s) Oral daily  midodrine 10 milliGRAM(s) Oral every 8 hours  multivitamin/minerals 1 Tablet(s) Oral daily  simvastatin 20 milliGRAM(s) Oral at bedtime    MEDICATIONS  (PRN):  dextrose Oral Gel 15 Gram(s) Oral once PRN Blood Glucose LESS THAN 70 milliGRAM(s)/deciliter                                   MICU Downgrade / Hospital course:   84 y/o female with pmhx of recurrent UTIs, chronic left sided hydronephrosis, hypertension, hyperlipidemia, diabetes, hypothyroidism, hiatal hernia, OA, TIA and DVT (2016 no longer on AC) who presented to ED from nursing facility for fever, altered mental status and shortness of breath x 1 day.  Pt admitted to micu for septic shock 2/2 UTI.  Hospital course complicated by AF rvr and cardio called but unable to place on rate control meds thus far due to hypotension.  Pt weaned off pressors this morning w/ midodrine and BP remains soft.  Pt retaining urine and required walters and drained purulent appearing urine.  Pt will be down graded to SDU.      SUBJECTIVE / OVERNIGHT EVENTS:  No acute events reported overnight.  This morning reported to be retaining urine and walters placed w/ 1800cc of purulent urine drained.  Pt c/o worsening productive cough.        I&O's Summary    31 Mar 2024 07:01  -  01 Apr 2024 07:00  --------------------------------------------------------  IN: 75 mL / OUT: 0 mL / NET: 75 mL    01 Apr 2024 07:01  -  01 Apr 2024 15:20  --------------------------------------------------------  IN: 25 mL / OUT: 2200 mL / NET: -2175 mL          PHYSICAL EXAM:  Vital Signs Last 24 Hrs  T(C): 37.3 (01 Apr 2024 13:00), Max: 39.1 (31 Mar 2024 20:24)  T(F): 99.1 (01 Apr 2024 13:00), Max: 102.3 (31 Mar 2024 20:24)  HR: 88 (01 Apr 2024 13:00) (82 - 155)  BP: 90/79 (01 Apr 2024 13:00) (74/49 - 132/63)  BP(mean): 85 (01 Apr 2024 13:00) (63 - 86)  RR: 24 (01 Apr 2024 13:00) (18 - 33)  SpO2: 99% (01 Apr 2024 13:00) (98% - 100%)    Parameters below as of 01 Apr 2024 12:00  Patient On (Oxygen Delivery Method): room air      GENERAL: pt examined bedside, laying comfortably in bed in NAD  HEENT: NC/AT, dry oral mucosa, clear conjunctiva, sclera nonicteric  RESPIRATORY: poor inspiratory effort, course rales b/l   CARDIOVASCULAR: irregularly irregular rate and rhythm, normal S1 and S2  ABDOMEN: soft, obese, NT/ND, normoactive bowel sounds, no rebound/guarding  EXTREMITIES: No cynaosis, no clubbing, 1+pedal edema and trace pretibial edema   PSYCH: affect appropriate and cooperative  NEUROLOGY: A+O x2, L-sided weakness (chronic)  SKIN: No rashes or no palpable lesions        LABS:                        9.8    15.42 )-----------( 352      ( 01 Apr 2024 13:19 )             29.7     04-01    133<L>  |  101  |  31.9<H>  ----------------------------<  131<H>  4.7   |  18.0<L>  |  1.12    Ca    8.7      01 Apr 2024 13:19  Phos  2.9     04-01  Mg     2.2     04-01    TPro  5.6<L>  /  Alb  2.3<L>  /  TBili  0.2<L>  /  DBili  x   /  AST  33<H>  /  ALT  44<H>  /  AlkPhos  93  04-01    PT/INR - ( 01 Apr 2024 03:45 )   PT: 23.0 sec;   INR: 2.12 ratio         PTT - ( 01 Apr 2024 03:45 )  PTT:25.3 sec      Urinalysis Basic - ( 01 Apr 2024 13:19 )    Color: x / Appearance: x / SG: x / pH: x  Gluc: 131 mg/dL / Ketone: x  / Bili: x / Urobili: x   Blood: x / Protein: x / Nitrite: x   Leuk Esterase: x / RBC: x / WBC x   Sq Epi: x / Non Sq Epi: x / Bacteria: x        CAPILLARY BLOOD GLUCOSE      POCT Blood Glucose.: 103 mg/dL (01 Apr 2024 11:50)  POCT Blood Glucose.: 110 mg/dL (01 Apr 2024 07:34)  POCT Blood Glucose.: 146 mg/dL (01 Apr 2024 03:50)        RADIOLOGY & ADDITIONAL TESTS:    < from: CT Abdomen and Pelvis No Cont (04.01.24 @ 03:18) >  IMPRESSION:  1.  Cystitis. Urinary bladder distended with fluid-fluid or fluid-debris   level, consider dilute intraluminal blood products or proteinaceous   debris. Correlate with urinalysis.  2.  New moderate right and severe chronic left hydroureteronephrosis   without specific cause of obstruction identified.  Bladder distention   possibly contributes to right-sided hydroureteronephrosis. Underlying   stricture or urothelial lesion is not excluded.    < end of copied text >      < from: CT Chest No Cont (04.01.24 @ 03:17) >  LUNGS AND AIRWAYS: Patent central airways.  Diffuse bronchial wall   thickening and few areas of scattered distal airway mucoid plugging.   Chronic and mild subpleural and basilar reticular opacities. Few   scattered sub-4 mm pulmonary nodules, largely stable. For example, stable   adjacent 2-3 mm nodules in the right upper lobe (4, 165). Areas of   atelectasis at the lung bases.  PLEURA: No pleural effusion.  MEDIASTINUM AND MARTY: No lymphadenopathy.    < end of copied text >      < from: TTE W or WO Ultrasound Enhancing Agent (04.01.24 @ 12:13) >  CONCLUSIONS:      1. Left ventricular systolic function is normal with an ejection fraction visually estimated at 60 to 65 %.   2. The left ventricular diastolic function is indeterminate.   3. Normal right ventricular cavity size and normal systolic function.   4. Mild mitral regurgitation.   5. Mild tricuspid regurgitation.   6. No prior echocardiogram is available for comparison.    < end of copied text >      MEDICATIONS  (STANDING):  apixaban 2.5 milliGRAM(s) Oral two times a day  cefTRIAXone Injectable. 1000 milliGRAM(s) IV Push every 24 hours  cholecalciferol 2000 Unit(s) Oral daily  dextrose 5%. 1000 milliLiter(s) (50 mL/Hr) IV Continuous <Continuous>  dextrose 5%. 1000 milliLiter(s) (100 mL/Hr) IV Continuous <Continuous>  dextrose 50% Injectable 12.5 Gram(s) IV Push once  dextrose 50% Injectable 25 Gram(s) IV Push once  dextrose 50% Injectable 25 Gram(s) IV Push once  glucagon  Injectable 1 milliGRAM(s) IntraMuscular once  influenza  Vaccine (HIGH DOSE) 0.7 milliLiter(s) IntraMuscular once  insulin lispro (ADMELOG) corrective regimen sliding scale   SubCutaneous at bedtime  insulin lispro (ADMELOG) corrective regimen sliding scale   SubCutaneous three times a day before meals  levothyroxine 25 MICROGram(s) Oral daily  midodrine 10 milliGRAM(s) Oral every 8 hours  multivitamin/minerals 1 Tablet(s) Oral daily  simvastatin 20 milliGRAM(s) Oral at bedtime    MEDICATIONS  (PRN):  dextrose Oral Gel 15 Gram(s) Oral once PRN Blood Glucose LESS THAN 70 milliGRAM(s)/deciliter

## 2024-04-01 NOTE — PATIENT PROFILE ADULT - FALL HARM RISK - FACTORS
Impaired gait/Impaired vision/IV and/or equipment tethered to patient/Other medical problems/Syncope/Weakness/Other

## 2024-04-01 NOTE — PATIENT PROFILE ADULT - FUNCTIONAL SCREEN CURRENT LEVEL: COMMUNICATION, MLM
Online Visit    Date/Time: 6/18/2018 7:25:29 AM  To: BRO WILKERSON  From: RIVAS TAI  Subject:    Your labs look overall acceptable--there are some very minor \"out of range\" levels in the metabolic panel and CBC though they are okay.  The thyroid testing suggests your thyroid care is appropriate, you are on the right level of levothyroxine.  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  There will likely be a time where we have to increase your thyroid hormone, when the thyroid doesn't create as much hormone, but for now it is producing just fine.      Verified Results  COMP METABOLIC PANEL WITH CBCA, LIPID PANEL AND TSH (CMP,CBCA,LIPFA,TSH) 13Jun2018 12:01AM RIVAS TAI     Test Name Result Flag Reference   WHITE BLOOD COUNT 6.9 K/mcL  4.2-11.0   RED CELL COUNT 5.24 mil/mcL H 4.00-5.20   HEMOGLOBIN 14.6 g/dl  12.0-15.5   HEMATOCRIT 46.2 %  36.0-46.5   MEAN CORPUSCULAR VOLUME 88.2 fL  78.0-100.0   MEAN CORPUSCULAR HEMOGLOBIN 27.9 pg  26.0-34.0   MEAN CORPUSCULAR HGB CONC 31.6 g/dl L 32.0-36.5   RDW-CV 12.6 %  11.0-15.0   PLATELET COUNT 332 K/mcL  140-450   CINDA% 43 %     LYM% 39 %     MON% 7 %     EOS% 9 %     BASO% 1 %     CINDA ABS 3.0 K/mcL  1.8-7.7   LYM ABS 2.7 K/mcL  1.0-4.8   MON ABS 0.5 K/mcL  0.3-0.9   EOS ABS 0.6 K/mcL H 0.1-0.5   BASO ABS 0.1 K/mcL  0.0-0.3   SODIUM 144 mmol/L  135-145   POTASSIUM 4.9 mmol/L  3.4-5.1   CHLORIDE 111 mmol/L H    CARBON DIOXIDE 20 mmol/L L 21-32   ANION GAP 18 mmol/L  10-20   GLUCOSE 90 mg/dl  65-99   BUN 12 mg/dl  6-20   CREATININE 0.89 mg/dl  0.51-0.95   GFR EST.AFRICAN AMER >90     eGFR results = or >90 mL/min/1.73m2 = Normal kidney function.   GFR EST.NONAFRI AMER 83     eGFR 60 - 89 mL/min/1.73m2 = Mild decrease in kidney function.   BUN/CREATININE RATIO 14  7-25   BILIRUBIN TOTAL 0.3 mg/dl  0.2-1.0   GOT/AST 29 Units/L  <38   ALKALINE  PHOSPHATASE 126 Units/L H    ALBUMIN 3.9 g/dl  3.6-5.1   TOTAL PROTEIN 7.7 g/dl  6.4-8.2   GLOBULIN (CALCULATED) 3.8 g/dl  2.0-4.0   A/G RATIO 1.0  1.0-2.4   CALCIUM 9.2 mg/dl  8.4-10.2   GPT/ALT 63 Units/L  <79   FASTING STATUS UNKNOWN hrs     CHOLESTEROL 162 mg/dl  <200   Desirable            <200  Borderline High      200 to 239  High                 >=240   HDL CHOLESTEROL 41 mg/dl L >49   Low            <40  Borderline Low 40 to 49  Near Optimal   50 to 59  Optimal        >=60   TRIGLYCERIDES 87 mg/dl  <150   Normal                   <150  Borderline High          150 to 199  High                     200 to 499  Very High                >=500   LDL CHOLESTEROL (CALCULATED) 104 mg/dl  <130   OPTIMAL               <100  NEAR OPTIMAL          100-129  BORDERLINE HIGH       130-159  HIGH                  160-189  VERY HIGH             >=190   NON-HDL CHOLESTEROL 121 mg/dl     Therapeutic Target:  CHD and risk equivalents <130  Multiple risk factors    <160  0 to 1 risk factors      <190   CHOLESTEROL/HDL RATIO 4.0  <4.5   TSH 0.596 mcUnits/mL  0.350-5.000   DIFF TYPE      AUTOMATED DIFFERENTIAL   FASTING STATUS UNKNOWN hrs     NRBC 0 /100 WBC  0   PERCENT IMMATURE GRANULOCYTES 1 %     ABSOLUTE IMMATURE GRANULOCYTES 0.0 K/mcL  0-0.2     T4, FREE 13Jun2018 12:01AM RIVAS TAI     Test Name Result Flag Reference   FREE T4 1.2 ng/dl  0.8-1.5     T3, FREE 13Jun2018 12:01 RIVAS TAI     Test Name Result Flag Reference   FREE T3 3.4 pg/ml  2.2-4.0     THYROID ANTIBODY 13Jun2018 12:01AM RIVAS TAI     Test Name Result Flag Reference   THYROGLOBULIN ANTIBODY 30.5 IU/ml H 0.0-4.0   [facility] [employer]  Chemiluminescence Immunoassay   ANTI-MICROSOMAL AB 32 UNITS/ML  <60        0 = understands/communicates without difficulty

## 2024-04-01 NOTE — CHART NOTE - NSCHARTNOTEFT_GEN_A_CORE
84 y/o female with pmhx of recurrent UTIs, chronic left sided hydronephrosis, hypertension, hyperlipidemia, diabetes, hypothyroidism, hiatal hernia, OA, TIA and DVT (2016 no longer on AC) who presented to ED from nursing facility for fever, altered mental status and shortness of breath x 1 day. Upon arrival, she was found to be febrile (102.3 rectally), tachypneic and in afib RVR with rates to 160s-180s. Code sepsis activated. UA positive. Was given 10mg IV Cardizem for afib, however afterwards became hypotensive to 70s requiring additional fluids and phenylephrine. Labs with leukocytosis, mild hyponatremia, ELISA and lactic acidosis. Admitted to MICU for urosepsis and afib RVR.       Physical Exam:    T(C): 37.9 (04-01-24 @ 09:00), Max: 39.1 (03-31-24 @ 20:24)  HR: 104 (04-01-24 @ 09:00) (82 - 155)  BP: 93/67 (04-01-24 @ 09:00) (74/49 - 132/63)  RR: 20 (04-01-24 @ 09:00) (18 - 33)  SpO2: 99% (04-01-24 @ 09:00) (98% - 100%)    CONSTITUTIONAL: Well groomed, no apparent distress  EYES: PERRLA and symmetric, EOMI, No conjunctival or scleral injection, non-icteric  RESP: No respiratory distress, no use of accessory muscles; Minimal bibasilar crackles with nonproductive wet cough.   CV: Irregularly irregular, no JVD; 2+ bilateral lower extremity edema  GI: Soft, NT, ND, no rebound, no guarding; no palpable masses  MSK: Normal ROM without pain, normal muscle strength/tone  SKIN: No rashes or ulcers noted; no subcutaneous nodules or induration palpable  NEURO: Alert and oriented x2, sensation intact in upper and lower extremities b/l to light touch       ASSESSMENT:    Neuro: Mentation has improved since receiving abx and fluids. As per daughter, patient is usually A&Ox3.   Cardio: Heart rate seems to be improving with fluid resuscitation. Off phenylephrine since 04:30 4/1/2024. c/w midodrine to facilitate wean from vasopressor. May need soft diuresis as BP tolerates.   Resp: Supplemental O2 as needed. Wet nonproductive cough already on rocephin.   GI: No active issues. Advance diet as tolerated now that her mentation is improving.   : ELISA and hyponatremia likely from dehydration. Optimizing renal perfusion with above. Hx of recurrent UTIs and chronic left sided hydronephrosis. Urology consulted.   ID: Pan cultures sent. S/p vancomycin and ceftriaxone in ED. As per chart review, previous urine cultures with E.coli. No history of ESBL. Will continue with ceftriaxone for now as she has penicillin allergy and has tolerated it previously. Trend wbc and fever curve.   Endo: c/w synthroid. Maintain glucose levels between 110-180.   Heme: c/w Eliquis as per cardiology team recs. No active signs of bleeding. 82 y/o female with pmhx of recurrent UTIs, chronic left sided hydronephrosis, hypertension, hyperlipidemia, diabetes, hypothyroidism, hiatal hernia, OA, TIA and DVT (2016 no longer on AC) who presented to ED from nursing facility for fever, altered mental status and shortness of breath x 1 day. Upon arrival, she was found to be febrile (102.3 rectally), tachypneic and in afib RVR with rates to 160s-180s. Code sepsis activated. UA positive. Was given 10mg IV Cardizem for afib, however afterwards became hypotensive to 70s requiring additional fluids and phenylephrine. Labs with leukocytosis, mild hyponatremia, ELISA and lactic acidosis. Admitted to MICU for urosepsis and afib RVR.       Physical Exam:    T(C): 37.9 (04-01-24 @ 09:00), Max: 39.1 (03-31-24 @ 20:24)  HR: 104 (04-01-24 @ 09:00) (82 - 155)  BP: 93/67 (04-01-24 @ 09:00) (74/49 - 132/63)  RR: 20 (04-01-24 @ 09:00) (18 - 33)  SpO2: 99% (04-01-24 @ 09:00) (98% - 100%)    CONSTITUTIONAL: Well groomed, no apparent distress  EYES: PERRLA and symmetric, EOMI, No conjunctival or scleral injection, non-icteric  RESP: No respiratory distress, no use of accessory muscles; Minimal bibasilar crackles with nonproductive wet cough.   CV: Irregularly irregular, no JVD; 2+ bilateral lower extremity edema  GI: Soft, NT, ND, no rebound, no guarding; no palpable masses  MSK: Normal ROM without pain, normal muscle strength/tone  SKIN: No rashes or ulcers noted; no subcutaneous nodules or induration palpable  NEURO: Alert and oriented x2, sensation intact in upper and lower extremities b/l to light touch       ASSESSMENT:    Neuro: Mentation has improved since receiving abx and fluids. As per daughter, patient is usually A&Ox3.   Cardio: Heart rate seems to be improving with fluid resuscitation. Off phenylephrine since 04:30 4/1/2024. c/w midodrine to facilitate wean from vasopressor. May need soft diuresis as BP tolerates.   Resp: Supplemental O2 as needed. Wet nonproductive cough already on rocephin.   GI: No active issues. Advance diet as tolerated now that her mentation is improving.   : ELISA and hyponatremia likely from dehydration. Optimizing renal perfusion with above. Hx of recurrent UTIs and chronic left sided hydronephrosis. Urology consulted. Repleted Mag this AM.   ID: Pan cultures sent. S/p vancomycin and ceftriaxone in ED. As per chart review, previous urine cultures with E.coli. No history of ESBL. Will continue with ceftriaxone for now as she has penicillin allergy and has tolerated it previously. Trend wbc and fever curve.   Endo: c/w synthroid. Maintain glucose levels between 110-180.   Heme: c/w Eliquis as per cardiology team recs. No active signs of bleeding.    Case discussed with ICU team and attending Dr. Welsh. Patient medically stable for downgrade to floor discussed with receiving Dr. Fisher 84 y/o female with pmhx of recurrent UTIs, chronic left sided hydronephrosis, hypertension, hyperlipidemia, diabetes, hypothyroidism, hiatal hernia, OA, TIA and DVT (2016 no longer on AC) who presented to ED from nursing facility for fever, altered mental status and shortness of breath x 1 day. Upon arrival, she was found to be febrile (102.3 rectally), tachypneic and in afib RVR with rates to 160s-180s. Code sepsis activated. UA positive. Was given 10mg IV Cardizem for afib, however afterwards became hypotensive to 70s requiring additional fluids and phenylephrine. Labs with leukocytosis, mild hyponatremia, ELISA and lactic acidosis. Admitted to MICU for urosepsis and afib RVR.       Physical Exam:    T(C): 37.9 (04-01-24 @ 09:00), Max: 39.1 (03-31-24 @ 20:24)  HR: 104 (04-01-24 @ 09:00) (82 - 155)  BP: 93/67 (04-01-24 @ 09:00) (74/49 - 132/63)  RR: 20 (04-01-24 @ 09:00) (18 - 33)  SpO2: 99% (04-01-24 @ 09:00) (98% - 100%)    CONSTITUTIONAL: Well groomed, no apparent distress  EYES: PERRLA and symmetric, EOMI, No conjunctival or scleral injection, non-icteric  RESP: No respiratory distress, no use of accessory muscles; Minimal bibasilar crackles with nonproductive wet cough.   CV: Irregularly irregular, no JVD; 2+ bilateral lower extremity edema  GI: Soft, NT, ND, no rebound, no guarding; no palpable masses  MSK: Normal ROM without pain, normal muscle strength/tone  SKIN: No rashes or ulcers noted; no subcutaneous nodules or induration palpable  NEURO: Alert and oriented x2, sensation intact in upper and lower extremities b/l to light touch       ASSESSMENT:    Neuro: Mentation has improved since receiving abx and fluids. As per daughter, patient is usually A&Ox3.   Cardio: Heart rate seems to be improving with fluid resuscitation. Off phenylephrine since 04:30 4/1/2024. c/w midodrine to facilitate wean from vasopressor. May need soft diuresis as BP tolerates.   Resp: Supplemental O2 as needed. Wet nonproductive cough already on rocephin.   GI: No active issues. Advance diet as tolerated now that her mentation is improving.   : ELISA and hyponatremia likely from dehydration. Optimizing renal perfusion with above. Hx of recurrent UTIs and chronic left sided hydronephrosis. Urology consulted. Repleted Mag this AM.   ID: Pan cultures sent. S/p vancomycin and ceftriaxone in ED. As per chart review, previous urine cultures with E.coli. No history of ESBL. Will continue with ceftriaxone for now as she has penicillin allergy and has tolerated it previously. Trend wbc and fever curve.   Endo: c/w synthroid. Maintain glucose levels between 110-180.   Heme: c/w Eliquis as per cardiology team recs. No active signs of bleeding.    Case discussed with ICU team and attending Dr. Welsh. Patient medically stable for downgrade to floor discussed with receiving  .      ATTENDING ADDENDUM:  Seen and examined,  84 yo female with recurrent UTIs, chronic left sided hydronephrosis, HTN, HLD, DM, hypothyroid, hiatal hernia, DVT no longer on AC, admitted with UTI, Afib with RVR, fever, hypotension requiring pressiors, ELISA.  Now doing better, off pressors.  Urology to see patient regarding chronic hydronephrosis.    Total time 55 min coordinating care with MICU PA/resident, MICU RN, reviewing labs and prior records, personally reviewing and interpreting imaging, documenting findings and assessment in the medical record.

## 2024-04-02 LAB
-  STAPHYLOCOCCUS EPIDERMIDIS, METHICILLIN RESISTANT: SIGNIFICANT CHANGE UP
ALBUMIN SERPL ELPH-MCNC: 2.2 G/DL — LOW (ref 3.3–5.2)
ALP SERPL-CCNC: 86 U/L — SIGNIFICANT CHANGE UP (ref 40–120)
ALT FLD-CCNC: 39 U/L — HIGH
ANION GAP SERPL CALC-SCNC: 12 MMOL/L — SIGNIFICANT CHANGE UP (ref 5–17)
AST SERPL-CCNC: 40 U/L — HIGH
BASOPHILS # BLD AUTO: 0.04 K/UL — SIGNIFICANT CHANGE UP (ref 0–0.2)
BASOPHILS NFR BLD AUTO: 0.3 % — SIGNIFICANT CHANGE UP (ref 0–2)
BILIRUB SERPL-MCNC: <0.2 MG/DL — LOW (ref 0.4–2)
BUN SERPL-MCNC: 22.1 MG/DL — HIGH (ref 8–20)
CALCIUM SERPL-MCNC: 8.4 MG/DL — SIGNIFICANT CHANGE UP (ref 8.4–10.5)
CHLORIDE SERPL-SCNC: 101 MMOL/L — SIGNIFICANT CHANGE UP (ref 96–108)
CO2 SERPL-SCNC: 21 MMOL/L — LOW (ref 22–29)
CREAT SERPL-MCNC: 0.74 MG/DL — SIGNIFICANT CHANGE UP (ref 0.5–1.3)
EGFR: 81 ML/MIN/1.73M2 — SIGNIFICANT CHANGE UP
EOSINOPHIL # BLD AUTO: 0.13 K/UL — SIGNIFICANT CHANGE UP (ref 0–0.5)
EOSINOPHIL NFR BLD AUTO: 1.1 % — SIGNIFICANT CHANGE UP (ref 0–6)
FLUAV H1 2009 PAND RNA SPEC QL NAA+PROBE: DETECTED
FLUAV SUBTYP SPEC NAA+PROBE: DETECTED
GLUCOSE BLDC GLUCOMTR-MCNC: 110 MG/DL — HIGH (ref 70–99)
GLUCOSE BLDC GLUCOMTR-MCNC: 117 MG/DL — HIGH (ref 70–99)
GLUCOSE BLDC GLUCOMTR-MCNC: 189 MG/DL — HIGH (ref 70–99)
GLUCOSE BLDC GLUCOMTR-MCNC: 89 MG/DL — SIGNIFICANT CHANGE UP (ref 70–99)
GLUCOSE SERPL-MCNC: 99 MG/DL — SIGNIFICANT CHANGE UP (ref 70–99)
GRAM STN FLD: ABNORMAL
HCT VFR BLD CALC: 27.5 % — LOW (ref 34.5–45)
HGB BLD-MCNC: 9.1 G/DL — LOW (ref 11.5–15.5)
IMM GRANULOCYTES NFR BLD AUTO: 1.5 % — HIGH (ref 0–0.9)
LYMPHOCYTES # BLD AUTO: 19.6 % — SIGNIFICANT CHANGE UP (ref 13–44)
LYMPHOCYTES # BLD AUTO: 2.37 K/UL — SIGNIFICANT CHANGE UP (ref 1–3.3)
MAGNESIUM SERPL-MCNC: 1.5 MG/DL — LOW (ref 1.6–2.6)
MCHC RBC-ENTMCNC: 28.4 PG — SIGNIFICANT CHANGE UP (ref 27–34)
MCHC RBC-ENTMCNC: 33.1 GM/DL — SIGNIFICANT CHANGE UP (ref 32–36)
MCV RBC AUTO: 85.9 FL — SIGNIFICANT CHANGE UP (ref 80–100)
METHOD TYPE: SIGNIFICANT CHANGE UP
MONOCYTES # BLD AUTO: 0.66 K/UL — SIGNIFICANT CHANGE UP (ref 0–0.9)
MONOCYTES NFR BLD AUTO: 5.5 % — SIGNIFICANT CHANGE UP (ref 2–14)
NEUTROPHILS # BLD AUTO: 8.71 K/UL — HIGH (ref 1.8–7.4)
NEUTROPHILS NFR BLD AUTO: 72 % — SIGNIFICANT CHANGE UP (ref 43–77)
PHOSPHATE SERPL-MCNC: 2.7 MG/DL — SIGNIFICANT CHANGE UP (ref 2.4–4.7)
PLATELET # BLD AUTO: 338 K/UL — SIGNIFICANT CHANGE UP (ref 150–400)
POTASSIUM SERPL-MCNC: 4.4 MMOL/L — SIGNIFICANT CHANGE UP (ref 3.5–5.3)
POTASSIUM SERPL-SCNC: 4.4 MMOL/L — SIGNIFICANT CHANGE UP (ref 3.5–5.3)
PROT SERPL-MCNC: 5.2 G/DL — LOW (ref 6.6–8.7)
RAPID RVP RESULT: DETECTED
RBC # BLD: 3.2 M/UL — LOW (ref 3.8–5.2)
RBC # FLD: 17.2 % — HIGH (ref 10.3–14.5)
SARS-COV-2 RNA SPEC QL NAA+PROBE: SIGNIFICANT CHANGE UP
SODIUM SERPL-SCNC: 134 MMOL/L — LOW (ref 135–145)
WBC # BLD: 12.09 K/UL — HIGH (ref 3.8–10.5)
WBC # FLD AUTO: 12.09 K/UL — HIGH (ref 3.8–10.5)

## 2024-04-02 PROCEDURE — 99233 SBSQ HOSP IP/OBS HIGH 50: CPT

## 2024-04-02 PROCEDURE — 71045 X-RAY EXAM CHEST 1 VIEW: CPT | Mod: 26

## 2024-04-02 RX ORDER — HYDRALAZINE HCL 50 MG
10 TABLET ORAL ONCE
Refills: 0 | Status: COMPLETED | OUTPATIENT
Start: 2024-04-02 | End: 2024-04-02

## 2024-04-02 RX ORDER — TAMSULOSIN HYDROCHLORIDE 0.4 MG/1
0.4 CAPSULE ORAL AT BEDTIME
Refills: 0 | Status: DISCONTINUED | OUTPATIENT
Start: 2024-04-02 | End: 2024-04-15

## 2024-04-02 RX ORDER — MAGNESIUM SULFATE 500 MG/ML
4 VIAL (ML) INJECTION ONCE
Refills: 0 | Status: COMPLETED | OUTPATIENT
Start: 2024-04-02 | End: 2024-04-02

## 2024-04-02 RX ORDER — METOPROLOL TARTRATE 50 MG
25 TABLET ORAL
Refills: 0 | Status: DISCONTINUED | OUTPATIENT
Start: 2024-04-02 | End: 2024-04-04

## 2024-04-02 RX ADMIN — Medication 10 MILLILITER(S): at 21:55

## 2024-04-02 RX ADMIN — MEROPENEM 1000 MILLIGRAM(S): 1 INJECTION INTRAVENOUS at 18:24

## 2024-04-02 RX ADMIN — Medication 2000 UNIT(S): at 12:20

## 2024-04-02 RX ADMIN — Medication 25 MILLIGRAM(S): at 18:27

## 2024-04-02 RX ADMIN — SIMVASTATIN 20 MILLIGRAM(S): 20 TABLET, FILM COATED ORAL at 21:54

## 2024-04-02 RX ADMIN — Medication 10 MILLIGRAM(S): at 02:15

## 2024-04-02 RX ADMIN — TAMSULOSIN HYDROCHLORIDE 0.4 MILLIGRAM(S): 0.4 CAPSULE ORAL at 21:54

## 2024-04-02 RX ADMIN — Medication 25 MILLIGRAM(S): at 08:22

## 2024-04-02 RX ADMIN — Medication 25 MICROGRAM(S): at 05:46

## 2024-04-02 RX ADMIN — MIDODRINE HYDROCHLORIDE 10 MILLIGRAM(S): 2.5 TABLET ORAL at 13:58

## 2024-04-02 RX ADMIN — MEROPENEM 1000 MILLIGRAM(S): 1 INJECTION INTRAVENOUS at 05:44

## 2024-04-02 RX ADMIN — Medication 25 GRAM(S): at 08:44

## 2024-04-02 RX ADMIN — PANTOPRAZOLE SODIUM 40 MILLIGRAM(S): 20 TABLET, DELAYED RELEASE ORAL at 05:47

## 2024-04-02 RX ADMIN — Medication 1 TABLET(S): at 13:58

## 2024-04-02 RX ADMIN — APIXABAN 2.5 MILLIGRAM(S): 2.5 TABLET, FILM COATED ORAL at 18:27

## 2024-04-02 RX ADMIN — MIDODRINE HYDROCHLORIDE 10 MILLIGRAM(S): 2.5 TABLET ORAL at 05:46

## 2024-04-02 RX ADMIN — APIXABAN 2.5 MILLIGRAM(S): 2.5 TABLET, FILM COATED ORAL at 05:47

## 2024-04-02 NOTE — CONSULT NOTE ADULT - ATTENDING COMMENTS
Known recurrent UTI, CHr hydronephrosis, known U retention and patient unwilling to have Walters,   who presented to ED from nursing facility for fever, altered mental status and shortness of breath x 1 day.    Pt admitted to MICU for septic shock 2/2 UTI. Also has A fib RVR.     # Septic shock 2/2 UTI   # U retention  # chr left hydro and new right hydro  # chr cystitis with debris  Weaned off pressor support w/ midodrine   was on Rocephin escalated to Merrem now   initial u c/s were contaminated specimen  will get a rpt U c/s now... will take longer to result and may be negative as pt already on antibx  but if +ve will help deescalate antibx  pt will need chr walters  Flomax added    # New onset AF rvr likely 2/2 above   – Now rate controlled  – C/w metoprolol 25mg BID  - c/w Eliquis 2.5mg bid   - CHADSVASC = 6    # ELISA on admission   resolved  possibly obstructive nephropathy vs hypovolemia from sepsis    # decub  wound care    # cough/ congestion  likely viral  RVP stat  Mucinex  nebs  CXR    still with soft BP in 90s and low 100s. SDU

## 2024-04-02 NOTE — PROCEDURE NOTE - ADDITIONAL PROCEDURE DETAILS
# Sepsis  # UTI  # AF with RVR  # Hypotension    Patient lost all peripheral IV access this morning. Multiple RNs tried and failed to obtain new access. I have placed 20G PIV in right basilic vein under direct ultrasound guidance on second attempt. Good dark red blood return. Flushes easily. Secured with dressing.

## 2024-04-02 NOTE — DIETITIAN NUTRITION RISK NOTIFICATION - TREATMENT: THE FOLLOWING DIET HAS BEEN RECOMMENDED
Diet, Consistent Carbohydrate w/Evening Snack:   Soft and Bite Sized (SOFTBTSZ) (04-01-24 @ 17:52) [Active]

## 2024-04-02 NOTE — DIETITIAN INITIAL EVALUATION ADULT - NS FNS DIET ORDER
Diet, Consistent Carbohydrate w/Evening Snack:   Soft and Bite Sized (SOFTBTSZ) (04-01-24 @ 17:52)

## 2024-04-02 NOTE — DIETITIAN INITIAL EVALUATION ADULT - ORAL INTAKE PTA/DIET HISTORY
Pt is a poor historian; however reports having decreased appetite and PO intake PTA; reports appetite slightly improved. Pt reports requiring assistance with meals.  Observed breakfast tray; < 50% consumed. NFPE conducted.

## 2024-04-02 NOTE — DIETITIAN INITIAL EVALUATION ADULT - PERTINENT LABORATORY DATA
04-02    134<L>  |  101  |  22.1<H>  ----------------------------<  99  4.4   |  21.0<L>  |  0.74    Ca    8.4      02 Apr 2024 04:30  Phos  2.7     04-02  Mg     1.5     04-02    TPro  5.2<L>  /  Alb  2.2<L>  /  TBili  <0.2<L>  /  DBili  x   /  AST  40<H>  /  ALT  39<H>  /  AlkPhos  86  04-02  POCT Blood Glucose.: 89 mg/dL (04-02-24 @ 08:00)

## 2024-04-02 NOTE — CONSULT NOTE ADULT - ASSESSMENT
82 yo female with recurrent UTIs, chronic left sided hydronephrosis, HTN, HLD, DM, hypothyroid, hiatal hernia, DVT no longer on AC, admitted with UTI, Afib with RVR, fever, hypotension requiring pressiors, ELISA.  Now doing better, off pressors.      #Septic shock 2/2 UTI  #Lactic acidosis (resolved)  #Metabolic encephalopathy (resolved)  #Urinary retention  - UA: WBC> 998; RBC 42; leukocyte esterase (+); bacteria (+)  - Lactate: 5 -> 3.2-> 2.3 (downtrending with fluids)  - CT ABD/PEL: Cystitis; new moderate right & severe chronic left hydronephrosis, no obstruction noted.  Distended bladder.  - RVP: Full panel pending, COVID/flu/RSV negative  - UCX: > 3 organisms; possible contamination; will repeat UCX  - BCx: NGTD  - S/p IVF bolus, Vanco, ceftriaxone in ED  - C/w meropenem empirically  - S/p phenylephrine gtt, transition to midodrine   - Wean off midodrine as tolerated  - Will need long-term Smart 2/2 urinary retention  - Start Flomax     #A-fib with RVR  - CHADVASC: 6  – Now rate controlled  – C/w metoprolol 25mg BID  - C/w Eliquis 2.5 mg BID  - Cardiology notes appreciated    #ELISA (resolved)  – Cr: 0.74 (1.8 at admission)  – Likely in the setting of septic shock    #T2DM  - Check A1c in a.m.  – C/w ISS    #Hypothyroidism  – C/w levothyroxine 25 mg QD    #HLD  – C/w simvastatin 20 mg HS    DVT PPx: Eliquis  Diet: Carb consistent  Dispo: Transfer to stepdown  
82yo F w/ recurrent UTIs, HTN, HLD, Hypothyroidism, severe OA of hands & knees, hip, TIA and DVT (2016 no longer on AC) admitted after she developed elevated Temp (102.3 F) with acute MS change and SOB.  + tachypnea and tachycardia found to meet sepsis criteria.  Started on empiric Abx and Levophed w/ IVF for pressure support.  On she is w/ A-fib with -180s irregularly irregular (new onset).    Cardiology consult requested for new onset A-fib in setting of sepsis.  BP:92/58  HR:130s  Afebrile  RR: 20-22  O2 Sats~99% on RA   ECG: A fib RVR

## 2024-04-02 NOTE — DIETITIAN INITIAL EVALUATION ADULT - FLUID ACCUMULATION
2+ edema B/L arms  62 yoM htn, hld, dm presents at advice for [place] [facility] [place] mass seen on mutiple MRI this past week. Has been having progressive weakness in BLE over the last 1 month with several near falls. Seeing Dr. Carmen of [facility]. Also reports some sensory changes, worse in L foot. + urinary incontinence. No fever, ivda, trauma. No hx of cancer. Denies any sob, cp, headache, n/v.

## 2024-04-02 NOTE — CHART NOTE - NSCHARTNOTEFT_GEN_A_CORE
received call from RN about bld c/s positive- Gram +ve cocci in clusters    pt already on Merrem since yesterday which provides coverage.    rpt bld c/s x 2 in AM tomorrow    TTE- no mention of any vegetations. received call from RN about bld c/s positive- Gram +ve cocci in clusters    pt already on Merrem since yesterday which provides coverage. Cont at current dose.  2020 publication shows comparable outcomes with higher doses vs standard doses.    rpt bld c/s x 2 in AM tomorrow    TTE- no mention of any vegetations.

## 2024-04-02 NOTE — DIETITIAN INITIAL EVALUATION ADULT - ETIOLOGY
Related to inability to meet sufficient protein energy needs in setting of advanced age, recurrent UTIs, skin breakdown,

## 2024-04-02 NOTE — CHART NOTE - NSCHARTNOTEFT_GEN_A_CORE
Patient here for urosepsis  Developed new onset afib  Has been off BB since was on pressors now transitioned to midodrine   ICU resumed BB today  Rates up to 150, not well rate controlled, asymptomatic   Increase dosage as BP warrants

## 2024-04-02 NOTE — DIETITIAN INITIAL EVALUATION ADULT - NSFNSPHYEXAMSKINFT_GEN_A_CORE
Pressure Injury 1: Right:, Stage II  Pressure Injury 2: Right:, heel, Stage I  Pressure Injury 3: Left:, heel, Stage I  Pressure Injury 4: sacrum, Stage I

## 2024-04-02 NOTE — PROGRESS NOTE ADULT - SUBJECTIVE AND OBJECTIVE BOX
Interval HPI:  Feels ok today  BP on high side    Exam:  alert, nad  irreg rhythm  bilat air entry  abd nontender  bilat edema    On Admission  03-31-24 (2d)  HPI:  82 y/o female with pmhx of recurrent UTIs, chronic left sided hydronephrosis, hypertension, hyperlipidemia, diabetes, hypothyroidism, hiatal hernia, OA, TIA and DVT (2016 no longer on AC) who presented to ED from nursing facility for fever, altered mental status and shortness of breath x 1 day. Upon arrival, she was found to be febrile (102.3 rectally), tachypneic and in afib RVR with rates to 160s-180s. Code sepsis activated. UA positive. Was given 10mg IV Cardizem for afib, however afterwards became hypotensive to 70s requiring additional fluids and phenylephrine. Labs with leukocytosis, mild hyponatremia, ELISA and lactic acidosis. Admitted to MICU for urosepsis and afib RVR.  (31 Mar 2024 23:35)    PAST MEDICAL & SURGICAL HISTORY:  Osteoarthritis      Hypertension      Hiatal hernia      Hyperlipidemia      Polio      DVT (deep venous thrombosis)  2016, was on xarelto for 6 months      TIA (transient ischemic attack)  2004      S/P cholecystectomy      S/P dilation and curettage  2001, 1965, 1972, 1978      H/O total knee replacement, left  2003 revision 2014      S/P rhinoplasty      History of foot surgery  left foot due to polio, 1953      Neck mass  excision of neck mass 1942          Antimicrobial:  meropenem Injectable 1000 milliGRAM(s) IV Push every 12 hours  meropenem Injectable        Cardiovascular:  metoprolol tartrate 25 milliGRAM(s) Oral two times a day  midodrine 10 milliGRAM(s) Oral every 8 hours    Pulmonary:  guaifenesin/dextromethorphan Oral Liquid 10 milliLiter(s) Oral every 4 hours    Hematalogic:  apixaban 2.5 milliGRAM(s) Oral two times a day    Other:  cholecalciferol 2000 Unit(s) Oral daily  dextrose 5%. 1000 milliLiter(s) IV Continuous <Continuous>  dextrose 5%. 1000 milliLiter(s) IV Continuous <Continuous>  dextrose 50% Injectable 12.5 Gram(s) IV Push once  dextrose 50% Injectable 25 Gram(s) IV Push once  dextrose 50% Injectable 25 Gram(s) IV Push once  dextrose Oral Gel 15 Gram(s) Oral once PRN  glucagon  Injectable 1 milliGRAM(s) IntraMuscular once  influenza  Vaccine (HIGH DOSE) 0.7 milliLiter(s) IntraMuscular once  insulin lispro (ADMELOG) corrective regimen sliding scale   SubCutaneous at bedtime  insulin lispro (ADMELOG) corrective regimen sliding scale   SubCutaneous three times a day before meals  lactated ringers. 1000 milliLiter(s) IV Continuous <Continuous>  levothyroxine 25 MICROGram(s) Oral daily  multivitamin/minerals 1 Tablet(s) Oral daily  pantoprazole    Tablet 40 milliGRAM(s) Oral before breakfast  simvastatin 20 milliGRAM(s) Oral at bedtime      Drug Dosing Weight  Height (cm): 157.5 (01 Apr 2024 03:30)  Weight (kg): 73.6 (01 Apr 2024 03:30)  BMI (kg/m2): 29.7 (01 Apr 2024 03:30)  BSA (m2): 1.75 (01 Apr 2024 03:30)    T(C): 37.5 (04-02-24 @ 08:00), Max: 37.9 (04-02-24 @ 01:30)  HR: 113 (04-02-24 @ 08:00)  BP: 154/131 (04-02-24 @ 08:00)  BP(mean): 138 (04-02-24 @ 08:00)  ABP: --  ABP(mean): --  RR: 18 (04-02-24 @ 08:00)  SpO2: 96% (04-02-24 @ 08:00)          04-01 @ 07:01  -  04-02 @ 07:00  --------------------------------------------------------  IN: 2465 mL / OUT: 3810 mL / NET: -1345 mL              LABS:  CBC Full  -  ( 02 Apr 2024 04:30 )  WBC Count : 12.09 K/uL  RBC Count : 3.20 M/uL  Hemoglobin : 9.1 g/dL  Hematocrit : 27.5 %  Platelet Count - Automated : 338 K/uL  Mean Cell Volume : 85.9 fl  Mean Cell Hemoglobin : 28.4 pg  Mean Cell Hemoglobin Concentration : 33.1 gm/dL  Auto Neutrophil # : 8.71 K/uL  Auto Lymphocyte # : 2.37 K/uL  Auto Monocyte # : 0.66 K/uL  Auto Eosinophil # : 0.13 K/uL  Auto Basophil # : 0.04 K/uL  Auto Neutrophil % : 72.0 %  Auto Lymphocyte % : 19.6 %  Auto Monocyte % : 5.5 %  Auto Eosinophil % : 1.1 %  Auto Basophil % : 0.3 %    04-02    134<L>  |  101  |  22.1<H>  ----------------------------<  99  4.4   |  21.0<L>  |  0.74    Ca    8.4      02 Apr 2024 04:30  Phos  2.7     04-02  Mg     1.5     04-02    TPro  5.2<L>  /  Alb  2.2<L>  /  TBili  <0.2<L>  /  DBili  x   /  AST  40<H>  /  ALT  39<H>  /  AlkPhos  86  04-02    PT/INR - ( 01 Apr 2024 03:45 )   PT: 23.0 sec;   INR: 2.12 ratio         PTT - ( 01 Apr 2024 03:45 )  PTT:25.3 sec  Urinalysis Basic - ( 02 Apr 2024 04:30 )    Color: x / Appearance: x / SG: x / pH: x  Gluc: 99 mg/dL / Ketone: x  / Bili: x / Urobili: x   Blood: x / Protein: x / Nitrite: x   Leuk Esterase: x / RBC: x / WBC x   Sq Epi: x / Non Sq Epi: x / Bacteria: x      Culture Results:   No growth at 24 hours (03-31 @ 20:47)  Culture Results:   No growth at 24 hours (03-31 @ 20:35)  Culture Results:   >=3 organisms. Probable collection contamination. (03-31 @ 20:30)    ____________________________________________________________________________________________________

## 2024-04-02 NOTE — DIETITIAN INITIAL EVALUATION ADULT - PERTINENT MEDS FT
MEDICATIONS  (STANDING):  apixaban 2.5 milliGRAM(s) Oral two times a day  cholecalciferol 2000 Unit(s) Oral daily  dextrose 50% Injectable 25 Gram(s) IV Push once  glucagon  Injectable 1 milliGRAM(s) IntraMuscular once  guaifenesin/dextromethorphan Oral Liquid 10 milliLiter(s) Oral every 4 hours  influenza  Vaccine (HIGH DOSE) 0.7 milliLiter(s) IntraMuscular once  insulin lispro (ADMELOG) corrective regimen sliding scale   SubCutaneous three times a day before meals  lactated ringers. 1000 milliLiter(s) (65 mL/Hr) IV Continuous <Continuous>  levothyroxine 25 MICROGram(s) Oral daily  magnesium sulfate  IVPB 4 Gram(s) IV Intermittent once  meropenem Injectable      metoprolol tartrate 25 milliGRAM(s) Oral two times a day  midodrine 10 milliGRAM(s) Oral every 8 hours  multivitamin/minerals 1 Tablet(s) Oral daily  pantoprazole    Tablet 40 milliGRAM(s) Oral before breakfast  simvastatin 20 milliGRAM(s) Oral at bedtime    MEDICATIONS  (PRN):  dextrose Oral Gel 15 Gram(s) Oral once PRN Blood Glucose LESS THAN 70 milliGRAM(s)/deciliter

## 2024-04-02 NOTE — PROGRESS NOTE ADULT - ASSESSMENT
84 yo female with recurrent UTIs, chronic left sided hydronephrosis, HTN, HLD, DM, hypothyroid, hiatal hernia, DVT no longer on AC, admitted with UTI, Afib with RVR, fever, hypotension requiring pressiors, ELISA.  Now doing better, off pressors.      UTI/ shock  - off pressors  - multiple organisms in urine culture, still on abx    Hydronephrosis/ urinary retention  - walters placed, 1800 mL drained  - urology called, follows with Ayla   - may need walters on discharge    Afib  - rate controlled, on metoprolol  - eliquis     Downgraded yesterday but kept in unit overnight due to hypotension, did not require pressors overnight.

## 2024-04-02 NOTE — DIETITIAN INITIAL EVALUATION ADULT - OTHER INFO
Pt is a 84 y/o female with pmhx of recurrent UTIs, chronic left sided hydronephrosis, hypertension, hyperlipidemia, diabetes, hypothyroidism, hiatal hernia, OA, TIA and DVT (2016 no longer on AC) who presented to ED from nursing facility for fever, altered mental status and shortness of breath x 1 day.  Pt admitted to micu for septic shock 2/2 UTI.  Hospital course complicated by AF rvr and cardio called but unable to place on rate control meds thus far due to hypotension.  Pt weaned off pressors this morning w/ midodrine and BP remains soft.  Pt retaining urine and required walters and drained purulent appearing urine

## 2024-04-02 NOTE — PROGRESS NOTE ADULT - TIME BILLING
coordinating care with MICU PA/resident, MICU RN, reviewing labs and prior records, personally reviewing and interpreting imaging, documenting findings and assessment in the medical record.

## 2024-04-03 LAB
CULTURE RESULTS: ABNORMAL
GLUCOSE BLDC GLUCOMTR-MCNC: 126 MG/DL — HIGH (ref 70–99)
GLUCOSE BLDC GLUCOMTR-MCNC: 143 MG/DL — HIGH (ref 70–99)
GLUCOSE BLDC GLUCOMTR-MCNC: 155 MG/DL — HIGH (ref 70–99)
GLUCOSE BLDC GLUCOMTR-MCNC: 165 MG/DL — HIGH (ref 70–99)
ORGANISM # SPEC MICROSCOPIC CNT: ABNORMAL
ORGANISM # SPEC MICROSCOPIC CNT: SIGNIFICANT CHANGE UP
SPECIMEN SOURCE: SIGNIFICANT CHANGE UP

## 2024-04-03 PROCEDURE — 99233 SBSQ HOSP IP/OBS HIGH 50: CPT

## 2024-04-03 PROCEDURE — 93010 ELECTROCARDIOGRAM REPORT: CPT

## 2024-04-03 RX ORDER — SODIUM CHLORIDE 9 MG/ML
500 INJECTION INTRAMUSCULAR; INTRAVENOUS; SUBCUTANEOUS ONCE
Refills: 0 | Status: COMPLETED | OUTPATIENT
Start: 2024-04-03 | End: 2024-04-03

## 2024-04-03 RX ORDER — IPRATROPIUM/ALBUTEROL SULFATE 18-103MCG
3 AEROSOL WITH ADAPTER (GRAM) INHALATION EVERY 6 HOURS
Refills: 0 | Status: COMPLETED | OUTPATIENT
Start: 2024-04-03 | End: 2024-04-05

## 2024-04-03 RX ADMIN — SIMVASTATIN 20 MILLIGRAM(S): 20 TABLET, FILM COATED ORAL at 22:01

## 2024-04-03 RX ADMIN — Medication 25 MICROGRAM(S): at 05:43

## 2024-04-03 RX ADMIN — Medication 3 MILLILITER(S): at 21:10

## 2024-04-03 RX ADMIN — APIXABAN 2.5 MILLIGRAM(S): 2.5 TABLET, FILM COATED ORAL at 18:30

## 2024-04-03 RX ADMIN — MEROPENEM 1000 MILLIGRAM(S): 1 INJECTION INTRAVENOUS at 05:43

## 2024-04-03 RX ADMIN — Medication 25 MILLIGRAM(S): at 22:01

## 2024-04-03 RX ADMIN — TAMSULOSIN HYDROCHLORIDE 0.4 MILLIGRAM(S): 0.4 CAPSULE ORAL at 22:08

## 2024-04-03 RX ADMIN — Medication 10 MILLILITER(S): at 22:01

## 2024-04-03 RX ADMIN — MIDODRINE HYDROCHLORIDE 10 MILLIGRAM(S): 2.5 TABLET ORAL at 11:14

## 2024-04-03 RX ADMIN — Medication 1: at 18:29

## 2024-04-03 RX ADMIN — Medication 10 MILLILITER(S): at 18:30

## 2024-04-03 RX ADMIN — Medication 75 MILLIGRAM(S): at 22:01

## 2024-04-03 RX ADMIN — MEROPENEM 1000 MILLIGRAM(S): 1 INJECTION INTRAVENOUS at 18:29

## 2024-04-03 RX ADMIN — Medication 10 MILLILITER(S): at 05:43

## 2024-04-03 RX ADMIN — MIDODRINE HYDROCHLORIDE 10 MILLIGRAM(S): 2.5 TABLET ORAL at 22:01

## 2024-04-03 RX ADMIN — APIXABAN 2.5 MILLIGRAM(S): 2.5 TABLET, FILM COATED ORAL at 05:44

## 2024-04-03 RX ADMIN — Medication 1 TABLET(S): at 11:14

## 2024-04-03 RX ADMIN — SODIUM CHLORIDE 500 MILLILITER(S): 9 INJECTION INTRAMUSCULAR; INTRAVENOUS; SUBCUTANEOUS at 18:00

## 2024-04-03 RX ADMIN — Medication 3 MILLILITER(S): at 15:09

## 2024-04-03 RX ADMIN — Medication 10 MILLILITER(S): at 11:14

## 2024-04-03 RX ADMIN — Medication 2000 UNIT(S): at 11:14

## 2024-04-03 RX ADMIN — MIDODRINE HYDROCHLORIDE 10 MILLIGRAM(S): 2.5 TABLET ORAL at 05:43

## 2024-04-03 RX ADMIN — Medication 10 MILLILITER(S): at 16:14

## 2024-04-03 RX ADMIN — PANTOPRAZOLE SODIUM 40 MILLIGRAM(S): 20 TABLET, DELAYED RELEASE ORAL at 05:43

## 2024-04-03 NOTE — PROGRESS NOTE ADULT - ASSESSMENT
82 yo female with recurrent UTIs, chronic left sided hydronephrosis, HTN, HLD, DM, hypothyroid, hiatal hernia, DVT no longer on AC, admitted with UTI, Afib with RVR, fever, hypotension requiring pressiors, ELISA.  Now doing better, off pressors.      #Septic shock 2/2 UTI  #Lactic acidosis (resolved)  #Metabolic encephalopathy (resolved)  - UA positive, f/u BCx  - UCX: > 3 organisms; possible contamination; will repeat UCX  - BCx: NGTD  - S/p IVF bolus, Vanco, ceftriaxone in ED  - C/w meropenem empirically  - Wean off midodrine as tolerated  - Start Flomax     #Hydronephrosis  - CT ABD/PEL: Cystitis; new moderate right & severe chronic left hydronephrosis, no obstruction noted.  Distended bladder  - prior image in 2023 with chronic L hydro, now with new R hydronephrosis  - eval by urology Dec of 2023, rec Walters on dc and outpt followup  - Flomax  - continue with walters inpatient and on discharge  - renal function normalized, trend BMP    #Positive blood culture  - noted with staph epi on Bcx drawn on admission - suspect contaminant  - f/u repeat BCx    #Acute resp failure with hypoxia  #+Flu  - start Tamiflu  - Duoneb  - On NC, wean as tolerated  - encourage incentive spirometry and OOB when able    #A-fib with RVR  - CHADVASC: 6  – Now rate controlled  – C/w metoprolol 25mg BID  - C/w Eliquis 2.5 mg BID  - Cardiology notes appreciated    #ELISA (resolved)  – Cr: 0.74 (1.8 at admission)  – Likely in the setting of septic shock    #T2DM  – C/w ISS    #Hypothyroidism  – C/w levothyroxine 25 mg QD    #HLD  – C/w simvastatin 20 mg HS    DVT PPx: Eliquis  Diet: Carb consistent  Dispo: active

## 2024-04-03 NOTE — CHART NOTE - NSCHARTNOTEFT_GEN_A_CORE
Addendum to equipment necessity note:    Hospital Bed:  Pt is unable to make changes significant enough to alleviate pressure due to debility and pt's compromised circulatory status requiring a gel overlay to protect skin and skin breakdown.     Wheelchair:  Elevating leg rests are required due to lower extremity edema. Addendum to equipment necessity note:    Hospital Bed:  Pt is unable to make changes significant enough to alleviate pressure due to debility and pt's compromised circulatory status requiring a gel overlay to protect skin and inhibit skin breakdown.     Wheelchair:  Elevating leg rests are required due to lower extremity edema.

## 2024-04-03 NOTE — PROGRESS NOTE ADULT - SUBJECTIVE AND OBJECTIVE BOX
Abdiaziz Barrett M.D.    Patient is a 83y old  Female who presents with a chief complaint of urosepsis (02 Apr 2024 12:28)      SUBJECTIVE / OVERNIGHT EVENTS: Admitted overnight.     Patient denies chest pain, SOB, abd pain, N/V, fever, chills, dysuria or any other complaints. All remainder ROS negative.     MEDICATIONS  (STANDING):  albuterol/ipratropium for Nebulization 3 milliLiter(s) Nebulizer every 6 hours  apixaban 2.5 milliGRAM(s) Oral two times a day  cholecalciferol 2000 Unit(s) Oral daily  dextrose 50% Injectable 25 Gram(s) IV Push once  dextrose 50% Injectable 12.5 Gram(s) IV Push once  dextrose 50% Injectable 25 Gram(s) IV Push once  guaifenesin/dextromethorphan Oral Liquid 10 milliLiter(s) Oral every 4 hours  influenza  Vaccine (HIGH DOSE) 0.7 milliLiter(s) IntraMuscular once  insulin lispro (ADMELOG) corrective regimen sliding scale   SubCutaneous at bedtime  insulin lispro (ADMELOG) corrective regimen sliding scale   SubCutaneous three times a day before meals  levothyroxine 25 MICROGram(s) Oral daily  meropenem Injectable 1000 milliGRAM(s) IV Push every 12 hours  meropenem Injectable      metoprolol tartrate 25 milliGRAM(s) Oral two times a day  midodrine 10 milliGRAM(s) Oral every 8 hours  multivitamin/minerals 1 Tablet(s) Oral daily  oseltamivir 75 milliGRAM(s) Oral two times a day  pantoprazole    Tablet 40 milliGRAM(s) Oral before breakfast  simvastatin 20 milliGRAM(s) Oral at bedtime  tamsulosin 0.4 milliGRAM(s) Oral at bedtime    MEDICATIONS  (PRN):      I&O's Summary    02 Apr 2024 07:01  -  03 Apr 2024 07:00  --------------------------------------------------------  IN: 0 mL / OUT: 1190 mL / NET: -1190 mL        PHYSICAL EXAM:  Vital Signs Last 24 Hrs  T(C): 36.8 (03 Apr 2024 12:09), Max: 37.9 (02 Apr 2024 19:00)  T(F): 98.2 (03 Apr 2024 12:09), Max: 100.2 (02 Apr 2024 19:00)  HR: 93 (03 Apr 2024 12:09) (76 - 110)  BP: 98/62 (03 Apr 2024 12:09) (86/54 - 141/94)  BP(mean): 66 (02 Apr 2024 20:40) (66 - 110)  RR: 18 (03 Apr 2024 12:09) (18 - 27)  SpO2: 92% (03 Apr 2024 12:09) (92% - 97%)    Parameters below as of 03 Apr 2024 12:09  Patient On (Oxygen Delivery Method): room air      CONSTITUTIONAL: NAD, AAOx3  RESPIRATORY: heavy secretions noted, course bs   CARDIOVASCULAR: Regular rate and rhythm, no LE edema  ABDOMEN: Nontender to palpation, +BS  PSYCH: A+O x3; affect appropriate    LABS:                        9.1    12.09 )-----------( 338      ( 02 Apr 2024 04:30 )             27.5     04-02    134<L>  |  101  |  22.1<H>  ----------------------------<  99  4.4   |  21.0<L>  |  0.74    Ca    8.4      02 Apr 2024 04:30  Phos  2.7     04-02  Mg     1.5     04-02    TPro  5.2<L>  /  Alb  2.2<L>  /  TBili  <0.2<L>  /  DBili  x   /  AST  40<H>  /  ALT  39<H>  /  AlkPhos  86  04-02          Urinalysis Basic - ( 02 Apr 2024 04:30 )    Color: x / Appearance: x / SG: x / pH: x  Gluc: 99 mg/dL / Ketone: x  / Bili: x / Urobili: x   Blood: x / Protein: x / Nitrite: x   Leuk Esterase: x / RBC: x / WBC x   Sq Epi: x / Non Sq Epi: x / Bacteria: x        Culture - Blood (collected 31 Mar 2024 20:47)  Source: .Blood Blood-Peripheral  Preliminary Report (03 Apr 2024 02:02):    No growth at 48 Hours    Culture - Blood (collected 31 Mar 2024 20:35)  Source: .Blood Blood-Peripheral  Gram Stain (02 Apr 2024 16:44):    Growth in anaerobic bottle: Gram Positive Cocci in Clusters  Preliminary Report (03 Apr 2024 11:47):    Growth in anaerobic bottle: Staphylococcus epidermidis    Isolation of Coagulase negative Staphylococcus from single blood culture    sets may represent    contamination. Contact the Microbiology Department at [phone] if    susceptibility testing is    clinically indicated.    Direct identification is available within approximately 3-5    hours either by Blood Panel Multiplexed PCR or Direct    MALDI-TOF. Details: [email or web address]  Organism: Blood Culture PCR (02 Apr 2024 18:12)  Organism: Blood Culture PCR (02 Apr 2024 18:12)    Culture - Urine (collected 31 Mar 2024 20:30)  Source: Clean Catch Clean Catch (Midstream)  Final Report (01 Apr 2024 22:19):    >=3 organisms. Probable collection contamination.      CAPILLARY BLOOD GLUCOSE      POCT Blood Glucose.: 143 mg/dL (03 Apr 2024 11:13)  POCT Blood Glucose.: 126 mg/dL (03 Apr 2024 08:48)  POCT Blood Glucose.: 189 mg/dL (02 Apr 2024 21:53)  POCT Blood Glucose.: 110 mg/dL (02 Apr 2024 16:54)      RADIOLOGY & ADDITIONAL TESTS:  Results Reviewed:   Imaging Personally Reviewed:  Electrocardiogram Personally Reviewed:

## 2024-04-03 NOTE — CHART NOTE - NSCHARTNOTEFT_GEN_A_CORE
Hospital Bed:  Patient requires a hospital bed due to debility. Patient requires frequent and immediate position changes not feasible in a regular bed with pillows. Patient needs HOB elevated to more than 30 degrees most of the time due to aspiration risk.     Wheelchair:  Patient is unable to ambulate with a cane or rolling walker and requires a manual standard wheelchair due to debility.  Patient has sufficient upper body strength to self-propel. The use of a manual wheelchair will greatly improve the patient's ability to participate in MRADLs, will use it regularly, has not expressed unwillingness, and has a caregiver willing to assist.     Ursula Lift:  Patient would be bed bound without a ursula lift. It is required for transfers from bed to chair.

## 2024-04-04 LAB
ANION GAP SERPL CALC-SCNC: 11 MMOL/L — SIGNIFICANT CHANGE UP (ref 5–17)
BUN SERPL-MCNC: 18.7 MG/DL — SIGNIFICANT CHANGE UP (ref 8–20)
CALCIUM SERPL-MCNC: 7.9 MG/DL — LOW (ref 8.4–10.5)
CHLORIDE SERPL-SCNC: 106 MMOL/L — SIGNIFICANT CHANGE UP (ref 96–108)
CO2 SERPL-SCNC: 19 MMOL/L — LOW (ref 22–29)
CREAT SERPL-MCNC: 0.66 MG/DL — SIGNIFICANT CHANGE UP (ref 0.5–1.3)
CULTURE RESULTS: SIGNIFICANT CHANGE UP
EGFR: 88 ML/MIN/1.73M2 — SIGNIFICANT CHANGE UP
GLUCOSE BLDC GLUCOMTR-MCNC: 102 MG/DL — HIGH (ref 70–99)
GLUCOSE BLDC GLUCOMTR-MCNC: 112 MG/DL — HIGH (ref 70–99)
GLUCOSE BLDC GLUCOMTR-MCNC: 136 MG/DL — HIGH (ref 70–99)
GLUCOSE BLDC GLUCOMTR-MCNC: 150 MG/DL — HIGH (ref 70–99)
GLUCOSE SERPL-MCNC: 108 MG/DL — HIGH (ref 70–99)
HCT VFR BLD CALC: 26.8 % — LOW (ref 34.5–45)
HGB BLD-MCNC: 8.6 G/DL — LOW (ref 11.5–15.5)
MAGNESIUM SERPL-MCNC: 1.3 MG/DL — LOW (ref 1.6–2.6)
MCHC RBC-ENTMCNC: 28.2 PG — SIGNIFICANT CHANGE UP (ref 27–34)
MCHC RBC-ENTMCNC: 32.1 GM/DL — SIGNIFICANT CHANGE UP (ref 32–36)
MCV RBC AUTO: 87.9 FL — SIGNIFICANT CHANGE UP (ref 80–100)
PLATELET # BLD AUTO: 290 K/UL — SIGNIFICANT CHANGE UP (ref 150–400)
POTASSIUM SERPL-MCNC: 4.3 MMOL/L — SIGNIFICANT CHANGE UP (ref 3.5–5.3)
POTASSIUM SERPL-SCNC: 4.3 MMOL/L — SIGNIFICANT CHANGE UP (ref 3.5–5.3)
RBC # BLD: 3.05 M/UL — LOW (ref 3.8–5.2)
RBC # FLD: 17.5 % — HIGH (ref 10.3–14.5)
SODIUM SERPL-SCNC: 136 MMOL/L — SIGNIFICANT CHANGE UP (ref 135–145)
SPECIMEN SOURCE: SIGNIFICANT CHANGE UP
WBC # BLD: 6.11 K/UL — SIGNIFICANT CHANGE UP (ref 3.8–10.5)
WBC # FLD AUTO: 6.11 K/UL — SIGNIFICANT CHANGE UP (ref 3.8–10.5)

## 2024-04-04 PROCEDURE — 99232 SBSQ HOSP IP/OBS MODERATE 35: CPT

## 2024-04-04 RX ORDER — METOPROLOL TARTRATE 50 MG
12.5 TABLET ORAL
Refills: 0 | Status: DISCONTINUED | OUTPATIENT
Start: 2024-04-04 | End: 2024-04-15

## 2024-04-04 RX ORDER — MAGNESIUM SULFATE 500 MG/ML
1 VIAL (ML) INJECTION ONCE
Refills: 0 | Status: COMPLETED | OUTPATIENT
Start: 2024-04-04 | End: 2024-04-04

## 2024-04-04 RX ORDER — CHLORHEXIDINE GLUCONATE 213 G/1000ML
1 SOLUTION TOPICAL
Refills: 0 | Status: DISCONTINUED | OUTPATIENT
Start: 2024-04-04 | End: 2024-04-15

## 2024-04-04 RX ADMIN — Medication 10 MILLILITER(S): at 14:09

## 2024-04-04 RX ADMIN — MEROPENEM 1000 MILLIGRAM(S): 1 INJECTION INTRAVENOUS at 18:05

## 2024-04-04 RX ADMIN — MEROPENEM 1000 MILLIGRAM(S): 1 INJECTION INTRAVENOUS at 05:26

## 2024-04-04 RX ADMIN — Medication 12.5 MILLIGRAM(S): at 18:05

## 2024-04-04 RX ADMIN — MIDODRINE HYDROCHLORIDE 10 MILLIGRAM(S): 2.5 TABLET ORAL at 05:25

## 2024-04-04 RX ADMIN — CHLORHEXIDINE GLUCONATE 1 APPLICATION(S): 213 SOLUTION TOPICAL at 14:10

## 2024-04-04 RX ADMIN — MIDODRINE HYDROCHLORIDE 10 MILLIGRAM(S): 2.5 TABLET ORAL at 14:10

## 2024-04-04 RX ADMIN — Medication 2000 UNIT(S): at 14:10

## 2024-04-04 RX ADMIN — TAMSULOSIN HYDROCHLORIDE 0.4 MILLIGRAM(S): 0.4 CAPSULE ORAL at 22:04

## 2024-04-04 RX ADMIN — SIMVASTATIN 20 MILLIGRAM(S): 20 TABLET, FILM COATED ORAL at 22:03

## 2024-04-04 RX ADMIN — Medication 100 GRAM(S): at 08:51

## 2024-04-04 RX ADMIN — Medication 25 MICROGRAM(S): at 05:25

## 2024-04-04 RX ADMIN — APIXABAN 2.5 MILLIGRAM(S): 2.5 TABLET, FILM COATED ORAL at 05:24

## 2024-04-04 RX ADMIN — MIDODRINE HYDROCHLORIDE 10 MILLIGRAM(S): 2.5 TABLET ORAL at 22:03

## 2024-04-04 RX ADMIN — PANTOPRAZOLE SODIUM 40 MILLIGRAM(S): 20 TABLET, DELAYED RELEASE ORAL at 05:25

## 2024-04-04 RX ADMIN — Medication 10 MILLILITER(S): at 08:51

## 2024-04-04 RX ADMIN — Medication 75 MILLIGRAM(S): at 18:05

## 2024-04-04 RX ADMIN — Medication 75 MILLIGRAM(S): at 05:23

## 2024-04-04 RX ADMIN — APIXABAN 2.5 MILLIGRAM(S): 2.5 TABLET, FILM COATED ORAL at 18:05

## 2024-04-04 RX ADMIN — Medication 1 TABLET(S): at 14:10

## 2024-04-04 RX ADMIN — Medication 10 MILLILITER(S): at 05:23

## 2024-04-04 NOTE — PHYSICAL THERAPY INITIAL EVALUATION ADULT - NSPTDISCHREC_GEN_A_CORE
ANITHA; family wants home (will require ambulance to take pt into home due to 2 EKTA, osmar lift, w/c, home PT)

## 2024-04-04 NOTE — PHYSICAL THERAPY INITIAL EVALUATION ADULT - THERAPY FREQUENCY, PT EVAL
Essential hypertension    History of prostate cancer    History of pulmonary embolus (PE)  after thyroidectomy has Green field filter placed and is followed by [facility] cardiologist  History of thyroid cancer    Hyperlipidemia, unspecified hyperlipidemia type    Wrist pain, left
3-5x/week

## 2024-04-04 NOTE — PHYSICAL THERAPY INITIAL EVALUATION ADULT - ADDITIONAL COMMENTS
Pt. is a poor historian. Reports she presents from [facility].  From PT yovanaal Dec 2023: Pt reports she lives in an apartment within her daughters private home, and pts granddaughter lives with her in the apartment.  Pt reports the home has 2 platform steps to enter with on HR and 0 interior steps.  Prior to this hospitalization pt required assistance for bathing and dressing ADLs.  Pt also required assistance for transfers, ambulation with RW, and pt used a chair-glider on the stairs to go up to her daughters part of the house.  Pt reports she had HHAs for 4 hours/day x 7 days/week. Pt states she has recently been requesting to increase her HHA hours.  DME: pt owns a RW, w/c, and chair-glider

## 2024-04-04 NOTE — PROGRESS NOTE ADULT - ASSESSMENT
82 yo female with recurrent UTIs, chronic left sided hydronephrosis, HTN, HLD, DM, hypothyroid, hiatal hernia, DVT no longer on AC, admitted with UTI, Afib with RVR, fever, hypotension requiring pressiors, ELISA.  Now doing better, off pressors.      #Septic shock 2/2 UTI  #Lactic acidosis (resolved)  #Metabolic encephalopathy (resolved)  - UA positive, f/u BCx  - UCX: > 3 organisms; possible contamination; will repeat UCX  - BCx: NGTD  - S/p IVF bolus, Vanco, ceftriaxone in ED  - C/w meropenem empirically  - Wean off midodrine as tolerated  - Start Flomax     #Hydronephrosis  - CT ABD/PEL: Cystitis; new moderate right & severe chronic left hydronephrosis, no obstruction noted.  Distended bladder  - prior image in 2023 with chronic L hydro, now with new R hydronephrosis  - eval by urology Dec of 2023, rec Walters on dc and outpt followup  - Flomax  - continue with walters inpatient and on discharge  - renal function normalized, trend BMP  - [facility] u[facility] recs    #Positive blood culture  - noted with staph epi on Bcx drawn on admission - suspect contaminant  - f/u repeat BCx    #Acute resp failure with hypoxia  #+Flu  - start Tamiflu  - Duoneb  - On NC, wean as tolerated  - encourage incentive spirometry and OOB when able    #A-fib with RVR  - CHADVASC: 6  – Now rate controlled  – C/w metoprolol 12.5mg BID  - C/w Eliquis 2.5 mg BID  - Cardiology notes appreciated    #ELISA (resolved)  – Cr: 0.74 (1.8 at admission)  – Likely in the setting of septic shock    #T2DM  – C/w ISS    #Hypothyroidism  – C/w levothyroxine 25 mg QD    #HLD  – C/w simvastatin 20 mg HS    DVT PPx: Eliquis  Diet: Carb consistent  Dispo: pending repeat BCx and weaning off midodrine

## 2024-04-04 NOTE — CONSULT NOTE ADULT - CONSULT REASON
elevated fever with AMS, SOB
bilateral hydroureteronephrosis ( New right - chronic left )
Septic Shock

## 2024-04-04 NOTE — PHYSICAL THERAPY INITIAL EVALUATION ADULT - PERTINENT HX OF CURRENT PROBLEM, REHAB EVAL
84 yo female with recurrent UTIs, chronic left sided hydronephrosis, HTN, HLD, DM, hypothyroid, hiatal hernia, DVT no longer on AC, admitted with UTI, Afib with RVR, fever, hypotension requiring pressiors, ELISA.

## 2024-04-04 NOTE — PHYSICAL THERAPY INITIAL EVALUATION ADULT - LEVEL OF INDEPENDENCE: SIT/STAND, REHAB EVAL
pt. with decreased balance, strength, activity tolerance, refusing further attempts at functional mobility. Pushing back to return to supin. Returned to bed./unable to perform

## 2024-04-04 NOTE — CONSULT NOTE ADULT - SUBJECTIVE AND OBJECTIVE BOX
Admission C.C.: 82 y/o female with pmhx of recurrent UTIs, chronic left sided hydronephrosis, hypertension, hyperlipidemia, diabetes, hypothyroidism, hiatal hernia, OA, TIA and DVT (2016 no longer on AC) who presented to ED from nursing facility for fever, altered mental status and shortness of breath x 1 day. Upon arrival, she was found to be febrile (102.3 rectally), tachypneic and in afib RVR with rates to 160s-180s. Code sepsis activated. UA positive. Was given 10mg IV Cardizem for afib, however afterwards became hypotensive to 70s requiring additional fluids and phenylephrine. Labs with leukocytosis, mild hyponatremia, ELISA and lactic acidosis. Admitted to MICU for urosepsis and afib RVR.     Urology Consult:  Patient with H/O  HTN, HLD, Hypothyroidism, hiatal hernia, severe OA of hands, knees and hips, as well as H/O TIA and DVT-; H/O recurrent UTIs ( which she has seen Urologist as outpatient in past . ) Patient aslo known to have at present a sacral decubitus .   Called today for evaluation of bilateral hydroureteronephrosis.   Patient awake, alert and responsive and resting in bed, appears in no acute distress.       Vital Signs Last 24 Hrs  T(C): 36.9 (04 Apr 2024 08:23), Max: 37.1 (04 Apr 2024 04:54)  T(F): 98.5 (04 Apr 2024 08:23), Max: 98.8 (04 Apr 2024 04:54)  HR: 79 (04 Apr 2024 08:23) (78 - 110)  BP: 102/61 (04 Apr 2024 08:23) (82/49 - 107/74)  BP(mean): --  RR: 17 (04 Apr 2024 08:23) (17 - 18)  SpO2: 96% (04 Apr 2024 08:23) (92% - 96%)    Parameters below as of 04 Apr 2024 08:23  Patient On (Oxygen Delivery Method): room air    abdomen:  soft, n/d, n/t,   :  walters in place draining well, urine noted yellow, noted in tubing with some sediment.     I&O's Detail  03 Apr 2024 07:01  -  04 Apr 2024 07:00  --------------------------------------------------------    OUT:    Indwelling Catheter - Urethral (mL): 1310 mL  Total OUT: 1310 mL    Total NET: -1135 mL    Labs:  CBC:                        8.6    6.11  )-----------( 290      ( 04 Apr 2024 05:58 )             26.8     Chemistry:     ( 04 Apr 2024 05:58 )    136  |  106  | BUN:  18.7  ----------------------------<  108<H>  4.3   |  19.0<L>  | CREAT.:  0.66    Impression:  Patient with history, urinary retention, chronic severe hydronephrosis, now with left & new right hydroureteronephrosis. ( Patient is known to have severe left hydronephrosis back as far as at least to 2020 ) Patient appears to be with limited motor function , noted on CT scan to have moderate stool in recto-sigmoid which could have attributed  to her urinary retention as along with recurrent UTI, Cystitis.   Discuss with Surgeon present situation and continued care and management.   Plan:   Continue walters catheter for adequate bladder emptying , Renal Function appears ok Creatinine normal levels at present no urological intervention is needed . We will gladly see again upon request.                 
SUBJECTIVE:    BRIEF HPI: 82/F with PMHx recurrent UTIs, chronic left-sided hydronephrosis, HTN, HLD, type II DM, hypothyroidism, hiatal hernia, MARK, TIA & DVT (2016, not on AC) who presented to the ED from NH for fever, AMS & SOB x 1 day.  On arrival to the ED, found to be febrile (102.3 rectal), tachypneic, and in A-fib with RVR (160s – 180s).  Code sepsis called in the ED, was treated with fluids.  Started on IV Cardizem for A-fib, then found to be hypotensive to SBP 70s, requiring additional fluids & phenylephrine infusion.  Initial labs showed leukocytosis, mild hyponatremia, ELISA & lactic acidosis.  Then admitted to MICU for sepsis secondary to UTI & A-fib with RVR.  Patient is currently off IV pressors, weaned down to midodrine.  Patient is currently hemodynamically stable, hence downgraded to stepdown.    INTERVAL HPI/OVERNIGHT EVENTS: Patient seen and examined at bedside at AM. Off pressors. Admits cough with mild expectoration.  Denies any chest pain, palpitations,  N/V/D, or any other complaints.     MEDICATIONS  (STANDING):  apixaban 2.5 milliGRAM(s) Oral two times a day  cholecalciferol 2000 Unit(s) Oral daily  dextrose 50% Injectable 25 Gram(s) IV Push once  dextrose 50% Injectable 12.5 Gram(s) IV Push once  dextrose 50% Injectable 25 Gram(s) IV Push once  guaifenesin/dextromethorphan Oral Liquid 10 milliLiter(s) Oral every 4 hours  influenza  Vaccine (HIGH DOSE) 0.7 milliLiter(s) IntraMuscular once  insulin lispro (ADMELOG) corrective regimen sliding scale   SubCutaneous at bedtime  insulin lispro (ADMELOG) corrective regimen sliding scale   SubCutaneous three times a day before meals  levothyroxine 25 MICROGram(s) Oral daily  meropenem Injectable 1000 milliGRAM(s) IV Push every 12 hours  meropenem Injectable      metoprolol tartrate 25 milliGRAM(s) Oral two times a day  midodrine 10 milliGRAM(s) Oral every 8 hours  multivitamin/minerals 1 Tablet(s) Oral daily  pantoprazole    Tablet 40 milliGRAM(s) Oral before breakfast  simvastatin 20 milliGRAM(s) Oral at bedtime    MEDICATIONS  (PRN):      Allergies    ampicillin (Stomach Upset)  Bactrim (Rash)  all narcotics give severe vomitting and dizziness (Other; Vomiting)  Cipro (Other)  Levaquin (Unknown)    Intolerances    Augmentin (Diarrhea)      REVIEW OF SYSTEMS:  All other review of systems negative, except as noted in HPI    OBJECTIVE:    Vital Signs Last 24 Hrs  T(C): 37.8 (02 Apr 2024 10:00), Max: 37.9 (02 Apr 2024 01:30)  T(F): 100 (02 Apr 2024 10:00), Max: 100.2 (02 Apr 2024 01:30)  HR: 78 (02 Apr 2024 10:00) (78 - 138)  BP: 97/64 (02 Apr 2024 10:00) (72/35 - 223/189)  BP(mean): 75 (02 Apr 2024 10:00) (48 - 201)  RR: 25 (02 Apr 2024 10:00) (18 - 33)  SpO2: 100% (02 Apr 2024 10:00) (95% - 100%)  Parameters below as of 02 Apr 2024 08:00  Patient On (Oxygen Delivery Method): room air        PHYSICAL EXAM:  GENERAL: NAD, elderly lady lying in bed   EYES: EOMI, PERRLA, conjunctiva and sclera clear  ENT: Moist mucous membranes  CHEST/LUNG: Clear to auscultation bilaterally; Unlabored respirations  HEART: Irregularly irregular rate and rhythm;   ABDOMEN: BSx4; Soft, nontender, nondistended  EXTREMITIES:  2+ Peripheral Pulses, brisk capillary refill. 1+ pedal edema  NERVOUS SYSTEM:  A&Ox3, Left sided weakness (chronic)  SKIN: Decubitus ulcer (+) on sacrum    Lab/ Imaging:    LABS:                        9.1    12.09 )-----------( 338      ( 02 Apr 2024 04:30 )             27.5     04-02    134<L>  |  101  |  22.1<H>  ----------------------------<  99  4.4   |  21.0<L>  |  0.74    Ca    8.4      02 Apr 2024 04:30  Phos  2.7     04-02  Mg     1.5     04-02    TPro  5.2<L>  /  Alb  2.2<L>  /  TBili  <0.2<L>  /  DBili  x   /  AST  40<H>  /  ALT  39<H>  /  AlkPhos  86  04-02    PT/INR - ( 01 Apr 2024 03:45 )   PT: 23.0 sec;   INR: 2.12 ratio         PTT - ( 01 Apr 2024 03:45 )  PTT:25.3 sec  Urinalysis Basic - ( 02 Apr 2024 04:30 )    Color: x / Appearance: x / SG: x / pH: x  Gluc: 99 mg/dL / Ketone: x  / Bili: x / Urobili: x   Blood: x / Protein: x / Nitrite: x   Leuk Esterase: x / RBC: x / WBC x   Sq Epi: x / Non Sq Epi: x / Bacteria: x      CAPILLARY BLOOD GLUCOSE  POCT Blood Glucose.: 117 mg/dL (02 Apr 2024 12:08)  POCT Blood Glucose.: 89 mg/dL (02 Apr 2024 08:00)  POCT Blood Glucose.: 145 mg/dL (01 Apr 2024 21:26)  POCT Blood Glucose.: 141 mg/dL (01 Apr 2024 16:18)      
                                             [facility]                                              CARDIOLOGY AT [address], [place] [address]                                             Telephone: [phone]. Fax:[phone]                                                       CARDIOLOGY CONSULTATION NOTE                                                                                             History obtained by: Patient and medical record  Community Cardiologist: [facility]   obtained: Yes [  ] No [  ]  Reason for Consultation: new onset A fib w/ RVR  Available out pt records reviewed: Yes [x] No [  ]    Chief complaint: elevated fever with AMS, SOB    HPI: Patient is an 84yo F w/ PMHx of recurrent UTIs, HTN, HLD, Hypothyroidism, severe OA of hands & knees, hip, TIA and DVT (2016 no longer on AC) presents to [facility] from nursing home for elevated Temp (102.3 F) with acute MS change and SOB.  On arrival she was tachypneic and tachycardic.  Tele monitor w/ A-fib with RVR in the 160-180s irregularly irregular.  Code sepsis activated and pancultured, started on empiric antibiotics.  Currently receiving IV fluids.  Cardiology consult requested due to new onset A-fib in setting of sepsis.  BP:92/58  HR:130s  Afebrile  RR: 20-22  O2 Sats~99% on RA   On Levophed for pressure support w/ treated for sepsis.      CARDIAC TESTING   ECHO: Pen  STRESS:  CATH:   ELECTROPHYSIOLOGY:     PAST MEDICAL HISTORY  Osteoarthritis  Hypertension  Hiatal hernia  Hyperlipidemia  Polio  DVT (deep venous thrombosis)  TIA (transient ischemic attack)    PAST SURGICAL HISTORY  S/P cholecystectomy  S/P dilation and curettage  H/O total knee replacement, left  S/P rhinoplasty  History of foot surgery  Neck mass    SOCIAL HISTORY:  Denies smoking/alcohol/drugs    FAMILY HISTORY:  Family history of diabetes mellitus (Grandparent, Aunt)  Family history of heart disease (Sibling)    Family History of Cardiovascular Disease:  Yes [  ] No [  ]  Coronary Artery Disease in first degree relative: Yes [  ] No [  ]  Sudden Cardiac Death in First degree relative: Yes [  ] No [  ]    HOME MEDICATIONS:  acetaminophen 325 mg oral tablet: 3 tab(s) orally every 8 hours, As needed,  Pain (4 - 6) (16 Nov 2020 14:34)  levothyroxine 25 mcg (0.025 mg) oral tablet: 1 tab(s) orally 5 times a week (26 Dec 2023 20:33)  levothyroxine 50 mcg (0.05 mg) oral tablet: 1 tab(s) orally 2 times a week (26 Dec 2023 20:34)  Multiple Vitamins with Minerals oral tablet: 1 tab(s) orally once a day (16 Nov 2020 14:34)  olmesartan 20 mg oral tablet: 1 tab(s) orally once a day (26 Dec 2023 20:38)  simvastatin 20 mg oral tablet: 1 tab(s) orally once a day (at bedtime) (02 Nov 2020 01:47)  Vitafusion Vitamin D3 Extra Strength Gummies 37.5 mcg (1500 intl units) oral tablet, chewable: 2 tab(s) chewed once a day (26 Dec 2023 20:37)    CURRENT CARDIAC MEDICATIONS:  phenylephrine    Infusion 0.1 MICROgram(s)/kG/Min IV Continuous <Continuous>    CURRENT OTHER MEDICATIONS:  sodium chloride 0.9% Bolus 1000 milliLiter(s) IV Bolus once, Stop order after: 1 Doses    ALLERGIES:   ampicillin (Stomach Upset)  Bactrim (Rash)  all narcotics give severe vomitting and dizziness (Other; Vomiting)  Cipro (Other)  Levaquin (Unknown)  Augmentin (Diarrhea)    REVIEW OF SYMPTOMS:   CONSTITUTIONAL: + fever, + chills, + lethargy   ENMT: No vertigo; No sinus or throat pain  NECK: No pain or stiffness  CARDIOVASCULAR: No chest pain, no dyspnea, no syncope/presyncope, no palpitations, no dizziness, no Orthopnea, no Paroxsymal nocturnal dyspnea  RESPIRATORY: no Shortness of breath, no cough, no wheezing  : No dysuria, no hematuria   GI: no nausea, no diarrhea, no constipation, no abdominal pain   NEURO: + change of MS, lethargy, no headache, no slurred speech   MUSCULOSKELETAL: + joint deformities, no muscle, back pain  PSYCH: No agitation, no anxiety    ALL OTHER REVIEW OF SYSTEMS ARE NEGATIVE.    VITAL SIGNS:  T(C): 36.4 (03-31-24 @ 21:51), Max: 39.1 (03-31-24 @ 20:24)  T(F): 97.6 (03-31-24 @ 21:51), Max: 102.3 (03-31-24 @ 20:24)  HR: 130 (03-31-24 @ 21:51) (82 - 155)  BP: 92/58 (03-31-24 @ 21:51) (74/49 - 131/112)  RR: 28 (03-31-24 @ 21:51) (20 - 32)  SpO2: 99% (03-31-24 @ 21:51) (98% - 100%)    INTAKE AND OUTPUT:     PHYSICAL EXAM:  Constitutional: Comfortable currently, no acute distress   HEENT: Atraumatic, neck is supple  CNS: Awake and alert, speech fluent, motor grossly inatct   Respiratory: CTAB, unlabored   Cardiovascular: + tachycardic, Irreg/irreg, S1S2, no murmur  Gastrointestinal: Soft, non-tender   Extremities: 2+ Peripheral Pulses, no cyanosis, or edema  Psychiatric: Calm, no agitation   Skin: Warm and dry, no ulcers on extremities     LABS:                       10.9   13.70 )-----------( 433      ( 31 Mar 2024 20:35 )             33.4     03-31    128<L>  |  93<L>  |  41.3<H>  ----------------------------<  183<H>  5.1   |  18.0<L>  |  1.81<H>    Ca    9.4      31 Mar 2024 20:35    TPro  6.7  /  Alb  2.9<L>  /  TBili  0.3<L>  /  DBili  x   /  AST  65<H>  /  ALT  64<H>  /  AlkPhos  124<H>  03-31    PT: 18.6 sec;   INR: 1.70 ratio   PTT:39.0 sec    Urinalysis Basic - ( 31 Mar 2024 20:35 )  Color: x / Appearance: x / SG: x / pH: x  Gluc: 183 mg/dL / Ketone: x  / Bili: x / Urobili: x   Blood: x / Protein: x / Nitrite: x   Leuk Esterase: x / RBC: x / WBC x   Sq Epi: x / Non Sq Epi: x / Bacteria: x    INTERPRETATION OF TELEMETRY: A fib   ECG: A fib RVR   Prior ECG: Yes [x] No [  ]    RADIOLOGY & ADDITIONAL STUDIES:   X-ray:  Pen  CT scan:   MRI:   US:

## 2024-04-04 NOTE — PROGRESS NOTE ADULT - SUBJECTIVE AND OBJECTIVE BOX
Abdiaziz Barrett M.D.    Patient is a 83y old  Female who presents with a chief complaint of urosepsis (04 Apr 2024 09:54)      SUBJECTIVE / OVERNIGHT EVENTS: no event overnight. BP remains soft. Reports breathing better, and able to cough up the secretions.     Patient denies chest pain, SOB, abd pain, N/V, fever, chills, dysuria or any other complaints. All remainder ROS negative.     MEDICATIONS  (STANDING):  albuterol/ipratropium for Nebulization 3 milliLiter(s) Nebulizer every 6 hours  apixaban 2.5 milliGRAM(s) Oral two times a day  chlorhexidine 2% Cloths 1 Application(s) Topical <User Schedule>  cholecalciferol 2000 Unit(s) Oral daily  dextrose 50% Injectable 12.5 Gram(s) IV Push once  dextrose 50% Injectable 25 Gram(s) IV Push once  dextrose 50% Injectable 25 Gram(s) IV Push once  guaifenesin/dextromethorphan Oral Liquid 10 milliLiter(s) Oral every 4 hours  influenza  Vaccine (HIGH DOSE) 0.7 milliLiter(s) IntraMuscular once  insulin lispro (ADMELOG) corrective regimen sliding scale   SubCutaneous three times a day before meals  insulin lispro (ADMELOG) corrective regimen sliding scale   SubCutaneous at bedtime  levothyroxine 25 MICROGram(s) Oral daily  meropenem Injectable 1000 milliGRAM(s) IV Push every 12 hours  meropenem Injectable      metoprolol tartrate 12.5 milliGRAM(s) Oral two times a day  midodrine 10 milliGRAM(s) Oral every 8 hours  multivitamin/minerals 1 Tablet(s) Oral daily  oseltamivir 75 milliGRAM(s) Oral two times a day  pantoprazole    Tablet 40 milliGRAM(s) Oral before breakfast  simvastatin 20 milliGRAM(s) Oral at bedtime  tamsulosin 0.4 milliGRAM(s) Oral at bedtime    MEDICATIONS  (PRN):      I&O's Summary    03 Apr 2024 07:01  -  04 Apr 2024 07:00  --------------------------------------------------------  IN: 175 mL / OUT: 1310 mL / NET: -1135 mL        PHYSICAL EXAM:  Vital Signs Last 24 Hrs  T(C): 36.9 (04 Apr 2024 08:23), Max: 37.1 (04 Apr 2024 04:54)  T(F): 98.5 (04 Apr 2024 08:23), Max: 98.8 (04 Apr 2024 04:54)  HR: 79 (04 Apr 2024 08:23) (78 - 110)  BP: 102/61 (04 Apr 2024 08:23) (82/49 - 107/74)  BP(mean): --  RR: 17 (04 Apr 2024 08:23) (17 - 18)  SpO2: 96% (04 Apr 2024 08:23) (92% - 96%)    Parameters below as of 04 Apr 2024 08:23  Patient On (Oxygen Delivery Method): room air    CONSTITUTIONAL: NAD, AAOx3  RESPIRATORY: heavy secretions noted, course bs   CARDIOVASCULAR: Regular rate and rhythm, no LE edema  ABDOMEN: Nontender to palpation, +BS  PSYCH: A+O x3; affect appropriate    LABS:                        8.6    6.11  )-----------( 290      ( 04 Apr 2024 05:58 )             26.8     04-04    136  |  106  |  18.7  ----------------------------<  108<H>  4.3   |  19.0<L>  |  0.66    Ca    7.9<L>      04 Apr 2024 05:58  Mg     1.3     04-04            Urinalysis Basic - ( 04 Apr 2024 05:58 )    Color: x / Appearance: x / SG: x / pH: x  Gluc: 108 mg/dL / Ketone: x  / Bili: x / Urobili: x   Blood: x / Protein: x / Nitrite: x   Leuk Esterase: x / RBC: x / WBC x   Sq Epi: x / Non Sq Epi: x / Bacteria: x        CAPILLARY BLOOD GLUCOSE      POCT Blood Glucose.: 102 mg/dL (04 Apr 2024 08:49)  POCT Blood Glucose.: 155 mg/dL (03 Apr 2024 22:00)  POCT Blood Glucose.: 165 mg/dL (03 Apr 2024 17:47)      RADIOLOGY & ADDITIONAL TESTS:  Results Reviewed:   Imaging Personally Reviewed:  Electrocardiogram Personally Reviewed:

## 2024-04-05 LAB
ANION GAP SERPL CALC-SCNC: 10 MMOL/L — SIGNIFICANT CHANGE UP (ref 5–17)
BUN SERPL-MCNC: 17.1 MG/DL — SIGNIFICANT CHANGE UP (ref 8–20)
CALCIUM SERPL-MCNC: 8 MG/DL — LOW (ref 8.4–10.5)
CHLORIDE SERPL-SCNC: 105 MMOL/L — SIGNIFICANT CHANGE UP (ref 96–108)
CO2 SERPL-SCNC: 19 MMOL/L — LOW (ref 22–29)
CREAT SERPL-MCNC: 0.55 MG/DL — SIGNIFICANT CHANGE UP (ref 0.5–1.3)
EGFR: 92 ML/MIN/1.73M2 — SIGNIFICANT CHANGE UP
GLUCOSE BLDC GLUCOMTR-MCNC: 106 MG/DL — HIGH (ref 70–99)
GLUCOSE BLDC GLUCOMTR-MCNC: 115 MG/DL — HIGH (ref 70–99)
GLUCOSE BLDC GLUCOMTR-MCNC: 134 MG/DL — HIGH (ref 70–99)
GLUCOSE BLDC GLUCOMTR-MCNC: 97 MG/DL — SIGNIFICANT CHANGE UP (ref 70–99)
GLUCOSE SERPL-MCNC: 104 MG/DL — HIGH (ref 70–99)
HCT VFR BLD CALC: 27.5 % — LOW (ref 34.5–45)
HGB BLD-MCNC: 9.1 G/DL — LOW (ref 11.5–15.5)
MAGNESIUM SERPL-MCNC: 1.3 MG/DL — LOW (ref 1.6–2.6)
MCHC RBC-ENTMCNC: 29 PG — SIGNIFICANT CHANGE UP (ref 27–34)
MCHC RBC-ENTMCNC: 33.1 GM/DL — SIGNIFICANT CHANGE UP (ref 32–36)
MCV RBC AUTO: 87.6 FL — SIGNIFICANT CHANGE UP (ref 80–100)
PLATELET # BLD AUTO: 287 K/UL — SIGNIFICANT CHANGE UP (ref 150–400)
POTASSIUM SERPL-MCNC: 4 MMOL/L — SIGNIFICANT CHANGE UP (ref 3.5–5.3)
POTASSIUM SERPL-SCNC: 4 MMOL/L — SIGNIFICANT CHANGE UP (ref 3.5–5.3)
RBC # BLD: 3.14 M/UL — LOW (ref 3.8–5.2)
RBC # FLD: 17.5 % — HIGH (ref 10.3–14.5)
SODIUM SERPL-SCNC: 134 MMOL/L — LOW (ref 135–145)
WBC # BLD: 7.05 K/UL — SIGNIFICANT CHANGE UP (ref 3.8–10.5)
WBC # FLD AUTO: 7.05 K/UL — SIGNIFICANT CHANGE UP (ref 3.8–10.5)

## 2024-04-05 PROCEDURE — 99232 SBSQ HOSP IP/OBS MODERATE 35: CPT

## 2024-04-05 PROCEDURE — 76775 US EXAM ABDO BACK WALL LIM: CPT | Mod: 26

## 2024-04-05 RX ORDER — MIDODRINE HYDROCHLORIDE 2.5 MG/1
5 TABLET ORAL EVERY 8 HOURS
Refills: 0 | Status: DISCONTINUED | OUTPATIENT
Start: 2024-04-05 | End: 2024-04-07

## 2024-04-05 RX ORDER — MIDODRINE HYDROCHLORIDE 2.5 MG/1
5 TABLET ORAL EVERY 8 HOURS
Refills: 0 | Status: DISCONTINUED | OUTPATIENT
Start: 2024-04-05 | End: 2024-04-05

## 2024-04-05 RX ORDER — MAGNESIUM SULFATE 500 MG/ML
2 VIAL (ML) INJECTION ONCE
Refills: 0 | Status: COMPLETED | OUTPATIENT
Start: 2024-04-05 | End: 2024-04-05

## 2024-04-05 RX ORDER — ACETAMINOPHEN 500 MG
650 TABLET ORAL EVERY 6 HOURS
Refills: 0 | Status: DISCONTINUED | OUTPATIENT
Start: 2024-04-05 | End: 2024-04-15

## 2024-04-05 RX ADMIN — MIDODRINE HYDROCHLORIDE 10 MILLIGRAM(S): 2.5 TABLET ORAL at 05:23

## 2024-04-05 RX ADMIN — Medication 650 MILLIGRAM(S): at 13:44

## 2024-04-05 RX ADMIN — Medication 25 GRAM(S): at 09:16

## 2024-04-05 RX ADMIN — CHLORHEXIDINE GLUCONATE 1 APPLICATION(S): 213 SOLUTION TOPICAL at 05:22

## 2024-04-05 RX ADMIN — TAMSULOSIN HYDROCHLORIDE 0.4 MILLIGRAM(S): 0.4 CAPSULE ORAL at 21:35

## 2024-04-05 RX ADMIN — PANTOPRAZOLE SODIUM 40 MILLIGRAM(S): 20 TABLET, DELAYED RELEASE ORAL at 05:24

## 2024-04-05 RX ADMIN — Medication 25 MICROGRAM(S): at 05:22

## 2024-04-05 RX ADMIN — Medication 1 TABLET(S): at 13:45

## 2024-04-05 RX ADMIN — Medication 75 MILLIGRAM(S): at 18:26

## 2024-04-05 RX ADMIN — Medication 12.5 MILLIGRAM(S): at 18:26

## 2024-04-05 RX ADMIN — APIXABAN 2.5 MILLIGRAM(S): 2.5 TABLET, FILM COATED ORAL at 05:21

## 2024-04-05 RX ADMIN — Medication 75 MILLIGRAM(S): at 05:23

## 2024-04-05 RX ADMIN — Medication 650 MILLIGRAM(S): at 14:14

## 2024-04-05 RX ADMIN — APIXABAN 2.5 MILLIGRAM(S): 2.5 TABLET, FILM COATED ORAL at 18:26

## 2024-04-05 RX ADMIN — Medication 2000 UNIT(S): at 13:45

## 2024-04-05 RX ADMIN — MEROPENEM 1000 MILLIGRAM(S): 1 INJECTION INTRAVENOUS at 18:25

## 2024-04-05 RX ADMIN — MEROPENEM 1000 MILLIGRAM(S): 1 INJECTION INTRAVENOUS at 05:22

## 2024-04-05 RX ADMIN — SIMVASTATIN 20 MILLIGRAM(S): 20 TABLET, FILM COATED ORAL at 21:35

## 2024-04-05 RX ADMIN — Medication 12.5 MILLIGRAM(S): at 05:22

## 2024-04-05 NOTE — PROGRESS NOTE ADULT - ASSESSMENT
84 yo female with recurrent UTIs, chronic left sided hydronephrosis, HTN, HLD, DM, hypothyroid, hiatal hernia, DVT no longer on AC, admitted with UTI, Afib with RVR, fever, hypotension requiring pressiors, ELISA.  Now doing better, off pressors.      #Septic shock 2/2 UTI  #Lactic acidosis (resolved)  #Metabolic encephalopathy (resolved)  - UA positive, f/u BCx  - UCX: > 3 organisms; possible contamination; repeat UCX NGTD  - BCx: NGTD  - S/p IVF bolus, Vanco, ceftriaxone in ED  - C/w meropenem empirically x 3 days  - Taper midodrine to 5mg TID, further taper per BP  - Start Flomax     #Hydronephrosis  - CT ABD/PEL: Cystitis; new moderate right & severe chronic left hydronephrosis, no obstruction noted.  Distended bladder  - prior image in 2023 with chronic L hydro, now with new R hydronephrosis  - Flomax  - renal function normalized, trend BMP  - jeff urology recs  - f/u renal u/s for eval of R hydro    #Positive blood culture  - noted with staph epi on Bcx drawn on admission - suspect contaminant  - f/u repeat BCx    #Acute resp failure with hypoxia  #+Flu  - start Tamiflu  - Duoneb  - On NC, wean as tolerated  - encourage incentive spirometry and OOB when able    #A-fib with RVR  - CHADVASC: 6  – Now rate controlled  – C/w metoprolol 12.5mg BID  - C/w Eliquis 2.5 mg BID  - Cardiology notes appreciated    #ELISA (resolved)  – Cr: 0.74 (1.8 at admission)  – Likely in the setting of septic shock    #T2DM  – C/w ISS    #Hypothyroidism  – C/w levothyroxine 25 mg QD    #HLD  – C/w simvastatin 20 mg HS    DVT PPx: Eliquis  Diet: Carb consistent  Dispo: pending repeat BCx, weaning off midodrine and urology clearance, plan for home

## 2024-04-05 NOTE — PROGRESS NOTE ADULT - ASSESSMENT
84 yo female with urosepsis, ELISA, urinary retention, chronic Left hydronephrosis, new right hydronephrosis  - ELISA resolved after IVF and walters placement  - right hydro most likely due to urinary retention  - cont walters  - recommend renal US to reassess right hydronephrosis for improvement

## 2024-04-05 NOTE — PROGRESS NOTE ADULT - SUBJECTIVE AND OBJECTIVE BOX
Adbiaziz Barrett M.D.    Patient is a 83y old  Female who presents with a chief complaint of urosepsis (05 Apr 2024 11:44)      SUBJECTIVE / OVERNIGHT EVENTS: no event overnight. Reports OA pain.     Patient denies chest pain, SOB, abd pain, N/V, fever, chills, dysuria or any other complaints. All remainder ROS negative.     MEDICATIONS  (STANDING):  apixaban 2.5 milliGRAM(s) Oral two times a day  chlorhexidine 2% Cloths 1 Application(s) Topical <User Schedule>  cholecalciferol 2000 Unit(s) Oral daily  dextrose 50% Injectable 12.5 Gram(s) IV Push once  dextrose 50% Injectable 25 Gram(s) IV Push once  dextrose 50% Injectable 25 Gram(s) IV Push once  influenza  Vaccine (HIGH DOSE) 0.7 milliLiter(s) IntraMuscular once  insulin lispro (ADMELOG) corrective regimen sliding scale   SubCutaneous three times a day before meals  insulin lispro (ADMELOG) corrective regimen sliding scale   SubCutaneous at bedtime  levothyroxine 25 MICROGram(s) Oral daily  meropenem Injectable 1000 milliGRAM(s) IV Push every 12 hours  meropenem Injectable      metoprolol tartrate 12.5 milliGRAM(s) Oral two times a day  midodrine 5 milliGRAM(s) Oral every 8 hours  multivitamin/minerals 1 Tablet(s) Oral daily  oseltamivir 75 milliGRAM(s) Oral two times a day  pantoprazole    Tablet 40 milliGRAM(s) Oral before breakfast  simvastatin 20 milliGRAM(s) Oral at bedtime  tamsulosin 0.4 milliGRAM(s) Oral at bedtime    MEDICATIONS  (PRN):  acetaminophen     Tablet .. 650 milliGRAM(s) Oral every 6 hours PRN Temp greater or equal to 38C (100.4F), Mild Pain (1 - 3)      I&O's Summary    04 Apr 2024 07:01  -  05 Apr 2024 07:00  --------------------------------------------------------  IN: 0 mL / OUT: 1450 mL / NET: -1450 mL        PHYSICAL EXAM:  Vital Signs Last 24 Hrs  T(C): 36.4 (05 Apr 2024 08:40), Max: 36.9 (05 Apr 2024 00:00)  T(F): 97.6 (05 Apr 2024 08:40), Max: 98.4 (05 Apr 2024 00:00)  HR: 78 (05 Apr 2024 08:40) (76 - 94)  BP: 112/61 (05 Apr 2024 08:40) (98/60 - 112/61)  BP(mean): --  RR: 19 (05 Apr 2024 08:40) (18 - 19)  SpO2: 92% (05 Apr 2024 08:40) (92% - 97%)    Parameters below as of 05 Apr 2024 08:40  Patient On (Oxygen Delivery Method): room air      CONSTITUTIONAL: NAD, AAOx3  RESPIRATORY: some secretions noted, course bs, improved  CARDIOVASCULAR: Regular rate and rhythm, no LE edema  ABDOMEN: Nontender to palpation, +BS  PSYCH: A+O x3; affect appropriate  EXTREM: RUE swelling - chronic     LABS:                        9.1    7.05  )-----------( 287      ( 05 Apr 2024 05:04 )             27.5     04-05    134<L>  |  105  |  17.1  ----------------------------<  104<H>  4.0   |  19.0<L>  |  0.55    Ca    8.0<L>      05 Apr 2024 05:04  Mg     1.3     04-05            Urinalysis Basic - ( 05 Apr 2024 05:04 )    Color: x / Appearance: x / SG: x / pH: x  Gluc: 104 mg/dL / Ketone: x  / Bili: x / Urobili: x   Blood: x / Protein: x / Nitrite: x   Leuk Esterase: x / RBC: x / WBC x   Sq Epi: x / Non Sq Epi: x / Bacteria: x        Culture - Urine (collected 02 Apr 2024 16:49)  Source: Catheterized Catheterized  Final Report (04 Apr 2024 17:19):    <10,000 CFU/mL Normal Urogenital Anne-Marie      CAPILLARY BLOOD GLUCOSE      POCT Blood Glucose.: 97 mg/dL (05 Apr 2024 08:14)  POCT Blood Glucose.: 150 mg/dL (04 Apr 2024 22:02)  POCT Blood Glucose.: 112 mg/dL (04 Apr 2024 17:56)      RADIOLOGY & ADDITIONAL TESTS:  Results Reviewed:   Imaging Personally Reviewed:  Electrocardiogram Personally Reviewed:

## 2024-04-05 NOTE — PROGRESS NOTE ADULT - SUBJECTIVE AND OBJECTIVE BOX
Subjective:83yFemale with urosepsis, urinary retention, ELISA. 1800ml drained with walters insertion 4/1. ELISA now resolved with IVF and walters.  pt with chronic left hydronephrosis, new right hydronephrosis.        Vital Signs Last 24 Hrs  T(C): 36.4 (05 Apr 2024 08:40), Max: 36.9 (05 Apr 2024 00:00)  T(F): 97.6 (05 Apr 2024 08:40), Max: 98.4 (05 Apr 2024 00:00)  HR: 78 (05 Apr 2024 08:40) (76 - 94)  BP: 112/61 (05 Apr 2024 08:40) (98/60 - 112/61)  BP(mean): --  RR: 19 (05 Apr 2024 08:40) (18 - 19)  SpO2: 92% (05 Apr 2024 08:40) (92% - 97%)    Parameters below as of 05 Apr 2024 08:40  Patient On (Oxygen Delivery Method): room air      I&O's Detail    04 Apr 2024 07:01  -  05 Apr 2024 07:00  --------------------------------------------------------  IN:  Total IN: 0 mL    OUT:    Voided (mL): 1450 mL  Total OUT: 1450 mL    Total NET: -1450 mL          Labs:                        9.1    7.05  )-----------( 287      ( 05 Apr 2024 05:04 )             27.5     04-05    134<L>  |  105  |  17.1  ----------------------------<  104<H>  4.0   |  19.0<L>  |  0.55    Ca    8.0<L>      05 Apr 2024 05:04  Mg     1.3     04-05            Culture - Urine (collected 02 Apr 2024 16:49)  Source: Catheterized Catheterized  Final Report (04 Apr 2024 17:19):    <10,000 CFU/mL Normal Urogenital Anne-Marie

## 2024-04-06 LAB
ANION GAP SERPL CALC-SCNC: 12 MMOL/L — SIGNIFICANT CHANGE UP (ref 5–17)
BUN SERPL-MCNC: 17.2 MG/DL — SIGNIFICANT CHANGE UP (ref 8–20)
CALCIUM SERPL-MCNC: 7.9 MG/DL — LOW (ref 8.4–10.5)
CHLORIDE SERPL-SCNC: 108 MMOL/L — SIGNIFICANT CHANGE UP (ref 96–108)
CO2 SERPL-SCNC: 17 MMOL/L — LOW (ref 22–29)
CREAT SERPL-MCNC: 0.56 MG/DL — SIGNIFICANT CHANGE UP (ref 0.5–1.3)
CULTURE RESULTS: SIGNIFICANT CHANGE UP
EGFR: 92 ML/MIN/1.73M2 — SIGNIFICANT CHANGE UP
GLUCOSE BLDC GLUCOMTR-MCNC: 126 MG/DL — HIGH (ref 70–99)
GLUCOSE BLDC GLUCOMTR-MCNC: 88 MG/DL — SIGNIFICANT CHANGE UP (ref 70–99)
GLUCOSE BLDC GLUCOMTR-MCNC: 98 MG/DL — SIGNIFICANT CHANGE UP (ref 70–99)
GLUCOSE BLDC GLUCOMTR-MCNC: 98 MG/DL — SIGNIFICANT CHANGE UP (ref 70–99)
GLUCOSE SERPL-MCNC: 86 MG/DL — SIGNIFICANT CHANGE UP (ref 70–99)
HCT VFR BLD CALC: 27.3 % — LOW (ref 34.5–45)
HGB BLD-MCNC: 9 G/DL — LOW (ref 11.5–15.5)
MCHC RBC-ENTMCNC: 28.6 PG — SIGNIFICANT CHANGE UP (ref 27–34)
MCHC RBC-ENTMCNC: 33 GM/DL — SIGNIFICANT CHANGE UP (ref 32–36)
MCV RBC AUTO: 86.7 FL — SIGNIFICANT CHANGE UP (ref 80–100)
PLATELET # BLD AUTO: 309 K/UL — SIGNIFICANT CHANGE UP (ref 150–400)
POTASSIUM SERPL-MCNC: 3.8 MMOL/L — SIGNIFICANT CHANGE UP (ref 3.5–5.3)
POTASSIUM SERPL-SCNC: 3.8 MMOL/L — SIGNIFICANT CHANGE UP (ref 3.5–5.3)
RBC # BLD: 3.15 M/UL — LOW (ref 3.8–5.2)
RBC # FLD: 17.4 % — HIGH (ref 10.3–14.5)
SODIUM SERPL-SCNC: 137 MMOL/L — SIGNIFICANT CHANGE UP (ref 135–145)
SPECIMEN SOURCE: SIGNIFICANT CHANGE UP
WBC # BLD: 7.87 K/UL — SIGNIFICANT CHANGE UP (ref 3.8–10.5)
WBC # FLD AUTO: 7.87 K/UL — SIGNIFICANT CHANGE UP (ref 3.8–10.5)

## 2024-04-06 PROCEDURE — 99231 SBSQ HOSP IP/OBS SF/LOW 25: CPT

## 2024-04-06 PROCEDURE — 99232 SBSQ HOSP IP/OBS MODERATE 35: CPT

## 2024-04-06 RX ADMIN — TAMSULOSIN HYDROCHLORIDE 0.4 MILLIGRAM(S): 0.4 CAPSULE ORAL at 21:24

## 2024-04-06 RX ADMIN — APIXABAN 2.5 MILLIGRAM(S): 2.5 TABLET, FILM COATED ORAL at 17:50

## 2024-04-06 RX ADMIN — Medication 1 TABLET(S): at 12:34

## 2024-04-06 RX ADMIN — Medication 75 MILLIGRAM(S): at 17:50

## 2024-04-06 RX ADMIN — MIDODRINE HYDROCHLORIDE 5 MILLIGRAM(S): 2.5 TABLET ORAL at 21:23

## 2024-04-06 RX ADMIN — Medication 2000 UNIT(S): at 12:34

## 2024-04-06 RX ADMIN — Medication 12.5 MILLIGRAM(S): at 17:50

## 2024-04-06 RX ADMIN — Medication 25 MICROGRAM(S): at 05:56

## 2024-04-06 RX ADMIN — Medication 75 MILLIGRAM(S): at 05:57

## 2024-04-06 RX ADMIN — CHLORHEXIDINE GLUCONATE 1 APPLICATION(S): 213 SOLUTION TOPICAL at 05:56

## 2024-04-06 RX ADMIN — Medication 12.5 MILLIGRAM(S): at 06:06

## 2024-04-06 RX ADMIN — APIXABAN 2.5 MILLIGRAM(S): 2.5 TABLET, FILM COATED ORAL at 05:55

## 2024-04-06 RX ADMIN — SIMVASTATIN 20 MILLIGRAM(S): 20 TABLET, FILM COATED ORAL at 21:24

## 2024-04-06 RX ADMIN — PANTOPRAZOLE SODIUM 40 MILLIGRAM(S): 20 TABLET, DELAYED RELEASE ORAL at 05:58

## 2024-04-06 NOTE — PROGRESS NOTE ADULT - SUBJECTIVE AND OBJECTIVE BOX
[facility] Division of Hospital Medicine     INTERVAL HISTORY:  Overnight, no acute events.    Patient seen and examined at bedside this morning. Slow to answer but reports feeling tired. Patient denies chest pain, SOB, abd pain, N/V, fever, chills, dysuria or any other complaints. All remainder ROS negative.     MEDICATIONS  (STANDING):  apixaban 2.5 milliGRAM(s) Oral two times a day  chlorhexidine 2% Cloths 1 Application(s) Topical <User Schedule>  cholecalciferol 2000 Unit(s) Oral daily  dextrose 50% Injectable 12.5 Gram(s) IV Push once  dextrose 50% Injectable 25 Gram(s) IV Push once  dextrose 50% Injectable 25 Gram(s) IV Push once  influenza  Vaccine (HIGH DOSE) 0.7 milliLiter(s) IntraMuscular once  insulin lispro (ADMELOG) corrective regimen sliding scale   SubCutaneous at bedtime  insulin lispro (ADMELOG) corrective regimen sliding scale   SubCutaneous three times a day before meals  levothyroxine 25 MICROGram(s) Oral daily  metoprolol tartrate 12.5 milliGRAM(s) Oral two times a day  midodrine 5 milliGRAM(s) Oral every 8 hours  multivitamin/minerals 1 Tablet(s) Oral daily  oseltamivir 75 milliGRAM(s) Oral two times a day  pantoprazole    Tablet 40 milliGRAM(s) Oral before breakfast  simvastatin 20 milliGRAM(s) Oral at bedtime  tamsulosin 0.4 milliGRAM(s) Oral at bedtime    MEDICATIONS  (PRN):  acetaminophen     Tablet .. 650 milliGRAM(s) Oral every 6 hours PRN Temp greater or equal to 38C (100.4F), Mild Pain (1 - 3)        I&O's Summary    05 Apr 2024 07:01  -  06 Apr 2024 07:00  --------------------------------------------------------  IN: 0 mL / OUT: 1300 mL / NET: -1300 mL    06 Apr 2024 07:01  -  06 Apr 2024 20:12  --------------------------------------------------------  IN: 0 mL / OUT: 800 mL / NET: -800 mL        PHYSICAL EXAM:  Vital Signs Last 24 Hrs  T(C): 36.3 (06 Apr 2024 16:18), Max: 36.7 (06 Apr 2024 04:00)  T(F): 97.3 (06 Apr 2024 16:18), Max: 98.1 (06 Apr 2024 12:31)  HR: 88 (06 Apr 2024 16:18) (66 - 92)  BP: 115/72 (06 Apr 2024 16:18) (108/62 - 117/74)  BP(mean): --  RR: 18 (06 Apr 2024 16:18) (18 - 19)  SpO2: 95% (06 Apr 2024 16:18) (92% - 97%)    Parameters below as of 06 Apr 2024 16:18  Patient On (Oxygen Delivery Method): room air          CONSTITUTIONAL: No apparent distress  HEENT: Normocephalic, Atraumatic.   RESPIRATORY:  lungs are clear to auscultation bilaterally  CARDIOVASCULAR: Regular rate and rhythm, No lower extremity edema  ABDOMEN: Soft, non-distended, nontender to palpation, +BS  MUSCLOSKELETAL:  moving all 4 extremities spontaneously, RUE swelling  PSYCH: thoughts linear, affect appropriate  NEUROLOGY: Alert, Oriented x2    LABS:                        9.0    7.87  )-----------( 309      ( 06 Apr 2024 04:00 )             27.3     04-06    137  |  108  |  17.2  ----------------------------<  86  3.8   |  17.0<L>  |  0.56    Ca    7.9<L>      06 Apr 2024 04:00  Mg     1.3     04-05            Urinalysis Basic - ( 06 Apr 2024 04:00 )    Color: x / Appearance: x / SG: x / pH: x  Gluc: 86 mg/dL / Ketone: x  / Bili: x / Urobili: x   Blood: x / Protein: x / Nitrite: x   Leuk Esterase: x / RBC: x / WBC x   Sq Epi: x / Non Sq Epi: x / Bacteria: x        Culture - Blood (collected 04 Apr 2024 11:55)  Source: .Blood Blood-Venous  Preliminary Report (06 Apr 2024 17:01):    No growth at 48 Hours    Culture - Blood (collected 04 Apr 2024 11:50)  Source: .Blood Blood-Peripheral  Preliminary Report (06 Apr 2024 17:01):    No growth at 48 Hours      CAPILLARY BLOOD GLUCOSE      POCT Blood Glucose.: 88 mg/dL (06 Apr 2024 17:10)  POCT Blood Glucose.: 98 mg/dL (06 Apr 2024 12:05)  POCT Blood Glucose.: 98 mg/dL (06 Apr 2024 08:11)  POCT Blood Glucose.: 134 mg/dL (05 Apr 2024 21:33)        RADIOLOGY & ADDITIONAL TESTS:  Results Reviewed

## 2024-04-06 NOTE — PROGRESS NOTE ADULT - SUBJECTIVE AND OBJECTIVE BOX
INTERVAL HPI/OVERNIGHT EVENTS/SUBJECTIVE:  Ultrasound performed yesterday shows Left improving hydro, right hydro resolved. walters draining clear yellow urine    ICU Vital Signs Last 24 Hrs  T(C): 36.3 (06 Apr 2024 16:18), Max: 36.7 (06 Apr 2024 04:00)  T(F): 97.3 (06 Apr 2024 16:18), Max: 98.1 (06 Apr 2024 12:31)  HR: 88 (06 Apr 2024 16:18) (66 - 92)  BP: 115/72 (06 Apr 2024 16:18) (108/62 - 117/74)  BP(mean): --  ABP: --  ABP(mean): --  RR: 18 (06 Apr 2024 16:18) (18 - 19)  SpO2: 95% (06 Apr 2024 16:18) (92% - 97%)    O2 Parameters below as of 06 Apr 2024 16:18  Patient On (Oxygen Delivery Method): room air            I&O's Detail    05 Apr 2024 07:01  -  06 Apr 2024 07:00  --------------------------------------------------------  IN:  Total IN: 0 mL    OUT:    Voided (mL): 1300 mL  Total OUT: 1300 mL    Total NET: -1300 mL      06 Apr 2024 07:01  -  06 Apr 2024 17:51  --------------------------------------------------------  IN:  Total IN: 0 mL    OUT:    Indwelling Catheter - Urethral (mL): 800 mL  Total OUT: 800 mL    Total NET: -800 mL      MEDICATIONS  (STANDING):  apixaban 2.5 milliGRAM(s) Oral two times a day  chlorhexidine 2% Cloths 1 Application(s) Topical <User Schedule>  cholecalciferol 2000 Unit(s) Oral daily  dextrose 50% Injectable 12.5 Gram(s) IV Push once  dextrose 50% Injectable 25 Gram(s) IV Push once  dextrose 50% Injectable 25 Gram(s) IV Push once  influenza  Vaccine (HIGH DOSE) 0.7 milliLiter(s) IntraMuscular once  insulin lispro (ADMELOG) corrective regimen sliding scale   SubCutaneous at bedtime  insulin lispro (ADMELOG) corrective regimen sliding scale   SubCutaneous three times a day before meals  levothyroxine 25 MICROGram(s) Oral daily  metoprolol tartrate 12.5 milliGRAM(s) Oral two times a day  midodrine 5 milliGRAM(s) Oral every 8 hours  multivitamin/minerals 1 Tablet(s) Oral daily  oseltamivir 75 milliGRAM(s) Oral two times a day  pantoprazole    Tablet 40 milliGRAM(s) Oral before breakfast  simvastatin 20 milliGRAM(s) Oral at bedtime  tamsulosin 0.4 milliGRAM(s) Oral at bedtime    MEDICATIONS  (PRN):  acetaminophen     Tablet .. 650 milliGRAM(s) Oral every 6 hours PRN Temp greater or equal to 38C (100.4F), Mild Pain (1 - 3)      MISC:     PHYSICAL EXAM:  : walters clear yellow      LABS:  CBC Full  -  ( 06 Apr 2024 04:00 )  WBC Count : 7.87 K/uL  RBC Count : 3.15 M/uL  Hemoglobin : 9.0 g/dL  Hematocrit : 27.3 %  Platelet Count - Automated : 309 K/uL  Mean Cell Volume : 86.7 fl  Mean Cell Hemoglobin : 28.6 pg  Mean Cell Hemoglobin Concentration : 33.0 gm/dL  Auto Neutrophil # : x  Auto Lymphocyte # : x  Auto Monocyte # : x  Auto Eosinophil # : x  Auto Basophil # : x  Auto Neutrophil % : x  Auto Lymphocyte % : x  Auto Monocyte % : x  Auto Eosinophil % : x  Auto Basophil % : x    04-06    137  |  108  |  17.2  ----------------------------<  86  3.8   |  17.0<L>  |  0.56    Ca    7.9<L>      06 Apr 2024 04:00  Mg     1.3     04-05        Urinalysis Basic - ( 06 Apr 2024 04:00 )    Color: x / Appearance: x / SG: x / pH: x  Gluc: 86 mg/dL / Ketone: x  / Bili: x / Urobili: x   Blood: x / Protein: x / Nitrite: x   Leuk Esterase: x / RBC: x / WBC x   Sq Epi: x / Non Sq Epi: x / Bacteria: x      RECENT CULTURES:  04-04 .Blood Blood-Venous XXXX XXXX   No growth at 48 Hours    04-04 .Blood Blood-Peripheral XXXX XXXX   No growth at 48 Hours    04-02 Catheterized Catheterized XXXX XXXX   <10,000 CFU/mL Normal Urogenital Anne-Marie    03-31 .Blood Blood-Peripheral XXXX XXXX   No growth at 5 days    03-31 .Blood Blood-Peripheral Blood Culture PCR   Growth in anaerobic bottle: Gram Positive Cocci in Clusters   Growth in anaerobic bottle: Staphylococcus epidermidis  Isolation of Coagulase negative Staphylococcus from single blood culture  sets may represent  contamination. Contact the Microbiology Department at [phone] if  susceptibility testing is  clinically indicated.  Direct identification is available within approximately 3-5  hours either by Blood Panel Multiplexed PCR or Direct  MALDI-TOF. Details: [email or web address]    03-31 Clean Catch Clean Catch (Midstream) XXXX XXXX   >=3 organisms. Probable collection contamination.          ASSESSMENT/PLAN:  83yFemale  urosepsis, ELISA, urinary retention, chronic Left hydronephrosis improving, right hydro resolved   - ELISA resolved after IVF and walters placement  - cont walters

## 2024-04-06 NOTE — PROGRESS NOTE ADULT - ASSESSMENT
82 yo female with recurrent UTIs, chronic left sided hydronephrosis, HTN, HLD, DM, hypothyroid, hiatal hernia, DVT no longer on AC, admitted with UTI, Afib with RVR, fever, hypotension requiring pressiors, ELISA.  Now doing better, off pressors.      #Septic shock 2/2 UTI  #Lactic acidosis (resolved)  #Metabolic encephalopathy (resolved)  - UA positive, f/u BCx  - UCX: > 3 organisms; possible contamination; repeat UCX NGTD  - BCx: NGTD  - S/p IVF bolus, Vanco, ceftriaxone in ED  - s/p meropenem empirically x 3 days  - Taper midodrine to 5mg TID, further taper per BP; has not been getting due to holding parameters, SBP >105  - c/w Flomax     #Hydronephrosis  - CT ABD/PEL: Cystitis; new moderate right & severe chronic left hydronephrosis, no obstruction noted.  Distended bladder  - prior image in 2023 with chronic L hydro, now with new R hydronephrosis  - Flomax  - renal function normalized, trend BMP  - jeff urology recs  - f/u renal u/s for eval of R hydro    #Positive blood culture  - noted with staph epi on Bcx drawn on admission - suspect contaminant  - f/u repeat BCx: NGTD    #Acute resp failure with hypoxia  #+Flu  - start Tamiflu  - Duoneb  - On NC, wean as tolerated  - encourage incentive spirometry and OOB when able    #A-fib with RVR  - CHADVASC: 6  – Now rate controlled  – C/w metoprolol 12.5mg BID  - C/w Eliquis 2.5 mg BID  - Cardiology notes appreciated    #ELISA (resolved)  – Cr: 0.74 (1.8 at admission)  – Likely in the setting of septic shock    #T2DM  – C/w ISS    #Hypothyroidism  – C/w levothyroxine 25 mg QD    #HLD  – C/w simvastatin 20 mg HS    DVT PPx: Eliquis  Diet: Carb consistent  Dispo: pending repeat BCx, weaning off midodrine and urology clearance, plan for home; likely d/c 1-2 days

## 2024-04-07 LAB
GLUCOSE BLDC GLUCOMTR-MCNC: 105 MG/DL — HIGH (ref 70–99)
GLUCOSE BLDC GLUCOMTR-MCNC: 121 MG/DL — HIGH (ref 70–99)
GLUCOSE BLDC GLUCOMTR-MCNC: 90 MG/DL — SIGNIFICANT CHANGE UP (ref 70–99)
GLUCOSE BLDC GLUCOMTR-MCNC: 95 MG/DL — SIGNIFICANT CHANGE UP (ref 70–99)

## 2024-04-07 PROCEDURE — 99232 SBSQ HOSP IP/OBS MODERATE 35: CPT

## 2024-04-07 RX ADMIN — MIDODRINE HYDROCHLORIDE 5 MILLIGRAM(S): 2.5 TABLET ORAL at 05:45

## 2024-04-07 RX ADMIN — CHLORHEXIDINE GLUCONATE 1 APPLICATION(S): 213 SOLUTION TOPICAL at 05:43

## 2024-04-07 RX ADMIN — Medication 12.5 MILLIGRAM(S): at 18:23

## 2024-04-07 RX ADMIN — APIXABAN 2.5 MILLIGRAM(S): 2.5 TABLET, FILM COATED ORAL at 05:43

## 2024-04-07 RX ADMIN — APIXABAN 2.5 MILLIGRAM(S): 2.5 TABLET, FILM COATED ORAL at 18:23

## 2024-04-07 RX ADMIN — Medication 1 TABLET(S): at 13:53

## 2024-04-07 RX ADMIN — SIMVASTATIN 20 MILLIGRAM(S): 20 TABLET, FILM COATED ORAL at 21:03

## 2024-04-07 RX ADMIN — Medication 12.5 MILLIGRAM(S): at 05:44

## 2024-04-07 RX ADMIN — TAMSULOSIN HYDROCHLORIDE 0.4 MILLIGRAM(S): 0.4 CAPSULE ORAL at 21:02

## 2024-04-07 RX ADMIN — PANTOPRAZOLE SODIUM 40 MILLIGRAM(S): 20 TABLET, DELAYED RELEASE ORAL at 05:46

## 2024-04-07 RX ADMIN — Medication 75 MILLIGRAM(S): at 05:46

## 2024-04-07 RX ADMIN — Medication 25 MICROGRAM(S): at 05:44

## 2024-04-07 RX ADMIN — Medication 75 MILLIGRAM(S): at 18:23

## 2024-04-07 RX ADMIN — Medication 2000 UNIT(S): at 13:53

## 2024-04-07 NOTE — PROGRESS NOTE ADULT - ASSESSMENT
84 yo female with recurrent UTIs, chronic left sided hydronephrosis, HTN, HLD, DM, hypothyroid, hiatal hernia, DVT no longer on AC, admitted with UTI, Afib with RVR, fever, hypotension requiring pressiors, ELISA.  Now doing better, off pressors.      #Septic shock 2/2 UTI  #Lactic acidosis (resolved)  #Metabolic encephalopathy (resolved)  - UA positive, f/u BCx  - UCX: > 3 organisms; possible contamination; repeat UCX NGTD  - BCx: NGTD  - S/p IVF bolus, Vanco, ceftriaxone in ED  - s/p meropenem empirically x 3 days  - will d/c midodrine; bp acceptable, if SBP persistently <100 then will restart   - c/w Flomax     #Hydronephrosis  - CT ABD/PEL: Cystitis; new moderate right & severe chronic left hydronephrosis, no obstruction noted.  Distended bladder  - prior image in 2023 with chronic L hydro, now with new R hydronephrosis  - Flomax  - renal function normalized, trend BMP  - jeff urology recs  - renal U/S: L hydro improved, R hydro resolved     #Positive blood culture  - noted with staph epi on Bcx drawn on admission - suspect contaminant  - f/u repeat BCx: NGTD    #Acute resp failure with hypoxia  #+Flu  - start Tamiflu  - Duoneb  - On NC, wean as tolerated  - encourage incentive spirometry and OOB when able    #A-fib with RVR  - CHADVASC: 6  – Now rate controlled  – C/w metoprolol 12.5mg BID  - C/w Eliquis 2.5 mg BID  - Cardiology notes appreciated    #ELISA (resolved)  – Cr: 0.56 (1.8 at admission)  – Likely in the setting of septic shock    #T2DM  – C/w ISS    #Hypothyroidism  – C/w levothyroxine 25 mg QD    #HLD  – C/w simvastatin 20 mg HS    DVT PPx: Eliquis  Diet: Carb consistent  Dispo: pending repeat BCx, weaning off midodrine and urology clearance, plan for home; likely d/c 1-2 days

## 2024-04-07 NOTE — PROGRESS NOTE ADULT - SUBJECTIVE AND OBJECTIVE BOX
[facility] Division of Hospital Medicine    Chief Complaint:      INTERVAL HISTORY:  Overnight, no acute events.     Patient seen and examined at bedside this morning. Complaining about the food at the hospital, does not like diet. Denies any trouble swallowing. Denies any other acutecomplaints. Patient denies chest pain, SOB, abd pain, N/V, fever, chills, dysuria or any other complaints. All remainder ROS negative.     MEDICATIONS  (STANDING):  apixaban 2.5 milliGRAM(s) Oral two times a day  chlorhexidine 2% Cloths 1 Application(s) Topical <User Schedule>  cholecalciferol 2000 Unit(s) Oral daily  dextrose 50% Injectable 12.5 Gram(s) IV Push once  dextrose 50% Injectable 25 Gram(s) IV Push once  dextrose 50% Injectable 25 Gram(s) IV Push once  influenza  Vaccine (HIGH DOSE) 0.7 milliLiter(s) IntraMuscular once  insulin lispro (ADMELOG) corrective regimen sliding scale   SubCutaneous at bedtime  insulin lispro (ADMELOG) corrective regimen sliding scale   SubCutaneous three times a day before meals  levothyroxine 25 MICROGram(s) Oral daily  metoprolol tartrate 12.5 milliGRAM(s) Oral two times a day  multivitamin/minerals 1 Tablet(s) Oral daily  oseltamivir 75 milliGRAM(s) Oral two times a day  pantoprazole    Tablet 40 milliGRAM(s) Oral before breakfast  simvastatin 20 milliGRAM(s) Oral at bedtime  tamsulosin 0.4 milliGRAM(s) Oral at bedtime    MEDICATIONS  (PRN):  acetaminophen     Tablet .. 650 milliGRAM(s) Oral every 6 hours PRN Temp greater or equal to 38C (100.4F), Mild Pain (1 - 3)        I&O's Summary    06 Apr 2024 07:01  -  07 Apr 2024 07:00  --------------------------------------------------------  IN: 0 mL / OUT: 1100 mL / NET: -1100 mL        PHYSICAL EXAM:  Vital Signs Last 24 Hrs  T(C): 36.4 (07 Apr 2024 11:18), Max: 37.2 (06 Apr 2024 20:37)  T(F): 97.6 (07 Apr 2024 11:18), Max: 99 (06 Apr 2024 20:37)  HR: 77 (07 Apr 2024 11:18) (68 - 90)  BP: 115/72 (07 Apr 2024 11:18) (100/60 - 115/72)  BP(mean): --  RR: 18 (07 Apr 2024 11:18) (18 - 18)  SpO2: 96% (07 Apr 2024 11:18) (94% - 96%)    Parameters below as of 07 Apr 2024 11:18  Patient On (Oxygen Delivery Method): room air        CONSTITUTIONAL: No apparent distress  HEENT: Normocephalic, Atraumatic.   RESPIRATORY:  lungs are clear to auscultation bilaterally  CARDIOVASCULAR: Regular rate and rhythm, No lower extremity edema  ABDOMEN: Soft, non-distended, nontender to palpation, +BS  MUSCLOSKELETAL:  moving all 4 extremities spontaneously, RUE swelling  PSYCH: thoughts linear, affect appropriate  NEUROLOGY: Alert, Oriented x3    LABS:                        9.0    7.87  )-----------( 309      ( 06 Apr 2024 04:00 )             27.3     04-06    137  |  108  |  17.2  ----------------------------<  86  3.8   |  17.0<L>  |  0.56    Ca    7.9<L>      06 Apr 2024 04:00            Urinalysis Basic - ( 06 Apr 2024 04:00 )    Color: x / Appearance: x / SG: x / pH: x  Gluc: 86 mg/dL / Ketone: x  / Bili: x / Urobili: x   Blood: x / Protein: x / Nitrite: x   Leuk Esterase: x / RBC: x / WBC x   Sq Epi: x / Non Sq Epi: x / Bacteria: x        CAPILLARY BLOOD GLUCOSE      POCT Blood Glucose.: 105 mg/dL (07 Apr 2024 11:22)  POCT Blood Glucose.: 95 mg/dL (07 Apr 2024 08:14)  POCT Blood Glucose.: 126 mg/dL (06 Apr 2024 21:28)  POCT Blood Glucose.: 88 mg/dL (06 Apr 2024 17:10)        RADIOLOGY & ADDITIONAL TESTS:  Results Reviewed

## 2024-04-08 LAB
AMORPH URATE CRY # URNS: PRESENT
ANION GAP SERPL CALC-SCNC: 13 MMOL/L — SIGNIFICANT CHANGE UP (ref 5–17)
APPEARANCE UR: ABNORMAL
BACTERIA # UR AUTO: ABNORMAL /HPF
BILIRUB UR-MCNC: ABNORMAL
BUN SERPL-MCNC: 20.9 MG/DL — HIGH (ref 8–20)
CALCIUM SERPL-MCNC: 7.9 MG/DL — LOW (ref 8.4–10.5)
CHLORIDE SERPL-SCNC: 109 MMOL/L — HIGH (ref 96–108)
CO2 SERPL-SCNC: 16 MMOL/L — LOW (ref 22–29)
COLOR SPEC: ABNORMAL
CREAT SERPL-MCNC: 0.57 MG/DL — SIGNIFICANT CHANGE UP (ref 0.5–1.3)
DIFF PNL FLD: ABNORMAL
EGFR: 91 ML/MIN/1.73M2 — SIGNIFICANT CHANGE UP
GLUCOSE BLDC GLUCOMTR-MCNC: 110 MG/DL — HIGH (ref 70–99)
GLUCOSE BLDC GLUCOMTR-MCNC: 111 MG/DL — HIGH (ref 70–99)
GLUCOSE BLDC GLUCOMTR-MCNC: 153 MG/DL — HIGH (ref 70–99)
GLUCOSE BLDC GLUCOMTR-MCNC: 161 MG/DL — HIGH (ref 70–99)
GLUCOSE SERPL-MCNC: 105 MG/DL — HIGH (ref 70–99)
GLUCOSE UR QL: NEGATIVE MG/DL — SIGNIFICANT CHANGE UP
HCT VFR BLD CALC: 23.7 % — LOW (ref 34.5–45)
HGB BLD-MCNC: 8 G/DL — LOW (ref 11.5–15.5)
KETONES UR-MCNC: NEGATIVE MG/DL — SIGNIFICANT CHANGE UP
LEUKOCYTE ESTERASE UR-ACNC: ABNORMAL
MCHC RBC-ENTMCNC: 28.9 PG — SIGNIFICANT CHANGE UP (ref 27–34)
MCHC RBC-ENTMCNC: 33.8 GM/DL — SIGNIFICANT CHANGE UP (ref 32–36)
MCV RBC AUTO: 85.6 FL — SIGNIFICANT CHANGE UP (ref 80–100)
NITRITE UR-MCNC: POSITIVE
PH UR: 5 — SIGNIFICANT CHANGE UP (ref 5–8)
PLATELET # BLD AUTO: 314 K/UL — SIGNIFICANT CHANGE UP (ref 150–400)
POTASSIUM SERPL-MCNC: 3.6 MMOL/L — SIGNIFICANT CHANGE UP (ref 3.5–5.3)
POTASSIUM SERPL-SCNC: 3.6 MMOL/L — SIGNIFICANT CHANGE UP (ref 3.5–5.3)
PROT UR-MCNC: 100 MG/DL
RBC # BLD: 2.77 M/UL — LOW (ref 3.8–5.2)
RBC # FLD: 17.5 % — HIGH (ref 10.3–14.5)
RBC CASTS # UR COMP ASSIST: 349 /HPF — HIGH (ref 0–4)
SODIUM SERPL-SCNC: 138 MMOL/L — SIGNIFICANT CHANGE UP (ref 135–145)
SP GR SPEC: 1.02 — SIGNIFICANT CHANGE UP (ref 1–1.03)
SQUAMOUS # UR AUTO: >36 /HPF — HIGH (ref 0–5)
UROBILINOGEN FLD QL: 0.2 MG/DL — SIGNIFICANT CHANGE UP (ref 0.2–1)
WBC # BLD: 11.27 K/UL — HIGH (ref 3.8–10.5)
WBC # FLD AUTO: 11.27 K/UL — HIGH (ref 3.8–10.5)
WBC UR QL: >998 /HPF — HIGH (ref 0–5)

## 2024-04-08 PROCEDURE — 99232 SBSQ HOSP IP/OBS MODERATE 35: CPT

## 2024-04-08 PROCEDURE — 71045 X-RAY EXAM CHEST 1 VIEW: CPT | Mod: 26

## 2024-04-08 RX ORDER — ASCORBIC ACID 60 MG
500 TABLET,CHEWABLE ORAL DAILY
Refills: 0 | Status: DISCONTINUED | OUTPATIENT
Start: 2024-04-08 | End: 2024-04-15

## 2024-04-08 RX ADMIN — PANTOPRAZOLE SODIUM 40 MILLIGRAM(S): 20 TABLET, DELAYED RELEASE ORAL at 05:35

## 2024-04-08 RX ADMIN — Medication 12.5 MILLIGRAM(S): at 05:35

## 2024-04-08 RX ADMIN — TAMSULOSIN HYDROCHLORIDE 0.4 MILLIGRAM(S): 0.4 CAPSULE ORAL at 21:31

## 2024-04-08 RX ADMIN — Medication 1: at 11:42

## 2024-04-08 RX ADMIN — SIMVASTATIN 20 MILLIGRAM(S): 20 TABLET, FILM COATED ORAL at 21:31

## 2024-04-08 RX ADMIN — APIXABAN 2.5 MILLIGRAM(S): 2.5 TABLET, FILM COATED ORAL at 05:35

## 2024-04-08 RX ADMIN — APIXABAN 2.5 MILLIGRAM(S): 2.5 TABLET, FILM COATED ORAL at 17:20

## 2024-04-08 RX ADMIN — Medication 75 MILLIGRAM(S): at 05:35

## 2024-04-08 RX ADMIN — Medication 1 TABLET(S): at 11:43

## 2024-04-08 RX ADMIN — Medication 2000 UNIT(S): at 11:43

## 2024-04-08 RX ADMIN — Medication 500 MILLIGRAM(S): at 17:20

## 2024-04-08 RX ADMIN — Medication 25 MICROGRAM(S): at 05:35

## 2024-04-08 RX ADMIN — CHLORHEXIDINE GLUCONATE 1 APPLICATION(S): 213 SOLUTION TOPICAL at 05:35

## 2024-04-08 RX ADMIN — Medication 12.5 MILLIGRAM(S): at 17:20

## 2024-04-08 NOTE — SWALLOW BEDSIDE ASSESSMENT ADULT - SLP GENERAL OBSERVATIONS
Recd awake/upright in bed, A&A Ox2, reduced cognition, agitated, re-direction required, 0/10 pain pre/post, tolerating RA NAD

## 2024-04-08 NOTE — PROGRESS NOTE ADULT - SUBJECTIVE AND OBJECTIVE BOX
[facility] Division of Hospital Medicine    INTERVAL HISTORY:  Overnight, no acute events.     Patient seen and examined at bedside this morning. Complaining about the food at the hospital, does not like diet. Denies any trouble swallowing. Denies any other acutecomplaints. Patient denies chest pain, SOB, abd pain, N/V, fever, chills, dysuria or any other complaints. All remainder ROS negative.     MEDICATIONS  (STANDING):  apixaban 2.5 milliGRAM(s) Oral two times a day  chlorhexidine 2% Cloths 1 Application(s) Topical <User Schedule>  cholecalciferol 2000 Unit(s) Oral daily  dextrose 50% Injectable 25 Gram(s) IV Push once  dextrose 50% Injectable 12.5 Gram(s) IV Push once  dextrose 50% Injectable 25 Gram(s) IV Push once  influenza  Vaccine (HIGH DOSE) 0.7 milliLiter(s) IntraMuscular once  insulin lispro (ADMELOG) corrective regimen sliding scale   SubCutaneous three times a day before meals  insulin lispro (ADMELOG) corrective regimen sliding scale   SubCutaneous at bedtime  levothyroxine 25 MICROGram(s) Oral daily  metoprolol tartrate 12.5 milliGRAM(s) Oral two times a day  multivitamin/minerals 1 Tablet(s) Oral daily  pantoprazole    Tablet 40 milliGRAM(s) Oral before breakfast  simvastatin 20 milliGRAM(s) Oral at bedtime  tamsulosin 0.4 milliGRAM(s) Oral at bedtime    MEDICATIONS  (PRN):  acetaminophen     Tablet .. 650 milliGRAM(s) Oral every 6 hours PRN Temp greater or equal to 38C (100.4F), Mild Pain (1 - 3)        I&O's Summary    07 Apr 2024 07:01  -  08 Apr 2024 07:00  --------------------------------------------------------  IN: 400 mL / OUT: 900 mL / NET: -500 mL        PHYSICAL EXAM:  Vital Signs Last 24 Hrs  T(C): 36.5 (08 Apr 2024 09:24), Max: 37.2 (08 Apr 2024 04:33)  T(F): 97.7 (08 Apr 2024 09:24), Max: 99 (08 Apr 2024 04:33)  HR: 80 (08 Apr 2024 09:24) (76 - 91)  BP: 118/69 (08 Apr 2024 09:24) (107/62 - 130/72)  BP(mean): --  RR: 18 (08 Apr 2024 09:24) (18 - 18)  SpO2: 98% (08 Apr 2024 09:24) (93% - 98%)    Parameters below as of 08 Apr 2024 09:24  Patient On (Oxygen Delivery Method): room air      CONSTITUTIONAL: No apparent distress  HEENT: Normocephalic, Atraumatic.   RESPIRATORY:  lungs are clear to auscultation bilaterally  CARDIOVASCULAR: Regular rate and rhythm, No lower extremity edema  ABDOMEN: Soft, non-distended, nontender to palpation, +BS  MUSCLOSKELETAL:  moving all 4 extremities spontaneously, RUE swelling  PSYCH: thoughts linear, affect appropriate  NEUROLOGY: Alert, Oriented x3    LABS:                        8.0    11.27 )-----------( 314      ( 08 Apr 2024 07:15 )             23.7     04-08    138  |  109<H>  |  20.9<H>  ----------------------------<  105<H>  3.6   |  16.0<L>  |  0.57    Ca    7.9<L>      08 Apr 2024 07:15            Urinalysis Basic - ( 08 Apr 2024 07:15 )    Color: x / Appearance: x / SG: x / pH: x  Gluc: 105 mg/dL / Ketone: x  / Bili: x / Urobili: x   Blood: x / Protein: x / Nitrite: x   Leuk Esterase: x / RBC: x / WBC x   Sq Epi: x / Non Sq Epi: x / Bacteria: x        CAPILLARY BLOOD GLUCOSE      POCT Blood Glucose.: 110 mg/dL (08 Apr 2024 08:28)  POCT Blood Glucose.: 121 mg/dL (07 Apr 2024 21:02)  POCT Blood Glucose.: 90 mg/dL (07 Apr 2024 18:19)  POCT Blood Glucose.: 105 mg/dL (07 Apr 2024 11:22)        RADIOLOGY & ADDITIONAL TESTS:  Results Reviewed           [facility] Division of Hospital Medicine    INTERVAL HISTORY:  Overnight, no acute events.     Patient seen and examined at bedside this morning. Very happy for diet being upgraded. Denies any trouble swallowing. Denies any other acute complaints. Patient denies chest pain, SOB, abd pain, N/V, fever, chills, dysuria or any other complaints.     MEDICATIONS  (STANDING):  apixaban 2.5 milliGRAM(s) Oral two times a day  chlorhexidine 2% Cloths 1 Application(s) Topical <User Schedule>  cholecalciferol 2000 Unit(s) Oral daily  dextrose 50% Injectable 25 Gram(s) IV Push once  dextrose 50% Injectable 12.5 Gram(s) IV Push once  dextrose 50% Injectable 25 Gram(s) IV Push once  influenza  Vaccine (HIGH DOSE) 0.7 milliLiter(s) IntraMuscular once  insulin lispro (ADMELOG) corrective regimen sliding scale   SubCutaneous three times a day before meals  insulin lispro (ADMELOG) corrective regimen sliding scale   SubCutaneous at bedtime  levothyroxine 25 MICROGram(s) Oral daily  metoprolol tartrate 12.5 milliGRAM(s) Oral two times a day  multivitamin/minerals 1 Tablet(s) Oral daily  pantoprazole    Tablet 40 milliGRAM(s) Oral before breakfast  simvastatin 20 milliGRAM(s) Oral at bedtime  tamsulosin 0.4 milliGRAM(s) Oral at bedtime    MEDICATIONS  (PRN):  acetaminophen     Tablet .. 650 milliGRAM(s) Oral every 6 hours PRN Temp greater or equal to 38C (100.4F), Mild Pain (1 - 3)        I&O's Summary    07 Apr 2024 07:01  -  08 Apr 2024 07:00  --------------------------------------------------------  IN: 400 mL / OUT: 900 mL / NET: -500 mL        PHYSICAL EXAM:  Vital Signs Last 24 Hrs  T(C): 36.5 (08 Apr 2024 09:24), Max: 37.2 (08 Apr 2024 04:33)  T(F): 97.7 (08 Apr 2024 09:24), Max: 99 (08 Apr 2024 04:33)  HR: 80 (08 Apr 2024 09:24) (76 - 91)  BP: 118/69 (08 Apr 2024 09:24) (107/62 - 130/72)  BP(mean): --  RR: 18 (08 Apr 2024 09:24) (18 - 18)  SpO2: 98% (08 Apr 2024 09:24) (93% - 98%)    Parameters below as of 08 Apr 2024 09:24  Patient On (Oxygen Delivery Method): room air      CONSTITUTIONAL: No apparent distress  HEENT: Normocephalic, Atraumatic.   RESPIRATORY:  lungs are clear to auscultation bilaterally  CARDIOVASCULAR: Regular rate and rhythm, No lower extremity edema  ABDOMEN: Soft, non-distended, nontender to palpation, +BS  MUSCLOSKELETAL:  moving all 4 extremities spontaneously, RUE swelling  PSYCH: thoughts linear, affect appropriate  NEUROLOGY: Alert, Oriented x3    LABS:                        8.0    11.27 )-----------( 314      ( 08 Apr 2024 07:15 )             23.7     04-08    138  |  109<H>  |  20.9<H>  ----------------------------<  105<H>  3.6   |  16.0<L>  |  0.57    Ca    7.9<L>      08 Apr 2024 07:15            Urinalysis Basic - ( 08 Apr 2024 07:15 )    Color: x / Appearance: x / SG: x / pH: x  Gluc: 105 mg/dL / Ketone: x  / Bili: x / Urobili: x   Blood: x / Protein: x / Nitrite: x   Leuk Esterase: x / RBC: x / WBC x   Sq Epi: x / Non Sq Epi: x / Bacteria: x        CAPILLARY BLOOD GLUCOSE      POCT Blood Glucose.: 110 mg/dL (08 Apr 2024 08:28)  POCT Blood Glucose.: 121 mg/dL (07 Apr 2024 21:02)  POCT Blood Glucose.: 90 mg/dL (07 Apr 2024 18:19)  POCT Blood Glucose.: 105 mg/dL (07 Apr 2024 11:22)        RADIOLOGY & ADDITIONAL TESTS:  Results Reviewed

## 2024-04-08 NOTE — PROVIDER CONTACT NOTE (OTHER) - SITUATION
monitor tech alerted RN that pt just had a run of PAF 's, unsustained, pt remains asymptomatic, NAD noted. pt remains stable. safety measures in place.

## 2024-04-08 NOTE — SWALLOW BEDSIDE ASSESSMENT ADULT - SWALLOW EVAL: PATIENT/FAMILY GOALS STATEMENT
"you better get me off this dumb baby food and go throw this out right now, you think I'm a [age] year old"

## 2024-04-08 NOTE — CHART NOTE - NSCHARTNOTESELECT_GEN_ALL_CORE
Cardiology/Event Note
Event Note
Event Note
Nutrition Services
Transfer Note
Event Note
[Follow-Up Visit] : a follow-up pain management visit

## 2024-04-08 NOTE — PROGRESS NOTE ADULT - ASSESSMENT
84 yo female with recurrent UTIs, chronic left sided hydronephrosis, HTN, HLD, DM, hypothyroid, hiatal hernia, DVT no longer on AC, admitted with UTI, Afib with RVR, fever, hypotension requiring pressiors, ELISA.  Now doing better, off pressors.      #hematuria  - uro?     #Septic shock 2/2 UTI  #Lactic acidosis (resolved)  #Metabolic encephalopathy (resolved)  - UA positive, f/u BCx  - UCX: >3 organisms; possible contamination; repeat UCX NGTD  - BCx: NGTD  - S/p IVF bolus, Vanco, ceftriaxone in ED  - s/p meropenem empirically x 3 days  - will d/c midodrine; bp acceptable, if SBP persistently <100 then will restart   - c/w Flomax     #Hydronephrosis  - CT ABD/PEL: Cystitis; new moderate right & severe chronic left hydronephrosis, no obstruction noted.  Distended bladder  - prior image in 2023 with chronic L hydro, now with new R hydronephrosis  - Flomax  - renal function normalized, trend BMP  - jeff urology recs  - renal U/S: L hydro improved, R hydro resolved     #Positive blood culture  - noted with staph epi on Bcx drawn on admission - suspect contaminant  - f/u repeat BCx: NGTD    #Acute resp failure with hypoxia  #+Flu  - start Tamiflu  - Duoneb  - On NC, wean as tolerated  - encourage incentive spirometry and OOB when able    #A-fib with RVR  - CHADVASC: 6  – Now rate controlled  – C/w metoprolol 12.5mg BID  - C/w Eliquis 2.5 mg BID  - Cardiology notes appreciated    #ELISA (resolved)  – Cr: 0.56 (1.8 at admission)  – Likely in the setting of septic shock    #T2DM  – C/w ISS    #Hypothyroidism  – C/w levothyroxine 25 mg QD    #HLD  – C/w simvastatin 20 mg HS    DVT PPx: Eliquis  Diet: Carb consistent  Dispo: pending repeat BCx, weaning off midodrine and urology clearance, plan for home; likely d/c 1-2 days    84 yo female with recurrent UTIs, chronic left sided hydronephrosis, HTN, HLD, DM, hypothyroid, hiatal hernia, DVT no longer on AC, admitted with UTI, Afib with RVR, fever, hypotension requiring pressiors, ELISA.  Now doing better, off pressors.      #hematuria  - possibly tugged walters overnight  - tubing with yellow urine, lots of sediment   - repeat U/A sent overnight, unclear where it was collected from   - afebrile, wbc 11; continue to monitor off abx at this time   - d/c with walters in place    #Septic shock 2/2 UTI  #Lactic acidosis (resolved)  #Metabolic encephalopathy (resolved)  - UA positive, f/u BCx  - UCX: >3 organisms; possible contamination; repeat UCX NGTD  - BCx: NGTD  - S/p IVF bolus, Vanco, ceftriaxone in ED  - s/p meropenem empirically x 3 days  - will d/c midodrine; bp acceptable, if SBP persistently <100 then will restart   - c/w Flomax     #Hydronephrosis  - CT ABD/PEL: Cystitis; new moderate right & severe chronic left hydronephrosis, no obstruction noted.  Distended bladder  - prior image in 2023 with chronic L hydro, now with new R hydronephrosis  - Flomax  - renal function normalized, trend BMP  - jeff urology recs  - renal U/S: L hydro improved, R hydro resolved     #Positive blood culture  - noted with staph epi on Bcx drawn on admission - suspect contaminant  - f/u repeat BCx: NGTD    #Acute resp failure with hypoxia  #+Flu  - start Tamiflu  - Duoneb  - On RA  - encourage incentive spirometry and OOB when able  - repeat CXR tomorrow am    #A-fib with RVR  - CHADVASC: 6  – Now rate controlled  – C/w metoprolol 12.5mg BID  - C/w Eliquis 2.5 mg BID  - Cardiology notes appreciated    #ELISA (resolved)  – Cr: 0.56 (1.8 at admission)  – Likely in the setting of septic shock    #T2DM  – C/w ISS    #Hypothyroidism  – C/w levothyroxine 25 mg QD    #HLD  – C/w simvastatin 20 mg HS    DVT PPx: Eliquis  Diet: Carb consistent  Dispo: medically stable for d/c; plan for home, per daughter does not have enough aid hours at home, does not want mom going to [facility]; Sw to f/u tomorrow with daughter    84 yo female with recurrent UTIs, chronic left sided hydronephrosis, HTN, HLD, DM, hypothyroid, hiatal hernia, DVT no longer on AC, admitted with UTI, Afib with RVR, fever, hypotension requiring pressiors, ELISA.  Now doing better, off pressors.      #hematuria  - possibly tugged walters overnight  - tubing with yellow urine, lots of sediment   - repeat U/A sent overnight, unclear where it was collected from   - afebrile, wbc 11; continue to monitor off abx at this time   - d/c with walters in place; f/u with Dr. Aguila outpt     #Septic shock 2/2 UTI  #Lactic acidosis (resolved)  #Metabolic encephalopathy (resolved)  - UA positive, f/u BCx  - UCX: >3 organisms; possible contamination; repeat UCX NGTD  - BCx: NGTD  - S/p IVF bolus, Vanco, ceftriaxone in ED  - s/p meropenem empirically x 3 days  - will d/c midodrine; bp acceptable, if SBP persistently <100 then will restart   - c/w Flomax     #Hydronephrosis  - CT ABD/PEL: Cystitis; new moderate right & severe chronic left hydronephrosis, no obstruction noted.  Distended bladder  - prior image in 2023 with chronic L hydro, now with new R hydronephrosis  - Flomax  - renal function normalized, trend BMP  - jeff urology recs  - renal U/S: L hydro improved, R hydro resolved     #Positive blood culture  - noted with staph epi on Bcx drawn on admission - suspect contaminant  - f/u repeat BCx: NGTD    #Acute resp failure with hypoxia  #+Flu  - start Tamiflu  - Duoneb  - On RA  - encourage incentive spirometry and OOB when able  - repeat CXR tomorrow am    #A-fib with RVR  - CHADVASC: 6  – Now rate controlled  – C/w metoprolol 12.5mg BID  - C/w Eliquis 2.5 mg BID  - Cardiology notes appreciated    #ELISA (resolved)  – Cr: 0.56 (1.8 at admission)  – Likely in the setting of septic shock    #T2DM  – C/w ISS    #Hypothyroidism  – C/w levothyroxine 25 mg QD    #HLD  – C/w simvastatin 20 mg HS    DVT PPx: Eliquis  Diet: Carb consistent  Dispo: medically stable for d/c; plan for home, per daughter does not have enough aid hours at home, does not want mom going to [facility]; Sw to f/u tomorrow with daughter

## 2024-04-08 NOTE — SWALLOW BEDSIDE ASSESSMENT ADULT - SLP PERTINENT HISTORY OF CURRENT PROBLEM
As per MD note, "84 yo female with recurrent UTIs, chronic left sided hydronephrosis, HTN, HLD, DM, hypothyroid, hiatal hernia, DVT no longer on AC, admitted with UTI, Afib with RVR, fever, hypotension requiring pressiors, ELISA.  Now doing better, off pressors".

## 2024-04-08 NOTE — PROGRESS NOTE ADULT - SUBJECTIVE AND OBJECTIVE BOX
Subjective: Patient seen at bedside.     MEDICATIONS  (STANDING):  apixaban 2.5 milliGRAM(s) Oral two times a day  chlorhexidine 2% Cloths 1 Application(s) Topical <User Schedule>  cholecalciferol 2000 Unit(s) Oral daily  dextrose 50% Injectable 12.5 Gram(s) IV Push once  dextrose 50% Injectable 25 Gram(s) IV Push once  dextrose 50% Injectable 25 Gram(s) IV Push once  influenza  Vaccine (HIGH DOSE) 0.7 milliLiter(s) IntraMuscular once  insulin lispro (ADMELOG) corrective regimen sliding scale   SubCutaneous at bedtime  insulin lispro (ADMELOG) corrective regimen sliding scale   SubCutaneous three times a day before meals  levothyroxine 25 MICROGram(s) Oral daily  metoprolol tartrate 12.5 milliGRAM(s) Oral two times a day  multivitamin/minerals 1 Tablet(s) Oral daily  pantoprazole    Tablet 40 milliGRAM(s) Oral before breakfast  simvastatin 20 milliGRAM(s) Oral at bedtime  tamsulosin 0.4 milliGRAM(s) Oral at bedtime    MEDICATIONS  (PRN):  acetaminophen     Tablet .. 650 milliGRAM(s) Oral every 6 hours PRN Temp greater or equal to 38C (100.4F), Mild Pain (1 - 3)    Vital Signs Last 24 Hrs  T(C): 36.6 (08 Apr 2024 11:15), Max: 37.2 (08 Apr 2024 04:33)  T(F): 97.8 (08 Apr 2024 11:15), Max: 99 (08 Apr 2024 04:33)  HR: 76 (08 Apr 2024 11:15) (76 - 91)  BP: 112/70 (08 Apr 2024 11:15) (107/62 - 130/72)  RR: 18 (08 Apr 2024 11:15) (18 - 18)  SpO2: 96% (08 Apr 2024 11:15) (93% - 98%)  Parameters below as of 08 Apr 2024 11:15  Patient On (Oxygen Delivery Method): room air    Physical Exam:  Constitutional: NAD  HEENT: PERRL, EOMI  Respiratory: Respirations non-labored, no accessory muscle use  : walters with yellow urine     LABS:                     8.0    11.27 )-----------( 314      ( 08 Apr 2024 07:15 )             23.7   04-08  138  |  109<H>  |  20.9<H>  ----------------------------<  105<H>  3.6   |  16.0<L>  |  0.57  Ca    7.9<L>      08 Apr 2024 07:15    A: Patient is an 84 yo F with urosepsis, ELISA, urinary retention, chronic Left hydronephrosis improving, right hydro resolved.     Plan:   Urology will sign off at this time. Patient is to follow up with Dr. Aguila as an outpatient.

## 2024-04-08 NOTE — SWALLOW BEDSIDE ASSESSMENT ADULT - SWALLOW EVAL: DIAGNOSIS
Oropharyngeal swallow appear clinically unremarkable w/no overt s/s penetration or aspiration demonstrated across trials consumed.

## 2024-04-08 NOTE — CHART NOTE - NSCHARTNOTEFT_GEN_A_CORE
Source: Patient [ ]  Family [ ]   other [x ]EMR, staff    Current Diet: Consistent CHO    Patient reports [ ] nausea  [ ] vomiting [ ] diarrhea [ ] constipation  [ ]chewing problems [ ] swallowing issues  [ ] other:     PO intake:  < 50% [ ]   50-75%  [ ]   %  [ x]  other :    Source for PO intake [ ] Patient [ ] family [x ] chart [ ] staff [ ] other    Enteral /Parenteral Nutrition:     Current Weight:  172.4# 4/2  1+ left and right hand    % Weight Change     Pertinent Medications: MEDICATIONS  (STANDING):  apixaban 2.5 milliGRAM(s) Oral two times a day  chlorhexidine 2% Cloths 1 Application(s) Topical <User Schedule>  cholecalciferol 2000 Unit(s) Oral daily  dextrose 50% Injectable 25 Gram(s) IV Push once  dextrose 50% Injectable 12.5 Gram(s) IV Push once  dextrose 50% Injectable 25 Gram(s) IV Push once  influenza  Vaccine (HIGH DOSE) 0.7 milliLiter(s) IntraMuscular once  insulin lispro (ADMELOG) corrective regimen sliding scale   SubCutaneous at bedtime  insulin lispro (ADMELOG) corrective regimen sliding scale   SubCutaneous three times a day before meals  levothyroxine 25 MICROGram(s) Oral daily  metoprolol tartrate 12.5 milliGRAM(s) Oral two times a day  multivitamin/minerals 1 Tablet(s) Oral daily  pantoprazole    Tablet 40 milliGRAM(s) Oral before breakfast  simvastatin 20 milliGRAM(s) Oral at bedtime  tamsulosin 0.4 milliGRAM(s) Oral at bedtime    MEDICATIONS  (PRN):  acetaminophen     Tablet .. 650 milliGRAM(s) Oral every 6 hours PRN Temp greater or equal to 38C (100.4F), Mild Pain (1 - 3)    Pertinent Labs: CBC Full  -  ( 08 Apr 2024 07:15 )  WBC Count : 11.27 K/uL  RBC Count : 2.77 M/uL  Hemoglobin : 8.0 g/dL  Hematocrit : 23.7 %  Platelet Count - Automated : 314 K/uL  Mean Cell Volume : 85.6 fl  Mean Cell Hemoglobin : 28.9 pg  Mean Cell Hemoglobin Concentration : 33.8 gm/dL  Auto Neutrophil # : x  Auto Lymphocyte # : x  Auto Monocyte # : x  Auto Eosinophil # : x  Auto Basophil # : x  Auto Neutrophil % : x  Auto Lymphocyte % : x  Auto Monocyte % : x  Auto Eosinophil % : x  Auto Basophil % : x      04-08 Na138 mmol/L Glu 105 mg/dL<H> K+ 3.6 mmol/L Cr  0.57 mg/dL BUN 20.9 mg/dL<H> Phos n/a   Alb n/a   PAB n/a           Skin: Pressure Injury 1: Right:, Stage II  Pressure Injury 2: Right:, heel, Stage I  Pressure Injury 3: Left:, heel, Stage I  Pressure Injury 4: sacrum, Stage I      Nutrition focused physical exam conducted - found signs of malnutrition [ ]absent [x ]present    Subcutaneous fat loss: [ x] Orbital fat pads region, [ ]Buccal fat region, [x]Triceps region,  [ ]Ribs region    Muscle wasting: [x ]Temples region, [x ]Clavicle region, [x ]Shoulder region, [ ]Scapula region, [ ]Interosseous region,  [ ]thigh region, [ ]Calf region    Estimated Needs:   [ x] no change since previous assessment  [ ] recalculated:     Current Nutrition Diagnosis: Malnutrition...  Moderate (chronic)  Related to inability to meet sufficient protein energy needs in setting of advanced age, recurrent UTIs, skin breakdown,  as evidenced by physical signs of mod muscle/fat loss, meeting < 75% est needs > 1 month. Pt with improved appetite, eating well and diet changed today from soft and bite sized to regular cons CHO diet.       Recommendations:   Continue MVI, Vit D  Rec Vit C for wound healing.   Glucerna shake once day. to optimize po intake and provide an additional 220 kcal, 10g protein per serving       Monitoring and Evaluation:   [ x] PO intake [x ] Tolerance to diet prescription [X] Weights  [X] Follow up per protocol [X] Labs:

## 2024-04-09 LAB
ANION GAP SERPL CALC-SCNC: 11 MMOL/L — SIGNIFICANT CHANGE UP (ref 5–17)
BUN SERPL-MCNC: 21.6 MG/DL — HIGH (ref 8–20)
CALCIUM SERPL-MCNC: 7.8 MG/DL — LOW (ref 8.4–10.5)
CHLORIDE SERPL-SCNC: 107 MMOL/L — SIGNIFICANT CHANGE UP (ref 96–108)
CO2 SERPL-SCNC: 18 MMOL/L — LOW (ref 22–29)
CREAT SERPL-MCNC: 0.61 MG/DL — SIGNIFICANT CHANGE UP (ref 0.5–1.3)
CULTURE RESULTS: SIGNIFICANT CHANGE UP
CULTURE RESULTS: SIGNIFICANT CHANGE UP
EGFR: 90 ML/MIN/1.73M2 — SIGNIFICANT CHANGE UP
GLUCOSE BLDC GLUCOMTR-MCNC: 124 MG/DL — HIGH (ref 70–99)
GLUCOSE BLDC GLUCOMTR-MCNC: 156 MG/DL — HIGH (ref 70–99)
GLUCOSE BLDC GLUCOMTR-MCNC: 84 MG/DL — SIGNIFICANT CHANGE UP (ref 70–99)
GLUCOSE BLDC GLUCOMTR-MCNC: 96 MG/DL — SIGNIFICANT CHANGE UP (ref 70–99)
GLUCOSE SERPL-MCNC: 95 MG/DL — SIGNIFICANT CHANGE UP (ref 70–99)
HCT VFR BLD CALC: 24 % — LOW (ref 34.5–45)
HGB BLD-MCNC: 8 G/DL — LOW (ref 11.5–15.5)
MCHC RBC-ENTMCNC: 28.9 PG — SIGNIFICANT CHANGE UP (ref 27–34)
MCHC RBC-ENTMCNC: 33.3 GM/DL — SIGNIFICANT CHANGE UP (ref 32–36)
MCV RBC AUTO: 86.6 FL — SIGNIFICANT CHANGE UP (ref 80–100)
PLATELET # BLD AUTO: 334 K/UL — SIGNIFICANT CHANGE UP (ref 150–400)
POTASSIUM SERPL-MCNC: 3.6 MMOL/L — SIGNIFICANT CHANGE UP (ref 3.5–5.3)
POTASSIUM SERPL-SCNC: 3.6 MMOL/L — SIGNIFICANT CHANGE UP (ref 3.5–5.3)
RBC # BLD: 2.77 M/UL — LOW (ref 3.8–5.2)
RBC # FLD: 17.7 % — HIGH (ref 10.3–14.5)
SODIUM SERPL-SCNC: 135 MMOL/L — SIGNIFICANT CHANGE UP (ref 135–145)
SPECIMEN SOURCE: SIGNIFICANT CHANGE UP
SPECIMEN SOURCE: SIGNIFICANT CHANGE UP
WBC # BLD: 9.7 K/UL — SIGNIFICANT CHANGE UP (ref 3.8–10.5)
WBC # FLD AUTO: 9.7 K/UL — SIGNIFICANT CHANGE UP (ref 3.8–10.5)

## 2024-04-09 PROCEDURE — 99232 SBSQ HOSP IP/OBS MODERATE 35: CPT

## 2024-04-09 RX ADMIN — Medication 25 MICROGRAM(S): at 05:08

## 2024-04-09 RX ADMIN — APIXABAN 2.5 MILLIGRAM(S): 2.5 TABLET, FILM COATED ORAL at 18:26

## 2024-04-09 RX ADMIN — PANTOPRAZOLE SODIUM 40 MILLIGRAM(S): 20 TABLET, DELAYED RELEASE ORAL at 05:09

## 2024-04-09 RX ADMIN — SIMVASTATIN 20 MILLIGRAM(S): 20 TABLET, FILM COATED ORAL at 21:30

## 2024-04-09 RX ADMIN — Medication 500 MILLIGRAM(S): at 13:01

## 2024-04-09 RX ADMIN — TAMSULOSIN HYDROCHLORIDE 0.4 MILLIGRAM(S): 0.4 CAPSULE ORAL at 21:30

## 2024-04-09 RX ADMIN — Medication 12.5 MILLIGRAM(S): at 05:08

## 2024-04-09 RX ADMIN — CHLORHEXIDINE GLUCONATE 1 APPLICATION(S): 213 SOLUTION TOPICAL at 05:12

## 2024-04-09 RX ADMIN — APIXABAN 2.5 MILLIGRAM(S): 2.5 TABLET, FILM COATED ORAL at 05:08

## 2024-04-09 RX ADMIN — Medication 1: at 13:02

## 2024-04-09 RX ADMIN — Medication 1 TABLET(S): at 13:02

## 2024-04-09 RX ADMIN — Medication 2000 UNIT(S): at 13:01

## 2024-04-09 RX ADMIN — Medication 12.5 MILLIGRAM(S): at 18:26

## 2024-04-09 NOTE — DISCHARGE NOTE PROVIDER - NSDCCPGOAL_GEN_ALL_CORE_FT
As FYI to DR. LEONE - called patient who states she has pin between right shoulder and Elbow - has x-rays years ago . RN instructed patient that she needs to be seen .  Patient is at a rehab center in [place] ( does not know the name) - she will try to sched Called patient with Dr. Alease Goltz message. Patient expressed understanding. Pt called, may call back later to schedule appt with Dr Isreal Figueroa she had to call back because a nurse came to her room Pt is at a rehab center     Has questions about what is causing her pain  & an xray that she has had   Wants to know if she is on the right track    Requests call back from Dr Iza Poole to discuss    Pt can be reached on her [phone] Pt's son Genesis Apodaca called. He wanted Dr. Carol Cutler to know that Krishjosiah is no longer in [facility]. She is still at [facility] but in [place] [address], so Dr. Carol Cutler can take care of her now. Genesis Apodaca can be reached at [phone]. Son, Keisha Vasquez (not on verbal release) is calling back after to the nurse at the nursing home. Nurse states the patient is complaining about sharp darting pain in her neck when eating or walking.  Nurse and family are wondering if the patient should have an xra Spoke to Nicholas to offer appt per MD below for neck pain. Nicholas advised pt has appt scheduled for 4/20 and OK to await appt to discuss neck pain. This was already addressed with the patient 4/14 per MD message below and RN spoke with pt: \"To nursing, please tell pt she should tell the nurse at the rehab facility where she is currently being cared for that she has this pain.   They have a staff doct To nursing, please tell pt she should tell the nurse at the rehab facility where she is currently being cared for that she has this pain. They have a staff doctor there who should be able to evaluate this and decide if she needs x-rays.     I am happy to s To get better and follow your care plan as instructed. normal...

## 2024-04-09 NOTE — DISCHARGE NOTE PROVIDER - PROVIDER TOKENS
PROVIDER:[TOKEN:[[id number]],FOLLOWUP:[1 week]],PROVIDER:[TOKEN:[[id number]],FOLLOWUP:[1 week]],FREE:[LAST:[PCP],PHONE:[(   )    -],FAX:[(   )    -],FOLLOWUP:[1-3 days]]

## 2024-04-09 NOTE — PROGRESS NOTE ADULT - SUBJECTIVE AND OBJECTIVE BOX
[facility] Division of Hospital Medicine    INTERVAL HISTORY:  Overnight, no acute events.     Patient seen and examined at bedside this morning. Reports some cough. Patient denies chest pain, SOB, abd pain, N/V, fever, chills, dysuria or any other complaints.     MEDICATIONS  (STANDING):  apixaban 2.5 milliGRAM(s) Oral two times a day  ascorbic acid 500 milliGRAM(s) Oral daily  chlorhexidine 2% Cloths 1 Application(s) Topical <User Schedule>  cholecalciferol 2000 Unit(s) Oral daily  dextrose 50% Injectable 12.5 Gram(s) IV Push once  dextrose 50% Injectable 25 Gram(s) IV Push once  dextrose 50% Injectable 25 Gram(s) IV Push once  influenza  Vaccine (HIGH DOSE) 0.7 milliLiter(s) IntraMuscular once  insulin lispro (ADMELOG) corrective regimen sliding scale   SubCutaneous at bedtime  insulin lispro (ADMELOG) corrective regimen sliding scale   SubCutaneous three times a day before meals  levothyroxine 25 MICROGram(s) Oral daily  metoprolol tartrate 12.5 milliGRAM(s) Oral two times a day  multivitamin/minerals 1 Tablet(s) Oral daily  pantoprazole    Tablet 40 milliGRAM(s) Oral before breakfast  simvastatin 20 milliGRAM(s) Oral at bedtime  tamsulosin 0.4 milliGRAM(s) Oral at bedtime    MEDICATIONS  (PRN):  acetaminophen     Tablet .. 650 milliGRAM(s) Oral every 6 hours PRN Temp greater or equal to 38C (100.4F), Mild Pain (1 - 3)        I&O's Summary    08 Apr 2024 07:01  -  09 Apr 2024 07:00  --------------------------------------------------------  IN: 0 mL / OUT: 300 mL / NET: -300 mL    09 Apr 2024 07:01  -  09 Apr 2024 15:27  --------------------------------------------------------  IN: 0 mL / OUT: 840 mL / NET: -840 mL        PHYSICAL EXAM:  Vital Signs Last 24 Hrs  T(C): 36.5 (09 Apr 2024 12:43), Max: 36.9 (08 Apr 2024 23:52)  T(F): 97.7 (09 Apr 2024 12:43), Max: 98.5 (08 Apr 2024 23:52)  HR: 82 (09 Apr 2024 12:43) (55 - 95)  BP: 109/72 (09 Apr 2024 12:43) (103/62 - 143/81)  BP(mean): --  RR: 18 (09 Apr 2024 12:43) (18 - 18)  SpO2: 96% (09 Apr 2024 12:43) (94% - 98%)    Parameters below as of 09 Apr 2024 12:43  Patient On (Oxygen Delivery Method): room air          CONSTITUTIONAL: No apparent distress  HEENT: Normocephalic, Atraumatic.  RESPIRATORY:  lungs are clear to auscultation bilaterally, No crackles, rhonchi, wheezes, +upper airway sounds   CARDIOVASCULAR: Regular rate and rhythm, No lower extremity edema  ABDOMEN: Soft, non-distended, nontender to palpation, +BS  MUSCLOSKELETAL: moving all 4 extremities spontaneously  PSYCH: thoughts linear, affect appropriate  NEUROLOGY: Alert, Oriented x3    LABS:                        8.0    9.70  )-----------( 334      ( 09 Apr 2024 05:48 )             24.0     04-09    135  |  107  |  21.6<H>  ----------------------------<  95  3.6   |  18.0<L>  |  0.61    Ca    7.8<L>      09 Apr 2024 05:48            Urinalysis Basic - ( 09 Apr 2024 05:48 )    Color: x / Appearance: x / SG: x / pH: x  Gluc: 95 mg/dL / Ketone: x  / Bili: x / Urobili: x   Blood: x / Protein: x / Nitrite: x   Leuk Esterase: x / RBC: x / WBC x   Sq Epi: x / Non Sq Epi: x / Bacteria: x        CAPILLARY BLOOD GLUCOSE      POCT Blood Glucose.: 156 mg/dL (09 Apr 2024 13:00)  POCT Blood Glucose.: 96 mg/dL (09 Apr 2024 08:12)  POCT Blood Glucose.: 153 mg/dL (08 Apr 2024 21:11)  POCT Blood Glucose.: 111 mg/dL (08 Apr 2024 17:06)        RADIOLOGY & ADDITIONAL TESTS:  Results Reviewed

## 2024-04-09 NOTE — DISCHARGE NOTE PROVIDER - DETAILS OF MALNUTRITION DIAGNOSIS/DIAGNOSES
This patient has been assessed with a concern for Malnutrition and was treated during this hospitalization for the following Nutrition diagnosis/diagnoses:     -  04/02/2024: Moderate protein-calorie malnutrition

## 2024-04-09 NOTE — DISCHARGE NOTE PROVIDER - CARE PROVIDER_API CALL
Gary Aguila  Urology  [address] [place] [id number]  Phone: [phone]  Fax: [phone]  Follow Up Time: 1 week    Cecelia Youssef  Cardiology  [address] [facility], [address] [address]  Phone: [phone]  Fax: [phone]  Follow Up Time: 1 week    PCP,   Phone: (   )    -  Fax: (   )    -  Follow Up Time: 1-3 days

## 2024-04-09 NOTE — DISCHARGE NOTE PROVIDER - HOSPITAL COURSE
84 yo female with recurrent UTIs, chronic left sided hydronephrosis, HTN, HLD, DM, hypothyroid, hiatal hernia, DVT no longer on AC, admitted with UTI, Afib with RVR, fever, hypotension requiring pressors and ELISA.  Pt initially admitted for sepsis 2/2 UTI, in addition to metabolic encephalopathy. Ucx positive, pt treated with merrum. CTAP with cystitis; new moderate right & severe chronic left hydronephrosis, no obstruction noted.  Distended bladder. Prior image in 2023 with chronic L hydro, now with new R hydronephrosis. Pt with positive bcx, repeat bcx ngtd. Pt also with respiratory failure 2/2 +flu. Pt was initiated on tamiflu with benefit. Urology was consulted and recommending long term walters. Pt with noted hematuria, hemoglobin is stable. Pt has since improved overall and is now medically stable for discharge with appropriate outpatient follow up.    All electrolyte abnormalities were monitored carefully and repleted as necessary during this hospitalization. At the time of discharge patient was hemodynamically stable and amenable to all terms of discharge.

## 2024-04-09 NOTE — PROGRESS NOTE ADULT - ASSESSMENT
82 yo female with recurrent UTIs, chronic left sided hydronephrosis, HTN, HLD, DM, hypothyroid, hiatal hernia, DVT no longer on AC, admitted with UTI, Afib with RVR, fever, hypotension requiring pressiors, ELISA.  Now doing better, off pressors.      #hematuria  - possibly tugged walters overnight  - tubing with yellow urine, lots of sediment   - repeat U/A sent 4/8, unclear where it was collected from as walters was not changed  - will change walters today, will obtain u/a if cloudy urine   - afebrile, wbc 11; continue to monitor off abx at this time   - plan to d/c with walters in place; f/u with Dr. Aguila outpt     #Septic shock 2/2 UTI  #Lactic acidosis (resolved)  #Metabolic encephalopathy (resolved)  - UA positive, f/u BCx  - UCX: >3 organisms; possible contamination; repeat UCX NGTD  - BCx: NGTD  - S/p IVF bolus, Vanco, ceftriaxone in ED  - s/p meropenem empirically x 3 days  - will d/c midodrine; bp acceptable, if SBP persistently <100 then will restart   - c/w Flomax     #Hydronephrosis  - CT ABD/PEL: Cystitis; new moderate right & severe chronic left hydronephrosis, no obstruction noted.  Distended bladder  - prior image in 2023 with chronic L hydro, now with new R hydronephrosis  - Flomax  - renal function normalized, trend BMP  - jeff urology recs  - renal U/S: L hydro improved, R hydro resolved     #Positive blood culture  - noted with staph epi on Bcx drawn on admission - suspect contaminant  - f/u repeat BCx: NGTD    #Acute resp failure with hypoxia  #+Flu  - start Tamiflu  - Duoneb  - On RA  - encourage incentive spirometry and OOB when able  - repeat CXR tomorrow am    #A-fib with RVR  - CHADVASC: 6  – Now rate controlled  – C/w metoprolol 12.5mg BID  - C/w Eliquis 2.5 mg BID  - Cardiology notes appreciated    #ELISA (resolved)  – Cr: 0.56 (1.8 at admission)  – Likely in the setting of septic shock    #T2DM  – C/w ISS    #Hypothyroidism  – C/w levothyroxine 25 mg QD    #HLD  – C/w simvastatin 20 mg HS    DVT PPx: Eliquis  Diet: Carb consistent  Dispo: medically stable for d/c; plan for home, per daughter does not have enough aid hours at home, does not want mom going to [facility]; SW to f/u tomorrow with daughter

## 2024-04-09 NOTE — DISCHARGE NOTE PROVIDER - NSDCCPCAREPLAN_GEN_ALL_CORE_FT
PRINCIPAL DISCHARGE DIAGNOSIS  Diagnosis: Septic shock  Assessment and Plan of Treatment: - You completed antibitoics on this admission  - Follow up with PCP in 1 week      SECONDARY DISCHARGE DIAGNOSES  Diagnosis: Atrial fibrillation with RVR  Assessment and Plan of Treatment: - Continue current medication regimen  - Follow up with Cardiology in 1 week    Diagnosis: UTI (urinary tract infection)  Assessment and Plan of Treatment: - You completed antibitoics on this admission  - Follow up with PCP in 1 week    Diagnosis: Hydronephrosis  Assessment and Plan of Treatment: - Continue flomax as directed  - Continue with walters  - Follow up with Urology and PCP in 1 week    Diagnosis: Hematuria  Assessment and Plan of Treatment: - Follow up with Urology in 1 week for repeat CBC    Diagnosis: Flu  Assessment and Plan of Treatment: - Tamiflu was completed  - Follow up with PCP in 1 week

## 2024-04-09 NOTE — DISCHARGE NOTE PROVIDER - NSDCMRMEDTOKEN_GEN_ALL_CORE_FT
acetaminophen 325 mg oral tablet: 3 tab(s) orally every 8 hours, As needed,  Pain (4 - 6)  Hospital Bed, Wheelchair and Ursula lift: ICD 10 R54.0  levothyroxine 25 mcg (0.025 mg) oral tablet: 1 tab(s) orally 5 times a week  levothyroxine 50 mcg (0.05 mg) oral tablet: 1 tab(s) orally 2 times a week  Multiple Vitamins with Minerals oral tablet: 1 tab(s) orally once a day  olmesartan 20 mg oral tablet: 1 tab(s) orally once a day  simvastatin 20 mg oral tablet: 1 tab(s) orally once a day (at bedtime)  Vitafusion Vitamin D3 Extra Strength Gummies 37.5 mcg (1500 intl units) oral tablet, chewable: 2 tab(s) chewed once a day   acetaminophen 325 mg oral tablet: 3 tab(s) orally every 8 hours, As needed,  Pain (4 - 6)  apixaban 2.5 mg oral tablet: 1 tab(s) orally 2 times a day  Hospital Bed, Wheelchair and Ursula lift: ICD 10 R54.0  levothyroxine 25 mcg (0.025 mg) oral tablet: 1 tab(s) orally 5 times a week  levothyroxine 50 mcg (0.05 mg) oral tablet: 1 tab(s) orally 2 times a week  metoprolol tartrate 25 mg oral tablet: 0.5 tab(s) orally 2 times a day  Multiple Vitamins with Minerals oral tablet: 1 tab(s) orally once a day  pantoprazole 40 mg oral delayed release tablet: 1 tab(s) orally once a day (before a meal)  simvastatin 20 mg oral tablet: 1 tab(s) orally once a day (at bedtime)  tamsulosin 0.4 mg oral capsule: 1 cap(s) orally once a day (at bedtime)  Vitafusion Vitamin D3 Extra Strength Gummies 37.5 mcg (1500 intl units) oral tablet, chewable: 2 tab(s) chewed once a day

## 2024-04-09 NOTE — DISCHARGE NOTE PROVIDER - CARE PROVIDERS DIRECT ADDRESSES
[email or web address]

## 2024-04-10 LAB
AMORPH CRY # UR COMP ASSIST: PRESENT
ANION GAP SERPL CALC-SCNC: 14 MMOL/L — SIGNIFICANT CHANGE UP (ref 5–17)
APPEARANCE UR: ABNORMAL
BACTERIA # UR AUTO: ABNORMAL /HPF
BILIRUB UR-MCNC: NEGATIVE — SIGNIFICANT CHANGE UP
BUN SERPL-MCNC: 28.9 MG/DL — HIGH (ref 8–20)
CALCIUM SERPL-MCNC: 7.9 MG/DL — LOW (ref 8.4–10.5)
CHLORIDE SERPL-SCNC: 107 MMOL/L — SIGNIFICANT CHANGE UP (ref 96–108)
CO2 SERPL-SCNC: 17 MMOL/L — LOW (ref 22–29)
COLOR SPEC: SIGNIFICANT CHANGE UP
CREAT SERPL-MCNC: 0.78 MG/DL — SIGNIFICANT CHANGE UP (ref 0.5–1.3)
DIFF PNL FLD: ABNORMAL
EGFR: 76 ML/MIN/1.73M2 — SIGNIFICANT CHANGE UP
GLUCOSE BLDC GLUCOMTR-MCNC: 103 MG/DL — HIGH (ref 70–99)
GLUCOSE BLDC GLUCOMTR-MCNC: 145 MG/DL — HIGH (ref 70–99)
GLUCOSE BLDC GLUCOMTR-MCNC: 149 MG/DL — HIGH (ref 70–99)
GLUCOSE BLDC GLUCOMTR-MCNC: 188 MG/DL — HIGH (ref 70–99)
GLUCOSE SERPL-MCNC: 116 MG/DL — HIGH (ref 70–99)
GLUCOSE UR QL: NEGATIVE MG/DL — SIGNIFICANT CHANGE UP
HCT VFR BLD CALC: 25.9 % — LOW (ref 34.5–45)
HGB BLD-MCNC: 8.4 G/DL — LOW (ref 11.5–15.5)
KETONES UR-MCNC: NEGATIVE MG/DL — SIGNIFICANT CHANGE UP
LEUKOCYTE ESTERASE UR-ACNC: ABNORMAL
MCHC RBC-ENTMCNC: 28.9 PG — SIGNIFICANT CHANGE UP (ref 27–34)
MCHC RBC-ENTMCNC: 32.4 GM/DL — SIGNIFICANT CHANGE UP (ref 32–36)
MCV RBC AUTO: 89 FL — SIGNIFICANT CHANGE UP (ref 80–100)
NITRITE UR-MCNC: NEGATIVE — SIGNIFICANT CHANGE UP
PH UR: 6 — SIGNIFICANT CHANGE UP (ref 5–8)
PLATELET # BLD AUTO: 363 K/UL — SIGNIFICANT CHANGE UP (ref 150–400)
POTASSIUM SERPL-MCNC: 4 MMOL/L — SIGNIFICANT CHANGE UP (ref 3.5–5.3)
POTASSIUM SERPL-SCNC: 4 MMOL/L — SIGNIFICANT CHANGE UP (ref 3.5–5.3)
PROT UR-MCNC: 300 MG/DL
RBC # BLD: 2.91 M/UL — LOW (ref 3.8–5.2)
RBC # FLD: 18.2 % — HIGH (ref 10.3–14.5)
RBC CASTS # UR COMP ASSIST: 234 /HPF — HIGH (ref 0–4)
SODIUM SERPL-SCNC: 137 MMOL/L — SIGNIFICANT CHANGE UP (ref 135–145)
SP GR SPEC: 1.02 — SIGNIFICANT CHANGE UP (ref 1–1.03)
SQUAMOUS # UR AUTO: >36 /HPF — HIGH (ref 0–5)
UROBILINOGEN FLD QL: 1 MG/DL — SIGNIFICANT CHANGE UP (ref 0.2–1)
WBC # BLD: 12.77 K/UL — HIGH (ref 3.8–10.5)
WBC # FLD AUTO: 12.77 K/UL — HIGH (ref 3.8–10.5)
WBC UR QL: >998 /HPF — HIGH (ref 0–5)

## 2024-04-10 PROCEDURE — 99232 SBSQ HOSP IP/OBS MODERATE 35: CPT

## 2024-04-10 RX ADMIN — Medication 12.5 MILLIGRAM(S): at 05:40

## 2024-04-10 RX ADMIN — APIXABAN 2.5 MILLIGRAM(S): 2.5 TABLET, FILM COATED ORAL at 05:41

## 2024-04-10 RX ADMIN — Medication 1 TABLET(S): at 12:12

## 2024-04-10 RX ADMIN — APIXABAN 2.5 MILLIGRAM(S): 2.5 TABLET, FILM COATED ORAL at 17:21

## 2024-04-10 RX ADMIN — Medication 25 MICROGRAM(S): at 05:41

## 2024-04-10 RX ADMIN — TAMSULOSIN HYDROCHLORIDE 0.4 MILLIGRAM(S): 0.4 CAPSULE ORAL at 21:09

## 2024-04-10 RX ADMIN — Medication 2000 UNIT(S): at 12:11

## 2024-04-10 RX ADMIN — SIMVASTATIN 20 MILLIGRAM(S): 20 TABLET, FILM COATED ORAL at 21:09

## 2024-04-10 RX ADMIN — CHLORHEXIDINE GLUCONATE 1 APPLICATION(S): 213 SOLUTION TOPICAL at 05:40

## 2024-04-10 RX ADMIN — Medication 500 MILLIGRAM(S): at 12:12

## 2024-04-10 RX ADMIN — PANTOPRAZOLE SODIUM 40 MILLIGRAM(S): 20 TABLET, DELAYED RELEASE ORAL at 05:41

## 2024-04-10 RX ADMIN — Medication 12.5 MILLIGRAM(S): at 17:21

## 2024-04-10 NOTE — PROGRESS NOTE ADULT - SUBJECTIVE AND OBJECTIVE BOX
[facility] Division of Hospital Medicine    INTERVAL HISTORY:  Overnight, no acute events.     Patient seen and examined at bedside this morning. Reports some cough. Patient denies chest pain, SOB, abd pain, N/V, fever, chills, dysuria or any other complaints.     MEDICATIONS  (STANDING):  apixaban 2.5 milliGRAM(s) Oral two times a day  ascorbic acid 500 milliGRAM(s) Oral daily  chlorhexidine 2% Cloths 1 Application(s) Topical <User Schedule>  cholecalciferol 2000 Unit(s) Oral daily  dextrose 50% Injectable 25 Gram(s) IV Push once  dextrose 50% Injectable 12.5 Gram(s) IV Push once  dextrose 50% Injectable 25 Gram(s) IV Push once  influenza  Vaccine (HIGH DOSE) 0.7 milliLiter(s) IntraMuscular once  insulin lispro (ADMELOG) corrective regimen sliding scale   SubCutaneous three times a day before meals  insulin lispro (ADMELOG) corrective regimen sliding scale   SubCutaneous at bedtime  levothyroxine 25 MICROGram(s) Oral daily  metoprolol tartrate 12.5 milliGRAM(s) Oral two times a day  multivitamin/minerals 1 Tablet(s) Oral daily  pantoprazole    Tablet 40 milliGRAM(s) Oral before breakfast  simvastatin 20 milliGRAM(s) Oral at bedtime  tamsulosin 0.4 milliGRAM(s) Oral at bedtime    MEDICATIONS  (PRN):  acetaminophen     Tablet .. 650 milliGRAM(s) Oral every 6 hours PRN Temp greater or equal to 38C (100.4F), Mild Pain (1 - 3)        I&O's Summary    09 Apr 2024 07:01  -  10 Apr 2024 07:00  --------------------------------------------------------  IN: 0 mL / OUT: 840 mL / NET: -840 mL        PHYSICAL EXAM:  Vital Signs Last 24 Hrs  T(C): 36.3 (10 Apr 2024 05:12), Max: 36.7 (09 Apr 2024 16:23)  T(F): 97.3 (10 Apr 2024 05:12), Max: 98 (09 Apr 2024 16:23)  HR: 70 (10 Apr 2024 05:12) (70 - 82)  BP: 113/76 (10 Apr 2024 05:12) (108/55 - 119/74)  BP(mean): --  RR: 17 (10 Apr 2024 05:12) (17 - 18)  SpO2: 95% (10 Apr 2024 05:12) (94% - 96%)    Parameters below as of 10 Apr 2024 05:12  Patient On (Oxygen Delivery Method): room air    CONSTITUTIONAL: No apparent distress  HEENT: Normocephalic, Atraumatic.  RESPIRATORY:  lungs are clear to auscultation bilaterally, No crackles, rhonchi, wheezes, +upper airway sounds   CARDIOVASCULAR: Regular rate and rhythm, No lower extremity edema  ABDOMEN: Soft, non-distended, nontender to palpation, +BS  MUSCLOSKELETAL: moving all 4 extremities spontaneously  PSYCH: thoughts linear, affect appropriate  NEUROLOGY: Alert, Oriented x3    LABS:                        8.4    12.77 )-----------( 363      ( 10 Apr 2024 06:17 )             25.9     04-10    137  |  107  |  28.9<H>  ----------------------------<  116<H>  4.0   |  17.0<L>  |  0.78    Ca    7.9<L>      10 Apr 2024 06:17            Urinalysis Basic - ( 10 Apr 2024 06:17 )    Color: x / Appearance: x / SG: x / pH: x  Gluc: 116 mg/dL / Ketone: x  / Bili: x / Urobili: x   Blood: x / Protein: x / Nitrite: x   Leuk Esterase: x / RBC: x / WBC x   Sq Epi: x / Non Sq Epi: x / Bacteria: x        CAPILLARY BLOOD GLUCOSE      POCT Blood Glucose.: 103 mg/dL (10 Apr 2024 07:44)  POCT Blood Glucose.: 124 mg/dL (09 Apr 2024 20:46)  POCT Blood Glucose.: 84 mg/dL (09 Apr 2024 17:52)  POCT Blood Glucose.: 156 mg/dL (09 Apr 2024 13:00)        RADIOLOGY & ADDITIONAL TESTS:  Results Reviewed       [facility] Division of Hospital Medicine    INTERVAL HISTORY:  Overnight, no acute events.     Patient seen and examined at bedside this morning. Reports some cough and reports feeling tired. Patient denies chest pain, SOB, abd pain, N/V, fever, chills, dysuria or any other complaints.     MEDICATIONS  (STANDING):  apixaban 2.5 milliGRAM(s) Oral two times a day  ascorbic acid 500 milliGRAM(s) Oral daily  chlorhexidine 2% Cloths 1 Application(s) Topical <User Schedule>  cholecalciferol 2000 Unit(s) Oral daily  dextrose 50% Injectable 25 Gram(s) IV Push once  dextrose 50% Injectable 12.5 Gram(s) IV Push once  dextrose 50% Injectable 25 Gram(s) IV Push once  influenza  Vaccine (HIGH DOSE) 0.7 milliLiter(s) IntraMuscular once  insulin lispro (ADMELOG) corrective regimen sliding scale   SubCutaneous three times a day before meals  insulin lispro (ADMELOG) corrective regimen sliding scale   SubCutaneous at bedtime  levothyroxine 25 MICROGram(s) Oral daily  metoprolol tartrate 12.5 milliGRAM(s) Oral two times a day  multivitamin/minerals 1 Tablet(s) Oral daily  pantoprazole    Tablet 40 milliGRAM(s) Oral before breakfast  simvastatin 20 milliGRAM(s) Oral at bedtime  tamsulosin 0.4 milliGRAM(s) Oral at bedtime    MEDICATIONS  (PRN):  acetaminophen     Tablet .. 650 milliGRAM(s) Oral every 6 hours PRN Temp greater or equal to 38C (100.4F), Mild Pain (1 - 3)        I&O's Summary    09 Apr 2024 07:01  -  10 Apr 2024 07:00  --------------------------------------------------------  IN: 0 mL / OUT: 840 mL / NET: -840 mL        PHYSICAL EXAM:  Vital Signs Last 24 Hrs  T(C): 36.3 (10 Apr 2024 05:12), Max: 36.7 (09 Apr 2024 16:23)  T(F): 97.3 (10 Apr 2024 05:12), Max: 98 (09 Apr 2024 16:23)  HR: 70 (10 Apr 2024 05:12) (70 - 82)  BP: 113/76 (10 Apr 2024 05:12) (108/55 - 119/74)  BP(mean): --  RR: 17 (10 Apr 2024 05:12) (17 - 18)  SpO2: 95% (10 Apr 2024 05:12) (94% - 96%)    Parameters below as of 10 Apr 2024 05:12  Patient On (Oxygen Delivery Method): room air    CONSTITUTIONAL: No apparent distress  HEENT: Normocephalic, Atraumatic.  RESPIRATORY:  lungs are clear to auscultation bilaterally, No crackles, rhonchi, wheezes, +upper airway sounds   CARDIOVASCULAR: Regular rate and rhythm, No lower extremity edema  ABDOMEN: Soft, non-distended, nontender to palpation, +BS  MUSCLOSKELETAL: moving all 4 extremities spontaneously  PSYCH: thoughts linear, affect appropriate  NEUROLOGY: Alert, Oriented x3    LABS:                        8.4    12.77 )-----------( 363      ( 10 Apr 2024 06:17 )             25.9     04-10    137  |  107  |  28.9<H>  ----------------------------<  116<H>  4.0   |  17.0<L>  |  0.78    Ca    7.9<L>      10 Apr 2024 06:17            Urinalysis Basic - ( 10 Apr 2024 06:17 )    Color: x / Appearance: x / SG: x / pH: x  Gluc: 116 mg/dL / Ketone: x  / Bili: x / Urobili: x   Blood: x / Protein: x / Nitrite: x   Leuk Esterase: x / RBC: x / WBC x   Sq Epi: x / Non Sq Epi: x / Bacteria: x        CAPILLARY BLOOD GLUCOSE      POCT Blood Glucose.: 103 mg/dL (10 Apr 2024 07:44)  POCT Blood Glucose.: 124 mg/dL (09 Apr 2024 20:46)  POCT Blood Glucose.: 84 mg/dL (09 Apr 2024 17:52)  POCT Blood Glucose.: 156 mg/dL (09 Apr 2024 13:00)        RADIOLOGY & ADDITIONAL TESTS:  Results Reviewed

## 2024-04-10 NOTE — PROGRESS NOTE ADULT - ASSESSMENT
82 yo female with recurrent UTIs, chronic left sided hydronephrosis, HTN, HLD, DM, hypothyroid, hiatal hernia, DVT no longer on AC, admitted with UTI, Afib with RVR, fever, hypotension requiring pressiors, ELISA.  Now doing better, off pressors.      #hematuria  - possibly tugged walters overnight  - tubing with yellow urine, lots of sediment   - repeat U/A sent 4/8, unclear where it was collected from as walters was not changed  - changed walters and sent new U/A given worsening leukocytosis; remains afebrile   - plan to d/c with walters in place; f/u with Dr. Aguila outpt     #Septic shock 2/2 UTI  #Lactic acidosis (resolved)  #Metabolic encephalopathy (resolved)  - UA positive, f/u BCx  - UCX: >3 organisms; possible contamination; repeat UCX NGTD  - BCx: NGTD  - S/p IVF bolus, Vanco, ceftriaxone in ED  - s/p meropenem empirically x 3 days  - will d/c midodrine; bp acceptable, if SBP persistently <100 then will restart   - c/w Flomax     #Hydronephrosis  - CT ABD/PEL: Cystitis; new moderate right & severe chronic left hydronephrosis, no obstruction noted.  Distended bladder  - prior image in 2023 with chronic L hydro, now with new R hydronephrosis  - Flomax  - renal function normalized, trend BMP  - jeff urology recs  - renal U/S: L hydro improved, R hydro resolved     #Positive blood culture  - noted with staph epi on Bcx drawn on admission - suspect contaminant  - f/u repeat BCx: NGTD    #Acute resp failure with hypoxia  #+Flu  - start Tamiflu  - Duoneb  - On RA  - encourage incentive spirometry and OOB when able  - repeat CXR tomorrow am    #A-fib with RVR  - CHADVASC: 6  – Now rate controlled  – C/w metoprolol 12.5mg BID  - C/w Eliquis 2.5 mg BID  - Cardiology notes appreciated    #ELISA (resolved)  – Cr: 0.56 (1.8 at admission)  – Likely in the setting of septic shock    #T2DM  – C/w ISS    #Hypothyroidism  – C/w levothyroxine 25 mg QD    #HLD  – C/w simvastatin 20 mg HS    DVT PPx: Eliquis  Diet: Carb consistent  Dispo: plan for home, per daughter does not have enough aid hours at home, does not want mom going to [facility]; SW to f/u tomorrow with daughter    82 yo female with recurrent UTIs, chronic left sided hydronephrosis, HTN, HLD, DM, hypothyroid, hiatal hernia, DVT no longer on AC, admitted with UTI, Afib with RVR, fever, hypotension requiring pressiors, ELISA.  Now doing better, off pressors.      #hematuria  - possibly tugged walters overnight  - tubing with yellow urine, lots of sediment   - repeat U/A sent 4/8, unclear where it was collected from as walters was not changed  - changed walters, U/a +wbc, given worsening leukocytosis will get repeat UCx if possible   - plan to d/c with walters in place; f/u with Dr. Aguila outpt     #Septic shock 2/2 UTI  #Lactic acidosis (resolved)  #Metabolic encephalopathy (resolved)  - UA positive, f/u BCx  - UCX: >3 organisms; possible contamination; repeat UCX NGTD  - BCx: NGTD  - S/p IVF bolus, Vanco, ceftriaxone in ED  - s/p meropenem empirically x 3 days  - will d/c midodrine; bp acceptable, if SBP persistently <100 then will restart   - c/w Flomax     #Hydronephrosis  - CT ABD/PEL: Cystitis; new moderate right & severe chronic left hydronephrosis, no obstruction noted.  Distended bladder  - prior image in 2023 with chronic L hydro, now with new R hydronephrosis  - Flomax  - renal function normalized, trend BMP  - jeff urology recs  - renal U/S: L hydro improved, R hydro resolved     #Positive blood culture  - noted with staph epi on Bcx drawn on admission - suspect contaminant  - f/u repeat BCx: NGTD    #Acute resp failure with hypoxia  #+Flu  - start Tamiflu  - Duoneb  - On RA  - encourage incentive spirometry and OOB when able  - repeat CXR tomorrow am    #A-fib with RVR  - CHADVASC: 6  – Now rate controlled  – C/w metoprolol 12.5mg BID  - C/w Eliquis 2.5 mg BID  - Cardiology notes appreciated    #ELISA (resolved)  – Cr: 0.56 (1.8 at admission)  – Likely in the setting of septic shock    #T2DM  – C/w ISS    #Hypothyroidism  – C/w levothyroxine 25 mg QD    #HLD  – C/w simvastatin 20 mg HS    DVT PPx: Eliquis  Diet: Carb consistent    Dispo: plan for home, per daughter does not have enough aid hours at home, does not want mom going to [facility]; SW to f/u tomorrow with daughter; per SW, aids will be in place for appropriate hours on Monday

## 2024-04-11 LAB
ANION GAP SERPL CALC-SCNC: 11 MMOL/L — SIGNIFICANT CHANGE UP (ref 5–17)
BUN SERPL-MCNC: 28.6 MG/DL — HIGH (ref 8–20)
CALCIUM SERPL-MCNC: 7.8 MG/DL — LOW (ref 8.4–10.5)
CHLORIDE SERPL-SCNC: 107 MMOL/L — SIGNIFICANT CHANGE UP (ref 96–108)
CO2 SERPL-SCNC: 18 MMOL/L — LOW (ref 22–29)
CREAT SERPL-MCNC: 0.67 MG/DL — SIGNIFICANT CHANGE UP (ref 0.5–1.3)
CULTURE RESULTS: SIGNIFICANT CHANGE UP
EGFR: 88 ML/MIN/1.73M2 — SIGNIFICANT CHANGE UP
GLUCOSE BLDC GLUCOMTR-MCNC: 112 MG/DL — HIGH (ref 70–99)
GLUCOSE BLDC GLUCOMTR-MCNC: 136 MG/DL — HIGH (ref 70–99)
GLUCOSE BLDC GLUCOMTR-MCNC: 145 MG/DL — HIGH (ref 70–99)
GLUCOSE BLDC GLUCOMTR-MCNC: 153 MG/DL — HIGH (ref 70–99)
GLUCOSE SERPL-MCNC: 96 MG/DL — SIGNIFICANT CHANGE UP (ref 70–99)
HCT VFR BLD CALC: 25.2 % — LOW (ref 34.5–45)
HGB BLD-MCNC: 8.1 G/DL — LOW (ref 11.5–15.5)
MCHC RBC-ENTMCNC: 28.2 PG — SIGNIFICANT CHANGE UP (ref 27–34)
MCHC RBC-ENTMCNC: 32.1 GM/DL — SIGNIFICANT CHANGE UP (ref 32–36)
MCV RBC AUTO: 87.8 FL — SIGNIFICANT CHANGE UP (ref 80–100)
PLATELET # BLD AUTO: 349 K/UL — SIGNIFICANT CHANGE UP (ref 150–400)
POTASSIUM SERPL-MCNC: 3.7 MMOL/L — SIGNIFICANT CHANGE UP (ref 3.5–5.3)
POTASSIUM SERPL-SCNC: 3.7 MMOL/L — SIGNIFICANT CHANGE UP (ref 3.5–5.3)
RBC # BLD: 2.87 M/UL — LOW (ref 3.8–5.2)
RBC # FLD: 18.5 % — HIGH (ref 10.3–14.5)
SODIUM SERPL-SCNC: 136 MMOL/L — SIGNIFICANT CHANGE UP (ref 135–145)
SPECIMEN SOURCE: SIGNIFICANT CHANGE UP
WBC # BLD: 10 K/UL — SIGNIFICANT CHANGE UP (ref 3.8–10.5)
WBC # FLD AUTO: 10 K/UL — SIGNIFICANT CHANGE UP (ref 3.8–10.5)

## 2024-04-11 PROCEDURE — 99232 SBSQ HOSP IP/OBS MODERATE 35: CPT

## 2024-04-11 RX ADMIN — Medication 1 TABLET(S): at 11:13

## 2024-04-11 RX ADMIN — TAMSULOSIN HYDROCHLORIDE 0.4 MILLIGRAM(S): 0.4 CAPSULE ORAL at 21:05

## 2024-04-11 RX ADMIN — Medication 12.5 MILLIGRAM(S): at 17:19

## 2024-04-11 RX ADMIN — Medication 1: at 11:15

## 2024-04-11 RX ADMIN — APIXABAN 2.5 MILLIGRAM(S): 2.5 TABLET, FILM COATED ORAL at 17:19

## 2024-04-11 RX ADMIN — Medication 2000 UNIT(S): at 11:13

## 2024-04-11 RX ADMIN — CHLORHEXIDINE GLUCONATE 1 APPLICATION(S): 213 SOLUTION TOPICAL at 06:01

## 2024-04-11 RX ADMIN — Medication 25 MICROGRAM(S): at 06:02

## 2024-04-11 RX ADMIN — Medication 500 MILLIGRAM(S): at 11:13

## 2024-04-11 RX ADMIN — SIMVASTATIN 20 MILLIGRAM(S): 20 TABLET, FILM COATED ORAL at 21:04

## 2024-04-11 RX ADMIN — PANTOPRAZOLE SODIUM 40 MILLIGRAM(S): 20 TABLET, DELAYED RELEASE ORAL at 06:02

## 2024-04-11 RX ADMIN — Medication 12.5 MILLIGRAM(S): at 06:01

## 2024-04-11 RX ADMIN — APIXABAN 2.5 MILLIGRAM(S): 2.5 TABLET, FILM COATED ORAL at 06:01

## 2024-04-11 NOTE — PROGRESS NOTE ADULT - SUBJECTIVE AND OBJECTIVE BOX
[facility] Division of Hospital Medicine    Chief Complaint:  urosepsis    SUBJECTIVE / OVERNIGHT EVENTS: Patient seen and examined. Alert and oriented. Tells me she cannot go home until monday. CM confirms this as her aides cannot be reinstated unitl then.     Patient denies chest pain, SOB, abd pain, N/V, fever, chills, dysuria or any other complaints. All remainder ROS negative.     MEDICATIONS  (STANDING):  apixaban 2.5 milliGRAM(s) Oral two times a day  ascorbic acid 500 milliGRAM(s) Oral daily  chlorhexidine 2% Cloths 1 Application(s) Topical <User Schedule>  cholecalciferol 2000 Unit(s) Oral daily  dextrose 50% Injectable 12.5 Gram(s) IV Push once  dextrose 50% Injectable 25 Gram(s) IV Push once  dextrose 50% Injectable 25 Gram(s) IV Push once  influenza  Vaccine (HIGH DOSE) 0.7 milliLiter(s) IntraMuscular once  insulin lispro (ADMELOG) corrective regimen sliding scale   SubCutaneous at bedtime  insulin lispro (ADMELOG) corrective regimen sliding scale   SubCutaneous three times a day before meals  levothyroxine 25 MICROGram(s) Oral daily  metoprolol tartrate 12.5 milliGRAM(s) Oral two times a day  multivitamin/minerals 1 Tablet(s) Oral daily  pantoprazole    Tablet 40 milliGRAM(s) Oral before breakfast  simvastatin 20 milliGRAM(s) Oral at bedtime  tamsulosin 0.4 milliGRAM(s) Oral at bedtime    MEDICATIONS  (PRN):  acetaminophen     Tablet .. 650 milliGRAM(s) Oral every 6 hours PRN Temp greater or equal to 38C (100.4F), Mild Pain (1 - 3)        I&O's Summary    10 Apr 2024 07:01  -  11 Apr 2024 07:00  --------------------------------------------------------  IN: 150 mL / OUT: 1600 mL / NET: -1450 mL        PHYSICAL EXAM:  Vital Signs Last 24 Hrs  T(C): 36.6 (11 Apr 2024 11:38), Max: 36.8 (11 Apr 2024 08:00)  T(F): 97.8 (11 Apr 2024 11:38), Max: 98.3 (11 Apr 2024 08:00)  HR: 81 (11 Apr 2024 11:38) (61 - 87)  BP: 116/71 (11 Apr 2024 11:38) (100/56 - 127/62)  BP(mean): 71 (11 Apr 2024 08:00) (71 - 71)  RR: 18 (11 Apr 2024 11:38) (16 - 18)  SpO2: 97% (11 Apr 2024 11:38) (96% - 98%)    Parameters below as of 11 Apr 2024 11:38  Patient On (Oxygen Delivery Method): room air            CONSTITUTIONAL: NAD,   ENMT: Moist oral mucosa, no pharyngeal injection or exudates;   RESPIRATORY: Normal respiratory effort; lungs are clear to auscultation bilaterally  CARDIOVASCULAR: Regular rate and rhythm, normal S1 and S2, no murmur/rub/gallop; No lower extremity edema;  ABDOMEN: Nontender to palpation, normoactive bowel sounds, no rebound/guarding  MUSCLOSKELETAL: no joint swelling or tenderness to palpation  PSYCH: A+O to person, place, and time; affect appropriate  NEUROLOGY: CN 2-12 are intact and symmetric; no gross sensory deficits;   SKIN: No rashes; no palpable lesions    LABS:                        8.1    10.00 )-----------( 349      ( 11 Apr 2024 06:40 )             25.2     04-11    136  |  107  |  28.6<H>  ----------------------------<  96  3.7   |  18.0<L>  |  0.67    Ca    7.8<L>      11 Apr 2024 06:40            Urinalysis Basic - ( 11 Apr 2024 06:40 )    Color: x / Appearance: x / SG: x / pH: x  Gluc: 96 mg/dL / Ketone: x  / Bili: x / Urobili: x   Blood: x / Protein: x / Nitrite: x   Leuk Esterase: x / RBC: x / WBC x   Sq Epi: x / Non Sq Epi: x / Bacteria: x        CAPILLARY BLOOD GLUCOSE      POCT Blood Glucose.: 153 mg/dL (11 Apr 2024 11:12)  POCT Blood Glucose.: 112 mg/dL (11 Apr 2024 08:08)  POCT Blood Glucose.: 188 mg/dL (10 Apr 2024 20:52)  POCT Blood Glucose.: 145 mg/dL (10 Apr 2024 16:58)        RADIOLOGY & ADDITIONAL TESTS:  Results Reviewed:   Imaging Personally Reviewed:  Electrocardiogram Personally Reviewed:

## 2024-04-11 NOTE — PROGRESS NOTE ADULT - ASSESSMENT
82 yo female with recurrent UTIs, chronic left sided hydronephrosis, HTN, HLD, DM, hypothyroid, hiatal hernia, DVT no longer on AC, admitted with UTI, Afib with RVR, fever, hypotension requiring pressiors, ELISA.  Now doing better, off pressors.      #hematuria, resolved  - possibly tugged walters overnight  - repeat U/A sent 4/8, unclear where it was collected from as walters was not changed  - changed walters, U/a +wbc, given worsening leukocytosis, repeat UCx sent on 4/10  - follow up repeat Culture  - plan to d/c with walters in place; f/u with Dr. Aguila outpt     #Septic shock 2/2 UTI  #Lactic acidosis (resolved)  #Metabolic encephalopathy (resolved)  - UA positive, f/u BCx  - UCX: >3 organisms; possible contamination; repeat UCX NGTD  - BCx: NGTD  - S/p IVF bolus, Vanco, ceftriaxone in ED  - s/p meropenem empirically x 3 days  - off midodrine  - c/w Flomax     #Hydronephrosis  - CT ABD/PEL: Cystitis; new moderate right & severe chronic left hydronephrosis, no obstruction noted.  Distended bladder  - prior image in 2023 with chronic L hydro, now with new R hydronephrosis  - Flomax  - renal function normalized, trend BMP  - jeff urology recs  - renal U/S: L hydro improved, R hydro resolved     #Positive blood culture  - noted with staph epi on Bcx drawn on admission - suspect contaminant  - f/u repeat BCx: NGTD    #Acute resp failure with hypoxia, resolved  #+Flu  - Complete Tamiflu  - Duoneb  - On RA  - encourage incentive spirometry and OOB when able    #A-fib with RVR  - CHADVASC: 6  – Now rate controlled  – C/w metoprolol 12.5mg BID  - C/w Eliquis 2.5 mg BID  - Cardiology notes appreciated    #ELISA (resolved)  – Cr: 0.56 (1.8 at admission)  – Likely in the setting of septic shock    #T2DM  – C/w ISS    #Hypothyroidism  – C/w levothyroxine 25 mg QD    #HLD  – C/w simvastatin 20 mg HS    DVT PPx: Eliquis  Diet: Carb consistent    Dispo: plan for home, per daughter does not have enough aid hours at home, does not want mom going to [facility]; CM/[facility]IC[facility] following and discussed with Daughter, aids will be in place for appropriate hours on Monday . remain inpt. Also still pending repeat ucx.

## 2024-04-12 LAB
ALBUMIN SERPL ELPH-MCNC: 2.1 G/DL — LOW (ref 3.3–5.2)
ALP SERPL-CCNC: 68 U/L — SIGNIFICANT CHANGE UP (ref 40–120)
ALT FLD-CCNC: 14 U/L — SIGNIFICANT CHANGE UP
ANION GAP SERPL CALC-SCNC: 13 MMOL/L — SIGNIFICANT CHANGE UP (ref 5–17)
AST SERPL-CCNC: 11 U/L — SIGNIFICANT CHANGE UP
BASOPHILS # BLD AUTO: 0.06 K/UL — SIGNIFICANT CHANGE UP (ref 0–0.2)
BASOPHILS NFR BLD AUTO: 0.5 % — SIGNIFICANT CHANGE UP (ref 0–2)
BILIRUB SERPL-MCNC: <0.2 MG/DL — LOW (ref 0.4–2)
BUN SERPL-MCNC: 25.3 MG/DL — HIGH (ref 8–20)
CALCIUM SERPL-MCNC: 7.9 MG/DL — LOW (ref 8.4–10.5)
CHLORIDE SERPL-SCNC: 107 MMOL/L — SIGNIFICANT CHANGE UP (ref 96–108)
CO2 SERPL-SCNC: 18 MMOL/L — LOW (ref 22–29)
CREAT SERPL-MCNC: 0.66 MG/DL — SIGNIFICANT CHANGE UP (ref 0.5–1.3)
EGFR: 88 ML/MIN/1.73M2 — SIGNIFICANT CHANGE UP
EOSINOPHIL # BLD AUTO: 0.08 K/UL — SIGNIFICANT CHANGE UP (ref 0–0.5)
EOSINOPHIL NFR BLD AUTO: 0.7 % — SIGNIFICANT CHANGE UP (ref 0–6)
GLUCOSE BLDC GLUCOMTR-MCNC: 103 MG/DL — HIGH (ref 70–99)
GLUCOSE BLDC GLUCOMTR-MCNC: 145 MG/DL — HIGH (ref 70–99)
GLUCOSE BLDC GLUCOMTR-MCNC: 178 MG/DL — HIGH (ref 70–99)
GLUCOSE BLDC GLUCOMTR-MCNC: 180 MG/DL — HIGH (ref 70–99)
GLUCOSE SERPL-MCNC: 105 MG/DL — HIGH (ref 70–99)
HCT VFR BLD CALC: 25.6 % — LOW (ref 34.5–45)
HGB BLD-MCNC: 8.4 G/DL — LOW (ref 11.5–15.5)
IMM GRANULOCYTES NFR BLD AUTO: 0.5 % — SIGNIFICANT CHANGE UP (ref 0–0.9)
LYMPHOCYTES # BLD AUTO: 28.9 % — SIGNIFICANT CHANGE UP (ref 13–44)
LYMPHOCYTES # BLD AUTO: 3.44 K/UL — HIGH (ref 1–3.3)
MAGNESIUM SERPL-MCNC: 1 MG/DL — CRITICAL LOW (ref 1.6–2.6)
MCHC RBC-ENTMCNC: 28.6 PG — SIGNIFICANT CHANGE UP (ref 27–34)
MCHC RBC-ENTMCNC: 32.8 GM/DL — SIGNIFICANT CHANGE UP (ref 32–36)
MCV RBC AUTO: 87.1 FL — SIGNIFICANT CHANGE UP (ref 80–100)
MONOCYTES # BLD AUTO: 1.2 K/UL — HIGH (ref 0–0.9)
MONOCYTES NFR BLD AUTO: 10.1 % — SIGNIFICANT CHANGE UP (ref 2–14)
NEUTROPHILS # BLD AUTO: 7.08 K/UL — SIGNIFICANT CHANGE UP (ref 1.8–7.4)
NEUTROPHILS NFR BLD AUTO: 59.3 % — SIGNIFICANT CHANGE UP (ref 43–77)
PLATELET # BLD AUTO: 390 K/UL — SIGNIFICANT CHANGE UP (ref 150–400)
POTASSIUM SERPL-MCNC: 3.8 MMOL/L — SIGNIFICANT CHANGE UP (ref 3.5–5.3)
POTASSIUM SERPL-SCNC: 3.8 MMOL/L — SIGNIFICANT CHANGE UP (ref 3.5–5.3)
PROT SERPL-MCNC: 5.1 G/DL — LOW (ref 6.6–8.7)
RBC # BLD: 2.94 M/UL — LOW (ref 3.8–5.2)
RBC # FLD: 18.6 % — HIGH (ref 10.3–14.5)
SODIUM SERPL-SCNC: 138 MMOL/L — SIGNIFICANT CHANGE UP (ref 135–145)
WBC # BLD: 11.92 K/UL — HIGH (ref 3.8–10.5)
WBC # FLD AUTO: 11.92 K/UL — HIGH (ref 3.8–10.5)

## 2024-04-12 PROCEDURE — 99232 SBSQ HOSP IP/OBS MODERATE 35: CPT

## 2024-04-12 RX ORDER — MAGNESIUM SULFATE 500 MG/ML
2 VIAL (ML) INJECTION ONCE
Refills: 0 | Status: COMPLETED | OUTPATIENT
Start: 2024-04-12 | End: 2024-04-12

## 2024-04-12 RX ADMIN — Medication 1 TABLET(S): at 17:10

## 2024-04-12 RX ADMIN — SIMVASTATIN 20 MILLIGRAM(S): 20 TABLET, FILM COATED ORAL at 20:55

## 2024-04-12 RX ADMIN — Medication 25 MICROGRAM(S): at 05:07

## 2024-04-12 RX ADMIN — TAMSULOSIN HYDROCHLORIDE 0.4 MILLIGRAM(S): 0.4 CAPSULE ORAL at 20:55

## 2024-04-12 RX ADMIN — Medication 500 MILLIGRAM(S): at 17:10

## 2024-04-12 RX ADMIN — APIXABAN 2.5 MILLIGRAM(S): 2.5 TABLET, FILM COATED ORAL at 17:10

## 2024-04-12 RX ADMIN — CHLORHEXIDINE GLUCONATE 1 APPLICATION(S): 213 SOLUTION TOPICAL at 05:06

## 2024-04-12 RX ADMIN — Medication 1: at 13:45

## 2024-04-12 RX ADMIN — Medication 12.5 MILLIGRAM(S): at 17:15

## 2024-04-12 RX ADMIN — APIXABAN 2.5 MILLIGRAM(S): 2.5 TABLET, FILM COATED ORAL at 05:08

## 2024-04-12 RX ADMIN — Medication 25 GRAM(S): at 10:00

## 2024-04-12 RX ADMIN — Medication 12.5 MILLIGRAM(S): at 05:07

## 2024-04-12 RX ADMIN — Medication 2000 UNIT(S): at 17:15

## 2024-04-12 RX ADMIN — PANTOPRAZOLE SODIUM 40 MILLIGRAM(S): 20 TABLET, DELAYED RELEASE ORAL at 05:08

## 2024-04-12 NOTE — PROGRESS NOTE ADULT - SUBJECTIVE AND OBJECTIVE BOX
Chief complaint: Urosepsis     Patient seen and examined at bedside. No acute overnight events reported. No fever, chills, cough, nausea or vomiting.     Vital Signs Last 24 Hrs  T(F): 97.7 (12 Apr 2024 08:58), Max: 98.3 (12 Apr 2024 04:22)  HR: 73 (12 Apr 2024 08:58) (65 - 81)  BP: 119/72 (12 Apr 2024 08:58) (109/63 - 125/78)  RR: 18 (12 Apr 2024 08:58) (18 - 18)  SpO2: 93% (12 Apr 2024 08:58) (93% - 98%)    Physical Exam:  Constitutional: alert and oriented, in no acute distress   Neck: Soft and supple  Respiratory: Good air entry b/l  Cardiovascular: Regular rate and rhythm  Gastrointestinal: Soft, non-tender to palpation, +bs  Vascular: 2+ peripheral pulses  Neurological: A/O x 3  Musculoskeletal: no lower extremity edema bilaterally    Labs:                        8.4    11.92 )-----------( 390      ( 12 Apr 2024 05:18 )             25.6   04-12    138  |  107  |  25.3<H>  ----------------------------<  105<H>  3.8   |  18.0<L>  |  0.66    Ca    7.9<L>      12 Apr 2024 05:18  Mg     1.0     04-12    TPro  5.1<L>  /  Alb  2.1<L>  /  TBili  <0.2<L>  /  DBili  x   /  AST  11  /  ALT  14  /  AlkPhos  68  04-12

## 2024-04-13 LAB
ANION GAP SERPL CALC-SCNC: 12 MMOL/L — SIGNIFICANT CHANGE UP (ref 5–17)
BASOPHILS # BLD AUTO: 0.08 K/UL — SIGNIFICANT CHANGE UP (ref 0–0.2)
BASOPHILS NFR BLD AUTO: 0.8 % — SIGNIFICANT CHANGE UP (ref 0–2)
BUN SERPL-MCNC: 23.3 MG/DL — HIGH (ref 8–20)
CALCIUM SERPL-MCNC: 8.1 MG/DL — LOW (ref 8.4–10.5)
CHLORIDE SERPL-SCNC: 105 MMOL/L — SIGNIFICANT CHANGE UP (ref 96–108)
CO2 SERPL-SCNC: 20 MMOL/L — LOW (ref 22–29)
CREAT SERPL-MCNC: 0.49 MG/DL — LOW (ref 0.5–1.3)
EGFR: 95 ML/MIN/1.73M2 — SIGNIFICANT CHANGE UP
EOSINOPHIL # BLD AUTO: 0.12 K/UL — SIGNIFICANT CHANGE UP (ref 0–0.5)
EOSINOPHIL NFR BLD AUTO: 1.2 % — SIGNIFICANT CHANGE UP (ref 0–6)
GLUCOSE BLDC GLUCOMTR-MCNC: 113 MG/DL — HIGH (ref 70–99)
GLUCOSE BLDC GLUCOMTR-MCNC: 152 MG/DL — HIGH (ref 70–99)
GLUCOSE BLDC GLUCOMTR-MCNC: 168 MG/DL — HIGH (ref 70–99)
GLUCOSE BLDC GLUCOMTR-MCNC: 177 MG/DL — HIGH (ref 70–99)
GLUCOSE SERPL-MCNC: 103 MG/DL — HIGH (ref 70–99)
HCT VFR BLD CALC: 26 % — LOW (ref 34.5–45)
HGB BLD-MCNC: 8.3 G/DL — LOW (ref 11.5–15.5)
IMM GRANULOCYTES NFR BLD AUTO: 0.8 % — SIGNIFICANT CHANGE UP (ref 0–0.9)
LYMPHOCYTES # BLD AUTO: 3.27 K/UL — SIGNIFICANT CHANGE UP (ref 1–3.3)
LYMPHOCYTES # BLD AUTO: 31.7 % — SIGNIFICANT CHANGE UP (ref 13–44)
MCHC RBC-ENTMCNC: 29 PG — SIGNIFICANT CHANGE UP (ref 27–34)
MCHC RBC-ENTMCNC: 31.9 GM/DL — LOW (ref 32–36)
MCV RBC AUTO: 90.9 FL — SIGNIFICANT CHANGE UP (ref 80–100)
MONOCYTES # BLD AUTO: 1.24 K/UL — HIGH (ref 0–0.9)
MONOCYTES NFR BLD AUTO: 12 % — SIGNIFICANT CHANGE UP (ref 2–14)
NEUTROPHILS # BLD AUTO: 5.51 K/UL — SIGNIFICANT CHANGE UP (ref 1.8–7.4)
NEUTROPHILS NFR BLD AUTO: 53.5 % — SIGNIFICANT CHANGE UP (ref 43–77)
PLATELET # BLD AUTO: 379 K/UL — SIGNIFICANT CHANGE UP (ref 150–400)
POTASSIUM SERPL-MCNC: 3.9 MMOL/L — SIGNIFICANT CHANGE UP (ref 3.5–5.3)
POTASSIUM SERPL-SCNC: 3.9 MMOL/L — SIGNIFICANT CHANGE UP (ref 3.5–5.3)
RBC # BLD: 2.86 M/UL — LOW (ref 3.8–5.2)
RBC # FLD: 19.4 % — HIGH (ref 10.3–14.5)
SODIUM SERPL-SCNC: 137 MMOL/L — SIGNIFICANT CHANGE UP (ref 135–145)
WBC # BLD: 10.3 K/UL — SIGNIFICANT CHANGE UP (ref 3.8–10.5)
WBC # FLD AUTO: 10.3 K/UL — SIGNIFICANT CHANGE UP (ref 3.8–10.5)

## 2024-04-13 PROCEDURE — 99232 SBSQ HOSP IP/OBS MODERATE 35: CPT

## 2024-04-13 RX ADMIN — Medication 1: at 11:15

## 2024-04-13 RX ADMIN — CHLORHEXIDINE GLUCONATE 1 APPLICATION(S): 213 SOLUTION TOPICAL at 06:18

## 2024-04-13 RX ADMIN — Medication 1: at 16:19

## 2024-04-13 RX ADMIN — APIXABAN 2.5 MILLIGRAM(S): 2.5 TABLET, FILM COATED ORAL at 06:14

## 2024-04-13 RX ADMIN — Medication 12.5 MILLIGRAM(S): at 17:10

## 2024-04-13 RX ADMIN — Medication 1 TABLET(S): at 11:14

## 2024-04-13 RX ADMIN — PANTOPRAZOLE SODIUM 40 MILLIGRAM(S): 20 TABLET, DELAYED RELEASE ORAL at 06:14

## 2024-04-13 RX ADMIN — Medication 2000 UNIT(S): at 11:14

## 2024-04-13 RX ADMIN — APIXABAN 2.5 MILLIGRAM(S): 2.5 TABLET, FILM COATED ORAL at 17:10

## 2024-04-13 RX ADMIN — TAMSULOSIN HYDROCHLORIDE 0.4 MILLIGRAM(S): 0.4 CAPSULE ORAL at 22:30

## 2024-04-13 RX ADMIN — Medication 25 MICROGRAM(S): at 06:14

## 2024-04-13 RX ADMIN — SIMVASTATIN 20 MILLIGRAM(S): 20 TABLET, FILM COATED ORAL at 22:31

## 2024-04-13 RX ADMIN — Medication 500 MILLIGRAM(S): at 11:15

## 2024-04-13 NOTE — PROGRESS NOTE ADULT - SUBJECTIVE AND OBJECTIVE BOX
Chief complaint: UTI    Patient seen and examined at bedside. No acute overnight events reported. No fever, chills, cough, nausea or vomiting.     Vital Signs Last 24 Hrs  T(F): 98.2 (13 Apr 2024 08:18), Max: 98.2 (12 Apr 2024 20:37)  HR: 73 (13 Apr 2024 08:18) (60 - 83)  BP: 131/75 (13 Apr 2024 08:18) (106/71 - 131/75)  RR: 17 (13 Apr 2024 08:18) (17 - 18)  SpO2: 96% (13 Apr 2024 08:18) (96% - 98%)    Physical Exam:  Constitutional: alert and oriented, in no acute distress   Neck: Soft and supple  Respiratory: Clear to auscultation bilaterally  Cardiovascular: Irregular rhythm  Gastrointestinal: Soft, non-tender to palpation, +bs  Vascular: 2+ peripheral pulses  Musculoskeletal: no lower extremity edema bilaterally    Labs:                        8.3    10.30 )-----------( 379      ( 13 Apr 2024 07:45 )             26.0   04-13    137  |  105  |  23.3<H>  ----------------------------<  103<H>  3.9   |  20.0<L>  |  0.49<L>    Ca    8.1<L>      13 Apr 2024 07:45  Mg     1.0     04-12    TPro  5.1<L>  /  Alb  2.1<L>  /  TBili  <0.2<L>  /  DBili  x   /  AST  11  /  ALT  14  /  AlkPhos  68  04-12

## 2024-04-13 NOTE — PROGRESS NOTE ADULT - ASSESSMENT
84 yo female with recurrent UTIs, chronic left sided hydronephrosis, HTN, HLD, DM, hypothyroid, hiatal hernia, DVT no longer on AC, admitted with UTI, Afib with RVR, fever, hypotension requiring pressiors, ELISA.  Now doing better, off pressors.      #Hematuria, resolved  - Walters changed  - U/a +wbc, given worsening leukocytosis, repeat UCx sent on 4/10  - Repeat culture negative  - plan to d/c with walters in place; f/u with Dr. Aguila outpt     #Septic shock 2/2 UTI  #Lactic acidosis (resolved)  #Metabolic encephalopathy (resolved)  - UA positive, f/u BCx  - UCX: >3 organisms; possible contamination; repeat UCX NGTD  - BCx: NGTD  - S/p IVF bolus, Vanco, ceftriaxone in ED  - s/p meropenem empirically x 3 days  - off midodrine  - c/w Flomax     #Hydronephrosis  - CT ABD/PEL: Cystitis; new moderate right & severe chronic left hydronephrosis, no obstruction noted.  Distended bladder  - prior image in 2023 with chronic L hydro, now with new R hydronephrosis  - Flomax  - Urology recs appreciated  - renal U/S: L hydro improved, R hydro resolved     #Positive blood culture  - noted with staph epi on Bcx drawn on admission - suspect contaminant  - f/u repeat BCx: NGTD    #Acute resp failure with hypoxia, resolved  #+Flu  - Complete Tamiflu  - Duoneb  - On RA  - encourage incentive spirometry and OOB when able    #A-fib with RVR  - CHADVASC: 6  – Now rate controlled  – C/w metoprolol 12.5mg BID  - C/w Eliquis 2.5 mg BID  - Cardiology notes appreciated    #ELISA (resolved)  – Cr: 0.56 (1.8 at admission)  – Likely in the setting of septic shock    #T2DM  – C/w ISS    #Hypothyroidism  – C/w levothyroxine 25 mg QD    #HLD  – C/w simvastatin 20 mg HS    DVT ppx  - Eliquis    Dispo: DC home 4/15 when aides reinstated

## 2024-04-14 LAB
ANION GAP SERPL CALC-SCNC: 11 MMOL/L — SIGNIFICANT CHANGE UP (ref 5–17)
BASOPHILS # BLD AUTO: 0.07 K/UL — SIGNIFICANT CHANGE UP (ref 0–0.2)
BASOPHILS NFR BLD AUTO: 0.6 % — SIGNIFICANT CHANGE UP (ref 0–2)
BUN SERPL-MCNC: 26.4 MG/DL — HIGH (ref 8–20)
CALCIUM SERPL-MCNC: 8.4 MG/DL — SIGNIFICANT CHANGE UP (ref 8.4–10.5)
CHLORIDE SERPL-SCNC: 109 MMOL/L — HIGH (ref 96–108)
CO2 SERPL-SCNC: 19 MMOL/L — LOW (ref 22–29)
CREAT SERPL-MCNC: 0.53 MG/DL — SIGNIFICANT CHANGE UP (ref 0.5–1.3)
EGFR: 93 ML/MIN/1.73M2 — SIGNIFICANT CHANGE UP
EOSINOPHIL # BLD AUTO: 0.1 K/UL — SIGNIFICANT CHANGE UP (ref 0–0.5)
EOSINOPHIL NFR BLD AUTO: 0.9 % — SIGNIFICANT CHANGE UP (ref 0–6)
GLUCOSE BLDC GLUCOMTR-MCNC: 109 MG/DL — HIGH (ref 70–99)
GLUCOSE BLDC GLUCOMTR-MCNC: 111 MG/DL — HIGH (ref 70–99)
GLUCOSE BLDC GLUCOMTR-MCNC: 158 MG/DL — HIGH (ref 70–99)
GLUCOSE BLDC GLUCOMTR-MCNC: 199 MG/DL — HIGH (ref 70–99)
GLUCOSE SERPL-MCNC: 112 MG/DL — HIGH (ref 70–99)
HCT VFR BLD CALC: 27.9 % — LOW (ref 34.5–45)
HGB BLD-MCNC: 8.7 G/DL — LOW (ref 11.5–15.5)
IMM GRANULOCYTES NFR BLD AUTO: 0.6 % — SIGNIFICANT CHANGE UP (ref 0–0.9)
LYMPHOCYTES # BLD AUTO: 2.92 K/UL — SIGNIFICANT CHANGE UP (ref 1–3.3)
LYMPHOCYTES # BLD AUTO: 26.5 % — SIGNIFICANT CHANGE UP (ref 13–44)
MCHC RBC-ENTMCNC: 29 PG — SIGNIFICANT CHANGE UP (ref 27–34)
MCHC RBC-ENTMCNC: 31.2 GM/DL — LOW (ref 32–36)
MCV RBC AUTO: 93 FL — SIGNIFICANT CHANGE UP (ref 80–100)
MONOCYTES # BLD AUTO: 1.12 K/UL — HIGH (ref 0–0.9)
MONOCYTES NFR BLD AUTO: 10.2 % — SIGNIFICANT CHANGE UP (ref 2–14)
NEUTROPHILS # BLD AUTO: 6.73 K/UL — SIGNIFICANT CHANGE UP (ref 1.8–7.4)
NEUTROPHILS NFR BLD AUTO: 61.2 % — SIGNIFICANT CHANGE UP (ref 43–77)
PLATELET # BLD AUTO: 372 K/UL — SIGNIFICANT CHANGE UP (ref 150–400)
POTASSIUM SERPL-MCNC: 4.4 MMOL/L — SIGNIFICANT CHANGE UP (ref 3.5–5.3)
POTASSIUM SERPL-SCNC: 4.4 MMOL/L — SIGNIFICANT CHANGE UP (ref 3.5–5.3)
RBC # BLD: 3 M/UL — LOW (ref 3.8–5.2)
RBC # FLD: 19.8 % — HIGH (ref 10.3–14.5)
SODIUM SERPL-SCNC: 139 MMOL/L — SIGNIFICANT CHANGE UP (ref 135–145)
WBC # BLD: 11.01 K/UL — HIGH (ref 3.8–10.5)
WBC # FLD AUTO: 11.01 K/UL — HIGH (ref 3.8–10.5)

## 2024-04-14 PROCEDURE — 99232 SBSQ HOSP IP/OBS MODERATE 35: CPT

## 2024-04-14 RX ADMIN — Medication 12.5 MILLIGRAM(S): at 05:25

## 2024-04-14 RX ADMIN — TAMSULOSIN HYDROCHLORIDE 0.4 MILLIGRAM(S): 0.4 CAPSULE ORAL at 21:58

## 2024-04-14 RX ADMIN — Medication 1: at 11:48

## 2024-04-14 RX ADMIN — Medication 500 MILLIGRAM(S): at 17:49

## 2024-04-14 RX ADMIN — SIMVASTATIN 20 MILLIGRAM(S): 20 TABLET, FILM COATED ORAL at 21:58

## 2024-04-14 RX ADMIN — Medication 25 MICROGRAM(S): at 05:24

## 2024-04-14 RX ADMIN — PANTOPRAZOLE SODIUM 40 MILLIGRAM(S): 20 TABLET, DELAYED RELEASE ORAL at 05:25

## 2024-04-14 RX ADMIN — Medication 1 TABLET(S): at 17:49

## 2024-04-14 RX ADMIN — CHLORHEXIDINE GLUCONATE 1 APPLICATION(S): 213 SOLUTION TOPICAL at 05:31

## 2024-04-14 RX ADMIN — APIXABAN 2.5 MILLIGRAM(S): 2.5 TABLET, FILM COATED ORAL at 05:24

## 2024-04-14 RX ADMIN — APIXABAN 2.5 MILLIGRAM(S): 2.5 TABLET, FILM COATED ORAL at 17:49

## 2024-04-14 RX ADMIN — Medication 12.5 MILLIGRAM(S): at 17:50

## 2024-04-14 RX ADMIN — Medication 2000 UNIT(S): at 17:50

## 2024-04-14 NOTE — PROGRESS NOTE ADULT - NUTRITIONAL ASSESSMENT
This patient has been assessed with a concern for Malnutrition and has been determined to have a diagnosis/diagnoses of Moderate protein-calorie malnutrition.    This patient is being managed with:   Diet Consistent Carbohydrate w/Evening Snack-  Soft and Bite Sized (SOFTBTSZ)  Entered: Apr 1 2024  5:52PM  
This patient has been assessed with a concern for Malnutrition and has been determined to have a diagnosis/diagnoses of Moderate protein-calorie malnutrition.    This patient is being managed with:   Diet Consistent Carbohydrate w/Evening Snack-  Supplement Feeding Modality:  Oral  Glucerna Shake Cans or Servings Per Day:  1       Frequency:  Daily  Entered: Apr 8 2024  2:13PM  
This patient has been assessed with a concern for Malnutrition and has been determined to have a diagnosis/diagnoses of Moderate protein-calorie malnutrition.    This patient is being managed with:   Diet Consistent Carbohydrate w/Evening Snack-  Soft and Bite Sized (SOFTBTSZ)  Entered: Apr 1 2024  5:52PM  
This patient has been assessed with a concern for Malnutrition and has been determined to have a diagnosis/diagnoses of Moderate protein-calorie malnutrition.    This patient is being managed with:   Diet Consistent Carbohydrate w/Evening Snack-  Supplement Feeding Modality:  Oral  Glucerna Shake Cans or Servings Per Day:  1       Frequency:  Daily  Entered: Apr 8 2024  2:13PM  
This patient has been assessed with a concern for Malnutrition and has been determined to have a diagnosis/diagnoses of Moderate protein-calorie malnutrition.    This patient is being managed with:   Diet Consistent Carbohydrate w/Evening Snack-  Supplement Feeding Modality:  Oral  Glucerna Shake Cans or Servings Per Day:  1       Frequency:  Daily  Entered: Apr 8 2024  2:13PM  
This patient has been assessed with a concern for Malnutrition and has been determined to have a diagnosis/diagnoses of Moderate protein-calorie malnutrition.    This patient is being managed with:   Diet Consistent Carbohydrate w/Evening Snack-  Soft and Bite Sized (SOFTBTSZ)  Entered: Apr 1 2024  5:52PM  
This patient has been assessed with a concern for Malnutrition and has been determined to have a diagnosis/diagnoses of Moderate protein-calorie malnutrition.    This patient is being managed with:   Diet Consistent Carbohydrate w/Evening Snack-  Supplement Feeding Modality:  Oral  Glucerna Shake Cans or Servings Per Day:  1       Frequency:  Daily  Entered: Apr 8 2024  2:13PM  
This patient has been assessed with a concern for Malnutrition and has been determined to have a diagnosis/diagnoses of Moderate protein-calorie malnutrition.    This patient is being managed with:   Diet Consistent Carbohydrate w/Evening Snack-  Soft and Bite Sized (SOFTBTSZ)  Entered: Apr 1 2024  5:52PM

## 2024-04-14 NOTE — PROGRESS NOTE ADULT - PROVIDER SPECIALTY LIST ADULT
Hospitalist
Urology
Urology
Hospitalist
MICU
Urology

## 2024-04-14 NOTE — PROGRESS NOTE ADULT - ASSESSMENT
82 yo female with recurrent UTIs, chronic left sided hydronephrosis, HTN, HLD, DM, hypothyroid, hiatal hernia, DVT no longer on AC, admitted with UTI, Afib with RVR, fever, hypotension requiring pressiors, ELISA.  Now doing better, off pressors.      #Hematuria, resolved  - Walters changed  - U/a +wbc, given worsening leukocytosis, repeat UCx sent on 4/10  - Repeat culture negative  - plan to d/c with walters in place; f/u with Dr. Aguila outpt     #Septic shock 2/2 UTI  #Lactic acidosis (resolved)  #Metabolic encephalopathy (resolved)  - UA positive, f/u BCx  - UCX: >3 organisms; possible contamination; repeat UCX NGTD  - BCx: NGTD  - S/p IVF bolus, Vanco, ceftriaxone in ED  - s/p meropenem empirically x 3 days  - off midodrine  - c/w Flomax     #Hydronephrosis  - CT ABD/PEL: Cystitis; new moderate right & severe chronic left hydronephrosis, no obstruction noted.  Distended bladder  - prior image in 2023 with chronic L hydro, now with new R hydronephrosis  - Flomax  - Urology recs appreciated  - renal U/S: L hydro improved, R hydro resolved     #Positive blood culture  - noted with staph epi on Bcx drawn on admission - suspect contaminant  - f/u repeat BCx: NGTD    #Acute resp failure with hypoxia, resolved  #+Flu  - Complete Tamiflu  - Duoneb  - On RA  - encourage incentive spirometry and OOB when able    #A-fib with RVR  - CHADVASC: 6  – Now rate controlled  – C/w metoprolol 12.5mg BID  - C/w Eliquis 2.5 mg BID  - Cardiology notes appreciated    #ELISA (resolved)  – Cr: 0.56 (1.8 at admission)  – Likely in the setting of septic shock    #T2DM  – C/w ISS    #Hypothyroidism  – C/w levothyroxine 25 mg QD    #HLD  – C/w simvastatin 20 mg HS    DVT ppx  - Eliquis    Dispo: DC home 4/15 when aides reinstated

## 2024-04-14 NOTE — PROGRESS NOTE ADULT - SUBJECTIVE AND OBJECTIVE BOX
Chief complaint: UTI sepsis    Patient seen and examined at bedside. No acute overnight events reported. No fever, chills, cough, nausea or vomiting.     Vital Signs Last 24 Hrs  T(F): 98.3 (14 Apr 2024 08:31), Max: 98.3 (14 Apr 2024 08:31)  HR: 76 (14 Apr 2024 08:31) (71 - 77)  BP: 116/70 (14 Apr 2024 08:31) (105/62 - 121/62)  RR: 18 (14 Apr 2024 08:31) (18 - 18)  SpO2: 98% (14 Apr 2024 08:31) (96% - 98%)    Physical Exam:  Constitutional: alert and oriented, in no acute distress   Neck: Soft and supple  Respiratory: Clear to auscultation bilaterally  Cardiovascular: Regular rate and rhyhtm  Gastrointestinal: Soft, non-tender to palpation, +bs  Musculoskeletal: no lower extremity edema bilaterally    Labs:                        8.7    11.01 )-----------( 372      ( 14 Apr 2024 05:56 )             27.9   04-14    139  |  109<H>  |  26.4<H>  ----------------------------<  112<H>  4.4   |  19.0<L>  |  0.53    Ca    8.4      14 Apr 2024 05:56

## 2024-04-14 NOTE — PROGRESS NOTE ADULT - REASON FOR ADMISSION
urosepsis

## 2024-04-15 ENCOUNTER — TRANSCRIPTION ENCOUNTER (OUTPATIENT)
Age: 84
End: 2024-04-15

## 2024-04-15 VITALS
TEMPERATURE: 98 F | RESPIRATION RATE: 18 BRPM | HEART RATE: 67 BPM | OXYGEN SATURATION: 94 % | DIASTOLIC BLOOD PRESSURE: 82 MMHG | SYSTOLIC BLOOD PRESSURE: 124 MMHG

## 2024-04-15 LAB — GLUCOSE BLDC GLUCOMTR-MCNC: 111 MG/DL — HIGH (ref 70–99)

## 2024-04-15 PROCEDURE — 93306 TTE W/DOPPLER COMPLETE: CPT

## 2024-04-15 PROCEDURE — 87077 CULTURE AEROBIC IDENTIFY: CPT

## 2024-04-15 PROCEDURE — 99239 HOSP IP/OBS DSCHRG MGMT >30: CPT

## 2024-04-15 PROCEDURE — 80053 COMPREHEN METABOLIC PANEL: CPT

## 2024-04-15 PROCEDURE — 85025 COMPLETE CBC W/AUTO DIFF WBC: CPT

## 2024-04-15 PROCEDURE — 83605 ASSAY OF LACTIC ACID: CPT

## 2024-04-15 PROCEDURE — 36569 INSJ PICC 5 YR+ W/O IMAGING: CPT

## 2024-04-15 PROCEDURE — 82330 ASSAY OF CALCIUM: CPT

## 2024-04-15 PROCEDURE — 83735 ASSAY OF MAGNESIUM: CPT

## 2024-04-15 PROCEDURE — 84295 ASSAY OF SERUM SODIUM: CPT

## 2024-04-15 PROCEDURE — 0225U NFCT DS DNA&RNA 21 SARSCOV2: CPT

## 2024-04-15 PROCEDURE — 84443 ASSAY THYROID STIM HORMONE: CPT

## 2024-04-15 PROCEDURE — 83880 ASSAY OF NATRIURETIC PEPTIDE: CPT

## 2024-04-15 PROCEDURE — 82962 GLUCOSE BLOOD TEST: CPT

## 2024-04-15 PROCEDURE — 96366 THER/PROPH/DIAG IV INF ADDON: CPT

## 2024-04-15 PROCEDURE — 84436 ASSAY OF TOTAL THYROXINE: CPT

## 2024-04-15 PROCEDURE — 87640 STAPH A DNA AMP PROBE: CPT

## 2024-04-15 PROCEDURE — 84484 ASSAY OF TROPONIN QUANT: CPT

## 2024-04-15 PROCEDURE — 87150 DNA/RNA AMPLIFIED PROBE: CPT

## 2024-04-15 PROCEDURE — 82435 ASSAY OF BLOOD CHLORIDE: CPT

## 2024-04-15 PROCEDURE — 71045 X-RAY EXAM CHEST 1 VIEW: CPT

## 2024-04-15 PROCEDURE — 94640 AIRWAY INHALATION TREATMENT: CPT

## 2024-04-15 PROCEDURE — C1751: CPT

## 2024-04-15 PROCEDURE — 96375 TX/PRO/DX INJ NEW DRUG ADDON: CPT | Mod: XU

## 2024-04-15 PROCEDURE — 85027 COMPLETE CBC AUTOMATED: CPT

## 2024-04-15 PROCEDURE — 93005 ELECTROCARDIOGRAM TRACING: CPT

## 2024-04-15 PROCEDURE — 82803 BLOOD GASES ANY COMBINATION: CPT

## 2024-04-15 PROCEDURE — 87040 BLOOD CULTURE FOR BACTERIA: CPT

## 2024-04-15 PROCEDURE — 87637 SARSCOV2&INF A&B&RSV AMP PRB: CPT

## 2024-04-15 PROCEDURE — 81001 URINALYSIS AUTO W/SCOPE: CPT

## 2024-04-15 PROCEDURE — 36415 COLL VENOUS BLD VENIPUNCTURE: CPT

## 2024-04-15 PROCEDURE — 99285 EMERGENCY DEPT VISIT HI MDM: CPT

## 2024-04-15 PROCEDURE — 85730 THROMBOPLASTIN TIME PARTIAL: CPT

## 2024-04-15 PROCEDURE — 76775 US EXAM ABDO BACK WALL LIM: CPT

## 2024-04-15 PROCEDURE — 86900 BLOOD TYPING SEROLOGIC ABO: CPT

## 2024-04-15 PROCEDURE — 82947 ASSAY GLUCOSE BLOOD QUANT: CPT

## 2024-04-15 PROCEDURE — 74176 CT ABD & PELVIS W/O CONTRAST: CPT | Mod: MC

## 2024-04-15 PROCEDURE — 87086 URINE CULTURE/COLONY COUNT: CPT

## 2024-04-15 PROCEDURE — 71250 CT THORAX DX C-: CPT | Mod: MC

## 2024-04-15 PROCEDURE — 96365 THER/PROPH/DIAG IV INF INIT: CPT | Mod: XU

## 2024-04-15 PROCEDURE — 85014 HEMATOCRIT: CPT

## 2024-04-15 PROCEDURE — 85018 HEMOGLOBIN: CPT

## 2024-04-15 PROCEDURE — 84100 ASSAY OF PHOSPHORUS: CPT

## 2024-04-15 PROCEDURE — 80048 BASIC METABOLIC PNL TOTAL CA: CPT

## 2024-04-15 PROCEDURE — 96368 THER/DIAG CONCURRENT INF: CPT

## 2024-04-15 PROCEDURE — 85610 PROTHROMBIN TIME: CPT

## 2024-04-15 PROCEDURE — 86901 BLOOD TYPING SEROLOGIC RH(D): CPT

## 2024-04-15 PROCEDURE — 84145 PROCALCITONIN (PCT): CPT

## 2024-04-15 PROCEDURE — 84132 ASSAY OF SERUM POTASSIUM: CPT

## 2024-04-15 PROCEDURE — 86850 RBC ANTIBODY SCREEN: CPT

## 2024-04-15 PROCEDURE — 87641 MR-STAPH DNA AMP PROBE: CPT

## 2024-04-15 RX ORDER — APIXABAN 2.5 MG/1
1 TABLET, FILM COATED ORAL
Qty: 60 | Refills: 0
Start: 2024-04-15 | End: 2024-05-14

## 2024-04-15 RX ORDER — OLMESARTAN MEDOXOMIL 5 MG/1
1 TABLET, FILM COATED ORAL
Refills: 0 | DISCHARGE

## 2024-04-15 RX ORDER — TAMSULOSIN HYDROCHLORIDE 0.4 MG/1
1 CAPSULE ORAL
Qty: 30 | Refills: 0
Start: 2024-04-15 | End: 2024-05-14

## 2024-04-15 RX ORDER — PANTOPRAZOLE SODIUM 20 MG/1
1 TABLET, DELAYED RELEASE ORAL
Qty: 30 | Refills: 0
Start: 2024-04-15 | End: 2024-05-14

## 2024-04-15 RX ORDER — METOPROLOL TARTRATE 50 MG
0.5 TABLET ORAL
Qty: 30 | Refills: 0
Start: 2024-04-15 | End: 2024-05-14

## 2024-04-15 RX ADMIN — Medication 12.5 MILLIGRAM(S): at 05:05

## 2024-04-15 RX ADMIN — Medication 25 MICROGRAM(S): at 05:05

## 2024-04-15 RX ADMIN — PANTOPRAZOLE SODIUM 40 MILLIGRAM(S): 20 TABLET, DELAYED RELEASE ORAL at 05:06

## 2024-04-15 RX ADMIN — APIXABAN 2.5 MILLIGRAM(S): 2.5 TABLET, FILM COATED ORAL at 05:05

## 2024-04-15 NOTE — DISCHARGE NOTE NURSING/CASE MANAGEMENT/SOCIAL WORK - NSDCPEFALRISK_GEN_ALL_CORE
For information on Fall & Injury Prevention, visit: [email or web address] OR  [email or web address] OR  https://www.cdc.gov/steadi/patient.html

## 2024-04-15 NOTE — DISCHARGE NOTE NURSING/CASE MANAGEMENT/SOCIAL WORK - NSTRANSFERBELONGINGSDISPO_GEN_A_NUR
Assessment and Plan:     Problem List Items Addressed This Visit        Endocrine    Diabetes mellitus type 2 in obese ([facility] )    Controlled type 2 diabetes mellitus with stage 3 chronic kidney disease, without long-term current use of insulin ([facility] )    Relevant Orders    POCT hemoglobin A1c (Completed)       Cardiovascular and Mediastinum    Essential hypertension       Other    Morbid obesity ([facility] )    Has health insurance with inadequate coverage of health expenses      Other Visit Diagnoses     Medicare annual wellness visit, subsequent    -  Primary    Breast cancer screening by mammogram        Relevant Orders    Mammo screening bilateral w 3d & cad    Screening for colorectal cancer        Relevant Orders    Occult Bloood,Fecal Immunochemical    Encounter for immunization        Relevant Orders    PNEUMOCOCCAL POLYSACCHARIDE VACCINE 23-VALENT =>3YO SQ IM (Completed)    Postmenopausal        Relevant Orders    DXA bone density spine hip and pelvis           Preventive health issues were discussed with patient, and age appropriate screening tests were ordered as noted in patient's After Visit Summary  Personalized health advice and appropriate referrals for health education or preventive services given if needed, as noted in patient's After Visit Summary       History of Present Illness:     Patient presents for Medicare Annual Wellness visit    Patient Care Team:  Xander Heaton DO as PCP - General  Malki Valladares DO     Problem List:     Patient Active Problem List   Diagnosis    Atrial fibrillation ([facility] )    Stage 3 chronic kidney disease ([facility] )    Essential hypertension    Morbid obesity ([facility] )    Multiple renal cysts    Pain of right scapula    Has health insurance with inadequate coverage of health expenses    Colonoscopy refused    Elevated fasting glucose    Diabetes mellitus type 2 in obese ([facility] )    Encounter for diabetic foot exam ([place] [id number] )    Skin lesions    Controlled type 2 diabetes mellitus with stage 3 chronic kidney disease, without long-term current use of insulin [facility])      Past Medical and Surgical History:     Past Medical History:   Diagnosis Date    Hypokalemia     Last Assessed: 8/12/2016     Hyponatremia     Last Assessed: 8/12/2016     Irregular heartbeat     A Flutter 7/2015 while in hospital for pneumonia, resolved     Pneumonia      History reviewed  No pertinent surgical history  Family History:     Family History   Problem Relation Age of Onset    Colon cancer Mother     Cancer Mother     Tongue cancer Father     Hypertension Father     Cancer Father     Other Sister         Epilepsy     Hypertension Sister     Colon cancer Brother     Lung cancer Brother         secondary     Hypertension Brother     Cancer Brother       Social History:     Social History     Socioeconomic History    Marital status: Single     Spouse name: None    Number of children: None    Years of education: None    Highest education level: None   Occupational History    None   Tobacco Use    Smoking status: Never Smoker    Smokeless tobacco: Never Used   Vaping Use    Vaping Use: Never used   Substance and Sexual Activity    Alcohol use: No    Drug use: No    Sexual activity: Not Currently   Other Topics Concern    None   Social History Narrative    None     Social Determinants of Health     Financial Resource Strain:     Difficulty of Paying Living Expenses:    Food Insecurity:     Worried About Running Out of Food in the Last Year:     Ran Out of Food in the Last Year:    Transportation Needs: No Transportation Needs    Lack of Transportation (Medical): No    Lack of Transportation (Non-Medical):  No   Physical Activity:     Days of Exercise per Week:     Minutes of Exercise per Session:    Stress:     Feeling of Stress :    Social Connections:     Frequency of Communication with Friends and Family:     Frequency of Social Gatherings with Friends and Family:     Attends Yazidi Services:     Active Member of Clubs or Organizations:     Attends Club or Organization Meetings:     Marital Status:    Intimate Partner Violence:     Fear of Current or Ex-Partner:     Emotionally Abused:     Physically Abused:     Sexually Abused:       Medications and Allergies:     Current Outpatient Medications   Medication Sig Dispense Refill    amLODIPine (NORVASC) 5 mg tablet Take 1 tablet (5 mg total) by mouth daily 90 tablet 1    aspirin 81 MG tablet Take 1 tablet by mouth daily      Cholecalciferol (VITAMIN D3) 2000 units capsule Take 1 capsule by mouth daily      CINNAMON PO Take 1 capsule by mouth daily      Coenzyme Q-10 200 MG CAPS Take 1 capsule by mouth      Cranberry 500 MG CAPS Take 1 capsule by mouth daily      lisinopril-hydrochlorothiazide (PRINZIDE,ZESTORETIC) 20-25 MG per tablet TAKE 1 TABLET BY MOUTH EVERY DAY IN THE MORNING 90 tablet 1    metoprolol tartrate (LOPRESSOR) 50 mg tablet TAKE 1 TABLET BY MOUTH TWICE A  tablet 1    Multiple Vitamins-Minerals (DAILY MULTIPLE VITAMINS/MIN PO) Take 1 tablet by mouth daily      Omega-3 Fatty Acids (FISH OIL) 1,000 mg Take 1 capsule by mouth daily      Protein (NUTRA/PRO CHOCOLATE PO) Take by mouth       No current facility-administered medications for this visit       Allergies   Allergen Reactions    Seasonal Ic  [Cholestatin]       Immunizations:     Immunization History   Administered Date(s) Administered    Influenza Quadrivalent Preservative Free 3 years and older IM 11/03/2016, 12/14/2017    Influenza, high dose seasonal 0 7 mL 11/20/2020    Influenza, injectable, quadrivalent, preservative free 0 5 mL 01/24/2019    Pneumococcal Conjugate 13-Valent 08/14/2020    Pneumococcal Polysaccharide PPV23 09/02/2021    SARS-CoV-2 / COVID-19 mRNA IM (Carla Manus) 04/22/2021, 05/24/2021    Tdap 03/03/2016      Health Maintenance:         Topic Date Due    Breast Cancer Screening: Mammogram  Never done    Colorectal Cancer Screening  Never done    Hepatitis C Screening  Completed         Topic Date Due    Influenza Vaccine (1) 09/01/2021      Medicare Health Risk Assessment:     /90 (BP Location: Left arm, Patient Position: Sitting, Cuff Size: Large)   Pulse 81   Temp 99 1 °F (37 3 °C) (Tympanic)   Ht 5' 9" (1 753 m)   Wt 131 kg (288 lb 9 6 oz)   SpO2 99%   BMI 42 62 kg/m²          Health Risk Assessment:   Patient rates overall health as good  Patient feels that their physical health rating is same  Patient is satisfied with their life  Eyesight was rated as same  Hearing was rated as same  Patient feels that their emotional and mental health rating is same  Patients states they are never, rarely angry  Patient states they are sometimes unusually tired/fatigued  Pain experienced in the last 7 days has been some  Patient's pain rating has been 2/10  Patient states that she has experienced no weight loss or gain in last 6 months  Depression Screening:   PHQ-2 Score: 0      Fall Risk Screening: In the past year, patient has experienced: no history of falling in past year      Urinary Incontinence Screening:   Patient has leaked urine accidently in the last six months  Home Safety:  Patient does not have trouble with stairs inside or outside of their home  Patient has working smoke alarms and has working carbon monoxide detector  Home safety hazards include: none  Nutrition:   Current diet is Regular and Limited junk food  Medications:   Patient is currently taking over-the-counter supplements  OTC medications include: see medication list  Patient is able to manage medications  Activities of Daily Living (ADLs)/Instrumental Activities of Daily Living (IADLs):   Walk and transfer into and out of bed and chair?: Yes  Dress and groom yourself?: Yes    Bathe or shower yourself?: Yes    Feed yourself?  Yes  Do your laundry/housekeeping?: Yes  Manage your money, pay your bills and track your expenses?: Yes  Make your own meals?: Yes    Do your own shopping?: Yes    Previous Hospitalizations:   Any hospitalizations or ED visits within the last 12 months?: No      Advance Care Planning:   Living will: No    Durable POA for healthcare: No    Advanced directive: No      Cognitive Screening:   Provider or family/friend/caregiver concerned regarding cognition?: No    PREVENTIVE SCREENINGS      Cardiovascular Screening:    General: Screening Not Indicated and History Lipid Disorder      Diabetes Screening:     General: Screening Not Indicated and History Diabetes      Colorectal Cancer Screening:     General: Risks and Benefits Discussed    Due for: FOBT/FIT      Breast Cancer Screening:     General: Risks and Benefits Discussed    Due for: Mammogram        Cervical Cancer Screening:    General: Screening Not Indicated      Osteoporosis Screening:    General: Risks and Benefits Discussed    Due for: DXA Axial      Abdominal Aortic Aneurysm (AAA) Screening:        General: Screening Not Indicated      Lung Cancer Screening:     General: Screening Not Indicated      Hepatitis C Screening:    General: Screening Current    Screening, Brief Intervention, and Referral to Treatment (SBIRT)    Screening  Typical number of drinks in a day: 0  Typical number of drinks in a week: 0  Interpretation: Low risk drinking behavior  Brady Denver, DO  BMI Counseling: Body mass index is 42 62 kg/m²  The BMI is above normal  Nutrition recommendations include reducing portion sizes and decreasing overall calorie intake  Exercise recommendations include exercising 3-5 times per week  not applicable

## 2024-04-15 NOTE — DISCHARGE NOTE NURSING/CASE MANAGEMENT/SOCIAL WORK - NSDCVIVACCINE_GEN_ALL_CORE_FT
Influenza, seasonal, injectable; 2014/11/0 ; Gisel Chamberlain (RN);   influenza, injectable, quadrivalent, preservative free; 17-Nov-2020 16:50; Juan J Quintanilla (RN); [employer]; [id number] (Exp. Date: 30-Jun-2021); IntraMuscular; Deltoid Left.; 0.5 milliLiter(s); VIS (VIS Published: 15-Aug-2019, VIS Presented: 17-Nov-2020);   pneumococcal polysaccharide PPV23; 2009/10/0 ; Gisel Chamberlain (RN);

## 2024-04-15 NOTE — DISCHARGE NOTE NURSING/CASE MANAGEMENT/SOCIAL WORK - PATIENT PORTAL LINK FT
You can access the FollowMyHealth Patient Portal offered by [facility] by registering at the following website: [email or web address]. By joining [facility]’s FollowMyHealth portal, you will also be able to view your health information using other applications (apps) compatible with our system.

## 2024-05-12 NOTE — ED ADULT TRIAGE NOTE - AS O2 DELIVERY
CARE PROVIDERS:  Accepting Physician: Torrey Weldon  Access Services: Niels Arias  Administration: Luis Manuel Mason  Admitting: Torrey Weldon  Attending: Torrey Weldon  Cardiology Technician: Hermila Ray  Case Management: Naomi Asher  Consultant: Sam Posada  Consultant: Aruna Guzman  Covering Team: Aruna Guzman  ED ACP: Asuncion Rodriguez  ED Attending: Citlali Manzano ED Nurse: Donavon Armenta  Emergency Medicine: Asuncion Rodriguez  Nurse: Sarah Tubbs  Nurse: Arron Hector  Nurse: Flor Tamez  Ordered: ServiceAccount, [facility] CenterMLM  Outpatient Provider: Nate Morales  Override: Eladio Salinas  Override: Latia Chavez  Override: Flor Tamez  Physical Therapy: Stephon Hugo  Primary Team: Jeffry Greer  Registered Dietitian: Salina Norton  : Shweta Lopes  
room air

## 2024-06-03 ENCOUNTER — INPATIENT (INPATIENT)
Facility: HOSPITAL | Age: 84
LOS: 21 days | Discharge: NURSING, REHAB OR LONG-TERM CARE | DRG: 872 | End: 2024-06-25
Attending: INTERNAL MEDICINE | Admitting: STUDENT IN AN ORGANIZED HEALTH CARE EDUCATION/TRAINING PROGRAM
Payer: MEDICARE

## 2024-06-03 VITALS
SYSTOLIC BLOOD PRESSURE: 84 MMHG | HEART RATE: 81 BPM | TEMPERATURE: 99 F | WEIGHT: 199.96 LBS | DIASTOLIC BLOOD PRESSURE: 63 MMHG | RESPIRATION RATE: 18 BRPM | OXYGEN SATURATION: 96 %

## 2024-06-03 DIAGNOSIS — Z41.1 ENCOUNTER FOR COSMETIC SURGERY: Chronic | ICD-10-CM

## 2024-06-03 DIAGNOSIS — R22.1 LOCALIZED SWELLING, MASS AND LUMP, NECK: Chronic | ICD-10-CM

## 2024-06-03 DIAGNOSIS — Z98.89 OTHER SPECIFIED POSTPROCEDURAL STATES: Chronic | ICD-10-CM

## 2024-06-03 DIAGNOSIS — Z90.49 ACQUIRED ABSENCE OF OTHER SPECIFIED PARTS OF DIGESTIVE TRACT: Chronic | ICD-10-CM

## 2024-06-03 DIAGNOSIS — Z96.652 PRESENCE OF LEFT ARTIFICIAL KNEE JOINT: Chronic | ICD-10-CM

## 2024-06-03 LAB
ALBUMIN SERPL ELPH-MCNC: 2.1 G/DL — LOW (ref 3.3–5.2)
ALP SERPL-CCNC: 254 U/L — HIGH (ref 40–120)
ALT FLD-CCNC: 137 U/L — HIGH
ANION GAP SERPL CALC-SCNC: 13 MMOL/L — SIGNIFICANT CHANGE UP (ref 5–17)
ANION GAP SERPL CALC-SCNC: 16 MMOL/L — SIGNIFICANT CHANGE UP (ref 5–17)
APPEARANCE UR: ABNORMAL
APTT BLD: 40 SEC — HIGH (ref 24.5–35.6)
AST SERPL-CCNC: 70 U/L — HIGH
BACTERIA # UR AUTO: ABNORMAL /HPF
BASE EXCESS BLDV CALC-SCNC: -12.6 MMOL/L — LOW (ref -2–3)
BASOPHILS # BLD AUTO: 0 K/UL — SIGNIFICANT CHANGE UP (ref 0–0.2)
BASOPHILS NFR BLD AUTO: 0 % — SIGNIFICANT CHANGE UP (ref 0–2)
BILIRUB SERPL-MCNC: 0.7 MG/DL — SIGNIFICANT CHANGE UP (ref 0.4–2)
BILIRUB UR-MCNC: NEGATIVE — SIGNIFICANT CHANGE UP
BUN SERPL-MCNC: 34.3 MG/DL — HIGH (ref 8–20)
BUN SERPL-MCNC: 36.7 MG/DL — HIGH (ref 8–20)
BURR CELLS BLD QL SMEAR: PRESENT — SIGNIFICANT CHANGE UP
CA-I SERPL-SCNC: 1.21 MMOL/L — SIGNIFICANT CHANGE UP (ref 1.15–1.33)
CALCIUM SERPL-MCNC: 8.7 MG/DL — SIGNIFICANT CHANGE UP (ref 8.4–10.5)
CALCIUM SERPL-MCNC: 8.8 MG/DL — SIGNIFICANT CHANGE UP (ref 8.4–10.5)
CAST: 1 /LPF — SIGNIFICANT CHANGE UP (ref 0–4)
CHLORIDE BLDV-SCNC: 96 MMOL/L — SIGNIFICANT CHANGE UP (ref 96–108)
CHLORIDE SERPL-SCNC: 89 MMOL/L — LOW (ref 96–108)
CHLORIDE SERPL-SCNC: 93 MMOL/L — LOW (ref 96–108)
CO2 SERPL-SCNC: 14 MMOL/L — LOW (ref 22–29)
CO2 SERPL-SCNC: 16 MMOL/L — LOW (ref 22–29)
COLOR SPEC: ABNORMAL
CREAT SERPL-MCNC: 0.95 MG/DL — SIGNIFICANT CHANGE UP (ref 0.5–1.3)
CREAT SERPL-MCNC: 1 MG/DL — SIGNIFICANT CHANGE UP (ref 0.5–1.3)
DIFF PNL FLD: ABNORMAL
EGFR: 57 ML/MIN/1.73M2 — LOW
EGFR: 60 ML/MIN/1.73M2 — SIGNIFICANT CHANGE UP
EOSINOPHIL # BLD AUTO: 0 K/UL — SIGNIFICANT CHANGE UP (ref 0–0.5)
EOSINOPHIL NFR BLD AUTO: 0 % — SIGNIFICANT CHANGE UP (ref 0–6)
FLUAV AG NPH QL: SIGNIFICANT CHANGE UP
FLUBV AG NPH QL: SIGNIFICANT CHANGE UP
GAS PNL BLDV: 117 MMOL/L — CRITICAL LOW (ref 136–145)
GAS PNL BLDV: SIGNIFICANT CHANGE UP
GLUCOSE BLDV-MCNC: 186 MG/DL — HIGH (ref 70–99)
GLUCOSE SERPL-MCNC: 157 MG/DL — HIGH (ref 70–99)
GLUCOSE SERPL-MCNC: 164 MG/DL — HIGH (ref 70–99)
GLUCOSE UR QL: NEGATIVE MG/DL — SIGNIFICANT CHANGE UP
HCO3 BLDV-SCNC: 14 MMOL/L — LOW (ref 22–29)
HCT VFR BLD CALC: 30.7 % — LOW (ref 34.5–45)
HCT VFR BLDA CALC: 32 % — SIGNIFICANT CHANGE UP
HGB BLD CALC-MCNC: 10.6 G/DL — LOW (ref 11.7–16.1)
HGB BLD-MCNC: 10 G/DL — LOW (ref 11.5–15.5)
INR BLD: 2.95 RATIO — HIGH (ref 0.85–1.18)
KETONES UR-MCNC: NEGATIVE MG/DL — SIGNIFICANT CHANGE UP
LACTATE BLDV-MCNC: 3.4 MMOL/L — HIGH (ref 0.5–2)
LEUKOCYTE ESTERASE UR-ACNC: ABNORMAL
LYMPHOCYTES # BLD AUTO: 27.4 % — SIGNIFICANT CHANGE UP (ref 13–44)
LYMPHOCYTES # BLD AUTO: 4.52 K/UL — HIGH (ref 1–3.3)
MANUAL SMEAR VERIFICATION: SIGNIFICANT CHANGE UP
MCHC RBC-ENTMCNC: 28 PG — SIGNIFICANT CHANGE UP (ref 27–34)
MCHC RBC-ENTMCNC: 32.6 GM/DL — SIGNIFICANT CHANGE UP (ref 32–36)
MCV RBC AUTO: 86 FL — SIGNIFICANT CHANGE UP (ref 80–100)
METAMYELOCYTES # FLD: 3.4 % — HIGH (ref 0–0)
MONOCYTES # BLD AUTO: 1.4 K/UL — HIGH (ref 0–0.9)
MONOCYTES NFR BLD AUTO: 8.5 % — SIGNIFICANT CHANGE UP (ref 2–14)
NEUTROPHILS # BLD AUTO: 10 K/UL — HIGH (ref 1.8–7.4)
NEUTROPHILS NFR BLD AUTO: 60.7 % — SIGNIFICANT CHANGE UP (ref 43–77)
NITRITE UR-MCNC: NEGATIVE — SIGNIFICANT CHANGE UP
OSMOLALITY SERPL: 271 MOSMOL/KG — LOW (ref 280–301)
OSMOLALITY UR: 299 MOSM/KG — LOW (ref 300–1000)
PCO2 BLDV: 30 MMHG — LOW (ref 39–42)
PH BLDV: 7.28 — LOW (ref 7.32–7.43)
PH UR: 6.5 — SIGNIFICANT CHANGE UP (ref 5–8)
PLAT MORPH BLD: NORMAL — SIGNIFICANT CHANGE UP
PLATELET # BLD AUTO: 392 K/UL — SIGNIFICANT CHANGE UP (ref 150–400)
PO2 BLDV: 133 MMHG — HIGH (ref 25–45)
POIKILOCYTOSIS BLD QL AUTO: SIGNIFICANT CHANGE UP
POLYCHROMASIA BLD QL SMEAR: SLIGHT — SIGNIFICANT CHANGE UP
POTASSIUM BLDV-SCNC: 6 MMOL/L — HIGH (ref 3.5–5.1)
POTASSIUM SERPL-MCNC: 5.6 MMOL/L — HIGH (ref 3.5–5.3)
POTASSIUM SERPL-MCNC: 6 MMOL/L — HIGH (ref 3.5–5.3)
POTASSIUM SERPL-SCNC: 5.6 MMOL/L — HIGH (ref 3.5–5.3)
POTASSIUM SERPL-SCNC: 6 MMOL/L — HIGH (ref 3.5–5.3)
PROT SERPL-MCNC: 6.4 G/DL — LOW (ref 6.6–8.7)
PROT UR-MCNC: 300 MG/DL
PROTHROM AB SERPL-ACNC: 31.7 SEC — HIGH (ref 9.5–13)
RBC # BLD: 3.57 M/UL — LOW (ref 3.8–5.2)
RBC # FLD: 18.2 % — HIGH (ref 10.3–14.5)
RBC BLD AUTO: SIGNIFICANT CHANGE UP
RBC CASTS # UR COMP ASSIST: 609 /HPF — HIGH (ref 0–4)
RSV RNA NPH QL NAA+NON-PROBE: SIGNIFICANT CHANGE UP
SAO2 % BLDV: 98.1 % — SIGNIFICANT CHANGE UP
SARS-COV-2 RNA SPEC QL NAA+PROBE: SIGNIFICANT CHANGE UP
SODIUM SERPL-SCNC: 119 MMOL/L — CRITICAL LOW (ref 135–145)
SODIUM SERPL-SCNC: 121 MMOL/L — LOW (ref 135–145)
SODIUM UR-SCNC: 99 MMOL/L — SIGNIFICANT CHANGE UP
SP GR SPEC: 1.01 — SIGNIFICANT CHANGE UP (ref 1–1.03)
SQUAMOUS # UR AUTO: 4 /HPF — SIGNIFICANT CHANGE UP (ref 0–5)
TROPONIN T, HIGH SENSITIVITY RESULT: 47 NG/L — SIGNIFICANT CHANGE UP (ref 0–51)
UROBILINOGEN FLD QL: 1 MG/DL — SIGNIFICANT CHANGE UP (ref 0.2–1)
WBC # BLD: 16.48 K/UL — HIGH (ref 3.8–10.5)
WBC # FLD AUTO: 16.48 K/UL — HIGH (ref 3.8–10.5)
WBC UR QL: >998 /HPF — HIGH (ref 0–5)

## 2024-06-03 PROCEDURE — 99285 EMERGENCY DEPT VISIT HI MDM: CPT | Mod: GC

## 2024-06-03 PROCEDURE — 93010 ELECTROCARDIOGRAM REPORT: CPT

## 2024-06-03 PROCEDURE — 71045 X-RAY EXAM CHEST 1 VIEW: CPT | Mod: 26

## 2024-06-03 RX ORDER — INSULIN REGULAR, HUMAN 100/ML
5 VIAL (ML) INJECTION ONCE
Refills: 0 | Status: COMPLETED | OUTPATIENT
Start: 2024-06-03 | End: 2024-06-03

## 2024-06-03 RX ORDER — CEFTRIAXONE SODIUM 500 MG
1000 VIAL (EA) INJECTION ONCE
Refills: 0 | Status: COMPLETED | OUTPATIENT
Start: 2024-06-03 | End: 2024-06-03

## 2024-06-03 RX ORDER — DEXTROSE MONOHYDRATE AND SODIUM CHLORIDE 5; .3 G/100ML; G/100ML
1900 INJECTION, SOLUTION INTRAVENOUS ONCE
Refills: 0 | Status: COMPLETED | OUTPATIENT
Start: 2024-06-03 | End: 2024-06-03

## 2024-06-03 RX ORDER — CEFTRIAXONE SODIUM 500 MG
1000 VIAL (EA) INJECTION ONCE
Refills: 0 | Status: DISCONTINUED | OUTPATIENT
Start: 2024-06-03 | End: 2024-06-03

## 2024-06-03 RX ORDER — DEXTROSE 30 % IN WATER 30 %
50 VIAL (ML) INTRAVENOUS ONCE
Refills: 0 | Status: COMPLETED | OUTPATIENT
Start: 2024-06-03 | End: 2024-06-03

## 2024-06-03 RX ADMIN — Medication 5 UNIT(S): at 22:05

## 2024-06-03 RX ADMIN — Medication 1000 MILLIGRAM(S): at 19:15

## 2024-06-03 RX ADMIN — DEXTROSE MONOHYDRATE AND SODIUM CHLORIDE 1900 MILLILITER(S): 5; .3 INJECTION, SOLUTION INTRAVENOUS at 19:16

## 2024-06-03 RX ADMIN — Medication 50 MILLILITER(S): at 22:04

## 2024-06-04 DIAGNOSIS — A41.9 SEPSIS, UNSPECIFIED ORGANISM: ICD-10-CM

## 2024-06-04 LAB
-  COAGULASE NEGATIVE STAPHYLOCOCCUS: SIGNIFICANT CHANGE UP
ANION GAP SERPL CALC-SCNC: 12 MMOL/L — SIGNIFICANT CHANGE UP (ref 5–17)
ANION GAP SERPL CALC-SCNC: 14 MMOL/L — SIGNIFICANT CHANGE UP (ref 5–17)
ANION GAP SERPL CALC-SCNC: 16 MMOL/L — SIGNIFICANT CHANGE UP (ref 5–17)
ANION GAP SERPL CALC-SCNC: 16 MMOL/L — SIGNIFICANT CHANGE UP (ref 5–17)
BUN SERPL-MCNC: 28.6 MG/DL — HIGH (ref 8–20)
BUN SERPL-MCNC: 29.2 MG/DL — HIGH (ref 8–20)
BUN SERPL-MCNC: 29.5 MG/DL — HIGH (ref 8–20)
BUN SERPL-MCNC: 33.3 MG/DL — HIGH (ref 8–20)
CALCIUM SERPL-MCNC: 8 MG/DL — LOW (ref 8.4–10.5)
CALCIUM SERPL-MCNC: 8.5 MG/DL — SIGNIFICANT CHANGE UP (ref 8.4–10.5)
CALCIUM SERPL-MCNC: 8.7 MG/DL — SIGNIFICANT CHANGE UP (ref 8.4–10.5)
CALCIUM SERPL-MCNC: 8.8 MG/DL — SIGNIFICANT CHANGE UP (ref 8.4–10.5)
CHLORIDE SERPL-SCNC: 91 MMOL/L — LOW (ref 96–108)
CHLORIDE SERPL-SCNC: 92 MMOL/L — LOW (ref 96–108)
CHLORIDE SERPL-SCNC: 92 MMOL/L — LOW (ref 96–108)
CHLORIDE SERPL-SCNC: 94 MMOL/L — LOW (ref 96–108)
CO2 SERPL-SCNC: 15 MMOL/L — LOW (ref 22–29)
CO2 SERPL-SCNC: 15 MMOL/L — LOW (ref 22–29)
CO2 SERPL-SCNC: 16 MMOL/L — LOW (ref 22–29)
CO2 SERPL-SCNC: 18 MMOL/L — LOW (ref 22–29)
CORTIS AM PEAK SERPL-MCNC: 20.7 UG/DL — HIGH (ref 6–18.4)
CREAT SERPL-MCNC: 0.78 MG/DL — SIGNIFICANT CHANGE UP (ref 0.5–1.3)
CREAT SERPL-MCNC: 0.79 MG/DL — SIGNIFICANT CHANGE UP (ref 0.5–1.3)
CREAT SERPL-MCNC: 0.79 MG/DL — SIGNIFICANT CHANGE UP (ref 0.5–1.3)
CREAT SERPL-MCNC: 0.86 MG/DL — SIGNIFICANT CHANGE UP (ref 0.5–1.3)
EGFR: 68 ML/MIN/1.73M2 — SIGNIFICANT CHANGE UP
EGFR: 75 ML/MIN/1.73M2 — SIGNIFICANT CHANGE UP
EGFR: 75 ML/MIN/1.73M2 — SIGNIFICANT CHANGE UP
EGFR: 76 ML/MIN/1.73M2 — SIGNIFICANT CHANGE UP
GLUCOSE SERPL-MCNC: 126 MG/DL — HIGH (ref 70–99)
GLUCOSE SERPL-MCNC: 164 MG/DL — HIGH (ref 70–99)
GLUCOSE SERPL-MCNC: 174 MG/DL — HIGH (ref 70–99)
GLUCOSE SERPL-MCNC: 180 MG/DL — HIGH (ref 70–99)
GRAM STN FLD: ABNORMAL
GRAM STN FLD: ABNORMAL
HCT VFR BLD CALC: 26.8 % — LOW (ref 34.5–45)
HCT VFR BLD CALC: 27.1 % — LOW (ref 34.5–45)
HGB BLD-MCNC: 8.9 G/DL — LOW (ref 11.5–15.5)
HGB BLD-MCNC: 9 G/DL — LOW (ref 11.5–15.5)
LACTATE SERPL-SCNC: 2.5 MMOL/L — HIGH (ref 0.5–2)
LACTATE SERPL-SCNC: 3.2 MMOL/L — HIGH (ref 0.5–2)
MAGNESIUM SERPL-MCNC: 1.3 MG/DL — LOW (ref 1.6–2.6)
MAGNESIUM SERPL-MCNC: 2.3 MG/DL — SIGNIFICANT CHANGE UP (ref 1.6–2.6)
MCHC RBC-ENTMCNC: 28.2 PG — SIGNIFICANT CHANGE UP (ref 27–34)
MCHC RBC-ENTMCNC: 28.8 PG — SIGNIFICANT CHANGE UP (ref 27–34)
MCHC RBC-ENTMCNC: 32.8 GM/DL — SIGNIFICANT CHANGE UP (ref 32–36)
MCHC RBC-ENTMCNC: 33.6 GM/DL — SIGNIFICANT CHANGE UP (ref 32–36)
MCV RBC AUTO: 85.6 FL — SIGNIFICANT CHANGE UP (ref 80–100)
MCV RBC AUTO: 85.8 FL — SIGNIFICANT CHANGE UP (ref 80–100)
METHOD TYPE: SIGNIFICANT CHANGE UP
MRSA PCR RESULT.: SIGNIFICANT CHANGE UP
OSMOLALITY UR: 330 MOSM/KG — SIGNIFICANT CHANGE UP (ref 300–1000)
OSMOLALITY UR: 388 MOSM/KG — SIGNIFICANT CHANGE UP (ref 300–1000)
PLATELET # BLD AUTO: 389 K/UL — SIGNIFICANT CHANGE UP (ref 150–400)
PLATELET # BLD AUTO: 391 K/UL — SIGNIFICANT CHANGE UP (ref 150–400)
POTASSIUM SERPL-MCNC: 4.8 MMOL/L — SIGNIFICANT CHANGE UP (ref 3.5–5.3)
POTASSIUM SERPL-MCNC: 5 MMOL/L — SIGNIFICANT CHANGE UP (ref 3.5–5.3)
POTASSIUM SERPL-MCNC: 5.3 MMOL/L — SIGNIFICANT CHANGE UP (ref 3.5–5.3)
POTASSIUM SERPL-MCNC: 5.4 MMOL/L — HIGH (ref 3.5–5.3)
POTASSIUM SERPL-MCNC: 6.2 MMOL/L — CRITICAL HIGH (ref 3.5–5.3)
POTASSIUM SERPL-SCNC: 4.8 MMOL/L — SIGNIFICANT CHANGE UP (ref 3.5–5.3)
POTASSIUM SERPL-SCNC: 5 MMOL/L — SIGNIFICANT CHANGE UP (ref 3.5–5.3)
POTASSIUM SERPL-SCNC: 5.3 MMOL/L — SIGNIFICANT CHANGE UP (ref 3.5–5.3)
POTASSIUM SERPL-SCNC: 5.4 MMOL/L — HIGH (ref 3.5–5.3)
POTASSIUM SERPL-SCNC: 6.2 MMOL/L — CRITICAL HIGH (ref 3.5–5.3)
RBC # BLD: 3.13 M/UL — LOW (ref 3.8–5.2)
RBC # BLD: 3.16 M/UL — LOW (ref 3.8–5.2)
RBC # FLD: 18.1 % — HIGH (ref 10.3–14.5)
RBC # FLD: 18.4 % — HIGH (ref 10.3–14.5)
S AUREUS DNA NOSE QL NAA+PROBE: DETECTED
SODIUM SERPL-SCNC: 121 MMOL/L — LOW (ref 135–145)
SODIUM SERPL-SCNC: 122 MMOL/L — LOW (ref 135–145)
SODIUM SERPL-SCNC: 123 MMOL/L — LOW (ref 135–145)
SODIUM SERPL-SCNC: 125 MMOL/L — LOW (ref 135–145)
SODIUM UR-SCNC: 52 MMOL/L — SIGNIFICANT CHANGE UP
SODIUM UR-SCNC: 62 MMOL/L — SIGNIFICANT CHANGE UP
SPECIMEN SOURCE: SIGNIFICANT CHANGE UP
SPECIMEN SOURCE: SIGNIFICANT CHANGE UP
TSH SERPL-MCNC: 4.04 UIU/ML — SIGNIFICANT CHANGE UP (ref 0.27–4.2)
WBC # BLD: 13.97 K/UL — HIGH (ref 3.8–10.5)
WBC # BLD: 15.81 K/UL — HIGH (ref 3.8–10.5)
WBC # FLD AUTO: 13.97 K/UL — HIGH (ref 3.8–10.5)
WBC # FLD AUTO: 15.81 K/UL — HIGH (ref 3.8–10.5)

## 2024-06-04 PROCEDURE — 99233 SBSQ HOSP IP/OBS HIGH 50: CPT | Mod: GC

## 2024-06-04 PROCEDURE — 74177 CT ABD & PELVIS W/CONTRAST: CPT | Mod: 26,MC

## 2024-06-04 RX ORDER — INSULIN REGULAR, HUMAN 100/ML
10 VIAL (ML) INJECTION ONCE
Refills: 0 | Status: DISCONTINUED | OUTPATIENT
Start: 2024-06-04 | End: 2024-06-04

## 2024-06-04 RX ORDER — MEROPENEM 500 MG/1
1000 INJECTION, POWDER, FOR SOLUTION INTRAVENOUS EVERY 8 HOURS
Refills: 0 | Status: DISCONTINUED | OUTPATIENT
Start: 2024-06-04 | End: 2024-06-04

## 2024-06-04 RX ORDER — VANCOMYCIN HYDROCHLORIDE 50 MG/ML
1000 KIT ORAL EVERY 12 HOURS
Refills: 0 | Status: DISCONTINUED | OUTPATIENT
Start: 2024-06-04 | End: 2024-06-04

## 2024-06-04 RX ORDER — TAMSULOSIN HYDROCHLORIDE 0.4 MG/1
0.4 CAPSULE ORAL AT BEDTIME
Refills: 0 | Status: DISCONTINUED | OUTPATIENT
Start: 2024-06-04 | End: 2024-06-25

## 2024-06-04 RX ORDER — LEVOTHYROXINE SODIUM 25 MCG
25 TABLET ORAL DAILY
Refills: 0 | Status: DISCONTINUED | OUTPATIENT
Start: 2024-06-04 | End: 2024-06-25

## 2024-06-04 RX ORDER — MEROPENEM 500 MG/1
1000 INJECTION, POWDER, FOR SOLUTION INTRAVENOUS ONCE
Refills: 0 | Status: COMPLETED | OUTPATIENT
Start: 2024-06-04 | End: 2024-06-04

## 2024-06-04 RX ORDER — PANTOPRAZOLE SODIUM 40 MG/10ML
40 INJECTION, POWDER, FOR SOLUTION INTRAVENOUS
Refills: 0 | Status: DISCONTINUED | OUTPATIENT
Start: 2024-06-04 | End: 2024-06-07

## 2024-06-04 RX ORDER — MEROPENEM 500 MG/1
1000 INJECTION, POWDER, FOR SOLUTION INTRAVENOUS EVERY 8 HOURS
Refills: 0 | Status: COMPLETED | OUTPATIENT
Start: 2024-06-04 | End: 2024-06-14

## 2024-06-04 RX ORDER — SIMVASTATIN 40 MG
20 TABLET ORAL AT BEDTIME
Refills: 0 | Status: DISCONTINUED | OUTPATIENT
Start: 2024-06-04 | End: 2024-06-25

## 2024-06-04 RX ORDER — SODIUM ZIRCONIUM CYCLOSILICATE 10 G/10G
10 POWDER, FOR SUSPENSION ORAL ONCE
Refills: 0 | Status: COMPLETED | OUTPATIENT
Start: 2024-06-04 | End: 2024-06-04

## 2024-06-04 RX ORDER — LEVOTHYROXINE SODIUM 125 MCG
1 TABLET ORAL
Refills: 0 | DISCHARGE

## 2024-06-04 RX ORDER — DEXTROSE MONOHYDRATE AND SODIUM CHLORIDE 5; .3 G/100ML; G/100ML
1000 INJECTION, SOLUTION INTRAVENOUS
Refills: 0 | Status: COMPLETED | OUTPATIENT
Start: 2024-06-04 | End: 2024-06-04

## 2024-06-04 RX ORDER — NOREPINEPHRINE BITARTRATE 1 MG/ML
0.05 INJECTION INTRAVENOUS
Qty: 8 | Refills: 0 | Status: DISCONTINUED | OUTPATIENT
Start: 2024-06-04 | End: 2024-06-04

## 2024-06-04 RX ORDER — PHENYLEPHRINE HYDROCHLORIDE 10 MG/ML
0.3 INJECTION INTRAVENOUS
Qty: 40 | Refills: 0 | Status: DISCONTINUED | OUTPATIENT
Start: 2024-06-04 | End: 2024-06-04

## 2024-06-04 RX ORDER — COLLAGENASE CLOSTRIDIUM HIST. 250 UNIT/G
1 OINTMENT (GRAM) TOPICAL
Refills: 0 | Status: DISCONTINUED | OUTPATIENT
Start: 2024-06-04 | End: 2024-06-25

## 2024-06-04 RX ORDER — DEXTROSE MONOHYDRATE AND SODIUM CHLORIDE 5; .3 G/100ML; G/100ML
1000 INJECTION, SOLUTION INTRAVENOUS
Refills: 0 | Status: DISCONTINUED | OUTPATIENT
Start: 2024-06-04 | End: 2024-06-04

## 2024-06-04 RX ORDER — MIDODRINE HYDROCHLORIDE 10 MG/1
10 TABLET ORAL EVERY 8 HOURS
Refills: 0 | Status: DISCONTINUED | OUTPATIENT
Start: 2024-06-04 | End: 2024-06-05

## 2024-06-04 RX ORDER — DEXTROSE MONOHYDRATE AND SODIUM CHLORIDE 5; .3 G/100ML; G/100ML
1000 INJECTION, SOLUTION INTRAVENOUS ONCE
Refills: 0 | Status: COMPLETED | OUTPATIENT
Start: 2024-06-04 | End: 2024-06-04

## 2024-06-04 RX ORDER — MAGNESIUM SULFATE 100 %
2 POWDER (GRAM) MISCELLANEOUS ONCE
Refills: 0 | Status: COMPLETED | OUTPATIENT
Start: 2024-06-04 | End: 2024-06-04

## 2024-06-04 RX ORDER — APIXABAN 5 MG/1
2.5 TABLET, FILM COATED ORAL
Refills: 0 | Status: DISCONTINUED | OUTPATIENT
Start: 2024-06-04 | End: 2024-06-05

## 2024-06-04 RX ORDER — DEXTROSE MONOHYDRATE AND SODIUM CHLORIDE 5; .3 G/100ML; G/100ML
1000 INJECTION, SOLUTION INTRAVENOUS ONCE
Refills: 0 | Status: DISCONTINUED | OUTPATIENT
Start: 2024-06-04 | End: 2024-06-04

## 2024-06-04 RX ORDER — MEROPENEM 500 MG/1
1000 INJECTION, POWDER, FOR SOLUTION INTRAVENOUS ONCE
Refills: 0 | Status: DISCONTINUED | OUTPATIENT
Start: 2024-06-04 | End: 2024-06-04

## 2024-06-04 RX ORDER — VANCOMYCIN HYDROCHLORIDE 50 MG/ML
750 KIT ORAL EVERY 12 HOURS
Refills: 0 | Status: DISCONTINUED | OUTPATIENT
Start: 2024-06-04 | End: 2024-06-05

## 2024-06-04 RX ORDER — DEXTROSE 30 % IN WATER 30 %
50 VIAL (ML) INTRAVENOUS ONCE
Refills: 0 | Status: DISCONTINUED | OUTPATIENT
Start: 2024-06-04 | End: 2024-06-04

## 2024-06-04 RX ADMIN — DEXTROSE MONOHYDRATE AND SODIUM CHLORIDE 1000 MILLILITER(S): 5; .3 INJECTION, SOLUTION INTRAVENOUS at 03:54

## 2024-06-04 RX ADMIN — MIDODRINE HYDROCHLORIDE 10 MILLIGRAM(S): 10 TABLET ORAL at 04:34

## 2024-06-04 RX ADMIN — MIDODRINE HYDROCHLORIDE 10 MILLIGRAM(S): 10 TABLET ORAL at 14:22

## 2024-06-04 RX ADMIN — MEROPENEM 1000 MILLIGRAM(S): 500 INJECTION, POWDER, FOR SOLUTION INTRAVENOUS at 01:45

## 2024-06-04 RX ADMIN — MEROPENEM 1000 MILLIGRAM(S): 500 INJECTION, POWDER, FOR SOLUTION INTRAVENOUS at 08:44

## 2024-06-04 RX ADMIN — Medication 1 APPLICATION(S): at 06:11

## 2024-06-04 RX ADMIN — Medication 25 MICROGRAM(S): at 14:21

## 2024-06-04 RX ADMIN — MIDODRINE HYDROCHLORIDE 10 MILLIGRAM(S): 10 TABLET ORAL at 21:38

## 2024-06-04 RX ADMIN — PHENYLEPHRINE HYDROCHLORIDE 10.2 MICROGRAM(S)/KG/MIN: 10 INJECTION INTRAVENOUS at 07:55

## 2024-06-04 RX ADMIN — TAMSULOSIN HYDROCHLORIDE 0.4 MILLIGRAM(S): 0.4 CAPSULE ORAL at 21:38

## 2024-06-04 RX ADMIN — MEROPENEM 1000 MILLIGRAM(S): 500 INJECTION, POWDER, FOR SOLUTION INTRAVENOUS at 16:43

## 2024-06-04 RX ADMIN — Medication 1 APPLICATION(S): at 16:43

## 2024-06-04 RX ADMIN — Medication 20 MILLIGRAM(S): at 21:38

## 2024-06-04 RX ADMIN — NOREPINEPHRINE BITARTRATE 8.5 MICROGRAM(S)/KG/MIN: 1 INJECTION INTRAVENOUS at 03:54

## 2024-06-04 RX ADMIN — DEXTROSE MONOHYDRATE AND SODIUM CHLORIDE 500 MILLILITER(S): 5; .3 INJECTION, SOLUTION INTRAVENOUS at 16:51

## 2024-06-04 RX ADMIN — APIXABAN 2.5 MILLIGRAM(S): 5 TABLET, FILM COATED ORAL at 16:44

## 2024-06-04 RX ADMIN — APIXABAN 2.5 MILLIGRAM(S): 5 TABLET, FILM COATED ORAL at 06:25

## 2024-06-04 RX ADMIN — SODIUM ZIRCONIUM CYCLOSILICATE 10 GRAM(S): 10 POWDER, FOR SUSPENSION ORAL at 16:44

## 2024-06-04 RX ADMIN — Medication 25 GRAM(S): at 09:44

## 2024-06-04 RX ADMIN — DEXTROSE MONOHYDRATE AND SODIUM CHLORIDE 50 MILLILITER(S): 5; .3 INJECTION, SOLUTION INTRAVENOUS at 08:44

## 2024-06-05 LAB
ANION GAP SERPL CALC-SCNC: 13 MMOL/L — SIGNIFICANT CHANGE UP (ref 5–17)
ANION GAP SERPL CALC-SCNC: 13 MMOL/L — SIGNIFICANT CHANGE UP (ref 5–17)
ANION GAP SERPL CALC-SCNC: 15 MMOL/L — SIGNIFICANT CHANGE UP (ref 5–17)
BUN SERPL-MCNC: 32.9 MG/DL — HIGH (ref 8–20)
BUN SERPL-MCNC: 33.3 MG/DL — HIGH (ref 8–20)
BUN SERPL-MCNC: 34.4 MG/DL — HIGH (ref 8–20)
CALCIUM SERPL-MCNC: 8.1 MG/DL — LOW (ref 8.4–10.5)
CALCIUM SERPL-MCNC: 8.1 MG/DL — LOW (ref 8.4–10.5)
CALCIUM SERPL-MCNC: 8.2 MG/DL — LOW (ref 8.4–10.5)
CHLORIDE SERPL-SCNC: 91 MMOL/L — LOW (ref 96–108)
CHLORIDE SERPL-SCNC: 92 MMOL/L — LOW (ref 96–108)
CHLORIDE SERPL-SCNC: 95 MMOL/L — LOW (ref 96–108)
CO2 SERPL-SCNC: 14 MMOL/L — LOW (ref 22–29)
CO2 SERPL-SCNC: 14 MMOL/L — LOW (ref 22–29)
CO2 SERPL-SCNC: 16 MMOL/L — LOW (ref 22–29)
CREAT SERPL-MCNC: 0.82 MG/DL — SIGNIFICANT CHANGE UP (ref 0.5–1.3)
CREAT SERPL-MCNC: 0.82 MG/DL — SIGNIFICANT CHANGE UP (ref 0.5–1.3)
CREAT SERPL-MCNC: 0.9 MG/DL — SIGNIFICANT CHANGE UP (ref 0.5–1.3)
CULTURE RESULTS: ABNORMAL
EGFR: 64 ML/MIN/1.73M2 — SIGNIFICANT CHANGE UP
EGFR: 72 ML/MIN/1.73M2 — SIGNIFICANT CHANGE UP
EGFR: 72 ML/MIN/1.73M2 — SIGNIFICANT CHANGE UP
GLUCOSE SERPL-MCNC: 109 MG/DL — HIGH (ref 70–99)
GLUCOSE SERPL-MCNC: 118 MG/DL — HIGH (ref 70–99)
GLUCOSE SERPL-MCNC: 195 MG/DL — HIGH (ref 70–99)
HCT VFR BLD CALC: 25.3 % — LOW (ref 34.5–45)
HGB BLD-MCNC: 8.4 G/DL — LOW (ref 11.5–15.5)
MAGNESIUM SERPL-MCNC: 1.9 MG/DL — SIGNIFICANT CHANGE UP (ref 1.6–2.6)
MCHC RBC-ENTMCNC: 28.2 PG — SIGNIFICANT CHANGE UP (ref 27–34)
MCHC RBC-ENTMCNC: 33.2 GM/DL — SIGNIFICANT CHANGE UP (ref 32–36)
MCV RBC AUTO: 84.9 FL — SIGNIFICANT CHANGE UP (ref 80–100)
ORGANISM # SPEC MICROSCOPIC CNT: ABNORMAL
ORGANISM # SPEC MICROSCOPIC CNT: SIGNIFICANT CHANGE UP
PHOSPHATE SERPL-MCNC: 3.4 MG/DL — SIGNIFICANT CHANGE UP (ref 2.4–4.7)
PLATELET # BLD AUTO: 346 K/UL — SIGNIFICANT CHANGE UP (ref 150–400)
POTASSIUM SERPL-MCNC: 4.5 MMOL/L — SIGNIFICANT CHANGE UP (ref 3.5–5.3)
POTASSIUM SERPL-MCNC: 4.7 MMOL/L — SIGNIFICANT CHANGE UP (ref 3.5–5.3)
POTASSIUM SERPL-MCNC: 5.5 MMOL/L — HIGH (ref 3.5–5.3)
POTASSIUM SERPL-SCNC: 4.5 MMOL/L — SIGNIFICANT CHANGE UP (ref 3.5–5.3)
POTASSIUM SERPL-SCNC: 4.7 MMOL/L — SIGNIFICANT CHANGE UP (ref 3.5–5.3)
POTASSIUM SERPL-SCNC: 5.5 MMOL/L — HIGH (ref 3.5–5.3)
RBC # BLD: 2.98 M/UL — LOW (ref 3.8–5.2)
RBC # FLD: 18.5 % — HIGH (ref 10.3–14.5)
SODIUM SERPL-SCNC: 120 MMOL/L — CRITICAL LOW (ref 135–145)
SODIUM SERPL-SCNC: 121 MMOL/L — LOW (ref 135–145)
SODIUM SERPL-SCNC: 122 MMOL/L — LOW (ref 135–145)
SPECIMEN SOURCE: SIGNIFICANT CHANGE UP
WBC # BLD: 12.71 K/UL — HIGH (ref 3.8–10.5)
WBC # FLD AUTO: 12.71 K/UL — HIGH (ref 3.8–10.5)

## 2024-06-05 RX ORDER — DIGOXIN 125 MCG
250 TABLET ORAL EVERY 6 HOURS
Refills: 0 | Status: COMPLETED | OUTPATIENT
Start: 2024-06-05 | End: 2024-06-05

## 2024-06-05 RX ORDER — MIDODRINE HYDROCHLORIDE 10 MG/1
15 TABLET ORAL EVERY 8 HOURS
Refills: 0 | Status: DISCONTINUED | OUTPATIENT
Start: 2024-06-05 | End: 2024-06-10

## 2024-06-05 RX ORDER — HYDROCORTISONE 100 MG/60ML
100 SUSPENSION RECTAL EVERY 8 HOURS
Refills: 0 | Status: DISCONTINUED | OUTPATIENT
Start: 2024-06-05 | End: 2024-06-05

## 2024-06-05 RX ORDER — PHENYLEPHRINE HYDROCHLORIDE 10 MG/ML
0.1 INJECTION INTRAVENOUS
Qty: 40 | Refills: 0 | Status: DISCONTINUED | OUTPATIENT
Start: 2024-06-05 | End: 2024-06-08

## 2024-06-05 RX ORDER — NOREPINEPHRINE BITARTRATE 1 MG/ML
0.05 INJECTION INTRAVENOUS
Qty: 8 | Refills: 0 | Status: DISCONTINUED | OUTPATIENT
Start: 2024-06-05 | End: 2024-06-06

## 2024-06-05 RX ORDER — VANCOMYCIN HYDROCHLORIDE 50 MG/ML
1000 KIT ORAL
Refills: 0 | Status: DISCONTINUED | OUTPATIENT
Start: 2024-06-06 | End: 2024-06-06

## 2024-06-05 RX ORDER — SODIUM CHLORIDE 3 G/100ML
1000 INJECTION, SOLUTION INTRAVENOUS
Refills: 0 | Status: DISCONTINUED | OUTPATIENT
Start: 2024-06-05 | End: 2024-06-05

## 2024-06-05 RX ORDER — ALBUMIN HUMAN 5 %
50 INTRAVENOUS SOLUTION INTRAVENOUS EVERY 6 HOURS
Refills: 0 | Status: COMPLETED | OUTPATIENT
Start: 2024-06-05 | End: 2024-06-06

## 2024-06-05 RX ORDER — DEXTROSE MONOHYDRATE AND SODIUM CHLORIDE 5; .3 G/100ML; G/100ML
500 INJECTION, SOLUTION INTRAVENOUS ONCE
Refills: 0 | Status: COMPLETED | OUTPATIENT
Start: 2024-06-05 | End: 2024-06-05

## 2024-06-05 RX ORDER — DIGOXIN 125 MCG
500 TABLET ORAL ONCE
Refills: 0 | Status: COMPLETED | OUTPATIENT
Start: 2024-06-05 | End: 2024-06-05

## 2024-06-05 RX ORDER — HYDROCORTISONE 100 MG/60ML
100 SUSPENSION RECTAL EVERY 8 HOURS
Refills: 0 | Status: DISCONTINUED | OUTPATIENT
Start: 2024-06-05 | End: 2024-06-08

## 2024-06-05 RX ADMIN — MIDODRINE HYDROCHLORIDE 15 MILLIGRAM(S): 10 TABLET ORAL at 21:46

## 2024-06-05 RX ADMIN — Medication 1 APPLICATION(S): at 05:22

## 2024-06-05 RX ADMIN — MEROPENEM 1000 MILLIGRAM(S): 500 INJECTION, POWDER, FOR SOLUTION INTRAVENOUS at 18:18

## 2024-06-05 RX ADMIN — HYDROCORTISONE 100 MILLIGRAM(S): 100 SUSPENSION RECTAL at 14:37

## 2024-06-05 RX ADMIN — Medication 25 MICROGRAM(S): at 05:22

## 2024-06-05 RX ADMIN — PANTOPRAZOLE SODIUM 40 MILLIGRAM(S): 40 INJECTION, POWDER, FOR SOLUTION INTRAVENOUS at 05:23

## 2024-06-05 RX ADMIN — Medication 50 MILLILITER(S): at 12:00

## 2024-06-05 RX ADMIN — PHENYLEPHRINE HYDROCHLORIDE 2.55 MICROGRAM(S)/KG/MIN: 10 INJECTION INTRAVENOUS at 18:16

## 2024-06-05 RX ADMIN — MIDODRINE HYDROCHLORIDE 10 MILLIGRAM(S): 10 TABLET ORAL at 05:22

## 2024-06-05 RX ADMIN — Medication 500 MICROGRAM(S): at 06:58

## 2024-06-05 RX ADMIN — Medication 1 APPLICATION(S): at 18:17

## 2024-06-05 RX ADMIN — Medication 20 MILLIGRAM(S): at 21:47

## 2024-06-05 RX ADMIN — HYDROCORTISONE 100 MILLIGRAM(S): 100 SUSPENSION RECTAL at 21:46

## 2024-06-05 RX ADMIN — APIXABAN 2.5 MILLIGRAM(S): 5 TABLET, FILM COATED ORAL at 05:22

## 2024-06-05 RX ADMIN — SODIUM CHLORIDE 30 MILLILITER(S): 3 INJECTION, SOLUTION INTRAVENOUS at 02:02

## 2024-06-05 RX ADMIN — PHENYLEPHRINE HYDROCHLORIDE 2.55 MICROGRAM(S)/KG/MIN: 10 INJECTION INTRAVENOUS at 12:00

## 2024-06-05 RX ADMIN — APIXABAN 2.5 MILLIGRAM(S): 5 TABLET, FILM COATED ORAL at 18:17

## 2024-06-05 RX ADMIN — Medication 5 MILLIGRAM(S): at 16:15

## 2024-06-05 RX ADMIN — VANCOMYCIN HYDROCHLORIDE 250 MILLIGRAM(S): KIT at 05:21

## 2024-06-05 RX ADMIN — Medication 250 MICROGRAM(S): at 18:17

## 2024-06-05 RX ADMIN — DEXTROSE MONOHYDRATE AND SODIUM CHLORIDE 500 MILLILITER(S): 5; .3 INJECTION, SOLUTION INTRAVENOUS at 14:38

## 2024-06-05 RX ADMIN — MEROPENEM 1000 MILLIGRAM(S): 500 INJECTION, POWDER, FOR SOLUTION INTRAVENOUS at 00:34

## 2024-06-05 RX ADMIN — MIDODRINE HYDROCHLORIDE 15 MILLIGRAM(S): 10 TABLET ORAL at 14:38

## 2024-06-05 RX ADMIN — TAMSULOSIN HYDROCHLORIDE 0.4 MILLIGRAM(S): 0.4 CAPSULE ORAL at 21:47

## 2024-06-05 RX ADMIN — Medication 250 MICROGRAM(S): at 11:59

## 2024-06-05 RX ADMIN — MEROPENEM 1000 MILLIGRAM(S): 500 INJECTION, POWDER, FOR SOLUTION INTRAVENOUS at 11:59

## 2024-06-05 RX ADMIN — Medication 50 MILLILITER(S): at 18:17

## 2024-06-06 LAB
-  AMOXICILLIN/CLAVULANIC ACID: SIGNIFICANT CHANGE UP
-  AMOXICILLIN/CLAVULANIC ACID: SIGNIFICANT CHANGE UP
-  AMPICILLIN/SULBACTAM: SIGNIFICANT CHANGE UP
-  AMPICILLIN/SULBACTAM: SIGNIFICANT CHANGE UP
-  AMPICILLIN: SIGNIFICANT CHANGE UP
-  AMPICILLIN: SIGNIFICANT CHANGE UP
-  AZTREONAM: SIGNIFICANT CHANGE UP
-  AZTREONAM: SIGNIFICANT CHANGE UP
-  CEFAZOLIN: SIGNIFICANT CHANGE UP
-  CEFAZOLIN: SIGNIFICANT CHANGE UP
-  CEFEPIME: SIGNIFICANT CHANGE UP
-  CEFEPIME: SIGNIFICANT CHANGE UP
-  CEFOXITIN: SIGNIFICANT CHANGE UP
-  CEFOXITIN: SIGNIFICANT CHANGE UP
-  CEFTRIAXONE: SIGNIFICANT CHANGE UP
-  CEFTRIAXONE: SIGNIFICANT CHANGE UP
-  CEFUROXIME: SIGNIFICANT CHANGE UP
-  CIPROFLOXACIN: SIGNIFICANT CHANGE UP
-  CIPROFLOXACIN: SIGNIFICANT CHANGE UP
-  CLINDAMYCIN: SIGNIFICANT CHANGE UP
-  ERTAPENEM: SIGNIFICANT CHANGE UP
-  ERTAPENEM: SIGNIFICANT CHANGE UP
-  ERYTHROMYCIN: SIGNIFICANT CHANGE UP
-  GENTAMICIN: SIGNIFICANT CHANGE UP
-  IMIPENEM: SIGNIFICANT CHANGE UP
-  IMIPENEM: SIGNIFICANT CHANGE UP
-  LEVOFLOXACIN: SIGNIFICANT CHANGE UP
-  LEVOFLOXACIN: SIGNIFICANT CHANGE UP
-  MEROPENEM: SIGNIFICANT CHANGE UP
-  MEROPENEM: SIGNIFICANT CHANGE UP
-  NITROFURANTOIN: SIGNIFICANT CHANGE UP
-  NITROFURANTOIN: SIGNIFICANT CHANGE UP
-  OXACILLIN: SIGNIFICANT CHANGE UP
-  PENICILLIN: SIGNIFICANT CHANGE UP
-  PIPERACILLIN/TAZOBACTAM: SIGNIFICANT CHANGE UP
-  PIPERACILLIN/TAZOBACTAM: SIGNIFICANT CHANGE UP
-  RIFAMPIN: SIGNIFICANT CHANGE UP
-  TETRACYCLINE: SIGNIFICANT CHANGE UP
-  TOBRAMYCIN: SIGNIFICANT CHANGE UP
-  TOBRAMYCIN: SIGNIFICANT CHANGE UP
-  TRIMETHOPRIM/SULFAMETHOXAZOLE: SIGNIFICANT CHANGE UP
-  VANCOMYCIN: SIGNIFICANT CHANGE UP
ALBUMIN SERPL ELPH-MCNC: 2 G/DL — LOW (ref 3.3–5.2)
ALP SERPL-CCNC: 184 U/L — HIGH (ref 40–120)
ALT FLD-CCNC: 120 U/L — HIGH
ANION GAP SERPL CALC-SCNC: 15 MMOL/L — SIGNIFICANT CHANGE UP (ref 5–17)
ANION GAP SERPL CALC-SCNC: 15 MMOL/L — SIGNIFICANT CHANGE UP (ref 5–17)
AST SERPL-CCNC: 74 U/L — HIGH
BILIRUB SERPL-MCNC: 0.2 MG/DL — LOW (ref 0.4–2)
BUN SERPL-MCNC: 31.9 MG/DL — HIGH (ref 8–20)
BUN SERPL-MCNC: 32.6 MG/DL — HIGH (ref 8–20)
CALCIUM SERPL-MCNC: 7.9 MG/DL — LOW (ref 8.4–10.5)
CALCIUM SERPL-MCNC: 8.2 MG/DL — LOW (ref 8.4–10.5)
CHLORIDE SERPL-SCNC: 96 MMOL/L — SIGNIFICANT CHANGE UP (ref 96–108)
CHLORIDE SERPL-SCNC: 98 MMOL/L — SIGNIFICANT CHANGE UP (ref 96–108)
CO2 SERPL-SCNC: 13 MMOL/L — LOW (ref 22–29)
CO2 SERPL-SCNC: 13 MMOL/L — LOW (ref 22–29)
CREAT SERPL-MCNC: 0.75 MG/DL — SIGNIFICANT CHANGE UP (ref 0.5–1.3)
CREAT SERPL-MCNC: 0.78 MG/DL — SIGNIFICANT CHANGE UP (ref 0.5–1.3)
CULTURE RESULTS: ABNORMAL
CULTURE RESULTS: ABNORMAL
DIGOXIN SERPL-MCNC: 3.1 NG/ML — CRITICAL HIGH (ref 0.8–2)
EGFR: 76 ML/MIN/1.73M2 — SIGNIFICANT CHANGE UP
EGFR: 80 ML/MIN/1.73M2 — SIGNIFICANT CHANGE UP
GLUCOSE BLDC GLUCOMTR-MCNC: 143 MG/DL — HIGH (ref 70–99)
GLUCOSE BLDC GLUCOMTR-MCNC: 222 MG/DL — HIGH (ref 70–99)
GLUCOSE SERPL-MCNC: 250 MG/DL — HIGH (ref 70–99)
GLUCOSE SERPL-MCNC: 277 MG/DL — HIGH (ref 70–99)
HCT VFR BLD CALC: 26.7 % — LOW (ref 34.5–45)
HGB BLD-MCNC: 8.7 G/DL — LOW (ref 11.5–15.5)
MAGNESIUM SERPL-MCNC: 1.8 MG/DL — SIGNIFICANT CHANGE UP (ref 1.8–2.6)
MCHC RBC-ENTMCNC: 28.2 PG — SIGNIFICANT CHANGE UP (ref 27–34)
MCHC RBC-ENTMCNC: 32.6 GM/DL — SIGNIFICANT CHANGE UP (ref 32–36)
MCV RBC AUTO: 86.7 FL — SIGNIFICANT CHANGE UP (ref 80–100)
METHOD TYPE: SIGNIFICANT CHANGE UP
ORGANISM # SPEC MICROSCOPIC CNT: ABNORMAL
ORGANISM # SPEC MICROSCOPIC CNT: SIGNIFICANT CHANGE UP
ORGANISM # SPEC MICROSCOPIC CNT: SIGNIFICANT CHANGE UP
PHOSPHATE SERPL-MCNC: 2.8 MG/DL — SIGNIFICANT CHANGE UP (ref 2.4–4.7)
PLATELET # BLD AUTO: 389 K/UL — SIGNIFICANT CHANGE UP (ref 150–400)
POTASSIUM SERPL-MCNC: 4.4 MMOL/L — SIGNIFICANT CHANGE UP (ref 3.5–5.3)
POTASSIUM SERPL-MCNC: 4.7 MMOL/L — SIGNIFICANT CHANGE UP (ref 3.5–5.3)
POTASSIUM SERPL-SCNC: 4.4 MMOL/L — SIGNIFICANT CHANGE UP (ref 3.5–5.3)
POTASSIUM SERPL-SCNC: 4.7 MMOL/L — SIGNIFICANT CHANGE UP (ref 3.5–5.3)
PROT SERPL-MCNC: 5.4 G/DL — LOW (ref 6.6–8.7)
RBC # BLD: 3.08 M/UL — LOW (ref 3.8–5.2)
RBC # FLD: 18.4 % — HIGH (ref 10.3–14.5)
SODIUM SERPL-SCNC: 124 MMOL/L — LOW (ref 135–145)
SODIUM SERPL-SCNC: 126 MMOL/L — LOW (ref 135–145)
SPECIMEN SOURCE: SIGNIFICANT CHANGE UP
SPECIMEN SOURCE: SIGNIFICANT CHANGE UP
WBC # BLD: 13.11 K/UL — HIGH (ref 3.8–10.5)
WBC # FLD AUTO: 13.11 K/UL — HIGH (ref 3.8–10.5)

## 2024-06-06 PROCEDURE — 99291 CRITICAL CARE FIRST HOUR: CPT | Mod: FS

## 2024-06-06 PROCEDURE — 71045 X-RAY EXAM CHEST 1 VIEW: CPT | Mod: 26,76

## 2024-06-06 RX ORDER — DEXTROSE 30 % IN WATER 30 %
15 VIAL (ML) INTRAVENOUS ONCE
Refills: 0 | Status: DISCONTINUED | OUTPATIENT
Start: 2024-06-06 | End: 2024-06-25

## 2024-06-06 RX ORDER — DEXTROSE 30 % IN WATER 30 %
12.5 VIAL (ML) INTRAVENOUS ONCE
Refills: 0 | Status: DISCONTINUED | OUTPATIENT
Start: 2024-06-06 | End: 2024-06-25

## 2024-06-06 RX ORDER — ENOXAPARIN SODIUM 100 MG/ML
70 INJECTION SUBCUTANEOUS EVERY 12 HOURS
Refills: 0 | Status: DISCONTINUED | OUTPATIENT
Start: 2024-06-06 | End: 2024-06-07

## 2024-06-06 RX ORDER — DEXTROSE MONOHYDRATE AND SODIUM CHLORIDE 5; .3 G/100ML; G/100ML
500 INJECTION, SOLUTION INTRAVENOUS ONCE
Refills: 0 | Status: COMPLETED | OUTPATIENT
Start: 2024-06-06 | End: 2024-06-06

## 2024-06-06 RX ORDER — GLUCAGON HYDROCHLORIDE 1 MG/ML
1 INJECTION, POWDER, FOR SOLUTION INTRAMUSCULAR; INTRAVENOUS; SUBCUTANEOUS ONCE
Refills: 0 | Status: DISCONTINUED | OUTPATIENT
Start: 2024-06-06 | End: 2024-06-25

## 2024-06-06 RX ORDER — INSULIN LISPRO 100 [IU]/ML
INJECTION, SOLUTION SUBCUTANEOUS
Refills: 0 | Status: DISCONTINUED | OUTPATIENT
Start: 2024-06-06 | End: 2024-06-25

## 2024-06-06 RX ORDER — DEXTROSE 30 % IN WATER 30 %
25 VIAL (ML) INTRAVENOUS ONCE
Refills: 0 | Status: DISCONTINUED | OUTPATIENT
Start: 2024-06-06 | End: 2024-06-25

## 2024-06-06 RX ORDER — DEXTROSE MONOHYDRATE AND SODIUM CHLORIDE 5; .3 G/100ML; G/100ML
1000 INJECTION, SOLUTION INTRAVENOUS
Refills: 0 | Status: DISCONTINUED | OUTPATIENT
Start: 2024-06-06 | End: 2024-06-25

## 2024-06-06 RX ORDER — DEXTROSE MONOHYDRATE 100 MG/ML
125 INJECTION, SOLUTION INTRAVENOUS ONCE
Refills: 0 | Status: DISCONTINUED | OUTPATIENT
Start: 2024-06-06 | End: 2024-06-25

## 2024-06-06 RX ORDER — ACETAMINOPHEN 325 MG
650 TABLET ORAL EVERY 6 HOURS
Refills: 0 | Status: DISCONTINUED | OUTPATIENT
Start: 2024-06-06 | End: 2024-06-10

## 2024-06-06 RX ORDER — ALBUMIN HUMAN 5 %
100 INTRAVENOUS SOLUTION INTRAVENOUS EVERY 6 HOURS
Refills: 0 | Status: COMPLETED | OUTPATIENT
Start: 2024-06-06 | End: 2024-06-07

## 2024-06-06 RX ORDER — DEXTROSE MONOHYDRATE AND SODIUM CHLORIDE 5; .3 G/100ML; G/100ML
1000 INJECTION, SOLUTION INTRAVENOUS ONCE
Refills: 0 | Status: COMPLETED | OUTPATIENT
Start: 2024-06-06 | End: 2024-06-06

## 2024-06-06 RX ORDER — ACETAMINOPHEN 325 MG
1000 TABLET ORAL ONCE
Refills: 0 | Status: DISCONTINUED | OUTPATIENT
Start: 2024-06-06 | End: 2024-06-10

## 2024-06-06 RX ADMIN — Medication 1 APPLICATION(S): at 05:59

## 2024-06-06 RX ADMIN — MIDODRINE HYDROCHLORIDE 15 MILLIGRAM(S): 10 TABLET ORAL at 05:59

## 2024-06-06 RX ADMIN — MEROPENEM 1000 MILLIGRAM(S): 500 INJECTION, POWDER, FOR SOLUTION INTRAVENOUS at 09:10

## 2024-06-06 RX ADMIN — Medication 20 MILLIGRAM(S): at 22:06

## 2024-06-06 RX ADMIN — Medication 25 MICROGRAM(S): at 05:59

## 2024-06-06 RX ADMIN — MEROPENEM 1000 MILLIGRAM(S): 500 INJECTION, POWDER, FOR SOLUTION INTRAVENOUS at 00:52

## 2024-06-06 RX ADMIN — HYDROCORTISONE 100 MILLIGRAM(S): 100 SUSPENSION RECTAL at 13:54

## 2024-06-06 RX ADMIN — Medication 650 MILLIGRAM(S): at 22:06

## 2024-06-06 RX ADMIN — Medication 50 MILLILITER(S): at 05:59

## 2024-06-06 RX ADMIN — MIDODRINE HYDROCHLORIDE 15 MILLIGRAM(S): 10 TABLET ORAL at 22:06

## 2024-06-06 RX ADMIN — Medication 1 APPLICATION(S): at 06:00

## 2024-06-06 RX ADMIN — Medication 650 MILLIGRAM(S): at 23:08

## 2024-06-06 RX ADMIN — Medication 1 APPLICATION(S): at 17:07

## 2024-06-06 RX ADMIN — Medication 50 MILLILITER(S): at 00:01

## 2024-06-06 RX ADMIN — INSULIN LISPRO 2: 100 INJECTION, SOLUTION SUBCUTANEOUS at 11:52

## 2024-06-06 RX ADMIN — Medication 50 MILLILITER(S): at 18:03

## 2024-06-06 RX ADMIN — DEXTROSE MONOHYDRATE AND SODIUM CHLORIDE 1000 MILLILITER(S): 5; .3 INJECTION, SOLUTION INTRAVENOUS at 22:05

## 2024-06-06 RX ADMIN — HYDROCORTISONE 100 MILLIGRAM(S): 100 SUSPENSION RECTAL at 22:05

## 2024-06-06 RX ADMIN — Medication 10 MILLIGRAM(S): at 04:07

## 2024-06-06 RX ADMIN — DEXTROSE MONOHYDRATE AND SODIUM CHLORIDE 1000 MILLILITER(S): 5; .3 INJECTION, SOLUTION INTRAVENOUS at 16:21

## 2024-06-06 RX ADMIN — VANCOMYCIN HYDROCHLORIDE 250 MILLIGRAM(S): KIT at 09:09

## 2024-06-06 RX ADMIN — TAMSULOSIN HYDROCHLORIDE 0.4 MILLIGRAM(S): 0.4 CAPSULE ORAL at 22:06

## 2024-06-06 RX ADMIN — HYDROCORTISONE 100 MILLIGRAM(S): 100 SUSPENSION RECTAL at 05:57

## 2024-06-06 RX ADMIN — ENOXAPARIN SODIUM 70 MILLIGRAM(S): 100 INJECTION SUBCUTANEOUS at 17:06

## 2024-06-06 RX ADMIN — MEROPENEM 1000 MILLIGRAM(S): 500 INJECTION, POWDER, FOR SOLUTION INTRAVENOUS at 17:06

## 2024-06-06 RX ADMIN — PHENYLEPHRINE HYDROCHLORIDE 2.55 MICROGRAM(S)/KG/MIN: 10 INJECTION INTRAVENOUS at 06:01

## 2024-06-06 RX ADMIN — MIDODRINE HYDROCHLORIDE 15 MILLIGRAM(S): 10 TABLET ORAL at 13:54

## 2024-06-07 ENCOUNTER — RESULT REVIEW (OUTPATIENT)
Age: 84
End: 2024-06-07

## 2024-06-07 LAB
A1C WITH ESTIMATED AVERAGE GLUCOSE RESULT: 6 % — HIGH (ref 4–5.6)
ALBUMIN SERPL ELPH-MCNC: 3.2 G/DL — LOW (ref 3.3–5.2)
ALP SERPL-CCNC: 94 U/L — SIGNIFICANT CHANGE UP (ref 40–120)
ALT FLD-CCNC: 73 U/L — HIGH
ANION GAP SERPL CALC-SCNC: 17 MMOL/L — SIGNIFICANT CHANGE UP (ref 5–17)
ANION GAP SERPL CALC-SCNC: 18 MMOL/L — HIGH (ref 5–17)
AST SERPL-CCNC: 56 U/L — HIGH
BILIRUB SERPL-MCNC: 1.2 MG/DL — SIGNIFICANT CHANGE UP (ref 0.4–2)
BLD GP AB SCN SERPL QL: SIGNIFICANT CHANGE UP
BUN SERPL-MCNC: 22.3 MG/DL — HIGH (ref 8–20)
BUN SERPL-MCNC: 26.5 MG/DL — HIGH (ref 8–20)
CALCIUM SERPL-MCNC: 8.2 MG/DL — LOW (ref 8.4–10.5)
CALCIUM SERPL-MCNC: 8.6 MG/DL — SIGNIFICANT CHANGE UP (ref 8.4–10.5)
CHLORIDE SERPL-SCNC: 102 MMOL/L — SIGNIFICANT CHANGE UP (ref 96–108)
CHLORIDE SERPL-SCNC: 105 MMOL/L — SIGNIFICANT CHANGE UP (ref 96–108)
CO2 SERPL-SCNC: 13 MMOL/L — LOW (ref 22–29)
CO2 SERPL-SCNC: 14 MMOL/L — LOW (ref 22–29)
CREAT SERPL-MCNC: 0.6 MG/DL — SIGNIFICANT CHANGE UP (ref 0.5–1.3)
CREAT SERPL-MCNC: 0.75 MG/DL — SIGNIFICANT CHANGE UP (ref 0.5–1.3)
EGFR: 80 ML/MIN/1.73M2 — SIGNIFICANT CHANGE UP
EGFR: 90 ML/MIN/1.73M2 — SIGNIFICANT CHANGE UP
ESTIMATED AVERAGE GLUCOSE: 126 MG/DL — HIGH (ref 68–114)
GLUCOSE BLDC GLUCOMTR-MCNC: 185 MG/DL — HIGH (ref 70–99)
GLUCOSE BLDC GLUCOMTR-MCNC: 269 MG/DL — HIGH (ref 70–99)
GLUCOSE BLDC GLUCOMTR-MCNC: 287 MG/DL — HIGH (ref 70–99)
GLUCOSE SERPL-MCNC: 181 MG/DL — HIGH (ref 70–99)
GLUCOSE SERPL-MCNC: 250 MG/DL — HIGH (ref 70–99)
HCT VFR BLD CALC: 19.3 % — CRITICAL LOW (ref 34.5–45)
HCT VFR BLD CALC: 21.6 % — LOW (ref 34.5–45)
HGB BLD-MCNC: 6.1 G/DL — CRITICAL LOW (ref 11.5–15.5)
HGB BLD-MCNC: 7.1 G/DL — LOW (ref 11.5–15.5)
MAGNESIUM SERPL-MCNC: 1.6 MG/DL — SIGNIFICANT CHANGE UP (ref 1.6–2.6)
MAGNESIUM SERPL-MCNC: 2.5 MG/DL — SIGNIFICANT CHANGE UP (ref 1.8–2.6)
MCHC RBC-ENTMCNC: 27.9 PG — SIGNIFICANT CHANGE UP (ref 27–34)
MCHC RBC-ENTMCNC: 29 PG — SIGNIFICANT CHANGE UP (ref 27–34)
MCHC RBC-ENTMCNC: 31.6 GM/DL — LOW (ref 32–36)
MCHC RBC-ENTMCNC: 32.9 GM/DL — SIGNIFICANT CHANGE UP (ref 32–36)
MCV RBC AUTO: 88.1 FL — SIGNIFICANT CHANGE UP (ref 80–100)
MCV RBC AUTO: 88.2 FL — SIGNIFICANT CHANGE UP (ref 80–100)
PHOSPHATE SERPL-MCNC: 2 MG/DL — LOW (ref 2.4–4.7)
PLATELET # BLD AUTO: 251 K/UL — SIGNIFICANT CHANGE UP (ref 150–400)
PLATELET # BLD AUTO: 312 K/UL — SIGNIFICANT CHANGE UP (ref 150–400)
POTASSIUM SERPL-MCNC: 3.2 MMOL/L — LOW (ref 3.5–5.3)
POTASSIUM SERPL-MCNC: 3.5 MMOL/L — SIGNIFICANT CHANGE UP (ref 3.5–5.3)
POTASSIUM SERPL-SCNC: 3.2 MMOL/L — LOW (ref 3.5–5.3)
POTASSIUM SERPL-SCNC: 3.5 MMOL/L — SIGNIFICANT CHANGE UP (ref 3.5–5.3)
PROT SERPL-MCNC: 5 G/DL — LOW (ref 6.6–8.7)
RBC # BLD: 2.19 M/UL — LOW (ref 3.8–5.2)
RBC # BLD: 2.45 M/UL — LOW (ref 3.8–5.2)
RBC # FLD: 17.3 % — HIGH (ref 10.3–14.5)
RBC # FLD: 18.7 % — HIGH (ref 10.3–14.5)
SODIUM SERPL-SCNC: 132 MMOL/L — LOW (ref 135–145)
SODIUM SERPL-SCNC: 136 MMOL/L — SIGNIFICANT CHANGE UP (ref 135–145)
VANCOMYCIN TROUGH SERPL-MCNC: 11.5 UG/ML — SIGNIFICANT CHANGE UP (ref 10–20)
WBC # BLD: 11.71 K/UL — HIGH (ref 3.8–10.5)
WBC # BLD: 14.25 K/UL — HIGH (ref 3.8–10.5)
WBC # FLD AUTO: 11.71 K/UL — HIGH (ref 3.8–10.5)
WBC # FLD AUTO: 14.25 K/UL — HIGH (ref 3.8–10.5)

## 2024-06-07 PROCEDURE — 93321 DOPPLER ECHO F-UP/LMTD STD: CPT | Mod: 26

## 2024-06-07 PROCEDURE — 93308 TTE F-UP OR LMTD: CPT | Mod: 26

## 2024-06-07 PROCEDURE — 93325 DOPPLER ECHO COLOR FLOW MAPG: CPT | Mod: 26

## 2024-06-07 PROCEDURE — 74177 CT ABD & PELVIS W/CONTRAST: CPT | Mod: 26

## 2024-06-07 PROCEDURE — 99291 CRITICAL CARE FIRST HOUR: CPT

## 2024-06-07 RX ORDER — PANTOPRAZOLE SODIUM 40 MG/10ML
80 INJECTION, POWDER, FOR SOLUTION INTRAVENOUS ONCE
Refills: 0 | Status: COMPLETED | OUTPATIENT
Start: 2024-06-07 | End: 2024-06-07

## 2024-06-07 RX ORDER — POTASSIUM PHOSPHATE, MONOBASIC POTASSIUM PHOSPHATE, DIBASIC INJECTION, 236; 224 MG/ML; MG/ML
30 SOLUTION, CONCENTRATE INTRAVENOUS ONCE
Refills: 0 | Status: COMPLETED | OUTPATIENT
Start: 2024-06-07 | End: 2024-06-07

## 2024-06-07 RX ORDER — POTASSIUM PHOSPHATE, MONOBASIC POTASSIUM PHOSPHATE, DIBASIC INJECTION, 236; 224 MG/ML; MG/ML
15 SOLUTION, CONCENTRATE INTRAVENOUS ONCE
Refills: 0 | Status: DISCONTINUED | OUTPATIENT
Start: 2024-06-07 | End: 2024-06-07

## 2024-06-07 RX ORDER — PANTOPRAZOLE SODIUM 40 MG/10ML
40 INJECTION, POWDER, FOR SOLUTION INTRAVENOUS EVERY 12 HOURS
Refills: 0 | Status: DISCONTINUED | OUTPATIENT
Start: 2024-06-07 | End: 2024-06-25

## 2024-06-07 RX ORDER — MAGNESIUM SULFATE 100 %
2 POWDER (GRAM) MISCELLANEOUS ONCE
Refills: 0 | Status: COMPLETED | OUTPATIENT
Start: 2024-06-07 | End: 2024-06-07

## 2024-06-07 RX ORDER — MAGNESIUM SULFATE 100 %
2 POWDER (GRAM) MISCELLANEOUS ONCE
Refills: 0 | Status: DISCONTINUED | OUTPATIENT
Start: 2024-06-07 | End: 2024-06-07

## 2024-06-07 RX ORDER — DEXTROSE MONOHYDRATE AND SODIUM CHLORIDE 5; .3 G/100ML; G/100ML
250 INJECTION, SOLUTION INTRAVENOUS
Refills: 0 | Status: DISCONTINUED | OUTPATIENT
Start: 2024-06-07 | End: 2024-06-08

## 2024-06-07 RX ADMIN — HYDROCORTISONE 100 MILLIGRAM(S): 100 SUSPENSION RECTAL at 05:14

## 2024-06-07 RX ADMIN — Medication 1 APPLICATION(S): at 17:10

## 2024-06-07 RX ADMIN — Medication 25 GRAM(S): at 06:46

## 2024-06-07 RX ADMIN — MEROPENEM 1000 MILLIGRAM(S): 500 INJECTION, POWDER, FOR SOLUTION INTRAVENOUS at 01:00

## 2024-06-07 RX ADMIN — Medication 20 MILLIGRAM(S): at 21:07

## 2024-06-07 RX ADMIN — Medication 1 APPLICATION(S): at 05:15

## 2024-06-07 RX ADMIN — HYDROCORTISONE 100 MILLIGRAM(S): 100 SUSPENSION RECTAL at 21:07

## 2024-06-07 RX ADMIN — ENOXAPARIN SODIUM 70 MILLIGRAM(S): 100 INJECTION SUBCUTANEOUS at 05:15

## 2024-06-07 RX ADMIN — INSULIN LISPRO 3: 100 INJECTION, SOLUTION SUBCUTANEOUS at 16:35

## 2024-06-07 RX ADMIN — MEROPENEM 1000 MILLIGRAM(S): 500 INJECTION, POWDER, FOR SOLUTION INTRAVENOUS at 17:10

## 2024-06-07 RX ADMIN — PANTOPRAZOLE SODIUM 80 MILLIGRAM(S): 40 INJECTION, POWDER, FOR SOLUTION INTRAVENOUS at 09:51

## 2024-06-07 RX ADMIN — INSULIN LISPRO 3: 100 INJECTION, SOLUTION SUBCUTANEOUS at 11:05

## 2024-06-07 RX ADMIN — TAMSULOSIN HYDROCHLORIDE 0.4 MILLIGRAM(S): 0.4 CAPSULE ORAL at 21:08

## 2024-06-07 RX ADMIN — POTASSIUM PHOSPHATE, MONOBASIC POTASSIUM PHOSPHATE, DIBASIC INJECTION, 83.33 MILLIMOLE(S): 236; 224 SOLUTION, CONCENTRATE INTRAVENOUS at 06:00

## 2024-06-07 RX ADMIN — DEXTROSE MONOHYDRATE AND SODIUM CHLORIDE 250 MILLILITER(S): 5; .3 INJECTION, SOLUTION INTRAVENOUS at 11:10

## 2024-06-07 RX ADMIN — HYDROCORTISONE 100 MILLIGRAM(S): 100 SUSPENSION RECTAL at 13:01

## 2024-06-07 RX ADMIN — MIDODRINE HYDROCHLORIDE 15 MILLIGRAM(S): 10 TABLET ORAL at 21:07

## 2024-06-07 RX ADMIN — Medication 1 APPLICATION(S): at 05:14

## 2024-06-07 RX ADMIN — MEROPENEM 1000 MILLIGRAM(S): 500 INJECTION, POWDER, FOR SOLUTION INTRAVENOUS at 08:32

## 2024-06-07 RX ADMIN — PANTOPRAZOLE SODIUM 40 MILLIGRAM(S): 40 INJECTION, POWDER, FOR SOLUTION INTRAVENOUS at 17:10

## 2024-06-07 RX ADMIN — PANTOPRAZOLE SODIUM 40 MILLIGRAM(S): 40 INJECTION, POWDER, FOR SOLUTION INTRAVENOUS at 06:33

## 2024-06-07 RX ADMIN — Medication 50 MILLILITER(S): at 05:13

## 2024-06-07 RX ADMIN — MIDODRINE HYDROCHLORIDE 15 MILLIGRAM(S): 10 TABLET ORAL at 05:14

## 2024-06-07 RX ADMIN — Medication 25 MICROGRAM(S): at 05:14

## 2024-06-07 RX ADMIN — INSULIN LISPRO 1: 100 INJECTION, SOLUTION SUBCUTANEOUS at 07:36

## 2024-06-07 RX ADMIN — Medication 50 MILLILITER(S): at 11:06

## 2024-06-07 RX ADMIN — MIDODRINE HYDROCHLORIDE 15 MILLIGRAM(S): 10 TABLET ORAL at 13:01

## 2024-06-08 LAB
ALBUMIN SERPL ELPH-MCNC: 3 G/DL — LOW (ref 3.3–5.2)
ALP SERPL-CCNC: 92 U/L — SIGNIFICANT CHANGE UP (ref 40–120)
ALT FLD-CCNC: 75 U/L — HIGH
ANION GAP SERPL CALC-SCNC: 14 MMOL/L — SIGNIFICANT CHANGE UP (ref 5–17)
APPEARANCE UR: ABNORMAL
AST SERPL-CCNC: 43 U/L — HIGH
BACTERIA # UR AUTO: ABNORMAL /HPF
BASE EXCESS BLDV CALC-SCNC: -11.2 MMOL/L — LOW (ref -2–3)
BASOPHILS # BLD AUTO: 0.05 K/UL — SIGNIFICANT CHANGE UP (ref 0–0.2)
BASOPHILS NFR BLD AUTO: 0.3 % — SIGNIFICANT CHANGE UP (ref 0–2)
BILIRUB SERPL-MCNC: 0.4 MG/DL — SIGNIFICANT CHANGE UP (ref 0.4–2)
BILIRUB UR-MCNC: NEGATIVE — SIGNIFICANT CHANGE UP
BUN SERPL-MCNC: 26.1 MG/DL — HIGH (ref 8–20)
CA-I SERPL-SCNC: 1.36 MMOL/L — HIGH (ref 1.15–1.33)
CALCIUM SERPL-MCNC: 8.4 MG/DL — SIGNIFICANT CHANGE UP (ref 8.4–10.5)
CAST: 5 /LPF — HIGH (ref 0–4)
CHLORIDE BLDV-SCNC: 110 MMOL/L — HIGH (ref 96–108)
CHLORIDE SERPL-SCNC: 106 MMOL/L — SIGNIFICANT CHANGE UP (ref 96–108)
CO2 SERPL-SCNC: 15 MMOL/L — LOW (ref 22–29)
COLOR SPEC: ABNORMAL
CREAT SERPL-MCNC: 0.67 MG/DL — SIGNIFICANT CHANGE UP (ref 0.5–1.3)
DIFF PNL FLD: ABNORMAL
EGFR: 88 ML/MIN/1.73M2 — SIGNIFICANT CHANGE UP
EOSINOPHIL # BLD AUTO: 0 K/UL — SIGNIFICANT CHANGE UP (ref 0–0.5)
EOSINOPHIL NFR BLD AUTO: 0 % — SIGNIFICANT CHANGE UP (ref 0–6)
GAS PNL BLDV: 130 MMOL/L — LOW (ref 136–145)
GAS PNL BLDV: SIGNIFICANT CHANGE UP
GLUCOSE BLDC GLUCOMTR-MCNC: 132 MG/DL — HIGH (ref 70–99)
GLUCOSE BLDC GLUCOMTR-MCNC: 154 MG/DL — HIGH (ref 70–99)
GLUCOSE BLDC GLUCOMTR-MCNC: 166 MG/DL — HIGH (ref 70–99)
GLUCOSE BLDC GLUCOMTR-MCNC: 251 MG/DL — HIGH (ref 70–99)
GLUCOSE BLDV-MCNC: 180 MG/DL — HIGH (ref 70–99)
GLUCOSE SERPL-MCNC: 251 MG/DL — HIGH (ref 70–99)
GLUCOSE UR QL: NEGATIVE MG/DL — SIGNIFICANT CHANGE UP
HCO3 BLDV-SCNC: 15 MMOL/L — LOW (ref 22–29)
HCT VFR BLD CALC: 21.9 % — LOW (ref 34.5–45)
HCT VFR BLD CALC: 29.3 % — LOW (ref 34.5–45)
HCT VFR BLD CALC: 30.9 % — LOW (ref 34.5–45)
HCT VFR BLDA CALC: 29 % — SIGNIFICANT CHANGE UP
HGB BLD CALC-MCNC: 9.7 G/DL — LOW (ref 11.7–16.1)
HGB BLD-MCNC: 10.1 G/DL — LOW (ref 11.5–15.5)
HGB BLD-MCNC: 6.9 G/DL — CRITICAL LOW (ref 11.5–15.5)
HGB BLD-MCNC: 9.7 G/DL — LOW (ref 11.5–15.5)
IMM GRANULOCYTES NFR BLD AUTO: 1.4 % — HIGH (ref 0–0.9)
KETONES UR-MCNC: NEGATIVE MG/DL — SIGNIFICANT CHANGE UP
LACTATE BLDV-MCNC: 2.2 MMOL/L — HIGH (ref 0.5–2)
LEUKOCYTE ESTERASE UR-ACNC: ABNORMAL
LYMPHOCYTES # BLD AUTO: 12.3 % — LOW (ref 13–44)
LYMPHOCYTES # BLD AUTO: 2.11 K/UL — SIGNIFICANT CHANGE UP (ref 1–3.3)
MAGNESIUM SERPL-MCNC: 1.8 MG/DL — SIGNIFICANT CHANGE UP (ref 1.6–2.6)
MCHC RBC-ENTMCNC: 27.8 PG — SIGNIFICANT CHANGE UP (ref 27–34)
MCHC RBC-ENTMCNC: 28.7 PG — SIGNIFICANT CHANGE UP (ref 27–34)
MCHC RBC-ENTMCNC: 29.4 PG — SIGNIFICANT CHANGE UP (ref 27–34)
MCHC RBC-ENTMCNC: 31.5 GM/DL — LOW (ref 32–36)
MCHC RBC-ENTMCNC: 32.7 GM/DL — SIGNIFICANT CHANGE UP (ref 32–36)
MCHC RBC-ENTMCNC: 33.1 GM/DL — SIGNIFICANT CHANGE UP (ref 32–36)
MCV RBC AUTO: 86.7 FL — SIGNIFICANT CHANGE UP (ref 80–100)
MCV RBC AUTO: 88.3 FL — SIGNIFICANT CHANGE UP (ref 80–100)
MCV RBC AUTO: 90.1 FL — SIGNIFICANT CHANGE UP (ref 80–100)
MONOCYTES # BLD AUTO: 1.39 K/UL — HIGH (ref 0–0.9)
MONOCYTES NFR BLD AUTO: 8.1 % — SIGNIFICANT CHANGE UP (ref 2–14)
NEUTROPHILS # BLD AUTO: 13.42 K/UL — HIGH (ref 1.8–7.4)
NEUTROPHILS NFR BLD AUTO: 77.9 % — HIGH (ref 43–77)
NITRITE UR-MCNC: POSITIVE
NT-PROBNP SERPL-SCNC: 6927 PG/ML — HIGH (ref 0–300)
PCO2 BLDV: 30 MMHG — LOW (ref 39–42)
PH BLDV: 7.31 — LOW (ref 7.32–7.43)
PH UR: 6 — SIGNIFICANT CHANGE UP (ref 5–8)
PHOSPHATE SERPL-MCNC: 1.8 MG/DL — LOW (ref 2.4–4.7)
PLATELET # BLD AUTO: 189 K/UL — SIGNIFICANT CHANGE UP (ref 150–400)
PLATELET # BLD AUTO: 218 K/UL — SIGNIFICANT CHANGE UP (ref 150–400)
PLATELET # BLD AUTO: 224 K/UL — SIGNIFICANT CHANGE UP (ref 150–400)
PO2 BLDV: 99 MMHG — HIGH (ref 25–45)
POTASSIUM BLDV-SCNC: 2.9 MMOL/L — CRITICAL LOW (ref 3.5–5.1)
POTASSIUM SERPL-MCNC: 3 MMOL/L — LOW (ref 3.5–5.3)
POTASSIUM SERPL-SCNC: 3 MMOL/L — LOW (ref 3.5–5.3)
PROT SERPL-MCNC: 4.9 G/DL — LOW (ref 6.6–8.7)
PROT UR-MCNC: 300 MG/DL
RAPID RVP RESULT: SIGNIFICANT CHANGE UP
RBC # BLD: 2.48 M/UL — LOW (ref 3.8–5.2)
RBC # BLD: 3.38 M/UL — LOW (ref 3.8–5.2)
RBC # BLD: 3.43 M/UL — LOW (ref 3.8–5.2)
RBC # FLD: 17.6 % — HIGH (ref 10.3–14.5)
RBC # FLD: 18.5 % — HIGH (ref 10.3–14.5)
RBC # FLD: 19 % — HIGH (ref 10.3–14.5)
RBC CASTS # UR COMP ASSIST: >1900 /HPF — HIGH (ref 0–4)
SAO2 % BLDV: 98.6 % — SIGNIFICANT CHANGE UP
SARS-COV-2 RNA SPEC QL NAA+PROBE: SIGNIFICANT CHANGE UP
SODIUM SERPL-SCNC: 135 MMOL/L — SIGNIFICANT CHANGE UP (ref 135–145)
SP GR SPEC: 1.02 — SIGNIFICANT CHANGE UP (ref 1–1.03)
SQUAMOUS # UR AUTO: 10 /HPF — HIGH (ref 0–5)
TROPONIN T, HIGH SENSITIVITY RESULT: 35 NG/L — SIGNIFICANT CHANGE UP (ref 0–51)
UROBILINOGEN FLD QL: 0.2 MG/DL — SIGNIFICANT CHANGE UP (ref 0.2–1)
WBC # BLD: 12.26 K/UL — HIGH (ref 3.8–10.5)
WBC # BLD: 17.21 K/UL — HIGH (ref 3.8–10.5)
WBC # BLD: 8.6 K/UL — SIGNIFICANT CHANGE UP (ref 3.8–10.5)
WBC # FLD AUTO: 12.26 K/UL — HIGH (ref 3.8–10.5)
WBC # FLD AUTO: 17.21 K/UL — HIGH (ref 3.8–10.5)
WBC # FLD AUTO: 8.6 K/UL — SIGNIFICANT CHANGE UP (ref 3.8–10.5)
WBC UR QL: >998 /HPF — HIGH (ref 0–5)

## 2024-06-08 PROCEDURE — 93010 ELECTROCARDIOGRAM REPORT: CPT

## 2024-06-08 PROCEDURE — 99233 SBSQ HOSP IP/OBS HIGH 50: CPT

## 2024-06-08 PROCEDURE — 99223 1ST HOSP IP/OBS HIGH 75: CPT

## 2024-06-08 PROCEDURE — 71045 X-RAY EXAM CHEST 1 VIEW: CPT | Mod: 26

## 2024-06-08 PROCEDURE — 93970 EXTREMITY STUDY: CPT | Mod: 26

## 2024-06-08 RX ORDER — HYDROCORTISONE 100 MG/60ML
50 SUSPENSION RECTAL EVERY 8 HOURS
Refills: 0 | Status: DISCONTINUED | OUTPATIENT
Start: 2024-06-08 | End: 2024-06-08

## 2024-06-08 RX ORDER — POTASSIUM CHLORIDE 600 MG/1
40 TABLET, FILM COATED, EXTENDED RELEASE ORAL ONCE
Refills: 0 | Status: COMPLETED | OUTPATIENT
Start: 2024-06-08 | End: 2024-06-08

## 2024-06-08 RX ORDER — MAGNESIUM SULFATE 100 %
2 POWDER (GRAM) MISCELLANEOUS ONCE
Refills: 0 | Status: COMPLETED | OUTPATIENT
Start: 2024-06-08 | End: 2024-06-08

## 2024-06-08 RX ORDER — DEXTROSE MONOHYDRATE AND SODIUM CHLORIDE 5; .3 G/100ML; G/100ML
1000 INJECTION, SOLUTION INTRAVENOUS
Refills: 0 | Status: DISCONTINUED | OUTPATIENT
Start: 2024-06-08 | End: 2024-06-08

## 2024-06-08 RX ORDER — VANCOMYCIN HYDROCHLORIDE 50 MG/ML
1000 KIT ORAL ONCE
Refills: 0 | Status: COMPLETED | OUTPATIENT
Start: 2024-06-08 | End: 2024-06-08

## 2024-06-08 RX ORDER — POTASSIUM PHOSPHATE, MONOBASIC POTASSIUM PHOSPHATE, DIBASIC INJECTION, 236; 224 MG/ML; MG/ML
15 SOLUTION, CONCENTRATE INTRAVENOUS ONCE
Refills: 0 | Status: COMPLETED | OUTPATIENT
Start: 2024-06-08 | End: 2024-06-08

## 2024-06-08 RX ADMIN — PANTOPRAZOLE SODIUM 40 MILLIGRAM(S): 40 INJECTION, POWDER, FOR SOLUTION INTRAVENOUS at 05:17

## 2024-06-08 RX ADMIN — HYDROCORTISONE 100 MILLIGRAM(S): 100 SUSPENSION RECTAL at 05:16

## 2024-06-08 RX ADMIN — INSULIN LISPRO 3: 100 INJECTION, SOLUTION SUBCUTANEOUS at 12:18

## 2024-06-08 RX ADMIN — PANTOPRAZOLE SODIUM 40 MILLIGRAM(S): 40 INJECTION, POWDER, FOR SOLUTION INTRAVENOUS at 17:54

## 2024-06-08 RX ADMIN — Medication 1 APPLICATION(S): at 05:24

## 2024-06-08 RX ADMIN — POTASSIUM CHLORIDE 40 MILLIEQUIVALENT(S): 600 TABLET, FILM COATED, EXTENDED RELEASE ORAL at 09:37

## 2024-06-08 RX ADMIN — Medication 1 APPLICATION(S): at 12:29

## 2024-06-08 RX ADMIN — Medication 25 MICROGRAM(S): at 05:16

## 2024-06-08 RX ADMIN — POTASSIUM PHOSPHATE, MONOBASIC POTASSIUM PHOSPHATE, DIBASIC INJECTION, 62.5 MILLIMOLE(S): 236; 224 SOLUTION, CONCENTRATE INTRAVENOUS at 09:39

## 2024-06-08 RX ADMIN — TAMSULOSIN HYDROCHLORIDE 0.4 MILLIGRAM(S): 0.4 CAPSULE ORAL at 23:14

## 2024-06-08 RX ADMIN — MEROPENEM 1000 MILLIGRAM(S): 500 INJECTION, POWDER, FOR SOLUTION INTRAVENOUS at 09:39

## 2024-06-08 RX ADMIN — INSULIN LISPRO 1: 100 INJECTION, SOLUTION SUBCUTANEOUS at 07:58

## 2024-06-08 RX ADMIN — MEROPENEM 1000 MILLIGRAM(S): 500 INJECTION, POWDER, FOR SOLUTION INTRAVENOUS at 02:23

## 2024-06-08 RX ADMIN — MEROPENEM 1000 MILLIGRAM(S): 500 INJECTION, POWDER, FOR SOLUTION INTRAVENOUS at 17:54

## 2024-06-08 RX ADMIN — Medication 1 APPLICATION(S): at 05:17

## 2024-06-08 RX ADMIN — MIDODRINE HYDROCHLORIDE 15 MILLIGRAM(S): 10 TABLET ORAL at 23:14

## 2024-06-08 RX ADMIN — Medication 20 MILLIGRAM(S): at 23:14

## 2024-06-08 RX ADMIN — VANCOMYCIN HYDROCHLORIDE 250 MILLIGRAM(S): KIT at 21:50

## 2024-06-08 RX ADMIN — MIDODRINE HYDROCHLORIDE 15 MILLIGRAM(S): 10 TABLET ORAL at 05:16

## 2024-06-08 RX ADMIN — Medication 25 GRAM(S): at 09:39

## 2024-06-08 RX ADMIN — MIDODRINE HYDROCHLORIDE 15 MILLIGRAM(S): 10 TABLET ORAL at 13:15

## 2024-06-08 RX ADMIN — Medication 1 APPLICATION(S): at 17:55

## 2024-06-09 LAB
ALBUMIN SERPL ELPH-MCNC: 2.8 G/DL — LOW (ref 3.3–5.2)
ALP SERPL-CCNC: 91 U/L — SIGNIFICANT CHANGE UP (ref 40–120)
ALT FLD-CCNC: 67 U/L — HIGH
ANION GAP SERPL CALC-SCNC: 13 MMOL/L — SIGNIFICANT CHANGE UP (ref 5–17)
ANION GAP SERPL CALC-SCNC: 15 MMOL/L — SIGNIFICANT CHANGE UP (ref 5–17)
APPEARANCE UR: ABNORMAL
AST SERPL-CCNC: 30 U/L — SIGNIFICANT CHANGE UP
BACTERIA # UR AUTO: NEGATIVE /HPF — SIGNIFICANT CHANGE UP
BILIRUB SERPL-MCNC: 0.5 MG/DL — SIGNIFICANT CHANGE UP (ref 0.4–2)
BILIRUB UR-MCNC: NEGATIVE — SIGNIFICANT CHANGE UP
BUN SERPL-MCNC: 35.8 MG/DL — HIGH (ref 8–20)
BUN SERPL-MCNC: 36.2 MG/DL — HIGH (ref 8–20)
CALCIUM SERPL-MCNC: 8.5 MG/DL — SIGNIFICANT CHANGE UP (ref 8.4–10.5)
CALCIUM SERPL-MCNC: 8.6 MG/DL — SIGNIFICANT CHANGE UP (ref 8.4–10.5)
CAST: 3 /LPF — SIGNIFICANT CHANGE UP (ref 0–4)
CHLORIDE SERPL-SCNC: 110 MMOL/L — HIGH (ref 96–108)
CHLORIDE SERPL-SCNC: 112 MMOL/L — HIGH (ref 96–108)
CO2 SERPL-SCNC: 13 MMOL/L — LOW (ref 22–29)
CO2 SERPL-SCNC: 14 MMOL/L — LOW (ref 22–29)
COLOR SPEC: ABNORMAL
CREAT SERPL-MCNC: 0.63 MG/DL — SIGNIFICANT CHANGE UP (ref 0.5–1.3)
CREAT SERPL-MCNC: 0.66 MG/DL — SIGNIFICANT CHANGE UP (ref 0.5–1.3)
DIFF PNL FLD: ABNORMAL
EGFR: 88 ML/MIN/1.73M2 — SIGNIFICANT CHANGE UP
EGFR: 89 ML/MIN/1.73M2 — SIGNIFICANT CHANGE UP
FERRITIN SERPL-MCNC: 923 NG/ML — HIGH (ref 13–330)
FOLATE SERPL-MCNC: 7.8 NG/ML — SIGNIFICANT CHANGE UP
GAS PNL BLDA: SIGNIFICANT CHANGE UP
GI PCR PANEL: SIGNIFICANT CHANGE UP
GLUCOSE BLDC GLUCOMTR-MCNC: 104 MG/DL — HIGH (ref 70–99)
GLUCOSE BLDC GLUCOMTR-MCNC: 129 MG/DL — HIGH (ref 70–99)
GLUCOSE BLDC GLUCOMTR-MCNC: 151 MG/DL — HIGH (ref 70–99)
GLUCOSE SERPL-MCNC: 97 MG/DL — SIGNIFICANT CHANGE UP (ref 70–99)
GLUCOSE SERPL-MCNC: 97 MG/DL — SIGNIFICANT CHANGE UP (ref 70–99)
GLUCOSE UR QL: NEGATIVE MG/DL — SIGNIFICANT CHANGE UP
HCT VFR BLD CALC: 27.9 % — LOW (ref 34.5–45)
HCT VFR BLD CALC: 28.9 % — LOW (ref 34.5–45)
HGB BLD-MCNC: 9.4 G/DL — LOW (ref 11.5–15.5)
HGB BLD-MCNC: 9.5 G/DL — LOW (ref 11.5–15.5)
IRON SATN MFR SERPL: 14 % — SIGNIFICANT CHANGE UP (ref 14–50)
IRON SATN MFR SERPL: 23 UG/DL — LOW (ref 37–145)
KETONES UR-MCNC: NEGATIVE MG/DL — SIGNIFICANT CHANGE UP
LACTATE SERPL-SCNC: 2 MMOL/L — SIGNIFICANT CHANGE UP (ref 0.5–2)
LEUKOCYTE ESTERASE UR-ACNC: ABNORMAL
MAGNESIUM SERPL-MCNC: 2.9 MG/DL — HIGH (ref 1.6–2.6)
MCHC RBC-ENTMCNC: 28.7 PG — SIGNIFICANT CHANGE UP (ref 27–34)
MCHC RBC-ENTMCNC: 29.1 PG — SIGNIFICANT CHANGE UP (ref 27–34)
MCHC RBC-ENTMCNC: 32.9 GM/DL — SIGNIFICANT CHANGE UP (ref 32–36)
MCHC RBC-ENTMCNC: 33.7 GM/DL — SIGNIFICANT CHANGE UP (ref 32–36)
MCV RBC AUTO: 86.4 FL — SIGNIFICANT CHANGE UP (ref 80–100)
MCV RBC AUTO: 87.3 FL — SIGNIFICANT CHANGE UP (ref 80–100)
NITRITE UR-MCNC: NEGATIVE — SIGNIFICANT CHANGE UP
PH UR: 6.5 — SIGNIFICANT CHANGE UP (ref 5–8)
PHOSPHATE SERPL-MCNC: 2 MG/DL — LOW (ref 2.4–4.7)
PLATELET # BLD AUTO: 176 K/UL — SIGNIFICANT CHANGE UP (ref 150–400)
PLATELET # BLD AUTO: 189 K/UL — SIGNIFICANT CHANGE UP (ref 150–400)
POTASSIUM SERPL-MCNC: 3 MMOL/L — LOW (ref 3.5–5.3)
POTASSIUM SERPL-MCNC: 3.1 MMOL/L — LOW (ref 3.5–5.3)
POTASSIUM SERPL-SCNC: 3 MMOL/L — LOW (ref 3.5–5.3)
POTASSIUM SERPL-SCNC: 3.1 MMOL/L — LOW (ref 3.5–5.3)
PROT SERPL-MCNC: 4.6 G/DL — LOW (ref 6.6–8.7)
PROT UR-MCNC: 100 MG/DL
RBC # BLD: 3.23 M/UL — LOW (ref 3.8–5.2)
RBC # BLD: 3.23 M/UL — LOW (ref 3.8–5.2)
RBC # BLD: 3.31 M/UL — LOW (ref 3.8–5.2)
RBC # FLD: 18.9 % — HIGH (ref 10.3–14.5)
RBC # FLD: 19 % — HIGH (ref 10.3–14.5)
RBC CASTS # UR COMP ASSIST: 1089 /HPF — HIGH (ref 0–4)
RETICS #: 81.1 K/UL — SIGNIFICANT CHANGE UP (ref 25–125)
RETICS/RBC NFR: 2.5 % — SIGNIFICANT CHANGE UP (ref 0.5–2.5)
SODIUM SERPL-SCNC: 138 MMOL/L — SIGNIFICANT CHANGE UP (ref 135–145)
SODIUM SERPL-SCNC: 139 MMOL/L — SIGNIFICANT CHANGE UP (ref 135–145)
SP GR SPEC: 1.02 — SIGNIFICANT CHANGE UP (ref 1–1.03)
SQUAMOUS # UR AUTO: 1 /HPF — SIGNIFICANT CHANGE UP (ref 0–5)
TIBC SERPL-MCNC: 164 UG/DL — LOW (ref 220–430)
TRANSFERRIN SERPL-MCNC: 115 MG/DL — LOW (ref 192–382)
UROBILINOGEN FLD QL: 0.2 MG/DL — SIGNIFICANT CHANGE UP (ref 0.2–1)
VIT B12 SERPL-MCNC: 1412 PG/ML — HIGH (ref 232–1245)
WBC # BLD: 15.15 K/UL — HIGH (ref 3.8–10.5)
WBC # BLD: 15.88 K/UL — HIGH (ref 3.8–10.5)
WBC # FLD AUTO: 15.15 K/UL — HIGH (ref 3.8–10.5)
WBC # FLD AUTO: 15.88 K/UL — HIGH (ref 3.8–10.5)
WBC UR QL: 632 /HPF — HIGH (ref 0–5)

## 2024-06-09 PROCEDURE — 99232 SBSQ HOSP IP/OBS MODERATE 35: CPT

## 2024-06-09 RX ORDER — POTASSIUM CHLORIDE 600 MG/1
40 TABLET, FILM COATED, EXTENDED RELEASE ORAL ONCE
Refills: 0 | Status: COMPLETED | OUTPATIENT
Start: 2024-06-09 | End: 2024-06-09

## 2024-06-09 RX ORDER — MAGNESIUM SULFATE 100 %
2 POWDER (GRAM) MISCELLANEOUS ONCE
Refills: 0 | Status: COMPLETED | OUTPATIENT
Start: 2024-06-09 | End: 2024-06-09

## 2024-06-09 RX ORDER — VANCOMYCIN HYDROCHLORIDE 50 MG/ML
750 KIT ORAL EVERY 12 HOURS
Refills: 0 | Status: DISCONTINUED | OUTPATIENT
Start: 2024-06-09 | End: 2024-06-09

## 2024-06-09 RX ORDER — POTASSIUM PHOSPHATE, MONOBASIC POTASSIUM PHOSPHATE, DIBASIC INJECTION, 236; 224 MG/ML; MG/ML
15 SOLUTION, CONCENTRATE INTRAVENOUS ONCE
Refills: 0 | Status: COMPLETED | OUTPATIENT
Start: 2024-06-09 | End: 2024-06-09

## 2024-06-09 RX ORDER — SODIUM CHLORIDE 0.9 % (FLUSH) 0.9 %
250 SYRINGE (ML) INJECTION ONCE
Refills: 0 | Status: COMPLETED | OUTPATIENT
Start: 2024-06-09 | End: 2024-06-09

## 2024-06-09 RX ORDER — POTASSIUM CHLORIDE 600 MG/1
40 TABLET, FILM COATED, EXTENDED RELEASE ORAL EVERY 4 HOURS
Refills: 0 | Status: COMPLETED | OUTPATIENT
Start: 2024-06-09 | End: 2024-06-09

## 2024-06-09 RX ADMIN — POTASSIUM CHLORIDE 40 MILLIEQUIVALENT(S): 600 TABLET, FILM COATED, EXTENDED RELEASE ORAL at 01:46

## 2024-06-09 RX ADMIN — POTASSIUM CHLORIDE 40 MILLIEQUIVALENT(S): 600 TABLET, FILM COATED, EXTENDED RELEASE ORAL at 14:57

## 2024-06-09 RX ADMIN — VANCOMYCIN HYDROCHLORIDE 250 MILLIGRAM(S): KIT at 10:00

## 2024-06-09 RX ADMIN — MEROPENEM 1000 MILLIGRAM(S): 500 INJECTION, POWDER, FOR SOLUTION INTRAVENOUS at 00:54

## 2024-06-09 RX ADMIN — Medication 20 MILLIGRAM(S): at 22:04

## 2024-06-09 RX ADMIN — Medication 25 MICROGRAM(S): at 05:36

## 2024-06-09 RX ADMIN — Medication 1 APPLICATION(S): at 17:45

## 2024-06-09 RX ADMIN — PANTOPRAZOLE SODIUM 40 MILLIGRAM(S): 40 INJECTION, POWDER, FOR SOLUTION INTRAVENOUS at 05:36

## 2024-06-09 RX ADMIN — POTASSIUM CHLORIDE 40 MILLIEQUIVALENT(S): 600 TABLET, FILM COATED, EXTENDED RELEASE ORAL at 05:36

## 2024-06-09 RX ADMIN — POTASSIUM PHOSPHATE, MONOBASIC POTASSIUM PHOSPHATE, DIBASIC INJECTION, 62.5 MILLIMOLE(S): 236; 224 SOLUTION, CONCENTRATE INTRAVENOUS at 14:58

## 2024-06-09 RX ADMIN — TAMSULOSIN HYDROCHLORIDE 0.4 MILLIGRAM(S): 0.4 CAPSULE ORAL at 22:04

## 2024-06-09 RX ADMIN — INSULIN LISPRO 1: 100 INJECTION, SOLUTION SUBCUTANEOUS at 12:00

## 2024-06-09 RX ADMIN — MEROPENEM 1000 MILLIGRAM(S): 500 INJECTION, POWDER, FOR SOLUTION INTRAVENOUS at 10:00

## 2024-06-09 RX ADMIN — Medication 25 GRAM(S): at 02:42

## 2024-06-09 RX ADMIN — Medication 1 APPLICATION(S): at 05:37

## 2024-06-09 RX ADMIN — MIDODRINE HYDROCHLORIDE 15 MILLIGRAM(S): 10 TABLET ORAL at 05:36

## 2024-06-09 RX ADMIN — MIDODRINE HYDROCHLORIDE 15 MILLIGRAM(S): 10 TABLET ORAL at 22:04

## 2024-06-09 RX ADMIN — Medication 1 APPLICATION(S): at 09:57

## 2024-06-09 RX ADMIN — Medication 250 MILLILITER(S): at 01:47

## 2024-06-09 RX ADMIN — Medication 1 APPLICATION(S): at 05:36

## 2024-06-10 LAB
ACETONE SERPL-MCNC: NEGATIVE — SIGNIFICANT CHANGE UP
ALBUMIN SERPL ELPH-MCNC: 2.7 G/DL — LOW (ref 3.3–5.2)
ALP SERPL-CCNC: 99 U/L — SIGNIFICANT CHANGE UP (ref 40–120)
ALT FLD-CCNC: 74 U/L — HIGH
ANION GAP SERPL CALC-SCNC: 15 MMOL/L — SIGNIFICANT CHANGE UP (ref 5–17)
AST SERPL-CCNC: 41 U/L — HIGH
BILIRUB SERPL-MCNC: 0.6 MG/DL — SIGNIFICANT CHANGE UP (ref 0.4–2)
BUN SERPL-MCNC: 37.4 MG/DL — HIGH (ref 8–20)
CALCIUM SERPL-MCNC: 8.3 MG/DL — LOW (ref 8.4–10.5)
CHLORIDE SERPL-SCNC: 109 MMOL/L — HIGH (ref 96–108)
CO2 SERPL-SCNC: 12 MMOL/L — LOW (ref 22–29)
CREAT SERPL-MCNC: 0.58 MG/DL — SIGNIFICANT CHANGE UP (ref 0.5–1.3)
CULTURE RESULTS: ABNORMAL
CULTURE RESULTS: NO GROWTH — SIGNIFICANT CHANGE UP
EGFR: 91 ML/MIN/1.73M2 — SIGNIFICANT CHANGE UP
GLUCOSE BLDC GLUCOMTR-MCNC: 131 MG/DL — HIGH (ref 70–99)
GLUCOSE BLDC GLUCOMTR-MCNC: 142 MG/DL — HIGH (ref 70–99)
GLUCOSE BLDC GLUCOMTR-MCNC: 158 MG/DL — HIGH (ref 70–99)
GLUCOSE BLDC GLUCOMTR-MCNC: 89 MG/DL — SIGNIFICANT CHANGE UP (ref 70–99)
GLUCOSE SERPL-MCNC: 93 MG/DL — SIGNIFICANT CHANGE UP (ref 70–99)
GRAM STN FLD: ABNORMAL
HCT VFR BLD CALC: 30.2 % — LOW (ref 34.5–45)
HGB BLD-MCNC: 9.8 G/DL — LOW (ref 11.5–15.5)
MAGNESIUM SERPL-MCNC: 2.2 MG/DL — SIGNIFICANT CHANGE UP (ref 1.6–2.6)
MCHC RBC-ENTMCNC: 28.3 PG — SIGNIFICANT CHANGE UP (ref 27–34)
MCHC RBC-ENTMCNC: 32.5 GM/DL — SIGNIFICANT CHANGE UP (ref 32–36)
MCV RBC AUTO: 87.3 FL — SIGNIFICANT CHANGE UP (ref 80–100)
PHOSPHATE SERPL-MCNC: 2 MG/DL — LOW (ref 2.4–4.7)
PLATELET # BLD AUTO: 150 K/UL — SIGNIFICANT CHANGE UP (ref 150–400)
POTASSIUM SERPL-MCNC: 3.6 MMOL/L — SIGNIFICANT CHANGE UP (ref 3.5–5.3)
POTASSIUM SERPL-SCNC: 3.6 MMOL/L — SIGNIFICANT CHANGE UP (ref 3.5–5.3)
PROT SERPL-MCNC: 4.7 G/DL — LOW (ref 6.6–8.7)
RBC # BLD: 3.46 M/UL — LOW (ref 3.8–5.2)
RBC # FLD: 19.1 % — HIGH (ref 10.3–14.5)
SODIUM SERPL-SCNC: 136 MMOL/L — SIGNIFICANT CHANGE UP (ref 135–145)
SPECIMEN SOURCE: SIGNIFICANT CHANGE UP
SPECIMEN SOURCE: SIGNIFICANT CHANGE UP
WBC # BLD: 13.79 K/UL — HIGH (ref 3.8–10.5)
WBC # FLD AUTO: 13.79 K/UL — HIGH (ref 3.8–10.5)

## 2024-06-10 PROCEDURE — 99233 SBSQ HOSP IP/OBS HIGH 50: CPT

## 2024-06-10 RX ORDER — DEXTROSE MONOHYDRATE AND SODIUM CHLORIDE 5; .3 G/100ML; G/100ML
1000 INJECTION, SOLUTION INTRAVENOUS
Refills: 0 | Status: COMPLETED | OUTPATIENT
Start: 2024-06-10 | End: 2024-06-10

## 2024-06-10 RX ORDER — MIDODRINE HYDROCHLORIDE 10 MG/1
10 TABLET ORAL EVERY 8 HOURS
Refills: 0 | Status: DISCONTINUED | OUTPATIENT
Start: 2024-06-10 | End: 2024-06-14

## 2024-06-10 RX ORDER — ENOXAPARIN SODIUM 100 MG/ML
70 INJECTION SUBCUTANEOUS EVERY 12 HOURS
Refills: 0 | Status: DISCONTINUED | OUTPATIENT
Start: 2024-06-10 | End: 2024-06-12

## 2024-06-10 RX ORDER — VANCOMYCIN HYDROCHLORIDE 50 MG/ML
1000 KIT ORAL EVERY 12 HOURS
Refills: 0 | Status: DISCONTINUED | OUTPATIENT
Start: 2024-06-10 | End: 2024-06-11

## 2024-06-10 RX ORDER — POTASSIUM PHOSPHATE, MONOBASIC POTASSIUM PHOSPHATE, DIBASIC INJECTION, 236; 224 MG/ML; MG/ML
15 SOLUTION, CONCENTRATE INTRAVENOUS ONCE
Refills: 0 | Status: COMPLETED | OUTPATIENT
Start: 2024-06-10 | End: 2024-06-10

## 2024-06-10 RX ORDER — ACETAMINOPHEN 325 MG
975 TABLET ORAL EVERY 8 HOURS
Refills: 0 | Status: COMPLETED | OUTPATIENT
Start: 2024-06-10 | End: 2024-06-12

## 2024-06-10 RX ADMIN — ENOXAPARIN SODIUM 70 MILLIGRAM(S): 100 INJECTION SUBCUTANEOUS at 15:10

## 2024-06-10 RX ADMIN — Medication 975 MILLIGRAM(S): at 15:10

## 2024-06-10 RX ADMIN — Medication 1 APPLICATION(S): at 05:27

## 2024-06-10 RX ADMIN — Medication 25 MICROGRAM(S): at 05:27

## 2024-06-10 RX ADMIN — MEROPENEM 1000 MILLIGRAM(S): 500 INJECTION, POWDER, FOR SOLUTION INTRAVENOUS at 17:44

## 2024-06-10 RX ADMIN — MEROPENEM 1000 MILLIGRAM(S): 500 INJECTION, POWDER, FOR SOLUTION INTRAVENOUS at 09:54

## 2024-06-10 RX ADMIN — Medication 1 APPLICATION(S): at 09:25

## 2024-06-10 RX ADMIN — DEXTROSE MONOHYDRATE AND SODIUM CHLORIDE 75 MILLILITER(S): 5; .3 INJECTION, SOLUTION INTRAVENOUS at 15:09

## 2024-06-10 RX ADMIN — Medication 1 APPLICATION(S): at 17:44

## 2024-06-10 RX ADMIN — VANCOMYCIN HYDROCHLORIDE 250 MILLIGRAM(S): KIT at 08:26

## 2024-06-10 RX ADMIN — MIDODRINE HYDROCHLORIDE 15 MILLIGRAM(S): 10 TABLET ORAL at 05:27

## 2024-06-10 RX ADMIN — PANTOPRAZOLE SODIUM 40 MILLIGRAM(S): 40 INJECTION, POWDER, FOR SOLUTION INTRAVENOUS at 05:26

## 2024-06-10 RX ADMIN — VANCOMYCIN HYDROCHLORIDE 250 MILLIGRAM(S): KIT at 21:31

## 2024-06-10 RX ADMIN — PANTOPRAZOLE SODIUM 40 MILLIGRAM(S): 40 INJECTION, POWDER, FOR SOLUTION INTRAVENOUS at 17:44

## 2024-06-10 RX ADMIN — TAMSULOSIN HYDROCHLORIDE 0.4 MILLIGRAM(S): 0.4 CAPSULE ORAL at 21:31

## 2024-06-10 RX ADMIN — POTASSIUM PHOSPHATE, MONOBASIC POTASSIUM PHOSPHATE, DIBASIC INJECTION, 62.5 MILLIMOLE(S): 236; 224 SOLUTION, CONCENTRATE INTRAVENOUS at 09:54

## 2024-06-10 RX ADMIN — Medication 20 MILLIGRAM(S): at 21:31

## 2024-06-10 RX ADMIN — MEROPENEM 1000 MILLIGRAM(S): 500 INJECTION, POWDER, FOR SOLUTION INTRAVENOUS at 00:10

## 2024-06-11 LAB
ANION GAP SERPL CALC-SCNC: 13 MMOL/L — SIGNIFICANT CHANGE UP (ref 5–17)
BUN SERPL-MCNC: 34.4 MG/DL — HIGH (ref 8–20)
CALCIUM SERPL-MCNC: 7.8 MG/DL — LOW (ref 8.4–10.5)
CHLORIDE SERPL-SCNC: 107 MMOL/L — SIGNIFICANT CHANGE UP (ref 96–108)
CO2 SERPL-SCNC: 16 MMOL/L — LOW (ref 22–29)
CREAT SERPL-MCNC: 0.46 MG/DL — LOW (ref 0.5–1.3)
CULTURE RESULTS: SIGNIFICANT CHANGE UP
CULTURE RESULTS: SIGNIFICANT CHANGE UP
EGFR: 96 ML/MIN/1.73M2 — SIGNIFICANT CHANGE UP
FERRITIN SERPL-MCNC: 622 NG/ML — HIGH (ref 13–330)
GLUCOSE BLDC GLUCOMTR-MCNC: 111 MG/DL — HIGH (ref 70–99)
GLUCOSE BLDC GLUCOMTR-MCNC: 127 MG/DL — HIGH (ref 70–99)
GLUCOSE BLDC GLUCOMTR-MCNC: 180 MG/DL — HIGH (ref 70–99)
GLUCOSE SERPL-MCNC: 176 MG/DL — HIGH (ref 70–99)
IRON SATN MFR SERPL: 11 % — LOW (ref 14–50)
IRON SATN MFR SERPL: 18 UG/DL — LOW (ref 37–145)
LDH SERPL L TO P-CCNC: 170 U/L — SIGNIFICANT CHANGE UP (ref 98–192)
MAGNESIUM SERPL-MCNC: 1.6 MG/DL — SIGNIFICANT CHANGE UP (ref 1.6–2.6)
PHOSPHATE SERPL-MCNC: 1.7 MG/DL — LOW (ref 2.4–4.7)
POTASSIUM SERPL-MCNC: 3 MMOL/L — LOW (ref 3.5–5.3)
POTASSIUM SERPL-SCNC: 3 MMOL/L — LOW (ref 3.5–5.3)
SODIUM SERPL-SCNC: 136 MMOL/L — SIGNIFICANT CHANGE UP (ref 135–145)
SPECIMEN SOURCE: SIGNIFICANT CHANGE UP
SPECIMEN SOURCE: SIGNIFICANT CHANGE UP
TIBC SERPL-MCNC: 169 UG/DL — LOW (ref 220–430)
TRANSFERRIN SERPL-MCNC: 118 MG/DL — LOW (ref 192–382)

## 2024-06-11 PROCEDURE — 99232 SBSQ HOSP IP/OBS MODERATE 35: CPT

## 2024-06-11 PROCEDURE — 93010 ELECTROCARDIOGRAM REPORT: CPT

## 2024-06-11 RX ORDER — POTASSIUM CHLORIDE 600 MG/1
40 TABLET, FILM COATED, EXTENDED RELEASE ORAL
Refills: 0 | Status: COMPLETED | OUTPATIENT
Start: 2024-06-11 | End: 2024-06-11

## 2024-06-11 RX ADMIN — INSULIN LISPRO 1: 100 INJECTION, SOLUTION SUBCUTANEOUS at 08:12

## 2024-06-11 RX ADMIN — VANCOMYCIN HYDROCHLORIDE 250 MILLIGRAM(S): KIT at 05:55

## 2024-06-11 RX ADMIN — MEROPENEM 1000 MILLIGRAM(S): 500 INJECTION, POWDER, FOR SOLUTION INTRAVENOUS at 01:26

## 2024-06-11 RX ADMIN — Medication 25 MICROGRAM(S): at 05:57

## 2024-06-11 RX ADMIN — ENOXAPARIN SODIUM 70 MILLIGRAM(S): 100 INJECTION SUBCUTANEOUS at 18:11

## 2024-06-11 RX ADMIN — Medication 20 MILLIGRAM(S): at 21:24

## 2024-06-11 RX ADMIN — ENOXAPARIN SODIUM 70 MILLIGRAM(S): 100 INJECTION SUBCUTANEOUS at 05:56

## 2024-06-11 RX ADMIN — POTASSIUM CHLORIDE 40 MILLIEQUIVALENT(S): 600 TABLET, FILM COATED, EXTENDED RELEASE ORAL at 12:26

## 2024-06-11 RX ADMIN — Medication 1 APPLICATION(S): at 09:25

## 2024-06-11 RX ADMIN — PANTOPRAZOLE SODIUM 40 MILLIGRAM(S): 40 INJECTION, POWDER, FOR SOLUTION INTRAVENOUS at 05:56

## 2024-06-11 RX ADMIN — TAMSULOSIN HYDROCHLORIDE 0.4 MILLIGRAM(S): 0.4 CAPSULE ORAL at 21:24

## 2024-06-11 RX ADMIN — MIDODRINE HYDROCHLORIDE 10 MILLIGRAM(S): 10 TABLET ORAL at 05:56

## 2024-06-11 RX ADMIN — MEROPENEM 1000 MILLIGRAM(S): 500 INJECTION, POWDER, FOR SOLUTION INTRAVENOUS at 18:10

## 2024-06-11 RX ADMIN — Medication 1 APPLICATION(S): at 05:57

## 2024-06-11 RX ADMIN — MIDODRINE HYDROCHLORIDE 10 MILLIGRAM(S): 10 TABLET ORAL at 21:24

## 2024-06-11 RX ADMIN — Medication 1 APPLICATION(S): at 18:11

## 2024-06-11 RX ADMIN — PANTOPRAZOLE SODIUM 40 MILLIGRAM(S): 40 INJECTION, POWDER, FOR SOLUTION INTRAVENOUS at 18:11

## 2024-06-11 RX ADMIN — POTASSIUM CHLORIDE 40 MILLIEQUIVALENT(S): 600 TABLET, FILM COATED, EXTENDED RELEASE ORAL at 15:10

## 2024-06-11 RX ADMIN — MIDODRINE HYDROCHLORIDE 10 MILLIGRAM(S): 10 TABLET ORAL at 15:10

## 2024-06-11 RX ADMIN — MEROPENEM 1000 MILLIGRAM(S): 500 INJECTION, POWDER, FOR SOLUTION INTRAVENOUS at 08:12

## 2024-06-12 LAB
ANION GAP SERPL CALC-SCNC: 15 MMOL/L — SIGNIFICANT CHANGE UP (ref 5–17)
BUN SERPL-MCNC: 37.7 MG/DL — HIGH (ref 8–20)
CALCIUM SERPL-MCNC: 8.3 MG/DL — LOW (ref 8.4–10.5)
CHLORIDE SERPL-SCNC: 106 MMOL/L — SIGNIFICANT CHANGE UP (ref 96–108)
CO2 SERPL-SCNC: 16 MMOL/L — LOW (ref 22–29)
CREAT SERPL-MCNC: 0.58 MG/DL — SIGNIFICANT CHANGE UP (ref 0.5–1.3)
EGFR: 91 ML/MIN/1.73M2 — SIGNIFICANT CHANGE UP
GLUCOSE BLDC GLUCOMTR-MCNC: 101 MG/DL — HIGH (ref 70–99)
GLUCOSE BLDC GLUCOMTR-MCNC: 113 MG/DL — HIGH (ref 70–99)
GLUCOSE BLDC GLUCOMTR-MCNC: 88 MG/DL — SIGNIFICANT CHANGE UP (ref 70–99)
GLUCOSE BLDC GLUCOMTR-MCNC: 93 MG/DL — SIGNIFICANT CHANGE UP (ref 70–99)
GLUCOSE SERPL-MCNC: 81 MG/DL — SIGNIFICANT CHANGE UP (ref 70–99)
HAPTOGLOB SERPL-MCNC: 29 MG/DL — LOW (ref 34–200)
HCT VFR BLD CALC: 35.7 % — SIGNIFICANT CHANGE UP (ref 34.5–45)
HGB BLD-MCNC: 11.6 G/DL — SIGNIFICANT CHANGE UP (ref 11.5–15.5)
MAGNESIUM SERPL-MCNC: 1.8 MG/DL — SIGNIFICANT CHANGE UP (ref 1.6–2.6)
MCHC RBC-ENTMCNC: 29 PG — SIGNIFICANT CHANGE UP (ref 27–34)
MCHC RBC-ENTMCNC: 32.5 GM/DL — SIGNIFICANT CHANGE UP (ref 32–36)
MCV RBC AUTO: 89.3 FL — SIGNIFICANT CHANGE UP (ref 80–100)
PLATELET # BLD AUTO: 173 K/UL — SIGNIFICANT CHANGE UP (ref 150–400)
POTASSIUM SERPL-MCNC: 3.9 MMOL/L — SIGNIFICANT CHANGE UP (ref 3.5–5.3)
POTASSIUM SERPL-SCNC: 3.9 MMOL/L — SIGNIFICANT CHANGE UP (ref 3.5–5.3)
RBC # BLD: 4 M/UL — SIGNIFICANT CHANGE UP (ref 3.8–5.2)
RBC # FLD: 19.7 % — HIGH (ref 10.3–14.5)
SODIUM SERPL-SCNC: 137 MMOL/L — SIGNIFICANT CHANGE UP (ref 135–145)
WBC # BLD: 19.48 K/UL — HIGH (ref 3.8–10.5)
WBC # FLD AUTO: 19.48 K/UL — HIGH (ref 3.8–10.5)

## 2024-06-12 PROCEDURE — 99232 SBSQ HOSP IP/OBS MODERATE 35: CPT

## 2024-06-12 RX ORDER — APIXABAN 5 MG/1
5 TABLET, FILM COATED ORAL
Refills: 0 | Status: DISCONTINUED | OUTPATIENT
Start: 2024-06-12 | End: 2024-06-25

## 2024-06-12 RX ADMIN — TAMSULOSIN HYDROCHLORIDE 0.4 MILLIGRAM(S): 0.4 CAPSULE ORAL at 22:31

## 2024-06-12 RX ADMIN — PANTOPRAZOLE SODIUM 40 MILLIGRAM(S): 40 INJECTION, POWDER, FOR SOLUTION INTRAVENOUS at 05:50

## 2024-06-12 RX ADMIN — PANTOPRAZOLE SODIUM 40 MILLIGRAM(S): 40 INJECTION, POWDER, FOR SOLUTION INTRAVENOUS at 17:33

## 2024-06-12 RX ADMIN — Medication 1 APPLICATION(S): at 12:34

## 2024-06-12 RX ADMIN — Medication 20 MILLIGRAM(S): at 22:31

## 2024-06-12 RX ADMIN — Medication 25 MICROGRAM(S): at 05:50

## 2024-06-12 RX ADMIN — MIDODRINE HYDROCHLORIDE 10 MILLIGRAM(S): 10 TABLET ORAL at 05:50

## 2024-06-12 RX ADMIN — MIDODRINE HYDROCHLORIDE 10 MILLIGRAM(S): 10 TABLET ORAL at 13:47

## 2024-06-12 RX ADMIN — ENOXAPARIN SODIUM 70 MILLIGRAM(S): 100 INJECTION SUBCUTANEOUS at 05:50

## 2024-06-12 RX ADMIN — Medication 1 APPLICATION(S): at 11:48

## 2024-06-12 RX ADMIN — MIDODRINE HYDROCHLORIDE 10 MILLIGRAM(S): 10 TABLET ORAL at 22:31

## 2024-06-12 RX ADMIN — MEROPENEM 1000 MILLIGRAM(S): 500 INJECTION, POWDER, FOR SOLUTION INTRAVENOUS at 10:27

## 2024-06-12 RX ADMIN — MEROPENEM 1000 MILLIGRAM(S): 500 INJECTION, POWDER, FOR SOLUTION INTRAVENOUS at 01:49

## 2024-06-12 RX ADMIN — APIXABAN 5 MILLIGRAM(S): 5 TABLET, FILM COATED ORAL at 17:34

## 2024-06-12 RX ADMIN — MEROPENEM 1000 MILLIGRAM(S): 500 INJECTION, POWDER, FOR SOLUTION INTRAVENOUS at 17:33

## 2024-06-13 LAB
GLUCOSE BLDC GLUCOMTR-MCNC: 112 MG/DL — HIGH (ref 70–99)
GLUCOSE BLDC GLUCOMTR-MCNC: 126 MG/DL — HIGH (ref 70–99)
GLUCOSE BLDC GLUCOMTR-MCNC: 80 MG/DL — SIGNIFICANT CHANGE UP (ref 70–99)
GLUCOSE BLDC GLUCOMTR-MCNC: 93 MG/DL — SIGNIFICANT CHANGE UP (ref 70–99)

## 2024-06-13 PROCEDURE — 99233 SBSQ HOSP IP/OBS HIGH 50: CPT

## 2024-06-13 RX ADMIN — APIXABAN 5 MILLIGRAM(S): 5 TABLET, FILM COATED ORAL at 18:08

## 2024-06-13 RX ADMIN — MIDODRINE HYDROCHLORIDE 10 MILLIGRAM(S): 10 TABLET ORAL at 14:04

## 2024-06-13 RX ADMIN — PANTOPRAZOLE SODIUM 40 MILLIGRAM(S): 40 INJECTION, POWDER, FOR SOLUTION INTRAVENOUS at 05:32

## 2024-06-13 RX ADMIN — Medication 20 MILLIGRAM(S): at 21:14

## 2024-06-13 RX ADMIN — MEROPENEM 1000 MILLIGRAM(S): 500 INJECTION, POWDER, FOR SOLUTION INTRAVENOUS at 08:08

## 2024-06-13 RX ADMIN — PANTOPRAZOLE SODIUM 40 MILLIGRAM(S): 40 INJECTION, POWDER, FOR SOLUTION INTRAVENOUS at 18:08

## 2024-06-13 RX ADMIN — Medication 1 APPLICATION(S): at 11:25

## 2024-06-13 RX ADMIN — Medication 1 APPLICATION(S): at 19:27

## 2024-06-13 RX ADMIN — Medication 1 APPLICATION(S): at 05:32

## 2024-06-13 RX ADMIN — MIDODRINE HYDROCHLORIDE 10 MILLIGRAM(S): 10 TABLET ORAL at 05:32

## 2024-06-13 RX ADMIN — MEROPENEM 1000 MILLIGRAM(S): 500 INJECTION, POWDER, FOR SOLUTION INTRAVENOUS at 01:46

## 2024-06-13 RX ADMIN — MEROPENEM 1000 MILLIGRAM(S): 500 INJECTION, POWDER, FOR SOLUTION INTRAVENOUS at 18:08

## 2024-06-13 RX ADMIN — APIXABAN 5 MILLIGRAM(S): 5 TABLET, FILM COATED ORAL at 05:32

## 2024-06-13 RX ADMIN — MIDODRINE HYDROCHLORIDE 10 MILLIGRAM(S): 10 TABLET ORAL at 21:14

## 2024-06-13 RX ADMIN — Medication 25 MICROGRAM(S): at 05:32

## 2024-06-13 RX ADMIN — TAMSULOSIN HYDROCHLORIDE 0.4 MILLIGRAM(S): 0.4 CAPSULE ORAL at 21:14

## 2024-06-14 LAB
ANION GAP SERPL CALC-SCNC: 12 MMOL/L — SIGNIFICANT CHANGE UP (ref 5–17)
BASOPHILS # BLD AUTO: 0.06 K/UL — SIGNIFICANT CHANGE UP (ref 0–0.2)
BASOPHILS NFR BLD AUTO: 0.3 % — SIGNIFICANT CHANGE UP (ref 0–2)
BUN SERPL-MCNC: 33.7 MG/DL — HIGH (ref 8–20)
CALCIUM SERPL-MCNC: 8.1 MG/DL — LOW (ref 8.4–10.5)
CHLORIDE SERPL-SCNC: 105 MMOL/L — SIGNIFICANT CHANGE UP (ref 96–108)
CO2 SERPL-SCNC: 14 MMOL/L — LOW (ref 22–29)
CREAT SERPL-MCNC: 0.63 MG/DL — SIGNIFICANT CHANGE UP (ref 0.5–1.3)
CULTURE RESULTS: SIGNIFICANT CHANGE UP
EGFR: 89 ML/MIN/1.73M2 — SIGNIFICANT CHANGE UP
EOSINOPHIL # BLD AUTO: 0.35 K/UL — SIGNIFICANT CHANGE UP (ref 0–0.5)
EOSINOPHIL NFR BLD AUTO: 1.9 % — SIGNIFICANT CHANGE UP (ref 0–6)
FOLATE SERPL-MCNC: 13.9 NG/ML — SIGNIFICANT CHANGE UP
GI PCR PANEL: SIGNIFICANT CHANGE UP
GI PCR PANEL: SIGNIFICANT CHANGE UP
GLUCOSE BLDC GLUCOMTR-MCNC: 129 MG/DL — HIGH (ref 70–99)
GLUCOSE BLDC GLUCOMTR-MCNC: 132 MG/DL — HIGH (ref 70–99)
GLUCOSE BLDC GLUCOMTR-MCNC: 136 MG/DL — HIGH (ref 70–99)
GLUCOSE BLDC GLUCOMTR-MCNC: 95 MG/DL — SIGNIFICANT CHANGE UP (ref 70–99)
GLUCOSE SERPL-MCNC: 90 MG/DL — SIGNIFICANT CHANGE UP (ref 70–99)
HCT VFR BLD CALC: 35.6 % — SIGNIFICANT CHANGE UP (ref 34.5–45)
HGB BLD-MCNC: 11.6 G/DL — SIGNIFICANT CHANGE UP (ref 11.5–15.5)
IMM GRANULOCYTES NFR BLD AUTO: 0.8 % — SIGNIFICANT CHANGE UP (ref 0–0.9)
LYMPHOCYTES # BLD AUTO: 21.8 % — SIGNIFICANT CHANGE UP (ref 13–44)
LYMPHOCYTES # BLD AUTO: 4 K/UL — HIGH (ref 1–3.3)
MAGNESIUM SERPL-MCNC: 1.4 MG/DL — LOW (ref 1.6–2.6)
MCHC RBC-ENTMCNC: 29.2 PG — SIGNIFICANT CHANGE UP (ref 27–34)
MCHC RBC-ENTMCNC: 32.6 GM/DL — SIGNIFICANT CHANGE UP (ref 32–36)
MCV RBC AUTO: 89.7 FL — SIGNIFICANT CHANGE UP (ref 80–100)
MONOCYTES # BLD AUTO: 1.38 K/UL — HIGH (ref 0–0.9)
MONOCYTES NFR BLD AUTO: 7.5 % — SIGNIFICANT CHANGE UP (ref 2–14)
NEUTROPHILS # BLD AUTO: 12.41 K/UL — HIGH (ref 1.8–7.4)
NEUTROPHILS NFR BLD AUTO: 67.7 % — SIGNIFICANT CHANGE UP (ref 43–77)
PHOSPHATE SERPL-MCNC: 2 MG/DL — LOW (ref 2.4–4.7)
PLATELET # BLD AUTO: 181 K/UL — SIGNIFICANT CHANGE UP (ref 150–400)
POTASSIUM SERPL-MCNC: 3.1 MMOL/L — LOW (ref 3.5–5.3)
POTASSIUM SERPL-SCNC: 3.1 MMOL/L — LOW (ref 3.5–5.3)
RBC # BLD: 3.97 M/UL — SIGNIFICANT CHANGE UP (ref 3.8–5.2)
RBC # FLD: 19.9 % — HIGH (ref 10.3–14.5)
SODIUM SERPL-SCNC: 131 MMOL/L — LOW (ref 135–145)
SPECIMEN SOURCE: SIGNIFICANT CHANGE UP
VIT B12 SERPL-MCNC: 935 PG/ML — SIGNIFICANT CHANGE UP (ref 232–1245)
WBC # BLD: 18.35 K/UL — HIGH (ref 3.8–10.5)
WBC # FLD AUTO: 18.35 K/UL — HIGH (ref 3.8–10.5)

## 2024-06-14 PROCEDURE — 99233 SBSQ HOSP IP/OBS HIGH 50: CPT

## 2024-06-14 RX ORDER — SODIUM BICARBONATE 650 MG/1
0.05 TABLET ORAL
Qty: 50 | Refills: 0 | Status: DISCONTINUED | OUTPATIENT
Start: 2024-06-14 | End: 2024-06-15

## 2024-06-14 RX ORDER — MIDODRINE HYDROCHLORIDE 10 MG/1
5 TABLET ORAL EVERY 8 HOURS
Refills: 0 | Status: DISCONTINUED | OUTPATIENT
Start: 2024-06-14 | End: 2024-06-15

## 2024-06-14 RX ORDER — POTASSIUM CHLORIDE 600 MG/1
10 TABLET, FILM COATED, EXTENDED RELEASE ORAL
Refills: 0 | Status: COMPLETED | OUTPATIENT
Start: 2024-06-14 | End: 2024-06-14

## 2024-06-14 RX ORDER — MAGNESIUM SULFATE 100 %
2 POWDER (GRAM) MISCELLANEOUS ONCE
Refills: 0 | Status: COMPLETED | OUTPATIENT
Start: 2024-06-14 | End: 2024-06-14

## 2024-06-14 RX ORDER — POTASSIUM PHOSPHATE, MONOBASIC POTASSIUM PHOSPHATE, DIBASIC INJECTION, 236; 224 MG/ML; MG/ML
15 SOLUTION, CONCENTRATE INTRAVENOUS ONCE
Refills: 0 | Status: COMPLETED | OUTPATIENT
Start: 2024-06-14 | End: 2024-06-14

## 2024-06-14 RX ADMIN — Medication 1 APPLICATION(S): at 17:59

## 2024-06-14 RX ADMIN — MIDODRINE HYDROCHLORIDE 5 MILLIGRAM(S): 10 TABLET ORAL at 22:08

## 2024-06-14 RX ADMIN — POTASSIUM PHOSPHATE, MONOBASIC POTASSIUM PHOSPHATE, DIBASIC INJECTION, 62.5 MILLIMOLE(S): 236; 224 SOLUTION, CONCENTRATE INTRAVENOUS at 22:07

## 2024-06-14 RX ADMIN — TAMSULOSIN HYDROCHLORIDE 0.4 MILLIGRAM(S): 0.4 CAPSULE ORAL at 22:08

## 2024-06-14 RX ADMIN — POTASSIUM CHLORIDE 100 MILLIEQUIVALENT(S): 600 TABLET, FILM COATED, EXTENDED RELEASE ORAL at 11:09

## 2024-06-14 RX ADMIN — MIDODRINE HYDROCHLORIDE 10 MILLIGRAM(S): 10 TABLET ORAL at 05:42

## 2024-06-14 RX ADMIN — Medication 20 MILLIGRAM(S): at 22:08

## 2024-06-14 RX ADMIN — PANTOPRAZOLE SODIUM 40 MILLIGRAM(S): 40 INJECTION, POWDER, FOR SOLUTION INTRAVENOUS at 05:42

## 2024-06-14 RX ADMIN — APIXABAN 5 MILLIGRAM(S): 5 TABLET, FILM COATED ORAL at 17:59

## 2024-06-14 RX ADMIN — SODIUM BICARBONATE 70 MEQ/KG/HR: 650 TABLET ORAL at 16:26

## 2024-06-14 RX ADMIN — MIDODRINE HYDROCHLORIDE 5 MILLIGRAM(S): 10 TABLET ORAL at 14:27

## 2024-06-14 RX ADMIN — Medication 1 APPLICATION(S): at 05:45

## 2024-06-14 RX ADMIN — PANTOPRAZOLE SODIUM 40 MILLIGRAM(S): 40 INJECTION, POWDER, FOR SOLUTION INTRAVENOUS at 17:59

## 2024-06-14 RX ADMIN — POTASSIUM CHLORIDE 100 MILLIEQUIVALENT(S): 600 TABLET, FILM COATED, EXTENDED RELEASE ORAL at 09:39

## 2024-06-14 RX ADMIN — Medication 25 MICROGRAM(S): at 05:43

## 2024-06-14 RX ADMIN — Medication 1 APPLICATION(S): at 12:26

## 2024-06-14 RX ADMIN — POTASSIUM CHLORIDE 100 MILLIEQUIVALENT(S): 600 TABLET, FILM COATED, EXTENDED RELEASE ORAL at 12:22

## 2024-06-14 RX ADMIN — Medication 25 GRAM(S): at 12:22

## 2024-06-14 RX ADMIN — MEROPENEM 1000 MILLIGRAM(S): 500 INJECTION, POWDER, FOR SOLUTION INTRAVENOUS at 00:41

## 2024-06-14 RX ADMIN — APIXABAN 5 MILLIGRAM(S): 5 TABLET, FILM COATED ORAL at 05:42

## 2024-06-15 LAB
ALBUMIN SERPL ELPH-MCNC: 2.6 G/DL — LOW (ref 3.3–5.2)
ALP SERPL-CCNC: 95 U/L — SIGNIFICANT CHANGE UP (ref 40–120)
ALT FLD-CCNC: 34 U/L — HIGH
ANION GAP SERPL CALC-SCNC: 13 MMOL/L — SIGNIFICANT CHANGE UP (ref 5–17)
ANION GAP SERPL CALC-SCNC: 17 MMOL/L — SIGNIFICANT CHANGE UP (ref 5–17)
ANISOCYTOSIS BLD QL: SLIGHT — SIGNIFICANT CHANGE UP
AST SERPL-CCNC: 16 U/L — SIGNIFICANT CHANGE UP
BASOPHILS # BLD AUTO: 0 K/UL — SIGNIFICANT CHANGE UP (ref 0–0.2)
BASOPHILS NFR BLD AUTO: 0 % — SIGNIFICANT CHANGE UP (ref 0–2)
BILIRUB SERPL-MCNC: 0.5 MG/DL — SIGNIFICANT CHANGE UP (ref 0.4–2)
BUN SERPL-MCNC: 27.2 MG/DL — HIGH (ref 8–20)
BUN SERPL-MCNC: 31.3 MG/DL — HIGH (ref 8–20)
BURR CELLS BLD QL SMEAR: PRESENT — SIGNIFICANT CHANGE UP
CALCIUM SERPL-MCNC: 7 MG/DL — LOW (ref 8.4–10.5)
CALCIUM SERPL-MCNC: 8 MG/DL — LOW (ref 8.4–10.5)
CHLORIDE SERPL-SCNC: 107 MMOL/L — SIGNIFICANT CHANGE UP (ref 96–108)
CHLORIDE SERPL-SCNC: 110 MMOL/L — HIGH (ref 96–108)
CO2 SERPL-SCNC: 13 MMOL/L — LOW (ref 22–29)
CO2 SERPL-SCNC: 14 MMOL/L — LOW (ref 22–29)
CREAT SERPL-MCNC: 0.5 MG/DL — SIGNIFICANT CHANGE UP (ref 0.5–1.3)
CREAT SERPL-MCNC: 0.62 MG/DL — SIGNIFICANT CHANGE UP (ref 0.5–1.3)
CULTURE RESULTS: SIGNIFICANT CHANGE UP
CULTURE RESULTS: SIGNIFICANT CHANGE UP
EGFR: 89 ML/MIN/1.73M2 — SIGNIFICANT CHANGE UP
EGFR: 94 ML/MIN/1.73M2 — SIGNIFICANT CHANGE UP
ELLIPTOCYTES BLD QL SMEAR: SLIGHT — SIGNIFICANT CHANGE UP
EOSINOPHIL # BLD AUTO: 0 K/UL — SIGNIFICANT CHANGE UP (ref 0–0.5)
EOSINOPHIL NFR BLD AUTO: 0 % — SIGNIFICANT CHANGE UP (ref 0–6)
GIANT PLATELETS BLD QL SMEAR: PRESENT — SIGNIFICANT CHANGE UP
GLUCOSE BLDC GLUCOMTR-MCNC: 138 MG/DL — HIGH (ref 70–99)
GLUCOSE BLDC GLUCOMTR-MCNC: 143 MG/DL — HIGH (ref 70–99)
GLUCOSE BLDC GLUCOMTR-MCNC: 153 MG/DL — HIGH (ref 70–99)
GLUCOSE BLDC GLUCOMTR-MCNC: 99 MG/DL — SIGNIFICANT CHANGE UP (ref 70–99)
GLUCOSE SERPL-MCNC: 158 MG/DL — HIGH (ref 70–99)
GLUCOSE SERPL-MCNC: 96 MG/DL — SIGNIFICANT CHANGE UP (ref 70–99)
HCT VFR BLD CALC: 34 % — LOW (ref 34.5–45)
HGB BLD-MCNC: 11 G/DL — LOW (ref 11.5–15.5)
LACTATE BLDV-MCNC: 1.6 MMOL/L — SIGNIFICANT CHANGE UP (ref 0.5–2)
LYMPHOCYTES # BLD AUTO: 15 % — SIGNIFICANT CHANGE UP (ref 13–44)
LYMPHOCYTES # BLD AUTO: 2.76 K/UL — SIGNIFICANT CHANGE UP (ref 1–3.3)
MAGNESIUM SERPL-MCNC: 1.6 MG/DL — SIGNIFICANT CHANGE UP (ref 1.6–2.6)
MANUAL SMEAR VERIFICATION: SIGNIFICANT CHANGE UP
MCHC RBC-ENTMCNC: 29.1 PG — SIGNIFICANT CHANGE UP (ref 27–34)
MCHC RBC-ENTMCNC: 32.4 GM/DL — SIGNIFICANT CHANGE UP (ref 32–36)
MCV RBC AUTO: 89.9 FL — SIGNIFICANT CHANGE UP (ref 80–100)
MONOCYTES # BLD AUTO: 2.28 K/UL — HIGH (ref 0–0.9)
MONOCYTES NFR BLD AUTO: 12.4 % — SIGNIFICANT CHANGE UP (ref 2–14)
NEUTROPHILS # BLD AUTO: 13.34 K/UL — HIGH (ref 1.8–7.4)
NEUTROPHILS NFR BLD AUTO: 69.9 % — SIGNIFICANT CHANGE UP (ref 43–77)
NEUTS BAND # BLD: 2.7 % — SIGNIFICANT CHANGE UP (ref 0–8)
PLAT MORPH BLD: NORMAL — SIGNIFICANT CHANGE UP
PLATELET # BLD AUTO: 173 K/UL — SIGNIFICANT CHANGE UP (ref 150–400)
POIKILOCYTOSIS BLD QL AUTO: SIGNIFICANT CHANGE UP
POLYCHROMASIA BLD QL SMEAR: SIGNIFICANT CHANGE UP
POTASSIUM SERPL-MCNC: 2.7 MMOL/L — CRITICAL LOW (ref 3.5–5.3)
POTASSIUM SERPL-MCNC: 3.6 MMOL/L — SIGNIFICANT CHANGE UP (ref 3.5–5.3)
POTASSIUM SERPL-SCNC: 2.7 MMOL/L — CRITICAL LOW (ref 3.5–5.3)
POTASSIUM SERPL-SCNC: 3.6 MMOL/L — SIGNIFICANT CHANGE UP (ref 3.5–5.3)
PROT SERPL-MCNC: 4.9 G/DL — LOW (ref 6.6–8.7)
RBC # BLD: 3.78 M/UL — LOW (ref 3.8–5.2)
RBC # FLD: 19.9 % — HIGH (ref 10.3–14.5)
RBC BLD AUTO: ABNORMAL
SCHISTOCYTES BLD QL AUTO: SLIGHT — SIGNIFICANT CHANGE UP
SMUDGE CELLS # BLD: PRESENT — SIGNIFICANT CHANGE UP
SODIUM SERPL-SCNC: 137 MMOL/L — SIGNIFICANT CHANGE UP (ref 135–145)
SODIUM SERPL-SCNC: 137 MMOL/L — SIGNIFICANT CHANGE UP (ref 135–145)
SPECIMEN SOURCE: SIGNIFICANT CHANGE UP
SPECIMEN SOURCE: SIGNIFICANT CHANGE UP
WBC # BLD: 18.38 K/UL — HIGH (ref 3.8–10.5)
WBC # FLD AUTO: 18.38 K/UL — HIGH (ref 3.8–10.5)

## 2024-06-15 PROCEDURE — 74176 CT ABD & PELVIS W/O CONTRAST: CPT | Mod: 26

## 2024-06-15 PROCEDURE — 99233 SBSQ HOSP IP/OBS HIGH 50: CPT

## 2024-06-15 RX ORDER — POTASSIUM CHLORIDE 600 MG/1
10 TABLET, FILM COATED, EXTENDED RELEASE ORAL
Refills: 0 | Status: COMPLETED | OUTPATIENT
Start: 2024-06-15 | End: 2024-06-15

## 2024-06-15 RX ORDER — SODIUM BICARBONATE 650 MG/1
650 TABLET ORAL
Refills: 0 | Status: DISCONTINUED | OUTPATIENT
Start: 2024-06-15 | End: 2024-06-16

## 2024-06-15 RX ORDER — SODIUM BICARBONATE 650 MG/1
0.17 TABLET ORAL
Qty: 150 | Refills: 0 | Status: DISCONTINUED | OUTPATIENT
Start: 2024-06-15 | End: 2024-06-17

## 2024-06-15 RX ORDER — POTASSIUM CHLORIDE 600 MG/1
40 TABLET, FILM COATED, EXTENDED RELEASE ORAL ONCE
Refills: 0 | Status: COMPLETED | OUTPATIENT
Start: 2024-06-15 | End: 2024-06-15

## 2024-06-15 RX ORDER — MAGNESIUM SULFATE 100 %
2 POWDER (GRAM) MISCELLANEOUS ONCE
Refills: 0 | Status: COMPLETED | OUTPATIENT
Start: 2024-06-15 | End: 2024-06-15

## 2024-06-15 RX ORDER — MIDODRINE HYDROCHLORIDE 10 MG/1
10 TABLET ORAL EVERY 8 HOURS
Refills: 0 | Status: DISCONTINUED | OUTPATIENT
Start: 2024-06-15 | End: 2024-06-18

## 2024-06-15 RX ADMIN — Medication 25 MICROGRAM(S): at 06:08

## 2024-06-15 RX ADMIN — APIXABAN 5 MILLIGRAM(S): 5 TABLET, FILM COATED ORAL at 06:08

## 2024-06-15 RX ADMIN — POTASSIUM CHLORIDE 40 MILLIEQUIVALENT(S): 600 TABLET, FILM COATED, EXTENDED RELEASE ORAL at 07:40

## 2024-06-15 RX ADMIN — POTASSIUM CHLORIDE 100 MILLIEQUIVALENT(S): 600 TABLET, FILM COATED, EXTENDED RELEASE ORAL at 09:34

## 2024-06-15 RX ADMIN — Medication 1 APPLICATION(S): at 17:42

## 2024-06-15 RX ADMIN — Medication 1 APPLICATION(S): at 12:44

## 2024-06-15 RX ADMIN — POTASSIUM CHLORIDE 100 MILLIEQUIVALENT(S): 600 TABLET, FILM COATED, EXTENDED RELEASE ORAL at 07:40

## 2024-06-15 RX ADMIN — MIDODRINE HYDROCHLORIDE 5 MILLIGRAM(S): 10 TABLET ORAL at 06:08

## 2024-06-15 RX ADMIN — PANTOPRAZOLE SODIUM 40 MILLIGRAM(S): 40 INJECTION, POWDER, FOR SOLUTION INTRAVENOUS at 17:42

## 2024-06-15 RX ADMIN — POTASSIUM CHLORIDE 100 MILLIEQUIVALENT(S): 600 TABLET, FILM COATED, EXTENDED RELEASE ORAL at 10:55

## 2024-06-15 RX ADMIN — SODIUM BICARBONATE 75 MEQ/KG/HR: 650 TABLET ORAL at 11:55

## 2024-06-15 RX ADMIN — Medication 25 GRAM(S): at 12:44

## 2024-06-15 RX ADMIN — INSULIN LISPRO 1: 100 INJECTION, SOLUTION SUBCUTANEOUS at 16:59

## 2024-06-15 RX ADMIN — APIXABAN 5 MILLIGRAM(S): 5 TABLET, FILM COATED ORAL at 17:42

## 2024-06-15 RX ADMIN — SODIUM BICARBONATE 650 MILLIGRAM(S): 650 TABLET ORAL at 17:42

## 2024-06-15 RX ADMIN — MIDODRINE HYDROCHLORIDE 10 MILLIGRAM(S): 10 TABLET ORAL at 12:44

## 2024-06-15 RX ADMIN — Medication 1 APPLICATION(S): at 06:09

## 2024-06-15 RX ADMIN — PANTOPRAZOLE SODIUM 40 MILLIGRAM(S): 40 INJECTION, POWDER, FOR SOLUTION INTRAVENOUS at 06:08

## 2024-06-16 LAB
ANION GAP SERPL CALC-SCNC: 12 MMOL/L — SIGNIFICANT CHANGE UP (ref 5–17)
BASOPHILS # BLD AUTO: 0.13 K/UL — SIGNIFICANT CHANGE UP (ref 0–0.2)
BASOPHILS NFR BLD AUTO: 0.9 % — SIGNIFICANT CHANGE UP (ref 0–2)
BUN SERPL-MCNC: 25.6 MG/DL — HIGH (ref 8–20)
CALCIUM SERPL-MCNC: 8 MG/DL — LOW (ref 8.4–10.5)
CHLORIDE SERPL-SCNC: 107 MMOL/L — SIGNIFICANT CHANGE UP (ref 96–108)
CO2 SERPL-SCNC: 16 MMOL/L — LOW (ref 22–29)
CREAT SERPL-MCNC: 0.49 MG/DL — LOW (ref 0.5–1.3)
EGFR: 95 ML/MIN/1.73M2 — SIGNIFICANT CHANGE UP
EOSINOPHIL # BLD AUTO: 0.2 K/UL — SIGNIFICANT CHANGE UP (ref 0–0.5)
EOSINOPHIL NFR BLD AUTO: 1.3 % — SIGNIFICANT CHANGE UP (ref 0–6)
GLUCOSE BLDC GLUCOMTR-MCNC: 106 MG/DL — HIGH (ref 70–99)
GLUCOSE BLDC GLUCOMTR-MCNC: 129 MG/DL — HIGH (ref 70–99)
GLUCOSE BLDC GLUCOMTR-MCNC: 190 MG/DL — HIGH (ref 70–99)
GLUCOSE BLDC GLUCOMTR-MCNC: 191 MG/DL — HIGH (ref 70–99)
GLUCOSE SERPL-MCNC: 100 MG/DL — HIGH (ref 70–99)
HCT VFR BLD CALC: 34.8 % — SIGNIFICANT CHANGE UP (ref 34.5–45)
HGB BLD-MCNC: 10.4 G/DL — LOW (ref 11.5–15.5)
IMM GRANULOCYTES NFR BLD AUTO: 0.5 % — SIGNIFICANT CHANGE UP (ref 0–0.9)
LYMPHOCYTES # BLD AUTO: 21.1 % — SIGNIFICANT CHANGE UP (ref 13–44)
LYMPHOCYTES # BLD AUTO: 3.22 K/UL — SIGNIFICANT CHANGE UP (ref 1–3.3)
MCHC RBC-ENTMCNC: 29.3 PG — SIGNIFICANT CHANGE UP (ref 27–34)
MCHC RBC-ENTMCNC: 29.9 GM/DL — LOW (ref 32–36)
MCV RBC AUTO: 98 FL — SIGNIFICANT CHANGE UP (ref 80–100)
MONOCYTES # BLD AUTO: 1.35 K/UL — HIGH (ref 0–0.9)
MONOCYTES NFR BLD AUTO: 8.9 % — SIGNIFICANT CHANGE UP (ref 2–14)
NEUTROPHILS # BLD AUTO: 10.26 K/UL — HIGH (ref 1.8–7.4)
NEUTROPHILS NFR BLD AUTO: 67.3 % — SIGNIFICANT CHANGE UP (ref 43–77)
PLATELET # BLD AUTO: 130 K/UL — LOW (ref 150–400)
POTASSIUM SERPL-MCNC: 3.5 MMOL/L — SIGNIFICANT CHANGE UP (ref 3.5–5.3)
POTASSIUM SERPL-SCNC: 3.5 MMOL/L — SIGNIFICANT CHANGE UP (ref 3.5–5.3)
RBC # BLD: 3.55 M/UL — LOW (ref 3.8–5.2)
RBC # FLD: 20 % — HIGH (ref 10.3–14.5)
SODIUM SERPL-SCNC: 135 MMOL/L — SIGNIFICANT CHANGE UP (ref 135–145)
WBC # BLD: 15.24 K/UL — HIGH (ref 3.8–10.5)
WBC # FLD AUTO: 15.24 K/UL — HIGH (ref 3.8–10.5)

## 2024-06-16 PROCEDURE — 99232 SBSQ HOSP IP/OBS MODERATE 35: CPT

## 2024-06-16 RX ORDER — NYSTATIN 100000/G
1 POWDER (GRAM) TOPICAL
Refills: 0 | Status: DISCONTINUED | OUTPATIENT
Start: 2024-06-16 | End: 2024-06-25

## 2024-06-16 RX ORDER — SODIUM BICARBONATE 650 MG/1
650 TABLET ORAL THREE TIMES A DAY
Refills: 0 | Status: DISCONTINUED | OUTPATIENT
Start: 2024-06-16 | End: 2024-06-23

## 2024-06-16 RX ORDER — ZINC OXIDE 20 %
1 OINTMENT (GRAM) TOPICAL THREE TIMES A DAY
Refills: 0 | Status: DISCONTINUED | OUTPATIENT
Start: 2024-06-16 | End: 2024-06-25

## 2024-06-16 RX ADMIN — Medication 20 MILLIGRAM(S): at 21:37

## 2024-06-16 RX ADMIN — SODIUM BICARBONATE 650 MILLIGRAM(S): 650 TABLET ORAL at 05:21

## 2024-06-16 RX ADMIN — SODIUM BICARBONATE 650 MILLIGRAM(S): 650 TABLET ORAL at 11:38

## 2024-06-16 RX ADMIN — SODIUM BICARBONATE 650 MILLIGRAM(S): 650 TABLET ORAL at 14:52

## 2024-06-16 RX ADMIN — SODIUM BICARBONATE 650 MILLIGRAM(S): 650 TABLET ORAL at 21:37

## 2024-06-16 RX ADMIN — Medication 25 MICROGRAM(S): at 05:22

## 2024-06-16 RX ADMIN — TAMSULOSIN HYDROCHLORIDE 0.4 MILLIGRAM(S): 0.4 CAPSULE ORAL at 21:36

## 2024-06-16 RX ADMIN — Medication 1 APPLICATION(S): at 14:53

## 2024-06-16 RX ADMIN — PANTOPRAZOLE SODIUM 40 MILLIGRAM(S): 40 INJECTION, POWDER, FOR SOLUTION INTRAVENOUS at 17:33

## 2024-06-16 RX ADMIN — Medication 1 APPLICATION(S): at 17:34

## 2024-06-16 RX ADMIN — SODIUM BICARBONATE 75 MEQ/KG/HR: 650 TABLET ORAL at 05:24

## 2024-06-16 RX ADMIN — INSULIN LISPRO 1: 100 INJECTION, SOLUTION SUBCUTANEOUS at 11:38

## 2024-06-16 RX ADMIN — APIXABAN 5 MILLIGRAM(S): 5 TABLET, FILM COATED ORAL at 05:21

## 2024-06-16 RX ADMIN — Medication 1 APPLICATION(S): at 21:40

## 2024-06-16 RX ADMIN — Medication 1 APPLICATION(S): at 17:35

## 2024-06-16 RX ADMIN — MIDODRINE HYDROCHLORIDE 10 MILLIGRAM(S): 10 TABLET ORAL at 05:22

## 2024-06-16 RX ADMIN — APIXABAN 5 MILLIGRAM(S): 5 TABLET, FILM COATED ORAL at 17:34

## 2024-06-16 RX ADMIN — SODIUM BICARBONATE 75 MEQ/KG/HR: 650 TABLET ORAL at 08:12

## 2024-06-16 RX ADMIN — PANTOPRAZOLE SODIUM 40 MILLIGRAM(S): 40 INJECTION, POWDER, FOR SOLUTION INTRAVENOUS at 05:24

## 2024-06-16 RX ADMIN — Medication 1 APPLICATION(S): at 11:36

## 2024-06-16 RX ADMIN — MIDODRINE HYDROCHLORIDE 10 MILLIGRAM(S): 10 TABLET ORAL at 14:52

## 2024-06-16 RX ADMIN — MIDODRINE HYDROCHLORIDE 10 MILLIGRAM(S): 10 TABLET ORAL at 21:37

## 2024-06-17 LAB
ANION GAP SERPL CALC-SCNC: 13 MMOL/L — SIGNIFICANT CHANGE UP (ref 5–17)
BASOPHILS # BLD AUTO: 0.06 K/UL — SIGNIFICANT CHANGE UP (ref 0–0.2)
BASOPHILS NFR BLD AUTO: 0.4 % — SIGNIFICANT CHANGE UP (ref 0–2)
BUN SERPL-MCNC: 24.5 MG/DL — HIGH (ref 8–20)
CALCIUM SERPL-MCNC: 7.5 MG/DL — LOW (ref 8.4–10.5)
CHLORIDE SERPL-SCNC: 99 MMOL/L — SIGNIFICANT CHANGE UP (ref 96–108)
CO2 SERPL-SCNC: 24 MMOL/L — SIGNIFICANT CHANGE UP (ref 22–29)
CREAT SERPL-MCNC: 0.4 MG/DL — LOW (ref 0.5–1.3)
CULTURE RESULTS: SIGNIFICANT CHANGE UP
CULTURE RESULTS: SIGNIFICANT CHANGE UP
EGFR: 99 ML/MIN/1.73M2 — SIGNIFICANT CHANGE UP
EOSINOPHIL # BLD AUTO: 0.28 K/UL — SIGNIFICANT CHANGE UP (ref 0–0.5)
EOSINOPHIL NFR BLD AUTO: 1.7 % — SIGNIFICANT CHANGE UP (ref 0–6)
GLUCOSE BLDC GLUCOMTR-MCNC: 119 MG/DL — HIGH (ref 70–99)
GLUCOSE BLDC GLUCOMTR-MCNC: 135 MG/DL — HIGH (ref 70–99)
GLUCOSE BLDC GLUCOMTR-MCNC: 164 MG/DL — HIGH (ref 70–99)
GLUCOSE BLDC GLUCOMTR-MCNC: 170 MG/DL — HIGH (ref 70–99)
GLUCOSE SERPL-MCNC: 114 MG/DL — HIGH (ref 70–99)
HCT VFR BLD CALC: 28.5 % — LOW (ref 34.5–45)
HGB BLD-MCNC: 9.3 G/DL — LOW (ref 11.5–15.5)
IMM GRANULOCYTES NFR BLD AUTO: 0.5 % — SIGNIFICANT CHANGE UP (ref 0–0.9)
LYMPHOCYTES # BLD AUTO: 28.1 % — SIGNIFICANT CHANGE UP (ref 13–44)
LYMPHOCYTES # BLD AUTO: 4.61 K/UL — HIGH (ref 1–3.3)
MAGNESIUM SERPL-MCNC: 1.6 MG/DL — SIGNIFICANT CHANGE UP (ref 1.6–2.6)
MCHC RBC-ENTMCNC: 29.1 PG — SIGNIFICANT CHANGE UP (ref 27–34)
MCHC RBC-ENTMCNC: 32.6 GM/DL — SIGNIFICANT CHANGE UP (ref 32–36)
MCV RBC AUTO: 89.1 FL — SIGNIFICANT CHANGE UP (ref 80–100)
MONOCYTES # BLD AUTO: 1.34 K/UL — HIGH (ref 0–0.9)
MONOCYTES NFR BLD AUTO: 8.2 % — SIGNIFICANT CHANGE UP (ref 2–14)
NEUTROPHILS # BLD AUTO: 10.05 K/UL — HIGH (ref 1.8–7.4)
NEUTROPHILS NFR BLD AUTO: 61.1 % — SIGNIFICANT CHANGE UP (ref 43–77)
PHOSPHATE SERPL-MCNC: 1.3 MG/DL — LOW (ref 2.4–4.7)
PLATELET # BLD AUTO: 205 K/UL — SIGNIFICANT CHANGE UP (ref 150–400)
POTASSIUM SERPL-MCNC: 2.3 MMOL/L — CRITICAL LOW (ref 3.5–5.3)
POTASSIUM SERPL-SCNC: 2.3 MMOL/L — CRITICAL LOW (ref 3.5–5.3)
RBC # BLD: 3.2 M/UL — LOW (ref 3.8–5.2)
RBC # FLD: 19.5 % — HIGH (ref 10.3–14.5)
SODIUM SERPL-SCNC: 136 MMOL/L — SIGNIFICANT CHANGE UP (ref 135–145)
SPECIMEN SOURCE: SIGNIFICANT CHANGE UP
SPECIMEN SOURCE: SIGNIFICANT CHANGE UP
WBC # BLD: 16.42 K/UL — HIGH (ref 3.8–10.5)
WBC # FLD AUTO: 16.42 K/UL — HIGH (ref 3.8–10.5)

## 2024-06-17 PROCEDURE — 99232 SBSQ HOSP IP/OBS MODERATE 35: CPT

## 2024-06-17 RX ORDER — LOPERAMIDE HCL 2 MG
2 CAPSULE ORAL
Refills: 0 | Status: DISCONTINUED | OUTPATIENT
Start: 2024-06-17 | End: 2024-06-19

## 2024-06-17 RX ORDER — POTASSIUM PHOSPHATE, MONOBASIC POTASSIUM PHOSPHATE, DIBASIC INJECTION, 236; 224 MG/ML; MG/ML
30 SOLUTION, CONCENTRATE INTRAVENOUS ONCE
Refills: 0 | Status: COMPLETED | OUTPATIENT
Start: 2024-06-17 | End: 2024-06-17

## 2024-06-17 RX ORDER — POTASSIUM CHLORIDE 600 MG/1
10 TABLET, FILM COATED, EXTENDED RELEASE ORAL
Refills: 0 | Status: COMPLETED | OUTPATIENT
Start: 2024-06-17 | End: 2024-06-17

## 2024-06-17 RX ORDER — POTASSIUM CHLORIDE 600 MG/1
20 TABLET, FILM COATED, EXTENDED RELEASE ORAL
Refills: 0 | Status: DISCONTINUED | OUTPATIENT
Start: 2024-06-17 | End: 2024-06-17

## 2024-06-17 RX ADMIN — POTASSIUM CHLORIDE 100 MILLIEQUIVALENT(S): 600 TABLET, FILM COATED, EXTENDED RELEASE ORAL at 08:31

## 2024-06-17 RX ADMIN — POTASSIUM CHLORIDE 100 MILLIEQUIVALENT(S): 600 TABLET, FILM COATED, EXTENDED RELEASE ORAL at 10:54

## 2024-06-17 RX ADMIN — TAMSULOSIN HYDROCHLORIDE 0.4 MILLIGRAM(S): 0.4 CAPSULE ORAL at 21:14

## 2024-06-17 RX ADMIN — INSULIN LISPRO 1: 100 INJECTION, SOLUTION SUBCUTANEOUS at 11:16

## 2024-06-17 RX ADMIN — PANTOPRAZOLE SODIUM 40 MILLIGRAM(S): 40 INJECTION, POWDER, FOR SOLUTION INTRAVENOUS at 05:34

## 2024-06-17 RX ADMIN — MIDODRINE HYDROCHLORIDE 10 MILLIGRAM(S): 10 TABLET ORAL at 21:14

## 2024-06-17 RX ADMIN — PANTOPRAZOLE SODIUM 40 MILLIGRAM(S): 40 INJECTION, POWDER, FOR SOLUTION INTRAVENOUS at 17:33

## 2024-06-17 RX ADMIN — Medication 25 MICROGRAM(S): at 05:34

## 2024-06-17 RX ADMIN — POTASSIUM CHLORIDE 100 MILLIEQUIVALENT(S): 600 TABLET, FILM COATED, EXTENDED RELEASE ORAL at 09:33

## 2024-06-17 RX ADMIN — SODIUM BICARBONATE 650 MILLIGRAM(S): 650 TABLET ORAL at 14:26

## 2024-06-17 RX ADMIN — Medication 1 APPLICATION(S): at 17:33

## 2024-06-17 RX ADMIN — Medication 1 APPLICATION(S): at 17:32

## 2024-06-17 RX ADMIN — Medication 1 APPLICATION(S): at 05:38

## 2024-06-17 RX ADMIN — MIDODRINE HYDROCHLORIDE 10 MILLIGRAM(S): 10 TABLET ORAL at 14:25

## 2024-06-17 RX ADMIN — APIXABAN 5 MILLIGRAM(S): 5 TABLET, FILM COATED ORAL at 17:32

## 2024-06-17 RX ADMIN — Medication 1 APPLICATION(S): at 21:15

## 2024-06-17 RX ADMIN — POTASSIUM PHOSPHATE, MONOBASIC POTASSIUM PHOSPHATE, DIBASIC INJECTION, 83.33 MILLIMOLE(S): 236; 224 SOLUTION, CONCENTRATE INTRAVENOUS at 10:19

## 2024-06-17 RX ADMIN — Medication 1 APPLICATION(S): at 14:26

## 2024-06-17 RX ADMIN — Medication 20 MILLIGRAM(S): at 21:14

## 2024-06-17 RX ADMIN — APIXABAN 5 MILLIGRAM(S): 5 TABLET, FILM COATED ORAL at 05:34

## 2024-06-17 RX ADMIN — SODIUM BICARBONATE 650 MILLIGRAM(S): 650 TABLET ORAL at 21:14

## 2024-06-17 RX ADMIN — MIDODRINE HYDROCHLORIDE 10 MILLIGRAM(S): 10 TABLET ORAL at 05:34

## 2024-06-17 RX ADMIN — SODIUM BICARBONATE 650 MILLIGRAM(S): 650 TABLET ORAL at 05:34

## 2024-06-17 RX ADMIN — Medication 1 APPLICATION(S): at 11:18

## 2024-06-18 LAB
ALBUMIN SERPL ELPH-MCNC: 2.1 G/DL — LOW (ref 3.3–5.2)
ALBUMIN SERPL ELPH-MCNC: 2.3 G/DL — LOW (ref 3.3–5.2)
ALP SERPL-CCNC: 76 U/L — SIGNIFICANT CHANGE UP (ref 40–120)
ALP SERPL-CCNC: 80 U/L — SIGNIFICANT CHANGE UP (ref 40–120)
ALT FLD-CCNC: 23 U/L — SIGNIFICANT CHANGE UP
ALT FLD-CCNC: 24 U/L — SIGNIFICANT CHANGE UP
ANION GAP SERPL CALC-SCNC: 10 MMOL/L — SIGNIFICANT CHANGE UP (ref 5–17)
ANION GAP SERPL CALC-SCNC: 11 MMOL/L — SIGNIFICANT CHANGE UP (ref 5–17)
APPEARANCE UR: ABNORMAL
APTT BLD: 33 SEC — SIGNIFICANT CHANGE UP (ref 24.5–35.6)
AST SERPL-CCNC: 12 U/L — SIGNIFICANT CHANGE UP
AST SERPL-CCNC: 20 U/L — SIGNIFICANT CHANGE UP
BACTERIA # UR AUTO: ABNORMAL /HPF
BASOPHILS # BLD AUTO: 0.05 K/UL — SIGNIFICANT CHANGE UP (ref 0–0.2)
BASOPHILS # BLD AUTO: 0.06 K/UL — SIGNIFICANT CHANGE UP (ref 0–0.2)
BASOPHILS NFR BLD AUTO: 0.4 % — SIGNIFICANT CHANGE UP (ref 0–2)
BASOPHILS NFR BLD AUTO: 0.5 % — SIGNIFICANT CHANGE UP (ref 0–2)
BILIRUB SERPL-MCNC: 0.2 MG/DL — LOW (ref 0.4–2)
BILIRUB SERPL-MCNC: 0.4 MG/DL — SIGNIFICANT CHANGE UP (ref 0.4–2)
BILIRUB UR-MCNC: NEGATIVE — SIGNIFICANT CHANGE UP
BUN SERPL-MCNC: 23.6 MG/DL — HIGH (ref 8–20)
BUN SERPL-MCNC: 24.2 MG/DL — HIGH (ref 8–20)
CALCIUM SERPL-MCNC: 7.4 MG/DL — LOW (ref 8.4–10.5)
CALCIUM SERPL-MCNC: 7.4 MG/DL — LOW (ref 8.4–10.5)
CAST: 5 /LPF — HIGH (ref 0–4)
CHLORIDE SERPL-SCNC: 100 MMOL/L — SIGNIFICANT CHANGE UP (ref 96–108)
CHLORIDE SERPL-SCNC: 99 MMOL/L — SIGNIFICANT CHANGE UP (ref 96–108)
CO2 SERPL-SCNC: 22 MMOL/L — SIGNIFICANT CHANGE UP (ref 22–29)
CO2 SERPL-SCNC: 26 MMOL/L — SIGNIFICANT CHANGE UP (ref 22–29)
COLOR SPEC: ABNORMAL
CREAT SERPL-MCNC: 0.37 MG/DL — LOW (ref 0.5–1.3)
CREAT SERPL-MCNC: 0.44 MG/DL — LOW (ref 0.5–1.3)
DIFF PNL FLD: ABNORMAL
EGFR: 101 ML/MIN/1.73M2 — SIGNIFICANT CHANGE UP
EGFR: 97 ML/MIN/1.73M2 — SIGNIFICANT CHANGE UP
EOSINOPHIL # BLD AUTO: 0.21 K/UL — SIGNIFICANT CHANGE UP (ref 0–0.5)
EOSINOPHIL # BLD AUTO: 0.29 K/UL — SIGNIFICANT CHANGE UP (ref 0–0.5)
EOSINOPHIL NFR BLD AUTO: 1.8 % — SIGNIFICANT CHANGE UP (ref 0–6)
EOSINOPHIL NFR BLD AUTO: 2.4 % — SIGNIFICANT CHANGE UP (ref 0–6)
GLUCOSE BLDC GLUCOMTR-MCNC: 107 MG/DL — HIGH (ref 70–99)
GLUCOSE BLDC GLUCOMTR-MCNC: 142 MG/DL — HIGH (ref 70–99)
GLUCOSE BLDC GLUCOMTR-MCNC: 151 MG/DL — HIGH (ref 70–99)
GLUCOSE BLDC GLUCOMTR-MCNC: 160 MG/DL — HIGH (ref 70–99)
GLUCOSE BLDC GLUCOMTR-MCNC: 175 MG/DL — HIGH (ref 70–99)
GLUCOSE SERPL-MCNC: 104 MG/DL — HIGH (ref 70–99)
GLUCOSE SERPL-MCNC: 158 MG/DL — HIGH (ref 70–99)
GLUCOSE UR QL: NEGATIVE MG/DL — SIGNIFICANT CHANGE UP
HCT VFR BLD CALC: 24.3 % — LOW (ref 34.5–45)
HCT VFR BLD CALC: 27.2 % — LOW (ref 34.5–45)
HGB BLD-MCNC: 8.1 G/DL — LOW (ref 11.5–15.5)
HGB BLD-MCNC: 9.1 G/DL — LOW (ref 11.5–15.5)
IMM GRANULOCYTES NFR BLD AUTO: 0.4 % — SIGNIFICANT CHANGE UP (ref 0–0.9)
IMM GRANULOCYTES NFR BLD AUTO: 0.5 % — SIGNIFICANT CHANGE UP (ref 0–0.9)
INR BLD: 2.06 RATIO — HIGH (ref 0.85–1.18)
KETONES UR-MCNC: NEGATIVE MG/DL — SIGNIFICANT CHANGE UP
LACTATE BLDV-MCNC: 2.4 MMOL/L — HIGH (ref 0.5–2)
LEUKOCYTE ESTERASE UR-ACNC: ABNORMAL
LYMPHOCYTES # BLD AUTO: 3.65 K/UL — HIGH (ref 1–3.3)
LYMPHOCYTES # BLD AUTO: 30.3 % — SIGNIFICANT CHANGE UP (ref 13–44)
LYMPHOCYTES # BLD AUTO: 34.9 % — SIGNIFICANT CHANGE UP (ref 13–44)
LYMPHOCYTES # BLD AUTO: 4.06 K/UL — HIGH (ref 1–3.3)
MAGNESIUM SERPL-MCNC: 1.1 MG/DL — LOW (ref 1.6–2.6)
MAGNESIUM SERPL-MCNC: 1.2 MG/DL — LOW (ref 1.6–2.6)
MCHC RBC-ENTMCNC: 29.4 PG — SIGNIFICANT CHANGE UP (ref 27–34)
MCHC RBC-ENTMCNC: 30 PG — SIGNIFICANT CHANGE UP (ref 27–34)
MCHC RBC-ENTMCNC: 33.3 GM/DL — SIGNIFICANT CHANGE UP (ref 32–36)
MCHC RBC-ENTMCNC: 33.5 GM/DL — SIGNIFICANT CHANGE UP (ref 32–36)
MCV RBC AUTO: 87.7 FL — SIGNIFICANT CHANGE UP (ref 80–100)
MCV RBC AUTO: 90 FL — SIGNIFICANT CHANGE UP (ref 80–100)
MONOCYTES # BLD AUTO: 0.84 K/UL — SIGNIFICANT CHANGE UP (ref 0–0.9)
MONOCYTES # BLD AUTO: 0.99 K/UL — HIGH (ref 0–0.9)
MONOCYTES NFR BLD AUTO: 7.2 % — SIGNIFICANT CHANGE UP (ref 2–14)
MONOCYTES NFR BLD AUTO: 8.2 % — SIGNIFICANT CHANGE UP (ref 2–14)
NEUTROPHILS # BLD AUTO: 6.41 K/UL — SIGNIFICANT CHANGE UP (ref 1.8–7.4)
NEUTROPHILS # BLD AUTO: 7.01 K/UL — SIGNIFICANT CHANGE UP (ref 1.8–7.4)
NEUTROPHILS NFR BLD AUTO: 55.3 % — SIGNIFICANT CHANGE UP (ref 43–77)
NEUTROPHILS NFR BLD AUTO: 58.1 % — SIGNIFICANT CHANGE UP (ref 43–77)
NITRITE UR-MCNC: NEGATIVE — SIGNIFICANT CHANGE UP
PH UR: 7 — SIGNIFICANT CHANGE UP (ref 5–8)
PHOSPHATE SERPL-MCNC: 1.8 MG/DL — LOW (ref 2.4–4.7)
PHOSPHATE SERPL-MCNC: 2.3 MG/DL — LOW (ref 2.4–4.7)
PLATELET # BLD AUTO: 191 K/UL — SIGNIFICANT CHANGE UP (ref 150–400)
PLATELET # BLD AUTO: 210 K/UL — SIGNIFICANT CHANGE UP (ref 150–400)
POTASSIUM SERPL-MCNC: 3.2 MMOL/L — LOW (ref 3.5–5.3)
POTASSIUM SERPL-MCNC: 4.2 MMOL/L — SIGNIFICANT CHANGE UP (ref 3.5–5.3)
POTASSIUM SERPL-SCNC: 3.2 MMOL/L — LOW (ref 3.5–5.3)
POTASSIUM SERPL-SCNC: 4.2 MMOL/L — SIGNIFICANT CHANGE UP (ref 3.5–5.3)
PROCALCITONIN SERPL-MCNC: 0.11 NG/ML — HIGH (ref 0.02–0.1)
PROT SERPL-MCNC: 4.4 G/DL — LOW (ref 6.6–8.7)
PROT SERPL-MCNC: 4.6 G/DL — LOW (ref 6.6–8.7)
PROT UR-MCNC: 300 MG/DL
PROTHROM AB SERPL-ACNC: 22.4 SEC — HIGH (ref 9.5–13)
RBC # BLD: 2.7 M/UL — LOW (ref 3.8–5.2)
RBC # BLD: 3.1 M/UL — LOW (ref 3.8–5.2)
RBC # FLD: 19.8 % — HIGH (ref 10.3–14.5)
RBC # FLD: 19.8 % — HIGH (ref 10.3–14.5)
RBC CASTS # UR COMP ASSIST: >1900 /HPF — HIGH (ref 0–4)
SODIUM SERPL-SCNC: 133 MMOL/L — LOW (ref 135–145)
SODIUM SERPL-SCNC: 135 MMOL/L — SIGNIFICANT CHANGE UP (ref 135–145)
SP GR SPEC: 1.02 — SIGNIFICANT CHANGE UP (ref 1–1.03)
SQUAMOUS # UR AUTO: 9 /HPF — HIGH (ref 0–5)
UROBILINOGEN FLD QL: 1 MG/DL — SIGNIFICANT CHANGE UP (ref 0.2–1)
WBC # BLD: 11.62 K/UL — HIGH (ref 3.8–10.5)
WBC # BLD: 12.06 K/UL — HIGH (ref 3.8–10.5)
WBC # FLD AUTO: 11.62 K/UL — HIGH (ref 3.8–10.5)
WBC # FLD AUTO: 12.06 K/UL — HIGH (ref 3.8–10.5)
WBC UR QL: 726 /HPF — HIGH (ref 0–5)

## 2024-06-18 PROCEDURE — 99232 SBSQ HOSP IP/OBS MODERATE 35: CPT

## 2024-06-18 PROCEDURE — 71045 X-RAY EXAM CHEST 1 VIEW: CPT | Mod: 26

## 2024-06-18 RX ORDER — ACETAMINOPHEN 325 MG
1000 TABLET ORAL ONCE
Refills: 0 | Status: COMPLETED | OUTPATIENT
Start: 2024-06-18 | End: 2024-06-18

## 2024-06-18 RX ORDER — MAGNESIUM SULFATE 100 %
2 POWDER (GRAM) MISCELLANEOUS ONCE
Refills: 0 | Status: COMPLETED | OUTPATIENT
Start: 2024-06-18 | End: 2024-06-18

## 2024-06-18 RX ORDER — MIDODRINE HYDROCHLORIDE 10 MG/1
10 TABLET ORAL ONCE
Refills: 0 | Status: COMPLETED | OUTPATIENT
Start: 2024-06-18 | End: 2024-06-18

## 2024-06-18 RX ORDER — POTASSIUM CHLORIDE 600 MG/1
40 TABLET, FILM COATED, EXTENDED RELEASE ORAL ONCE
Refills: 0 | Status: COMPLETED | OUTPATIENT
Start: 2024-06-18 | End: 2024-06-18

## 2024-06-18 RX ORDER — POTASSIUM PHOSPHATE, MONOBASIC POTASSIUM PHOSPHATE, DIBASIC INJECTION, 236; 224 MG/ML; MG/ML
30 SOLUTION, CONCENTRATE INTRAVENOUS ONCE
Refills: 0 | Status: COMPLETED | OUTPATIENT
Start: 2024-06-18 | End: 2024-06-18

## 2024-06-18 RX ORDER — MEROPENEM 500 MG/1
INJECTION, POWDER, FOR SOLUTION INTRAVENOUS
Refills: 0 | Status: COMPLETED | OUTPATIENT
Start: 2024-06-19 | End: 2024-06-22

## 2024-06-18 RX ORDER — SODIUM CHLORIDE 0.9 % (FLUSH) 0.9 %
2100 SYRINGE (ML) INJECTION ONCE
Refills: 0 | Status: COMPLETED | OUTPATIENT
Start: 2024-06-18 | End: 2024-06-18

## 2024-06-18 RX ORDER — POTASSIUM CHLORIDE 600 MG/1
10 TABLET, FILM COATED, EXTENDED RELEASE ORAL
Refills: 0 | Status: DISCONTINUED | OUTPATIENT
Start: 2024-06-18 | End: 2024-06-18

## 2024-06-18 RX ADMIN — SODIUM BICARBONATE 650 MILLIGRAM(S): 650 TABLET ORAL at 13:54

## 2024-06-18 RX ADMIN — MIDODRINE HYDROCHLORIDE 10 MILLIGRAM(S): 10 TABLET ORAL at 05:26

## 2024-06-18 RX ADMIN — Medication 1000 MILLIGRAM(S): at 23:30

## 2024-06-18 RX ADMIN — Medication 1 APPLICATION(S): at 12:25

## 2024-06-18 RX ADMIN — APIXABAN 5 MILLIGRAM(S): 5 TABLET, FILM COATED ORAL at 05:26

## 2024-06-18 RX ADMIN — Medication 400 MILLIGRAM(S): at 22:29

## 2024-06-18 RX ADMIN — PANTOPRAZOLE SODIUM 40 MILLIGRAM(S): 40 INJECTION, POWDER, FOR SOLUTION INTRAVENOUS at 05:27

## 2024-06-18 RX ADMIN — APIXABAN 5 MILLIGRAM(S): 5 TABLET, FILM COATED ORAL at 17:57

## 2024-06-18 RX ADMIN — Medication 2100 MILLILITER(S): at 22:45

## 2024-06-18 RX ADMIN — Medication 1 APPLICATION(S): at 05:27

## 2024-06-18 RX ADMIN — SODIUM BICARBONATE 650 MILLIGRAM(S): 650 TABLET ORAL at 21:25

## 2024-06-18 RX ADMIN — Medication 25 MICROGRAM(S): at 05:26

## 2024-06-18 RX ADMIN — Medication 1 APPLICATION(S): at 17:57

## 2024-06-18 RX ADMIN — Medication 1 APPLICATION(S): at 17:58

## 2024-06-18 RX ADMIN — TAMSULOSIN HYDROCHLORIDE 0.4 MILLIGRAM(S): 0.4 CAPSULE ORAL at 21:25

## 2024-06-18 RX ADMIN — MIDODRINE HYDROCHLORIDE 10 MILLIGRAM(S): 10 TABLET ORAL at 13:54

## 2024-06-18 RX ADMIN — Medication 1 APPLICATION(S): at 13:54

## 2024-06-18 RX ADMIN — Medication 2 MILLIGRAM(S): at 17:58

## 2024-06-18 RX ADMIN — Medication 1 APPLICATION(S): at 05:26

## 2024-06-18 RX ADMIN — PANTOPRAZOLE SODIUM 40 MILLIGRAM(S): 40 INJECTION, POWDER, FOR SOLUTION INTRAVENOUS at 17:57

## 2024-06-18 RX ADMIN — Medication 1 APPLICATION(S): at 21:26

## 2024-06-18 RX ADMIN — SODIUM BICARBONATE 650 MILLIGRAM(S): 650 TABLET ORAL at 05:27

## 2024-06-18 RX ADMIN — Medication 25 GRAM(S): at 09:23

## 2024-06-18 RX ADMIN — POTASSIUM CHLORIDE 40 MILLIEQUIVALENT(S): 600 TABLET, FILM COATED, EXTENDED RELEASE ORAL at 12:25

## 2024-06-18 RX ADMIN — Medication 20 MILLIGRAM(S): at 21:25

## 2024-06-19 LAB
ALBUMIN SERPL ELPH-MCNC: 2.5 G/DL — LOW (ref 3.3–5.2)
ALP SERPL-CCNC: 64 U/L — SIGNIFICANT CHANGE UP (ref 40–120)
ALT FLD-CCNC: 19 U/L — SIGNIFICANT CHANGE UP
ANION GAP SERPL CALC-SCNC: 12 MMOL/L — SIGNIFICANT CHANGE UP (ref 5–17)
AST SERPL-CCNC: 13 U/L — SIGNIFICANT CHANGE UP
BILIRUB SERPL-MCNC: 0.3 MG/DL — LOW (ref 0.4–2)
BUN SERPL-MCNC: 23.3 MG/DL — HIGH (ref 8–20)
CALCIUM SERPL-MCNC: 7.5 MG/DL — LOW (ref 8.4–10.5)
CHLORIDE SERPL-SCNC: 102 MMOL/L — SIGNIFICANT CHANGE UP (ref 96–108)
CO2 SERPL-SCNC: 22 MMOL/L — SIGNIFICANT CHANGE UP (ref 22–29)
CREAT SERPL-MCNC: 0.38 MG/DL — LOW (ref 0.5–1.3)
EGFR: 101 ML/MIN/1.73M2 — SIGNIFICANT CHANGE UP
GLUCOSE BLDC GLUCOMTR-MCNC: 108 MG/DL — HIGH (ref 70–99)
GLUCOSE BLDC GLUCOMTR-MCNC: 150 MG/DL — HIGH (ref 70–99)
GLUCOSE BLDC GLUCOMTR-MCNC: 156 MG/DL — HIGH (ref 70–99)
GLUCOSE BLDC GLUCOMTR-MCNC: 180 MG/DL — HIGH (ref 70–99)
GLUCOSE SERPL-MCNC: 107 MG/DL — HIGH (ref 70–99)
HCT VFR BLD CALC: 24.3 % — LOW (ref 34.5–45)
HGB BLD-MCNC: 7.8 G/DL — LOW (ref 11.5–15.5)
LACTATE BLDV-MCNC: 1.3 MMOL/L — SIGNIFICANT CHANGE UP (ref 0.5–2)
MAGNESIUM SERPL-MCNC: 2 MG/DL — SIGNIFICANT CHANGE UP (ref 1.6–2.6)
MCHC RBC-ENTMCNC: 29.1 PG — SIGNIFICANT CHANGE UP (ref 27–34)
MCHC RBC-ENTMCNC: 32.1 GM/DL — SIGNIFICANT CHANGE UP (ref 32–36)
MCV RBC AUTO: 90.7 FL — SIGNIFICANT CHANGE UP (ref 80–100)
PLATELET # BLD AUTO: 212 K/UL — SIGNIFICANT CHANGE UP (ref 150–400)
POTASSIUM SERPL-MCNC: 3.5 MMOL/L — SIGNIFICANT CHANGE UP (ref 3.5–5.3)
POTASSIUM SERPL-SCNC: 3.5 MMOL/L — SIGNIFICANT CHANGE UP (ref 3.5–5.3)
PROT SERPL-MCNC: 4.3 G/DL — LOW (ref 6.6–8.7)
RBC # BLD: 2.68 M/UL — LOW (ref 3.8–5.2)
RBC # FLD: 19.9 % — HIGH (ref 10.3–14.5)
SODIUM SERPL-SCNC: 136 MMOL/L — SIGNIFICANT CHANGE UP (ref 135–145)
WBC # BLD: 8.05 K/UL — SIGNIFICANT CHANGE UP (ref 3.8–10.5)
WBC # FLD AUTO: 8.05 K/UL — SIGNIFICANT CHANGE UP (ref 3.8–10.5)

## 2024-06-19 PROCEDURE — 74176 CT ABD & PELVIS W/O CONTRAST: CPT | Mod: 26

## 2024-06-19 PROCEDURE — 99233 SBSQ HOSP IP/OBS HIGH 50: CPT

## 2024-06-19 PROCEDURE — 71250 CT THORAX DX C-: CPT | Mod: 26

## 2024-06-19 RX ORDER — MEROPENEM 500 MG/1
1000 INJECTION, POWDER, FOR SOLUTION INTRAVENOUS EVERY 8 HOURS
Refills: 0 | Status: COMPLETED | OUTPATIENT
Start: 2024-06-19 | End: 2024-06-21

## 2024-06-19 RX ORDER — MIDODRINE HYDROCHLORIDE 10 MG/1
10 TABLET ORAL
Refills: 0 | Status: DISCONTINUED | OUTPATIENT
Start: 2024-06-19 | End: 2024-06-21

## 2024-06-19 RX ORDER — MEROPENEM 500 MG/1
1000 INJECTION, POWDER, FOR SOLUTION INTRAVENOUS ONCE
Refills: 0 | Status: COMPLETED | OUTPATIENT
Start: 2024-06-19 | End: 2024-06-19

## 2024-06-19 RX ORDER — ALBUMIN HUMAN 5 %
100 INTRAVENOUS SOLUTION INTRAVENOUS ONCE
Refills: 0 | Status: COMPLETED | OUTPATIENT
Start: 2024-06-19 | End: 2024-06-19

## 2024-06-19 RX ADMIN — SODIUM BICARBONATE 650 MILLIGRAM(S): 650 TABLET ORAL at 13:23

## 2024-06-19 RX ADMIN — MEROPENEM 1000 MILLIGRAM(S): 500 INJECTION, POWDER, FOR SOLUTION INTRAVENOUS at 05:23

## 2024-06-19 RX ADMIN — SODIUM BICARBONATE 650 MILLIGRAM(S): 650 TABLET ORAL at 05:23

## 2024-06-19 RX ADMIN — MIDODRINE HYDROCHLORIDE 10 MILLIGRAM(S): 10 TABLET ORAL at 09:44

## 2024-06-19 RX ADMIN — Medication 25 GRAM(S): at 01:11

## 2024-06-19 RX ADMIN — Medication 1 APPLICATION(S): at 13:24

## 2024-06-19 RX ADMIN — Medication 1 APPLICATION(S): at 17:32

## 2024-06-19 RX ADMIN — MEROPENEM 1000 MILLIGRAM(S): 500 INJECTION, POWDER, FOR SOLUTION INTRAVENOUS at 01:37

## 2024-06-19 RX ADMIN — PANTOPRAZOLE SODIUM 40 MILLIGRAM(S): 40 INJECTION, POWDER, FOR SOLUTION INTRAVENOUS at 05:23

## 2024-06-19 RX ADMIN — MEROPENEM 1000 MILLIGRAM(S): 500 INJECTION, POWDER, FOR SOLUTION INTRAVENOUS at 13:23

## 2024-06-19 RX ADMIN — PANTOPRAZOLE SODIUM 40 MILLIGRAM(S): 40 INJECTION, POWDER, FOR SOLUTION INTRAVENOUS at 17:32

## 2024-06-19 RX ADMIN — INSULIN LISPRO 1: 100 INJECTION, SOLUTION SUBCUTANEOUS at 17:02

## 2024-06-19 RX ADMIN — APIXABAN 5 MILLIGRAM(S): 5 TABLET, FILM COATED ORAL at 05:23

## 2024-06-19 RX ADMIN — MEROPENEM 1000 MILLIGRAM(S): 500 INJECTION, POWDER, FOR SOLUTION INTRAVENOUS at 21:47

## 2024-06-19 RX ADMIN — Medication 20 MILLIGRAM(S): at 21:47

## 2024-06-19 RX ADMIN — Medication 25 MICROGRAM(S): at 05:23

## 2024-06-19 RX ADMIN — Medication 50 MILLILITER(S): at 02:25

## 2024-06-19 RX ADMIN — Medication 1 APPLICATION(S): at 05:24

## 2024-06-19 RX ADMIN — SODIUM BICARBONATE 650 MILLIGRAM(S): 650 TABLET ORAL at 21:46

## 2024-06-19 RX ADMIN — APIXABAN 5 MILLIGRAM(S): 5 TABLET, FILM COATED ORAL at 17:33

## 2024-06-19 RX ADMIN — POTASSIUM PHOSPHATE, MONOBASIC POTASSIUM PHOSPHATE, DIBASIC INJECTION, 83.33 MILLIMOLE(S): 236; 224 SOLUTION, CONCENTRATE INTRAVENOUS at 05:21

## 2024-06-19 RX ADMIN — TAMSULOSIN HYDROCHLORIDE 0.4 MILLIGRAM(S): 0.4 CAPSULE ORAL at 21:46

## 2024-06-19 RX ADMIN — Medication 1 APPLICATION(S): at 05:23

## 2024-06-20 ENCOUNTER — TRANSCRIPTION ENCOUNTER (OUTPATIENT)
Age: 84
End: 2024-06-20

## 2024-06-20 LAB
ALBUMIN SERPL ELPH-MCNC: 2.5 G/DL — LOW (ref 3.3–5.2)
ALP SERPL-CCNC: 70 U/L — SIGNIFICANT CHANGE UP (ref 40–120)
ALT FLD-CCNC: 19 U/L — SIGNIFICANT CHANGE UP
ANION GAP SERPL CALC-SCNC: 12 MMOL/L — SIGNIFICANT CHANGE UP (ref 5–17)
AST SERPL-CCNC: 13 U/L — SIGNIFICANT CHANGE UP
BASOPHILS # BLD AUTO: 0.06 K/UL — SIGNIFICANT CHANGE UP (ref 0–0.2)
BASOPHILS NFR BLD AUTO: 0.6 % — SIGNIFICANT CHANGE UP (ref 0–2)
BILIRUB SERPL-MCNC: 0.3 MG/DL — LOW (ref 0.4–2)
BUN SERPL-MCNC: 24.4 MG/DL — HIGH (ref 8–20)
CALCIUM SERPL-MCNC: 8.2 MG/DL — LOW (ref 8.4–10.5)
CHLORIDE SERPL-SCNC: 105 MMOL/L — SIGNIFICANT CHANGE UP (ref 96–108)
CO2 SERPL-SCNC: 21 MMOL/L — LOW (ref 22–29)
CREAT SERPL-MCNC: 0.57 MG/DL — SIGNIFICANT CHANGE UP (ref 0.5–1.3)
EGFR: 91 ML/MIN/1.73M2 — SIGNIFICANT CHANGE UP
EOSINOPHIL # BLD AUTO: 0.28 K/UL — SIGNIFICANT CHANGE UP (ref 0–0.5)
EOSINOPHIL NFR BLD AUTO: 2.9 % — SIGNIFICANT CHANGE UP (ref 0–6)
GLUCOSE BLDC GLUCOMTR-MCNC: 126 MG/DL — HIGH (ref 70–99)
GLUCOSE BLDC GLUCOMTR-MCNC: 142 MG/DL — HIGH (ref 70–99)
GLUCOSE BLDC GLUCOMTR-MCNC: 185 MG/DL — HIGH (ref 70–99)
GLUCOSE SERPL-MCNC: 118 MG/DL — HIGH (ref 70–99)
HCT VFR BLD CALC: 26.3 % — LOW (ref 34.5–45)
HGB BLD-MCNC: 8.6 G/DL — LOW (ref 11.5–15.5)
IMM GRANULOCYTES NFR BLD AUTO: 0.5 % — SIGNIFICANT CHANGE UP (ref 0–0.9)
LYMPHOCYTES # BLD AUTO: 3.27 K/UL — SIGNIFICANT CHANGE UP (ref 1–3.3)
LYMPHOCYTES # BLD AUTO: 34.1 % — SIGNIFICANT CHANGE UP (ref 13–44)
MCHC RBC-ENTMCNC: 29.8 PG — SIGNIFICANT CHANGE UP (ref 27–34)
MCHC RBC-ENTMCNC: 32.7 GM/DL — SIGNIFICANT CHANGE UP (ref 32–36)
MCV RBC AUTO: 91 FL — SIGNIFICANT CHANGE UP (ref 80–100)
MONOCYTES # BLD AUTO: 0.79 K/UL — SIGNIFICANT CHANGE UP (ref 0–0.9)
MONOCYTES NFR BLD AUTO: 8.2 % — SIGNIFICANT CHANGE UP (ref 2–14)
NEUTROPHILS # BLD AUTO: 5.15 K/UL — SIGNIFICANT CHANGE UP (ref 1.8–7.4)
NEUTROPHILS NFR BLD AUTO: 53.7 % — SIGNIFICANT CHANGE UP (ref 43–77)
PLATELET # BLD AUTO: 240 K/UL — SIGNIFICANT CHANGE UP (ref 150–400)
POTASSIUM SERPL-MCNC: 4.2 MMOL/L — SIGNIFICANT CHANGE UP (ref 3.5–5.3)
POTASSIUM SERPL-SCNC: 4.2 MMOL/L — SIGNIFICANT CHANGE UP (ref 3.5–5.3)
PROT SERPL-MCNC: 5 G/DL — LOW (ref 6.6–8.7)
RBC # BLD: 2.89 M/UL — LOW (ref 3.8–5.2)
RBC # FLD: 19.8 % — HIGH (ref 10.3–14.5)
SODIUM SERPL-SCNC: 138 MMOL/L — SIGNIFICANT CHANGE UP (ref 135–145)
WBC # BLD: 9.6 K/UL — SIGNIFICANT CHANGE UP (ref 3.8–10.5)
WBC # FLD AUTO: 9.6 K/UL — SIGNIFICANT CHANGE UP (ref 3.8–10.5)

## 2024-06-20 PROCEDURE — 99233 SBSQ HOSP IP/OBS HIGH 50: CPT

## 2024-06-20 RX ADMIN — Medication 1 APPLICATION(S): at 14:10

## 2024-06-20 RX ADMIN — SODIUM BICARBONATE 650 MILLIGRAM(S): 650 TABLET ORAL at 14:10

## 2024-06-20 RX ADMIN — SODIUM BICARBONATE 650 MILLIGRAM(S): 650 TABLET ORAL at 06:23

## 2024-06-20 RX ADMIN — PANTOPRAZOLE SODIUM 40 MILLIGRAM(S): 40 INJECTION, POWDER, FOR SOLUTION INTRAVENOUS at 18:22

## 2024-06-20 RX ADMIN — MEROPENEM 1000 MILLIGRAM(S): 500 INJECTION, POWDER, FOR SOLUTION INTRAVENOUS at 21:23

## 2024-06-20 RX ADMIN — MIDODRINE HYDROCHLORIDE 10 MILLIGRAM(S): 10 TABLET ORAL at 10:30

## 2024-06-20 RX ADMIN — APIXABAN 5 MILLIGRAM(S): 5 TABLET, FILM COATED ORAL at 06:23

## 2024-06-20 RX ADMIN — Medication 25 MICROGRAM(S): at 06:23

## 2024-06-20 RX ADMIN — Medication 1 APPLICATION(S): at 18:23

## 2024-06-20 RX ADMIN — MEROPENEM 1000 MILLIGRAM(S): 500 INJECTION, POWDER, FOR SOLUTION INTRAVENOUS at 06:23

## 2024-06-20 RX ADMIN — PANTOPRAZOLE SODIUM 40 MILLIGRAM(S): 40 INJECTION, POWDER, FOR SOLUTION INTRAVENOUS at 06:23

## 2024-06-20 RX ADMIN — TAMSULOSIN HYDROCHLORIDE 0.4 MILLIGRAM(S): 0.4 CAPSULE ORAL at 21:24

## 2024-06-20 RX ADMIN — INSULIN LISPRO 1: 100 INJECTION, SOLUTION SUBCUTANEOUS at 12:03

## 2024-06-20 RX ADMIN — MEROPENEM 1000 MILLIGRAM(S): 500 INJECTION, POWDER, FOR SOLUTION INTRAVENOUS at 14:08

## 2024-06-20 RX ADMIN — Medication 1 APPLICATION(S): at 22:37

## 2024-06-20 RX ADMIN — Medication 1 APPLICATION(S): at 06:23

## 2024-06-20 RX ADMIN — SODIUM BICARBONATE 650 MILLIGRAM(S): 650 TABLET ORAL at 21:24

## 2024-06-20 RX ADMIN — APIXABAN 5 MILLIGRAM(S): 5 TABLET, FILM COATED ORAL at 18:23

## 2024-06-20 RX ADMIN — Medication 1 APPLICATION(S): at 06:24

## 2024-06-20 RX ADMIN — Medication 20 MILLIGRAM(S): at 21:24

## 2024-06-20 RX ADMIN — Medication 1 APPLICATION(S): at 10:30

## 2024-06-21 LAB
ALBUMIN SERPL ELPH-MCNC: 2.3 G/DL — LOW (ref 3.3–5.2)
ALP SERPL-CCNC: 70 U/L — SIGNIFICANT CHANGE UP (ref 40–120)
ALT FLD-CCNC: 18 U/L — SIGNIFICANT CHANGE UP
ANION GAP SERPL CALC-SCNC: 12 MMOL/L — SIGNIFICANT CHANGE UP (ref 5–17)
AST SERPL-CCNC: 15 U/L — SIGNIFICANT CHANGE UP
BASOPHILS # BLD AUTO: 0.05 K/UL — SIGNIFICANT CHANGE UP (ref 0–0.2)
BASOPHILS NFR BLD AUTO: 0.6 % — SIGNIFICANT CHANGE UP (ref 0–2)
BILIRUB SERPL-MCNC: 0.4 MG/DL — SIGNIFICANT CHANGE UP (ref 0.4–2)
BUN SERPL-MCNC: 25 MG/DL — HIGH (ref 8–20)
CALCIUM SERPL-MCNC: 8.2 MG/DL — LOW (ref 8.4–10.5)
CHLORIDE SERPL-SCNC: 103 MMOL/L — SIGNIFICANT CHANGE UP (ref 96–108)
CO2 SERPL-SCNC: 21 MMOL/L — LOW (ref 22–29)
CREAT SERPL-MCNC: 0.44 MG/DL — LOW (ref 0.5–1.3)
CULTURE RESULTS: SIGNIFICANT CHANGE UP
EGFR: 97 ML/MIN/1.73M2 — SIGNIFICANT CHANGE UP
EOSINOPHIL # BLD AUTO: 0.41 K/UL — SIGNIFICANT CHANGE UP (ref 0–0.5)
EOSINOPHIL NFR BLD AUTO: 4.7 % — SIGNIFICANT CHANGE UP (ref 0–6)
GLUCOSE BLDC GLUCOMTR-MCNC: 105 MG/DL — HIGH (ref 70–99)
GLUCOSE BLDC GLUCOMTR-MCNC: 173 MG/DL — HIGH (ref 70–99)
GLUCOSE BLDC GLUCOMTR-MCNC: 215 MG/DL — HIGH (ref 70–99)
GLUCOSE SERPL-MCNC: 113 MG/DL — HIGH (ref 70–99)
HCT VFR BLD CALC: 26.1 % — LOW (ref 34.5–45)
HGB BLD-MCNC: 8.4 G/DL — LOW (ref 11.5–15.5)
IMM GRANULOCYTES NFR BLD AUTO: 0.3 % — SIGNIFICANT CHANGE UP (ref 0–0.9)
LYMPHOCYTES # BLD AUTO: 3.02 K/UL — SIGNIFICANT CHANGE UP (ref 1–3.3)
LYMPHOCYTES # BLD AUTO: 34.7 % — SIGNIFICANT CHANGE UP (ref 13–44)
MCHC RBC-ENTMCNC: 29.8 PG — SIGNIFICANT CHANGE UP (ref 27–34)
MCHC RBC-ENTMCNC: 32.2 GM/DL — SIGNIFICANT CHANGE UP (ref 32–36)
MCV RBC AUTO: 92.6 FL — SIGNIFICANT CHANGE UP (ref 80–100)
MONOCYTES # BLD AUTO: 0.78 K/UL — SIGNIFICANT CHANGE UP (ref 0–0.9)
MONOCYTES NFR BLD AUTO: 9 % — SIGNIFICANT CHANGE UP (ref 2–14)
NEUTROPHILS # BLD AUTO: 4.42 K/UL — SIGNIFICANT CHANGE UP (ref 1.8–7.4)
NEUTROPHILS NFR BLD AUTO: 50.7 % — SIGNIFICANT CHANGE UP (ref 43–77)
PLATELET # BLD AUTO: 165 K/UL — SIGNIFICANT CHANGE UP (ref 150–400)
POTASSIUM SERPL-MCNC: 3.8 MMOL/L — SIGNIFICANT CHANGE UP (ref 3.5–5.3)
POTASSIUM SERPL-SCNC: 3.8 MMOL/L — SIGNIFICANT CHANGE UP (ref 3.5–5.3)
PROT SERPL-MCNC: 4.9 G/DL — LOW (ref 6.6–8.7)
RBC # BLD: 2.82 M/UL — LOW (ref 3.8–5.2)
RBC # FLD: 19.4 % — HIGH (ref 10.3–14.5)
SODIUM SERPL-SCNC: 136 MMOL/L — SIGNIFICANT CHANGE UP (ref 135–145)
SPECIMEN SOURCE: SIGNIFICANT CHANGE UP
WBC # BLD: 8.71 K/UL — SIGNIFICANT CHANGE UP (ref 3.8–10.5)
WBC # FLD AUTO: 8.71 K/UL — SIGNIFICANT CHANGE UP (ref 3.8–10.5)

## 2024-06-21 PROCEDURE — 99232 SBSQ HOSP IP/OBS MODERATE 35: CPT

## 2024-06-21 RX ORDER — METOPROLOL TARTRATE 50 MG
50 TABLET ORAL
Refills: 0 | Status: DISCONTINUED | OUTPATIENT
Start: 2024-06-21 | End: 2024-06-23

## 2024-06-21 RX ORDER — BIOTIN/FOLIC AC/VIT BCOMP,C/ZN 3MG-0.8MG
1 TABLET ORAL DAILY
Refills: 0 | Status: DISCONTINUED | OUTPATIENT
Start: 2024-06-21 | End: 2024-06-25

## 2024-06-21 RX ADMIN — SODIUM BICARBONATE 650 MILLIGRAM(S): 650 TABLET ORAL at 21:21

## 2024-06-21 RX ADMIN — Medication 1 APPLICATION(S): at 11:53

## 2024-06-21 RX ADMIN — Medication 1 APPLICATION(S): at 06:02

## 2024-06-21 RX ADMIN — Medication 50 MILLIGRAM(S): at 21:21

## 2024-06-21 RX ADMIN — PANTOPRAZOLE SODIUM 40 MILLIGRAM(S): 40 INJECTION, POWDER, FOR SOLUTION INTRAVENOUS at 06:02

## 2024-06-21 RX ADMIN — INSULIN LISPRO 1: 100 INJECTION, SOLUTION SUBCUTANEOUS at 11:52

## 2024-06-21 RX ADMIN — Medication 1 APPLICATION(S): at 16:56

## 2024-06-21 RX ADMIN — INSULIN LISPRO 2: 100 INJECTION, SOLUTION SUBCUTANEOUS at 16:49

## 2024-06-21 RX ADMIN — MEROPENEM 1000 MILLIGRAM(S): 500 INJECTION, POWDER, FOR SOLUTION INTRAVENOUS at 21:21

## 2024-06-21 RX ADMIN — MIDODRINE HYDROCHLORIDE 10 MILLIGRAM(S): 10 TABLET ORAL at 08:46

## 2024-06-21 RX ADMIN — Medication 25 MICROGRAM(S): at 06:03

## 2024-06-21 RX ADMIN — APIXABAN 5 MILLIGRAM(S): 5 TABLET, FILM COATED ORAL at 06:03

## 2024-06-21 RX ADMIN — Medication 50 MILLIGRAM(S): at 12:08

## 2024-06-21 RX ADMIN — Medication 1 APPLICATION(S): at 22:17

## 2024-06-21 RX ADMIN — Medication 1 APPLICATION(S): at 16:58

## 2024-06-21 RX ADMIN — Medication 20 MILLIGRAM(S): at 21:21

## 2024-06-21 RX ADMIN — Medication 1 APPLICATION(S): at 06:18

## 2024-06-21 RX ADMIN — MEROPENEM 1000 MILLIGRAM(S): 500 INJECTION, POWDER, FOR SOLUTION INTRAVENOUS at 06:02

## 2024-06-21 RX ADMIN — APIXABAN 5 MILLIGRAM(S): 5 TABLET, FILM COATED ORAL at 16:59

## 2024-06-21 RX ADMIN — PANTOPRAZOLE SODIUM 40 MILLIGRAM(S): 40 INJECTION, POWDER, FOR SOLUTION INTRAVENOUS at 17:00

## 2024-06-21 RX ADMIN — MEROPENEM 1000 MILLIGRAM(S): 500 INJECTION, POWDER, FOR SOLUTION INTRAVENOUS at 16:59

## 2024-06-21 RX ADMIN — TAMSULOSIN HYDROCHLORIDE 0.4 MILLIGRAM(S): 0.4 CAPSULE ORAL at 21:21

## 2024-06-21 RX ADMIN — SODIUM BICARBONATE 650 MILLIGRAM(S): 650 TABLET ORAL at 06:03

## 2024-06-21 RX ADMIN — SODIUM BICARBONATE 650 MILLIGRAM(S): 650 TABLET ORAL at 17:03

## 2024-06-21 RX ADMIN — Medication 1 APPLICATION(S): at 16:57

## 2024-06-22 LAB
ALBUMIN SERPL ELPH-MCNC: 2.4 G/DL — LOW (ref 3.3–5.2)
ALP SERPL-CCNC: 66 U/L — SIGNIFICANT CHANGE UP (ref 40–120)
ALT FLD-CCNC: 16 U/L — SIGNIFICANT CHANGE UP
ANION GAP SERPL CALC-SCNC: 10 MMOL/L — SIGNIFICANT CHANGE UP (ref 5–17)
AST SERPL-CCNC: 12 U/L — SIGNIFICANT CHANGE UP
BASOPHILS # BLD AUTO: 0.05 K/UL — SIGNIFICANT CHANGE UP (ref 0–0.2)
BASOPHILS NFR BLD AUTO: 0.7 % — SIGNIFICANT CHANGE UP (ref 0–2)
BILIRUB SERPL-MCNC: 0.3 MG/DL — LOW (ref 0.4–2)
BUN SERPL-MCNC: 27.2 MG/DL — HIGH (ref 8–20)
CALCIUM SERPL-MCNC: 8.1 MG/DL — LOW (ref 8.4–10.5)
CHLORIDE SERPL-SCNC: 103 MMOL/L — SIGNIFICANT CHANGE UP (ref 96–108)
CO2 SERPL-SCNC: 23 MMOL/L — SIGNIFICANT CHANGE UP (ref 22–29)
CREAT SERPL-MCNC: 0.46 MG/DL — LOW (ref 0.5–1.3)
EGFR: 96 ML/MIN/1.73M2 — SIGNIFICANT CHANGE UP
EOSINOPHIL # BLD AUTO: 0.49 K/UL — SIGNIFICANT CHANGE UP (ref 0–0.5)
EOSINOPHIL NFR BLD AUTO: 6.4 % — HIGH (ref 0–6)
GLUCOSE BLDC GLUCOMTR-MCNC: 111 MG/DL — HIGH (ref 70–99)
GLUCOSE BLDC GLUCOMTR-MCNC: 165 MG/DL — HIGH (ref 70–99)
GLUCOSE BLDC GLUCOMTR-MCNC: 176 MG/DL — HIGH (ref 70–99)
GLUCOSE SERPL-MCNC: 115 MG/DL — HIGH (ref 70–99)
HCT VFR BLD CALC: 26 % — LOW (ref 34.5–45)
HGB BLD-MCNC: 8.3 G/DL — LOW (ref 11.5–15.5)
IMM GRANULOCYTES NFR BLD AUTO: 0.4 % — SIGNIFICANT CHANGE UP (ref 0–0.9)
LYMPHOCYTES # BLD AUTO: 3.08 K/UL — SIGNIFICANT CHANGE UP (ref 1–3.3)
LYMPHOCYTES # BLD AUTO: 40.2 % — SIGNIFICANT CHANGE UP (ref 13–44)
MCHC RBC-ENTMCNC: 28.9 PG — SIGNIFICANT CHANGE UP (ref 27–34)
MCHC RBC-ENTMCNC: 31.9 GM/DL — LOW (ref 32–36)
MCV RBC AUTO: 90.6 FL — SIGNIFICANT CHANGE UP (ref 80–100)
MONOCYTES # BLD AUTO: 0.79 K/UL — SIGNIFICANT CHANGE UP (ref 0–0.9)
MONOCYTES NFR BLD AUTO: 10.3 % — SIGNIFICANT CHANGE UP (ref 2–14)
NEUTROPHILS # BLD AUTO: 3.23 K/UL — SIGNIFICANT CHANGE UP (ref 1.8–7.4)
NEUTROPHILS NFR BLD AUTO: 42 % — LOW (ref 43–77)
PLATELET # BLD AUTO: 219 K/UL — SIGNIFICANT CHANGE UP (ref 150–400)
POTASSIUM SERPL-MCNC: 3.7 MMOL/L — SIGNIFICANT CHANGE UP (ref 3.5–5.3)
POTASSIUM SERPL-SCNC: 3.7 MMOL/L — SIGNIFICANT CHANGE UP (ref 3.5–5.3)
PROT SERPL-MCNC: 4.7 G/DL — LOW (ref 6.6–8.7)
RBC # BLD: 2.87 M/UL — LOW (ref 3.8–5.2)
RBC # FLD: 19.1 % — HIGH (ref 10.3–14.5)
SODIUM SERPL-SCNC: 136 MMOL/L — SIGNIFICANT CHANGE UP (ref 135–145)
WBC # BLD: 7.67 K/UL — SIGNIFICANT CHANGE UP (ref 3.8–10.5)
WBC # FLD AUTO: 7.67 K/UL — SIGNIFICANT CHANGE UP (ref 3.8–10.5)

## 2024-06-22 PROCEDURE — 99233 SBSQ HOSP IP/OBS HIGH 50: CPT

## 2024-06-22 RX ADMIN — PANTOPRAZOLE SODIUM 40 MILLIGRAM(S): 40 INJECTION, POWDER, FOR SOLUTION INTRAVENOUS at 17:43

## 2024-06-22 RX ADMIN — INSULIN LISPRO 1: 100 INJECTION, SOLUTION SUBCUTANEOUS at 11:05

## 2024-06-22 RX ADMIN — Medication 1 APPLICATION(S): at 05:47

## 2024-06-22 RX ADMIN — Medication 1 APPLICATION(S): at 14:39

## 2024-06-22 RX ADMIN — Medication 1 APPLICATION(S): at 17:45

## 2024-06-22 RX ADMIN — Medication 1 APPLICATION(S): at 17:48

## 2024-06-22 RX ADMIN — INSULIN LISPRO 1: 100 INJECTION, SOLUTION SUBCUTANEOUS at 16:47

## 2024-06-22 RX ADMIN — Medication 20 MILLIGRAM(S): at 21:29

## 2024-06-22 RX ADMIN — SODIUM BICARBONATE 650 MILLIGRAM(S): 650 TABLET ORAL at 05:47

## 2024-06-22 RX ADMIN — SODIUM BICARBONATE 650 MILLIGRAM(S): 650 TABLET ORAL at 14:40

## 2024-06-22 RX ADMIN — TAMSULOSIN HYDROCHLORIDE 0.4 MILLIGRAM(S): 0.4 CAPSULE ORAL at 21:29

## 2024-06-22 RX ADMIN — APIXABAN 5 MILLIGRAM(S): 5 TABLET, FILM COATED ORAL at 17:45

## 2024-06-22 RX ADMIN — PANTOPRAZOLE SODIUM 40 MILLIGRAM(S): 40 INJECTION, POWDER, FOR SOLUTION INTRAVENOUS at 05:47

## 2024-06-22 RX ADMIN — Medication 1 TABLET(S): at 14:40

## 2024-06-22 RX ADMIN — SODIUM BICARBONATE 650 MILLIGRAM(S): 650 TABLET ORAL at 21:29

## 2024-06-22 RX ADMIN — Medication 25 MICROGRAM(S): at 05:47

## 2024-06-22 RX ADMIN — Medication 50 MILLIGRAM(S): at 11:00

## 2024-06-22 RX ADMIN — APIXABAN 5 MILLIGRAM(S): 5 TABLET, FILM COATED ORAL at 05:48

## 2024-06-22 RX ADMIN — Medication 1 APPLICATION(S): at 17:47

## 2024-06-22 RX ADMIN — Medication 50 MILLIGRAM(S): at 21:29

## 2024-06-23 LAB
GLUCOSE BLDC GLUCOMTR-MCNC: 108 MG/DL — HIGH (ref 70–99)
GLUCOSE BLDC GLUCOMTR-MCNC: 150 MG/DL — HIGH (ref 70–99)
GLUCOSE BLDC GLUCOMTR-MCNC: 180 MG/DL — HIGH (ref 70–99)
GLUCOSE BLDC GLUCOMTR-MCNC: 182 MG/DL — HIGH (ref 70–99)

## 2024-06-23 PROCEDURE — 99232 SBSQ HOSP IP/OBS MODERATE 35: CPT

## 2024-06-23 RX ORDER — SENNOSIDES 8.6 MG
2 TABLET ORAL AT BEDTIME
Refills: 0 | Status: DISCONTINUED | OUTPATIENT
Start: 2024-06-23 | End: 2024-06-25

## 2024-06-23 RX ORDER — DEXTROSE MONOHYDRATE AND SODIUM CHLORIDE 5; .3 G/100ML; G/100ML
500 INJECTION, SOLUTION INTRAVENOUS ONCE
Refills: 0 | Status: DISCONTINUED | OUTPATIENT
Start: 2024-06-23 | End: 2024-06-23

## 2024-06-23 RX ORDER — MEROPENEM 500 MG/1
1000 INJECTION, POWDER, FOR SOLUTION INTRAVENOUS EVERY 8 HOURS
Refills: 0 | Status: DISCONTINUED | OUTPATIENT
Start: 2024-06-23 | End: 2024-06-23

## 2024-06-23 RX ORDER — METOPROLOL TARTRATE 50 MG
25 TABLET ORAL
Refills: 0 | Status: DISCONTINUED | OUTPATIENT
Start: 2024-06-23 | End: 2024-06-25

## 2024-06-23 RX ADMIN — Medication 1 APPLICATION(S): at 12:11

## 2024-06-23 RX ADMIN — Medication 1 APPLICATION(S): at 18:26

## 2024-06-23 RX ADMIN — Medication 1 APPLICATION(S): at 21:00

## 2024-06-23 RX ADMIN — Medication 20 MILLIGRAM(S): at 21:22

## 2024-06-23 RX ADMIN — Medication 25 MICROGRAM(S): at 05:30

## 2024-06-23 RX ADMIN — PANTOPRAZOLE SODIUM 40 MILLIGRAM(S): 40 INJECTION, POWDER, FOR SOLUTION INTRAVENOUS at 05:29

## 2024-06-23 RX ADMIN — APIXABAN 5 MILLIGRAM(S): 5 TABLET, FILM COATED ORAL at 18:26

## 2024-06-23 RX ADMIN — Medication 1 APPLICATION(S): at 05:30

## 2024-06-23 RX ADMIN — Medication 1 APPLICATION(S): at 06:05

## 2024-06-23 RX ADMIN — Medication 1 APPLICATION(S): at 18:27

## 2024-06-23 RX ADMIN — SODIUM BICARBONATE 650 MILLIGRAM(S): 650 TABLET ORAL at 05:29

## 2024-06-23 RX ADMIN — INSULIN LISPRO 1: 100 INJECTION, SOLUTION SUBCUTANEOUS at 12:11

## 2024-06-23 RX ADMIN — Medication 1 TABLET(S): at 12:11

## 2024-06-23 RX ADMIN — Medication 1 APPLICATION(S): at 12:12

## 2024-06-23 RX ADMIN — APIXABAN 5 MILLIGRAM(S): 5 TABLET, FILM COATED ORAL at 05:30

## 2024-06-23 RX ADMIN — Medication 25 MILLIGRAM(S): at 18:26

## 2024-06-23 RX ADMIN — Medication 2 TABLET(S): at 21:22

## 2024-06-23 RX ADMIN — PANTOPRAZOLE SODIUM 40 MILLIGRAM(S): 40 INJECTION, POWDER, FOR SOLUTION INTRAVENOUS at 18:26

## 2024-06-23 RX ADMIN — TAMSULOSIN HYDROCHLORIDE 0.4 MILLIGRAM(S): 0.4 CAPSULE ORAL at 21:21

## 2024-06-24 LAB
ALBUMIN SERPL ELPH-MCNC: 2.2 G/DL — LOW (ref 3.3–5.2)
ALP SERPL-CCNC: 74 U/L — SIGNIFICANT CHANGE UP (ref 40–120)
ALT FLD-CCNC: 18 U/L — SIGNIFICANT CHANGE UP
ANION GAP SERPL CALC-SCNC: 10 MMOL/L — SIGNIFICANT CHANGE UP (ref 5–17)
AST SERPL-CCNC: 17 U/L — SIGNIFICANT CHANGE UP
BILIRUB SERPL-MCNC: <0.2 MG/DL — LOW (ref 0.4–2)
BUN SERPL-MCNC: 36.1 MG/DL — HIGH (ref 8–20)
CALCIUM SERPL-MCNC: 8.2 MG/DL — LOW (ref 8.4–10.5)
CHLORIDE SERPL-SCNC: 106 MMOL/L — SIGNIFICANT CHANGE UP (ref 96–108)
CO2 SERPL-SCNC: 22 MMOL/L — SIGNIFICANT CHANGE UP (ref 22–29)
CREAT SERPL-MCNC: 0.56 MG/DL — SIGNIFICANT CHANGE UP (ref 0.5–1.3)
CULTURE RESULTS: SIGNIFICANT CHANGE UP
CULTURE RESULTS: SIGNIFICANT CHANGE UP
EGFR: 92 ML/MIN/1.73M2 — SIGNIFICANT CHANGE UP
GLUCOSE BLDC GLUCOMTR-MCNC: 129 MG/DL — HIGH (ref 70–99)
GLUCOSE BLDC GLUCOMTR-MCNC: 145 MG/DL — HIGH (ref 70–99)
GLUCOSE BLDC GLUCOMTR-MCNC: 179 MG/DL — HIGH (ref 70–99)
GLUCOSE BLDC GLUCOMTR-MCNC: 196 MG/DL — HIGH (ref 70–99)
GLUCOSE SERPL-MCNC: 122 MG/DL — HIGH (ref 70–99)
HCT VFR BLD CALC: 26.6 % — LOW (ref 34.5–45)
HGB BLD-MCNC: 8.5 G/DL — LOW (ref 11.5–15.5)
MAGNESIUM SERPL-MCNC: 1.2 MG/DL — LOW (ref 1.6–2.6)
MCHC RBC-ENTMCNC: 29.5 PG — SIGNIFICANT CHANGE UP (ref 27–34)
MCHC RBC-ENTMCNC: 32 GM/DL — SIGNIFICANT CHANGE UP (ref 32–36)
MCV RBC AUTO: 92.4 FL — SIGNIFICANT CHANGE UP (ref 80–100)
PHOSPHATE SERPL-MCNC: 2.9 MG/DL — SIGNIFICANT CHANGE UP (ref 2.4–4.7)
PLATELET # BLD AUTO: 255 K/UL — SIGNIFICANT CHANGE UP (ref 150–400)
POTASSIUM SERPL-MCNC: 3.9 MMOL/L — SIGNIFICANT CHANGE UP (ref 3.5–5.3)
POTASSIUM SERPL-SCNC: 3.9 MMOL/L — SIGNIFICANT CHANGE UP (ref 3.5–5.3)
PROT SERPL-MCNC: 5 G/DL — LOW (ref 6.6–8.7)
RBC # BLD: 2.88 M/UL — LOW (ref 3.8–5.2)
RBC # FLD: 18.9 % — HIGH (ref 10.3–14.5)
SODIUM SERPL-SCNC: 138 MMOL/L — SIGNIFICANT CHANGE UP (ref 135–145)
SPECIMEN SOURCE: SIGNIFICANT CHANGE UP
SPECIMEN SOURCE: SIGNIFICANT CHANGE UP
WBC # BLD: 7.9 K/UL — SIGNIFICANT CHANGE UP (ref 3.8–10.5)
WBC # FLD AUTO: 7.9 K/UL — SIGNIFICANT CHANGE UP (ref 3.8–10.5)

## 2024-06-24 PROCEDURE — 99232 SBSQ HOSP IP/OBS MODERATE 35: CPT

## 2024-06-24 RX ORDER — MAGNESIUM OXIDE 400 MG/1
400 TABLET ORAL
Refills: 0 | Status: DISCONTINUED | OUTPATIENT
Start: 2024-06-24 | End: 2024-06-25

## 2024-06-24 RX ORDER — MAGNESIUM SULFATE 100 %
2 POWDER (GRAM) MISCELLANEOUS EVERY 4 HOURS
Refills: 0 | Status: COMPLETED | OUTPATIENT
Start: 2024-06-24 | End: 2024-06-24

## 2024-06-24 RX ADMIN — APIXABAN 5 MILLIGRAM(S): 5 TABLET, FILM COATED ORAL at 05:24

## 2024-06-24 RX ADMIN — INSULIN LISPRO 1: 100 INJECTION, SOLUTION SUBCUTANEOUS at 12:00

## 2024-06-24 RX ADMIN — Medication 1 APPLICATION(S): at 13:52

## 2024-06-24 RX ADMIN — Medication 25 GRAM(S): at 13:52

## 2024-06-24 RX ADMIN — Medication 2 TABLET(S): at 21:36

## 2024-06-24 RX ADMIN — Medication 1 APPLICATION(S): at 05:24

## 2024-06-24 RX ADMIN — Medication 1 APPLICATION(S): at 21:37

## 2024-06-24 RX ADMIN — Medication 1 TABLET(S): at 13:52

## 2024-06-24 RX ADMIN — Medication 25 GRAM(S): at 10:32

## 2024-06-24 RX ADMIN — APIXABAN 5 MILLIGRAM(S): 5 TABLET, FILM COATED ORAL at 17:56

## 2024-06-24 RX ADMIN — PANTOPRAZOLE SODIUM 40 MILLIGRAM(S): 40 INJECTION, POWDER, FOR SOLUTION INTRAVENOUS at 17:56

## 2024-06-24 RX ADMIN — Medication 1 APPLICATION(S): at 17:56

## 2024-06-24 RX ADMIN — PANTOPRAZOLE SODIUM 40 MILLIGRAM(S): 40 INJECTION, POWDER, FOR SOLUTION INTRAVENOUS at 05:24

## 2024-06-24 RX ADMIN — Medication 25 MICROGRAM(S): at 05:24

## 2024-06-24 RX ADMIN — Medication 25 MILLIGRAM(S): at 17:56

## 2024-06-24 RX ADMIN — TAMSULOSIN HYDROCHLORIDE 0.4 MILLIGRAM(S): 0.4 CAPSULE ORAL at 21:36

## 2024-06-24 RX ADMIN — Medication 20 MILLIGRAM(S): at 21:36

## 2024-06-24 RX ADMIN — MAGNESIUM OXIDE 400 MILLIGRAM(S): 400 TABLET ORAL at 17:56

## 2024-06-24 RX ADMIN — Medication 1 APPLICATION(S): at 05:25

## 2024-06-24 RX ADMIN — Medication 25 MILLIGRAM(S): at 05:24

## 2024-06-25 ENCOUNTER — TRANSCRIPTION ENCOUNTER (OUTPATIENT)
Age: 84
End: 2024-06-25

## 2024-06-25 VITALS
OXYGEN SATURATION: 96 % | SYSTOLIC BLOOD PRESSURE: 102 MMHG | TEMPERATURE: 98 F | HEART RATE: 106 BPM | RESPIRATION RATE: 18 BRPM | DIASTOLIC BLOOD PRESSURE: 64 MMHG

## 2024-06-25 LAB
GLUCOSE BLDC GLUCOMTR-MCNC: 133 MG/DL — HIGH (ref 70–99)
GLUCOSE BLDC GLUCOMTR-MCNC: 152 MG/DL — HIGH (ref 70–99)
MAGNESIUM SERPL-MCNC: 1.8 MG/DL — SIGNIFICANT CHANGE UP (ref 1.6–2.6)

## 2024-06-25 PROCEDURE — 74177 CT ABD & PELVIS W/CONTRAST: CPT | Mod: MC

## 2024-06-25 PROCEDURE — 82947 ASSAY GLUCOSE BLOOD QUANT: CPT

## 2024-06-25 PROCEDURE — 85025 COMPLETE CBC W/AUTO DIFF WBC: CPT

## 2024-06-25 PROCEDURE — 87640 STAPH A DNA AMP PROBE: CPT

## 2024-06-25 PROCEDURE — 87637 SARSCOV2&INF A&B&RSV AMP PRB: CPT

## 2024-06-25 PROCEDURE — 0225U NFCT DS DNA&RNA 21 SARSCOV2: CPT

## 2024-06-25 PROCEDURE — 82533 TOTAL CORTISOL: CPT

## 2024-06-25 PROCEDURE — 87186 SC STD MICRODIL/AGAR DIL: CPT

## 2024-06-25 PROCEDURE — 83615 LACTATE (LD) (LDH) ENZYME: CPT

## 2024-06-25 PROCEDURE — 83550 IRON BINDING TEST: CPT

## 2024-06-25 PROCEDURE — 84443 ASSAY THYROID STIM HORMONE: CPT

## 2024-06-25 PROCEDURE — 82728 ASSAY OF FERRITIN: CPT

## 2024-06-25 PROCEDURE — 82607 VITAMIN B-12: CPT

## 2024-06-25 PROCEDURE — 84295 ASSAY OF SERUM SODIUM: CPT

## 2024-06-25 PROCEDURE — 93321 DOPPLER ECHO F-UP/LMTD STD: CPT

## 2024-06-25 PROCEDURE — 87150 DNA/RNA AMPLIFIED PROBE: CPT

## 2024-06-25 PROCEDURE — 81001 URINALYSIS AUTO W/SCOPE: CPT

## 2024-06-25 PROCEDURE — 82009 KETONE BODYS QUAL: CPT

## 2024-06-25 PROCEDURE — 87086 URINE CULTURE/COLONY COUNT: CPT

## 2024-06-25 PROCEDURE — 86850 RBC ANTIBODY SCREEN: CPT

## 2024-06-25 PROCEDURE — 85027 COMPLETE CBC AUTOMATED: CPT

## 2024-06-25 PROCEDURE — 93325 DOPPLER ECHO COLOR FLOW MAPG: CPT

## 2024-06-25 PROCEDURE — 84484 ASSAY OF TROPONIN QUANT: CPT

## 2024-06-25 PROCEDURE — 87641 MR-STAPH DNA AMP PROBE: CPT

## 2024-06-25 PROCEDURE — 36415 COLL VENOUS BLD VENIPUNCTURE: CPT

## 2024-06-25 PROCEDURE — 93005 ELECTROCARDIOGRAM TRACING: CPT

## 2024-06-25 PROCEDURE — 86923 COMPATIBILITY TEST ELECTRIC: CPT

## 2024-06-25 PROCEDURE — 83010 ASSAY OF HAPTOGLOBIN QUANT: CPT

## 2024-06-25 PROCEDURE — 87507 IADNA-DNA/RNA PROBE TQ 12-25: CPT

## 2024-06-25 PROCEDURE — 99239 HOSP IP/OBS DSCHRG MGMT >30: CPT

## 2024-06-25 PROCEDURE — 87040 BLOOD CULTURE FOR BACTERIA: CPT

## 2024-06-25 PROCEDURE — 87077 CULTURE AEROBIC IDENTIFY: CPT

## 2024-06-25 PROCEDURE — P9047: CPT

## 2024-06-25 PROCEDURE — 71045 X-RAY EXAM CHEST 1 VIEW: CPT

## 2024-06-25 PROCEDURE — 83930 ASSAY OF BLOOD OSMOLALITY: CPT

## 2024-06-25 PROCEDURE — 84100 ASSAY OF PHOSPHORUS: CPT

## 2024-06-25 PROCEDURE — 85018 HEMOGLOBIN: CPT

## 2024-06-25 PROCEDURE — 93970 EXTREMITY STUDY: CPT

## 2024-06-25 PROCEDURE — 71250 CT THORAX DX C-: CPT | Mod: MC

## 2024-06-25 PROCEDURE — 82435 ASSAY OF BLOOD CHLORIDE: CPT

## 2024-06-25 PROCEDURE — 96374 THER/PROPH/DIAG INJ IV PUSH: CPT

## 2024-06-25 PROCEDURE — 83735 ASSAY OF MAGNESIUM: CPT

## 2024-06-25 PROCEDURE — 80162 ASSAY OF DIGOXIN TOTAL: CPT

## 2024-06-25 PROCEDURE — 84466 ASSAY OF TRANSFERRIN: CPT

## 2024-06-25 PROCEDURE — 85610 PROTHROMBIN TIME: CPT

## 2024-06-25 PROCEDURE — 83540 ASSAY OF IRON: CPT

## 2024-06-25 PROCEDURE — 93308 TTE F-UP OR LMTD: CPT

## 2024-06-25 PROCEDURE — 82803 BLOOD GASES ANY COMBINATION: CPT

## 2024-06-25 PROCEDURE — 82746 ASSAY OF FOLIC ACID SERUM: CPT

## 2024-06-25 PROCEDURE — 80053 COMPREHEN METABOLIC PANEL: CPT

## 2024-06-25 PROCEDURE — 82330 ASSAY OF CALCIUM: CPT

## 2024-06-25 PROCEDURE — 84300 ASSAY OF URINE SODIUM: CPT

## 2024-06-25 PROCEDURE — 85730 THROMBOPLASTIN TIME PARTIAL: CPT

## 2024-06-25 PROCEDURE — 83605 ASSAY OF LACTIC ACID: CPT

## 2024-06-25 PROCEDURE — 80202 ASSAY OF VANCOMYCIN: CPT

## 2024-06-25 PROCEDURE — 96375 TX/PRO/DX INJ NEW DRUG ADDON: CPT

## 2024-06-25 PROCEDURE — P9016: CPT

## 2024-06-25 PROCEDURE — 83036 HEMOGLOBIN GLYCOSYLATED A1C: CPT

## 2024-06-25 PROCEDURE — 85045 AUTOMATED RETICULOCYTE COUNT: CPT

## 2024-06-25 PROCEDURE — 82962 GLUCOSE BLOOD TEST: CPT

## 2024-06-25 PROCEDURE — 36430 TRANSFUSION BLD/BLD COMPNT: CPT

## 2024-06-25 PROCEDURE — 85014 HEMATOCRIT: CPT

## 2024-06-25 PROCEDURE — 86900 BLOOD TYPING SEROLOGIC ABO: CPT

## 2024-06-25 PROCEDURE — 80048 BASIC METABOLIC PNL TOTAL CA: CPT

## 2024-06-25 PROCEDURE — 84145 PROCALCITONIN (PCT): CPT

## 2024-06-25 PROCEDURE — 84132 ASSAY OF SERUM POTASSIUM: CPT

## 2024-06-25 PROCEDURE — 86901 BLOOD TYPING SEROLOGIC RH(D): CPT

## 2024-06-25 PROCEDURE — 74176 CT ABD & PELVIS W/O CONTRAST: CPT | Mod: MC

## 2024-06-25 PROCEDURE — 99285 EMERGENCY DEPT VISIT HI MDM: CPT

## 2024-06-25 PROCEDURE — 83880 ASSAY OF NATRIURETIC PEPTIDE: CPT

## 2024-06-25 PROCEDURE — 83935 ASSAY OF URINE OSMOLALITY: CPT

## 2024-06-25 RX ORDER — LEVOTHYROXINE SODIUM 25 MCG
1 TABLET ORAL
Qty: 0 | Refills: 0 | DISCHARGE
Start: 2024-06-25

## 2024-06-25 RX ORDER — TAMSULOSIN HYDROCHLORIDE 0.4 MG/1
1 CAPSULE ORAL
Qty: 0 | Refills: 0 | DISCHARGE
Start: 2024-06-25

## 2024-06-25 RX ORDER — COLLAGENASE CLOSTRIDIUM HIST. 250 UNIT/G
1 OINTMENT (GRAM) TOPICAL
Qty: 0 | Refills: 0 | DISCHARGE
Start: 2024-06-25

## 2024-06-25 RX ORDER — SENNOSIDES 8.6 MG
2 TABLET ORAL
Qty: 0 | Refills: 0 | DISCHARGE
Start: 2024-06-25

## 2024-06-25 RX ORDER — NYSTATIN 100000/G
1 POWDER (GRAM) TOPICAL
Qty: 0 | Refills: 0 | DISCHARGE
Start: 2024-06-25

## 2024-06-25 RX ORDER — APIXABAN 5 MG/1
1 TABLET, FILM COATED ORAL
Qty: 0 | Refills: 0 | DISCHARGE
Start: 2024-06-25

## 2024-06-25 RX ORDER — SIMVASTATIN 40 MG
1 TABLET ORAL
Qty: 0 | Refills: 0 | DISCHARGE
Start: 2024-06-25

## 2024-06-25 RX ORDER — METOPROLOL TARTRATE 50 MG
1 TABLET ORAL
Qty: 0 | Refills: 0 | DISCHARGE
Start: 2024-06-25

## 2024-06-25 RX ORDER — FAMOTIDINE 40 MG
1 TABLET ORAL
Qty: 0 | Refills: 0 | DISCHARGE

## 2024-06-25 RX ORDER — LEVOTHYROXINE SODIUM 125 MCG
1 TABLET ORAL
Refills: 0 | DISCHARGE

## 2024-06-25 RX ORDER — METOPROLOL TARTRATE 50 MG
1 TABLET ORAL
Refills: 0 | DISCHARGE

## 2024-06-25 RX ORDER — MAGNESIUM OXIDE 400 MG/1
1 TABLET ORAL
Qty: 0 | Refills: 0 | DISCHARGE
Start: 2024-06-25

## 2024-06-25 RX ADMIN — MAGNESIUM OXIDE 400 MILLIGRAM(S): 400 TABLET ORAL at 08:50

## 2024-06-25 RX ADMIN — Medication 1 TABLET(S): at 11:59

## 2024-06-25 RX ADMIN — Medication 1 APPLICATION(S): at 05:31

## 2024-06-25 RX ADMIN — Medication 25 MICROGRAM(S): at 05:31

## 2024-06-25 RX ADMIN — INSULIN LISPRO 1: 100 INJECTION, SOLUTION SUBCUTANEOUS at 11:58

## 2024-06-25 RX ADMIN — APIXABAN 5 MILLIGRAM(S): 5 TABLET, FILM COATED ORAL at 05:31

## 2024-06-25 RX ADMIN — PANTOPRAZOLE SODIUM 40 MILLIGRAM(S): 40 INJECTION, POWDER, FOR SOLUTION INTRAVENOUS at 05:31

## 2024-06-25 RX ADMIN — Medication 1 APPLICATION(S): at 12:00

## 2024-06-25 RX ADMIN — Medication 1 APPLICATION(S): at 11:58

## 2024-06-25 RX ADMIN — Medication 1 APPLICATION(S): at 05:30

## 2024-07-01 ENCOUNTER — APPOINTMENT (OUTPATIENT)
Dept: NEUROLOGY | Facility: CLINIC | Age: 84
End: 2024-07-01
Payer: MEDICARE

## 2024-07-01 VITALS
SYSTOLIC BLOOD PRESSURE: 110 MMHG | BODY MASS INDEX: 26.46 KG/M2 | HEIGHT: 64 IN | DIASTOLIC BLOOD PRESSURE: 80 MMHG | WEIGHT: 155 LBS

## 2024-07-01 DIAGNOSIS — M47.816 SPONDYLOSIS W/OUT MYELOPATHY OR RADICULOPATHY, LUMBAR REGION: ICD-10-CM

## 2024-07-01 DIAGNOSIS — G91.9 HYDROCEPHALUS, UNSPECIFIED: ICD-10-CM

## 2024-07-01 DIAGNOSIS — M54.12 RADICULOPATHY, CERVICAL REGION: ICD-10-CM

## 2024-07-01 PROCEDURE — 99203 OFFICE O/P NEW LOW 30 MIN: CPT

## 2024-07-01 PROCEDURE — G2211 COMPLEX E/M VISIT ADD ON: CPT

## 2024-07-01 RX ORDER — TAMSULOSIN HYDROCHLORIDE 0.4 MG/1
0.4 CAPSULE ORAL
Qty: 90 | Refills: 0 | Status: ACTIVE | COMMUNITY
Start: 2024-05-15

## 2024-07-01 RX ORDER — AZITHROMYCIN 250 MG/1
250 TABLET, FILM COATED ORAL
Qty: 6 | Refills: 0 | Status: COMPLETED | COMMUNITY
Start: 2024-04-23

## 2024-07-01 RX ORDER — MAGNESIUM OXIDE 400 MG
400 CAPSULE ORAL
Refills: 0 | Status: ACTIVE | COMMUNITY

## 2024-07-01 RX ORDER — METOPROLOL TARTRATE 50 MG/1
50 TABLET, FILM COATED ORAL
Qty: 180 | Refills: 0 | Status: ACTIVE | COMMUNITY
Start: 2024-04-23

## 2024-07-01 RX ORDER — AMOXICILLIN AND CLAVULANATE POTASSIUM 500; 125 MG/1; MG/1
500-125 TABLET, FILM COATED ORAL
Qty: 5 | Refills: 0 | Status: COMPLETED | COMMUNITY
Start: 2024-05-18

## 2024-07-01 RX ORDER — FAMOTIDINE 20 MG/1
20 TABLET, FILM COATED ORAL
Refills: 0 | Status: ACTIVE | COMMUNITY

## 2024-07-01 RX ORDER — CEFPODOXIME PROXETIL 100 MG/1
100 TABLET, FILM COATED ORAL
Qty: 20 | Refills: 0 | Status: COMPLETED | COMMUNITY
Start: 2024-06-03

## 2024-07-01 RX ORDER — SENNOSIDES 8.6 MG/1
8.6 TABLET ORAL
Refills: 0 | Status: ACTIVE | COMMUNITY

## 2024-07-01 RX ORDER — LEVOTHYROXINE SODIUM 0.03 MG/1
25 TABLET ORAL
Qty: 90 | Refills: 0 | Status: ACTIVE | COMMUNITY
Start: 2024-05-15

## 2024-07-01 RX ORDER — APIXABAN 2.5 MG/1
2.5 TABLET, FILM COATED ORAL
Qty: 180 | Refills: 0 | Status: ACTIVE | COMMUNITY
Start: 2024-05-15

## 2024-07-02 ENCOUNTER — APPOINTMENT (OUTPATIENT)
Dept: UROLOGY | Facility: CLINIC | Age: 84
End: 2024-07-02
Payer: MEDICARE

## 2024-07-02 VITALS
SYSTOLIC BLOOD PRESSURE: 109 MMHG | BODY MASS INDEX: 30.9 KG/M2 | HEART RATE: 88 BPM | WEIGHT: 180 LBS | DIASTOLIC BLOOD PRESSURE: 72 MMHG

## 2024-07-02 PROCEDURE — 99213 OFFICE O/P EST LOW 20 MIN: CPT

## 2024-08-14 ENCOUNTER — INPATIENT (INPATIENT)
Facility: HOSPITAL | Age: 84
LOS: 7 days | Discharge: HOME HEALTH | DRG: 698 | End: 2024-08-22
Attending: HOSPITALIST | Admitting: INTERNAL MEDICINE
Payer: MEDICARE

## 2024-08-14 VITALS
SYSTOLIC BLOOD PRESSURE: 144 MMHG | HEART RATE: 123 BPM | TEMPERATURE: 98 F | RESPIRATION RATE: 20 BRPM | OXYGEN SATURATION: 94 % | HEIGHT: 64 IN | DIASTOLIC BLOOD PRESSURE: 74 MMHG | WEIGHT: 190.04 LBS

## 2024-08-14 DIAGNOSIS — Z96.652 PRESENCE OF LEFT ARTIFICIAL KNEE JOINT: Chronic | ICD-10-CM

## 2024-08-14 DIAGNOSIS — R22.1 LOCALIZED SWELLING, MASS AND LUMP, NECK: Chronic | ICD-10-CM

## 2024-08-14 DIAGNOSIS — Z98.89 OTHER SPECIFIED POSTPROCEDURAL STATES: Chronic | ICD-10-CM

## 2024-08-14 DIAGNOSIS — Z41.1 ENCOUNTER FOR COSMETIC SURGERY: Chronic | ICD-10-CM

## 2024-08-14 DIAGNOSIS — Z90.49 ACQUIRED ABSENCE OF OTHER SPECIFIED PARTS OF DIGESTIVE TRACT: Chronic | ICD-10-CM

## 2024-08-14 LAB
ALBUMIN SERPL ELPH-MCNC: 2.6 G/DL — LOW (ref 3.3–5.2)
ALP SERPL-CCNC: 106 U/L — SIGNIFICANT CHANGE UP (ref 40–120)
ALT FLD-CCNC: 23 U/L — SIGNIFICANT CHANGE UP
ANION GAP SERPL CALC-SCNC: 12 MMOL/L — SIGNIFICANT CHANGE UP (ref 5–17)
APPEARANCE UR: ABNORMAL
APTT BLD: 50.3 SEC — HIGH (ref 24.5–35.6)
AST SERPL-CCNC: 18 U/L — SIGNIFICANT CHANGE UP
BASE EXCESS BLDV CALC-SCNC: -4.5 MMOL/L — LOW (ref -2–3)
BASOPHILS # BLD AUTO: 0.05 K/UL — SIGNIFICANT CHANGE UP (ref 0–0.2)
BASOPHILS NFR BLD AUTO: 0.3 % — SIGNIFICANT CHANGE UP (ref 0–2)
BILIRUB SERPL-MCNC: 0.6 MG/DL — SIGNIFICANT CHANGE UP (ref 0.4–2)
BILIRUB UR-MCNC: NEGATIVE — SIGNIFICANT CHANGE UP
BLD GP AB SCN SERPL QL: SIGNIFICANT CHANGE UP
BUN SERPL-MCNC: 18.5 MG/DL — SIGNIFICANT CHANGE UP (ref 8–20)
CALCIUM SERPL-MCNC: 9 MG/DL — SIGNIFICANT CHANGE UP (ref 8.4–10.5)
CHLORIDE SERPL-SCNC: 99 MMOL/L — SIGNIFICANT CHANGE UP (ref 96–108)
CO2 SERPL-SCNC: 23 MMOL/L — SIGNIFICANT CHANGE UP (ref 22–29)
COLOR SPEC: YELLOW — SIGNIFICANT CHANGE UP
CREAT SERPL-MCNC: 0.51 MG/DL — SIGNIFICANT CHANGE UP (ref 0.5–1.3)
DIFF PNL FLD: ABNORMAL
EGFR: 94 ML/MIN/1.73M2 — SIGNIFICANT CHANGE UP
EOSINOPHIL # BLD AUTO: 0.04 K/UL — SIGNIFICANT CHANGE UP (ref 0–0.5)
EOSINOPHIL NFR BLD AUTO: 0.3 % — SIGNIFICANT CHANGE UP (ref 0–6)
GAS PNL BLDV: SIGNIFICANT CHANGE UP
GLUCOSE SERPL-MCNC: 136 MG/DL — HIGH (ref 70–99)
GLUCOSE UR QL: NEGATIVE MG/DL — SIGNIFICANT CHANGE UP
HCO3 BLDV-SCNC: 22 MMOL/L — SIGNIFICANT CHANGE UP (ref 22–29)
HCT VFR BLD CALC: 35.6 % — SIGNIFICANT CHANGE UP (ref 34.5–45)
HGB BLD-MCNC: 11.5 G/DL — SIGNIFICANT CHANGE UP (ref 11.5–15.5)
IMM GRANULOCYTES NFR BLD AUTO: 0.5 % — SIGNIFICANT CHANGE UP (ref 0–0.9)
INR BLD: 1.98 RATIO — HIGH (ref 0.85–1.18)
KETONES UR-MCNC: ABNORMAL MG/DL
LACTATE BLDV-MCNC: 1.9 MMOL/L — SIGNIFICANT CHANGE UP (ref 0.5–2)
LEUKOCYTE ESTERASE UR-ACNC: ABNORMAL
LYMPHOCYTES # BLD AUTO: 19.3 % — SIGNIFICANT CHANGE UP (ref 13–44)
LYMPHOCYTES # BLD AUTO: 2.9 K/UL — SIGNIFICANT CHANGE UP (ref 1–3.3)
MCHC RBC-ENTMCNC: 28 PG — SIGNIFICANT CHANGE UP (ref 27–34)
MCHC RBC-ENTMCNC: 32.3 GM/DL — SIGNIFICANT CHANGE UP (ref 32–36)
MCV RBC AUTO: 86.8 FL — SIGNIFICANT CHANGE UP (ref 80–100)
MONOCYTES # BLD AUTO: 1.31 K/UL — HIGH (ref 0–0.9)
MONOCYTES NFR BLD AUTO: 8.7 % — SIGNIFICANT CHANGE UP (ref 2–14)
NEUTROPHILS # BLD AUTO: 10.66 K/UL — HIGH (ref 1.8–7.4)
NEUTROPHILS NFR BLD AUTO: 70.9 % — SIGNIFICANT CHANGE UP (ref 43–77)
NITRITE UR-MCNC: NEGATIVE — SIGNIFICANT CHANGE UP
PCO2 BLDV: 42 MMHG — SIGNIFICANT CHANGE UP (ref 39–42)
PH BLDV: 7.32 — SIGNIFICANT CHANGE UP (ref 7.32–7.43)
PH UR: 6.5 — SIGNIFICANT CHANGE UP (ref 5–8)
PLATELET # BLD AUTO: 505 K/UL — HIGH (ref 150–400)
PO2 BLDV: 60 MMHG — HIGH (ref 25–45)
POTASSIUM SERPL-MCNC: 3.7 MMOL/L — SIGNIFICANT CHANGE UP (ref 3.5–5.3)
POTASSIUM SERPL-SCNC: 3.7 MMOL/L — SIGNIFICANT CHANGE UP (ref 3.5–5.3)
PROT SERPL-MCNC: 6.7 G/DL — SIGNIFICANT CHANGE UP (ref 6.6–8.7)
PROT UR-MCNC: 100 MG/DL
PROTHROM AB SERPL-ACNC: 21.5 SEC — HIGH (ref 9.5–13)
RBC # BLD: 4.1 M/UL — SIGNIFICANT CHANGE UP (ref 3.8–5.2)
RBC # FLD: 16.1 % — HIGH (ref 10.3–14.5)
SAO2 % BLDV: 86.7 % — SIGNIFICANT CHANGE UP
SODIUM SERPL-SCNC: 134 MMOL/L — LOW (ref 135–145)
SP GR SPEC: >1.03 — HIGH (ref 1–1.03)
TROPONIN T, HIGH SENSITIVITY RESULT: 42 NG/L — SIGNIFICANT CHANGE UP (ref 0–51)
UROBILINOGEN FLD QL: 1 MG/DL — SIGNIFICANT CHANGE UP (ref 0.2–1)
WBC # BLD: 15.03 K/UL — HIGH (ref 3.8–10.5)
WBC # FLD AUTO: 15.03 K/UL — HIGH (ref 3.8–10.5)

## 2024-08-14 PROCEDURE — 71045 X-RAY EXAM CHEST 1 VIEW: CPT | Mod: 26

## 2024-08-14 PROCEDURE — 74174 CTA ABD&PLVS W/CONTRAST: CPT | Mod: 26,MC

## 2024-08-14 PROCEDURE — 99291 CRITICAL CARE FIRST HOUR: CPT

## 2024-08-14 PROCEDURE — 93010 ELECTROCARDIOGRAM REPORT: CPT

## 2024-08-14 PROCEDURE — 71260 CT THORAX DX C+: CPT | Mod: 26,MC

## 2024-08-14 RX ORDER — DILTIAZEM HYDROCHLORIDE 5 MG/ML
10 INJECTION INTRAVENOUS ONCE
Refills: 0 | Status: COMPLETED | OUTPATIENT
Start: 2024-08-14 | End: 2024-08-14

## 2024-08-14 RX ORDER — METOPROLOL TARTRATE 100 MG/1
5 TABLET ORAL ONCE
Refills: 0 | Status: COMPLETED | OUTPATIENT
Start: 2024-08-14 | End: 2024-08-14

## 2024-08-14 RX ORDER — COLLAGENASE SANTYL 250 [ARB'U]/G
1 OINTMENT TOPICAL ONCE
Refills: 0 | Status: COMPLETED | OUTPATIENT
Start: 2024-08-14 | End: 2024-08-14

## 2024-08-14 RX ORDER — ACETAMINOPHEN 325 MG/1
1000 TABLET ORAL ONCE
Refills: 0 | Status: COMPLETED | OUTPATIENT
Start: 2024-08-14 | End: 2024-08-14

## 2024-08-14 RX ORDER — SODIUM CHLORIDE 9 MG/ML
3 INJECTION INTRAMUSCULAR; INTRAVENOUS; SUBCUTANEOUS ONCE
Refills: 0 | Status: COMPLETED | OUTPATIENT
Start: 2024-08-14 | End: 2024-08-14

## 2024-08-14 RX ORDER — VANCOMYCIN/0.9 % SOD CHLORIDE 1.75G/25
1250 PLASTIC BAG, INJECTION (ML) INTRAVENOUS ONCE
Refills: 0 | Status: COMPLETED | OUTPATIENT
Start: 2024-08-14 | End: 2024-08-14

## 2024-08-14 RX ORDER — APIXABAN 5 MG/1
5 TABLET, FILM COATED ORAL ONCE
Refills: 0 | Status: COMPLETED | OUTPATIENT
Start: 2024-08-14 | End: 2024-08-14

## 2024-08-14 RX ORDER — CEFEPIME 2 G/1
2000 INJECTION, POWDER, FOR SOLUTION INTRAVENOUS ONCE
Refills: 0 | Status: COMPLETED | OUTPATIENT
Start: 2024-08-14 | End: 2024-08-14

## 2024-08-14 RX ORDER — METOPROLOL TARTRATE 100 MG/1
100 TABLET ORAL ONCE
Refills: 0 | Status: COMPLETED | OUTPATIENT
Start: 2024-08-14 | End: 2024-08-14

## 2024-08-14 RX ORDER — SODIUM CHLORIDE 9 MG/ML
1000 INJECTION INTRAMUSCULAR; INTRAVENOUS; SUBCUTANEOUS ONCE
Refills: 0 | Status: COMPLETED | OUTPATIENT
Start: 2024-08-14 | End: 2024-08-14

## 2024-08-14 RX ORDER — KETOROLAC TROMETHAMINE 30 MG/ML
15 INJECTION, SOLUTION INTRAMUSCULAR ONCE
Refills: 0 | Status: DISCONTINUED | OUTPATIENT
Start: 2024-08-14 | End: 2024-08-14

## 2024-08-14 RX ORDER — CEFEPIME 2 G/1
2000 INJECTION, POWDER, FOR SOLUTION INTRAVENOUS ONCE
Refills: 0 | Status: DISCONTINUED | OUTPATIENT
Start: 2024-08-14 | End: 2024-08-14

## 2024-08-14 RX ADMIN — METOPROLOL TARTRATE 5 MILLIGRAM(S): 100 TABLET ORAL at 21:22

## 2024-08-14 RX ADMIN — SODIUM CHLORIDE 3 MILLILITER(S): 9 INJECTION INTRAMUSCULAR; INTRAVENOUS; SUBCUTANEOUS at 13:59

## 2024-08-14 RX ADMIN — METOPROLOL TARTRATE 5 MILLIGRAM(S): 100 TABLET ORAL at 13:45

## 2024-08-14 RX ADMIN — SODIUM CHLORIDE 1000 MILLILITER(S): 9 INJECTION INTRAMUSCULAR; INTRAVENOUS; SUBCUTANEOUS at 13:59

## 2024-08-14 RX ADMIN — METOPROLOL TARTRATE 5 MILLIGRAM(S): 100 TABLET ORAL at 20:44

## 2024-08-14 NOTE — ED ADULT NURSE NOTE - NS ED NURSE DISCH DISPOSITION
[facility] - [facility] (Hospital)  Discharge Note  Short Stay    Procedure(s) (LRB):  COLONOSCOPY (N/A)      OUTCOME: Patient tolerated treatment/procedure well without complication and is now ready for discharge.    DISPOSITION: Home or Self Care    FINAL DIAGNOSIS:  <principal problem not specified>    FOLLOWUP: With primary care provider    DISCHARGE INSTRUCTIONS:  No discharge procedures on file.      Clinical Reference Documents Added to Patient Instructions         Document    COLON POLYPS (ENGLISH)               Admitted

## 2024-08-14 NOTE — ED ADULT NURSE REASSESSMENT NOTE - NS ED NURSE REASSESS COMMENT FT1
report given to JACE Tan at 1400. pt moved to b 9 l continued cardiac monitoring in place. rr even and unlabored. en made aware of transfer of care.

## 2024-08-14 NOTE — ED ADULT NURSE REASSESSMENT NOTE - NS ED NURSE REASSESS COMMENT FT1
A 2 RN skin check has been performed on admission to  hospital with RN witness simin_.  A pressure injury was  identified.   stage 3 pressure ulcer and noted on stage 3 .Documentation in the assessment to support findings.

## 2024-08-14 NOTE — ED ADULT TRIAGE NOTE - CHIEF COMPLAINT QUOTE
BIBA from home c/o generalized abdominal pain that began this am. Denies nvd. As per ems, pt just started labetalol last night. Smart in place upon arrival.

## 2024-08-14 NOTE — ED PROVIDER NOTE - CLINICAL SUMMARY MEDICAL DECISION MAKING FREE TEXT BOX
82 yo  Female with a h/o A-ifb, OA, HTN, HLD, CVA (residual left sided weakness) presents to ED from home where her daughter cares for her c/o diffuse abd pain since early this AM. Patient is a poor historian.  patient noted to be in rapid A-fib on triage and  reported to be started on labetalol last evening.  Patient was subsequently placed in critical care for rate control.   As per chart patient was admitted  on 6/4/2024 for lethargy with decreased p.o. intake and urine output  and subsequently admitted to MICU for sodium 119  and was subsequently hypotensive requiring pressor support.

## 2024-08-14 NOTE — ED PROVIDER NOTE - CHPI ED SYMPTOMS NEG
Anus position normal and patency confirmed, rectal-cutaneous fistula absent, normal anal wink. no abdominal distension

## 2024-08-14 NOTE — ED PROVIDER NOTE - ATTENDING CONTRIBUTION TO CARE
I, Cezar Velazquez, performed a face to face bedside interview with this patient regarding history of present illness, and completed an independent physical examination. I personally made/approved the management plan and take responsibility for the patient management. I have communicated the patient’s plan of care and disposition with the resident  83-year-old female past medical history of CVA with residual left-sided weakness, A-fib on Eliquis, hypertension, hyperlipidemia presents with abdominal pain.  Patient reports 1 day of cramping abdominal pain, noted to be in rapid A-fib upon arrival.  Gen: NAD, well appearing  CV: Rapid and irregular  Pul: CTA b/l  Abd: Soft, non-distended, periumbilical ttp  Neuro: no focal deficits  Pt admitted for rapi afib and uti

## 2024-08-14 NOTE — ED ADULT NURSE NOTE - PAIN: PRESENCE, MLM
Addended by: SEAVER, LAURA on: 10/11/2023 07:34 AM     Modules accepted: Orders     complains of pain/discomfort

## 2024-08-14 NOTE — ED ADULT NURSE NOTE - OBJECTIVE STATEMENT
assumed care of pt at 1319 in CC. MD Torres at bedside. pt sent in from home c/o generalized abd pain. pt reports starting labetolol last night. pt noted have heart rate in 150's. denies chest pain, sob of dizziness at this time. placed on cardiac monitor and . anox4. denies cardiac hx. pt educated on plan of care, pt able to successfully teach back plan of care to RN, RN will continue to reeducate pt during hospital stay.

## 2024-08-14 NOTE — ED PROVIDER NOTE - CARDIAC, MLM
tachycardic, irregularly irregular  rhythm.  Heart sounds S1, S2.   grade 2/6 systolic ejection murmur

## 2024-08-15 ENCOUNTER — RESULT REVIEW (OUTPATIENT)
Age: 84
End: 2024-08-15

## 2024-08-15 DIAGNOSIS — I31.39 OTHER PERICARDIAL EFFUSION (NONINFLAMMATORY): ICD-10-CM

## 2024-08-15 DIAGNOSIS — J90 PLEURAL EFFUSION, NOT ELSEWHERE CLASSIFIED: ICD-10-CM

## 2024-08-15 DIAGNOSIS — N39.0 URINARY TRACT INFECTION, SITE NOT SPECIFIED: ICD-10-CM

## 2024-08-15 DIAGNOSIS — I48.20 CHRONIC ATRIAL FIBRILLATION, UNSPECIFIED: ICD-10-CM

## 2024-08-15 DIAGNOSIS — I30.9 ACUTE PERICARDITIS, UNSPECIFIED: ICD-10-CM

## 2024-08-15 LAB
ALBUMIN SERPL ELPH-MCNC: 1.9 G/DL — LOW (ref 3.3–5.2)
ALP SERPL-CCNC: 84 U/L — SIGNIFICANT CHANGE UP (ref 40–120)
ALT FLD-CCNC: 18 U/L — SIGNIFICANT CHANGE UP
ANION GAP SERPL CALC-SCNC: 15 MMOL/L — SIGNIFICANT CHANGE UP (ref 5–17)
APTT BLD: 122 SEC — CRITICAL HIGH (ref 24.5–35.6)
AST SERPL-CCNC: 32 U/L — HIGH
BACTERIA # UR AUTO: ABNORMAL /HPF
BILIRUB SERPL-MCNC: 0.7 MG/DL — SIGNIFICANT CHANGE UP (ref 0.4–2)
BUN SERPL-MCNC: 20.8 MG/DL — HIGH (ref 8–20)
CALCIUM SERPL-MCNC: 8.8 MG/DL — SIGNIFICANT CHANGE UP (ref 8.4–10.5)
CAST: 1 /LPF — SIGNIFICANT CHANGE UP (ref 0–4)
CHLORIDE SERPL-SCNC: 100 MMOL/L — SIGNIFICANT CHANGE UP (ref 96–108)
CK SERPL-CCNC: 16 U/L — LOW (ref 25–170)
CO2 SERPL-SCNC: 16 MMOL/L — LOW (ref 22–29)
CREAT SERPL-MCNC: 0.48 MG/DL — LOW (ref 0.5–1.3)
CRP SERPL-MCNC: 245 MG/L — HIGH
EGFR: 95 ML/MIN/1.73M2 — SIGNIFICANT CHANGE UP
ERYTHROCYTE [SEDIMENTATION RATE] IN BLOOD: 100 MM/HR — HIGH (ref 0–20)
GLUCOSE BLDC GLUCOMTR-MCNC: 119 MG/DL — HIGH (ref 70–99)
GLUCOSE BLDC GLUCOMTR-MCNC: 132 MG/DL — HIGH (ref 70–99)
GLUCOSE SERPL-MCNC: 123 MG/DL — HIGH (ref 70–99)
HCT VFR BLD CALC: 30 % — LOW (ref 34.5–45)
HCT VFR BLD CALC: 30.3 % — LOW (ref 34.5–45)
HCT VFR BLD CALC: SIGNIFICANT CHANGE UP % (ref 34.5–45)
HGB BLD-MCNC: 9.7 G/DL — LOW (ref 11.5–15.5)
HGB BLD-MCNC: 9.7 G/DL — LOW (ref 11.5–15.5)
HGB BLD-MCNC: SIGNIFICANT CHANGE UP G/DL (ref 11.5–15.5)
LEGIONELLA AG UR QL: NEGATIVE — SIGNIFICANT CHANGE UP
MAGNESIUM SERPL-MCNC: 1.6 MG/DL — SIGNIFICANT CHANGE UP (ref 1.6–2.6)
MCHC RBC-ENTMCNC: 28 PG — SIGNIFICANT CHANGE UP (ref 27–34)
MCHC RBC-ENTMCNC: 28 PG — SIGNIFICANT CHANGE UP (ref 27–34)
MCHC RBC-ENTMCNC: 32 GM/DL — SIGNIFICANT CHANGE UP (ref 32–36)
MCHC RBC-ENTMCNC: 32.3 GM/DL — SIGNIFICANT CHANGE UP (ref 32–36)
MCHC RBC-ENTMCNC: SIGNIFICANT CHANGE UP GM/DL (ref 32–36)
MCHC RBC-ENTMCNC: SIGNIFICANT CHANGE UP PG (ref 27–34)
MCV RBC AUTO: 86.7 FL — SIGNIFICANT CHANGE UP (ref 80–100)
MCV RBC AUTO: 87.6 FL — SIGNIFICANT CHANGE UP (ref 80–100)
MCV RBC AUTO: SIGNIFICANT CHANGE UP FL (ref 80–100)
NRBC # BLD: SIGNIFICANT CHANGE UP /100 WBCS (ref 0–0)
NT-PROBNP SERPL-SCNC: 1543 PG/ML — HIGH (ref 0–300)
PHOSPHATE SERPL-MCNC: 3.4 MG/DL — SIGNIFICANT CHANGE UP (ref 2.4–4.7)
PLATELET # BLD AUTO: 448 K/UL — HIGH (ref 150–400)
PLATELET # BLD AUTO: 455 K/UL — HIGH (ref 150–400)
PLATELET # BLD AUTO: SIGNIFICANT CHANGE UP K/UL (ref 150–400)
POTASSIUM SERPL-MCNC: 4.3 MMOL/L — SIGNIFICANT CHANGE UP (ref 3.5–5.3)
POTASSIUM SERPL-SCNC: 4.3 MMOL/L — SIGNIFICANT CHANGE UP (ref 3.5–5.3)
PROT SERPL-MCNC: 5.9 G/DL — LOW (ref 6.6–8.7)
RAPID RVP RESULT: SIGNIFICANT CHANGE UP
RBC # BLD: 3.46 M/UL — LOW (ref 3.8–5.2)
RBC # BLD: 3.46 M/UL — LOW (ref 3.8–5.2)
RBC # BLD: SIGNIFICANT CHANGE UP (ref 3.8–5.2)
RBC # FLD: 15.8 % — HIGH (ref 10.3–14.5)
RBC # FLD: 16 % — HIGH (ref 10.3–14.5)
RBC # FLD: SIGNIFICANT CHANGE UP (ref 10.3–14.5)
RBC CASTS # UR COMP ASSIST: 59 /HPF — HIGH (ref 0–4)
SARS-COV-2 RNA SPEC QL NAA+PROBE: SIGNIFICANT CHANGE UP
SODIUM SERPL-SCNC: 131 MMOL/L — LOW (ref 135–145)
SQUAMOUS # UR AUTO: 2 /HPF — SIGNIFICANT CHANGE UP (ref 0–5)
TROPONIN T, HIGH SENSITIVITY RESULT: 39 NG/L — SIGNIFICANT CHANGE UP (ref 0–51)
WBC # BLD: 18.32 K/UL — HIGH (ref 3.8–10.5)
WBC # BLD: 20.02 K/UL — HIGH (ref 3.8–10.5)
WBC # BLD: SIGNIFICANT CHANGE UP K/UL (ref 3.8–10.5)
WBC # FLD AUTO: 18.32 K/UL — HIGH (ref 3.8–10.5)
WBC # FLD AUTO: 20.02 K/UL — HIGH (ref 3.8–10.5)
WBC # FLD AUTO: SIGNIFICANT CHANGE UP K/UL (ref 3.8–10.5)
WBC UR QL: >998 /HPF — HIGH (ref 0–5)

## 2024-08-15 PROCEDURE — 93306 TTE W/DOPPLER COMPLETE: CPT | Mod: 26

## 2024-08-15 PROCEDURE — 99223 1ST HOSP IP/OBS HIGH 75: CPT

## 2024-08-15 PROCEDURE — 99221 1ST HOSP IP/OBS SF/LOW 40: CPT

## 2024-08-15 PROCEDURE — 99223 1ST HOSP IP/OBS HIGH 75: CPT | Mod: GC

## 2024-08-15 PROCEDURE — 99497 ADVNCD CARE PLAN 30 MIN: CPT | Mod: GC,25

## 2024-08-15 RX ORDER — APIXABAN 5 MG/1
5 TABLET, FILM COATED ORAL EVERY 12 HOURS
Refills: 0 | Status: DISCONTINUED | OUTPATIENT
Start: 2024-08-15 | End: 2024-08-15

## 2024-08-15 RX ORDER — HEPARIN SODIUM,BOVINE 1000/ML
7000 VIAL (ML) INJECTION EVERY 6 HOURS
Refills: 0 | Status: DISCONTINUED | OUTPATIENT
Start: 2024-08-15 | End: 2024-08-15

## 2024-08-15 RX ORDER — LEVOTHYROXINE SODIUM 100 MCG
75 TABLET ORAL DAILY
Refills: 0 | Status: DISCONTINUED | OUTPATIENT
Start: 2024-08-15 | End: 2024-08-22

## 2024-08-15 RX ORDER — COLLAGENASE SANTYL 250 [ARB'U]/G
1 OINTMENT TOPICAL DAILY
Refills: 0 | Status: DISCONTINUED | OUTPATIENT
Start: 2024-08-15 | End: 2024-08-22

## 2024-08-15 RX ORDER — LEVOTHYROXINE SODIUM 100 MCG
1 TABLET ORAL
Refills: 0 | DISCHARGE

## 2024-08-15 RX ORDER — PIPERACILLIN SODIUM AND TAZOBACTAM SODIUM 3; .375 G/15ML; G/15ML
3.38 INJECTION, POWDER, FOR SOLUTION INTRAVENOUS EVERY 8 HOURS
Refills: 0 | Status: DISCONTINUED | OUTPATIENT
Start: 2024-08-15 | End: 2024-08-15

## 2024-08-15 RX ORDER — HEPARIN SODIUM,BOVINE 1000/ML
7000 VIAL (ML) INJECTION ONCE
Refills: 0 | Status: DISCONTINUED | OUTPATIENT
Start: 2024-08-15 | End: 2024-08-15

## 2024-08-15 RX ORDER — HEPARIN SODIUM,BOVINE 1000/ML
5000 VIAL (ML) INJECTION EVERY 6 HOURS
Refills: 0 | Status: DISCONTINUED | OUTPATIENT
Start: 2024-08-15 | End: 2024-08-16

## 2024-08-15 RX ORDER — TAMSULOSIN HYDROCHLORIDE 0.4 MG/1
0.4 CAPSULE ORAL AT BEDTIME
Refills: 0 | Status: DISCONTINUED | OUTPATIENT
Start: 2024-08-15 | End: 2024-08-22

## 2024-08-15 RX ORDER — PIPERACILLIN SODIUM AND TAZOBACTAM SODIUM 3; .375 G/15ML; G/15ML
3.38 INJECTION, POWDER, FOR SOLUTION INTRAVENOUS EVERY 8 HOURS
Refills: 0 | Status: DISCONTINUED | OUTPATIENT
Start: 2024-08-15 | End: 2024-08-18

## 2024-08-15 RX ORDER — HEPARIN SODIUM,BOVINE 1000/ML
VIAL (ML) INJECTION
Qty: 25000 | Refills: 0 | Status: DISCONTINUED | OUTPATIENT
Start: 2024-08-15 | End: 2024-08-15

## 2024-08-15 RX ORDER — FUROSEMIDE 40 MG
20 TABLET ORAL DAILY
Refills: 0 | Status: DISCONTINUED | OUTPATIENT
Start: 2024-08-15 | End: 2024-08-16

## 2024-08-15 RX ORDER — METOPROLOL TARTRATE 100 MG/1
25 TABLET ORAL
Refills: 0 | Status: DISCONTINUED | OUTPATIENT
Start: 2024-08-15 | End: 2024-08-16

## 2024-08-15 RX ORDER — HEPARIN SODIUM,BOVINE 1000/ML
VIAL (ML) INJECTION
Qty: 25000 | Refills: 0 | Status: DISCONTINUED | OUTPATIENT
Start: 2024-08-15 | End: 2024-08-16

## 2024-08-15 RX ORDER — SENNA 187 MG
2 TABLET ORAL AT BEDTIME
Refills: 0 | Status: DISCONTINUED | OUTPATIENT
Start: 2024-08-15 | End: 2024-08-22

## 2024-08-15 RX ORDER — POLYETHYLENE GLYCOL 3350 17 G/17G
17 POWDER, FOR SOLUTION ORAL
Refills: 0 | Status: DISCONTINUED | OUTPATIENT
Start: 2024-08-15 | End: 2024-08-22

## 2024-08-15 RX ORDER — SIMVASTATIN 10 MG
20 TABLET ORAL AT BEDTIME
Refills: 0 | Status: DISCONTINUED | OUTPATIENT
Start: 2024-08-15 | End: 2024-08-22

## 2024-08-15 RX ORDER — ACETAMINOPHEN 325 MG/1
650 TABLET ORAL EVERY 6 HOURS
Refills: 0 | Status: DISCONTINUED | OUTPATIENT
Start: 2024-08-15 | End: 2024-08-22

## 2024-08-15 RX ORDER — FUROSEMIDE 40 MG
1 TABLET ORAL
Refills: 0 | DISCHARGE

## 2024-08-15 RX ORDER — HEPARIN SODIUM,BOVINE 1000/ML
3500 VIAL (ML) INJECTION EVERY 6 HOURS
Refills: 0 | Status: DISCONTINUED | OUTPATIENT
Start: 2024-08-15 | End: 2024-08-15

## 2024-08-15 RX ORDER — ONDANSETRON 2 MG/ML
4 INJECTION, SOLUTION INTRAMUSCULAR; INTRAVENOUS EVERY 8 HOURS
Refills: 0 | Status: DISCONTINUED | OUTPATIENT
Start: 2024-08-15 | End: 2024-08-22

## 2024-08-15 RX ORDER — PIPERACILLIN SODIUM AND TAZOBACTAM SODIUM 3; .375 G/15ML; G/15ML
3.38 INJECTION, POWDER, FOR SOLUTION INTRAVENOUS ONCE
Refills: 0 | Status: DISCONTINUED | OUTPATIENT
Start: 2024-08-15 | End: 2024-08-15

## 2024-08-15 RX ORDER — HEPARIN SODIUM,BOVINE 1000/ML
5000 VIAL (ML) INJECTION ONCE
Refills: 0 | Status: COMPLETED | OUTPATIENT
Start: 2024-08-15 | End: 2024-08-15

## 2024-08-15 RX ORDER — MAGNESIUM, ALUMINUM HYDROXIDE 200-225/5
30 SUSPENSION, ORAL (FINAL DOSE FORM) ORAL EVERY 4 HOURS
Refills: 0 | Status: DISCONTINUED | OUTPATIENT
Start: 2024-08-15 | End: 2024-08-22

## 2024-08-15 RX ORDER — PIPERACILLIN SODIUM AND TAZOBACTAM SODIUM 3; .375 G/15ML; G/15ML
3.38 INJECTION, POWDER, FOR SOLUTION INTRAVENOUS ONCE
Refills: 0 | Status: COMPLETED | OUTPATIENT
Start: 2024-08-15 | End: 2024-08-15

## 2024-08-15 RX ORDER — HEPARIN SODIUM,BOVINE 1000/ML
2500 VIAL (ML) INJECTION EVERY 6 HOURS
Refills: 0 | Status: DISCONTINUED | OUTPATIENT
Start: 2024-08-15 | End: 2024-08-16

## 2024-08-15 RX ADMIN — Medication 1100 UNIT(S)/HR: at 11:11

## 2024-08-15 RX ADMIN — Medication 5000 UNIT(S): at 11:12

## 2024-08-15 RX ADMIN — PIPERACILLIN SODIUM AND TAZOBACTAM SODIUM 25 GRAM(S): 3; .375 INJECTION, POWDER, FOR SOLUTION INTRAVENOUS at 22:37

## 2024-08-15 RX ADMIN — PIPERACILLIN SODIUM AND TAZOBACTAM SODIUM 200 GRAM(S): 3; .375 INJECTION, POWDER, FOR SOLUTION INTRAVENOUS at 06:59

## 2024-08-15 RX ADMIN — KETOROLAC TROMETHAMINE 15 MILLIGRAM(S): 30 INJECTION, SOLUTION INTRAMUSCULAR at 00:09

## 2024-08-15 RX ADMIN — COLLAGENASE SANTYL 1 APPLICATION(S): 250 OINTMENT TOPICAL at 00:25

## 2024-08-15 RX ADMIN — Medication 166.67 MILLIGRAM(S): at 02:21

## 2024-08-15 RX ADMIN — Medication 20 MILLIGRAM(S): at 06:58

## 2024-08-15 RX ADMIN — KETOROLAC TROMETHAMINE 15 MILLIGRAM(S): 30 INJECTION, SOLUTION INTRAMUSCULAR at 02:21

## 2024-08-15 RX ADMIN — POLYETHYLENE GLYCOL 3350 17 GRAM(S): 17 POWDER, FOR SOLUTION ORAL at 06:59

## 2024-08-15 RX ADMIN — Medication 1000 UNIT(S)/HR: at 19:48

## 2024-08-15 RX ADMIN — ACETAMINOPHEN 1000 MILLIGRAM(S): 325 TABLET ORAL at 06:37

## 2024-08-15 RX ADMIN — Medication 2 MILLIGRAM(S): at 02:22

## 2024-08-15 RX ADMIN — POLYETHYLENE GLYCOL 3350 17 GRAM(S): 17 POWDER, FOR SOLUTION ORAL at 18:09

## 2024-08-15 RX ADMIN — METOPROLOL TARTRATE 25 MILLIGRAM(S): 100 TABLET ORAL at 06:58

## 2024-08-15 RX ADMIN — Medication 75 MICROGRAM(S): at 06:58

## 2024-08-15 RX ADMIN — COLLAGENASE SANTYL 1 APPLICATION(S): 250 OINTMENT TOPICAL at 13:27

## 2024-08-15 RX ADMIN — APIXABAN 5 MILLIGRAM(S): 5 TABLET, FILM COATED ORAL at 00:07

## 2024-08-15 RX ADMIN — Medication 1000 UNIT(S)/HR: at 21:59

## 2024-08-15 RX ADMIN — PIPERACILLIN SODIUM AND TAZOBACTAM SODIUM 25 GRAM(S): 3; .375 INJECTION, POWDER, FOR SOLUTION INTRAVENOUS at 13:26

## 2024-08-15 RX ADMIN — METOPROLOL TARTRATE 100 MILLIGRAM(S): 100 TABLET ORAL at 00:08

## 2024-08-15 RX ADMIN — Medication 2 MILLIGRAM(S): at 00:08

## 2024-08-15 RX ADMIN — CEFEPIME 2000 MILLIGRAM(S): 2 INJECTION, POWDER, FOR SOLUTION INTRAVENOUS at 00:08

## 2024-08-15 RX ADMIN — TAMSULOSIN HYDROCHLORIDE 0.4 MILLIGRAM(S): 0.4 CAPSULE ORAL at 22:37

## 2024-08-15 RX ADMIN — Medication 1000 MILLILITER(S): at 00:25

## 2024-08-15 RX ADMIN — ACETAMINOPHEN 400 MILLIGRAM(S): 325 TABLET ORAL at 00:09

## 2024-08-15 RX ADMIN — DILTIAZEM HYDROCHLORIDE 10 MILLIGRAM(S): 5 INJECTION INTRAVENOUS at 00:08

## 2024-08-15 NOTE — CONSULT NOTE ADULT - NS ATTEND AMEND GEN_ALL_CORE FT
-    	  83F hx Aifb on Eliquis, CVA (residual left sided weakness), poor PO intake and decreased urine output who presents to ED from home where her daughter cares for her c/o diffuse abd pain since early in AM. ED workup notable for afib RVR, acute UTI, b/l pleural effusions L>R, mod pericardial effusion without clinical evidence of tamponade. Thoracic Sx consulted for effusion eval. Cardiology consulted for pericardial effusion       Acute pericardial effusion.    - Diagnosed here with Afib RVR, acute uti, bilateral pleural effusion L>R, and moderate pericardial effusion  - prior echo does not demonstrate pericardial effusion  - Echo shows LVEF 50-55%  - troponin is normal but upper end of range, continue to trend  - pBNP in 6/2024 was 6927, need repeat this admission  - for now there is no indication for pericardiocentesis  - we will follow and repeat limited echo in 72h to evaluate for worsening effusion   - during this time we expect that thoracic will perform thoracentesis and send pleural fluid for cytology to evaluate the etiology of the effusion.   - concern for malignancy  - euvolemic on exam.   - cannot exclude underlying heart failure, but will hold off on diuretics for now.   - pt appears generally unwell on exam, needs infectious workup. consider stepdown/ICU eval   - monitor on telemetry, afib is rate controlled 90s x24h no episodes of RVR or SVR. eliquis on hold for thoracentesis

## 2024-08-15 NOTE — H&P ADULT - TIME BILLING
Labs/Imaging/Notes reviewed by me. Med rec confirmed. Orders placed. Cultures for UTI and r/o PNA. Empiric IV ABX. Thoracic Sx consulted for pleural and pericardial effusions. Hep gtt started. Monitor closely for AFIB RVR.

## 2024-08-15 NOTE — H&P ADULT - CONVERSATION DETAILS
Discussed MOLST with patient, elected to be DNR/DNI, no dialysis, no feeding tube. Findings and plan discussed with patient. Significant comorbidities discussed. Goals of Care approached. Discussed Code Status with patient, elected to be DNR/DNI, no dialysis, no feeding tube. MOLST form completed and placed in chart. Okay for other medical interventions. 19 minutes spent on goals of care separate for time spent on medical decision making.

## 2024-08-15 NOTE — H&P ADULT - ASSESSMENT
Assessment:   83F PMHx Afib on apixaban, recurrent UTIs, chronic left sided nephrosis, hypothyroid, OA, HTN, HLD, CVA (residual left sided weakness), chronic walters BIBEMS from home for abdominal pain .    Plan:   Sepsis 2/2 UTI/fecal impaction/pleural effusion   - Leukocytosis, tachycardia, tachypnea   - Previous admission had Staph capitis in BCx   - BCx, UCx, RVP/Covid PCR ordered   - CT w/w/o con Abd/pelvis showed large left pleural effusion, moderate pericardial effusion, stable mild left hydronephrosis with bilateral urothelial enhancement along with urinary bladder wall thickening and perivesicular inflammation, no pyelonephritis. Fecal impaction of the rectum.   - UA (+)   - Continue Zosyn, narrow once BCx/UCx result  - Thoracic following, planning thoracentesis tomorrow, apixaban held  - Follow up cultures  - Trend Fever  - Trend WBC  - Tele/     Pericardial effusion   - CT showed pericardial effusion   - TTE to r/o tamponade physiology   - Tele/     Pleural effusion   - CT showed large left pleural effusion   - Thoracic following, planning thoracentesis     Fecal impaction  - manual disimpaction   - Miralax, senna    Afib RVR  - CHADVASC 5  - HASBLED 3  - home meds: apixaban, metoprolol tartrate   - current meds: heparin full AC, metoprolol tartrate   - Tele    Hypothyroidism  - c/w levothyroxine     HTN  - C/w Metoprolol     HLD  - C/w simvastatin     Diet: Regular  DVT: Heparin Full AC, pneumatic compression boots  Dispo: pending ID workup, thoracentesis, BM    Assessment:   83F PMHx Afib on apixaban, recurrent UTIs, chronic left sided nephrosis, hypothyroid, OA, HTN, HLD, CVA (residual left sided weakness), chronic walters BIBEMS from home for abdominal pain admitted for Sepsis 2/2 Acute UTI c/b chronic walters, fecal impaction, pleural and pericardial effusions, and AFIB RVR.    Plan:   Sepsis 2/2 UTI/fecal impaction  - Leukocytosis, tachycardia, tachypnea   - Previous admission UCX +Enterobacter and Klebsiella  - BCx, UCx, RVP/Covid PCR ordered   - CT w/w/o con Abd/pelvis showed large left pleural effusion, moderate pericardial effusion, stable mild left hydronephrosis with bilateral urothelial enhancement along with urinary bladder wall thickening and perivesicular inflammation, no pyelonephritis. Fecal impaction of the rectum.   - Misplaced Walters Catheter in Cervix removed and replaced with clean walters in urethra  - UA grossly positive  - Continue Zosyn for empiric IV Abx coverage for Acute UTI and also pleural pathology and stercoral procitis  - Follow up cultures  - Trend Fever  - Trend WBC  - Tele/     Pleural Effusion and Moderate Pericardial effusion   - CT C/A/P reviewed see findings   - TTE to r/o tamponade physiology   - Tele/  - Thoracic following, planning thoracentesis tomorrow, apixaban held    Pleural effusion   - CT showed large left pleural effusion   - Thoracic following, planning thoracentesis     Fecal impaction  - s/p manual disimpaction   - Start miralax, senna    Afib RVR  - CHADVASC 5  - HASBLED 3  - Metoprolol 25mg BID for rate control  - Hold Apixaban in anticipation of thora. Heparin gtt for AC for now.  - Monitor Tele    Hypothyroidism  - c/w levothyroxine     HTN  - C/w Metoprolol     HLD  - C/w simvastatin     Diet: Regular  DVT: Heparin Full AC, pneumatic compression boots  Dispo: Acute. Admit to inpatient. Pending IV ABx and Culture results for acute UTI. Bowel regimen and disimpaction for constipation/fecal load monitor BMs. Monitor tele for AFIB RVR. Awaiting thora and repeat eval by Thoracic Sx for effusions.

## 2024-08-15 NOTE — H&P ADULT - NSHPPHYSICALEXAM_GEN_ALL_CORE
VITALS:   T(C): 36.4 (08-15-24 @ 04:20), Max: 36.9 (08-14-24 @ 13:07)  HR: 88 (08-15-24 @ 04:20) (88 - 151)  BP: 103/65 (08-15-24 @ 04:20) (103/65 - 152/70)  RR: 23 (08-15-24 @ 04:20) (20 - 30)  SpO2: 99% (08-15-24 @ 04:20) (94% - 99%)    GENERAL: NAD, lying in bed comfortably  HEAD:  Atraumatic, normocephalic  EYES: EOMI, PERRLA, conjunctiva and sclera clear  ENT: Moist mucous membranes  NECK: Supple, no JVD  HEART: Regular rate and rhythm, no murmurs, rubs, or gallops  LUNGS: Unlabored respirations.  Clear to auscultation bilaterally, no crackles, wheezing, or rhonchi  ABDOMEN: Soft, nontender, nondistended, +BS  EXTREMITIES: 2+ peripheral pulses bilaterally. No clubbing, cyanosis, or edema  NERVOUS SYSTEM:  A&Ox3, CN II-XII grossly intact, 2/5 strength in LUE/LLE    SKIN: Stage III sacral decubitus ulcer 2 [id number] wounds, dry, subcutaneous tissue visible, no slough VITALS:   T(C): 36.4 (08-15-24 @ 04:20), Max: 36.9 (08-14-24 @ 13:07)  HR: 88 (08-15-24 @ 04:20) (88 - 151)  BP: 103/65 (08-15-24 @ 04:20) (103/65 - 152/70)  RR: 23 (08-15-24 @ 04:20) (20 - 30)  SpO2: 99% (08-15-24 @ 04:20) (94% - 99%)    GENERAL: NAD, lying in bed comfortably  HEAD:  Atraumatic, normocephalic  EYES: EOMI, PERRLA, conjunctiva and sclera clear  ENT: Moist mucous membranes  NECK: Supple, no JVD  HEART: Regular rate and rhythm, no murmurs, rubs, or gallops  LUNGS: Unlabored respirations.  Clear to auscultation bilaterally, no crackles, wheezing, or rhonchi  ABDOMEN: Soft, nontender, nondistended, +BS  : +Smart POA replaced in ED  Rectal: +s/p rectal disimpaction  EXTREMITIES: 2+ peripheral pulses bilaterally. No clubbing, cyanosis, or edema  NERVOUS SYSTEM:  A&Ox3, CN II-XII grossly intact, 2/5 strength in LUE/LLE    SKIN: Stage III sacral decubitus ulcer 2 [id number] wounds, dry, subcutaneous tissue visible, no slough

## 2024-08-15 NOTE — CHART NOTE - NSCHARTNOTEFT_GEN_A_CORE
Assumed care today. Admission note reviewed. Patient was seen and evaluted in ED.    Reports improvement in abdominal pain.     Labs and vitals reviewed    83F PMHx Afib on apixaban, recurrent UTIs, chronic left sided nephrosis, hypothyroid, OA, HTN, HLD, CVA (residual left sided weakness), chronic walters BIBEMS from home for abdominal pain admitted for Sepsis 2/2 Acute UTI c/b chronic walters, fecal impaction, pleural and pericardial effusions, and AFIB RVR.    # Sepsis secondary to cathter associated UTI  -Follow up on culture  -S/p cathter chaneged in ED  -Continue Zosyn    # Left pleural effusion  -  Plan for thoracentesis tomorrow once off APixaban  -thoracic surgry is following    # Pericardial effusion  -TTE reported moderate pericardial effusion  -Cardiology consulted    # A.fib with RVR  -Currently rate controlled  -Continue metoprolol   -Apixaban on hold  -ON IV heparin infusion    Rest of the plan per H&P Assumed care today. Admission note reviewed. Patient was seen and evaluted in ED.    Reports improvement in abdominal pain.     Labs and vitals reviewed    83F PMHx Afib on apixaban, recurrent UTIs, chronic left sided nephrosis, hypothyroid, OA, HTN, HLD, CVA (residual left sided weakness), chronic walters BIBEMS from home for abdominal pain admitted for Sepsis 2/2 Acute UTI c/b chronic walters, fecal impaction, pleural and pericardial effusions, and AFIB RVR.    # Sepsis secondary to cathter associated UTI  -Follow up on culture  -S/p cathter chaneged in ED  -Continue Zosyn    # Left pleural effusion  -  Plan for thoracentesis tomorrow once off APixaban  -thoracic surgry is following    # Dysphagia  -Family reported patient was coughing with diet for last few days  -SLP consulted  -Could be complication of previous stroke    # Pericardial effusion  -TTE reported moderate pericardial effusion  -Cardiology consulted    # A.fib with RVR  -Currently rate controlled  -Continue metoprolol   -Apixaban on hold  -ON IV heparin infusion    # Pressure injury   -On injury  -ON buttock and hip, Stage II and III  - Daily wound care with sently and calcium alginate    Rest of the plan per H&P    Discussed with patient's daughter on phone

## 2024-08-15 NOTE — SWALLOW BEDSIDE ASSESSMENT ADULT - ORAL PHASE
Within functional limits however grossly functional/Decreased anterior-posterior movement of the bolus/Delayed oral transit time Decreased anterior-posterior movement of the bolus/Delayed oral transit time

## 2024-08-15 NOTE — H&P ADULT - NSHPSOCIALHISTORY_GEN_ALL_CORE
Lives at home with daughter, 24 hr HHAs, Traveling nurse 3x/week for wound care/Smart, non-ambulatory/bed bound, Ursula lift out of bed

## 2024-08-15 NOTE — H&P ADULT - ATTENDING COMMENTS
Changes made to above history/exam/assessment/plan otherwise agree with above    Assessment:   83F PMHx Afib on apixaban, recurrent UTIs, chronic left sided nephrosis, hypothyroid, OA, HTN, HLD, CVA (residual left sided weakness), chronic walters BIBEMS from home for abdominal pain admitted for Sepsis 2/2 Acute UTI c/b chronic walters, fecal impaction, pleural and pericardial effusions, and AFIB RVR.    Plan:   Sepsis 2/2 UTI/fecal impaction  - Leukocytosis, tachycardia, tachypnea   - Previous admission UCX +Enterobacter and Klebsiella  - BCx, UCx, RVP/Covid PCR ordered   - CT w/w/o con Abd/pelvis showed large left pleural effusion, moderate pericardial effusion, stable mild left hydronephrosis with bilateral urothelial enhancement along with urinary bladder wall thickening and perivesicular inflammation, no pyelonephritis. Fecal impaction of the rectum.   - Misplaced Walters Catheter in Cervix removed and replaced with clean walters in urethra  - UA grossly positive  - Continue Zosyn for empiric IV Abx coverage for Acute UTI and also pleural pathology and stercoral procitis  - Follow up cultures  - Trend Fever  - Trend WBC  - Tele/     Pleural Effusion and Moderate Pericardial effusion   - CT C/A/P reviewed see findings   - TTE to r/o tamponade physiology   - Tele/  - Thoracic following, planning thoracentesis tomorrow, apixaban held    Pleural effusion   - CT showed large left pleural effusion   - Thoracic following, planning thoracentesis     Fecal impaction  - s/p manual disimpaction   - Start miralax, senna    Afib RVR  - CHADVASC 5  - HASBLED 3  - Metoprolol 25mg BID for rate control  - Hold Apixaban in anticipation of thora. Heparin gtt for AC for now.  - Monitor Tele    Hypothyroidism  - c/w levothyroxine     HTN  - C/w Metoprolol     HLD  - C/w simvastatin     Diet: Regular  DVT: Heparin Full AC, pneumatic compression boots  Dispo: Acute. Admit to inpatient. Pending IV ABx and Culture results for acute UTI. Bowel regimen and disimpaction for constipation/fecal load monitor BMs. Monitor tele for AFIB RVR. Awaiting thora and repeat eval by Thoracic Sx for effusions.

## 2024-08-15 NOTE — H&P ADULT - HISTORY OF PRESENT ILLNESS
83F PMHx Afib on apixaban, recurrent UTIs, chronic left sided nephrosis, hypothyroid, OA, HTN, HLD, CVA (residual left sided weakness), chronic walters BIBEMS from home for abdominal pain . Patient at baseline is  A&Ox3, can answer all questions, knows medical hx well, has left sided weakness of UE/LE, unable to walk, Ursula lift out of bed. Last night the patient experienced stabbing LLQ abd pain w/o radiation, no worsening/remitting factors. Patient denies fever, chills, chest pain, cough, N/V/D and endorses no other symptoms. Unable to get in touch with family, patient did not know meds well, used Dr. Garibay, recommend calling family in AM. Patient was recently admitted to [facility] from 3/31 - 4/15/24 with UTI with sepsis complicated by A fib with RVR, BCx grew  coagulase-negative staph which was considered contamination rest of blood cultures including urine culture were all negative. Also, admitted on 6/4/24 with hyponatremia with a Na of 119 and hypotensive, BCx grew Staphylococcus capitis, UCx grew Klebsiella pneumonia and Enterobacter cloacae.     In ED patient was found to be in Afib RVR, CT abd/pelvis showed pericolitis, pleural effusion. Thoracic consulted, planning thoracentesis once 24 hrs past last apixaban. Also, UA +, patient received 2L IVF bolus and vancomycin 1x and Cefepime 1x. Patient admitted to med/tele for sepsis 2/2 UTI/sterocolitis.    83F PMHx Afib on apixaban, recurrent UTIs, chronic left sided nephrosis, hypothyroid, OA, HTN, HLD, CVA (residual left sided weakness), chronic walters BIBEMS from home for severe abdominal pain. Patient at baseline is A&Ox3, can answer all questions, knows medical hx well, has left sided weakness of UE/LE, unable to walk, Ursula lift out of bed. Last night the patient experienced stabbing LLQ abd pain w/o radiation, no worsening/remitting factors. Patient denies fever, chills, chest pain, cough, N/V/D and endorses no other symptoms. Unable to get in touch with family, patient did not know meds well, used Dr. Garibay, recommend calling family in AM. Patient was recently admitted to [facility] from 3/31 - 4/15/24 with UTI with sepsis complicated by A fib with RVR, BCx grew  coagulase-negative staph which was considered contamination rest of blood cultures including urine culture were all negative. Also, admitted on 6/4/24 with hyponatremia with a Na of 119 and hypotensive, Prior BCx grew Staphylococcus capitis and prior UCx grew Klebsiella pneumonia and Enterobacter cloacae.     In ED patient was found to be in Afib RVR. Rate control with improvement. CT chest/abd/pelvis showed pleural effusion, moderate pericardial effusion, fecal impaction, and pyelonephritis, as well as misplaced walters catheter in cervix. Thoracic consulted, planning thoracentesis once 24 hrs past last apixaban. Also, UA +, patient received 2L IVF bolus and vancomycin 1x and Cefepime 1x. Patient admitted to med/tele for sepsis 2/2 UTI c/b fecal impaciton, AFIB RVR, pleural and pericardial effusions.

## 2024-08-15 NOTE — SWALLOW BEDSIDE ASSESSMENT ADULT - SWALLOW EVAL: DIAGNOSIS
Pt presents with at least mild-moderate oral dysphagia characterized by prolonged mastication and increased oral transit time across chewable solids, grossly functional for minced and moist solids and thin liquids.  Pharyngeal stage appears clinically unremarkable: no overt signs/symptoms of penetration/aspiration observed.

## 2024-08-15 NOTE — CONSULT NOTE ADULT - ASSESSMENT
83y Female with a h/o Aifb on Eliquis, OA, HTN, HLD, CVA (residual left sided weakness). Multiple admission for UTI/sepsis, most recently on 6/4/24 with lethargy, poor PO intake and decreased urine output. Patient was admitted to MICU requiring vasopressors. Completed course of IV Meropenem for Klebsiella and enterobacter UTI. Presents to ED from home where her daughter cares for her c/o diffuse abd pain since early in AM. ED workup notable for afib RVR, acute UTI, b/l pleural effusions L>R, mod pericardial effusion without clinical evidence of tamponade. Thoracic Sx consulted for effusion eval.
83y Female with a h/o Aifb on Eliquis, OA, HTN, HLD, CVA (residual left sided weakness). Multiple admission for UTI/sepsis, most recently on 6/4/24 with lethargy, poor PO intake and decreased urine output. Patient was admitted to MICU requiring vasopressors. Completed course of IV Meropenem for Klebsiella and enterobacter UTI. Presents to ED from home where her daughter cares for her c/o diffuse abd pain since early in AM. ED workup notable for afib RVR, acute UTI, b/l pleural effusions L>R, mod pericardial effusion without clinical evidence of tamponade. Thoracic Sx consulted for effusion eval. Cardiology consulted for pericardial effusion

## 2024-08-15 NOTE — CONSULT NOTE ADULT - PROBLEM SELECTOR RECOMMENDATION 9
- pt admitted recently with urosepsis was in MICU requiring pressor support, discharged after IV abx course   - at home pt found by daughter with diffuse abd pain.   - Diagnosed here with Afib RVR, acute uti, bilateral pleural effusion L>R, and moderate pericardial effusion  - prior echo does not demonstrate pericardial effusion  - Echo shows normal EF 50-55%  - troponin is normal but upper end of range, continue to trend  - pBNP in 6/2024 was 6927, need repeat this admission  - check TSH, ESR, CRP, CPK  - for now there is no indication for pericardiocentesis  - there is no smith's triad on physical exam   - we will follow and repeat limited echo in 72h to evaluate for worsening effusion   - during this time we expect that thoracic will perform thoracentesis and send pleural fluid for cytology to evaluate the etiology of the effusion. we have concern there is underlying malignancy as pt appears near euvolemic on exam.   - we cannot exclude underlying heart failure, but will hold off on diuretics for now.   - pt appears generally unwell on exam, needs infectious workup. consider stepdown/ICU eval   - monitor on telemetry, afib is rate controlled 90s x24h no episodes of RVR or SVR. eliquis on hold for pericardiocentesis. use heparin infusion full AC protocol temporarily and resume elquis as soon as possible if no procedures are planned   - continue metoprolol, lasix - pt admitted recently with urosepsis was in MICU requiring pressor support, discharged after IV abx course   - at home pt found by daughter with diffuse abd pain.   - Diagnosed here with Afib RVR, acute uti, bilateral pleural effusion L>R, and moderate pericardial effusion  - prior echo does not demonstrate pericardial effusion  - Echo shows normal EF 50-55%  - troponin is normal but upper end of range, continue to trend  - pBNP in 6/2024 was 6927, need repeat this admission  - check TSH, ESR, CRP, CPK  - for now there is no indication for thoracentesis  - there is no smith's triad on physical exam   - we will follow and repeat limited echo in 72h to evaluate for worsening effusion   - during this time we expect that thoracic will perform thoracentesis and send pleural fluid for cytology to evaluate the etiology of the effusion. we have concern there is underlying malignancy as pt appears near euvolemic on exam.   - we cannot exclude underlying heart failure, but will hold off on diuretics for now.   - pt appears generally unwell on exam, needs infectious workup. consider stepdown/ICU eval   - monitor on telemetry, afib is rate controlled 90s x24h no episodes of RVR or SVR. eliquis on hold for pericardiocentesis. use heparin infusion full AC protocol temporarily and resume elquis as soon as possible if no procedures are planned   - continue metoprolol, lasix

## 2024-08-15 NOTE — CONSULT NOTE ADULT - PROBLEM SELECTOR RECOMMENDATION 2
On Eliquis, last dose in ED at midnight  Please hold Eliquis as patient will need effusion drainage  Recommend heparin gtt for AC, can be held prior to thoracic intervention

## 2024-08-15 NOTE — SWALLOW BEDSIDE ASSESSMENT ADULT - SWALLOW EVAL: RECOMMENDED FEEDING/EATING TECHNIQUES
alternate food with liquid/check mouth frequently for oral residue/pocketing/crush medication (when feasible)/hard swallow w/ each bite or sip/maintain upright posture during/after eating for 30 mins/position upright (90 degrees)/small sips/bites

## 2024-08-15 NOTE — CONSULT NOTE ADULT - PROBLEM SELECTOR RECOMMENDATION 9
Mod/Large left pleural effusion, compressive atelectasis, small right effusion  Pt without respiratory symptoms on eval, oxygenating well on room air  Defer effusion drainage for at least 24 hrs from last dose of Eliquis, can use heparin gtt for AC  Chest CT with moderate pericardial effusion, recommend TTE for further eval  Hemodynamically stable, no obvious signs of tamponade  Admit to medicine, /Tele  Check RVP, urine antigens  Supplemental O2 prn, incentive use  Will consider placing a chest tube sooner if patient experiences increased work of breathing, SpO2 starts to trend down, O2 requirements start to trend up  Thoracic to follow

## 2024-08-15 NOTE — SWALLOW BEDSIDE ASSESSMENT ADULT - COMMENTS
Per chart: "83F PMHx Afib on apixaban, recurrent UTIs, chronic left sided nephrosis, hypothyroid, OA, HTN, HLD, CVA (residual left sided weakness), chronic walters BIBEMS from home for severe abdominal pain. Patient at baseline is A&Ox3, can answer all questions, knows medical hx well, has left sided weakness of UE/LE, unable to walk, Ursula lift out of bed. Last night the patient experienced stabbing LLQ abd pain w/o radiation, no worsening/remitting factors. Patient denies fever, chills, chest pain, cough, N/V/D and endorses no other symptoms. Unable to get in touch with family, patient did not know meds well, used Dr. Garibay, recommend calling family in AM. Patient was recently admitted to [facility] from 3/31 - 4/15/24 with UTI with sepsis complicated by A fib with RVR, BCx grew  coagulase-negative staph which was considered contamination rest of blood cultures including urine culture were all negative. Also, admitted on 6/4/24 with hyponatremia with a Na of 119 and hypotensive, Prior BCx grew Staphylococcus capitis and prior UCx grew Klebsiella pneumonia and Enterobacter cloacae. In ED patient was found to be in Afib RVR. Rate control with improvement. CT chest/abd/pelvis showed pleural effusion, moderate pericardial effusion, fecal impaction, and pyelonephritis, as well as misplaced walters catheter in cervix. Thoracic consulted, planning thoracentesis once 24 hrs past last apixaban. Also, UA +, patient received 2L IVF bolus and vancomycin 1x and Cefepime 1x. Patient admitted to med/tele for sepsis 2/2 UTI c/b fecal impaciton, AFIB RVR, pleural and pericardial effusions."

## 2024-08-15 NOTE — PATIENT PROFILE ADULT - FALL HARM RISK - HARM RISK INTERVENTIONS

## 2024-08-15 NOTE — CONSULT NOTE ADULT - SUBJECTIVE AND OBJECTIVE BOX
[facility]                                              CARDIOLOGY AT [address], [place] [address]                                             Telephone: [phone]. Fax:[phone]                                                       CARDIOLOGY CONSULTATION NOTE                                                                                             History obtained by: Patient and medical record   Community Cardiologist: None    obtained: Yes [  ] No [  ]  Reason for Consultation: Pericardial effusion  Available out pt records reviewed: Yes [  ] No [  ]    Chief complaint:    Patient is a 83y old  Female who presents with a chief complaint of Acute UTI, Pleural Effusion, Pericardial Effusion, Fecal Impaction, AFIB RVR (15 Aug 2024 04:01)      HPI:  83F PMHx Afib on apixaban, recurrent UTIs, chronic left sided nephrosis, hypothyroid, OA, HTN, HLD, CVA (residual left sided weakness), chronic walters BIBEMS from home for severe abdominal pain. Patient at baseline is A&Ox3, can answer all questions, knows medical hx well, has left sided weakness of UE/LE, unable to walk, Ursula lift out of bed. Last night the patient experienced stabbing LLQ abd pain w/o radiation, no worsening/remitting factors. Patient denies fever, chills, chest pain, cough, N/V/D and endorses no other symptoms. Unable to get in touch with family, patient did not know meds well, used Dr. Garibay, recommend calling family in AM. Patient was recently admitted to [facility] from 3/31 - 4/15/24 with UTI with sepsis complicated by A fib with RVR, BCx grew  coagulase-negative staph which was considered contamination rest of blood cultures including urine culture were all negative. Also, admitted on 6/4/24 with hyponatremia with a Na of 119 and hypotensive, Prior BCx grew Staphylococcus capitis and prior UCx grew Klebsiella pneumonia and Enterobacter cloacae.     In ED patient was found to be in Afib RVR. Rate control with improvement. CT chest/abd/pelvis showed pleural effusion, moderate pericardial effusion, fecal impaction, and pyelonephritis, as well as misplaced walters catheter in cervix. Thoracic consulted, planning thoracentesis once 24 hrs past last apixaban. Also, UA +, patient received 2L IVF bolus and vancomycin 1x and Cefepime 1x. Patient admitted to med/tele for sepsis 2/2 UTI c/b fecal impaciton, AFIB RVR, pleural and pericardial effusions.   (15 Aug 2024 04:01)      PAST MEDICAL HISTORY  Osteoarthritis    Hypertension    Hiatal hernia    Hyperlipidemia    Polio    DVT (deep venous thrombosis)    TIA (transient ischemic attack)        PAST SURGICAL HISTORY  S/P cholecystectomy    S/P dilation and curettage    H/O total knee replacement, left    S/P rhinoplasty    History of foot surgery    Neck mass        SUBSTANCE USE HISTORY  Denies current and previous substance use [  ]   CIGARETTES -   ALCOHOL -   DRUGS -     FAMILY HISTORY:  Family history of diabetes mellitus (Grandparent, Aunt)    Family history of heart disease (Sibling)        CARDIAC SPECIFIC FAMILY HX   No KNOWN family history of Cardiovascular disease, CAD, or sudden death in first degree relatives unless specified below  Family History of Cardiovascular Disease:  [  ]   Coronary Artery Disease in first degree relative:  [  ]   Sudden Cardiac Death in First degree relative: [  ]    HOME MEDICATIONS:  apixaban 5 mg oral tablet: 1 tab(s) orally 2 times a day (15 Aug 2024 03:53)  collagenase 250 units/g topical ointment: 1 Apply topically to affected area 2 times a day (15 Aug 2024 03:53)  levothyroxine 75 mcg (0.075 mg) oral capsule: 1 cap(s) orally once a day (15 Aug 2024 03:56)  simvastatin 20 mg oral tablet: 1 tab(s) orally once a day (at bedtime) (15 Aug 2024 03:53)  simvastatin 20 mg oral tablet: 1 tab(s) orally once a day (15 Aug 2024 04:58)  tamsulosin 0.4 mg oral capsule: 1 cap(s) orally once a day (at bedtime) (15 Aug 2024 03:53)      CURRENT CARDIAC MEDICATIONS:  furosemide    Tablet 20 milliGRAM(s) Oral daily  metoprolol tartrate 25 milliGRAM(s) Oral two times a day      CURRENT OTHER MEDICATIONS:  acetaminophen     Tablet .. 650 milliGRAM(s) Oral every 6 hours PRN Temp greater or equal to 38C (100.4F), Mild Pain (1 - 3)  melatonin 3 milliGRAM(s) Oral at bedtime PRN Insomnia  ondansetron Injectable 4 milliGRAM(s) IV Push every 8 hours PRN Nausea and/or Vomiting  aluminum hydroxide/magnesium hydroxide/simethicone Suspension 30 milliLiter(s) Oral every 4 hours PRN Dyspepsia  polyethylene glycol 3350 17 Gram(s) Oral two times a day  senna 2 Tablet(s) Oral at bedtime  collagenase Ointment 1 Application(s) Topical daily  heparin   Injectable 5000 Unit(s) IV Push every 6 hours PRN For aPTT less than 40  heparin   Injectable 2500 Unit(s) IV Push every 6 hours PRN For aPTT between 40 - 57  heparin  Infusion.  Unit(s)/Hr (11 mL/Hr) IV Continuous <Continuous>  levothyroxine 75 MICROGram(s) Oral daily  piperacillin/tazobactam IVPB.. 3.375 Gram(s) IV Intermittent every 8 hours, Stop order after: 10 Days  simvastatin 20 milliGRAM(s) Oral at bedtime  tamsulosin 0.4 milliGRAM(s) Oral at bedtime      ALLERGIES:   Cipro (Other)  Augmentin (Diarrhea)  all narcotics give severe vomitting and dizziness (Other; Vomiting)  Bactrim (Rash)  Levaquin (Unknown)  ampicillin (Stomach Upset)      VITAL SIGNS:  T(C): 36.6 (08-15-24 @ 11:46), Max: 36.6 (08-14-24 @ 19:38)  T(F): 97.8 (08-15-24 @ 11:46), Max: 97.9 (08-14-24 @ 19:38)  HR: 109 (08-15-24 @ 14:00) (75 - 123)  BP: 111/76 (08-15-24 @ 14:00) (88/64 - 120/91)  RR: 24 (08-15-24 @ 14:00) (18 - 30)  SpO2: 98% (08-15-24 @ 14:00) (95% - 100%)    INTAKE AND OUTPUT:       LABS:                            9.7    18.32 )-----------( 448      ( 15 Aug 2024 12:19 )             30.3     08-15    131<L>  |  100  |  20.8<H>  ----------------------------<  123<H>  4.3   |  16.0<L>  |  0.48<L>    Ca    8.8      15 Aug 2024 06:50  Phos  3.4     08-15  Mg     1.6     08-15    TPro  5.9<L>  /  Alb  1.9<L>  /  TBili  0.7  /  DBili  x   /  AST  32<H>  /  ALT  18  /  AlkPhos  84  08-15    PT/INR - ( 14 Aug 2024 13:25 )   PT: 21.5 sec;   INR: 1.98 ratio         PTT - ( 14 Aug 2024 13:25 )  PTT:50.3 sec  Urinalysis Basic - ( 15 Aug 2024 06:50 )    Color: x / Appearance: x / SG: x / pH: x  Gluc: 123 mg/dL / Ketone: x  / Bili: x / Urobili: x   Blood: x / Protein: x / Nitrite: x   Leuk Esterase: x / RBC: x / WBC x   Sq Epi: x / Non Sq Epi: x / Bacteria: x    RADIOLOGY IMAGING:             
HPI:  83y Female with a h/o Aifb on Eliquis, OA, HTN, HLD, CVA (residual left sided weakness). Multiple admission for UTI/sepsis, most recently on 6/4/24 with lethargy, poor PO intake and decreased urine output. Patient was admitted to MICU requiring vasopressors. Completed course of IV Meropenem for Klebsiella and enterobacter UTI. Presents to ED from home where her daughter cares for her c/o diffuse abd pain since early in AM. ED workup notable for afib RVR, acute UTI, b/l pleural effusions L>R, mod pericardial effusion without clinical evidence of tamponade. Thoracic Sx consulted for effusion eval.  Patient seen and examined in ED. In no obvious distress, O2 sats well on room air, speaking in full sentences. Pt states 2 week history of cold like symptoms, dry cough. Her daughter was given her cough suppressant medication (unsure of specifics) that made her stomach hurt. Of note, patient does appear to be a poor historian. Denies fevers, chills, chest pain, palpitations, SOB, orthopnea, abdominal pain at present, no reports of dysuria, nvdc, or other acute complaints.  Baseline non-mobile due to prior CVA with residual left-sided weakness, uses wheelchair. Denies smoking history or personal history of malignancy. Denies any "heart" problems.    PAST MEDICAL & SURGICAL HISTORY:  Osteoarthritis  Hypertension  Hiatal hernia  Hyperlipidemia  Polio  DVT (deep venous thrombosis)  2016, was on xarelto for 6 months  TIA (transient ischemic attack)  2004  S/P cholecystectomy  S/P dilation and curettage  2001, 1965, 1972, 1978  H/O total knee replacement, left  2003 revision 2014  S/P rhinoplasty  History of foot surgery  left foot due to polio, 1953  Neck mass  excision of neck mass 1942    REVIEW OF SYSTEMS  GENERAL:  Fevers[] chills[] sweats[] fatigue[] weight loss[] weight gain []                                        NEURO:  paresthesias seizures []  syncope []  confusion []                                                                                  EYES: glasses[]  blurry vision[]  discharge[] pain[] glaucoma []                                                                            ENMT:  difficulty hearing []  vertigo[]  dysphagia[] epistaxis[] recent dental work []                                      CV:  chest pain[] palpitations[] BECK [] diaphoresis [] edema[]                                                                                             RESPIRATORY:  wheezing[] SOB[] cough [] sputum[] hemoptysis[]                                                                    GI:  nausea[]  vomiting []  diarrhea[] constipation [] melena []                                                                        : hematuria[ ]  dysuria[ ] urgency[] incontinence[]                                                                                              MUSCULOSKELETAL  arthritis[ ]  joint swelling [ ] muscle weakness [ ]                                                                  SKIN/BREAST:  rash[ ] itching [ ]  hair loss[ ] masses[ ]                                                                                                PSYCH:  dementia [ ] depression [ ] anxiety[ ]                                                                                                                  HEME/LYMPH:  bruises easily[ ] enlarged lymph nodes[ ] tender lymph nodes[ ]                                                 ENDOCRINE:  cold intolerance[ ] heat intolerance[ ] polydipsia[ ]       All other ROS negative except as indicated in HPI    ALLERGIES  Cipro (Other)  all narcotics give severe vomitting and dizziness (Other; Vomiting)  Bactrim (Rash)  Levaquin (Unknown)  ampicillin (Stomach Upset)    Intolerances  Augmentin (Diarrhea)    MEDICATIONS  Home Medications:  apixaban 5 mg oral tablet: 1 tab(s) orally 2 times a day (25 Jun 2024 10:58)  collagenase 250 units/g topical ointment: 1 Apply topically to affected area 2 times a day (25 Jun 2024 10:58)  levothyroxine 25 mcg (0.025 mg) oral tablet: 1 tab(s) orally once a day (25 Jun 2024 10:58)  magnesium oxide 400 mg oral tablet: 1 tab(s) orally 2 times a day (with meals) (25 Jun 2024 10:58)  metoprolol tartrate 25 mg oral tablet: 1 tab(s) orally 2 times a day (25 Jun 2024 10:58)  Multiple Vitamins with Minerals oral tablet: 1 tab(s) orally once a day (04 Jun 2024 11:31)  nystatin 100,000 units/g topical powder: 1 Apply topically to affected area 2 times a day (25 Jun 2024 10:58)  Pepcid 20 mg oral tablet: 1 tab(s) orally once a day (25 Jun 2024 10:58)  senna leaf extract oral tablet: 2 tab(s) orally once a day (at bedtime) (25 Jun 2024 10:58)  simvastatin 20 mg oral tablet: 1 tab(s) orally once a day (at bedtime) (25 Jun 2024 10:58)  tamsulosin 0.4 mg oral capsule: 1 cap(s) orally once a day (at bedtime) (25 Jun 2024 10:58)    FAMILY HISTORY  FAMILY HISTORY:  Family history of diabetes mellitus (Grandparent, Aunt)  Family history of heart disease (Sibling)    VITALS  Vital Signs Last 24 Hrs  T(C): 36.4 (15 Aug 2024 00:07), Max: 36.9 (14 Aug 2024 13:07)  T(F): 97.5 (15 Aug 2024 00:07), Max: 98.5 (14 Aug 2024 13:07)  HR: 104 (15 Aug 2024 00:07) (104 - 151)  BP: 112/95 (15 Aug 2024 00:07) (112/95 - 152/70)  BP(mean): 99 (14 Aug 2024 19:38) (99 - 99)  RR: 20 (15 Aug 2024 00:07) (20 - 30)  SpO2: 98% (15 Aug 2024 00:07) (94% - 98%)    Parameters below as of 15 Aug 2024 00:07  Patient On (Oxygen Delivery Method): room air    LABS                        11.5   15.03 )-----------( 505      ( 14 Aug 2024 13:25 )             35.6     08-14    134<L>  |  99  |  18.5  ----------------------------<  136<H>  3.7   |  23.0  |  0.51    Ca    9.0      14 Aug 2024 13:25    TPro  6.7  /  Alb  2.6<L>  /  TBili  0.6  /  DBili  x   /  AST  18  /  ALT  23  /  AlkPhos  106  08-14    PT/INR - ( 14 Aug 2024 13:25 )   PT: 21.5 sec;   INR: 1.98 ratio      PTT - ( 14 Aug 2024 13:25 )  PTT:50.3 sec  Urinalysis Basic - ( 14 Aug 2024 23:30 )    Color: Yellow / Appearance: Turbid / SG: >1.030 / pH: x  Gluc: x / Ketone: Trace mg/dL  / Bili: Negative / Urobili: 1.0 mg/dL   Blood: x / Protein: 100 mg/dL / Nitrite: Negative   Leuk Esterase: Large / RBC: 59 /HPF / WBC >998 /HPF   Sq Epi: x / Non Sq Epi: 2 /HPF / Bacteria: Too Numerous to count /HPF    RADIOLOGY & ADDITIONAL STUDIES  All relevant and available laboratory results, radiology and medications reviewed.  CT Chest w/ IV Cont (08.14.24 @ 17:51)   FINDINGS:  CHEST:  LUNGS AND LARGE AIRWAYS: Patent central airways. Complete left lower lobe   atelectasis and partial left upper lobe atelectasis adjacent to a pleural   effusion. Mild compressive atelectasis in the right lower lobe..  PLEURA: Moderate to large left pleural effusion. Small right pleural   effusion.  VESSELS: Aortic calcifications. Coronary artery calcifications. Air in   the right subclavian and internal jugular veins, likely iatrogenic.  HEART: Mild cardiomegaly. Moderate pericardial effusion.  MEDIASTINUM AND MARTY: No lymphadenopathy.  CHEST WALL AND LOWER NECK: Within normal limits.    ABDOMEN AND PELVIS: Images are degraded by respiratory motion artifact in   the upper abdomen.  LIVER: Subcentimeter indeterminate hypodense focus in segment 3.  BILE DUCTS: Normal caliber.  GALLBLADDER: Cholecystectomy.  SPLEEN: Within normal limits.  PANCREAS: Within normal limits.  ADRENALS: Within normal limits.  KIDNEYS/URETERS: Mild nonspecific bilateral urothelial enhancement.   Dilated left extrarenal pelvis and mild left hydronephrosis.   Subcentimeter indeterminate bilateral hypodense renal lesions are too   small to characterize.    BLADDER: Circumferential bladder wall thickening and perivesicular   infiltration. Air in the bladder lumen..  REPRODUCTIVE ORGANS: Malpositioned Smart catheter balloon in the cervix.    BOWEL: Rectal distention with stool with mild perirectal edema. Moderate   fecal load. No obstruction.  PERITONEUM/RETROPERITONEUM: Within normal limits.  VESSELS: Atherosclerotic changes. Extensive calcification of the ostia of   the celiac and superior mesenteric arteries causing moderate to severe   stenosis.  LYMPH NODES: No lymphadenopathy.  ABDOMINAL WALL: Within normal limits.  BONES: Within normal limits.    IMPRESSION:  Moderate to large left pleural effusion with complete left lower lobe and   partial left upper lobe atelectasis. Small right pleural effusion.    Moderate pericardial effusion.    Stable mild left hydronephrosis with bilateral urothelial enhancement   along with urinary bladder wall thickening and perivesicular   inflammation. Findings may represent acute cystitis and pyelitis. No CT   evidence for pyelonephritis.    Malpositioned Smart catheter balloon within the cervix.    Fecal impaction of the rectum. Mild perirectal edema is nonspecific.    TTE W or WO Ultrasound Enhancing Agent (06.07.24 @ 12:06)  CONCLUSIONS:   1. Left ventricular systolic function is normal with an ejection fraction visually estimated at 70 to 75 %.   2. The left ventricular diastolic function is indeterminate.   3. Normal right ventricular cavity size and normal systolic function.   4. Estimated pulmonary artery systolic pressure is 34 mmHg.   5. Mild mitral regurgitation.   6. Mild to moderate tricuspid regurgitation.   7. Fibrocalcific aortic valve sclerosis without stenosis.   8. No pericardial effusion seen.   9. Compared to the transthoracic echocardiogram performed on 4/1/2024, there have been no significant interval changes.    PHYSICAL EXAM  Gen: NAD, pleasant  HENT: no JVD, trachea midline, sclera normal, no conjunctival drainage  Pulm: no wheezes or rhonchi, diminished BS Left mid/base, no accessory muscle use  CV: irregular rhythm, no obvious murmur, no rub, S1S2  Gastrointestinal: Soft, obese, non-tender  Extremities: SALEEM x 4, left-sided weakness; 1+ LE edema  Neurological: A+O x3; speech clear and intact  Psychiatric: calm, normal mood, normal affect   Skin: warm, dry, well perfused, no rashes

## 2024-08-16 ENCOUNTER — RESULT REVIEW (OUTPATIENT)
Age: 84
End: 2024-08-16

## 2024-08-16 LAB
ALBUMIN FLD-MCNC: 1.4 G/DL — SIGNIFICANT CHANGE UP
ALBUMIN SERPL ELPH-MCNC: 2.4 G/DL — LOW (ref 3.3–5.2)
ALP SERPL-CCNC: 101 U/L — SIGNIFICANT CHANGE UP (ref 40–120)
ALT FLD-CCNC: 19 U/L — SIGNIFICANT CHANGE UP
ANION GAP SERPL CALC-SCNC: 13 MMOL/L — SIGNIFICANT CHANGE UP (ref 5–17)
APTT BLD: 56.4 SEC — HIGH (ref 24.5–35.6)
APTT BLD: 88.8 SEC — HIGH (ref 24.5–35.6)
AST SERPL-CCNC: 19 U/L — SIGNIFICANT CHANGE UP
B PERT IGG+IGM PNL SER: ABNORMAL
BILIRUB SERPL-MCNC: 0.5 MG/DL — SIGNIFICANT CHANGE UP (ref 0.4–2)
BUN SERPL-MCNC: 20.8 MG/DL — HIGH (ref 8–20)
CALCIUM SERPL-MCNC: 8.3 MG/DL — LOW (ref 8.4–10.5)
CHLORIDE SERPL-SCNC: 99 MMOL/L — SIGNIFICANT CHANGE UP (ref 96–108)
CO2 SERPL-SCNC: 20 MMOL/L — LOW (ref 22–29)
COLOR FLD: YELLOW
CREAT SERPL-MCNC: 0.52 MG/DL — SIGNIFICANT CHANGE UP (ref 0.5–1.3)
EGFR: 93 ML/MIN/1.73M2 — SIGNIFICANT CHANGE UP
FLUID INTAKE SUBSTANCE CLASS: SIGNIFICANT CHANGE UP
GLUCOSE FLD-MCNC: 181 MG/DL — SIGNIFICANT CHANGE UP
GLUCOSE SERPL-MCNC: 164 MG/DL — HIGH (ref 70–99)
HCT VFR BLD CALC: 31.2 % — LOW (ref 34.5–45)
HGB BLD-MCNC: 9.9 G/DL — LOW (ref 11.5–15.5)
LDH SERPL L TO P-CCNC: 117 U/L — SIGNIFICANT CHANGE UP
LYMPHOCYTES # FLD: 4 % — SIGNIFICANT CHANGE UP
MAGNESIUM SERPL-MCNC: 1.3 MG/DL — LOW (ref 1.6–2.6)
MCHC RBC-ENTMCNC: 27.3 PG — SIGNIFICANT CHANGE UP (ref 27–34)
MCHC RBC-ENTMCNC: 31.7 GM/DL — LOW (ref 32–36)
MCV RBC AUTO: 86 FL — SIGNIFICANT CHANGE UP (ref 80–100)
MONOS+MACROS # FLD: 3 % — SIGNIFICANT CHANGE UP
NEUTROPHILS-BODY FLUID: 93 % — SIGNIFICANT CHANGE UP
PH FLD: 8.5 — SIGNIFICANT CHANGE UP
PHOSPHATE SERPL-MCNC: 2.8 MG/DL — SIGNIFICANT CHANGE UP (ref 2.4–4.7)
PLATELET # BLD AUTO: 464 K/UL — HIGH (ref 150–400)
POTASSIUM SERPL-MCNC: 3.7 MMOL/L — SIGNIFICANT CHANGE UP (ref 3.5–5.3)
POTASSIUM SERPL-SCNC: 3.7 MMOL/L — SIGNIFICANT CHANGE UP (ref 3.5–5.3)
PROT FLD-MCNC: 2.6 G/DL — SIGNIFICANT CHANGE UP
PROT SERPL-MCNC: 5.8 G/DL — LOW (ref 6.6–8.7)
RBC # BLD: 3.63 M/UL — LOW (ref 3.8–5.2)
RBC # FLD: 15.9 % — HIGH (ref 10.3–14.5)
RCV VOL RI: < 2000 /UL — SIGNIFICANT CHANGE UP (ref 0–0)
S PNEUM AG UR QL: NEGATIVE — SIGNIFICANT CHANGE UP
SODIUM SERPL-SCNC: 132 MMOL/L — LOW (ref 135–145)
TOTAL NUCLEATED CELL COUNT, BODY FLUID: 174 /UL — SIGNIFICANT CHANGE UP
TUBE TYPE: SIGNIFICANT CHANGE UP
WBC # BLD: 17.06 K/UL — HIGH (ref 3.8–10.5)
WBC # FLD AUTO: 17.06 K/UL — HIGH (ref 3.8–10.5)

## 2024-08-16 PROCEDURE — 99232 SBSQ HOSP IP/OBS MODERATE 35: CPT | Mod: FS,25

## 2024-08-16 PROCEDURE — 99232 SBSQ HOSP IP/OBS MODERATE 35: CPT

## 2024-08-16 PROCEDURE — 88112 CYTOPATH CELL ENHANCE TECH: CPT | Mod: 26

## 2024-08-16 PROCEDURE — 71045 X-RAY EXAM CHEST 1 VIEW: CPT | Mod: 26

## 2024-08-16 PROCEDURE — 88305 TISSUE EXAM BY PATHOLOGIST: CPT | Mod: 26

## 2024-08-16 PROCEDURE — 32557 INSERT CATH PLEURA W/ IMAGE: CPT | Mod: LT

## 2024-08-16 PROCEDURE — 99233 SBSQ HOSP IP/OBS HIGH 50: CPT | Mod: GC

## 2024-08-16 RX ORDER — DIGOXIN 0.12 MG/1
250 TABLET ORAL EVERY 6 HOURS
Refills: 0 | Status: DISCONTINUED | OUTPATIENT
Start: 2024-08-16 | End: 2024-08-17

## 2024-08-16 RX ORDER — DIGOXIN 0.12 MG/1
500 TABLET ORAL ONCE
Refills: 0 | Status: COMPLETED | OUTPATIENT
Start: 2024-08-16 | End: 2024-08-16

## 2024-08-16 RX ORDER — FUROSEMIDE 40 MG
40 TABLET ORAL DAILY
Refills: 0 | Status: DISCONTINUED | OUTPATIENT
Start: 2024-08-17 | End: 2024-08-18

## 2024-08-16 RX ORDER — POTASSIUM CHLORIDE 10 MEQ
20 TABLET, EXT RELEASE, PARTICLES/CRYSTALS ORAL ONCE
Refills: 0 | Status: COMPLETED | OUTPATIENT
Start: 2024-08-16 | End: 2024-08-16

## 2024-08-16 RX ORDER — METOPROLOL TARTRATE 100 MG/1
2.5 TABLET ORAL EVERY 6 HOURS
Refills: 0 | Status: DISCONTINUED | OUTPATIENT
Start: 2024-08-16 | End: 2024-08-22

## 2024-08-16 RX ORDER — APIXABAN 5 MG/1
5 TABLET, FILM COATED ORAL
Refills: 0 | Status: DISCONTINUED | OUTPATIENT
Start: 2024-08-17 | End: 2024-08-22

## 2024-08-16 RX ORDER — SODIUM CHLORIDE 9 MG/ML
500 INJECTION INTRAMUSCULAR; INTRAVENOUS; SUBCUTANEOUS ONCE
Refills: 0 | Status: COMPLETED | OUTPATIENT
Start: 2024-08-16 | End: 2024-08-16

## 2024-08-16 RX ADMIN — Medication 1000 UNIT(S)/HR: at 02:43

## 2024-08-16 RX ADMIN — Medication 25 GRAM(S): at 06:20

## 2024-08-16 RX ADMIN — PIPERACILLIN SODIUM AND TAZOBACTAM SODIUM 25 GRAM(S): 3; .375 INJECTION, POWDER, FOR SOLUTION INTRAVENOUS at 12:45

## 2024-08-16 RX ADMIN — Medication 2 TABLET(S): at 21:43

## 2024-08-16 RX ADMIN — TAMSULOSIN HYDROCHLORIDE 0.4 MILLIGRAM(S): 0.4 CAPSULE ORAL at 21:43

## 2024-08-16 RX ADMIN — POLYETHYLENE GLYCOL 3350 17 GRAM(S): 17 POWDER, FOR SOLUTION ORAL at 17:37

## 2024-08-16 RX ADMIN — METOPROLOL TARTRATE 2.5 MILLIGRAM(S): 100 TABLET ORAL at 02:15

## 2024-08-16 RX ADMIN — METOPROLOL TARTRATE 25 MILLIGRAM(S): 100 TABLET ORAL at 05:39

## 2024-08-16 RX ADMIN — Medication 1000 UNIT(S)/HR: at 07:42

## 2024-08-16 RX ADMIN — Medication 20 MILLIGRAM(S): at 21:43

## 2024-08-16 RX ADMIN — Medication 20 MILLIGRAM(S): at 05:39

## 2024-08-16 RX ADMIN — PIPERACILLIN SODIUM AND TAZOBACTAM SODIUM 25 GRAM(S): 3; .375 INJECTION, POWDER, FOR SOLUTION INTRAVENOUS at 04:32

## 2024-08-16 RX ADMIN — POLYETHYLENE GLYCOL 3350 17 GRAM(S): 17 POWDER, FOR SOLUTION ORAL at 05:40

## 2024-08-16 RX ADMIN — Medication 75 MICROGRAM(S): at 05:39

## 2024-08-16 RX ADMIN — SODIUM CHLORIDE 2000 MILLILITER(S): 9 INJECTION INTRAMUSCULAR; INTRAVENOUS; SUBCUTANEOUS at 11:43

## 2024-08-16 RX ADMIN — COLLAGENASE SANTYL 1 APPLICATION(S): 250 OINTMENT TOPICAL at 12:44

## 2024-08-16 RX ADMIN — Medication 25 GRAM(S): at 08:34

## 2024-08-16 RX ADMIN — DIGOXIN 500 MICROGRAM(S): 0.12 TABLET ORAL at 17:37

## 2024-08-16 RX ADMIN — PIPERACILLIN SODIUM AND TAZOBACTAM SODIUM 25 GRAM(S): 3; .375 INJECTION, POWDER, FOR SOLUTION INTRAVENOUS at 21:42

## 2024-08-16 RX ADMIN — Medication 1000 MILLILITER(S): at 02:16

## 2024-08-16 NOTE — PROGRESS NOTE ADULT - SUBJECTIVE AND OBJECTIVE BOX
SUBJECTIVE:    Chief Complaint: Patient is a 83y old  Female who presents with a chief complaint of Acute UTI, Pleural Effusion, Pericardial Effusion, Fecal Impaction, AFIB RVR (15 Aug 2024 15:26)      Hospital Course:   83F PMHx Afib on apixaban, recurrent UTIs, chronic left sided nephrosis, hypothyroid, OA, HTN, HLD, CVA (residual left sided weakness), chronic walters BIBEMS from home for severe abdominal pain. Patient at baseline is A&Ox3, can answer all questions, knows medical hx well, has left sided weakness of UE/LE, unable to walk, Ursula lift out of bed. Last night the patient experienced stabbing LLQ abd pain w/o radiation, no worsening/remitting factors. Patient denies fever, chills, chest pain, cough, N/V/D and endorses no other symptoms. Unable to get in touch with family, patient did not know meds well, used Dr. Garibay, recommend calling family in AM. Patient was recently admitted to [facility] from 3/31 - 4/15/24 with UTI with sepsis complicated by A fib with RVR, BCx grew  coagulase-negative staph which was considered contamination rest of blood cultures including urine culture were all negative. Also, admitted on 6/4/24 with hyponatremia with a Na of 119 and hypotensive, Prior BCx grew Staphylococcus capitis and prior UCx grew Klebsiella pneumonia and Enterobacter cloacae.     In ED patient was found to be in Afib RVR. Rate control with improvement. CT chest/abd/pelvis showed pleural effusion, moderate pericardial effusion, fecal impaction, and pyelonephritis, as well as misplaced walters catheter in cervix. Thoracic consulted, planning thoracentesis once 24 hrs past last apixaban. Also, UA +, patient received 2L IVF bolus and vancomycin 1x and Cefepime 1x. Patient admitted to med/tele for sepsis 2/2 UTI c/b fecal impaciton, AFIB RVR, pleural and pericardial effusions.    INTERVAL HPI/OVERNIGHT EVENTS: Patient seen and examined at bedside at AM. No clinically acute event overnight.   Denies any chest pain, palpitations, SOB, leg edema, N/V/D, or any other complaints.     MEDICATIONS  (STANDING):  collagenase Ointment 1 Application(s) Topical daily  furosemide    Tablet 20 milliGRAM(s) Oral daily  heparin  Infusion.  Unit(s)/Hr (11 mL/Hr) IV Continuous <Continuous>  levothyroxine 75 MICROGram(s) Oral daily  magnesium sulfate  IVPB 2 Gram(s) IV Intermittent once  metoprolol tartrate 25 milliGRAM(s) Oral two times a day  piperacillin/tazobactam IVPB.. 3.375 Gram(s) IV Intermittent every 8 hours  polyethylene glycol 3350 17 Gram(s) Oral two times a day  potassium chloride  20 mEq/100 mL IVPB 20 milliEquivalent(s) IV Intermittent once  senna 2 Tablet(s) Oral at bedtime  simvastatin 20 milliGRAM(s) Oral at bedtime  tamsulosin 0.4 milliGRAM(s) Oral at bedtime    MEDICATIONS  (PRN):  acetaminophen     Tablet .. 650 milliGRAM(s) Oral every 6 hours PRN Temp greater or equal to 38C (100.4F), Mild Pain (1 - 3)  aluminum hydroxide/magnesium hydroxide/simethicone Suspension 30 milliLiter(s) Oral every 4 hours PRN Dyspepsia  heparin   Injectable 5000 Unit(s) IV Push every 6 hours PRN For aPTT less than 40  heparin   Injectable 2500 Unit(s) IV Push every 6 hours PRN For aPTT between 40 - 57  melatonin 3 milliGRAM(s) Oral at bedtime PRN Insomnia  metoprolol tartrate Injectable 2.5 milliGRAM(s) IV Push every 6 hours PRN HR > 120  ondansetron Injectable 4 milliGRAM(s) IV Push every 8 hours PRN Nausea and/or Vomiting      Allergies    Cipro (Other)  all narcotics give severe vomitting and dizziness (Other; Vomiting)  Bactrim (Rash)  Levaquin (Unknown)  ampicillin (Stomach Upset)    Intolerances    Augmentin (Diarrhea)      REVIEW OF SYSTEMS:  All other review of systems negative, except as noted in HPI    OBJECTIVE:    Vital Signs Last 24 Hrs  T(C): 36.9 (16 Aug 2024 04:46), Max: 36.9 (16 Aug 2024 04:46)  T(F): 98.4 (16 Aug 2024 04:46), Max: 98.4 (16 Aug 2024 04:46)  HR: 126 (16 Aug 2024 04:46) (69 - 126)  BP: 112/78 (16 Aug 2024 04:46) (88/64 - 112/78)  BP(mean): 87 (15 Aug 2024 18:05) (72 - 88)  RR: 18 (16 Aug 2024 04:46) (18 - 24)  SpO2: 95% (16 Aug 2024 04:46) (95% - 100%)    Parameters below as of 16 Aug 2024 04:46  Patient On (Oxygen Delivery Method): room air        PHYSICAL EXAM:      Lab/ Imaging:    LABS:                        9.9    17.06 )-----------( 464      ( 16 Aug 2024 02:13 )             31.2     08-16    132<L>  |  99  |  20.8<H>  ----------------------------<  164<H>  3.7   |  20.0<L>  |  0.52    Ca    8.3<L>      16 Aug 2024 02:13  Phos  2.8     08-16  Mg     1.3     08-16    TPro  5.8<L>  /  Alb  2.4<L>  /  TBili  0.5  /  DBili  x   /  AST  19  /  ALT  19  /  AlkPhos  101  08-16    PT/INR - ( 14 Aug 2024 13:25 )   PT: 21.5 sec;   INR: 1.98 ratio         PTT - ( 16 Aug 2024 02:13 )  PTT:88.8 sec  Urinalysis Basic - ( 16 Aug 2024 02:13 )    Color: x / Appearance: x / SG: x / pH: x  Gluc: 164 mg/dL / Ketone: x  / Bili: x / Urobili: x   Blood: x / Protein: x / Nitrite: x   Leuk Esterase: x / RBC: x / WBC x   Sq Epi: x / Non Sq Epi: x / Bacteria: x      CAPILLARY BLOOD GLUCOSE      POCT Blood Glucose.: 132 mg/dL (15 Aug 2024 17:56)  POCT Blood Glucose.: 119 mg/dL (15 Aug 2024 08:33)       SUBJECTIVE:    Chief Complaint: Patient is a 83y old  Female who presents with a chief complaint of Acute UTI, Pleural Effusion, Pericardial Effusion, Fecal Impaction, AFIB RVR (15 Aug 2024 15:26)      Hospital Course:   83F PMHx Afib on apixaban, recurrent UTIs, chronic left sided nephrosis, hypothyroid, OA, HTN, HLD, CVA (residual left sided weakness), chronic walters BIBEMS from home for severe abdominal pain. Patient at baseline is A&Ox3, can answer all questions, knows medical hx well, has left sided weakness of UE/LE, unable to walk, Ursula lift out of bed. Last night the patient experienced stabbing LLQ abd pain w/o radiation, no worsening/remitting factors. Patient denies fever, chills, chest pain, cough, N/V/D and endorses no other symptoms. Unable to get in touch with family, patient did not know meds well, used Dr. Garibay, recommend calling family in AM. Patient was recently admitted to [facility] from 3/31 - 4/15/24 with UTI with sepsis complicated by A fib with RVR, BCx grew  coagulase-negative staph which was considered contamination rest of blood cultures including urine culture were all negative. Also, admitted on 6/4/24 with hyponatremia with a Na of 119 and hypotensive, Prior BCx grew Staphylococcus capitis and prior UCx grew Klebsiella pneumonia and Enterobacter cloacae.     In ED patient was found to be in Afib RVR. Rate control with improvement. CT chest/abd/pelvis showed pleural effusion, moderate pericardial effusion, fecal impaction, and pyelonephritis, as well as misplaced walters catheter in cervix. Thoracic consulted, planning thoracentesis once 24 hrs past last apixaban. Also, UA +, patient received 2L IVF bolus and vancomycin 1x and Cefepime 1x. Patient admitted to med/tele for sepsis 2/2 UTI c/b fecal impaciton, AFIB RVR, pleural and pericardial effusions.    INTERVAL HPI/OVERNIGHT EVENTS: Patient seen and examined at bedside at AM. No clinically acute event overnight.   Denies any chest pain, palpitations, SOB, leg edema, N/V/D, or any other complaints.     MEDICATIONS  (STANDING):  collagenase Ointment 1 Application(s) Topical daily  furosemide    Tablet 20 milliGRAM(s) Oral daily  heparin  Infusion.  Unit(s)/Hr (11 mL/Hr) IV Continuous <Continuous>  levothyroxine 75 MICROGram(s) Oral daily  magnesium sulfate  IVPB 2 Gram(s) IV Intermittent once  metoprolol tartrate 25 milliGRAM(s) Oral two times a day  piperacillin/tazobactam IVPB.. 3.375 Gram(s) IV Intermittent every 8 hours  polyethylene glycol 3350 17 Gram(s) Oral two times a day  potassium chloride  20 mEq/100 mL IVPB 20 milliEquivalent(s) IV Intermittent once  senna 2 Tablet(s) Oral at bedtime  simvastatin 20 milliGRAM(s) Oral at bedtime  tamsulosin 0.4 milliGRAM(s) Oral at bedtime    MEDICATIONS  (PRN):  acetaminophen     Tablet .. 650 milliGRAM(s) Oral every 6 hours PRN Temp greater or equal to 38C (100.4F), Mild Pain (1 - 3)  aluminum hydroxide/magnesium hydroxide/simethicone Suspension 30 milliLiter(s) Oral every 4 hours PRN Dyspepsia  heparin   Injectable 5000 Unit(s) IV Push every 6 hours PRN For aPTT less than 40  heparin   Injectable 2500 Unit(s) IV Push every 6 hours PRN For aPTT between 40 - 57  melatonin 3 milliGRAM(s) Oral at bedtime PRN Insomnia  metoprolol tartrate Injectable 2.5 milliGRAM(s) IV Push every 6 hours PRN HR > 120  ondansetron Injectable 4 milliGRAM(s) IV Push every 8 hours PRN Nausea and/or Vomiting      Allergies    Cipro (Other)  all narcotics give severe vomitting and dizziness (Other; Vomiting)  Bactrim (Rash)  Levaquin (Unknown)  ampicillin (Stomach Upset)    Intolerances    Augmentin (Diarrhea)      REVIEW OF SYSTEMS:  All other review of systems negative, except as noted in HPI    OBJECTIVE:    Vital Signs Last 24 Hrs  T(C): 36.9 (16 Aug 2024 04:46), Max: 36.9 (16 Aug 2024 04:46)  T(F): 98.4 (16 Aug 2024 04:46), Max: 98.4 (16 Aug 2024 04:46)  HR: 126 (16 Aug 2024 04:46) (69 - 126)  BP: 112/78 (16 Aug 2024 04:46) (88/64 - 112/78)  BP(mean): 87 (15 Aug 2024 18:05) (72 - 88)  RR: 18 (16 Aug 2024 04:46) (18 - 24)  SpO2: 95% (16 Aug 2024 04:46) (95% - 100%)    Parameters below as of 16 Aug 2024 04:46  Patient On (Oxygen Delivery Method): room air          PHYSICAL EXAM:  GENERAL: Pt lying in bed with labored breath.   HEAD:  Atraumatic, Normocephalic  EYES: EOMI, PERRL, conjunctiva and sclera clear  ENT: Moist mucous membranes  NECK: Supple, No JVD  CHEST/LUNG: Recreased auscultation on LL bilaterally; No rales, rhonchi, wheezing, or rubs.   HEART: Irregular rate and rhythm; No murmurs, rubs, or gallops  ABDOMEN: Bowel sounds present; Soft, Nontender, Nondistended. No guarding or rigidity   EXTREMITIES:  2+ Peripheral Pulses, brisk capillary refill. No clubbing, cyanosis, or edema  NERVOUS SYSTEM:  Alert & Oriented X3, FROM x 4 extremities. No deficits   SKIN: No rashes or lesions        Lab/ Imaging:    LABS:                        9.9    17.06 )-----------( 464      ( 16 Aug 2024 02:13 )             31.2     08-16    132<L>  |  99  |  20.8<H>  ----------------------------<  164<H>  3.7   |  20.0<L>  |  0.52    Ca    8.3<L>      16 Aug 2024 02:13  Phos  2.8     08-16  Mg     1.3     08-16    TPro  5.8<L>  /  Alb  2.4<L>  /  TBili  0.5  /  DBili  x   /  AST  19  /  ALT  19  /  AlkPhos  101  08-16    PT/INR - ( 14 Aug 2024 13:25 )   PT: 21.5 sec;   INR: 1.98 ratio         PTT - ( 16 Aug 2024 02:13 )  PTT:88.8 sec  Urinalysis Basic - ( 16 Aug 2024 02:13 )    Color: x / Appearance: x / SG: x / pH: x  Gluc: 164 mg/dL / Ketone: x  / Bili: x / Urobili: x   Blood: x / Protein: x / Nitrite: x   Leuk Esterase: x / RBC: x / WBC x   Sq Epi: x / Non Sq Epi: x / Bacteria: x      CAPILLARY BLOOD GLUCOSE      POCT Blood Glucose.: 132 mg/dL (15 Aug 2024 17:56)  POCT Blood Glucose.: 119 mg/dL (15 Aug 2024 08:33)       SUBJECTIVE:    Chief Complaint: Patient is a 83y old  Female who presents with a chief complaint of Acute UTI, Pleural Effusion, Pericardial Effusion, Fecal Impaction, AFIB RVR (15 Aug 2024 15:26)      Hospital Course:   83F PMHx Afib on apixaban, recurrent UTIs, chronic left sided nephrosis, hypothyroid, OA, HTN, HLD, CVA (residual left sided weakness), chronic walters BIBEMS from home for severe abdominal pain. Patient at baseline is A&Ox3, can answer all questions, knows medical hx well, has left sided weakness of UE/LE, unable to walk, Ursula lift out of bed. Last night the patient experienced stabbing LLQ abd pain w/o radiation, no worsening/remitting factors. Patient denies fever, chills, chest pain, cough, N/V/D and endorses no other symptoms. Unable to get in touch with family, patient did not know meds well, used Dr. Garibay, recommend calling family in AM. Patient was recently admitted to [facility] from 3/31 - 4/15/24 with UTI with sepsis complicated by A fib with RVR, BCx grew  coagulase-negative staph which was considered contamination rest of blood cultures including urine culture were all negative. Also, admitted on 6/4/24 with hyponatremia with a Na of 119 and hypotensive, Prior BCx grew Staphylococcus capitis and prior UCx grew Klebsiella pneumonia and Enterobacter cloacae.     In ED patient was found to be in Afib RVR. Rate control with improvement. CT chest/abd/pelvis showed pleural effusion, moderate pericardial effusion, fecal impaction, and pyelonephritis, as well as misplaced walters catheter in cervix. Thoracic consulted, planning thoracentesis once 24 hrs past last apixaban. Also, UA +, patient received 2L IVF bolus and vancomycin 1x and Cefepime 1x. Patient admitted to med/tele for sepsis 2/2 UTI c/b fecal impaciton, AFIB RVR, pleural and pericardial effusions.    INTERVAL HPI/OVERNIGHT EVENTS: Patient seen and examined at bedside at AM. No clinically acute event overnight.   Denies any chest pain, palpitations, SOB, leg edema, N/V/D, or any other complaints.     MEDICATIONS  (STANDING):  collagenase Ointment 1 Application(s) Topical daily  furosemide    Tablet 20 milliGRAM(s) Oral daily  heparin  Infusion.  Unit(s)/Hr (11 mL/Hr) IV Continuous <Continuous>  levothyroxine 75 MICROGram(s) Oral daily  magnesium sulfate  IVPB 2 Gram(s) IV Intermittent once  metoprolol tartrate 25 milliGRAM(s) Oral two times a day  piperacillin/tazobactam IVPB.. 3.375 Gram(s) IV Intermittent every 8 hours  polyethylene glycol 3350 17 Gram(s) Oral two times a day  potassium chloride  20 mEq/100 mL IVPB 20 milliEquivalent(s) IV Intermittent once  senna 2 Tablet(s) Oral at bedtime  simvastatin 20 milliGRAM(s) Oral at bedtime  tamsulosin 0.4 milliGRAM(s) Oral at bedtime    MEDICATIONS  (PRN):  acetaminophen     Tablet .. 650 milliGRAM(s) Oral every 6 hours PRN Temp greater or equal to 38C (100.4F), Mild Pain (1 - 3)  aluminum hydroxide/magnesium hydroxide/simethicone Suspension 30 milliLiter(s) Oral every 4 hours PRN Dyspepsia  heparin   Injectable 5000 Unit(s) IV Push every 6 hours PRN For aPTT less than 40  heparin   Injectable 2500 Unit(s) IV Push every 6 hours PRN For aPTT between 40 - 57  melatonin 3 milliGRAM(s) Oral at bedtime PRN Insomnia  metoprolol tartrate Injectable 2.5 milliGRAM(s) IV Push every 6 hours PRN HR > 120  ondansetron Injectable 4 milliGRAM(s) IV Push every 8 hours PRN Nausea and/or Vomiting      Allergies    Cipro (Other)  all narcotics give severe vomitting and dizziness (Other; Vomiting)  Bactrim (Rash)  Levaquin (Unknown)  ampicillin (Stomach Upset)    Intolerances    Augmentin (Diarrhea)      REVIEW OF SYSTEMS:  All other review of systems negative, except as noted in HPI    OBJECTIVE:    Vital Signs Last 24 Hrs  T(C): 36.9 (16 Aug 2024 04:46), Max: 36.9 (16 Aug 2024 04:46)  T(F): 98.4 (16 Aug 2024 04:46), Max: 98.4 (16 Aug 2024 04:46)  HR: 126 (16 Aug 2024 04:46) (69 - 126)  BP: 112/78 (16 Aug 2024 04:46) (88/64 - 112/78)  BP(mean): 87 (15 Aug 2024 18:05) (72 - 88)  RR: 18 (16 Aug 2024 04:46) (18 - 24)  SpO2: 95% (16 Aug 2024 04:46) (95% - 100%)    Parameters below as of 16 Aug 2024 04:46  Patient On (Oxygen Delivery Method): room air          PHYSICAL EXAM:  GENERAL: Pt lying in bed with labored breath.   HEAD:  Atraumatic, Normocephalic  EYES: EOMI, PERRL, conjunctiva and sclera clear  ENT: Moist mucous membranes  NECK: Supple, No JVD  CHEST/LUNG: Decreased lung sound on LL b/l on ausculation. No rales, rhonchi, wheezing, or rubs.   HEART: Irregular rate and rhythm; No murmurs, rubs, or gallops  ABDOMEN: Bowel sounds present; Soft, Nontender, Nondistended. No guarding or rigidity   EXTREMITIES:  2+ Peripheral Pulses, brisk capillary refill. No clubbing, cyanosis, or edema  NERVOUS SYSTEM:  Alert & Oriented X3, FROM x 4 extremities. No deficits   SKIN: No rashes or lesions        Lab/ Imaging:    LABS:                        9.9    17.06 )-----------( 464      ( 16 Aug 2024 02:13 )             31.2     08-16    132<L>  |  99  |  20.8<H>  ----------------------------<  164<H>  3.7   |  20.0<L>  |  0.52    Ca    8.3<L>      16 Aug 2024 02:13  Phos  2.8     08-16  Mg     1.3     08-16    TPro  5.8<L>  /  Alb  2.4<L>  /  TBili  0.5  /  DBili  x   /  AST  19  /  ALT  19  /  AlkPhos  101  08-16    PT/INR - ( 14 Aug 2024 13:25 )   PT: 21.5 sec;   INR: 1.98 ratio         PTT - ( 16 Aug 2024 02:13 )  PTT:88.8 sec  Urinalysis Basic - ( 16 Aug 2024 02:13 )    Color: x / Appearance: x / SG: x / pH: x  Gluc: 164 mg/dL / Ketone: x  / Bili: x / Urobili: x   Blood: x / Protein: x / Nitrite: x   Leuk Esterase: x / RBC: x / WBC x   Sq Epi: x / Non Sq Epi: x / Bacteria: x      CAPILLARY BLOOD GLUCOSE      POCT Blood Glucose.: 132 mg/dL (15 Aug 2024 17:56)  POCT Blood Glucose.: 119 mg/dL (15 Aug 2024 08:33)
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
## 2024-08-16 NOTE — PROGRESS NOTE ADULT - ASSESSMENT
83F PMHx Afib on apixaban, recurrent UTIs, chronic left sided nephrosis, hypothyroid, OA, HTN, HLD, CVA (residual left sided weakness), chronic walters BIBEMS from home for abdominal pain admitted for Sepsis 2/2 Acute UTI c/b chronic walters, fecal impaction, pleural and pericardial effusions, and AFIB RVR.    # Sepsis secondary to cathter associated UTI  -Follow up on culture  -S/p cathter chaneged in ED  -Continue Zosyn    # Left pleural effusion  - S/p Apixaban   -  Plan for thoracentesis today   -thoracic surgry is following    # Dysphagia  -Family reported patient was coughing with diet for last few days  -SLP consulted  -Could be complication of previous stroke    # Pericardial effusion  -TTE reported moderate pericardial effusion  - Prior echo does not demonstrate pericarial effusion  -Cardiology consult appreciated   - Echo shows normal EF 50-55%  - troponin is normal but upper end of range, continue to trend  - pBNP in 6/2024 was 6927,  repeated on 8/15 1543.   - check TSH, ESR, CRP, CPK  - for now there is no indication for thoracentesis  - there is no smith's triad on physical exam   - Repeat Echo in 72hrs  to evaluate for worsening effusion   - Recommend thoracentesis first and send pleural fluid for cytology to evaluate the etiology of the effusion. Concern there is underlying malignancy.   - monitor on telemetry, afib is rate controlled 90s x24h no episodes of RVR or SVR. eliquis on hold for pericardiocentesis. use heparin infusion full AC protocol temporarily and resume elquis as soon as possible if no procedures are planned   - continue metoprolol, lasix.      # A.fib with RVR  -Currently rate controlled  -Continue metoprolol   -Apixaban on hold  -ON IV heparin infusion    # Pressure injury   -On injury  -ON buttock and hip, Stage II and III  - Daily wound care with sently and calcium alginate       83F PMHx Afib on apixaban, recurrent UTIs, chronic left sided nephrosis, hypothyroid, OA, HTN, HLD, CVA (residual left sided weakness), chronic walters BIBEMS from home for abdominal pain admitted for Sepsis 2/2 Acute UTI c/b chronic walters, fecal impaction, pleural and pericardial effusions, and AFIB RVR.    # Sepsis secondary to cathter associated UTI  -Follow up on culture  -S/p cathter changed in ED  -Continue Zosyn    # Left pleural effusion  - S/p Apixaban   - Plan for thoracentesis today   - Dc'd heparin 2hrs before thoracentesis   - thoracic surgry is following  - Pt is 83 years old, will resume Eliquis next day.     # Dysphagia  -Family reported patient was coughing with diet for last few days  -SLP consulted  -Could be complication of previous stroke    # Pericardial effusion  -TTE reported moderate pericardial effusion  - Prior echo does not demonstrate pericarial effusion  - Cardiology consult appreciated   - Echo shows normal EF 50-55%  - Troponin is normal but upper end of range, continue to trend  - pBNP in 6/2024 was 6927,  repeated on 8/15 1543.   - Repeated Echo on probably Sunday (8/18) to evaluate worsening effusion.   - Follow up TSH, CPK  - There is no smith's triad on physical exam   - Recommend thoracentesis first and send pleural fluid for cytology to evaluate the etiology of the effusion. Concern there is underlying malignancy.   - Monitor on telemetry, afib is rate controlled 90s x24h no episodes of RVR or SVR. Eliquis will be on hold 24hrs before planned cardiocentesis.   - continue metoprolol, lasix.      # A.fib with RVR  -Currently rate controlled  -Continue metoprolol   -Apixaban on hold  -ON IV heparin infusion    # Pressure injury   -On injury  -ON buttock and hip, Stage II and III  - Daily wound care with sently and calcium alginate    Dispo: medically active, discharge pending    83F PMHx Afib on apixaban, recurrent UTIs, chronic left sided nephrosis, hypothyroid, OA, HTN, HLD, CVA (residual left sided weakness), chronic walters BIBEMS from home for abdominal pain admitted for Sepsis 2/2 Acute UTI c/b chronic walters, fecal impaction, pleural and pericardial effusions, and AFIB RVR.    # Sepsis secondary to cathter associated UTI  -Follow up on culture  -S/p cathter changed in ED  -Continue Zosyn    # Left pleural effusion  - S/p Apixaban   - Plan for thoracentesis today   - Dc'd heparin 2hrs before thoracentesis   - thoracic surgry is following  - Pt is 83 years old, will resume Eliquis next day.     # Dysphagia  -Family reported patient was coughing with diet for last few days  -SLP consulted  -Could be complication of previous stroke    # Pericardial effusion  -TTE reported moderate pericardial effusion  - Prior echo does not demonstrate pericarial effusion  - Cardiology consult appreciated   - Echo shows normal EF 50-55%  - Troponin is normal but upper end of range, continue to trend  - pBNP in 6/2024 was 6927,  repeated on 8/15 1543.   - Repeated Echo on probably Sunday (8/18) to evaluate worsening effusion.   - Follow up TSH, CPK  - There is no smith's triad on physical exam   - Recommend thoracentesis first and send pleural fluid for cytology to evaluate the etiology of the effusion. Concern there is underlying malignancy.   - Monitor on telemetry, afib is rate controlled 90s x24h no episodes of RVR or SVR. Eliquis will be on hold 24hrs before planned pericardiocentesis.   - continue metoprolol, lasix.      # A.fib with RVR  -Currently rate controlled  -Continue metoprolol   -Apixaban on hold  -ON IV heparin infusion    # Pressure injury   -On injury  -ON buttock and hip, Stage II and III  - Daily wound care with sently and calcium alginate    Dispo: medically active, discharge pending

## 2024-08-16 NOTE — PROGRESS NOTE ADULT - ASSESSMENT
83y Female with a h/o Aifb on Eliquis, OA, HTN, HLD, CVA (residual left sided weakness). Multiple admission for UTI/sepsis, most recently on 6/4/24 with lethargy, poor PO intake and decreased urine output. Patient was admitted to MICU requiring vasopressors. Completed course of IV Meropenem for Klebsiella and enterobacter UTI. Presents to ED from home where her daughter cares for her c/o diffuse abd pain since early in AM. ED workup notable for afib RVR, acute UTI, b/l pleural effusions L>R, mod pericardial effusion without clinical evidence of tamponade. Thoracic Sx consulted for effusion eval.  S/p L PTC placed 8/15 with 1L serous fluid initial output.

## 2024-08-16 NOTE — PROGRESS NOTE ADULT - NS ATTEND AMEND GEN_ALL_CORE FT
Patient was seen Patient was seen and examined at bedside with shortness of breath and tachypnea    83y Female with a h/o Aifb on Eliquis, OA, HTN, HLD, CVA (residual left sided weakness). Multiple admission for UTI/sepsis, most recently on 6/4/24 with lethargy, poor PO intake and decreased urine output. Patient was admitted to MICU requiring vasopressors. Completed course of IV Meropenem for Klebsiella and enterobacter UTI. Presents to ED from home where her daughter cares for her c/o diffuse abd pain since early in AM. ED workup notable for afib RVR, acute UTI, b/l pleural effusions L>R, mod pericardial effusion without clinical evidence of tamponade. Thoracic Sx consulted for effusion eval. Cardiology consulted for pericardial effusion       1. Acute respiratory distress possible secondary to pleural effusion and possible pneumonia   2. Afib with RVR   3. Atrial fibrillation, on Eliquis    - patient is still in respiratory distress with bilateral pleural effusion with tachypnea and shortness of breath   - patient s/p thoracentesis and currently has drain in place   -Left ventricular systolic function is normal with an ejection fraction of 54 % by Maher's method of disks with an ejection fraction visually estimated at 50 to 55 %.  - pericardial effusion: Moderate pericardial effusion noted adjacent to the right atrium and small pericardial effusion noted adjacent to the lateral left ventricle with no evidence of hemodynamic compromise (or echocardiographic evidence of cardiac tamponade).  - will start Lasix 40mg IVP possibly for 2 days   - due to hypotension will stop the Metoprolol and start Digoxin load for better heart rate control Digoxin 500mcg IVP x 1 and then Digoxin 250mcg IVP q 6hrs and then 125mg po q daily; continue with Eliquis, once HR better controlled will then transition to Metoprolol     will follow up     Eva Hodges D.O. [facility]  Cardiology/Vascular Cardiology -[facility]   Telephone # [phone]

## 2024-08-16 NOTE — PROGRESS NOTE ADULT - PROBLEM SELECTOR PLAN 1
- Diagnosed here with Afib RVR, acute uti, bilateral pleural effusion L>R, and moderate pericardial effusion  - prior echo does not demonstrate pericardial effusion  - Echo shows LVEF 50-55%  - troponin is normal but upper end of range, continue to trend  - pBNP in 6/2024 was 6927 and pt was not overloaded at that time, now it is significantly less 1500 and pt is overloaded. pBNP is sort of non-diagnostic   - ESR is 100 and CRP is 250 which is markedly elevated. Acute phase reactant secondary to infection?   - possibly underlying rheumatologic condition vs pericarditis, but in absence of chest pain, EKG without AK depression, and Trop normal, suspicion is lower  - for now there is no indication for pericardiocentesis  - we will follow and repeat limited echo on Sunday to evaluate for worsening effusion   - during this time we expect that thoracic will perform thoracentesis and send pleural fluid for cytology to evaluate the etiology of the effusion.   - concern for malignancy  - euvolemic on exam.

## 2024-08-16 NOTE — PROGRESS NOTE ADULT - ASSESSMENT
83y Female with a h/o Aifb on Eliquis, OA, HTN, HLD, CVA (residual left sided weakness). Multiple admission for UTI/sepsis, most recently on 6/4/24 with lethargy, poor PO intake and decreased urine output. Patient was admitted to MICU requiring vasopressors. Completed course of IV Meropenem for Klebsiella and enterobacter UTI. Presents to ED from home where her daughter cares for her c/o diffuse abd pain since early in AM. ED workup notable for afib RVR, acute UTI, b/l pleural effusions L>R, mod pericardial effusion without clinical evidence of tamponade. Thoracic Sx consulted for effusion eval. Cardiology consulted for pericardial effusion

## 2024-08-16 NOTE — PROGRESS NOTE ADULT - SUBJECTIVE AND OBJECTIVE BOX
[facility]                                                         CARDIOLOGY AT [place] [address]                                                         Telephone: [phone]. Fax:[phone]                                                                             PROGRESS NOTE    Reason for follow up: pericardial effusion     Review of symptoms:   Cardiac:  No chest pain. No dyspnea. No palpitations.  Respiratory: no cough. No dyspnea  Gastrointestinal: No diarrhea. No abdominal pain. No bleeding.   Neuro: No focal neuro complaints.    Vitals:  T(C): 36.6 (08-16-24 @ 10:22), Max: 36.9 (08-16-24 @ 04:46)  HR: 113 (08-16-24 @ 10:22) (69 - 126)  BP: 80/56 (08-16-24 @ 10:22) (80/56 - 112/78)  RR: 18 (08-16-24 @ 10:22) (18 - 24)  SpO2: 97% (08-16-24 @ 10:22) (95% - 100%)  Wt(kg): --  I&O's Summary    15 Aug 2024 07:01  -  16 Aug 2024 07:00  --------------------------------------------------------  IN: 0 mL / OUT: 550 mL / NET: -550 mL      Weight (kg): 64 (08-15 @ 04:57)    PHYSICAL EXAM:  Appearance: Comfortable. No acute distress  HEENT:  Atraumatic. Normocephalic.  Normal oral mucosa  Neurologic: A & O x 3, no gross focal deficits.  Cardiovascular: RRR S1 S2, No murmur, no rubs/gallops. No JVD  Respiratory: Lungs clear to auscultation, unlabored   Gastrointestinal:  Soft, Non-tender, + BS  Lower Extremities: 2+ Peripheral Pulses, No clubbing, cyanosis, or edema  Psychiatry: Patient is calm. No agitation.   Skin: warm and dry.    CURRENT CARDIAC MEDICATIONS:  furosemide    Tablet 20 milliGRAM(s) Oral daily  metoprolol tartrate 25 milliGRAM(s) Oral two times a day  metoprolol tartrate Injectable 2.5 milliGRAM(s) IV Push every 6 hours PRN      CURRENT OTHER MEDICATIONS:  piperacillin/tazobactam IVPB.. 3.375 Gram(s) IV Intermittent every 8 hours  acetaminophen     Tablet .. 650 milliGRAM(s) Oral every 6 hours PRN Temp greater or equal to 38C (100.4F), Mild Pain (1 - 3)  melatonin 3 milliGRAM(s) Oral at bedtime PRN Insomnia  ondansetron Injectable 4 milliGRAM(s) IV Push every 8 hours PRN Nausea and/or Vomiting  aluminum hydroxide/magnesium hydroxide/simethicone Suspension 30 milliLiter(s) Oral every 4 hours PRN Dyspepsia  polyethylene glycol 3350 17 Gram(s) Oral two times a day  senna 2 Tablet(s) Oral at bedtime  levothyroxine 75 MICROGram(s) Oral daily  simvastatin 20 milliGRAM(s) Oral at bedtime  collagenase Ointment 1 Application(s) Topical daily  heparin   Injectable 5000 Unit(s) IV Push every 6 hours PRN For aPTT less than 40  heparin   Injectable 2500 Unit(s) IV Push every 6 hours PRN For aPTT between 40 - 57  tamsulosin 0.4 milliGRAM(s) Oral at bedtime      LABS:	 	                            9.9    17.06 )-----------( 464      ( 16 Aug 2024 02:13 )             31.2     08-16    132<L>  |  99  |  20.8<H>  ----------------------------<  164<H>  3.7   |  20.0<L>  |  0.52    Ca    8.3<L>      16 Aug 2024 02:13  Phos  2.8     08-16  Mg     1.3     08-16    TPro  5.8<L>  /  Alb  2.4<L>  /  TBili  0.5  /  DBili  x   /  AST  19  /  ALT  19  /  AlkPhos  101  08-16    PT/INR/PTT ( 16 Aug 2024 08:40 )                       :                       :      X            :       56.4                  .        .                   .              .           .       X           .                                       Lipid Profile:   HgA1c:   TSH:

## 2024-08-16 NOTE — PROGRESS NOTE ADULT - SUBJECTIVE AND OBJECTIVE BOX
Brief summary:  83yFemale seen and assessed at bedside.  Pt laying comfortably in hospital bed in NAD on room air.  States no current physical complaints at this time.  Denies f/c, n/v, chest pain, sob, abdominal pain, d/c, urinary symptoms.  ROS negative x 10.      Overnight events: No acute events overnight.  Hospital course progressing as expected.      PAST MEDICAL & SURGICAL HISTORY:  Osteoarthritis    Hypertension    Hiatal hernia    Hyperlipidemia    Polio    DVT (deep venous thrombosis)  2016, was on xarelto for 6 months    TIA (transient ischemic attack)  2004    S/P cholecystectomy    S/P dilation and curettage  2001, 1965, 1972, 1978    H/O total knee replacement, left  2003 revision 2014    S/P rhinoplasty    History of foot surgery  left foot due to polio, 1953    Neck mass  excision of neck mass 1942        Medications:  acetaminophen     Tablet .. 650 milliGRAM(s) Oral every 6 hours PRN  aluminum hydroxide/magnesium hydroxide/simethicone Suspension 30 milliLiter(s) Oral every 4 hours PRN  collagenase Ointment 1 Application(s) Topical daily  furosemide    Tablet 20 milliGRAM(s) Oral daily  heparin   Injectable 2500 Unit(s) IV Push every 6 hours PRN  heparin   Injectable 5000 Unit(s) IV Push every 6 hours PRN  levothyroxine 75 MICROGram(s) Oral daily  melatonin 3 milliGRAM(s) Oral at bedtime PRN  metoprolol tartrate 25 milliGRAM(s) Oral two times a day  metoprolol tartrate Injectable 2.5 milliGRAM(s) IV Push every 6 hours PRN  ondansetron Injectable 4 milliGRAM(s) IV Push every 8 hours PRN  piperacillin/tazobactam IVPB.. 3.375 Gram(s) IV Intermittent every 8 hours  polyethylene glycol 3350 17 Gram(s) Oral two times a day  senna 2 Tablet(s) Oral at bedtime  simvastatin 20 milliGRAM(s) Oral at bedtime  tamsulosin 0.4 milliGRAM(s) Oral at bedtime      MEDICATIONS  (PRN):  acetaminophen     Tablet .. 650 milliGRAM(s) Oral every 6 hours PRN Temp greater or equal to 38C (100.4F), Mild Pain (1 - 3)  aluminum hydroxide/magnesium hydroxide/simethicone Suspension 30 milliLiter(s) Oral every 4 hours PRN Dyspepsia  heparin   Injectable 2500 Unit(s) IV Push every 6 hours PRN For aPTT between 40 - 57  heparin   Injectable 5000 Unit(s) IV Push every 6 hours PRN For aPTT less than 40  melatonin 3 milliGRAM(s) Oral at bedtime PRN Insomnia  metoprolol tartrate Injectable 2.5 milliGRAM(s) IV Push every 6 hours PRN HR > 120  ondansetron Injectable 4 milliGRAM(s) IV Push every 8 hours PRN Nausea and/or Vomiting        Daily Review:               9.9    17.06 )-----------( 464      ( 16 Aug 2024 02:13 )             31.2   08-16    132<L>  |  99  |  20.8<H>  ----------------------------<  164<H>  3.7   |  20.0<L>  |  0.52    Ca    8.3<L>      16 Aug 2024 02:13  Phos  2.8     08-16  Mg     1.3     08-16    TPro  5.8<L>  /  Alb  2.4<L>  /  TBili  0.5  /  DBili  x   /  AST  19  /  ALT  19  /  AlkPhos  101  08-16    PTT - ( 16 Aug 2024 08:40 )  PTT:56.4 sec      T(C): 36.6 (08-16-24 @ 12:38), Max: 36.9 (08-16-24 @ 04:46)  HR: 105 (08-16-24 @ 12:38) (69 - 126)  BP: 102/70 (08-16-24 @ 12:38) (80/56 - 112/78)  RR: 18 (08-16-24 @ 12:38) (18 - 20)  SpO2: 97% (08-16-24 @ 12:38) (95% - 100%)  Wt(kg): --      CAPILLARY BLOOD GLUCOSE    POCT Blood Glucose.: 132 mg/dL (15 Aug 2024 17:56)      I&O's Summary    15 Aug 2024 07:01  -  16 Aug 2024 07:00  --------------------------------------------------------  IN: 0 mL / OUT: 550 mL / NET: -550 mL        Physical Exam  Neuro: A+O x 3, non-focal, speech clear and intact  Pulm: coarse breath sounds bilaterally, no wheezing or rales, on room air  CV: irregularly irregular, +S1S2  Abd: soft, NT, ND, normoactive bowel sounds  Ext: +DP Pulses b/l, +PT pulses, no edema        TTE 8/15/24    < from: TTE W or WO Ultrasound Enhancing Agent (08.15.24 @ 09:19) >     CONCLUSIONS:      1. Left ventricular systolic function is normal with an ejection fraction of 54 % by Maher's method of disks with an ejection fraction visually estimated at 50 to 55 %.   2. Normal right ventricular cavity size and normal right ventricular systolic function.   3. Mild mitral regurgitation.   4. Normal left and right atrial size.   5. Mild to moderate tricuspid regurgitation.   6. Estimated pulmonary artery systolic pressure is 33 mmHg, consistent with normal pulmonary artery pressure.   7. Moderate pericardial effusion noted adjacent to the right atrium and small pericardial effusion noted adjacent to the lateral left ventricle with no evidence of hemodynamic compromise (or echocardiographic evidence of cardiac tamponade).    < end of copied text >

## 2024-08-16 NOTE — PROGRESS NOTE ADULT - PROBLEM SELECTOR PLAN 1
Mod/Large left pleural effusion, compressive atelectasis, small right effusion  Chest CT with moderate pericardial effusion, TTE as above  Hemodynamically stable, no obvious signs of tamponade  S/p L PTC placed 8/15 with serous fluid 1L initial output  Maintain left pigtail to water seal.  CXR each AM while chest tube is in place.  Record chest tube output on pleurevac and document in flowsheet q12 hours, even if output is zero.  Continuous SpO2 monitoring.  Maintain SpO2 >92%. Supplement with O2 therapy PRN.  Encourage the use of incentive spirometer, cough/deep breathing exercises, OOB to chair, ambulate as tolerated.  Thoracic Surgery will continue to follow.  Rest of care per primary team.    Light's criteria - _____  Pleural Fluid: Gram stain/culture/fungal pending  Pleural fluid cyto pending     Plan discussed with Thoracic Surgery attendings during AM rounds.

## 2024-08-16 NOTE — PROCEDURE NOTE - NSAPPROACH_GEN_A_CORE
Refill request received for losartan (COZAAR) 25 MG tablet     Last office visit: 12/4/2023  Next office visit: 12/5/2024  Last refill: 12/7/2022  Is patient due for refill: yes    percutaneous

## 2024-08-17 ENCOUNTER — RESULT REVIEW (OUTPATIENT)
Age: 84
End: 2024-08-17

## 2024-08-17 DIAGNOSIS — I50.33 ACUTE ON CHRONIC DIASTOLIC (CONGESTIVE) HEART FAILURE: ICD-10-CM

## 2024-08-17 LAB
-  AMOXICILLIN/CLAVULANIC ACID: SIGNIFICANT CHANGE UP
-  AMPICILLIN/SULBACTAM: SIGNIFICANT CHANGE UP
-  AMPICILLIN: SIGNIFICANT CHANGE UP
-  AZTREONAM: SIGNIFICANT CHANGE UP
-  CEFAZOLIN: SIGNIFICANT CHANGE UP
-  CEFEPIME: SIGNIFICANT CHANGE UP
-  CEFOXITIN: SIGNIFICANT CHANGE UP
-  CEFTRIAXONE: SIGNIFICANT CHANGE UP
-  CIPROFLOXACIN: SIGNIFICANT CHANGE UP
-  ERTAPENEM: SIGNIFICANT CHANGE UP
-  GENTAMICIN: SIGNIFICANT CHANGE UP
-  IMIPENEM: SIGNIFICANT CHANGE UP
-  LEVOFLOXACIN: SIGNIFICANT CHANGE UP
-  MEROPENEM: SIGNIFICANT CHANGE UP
-  NITROFURANTOIN: SIGNIFICANT CHANGE UP
-  PIPERACILLIN/TAZOBACTAM: SIGNIFICANT CHANGE UP
-  TOBRAMYCIN: SIGNIFICANT CHANGE UP
-  TRIMETHOPRIM/SULFAMETHOXAZOLE: SIGNIFICANT CHANGE UP
ALBUMIN SERPL ELPH-MCNC: 2 G/DL — LOW (ref 3.3–5.2)
ALP SERPL-CCNC: 91 U/L — SIGNIFICANT CHANGE UP (ref 40–120)
ALT FLD-CCNC: 16 U/L — SIGNIFICANT CHANGE UP
ANION GAP SERPL CALC-SCNC: 13 MMOL/L — SIGNIFICANT CHANGE UP (ref 5–17)
AST SERPL-CCNC: 18 U/L — SIGNIFICANT CHANGE UP
BILIRUB SERPL-MCNC: 0.4 MG/DL — SIGNIFICANT CHANGE UP (ref 0.4–2)
BUN SERPL-MCNC: 20 MG/DL — SIGNIFICANT CHANGE UP (ref 8–20)
CALCIUM SERPL-MCNC: 8.3 MG/DL — LOW (ref 8.4–10.5)
CHLORIDE SERPL-SCNC: 102 MMOL/L — SIGNIFICANT CHANGE UP (ref 96–108)
CO2 SERPL-SCNC: 19 MMOL/L — LOW (ref 22–29)
CREAT SERPL-MCNC: 0.58 MG/DL — SIGNIFICANT CHANGE UP (ref 0.5–1.3)
CULTURE RESULTS: ABNORMAL
EGFR: 91 ML/MIN/1.73M2 — SIGNIFICANT CHANGE UP
GLUCOSE SERPL-MCNC: 145 MG/DL — HIGH (ref 70–99)
GRAM STN FLD: SIGNIFICANT CHANGE UP
HCT VFR BLD CALC: 30 % — LOW (ref 34.5–45)
HGB BLD-MCNC: 9.3 G/DL — LOW (ref 11.5–15.5)
LDH SERPL L TO P-CCNC: 176 U/L — SIGNIFICANT CHANGE UP (ref 98–192)
MAGNESIUM SERPL-MCNC: 1.7 MG/DL — SIGNIFICANT CHANGE UP (ref 1.6–2.6)
MCHC RBC-ENTMCNC: 27 PG — SIGNIFICANT CHANGE UP (ref 27–34)
MCHC RBC-ENTMCNC: 31 GM/DL — LOW (ref 32–36)
MCV RBC AUTO: 87.2 FL — SIGNIFICANT CHANGE UP (ref 80–100)
METHOD TYPE: SIGNIFICANT CHANGE UP
ORGANISM # SPEC MICROSCOPIC CNT: ABNORMAL
ORGANISM # SPEC MICROSCOPIC CNT: SIGNIFICANT CHANGE UP
PHOSPHATE SERPL-MCNC: 2.6 MG/DL — SIGNIFICANT CHANGE UP (ref 2.4–4.7)
PLATELET # BLD AUTO: 389 K/UL — SIGNIFICANT CHANGE UP (ref 150–400)
POTASSIUM SERPL-MCNC: 3.2 MMOL/L — LOW (ref 3.5–5.3)
POTASSIUM SERPL-SCNC: 3.2 MMOL/L — LOW (ref 3.5–5.3)
PROT SERPL-MCNC: 5.3 G/DL — LOW (ref 6.6–8.7)
RBC # BLD: 3.44 M/UL — LOW (ref 3.8–5.2)
RBC # FLD: 15.9 % — HIGH (ref 10.3–14.5)
SODIUM SERPL-SCNC: 134 MMOL/L — LOW (ref 135–145)
SPECIMEN SOURCE: SIGNIFICANT CHANGE UP
SPECIMEN SOURCE: SIGNIFICANT CHANGE UP
TSH SERPL-MCNC: 4.56 UIU/ML — HIGH (ref 0.27–4.2)
WBC # BLD: 16.38 K/UL — HIGH (ref 3.8–10.5)
WBC # FLD AUTO: 16.38 K/UL — HIGH (ref 3.8–10.5)

## 2024-08-17 PROCEDURE — 71045 X-RAY EXAM CHEST 1 VIEW: CPT | Mod: 26

## 2024-08-17 PROCEDURE — 99233 SBSQ HOSP IP/OBS HIGH 50: CPT

## 2024-08-17 PROCEDURE — 99232 SBSQ HOSP IP/OBS MODERATE 35: CPT

## 2024-08-17 PROCEDURE — 99231 SBSQ HOSP IP/OBS SF/LOW 25: CPT

## 2024-08-17 PROCEDURE — 93308 TTE F-UP OR LMTD: CPT | Mod: 26

## 2024-08-17 RX ORDER — DIGOXIN 0.12 MG/1
125 TABLET ORAL DAILY
Refills: 0 | Status: DISCONTINUED | OUTPATIENT
Start: 2024-08-18 | End: 2024-08-22

## 2024-08-17 RX ORDER — ZINC SULFATE 50(220)MG
220 CAPSULE ORAL DAILY
Refills: 0 | Status: DISCONTINUED | OUTPATIENT
Start: 2024-08-17 | End: 2024-08-22

## 2024-08-17 RX ORDER — DIGOXIN 0.12 MG/1
250 TABLET ORAL EVERY 6 HOURS
Refills: 0 | Status: DISCONTINUED | OUTPATIENT
Start: 2024-08-17 | End: 2024-08-17

## 2024-08-17 RX ORDER — ASCORBIC ACID/ASCORBATE SODIUM 500 MG
500 TABLET,CHEWABLE ORAL DAILY
Refills: 0 | Status: DISCONTINUED | OUTPATIENT
Start: 2024-08-17 | End: 2024-08-22

## 2024-08-17 RX ADMIN — ACETAMINOPHEN 650 MILLIGRAM(S): 325 TABLET ORAL at 00:39

## 2024-08-17 RX ADMIN — Medication 40 MILLIGRAM(S): at 06:02

## 2024-08-17 RX ADMIN — COLLAGENASE SANTYL 1 APPLICATION(S): 250 OINTMENT TOPICAL at 11:30

## 2024-08-17 RX ADMIN — ACETAMINOPHEN 650 MILLIGRAM(S): 325 TABLET ORAL at 07:00

## 2024-08-17 RX ADMIN — APIXABAN 5 MILLIGRAM(S): 5 TABLET, FILM COATED ORAL at 17:16

## 2024-08-17 RX ADMIN — ACETAMINOPHEN 650 MILLIGRAM(S): 325 TABLET ORAL at 06:01

## 2024-08-17 RX ADMIN — APIXABAN 5 MILLIGRAM(S): 5 TABLET, FILM COATED ORAL at 05:53

## 2024-08-17 RX ADMIN — ACETAMINOPHEN 650 MILLIGRAM(S): 325 TABLET ORAL at 02:00

## 2024-08-17 RX ADMIN — PIPERACILLIN SODIUM AND TAZOBACTAM SODIUM 25 GRAM(S): 3; .375 INJECTION, POWDER, FOR SOLUTION INTRAVENOUS at 04:36

## 2024-08-17 RX ADMIN — POLYETHYLENE GLYCOL 3350 17 GRAM(S): 17 POWDER, FOR SOLUTION ORAL at 17:16

## 2024-08-17 RX ADMIN — PIPERACILLIN SODIUM AND TAZOBACTAM SODIUM 25 GRAM(S): 3; .375 INJECTION, POWDER, FOR SOLUTION INTRAVENOUS at 20:40

## 2024-08-17 RX ADMIN — POLYETHYLENE GLYCOL 3350 17 GRAM(S): 17 POWDER, FOR SOLUTION ORAL at 05:57

## 2024-08-17 RX ADMIN — TAMSULOSIN HYDROCHLORIDE 0.4 MILLIGRAM(S): 0.4 CAPSULE ORAL at 21:29

## 2024-08-17 RX ADMIN — Medication 20 MILLIGRAM(S): at 21:30

## 2024-08-17 RX ADMIN — ACETAMINOPHEN 650 MILLIGRAM(S): 325 TABLET ORAL at 17:16

## 2024-08-17 RX ADMIN — PIPERACILLIN SODIUM AND TAZOBACTAM SODIUM 25 GRAM(S): 3; .375 INJECTION, POWDER, FOR SOLUTION INTRAVENOUS at 11:30

## 2024-08-17 RX ADMIN — DIGOXIN 250 MICROGRAM(S): 0.12 TABLET ORAL at 00:38

## 2024-08-17 RX ADMIN — ACETAMINOPHEN 650 MILLIGRAM(S): 325 TABLET ORAL at 18:15

## 2024-08-17 RX ADMIN — Medication 75 MICROGRAM(S): at 05:56

## 2024-08-17 RX ADMIN — Medication 2 TABLET(S): at 21:29

## 2024-08-17 NOTE — PROGRESS NOTE ADULT - PROBLEM SELECTOR PLAN 4
- in Afib with RVR and due to hypotension stopped BB and completed Digoxin load and on Digoxin 125mg po q daily   - continue with telemetry monitoring   - rates have improved   - CHADS-VASc 7, on Eliquis 5mg po q 12hrs

## 2024-08-17 NOTE — PROGRESS NOTE ADULT - ASSESSMENT
· Assessment	  83F PMHx Afib on apixaban, recurrent UTIs, chronic left sided nephrosis, hypothyroid, OA, HTN, HLD, CVA (residual left sided weakness), chronic walters BIBEMS from home for abdominal pain admitted for Sepsis 2/2 Acute UTI c/b chronic walters, fecal impaction, pleural and pericardial effusions, and AFIB RVR.    # Sepsis secondary to cathter associated UTI  -Follow up on culture  - Urine culture (8/17) klebsiella (+)  -S/p cathter changed in ED  - Discontinued Zosyn?     # Left pleural effusion  - S/P L PTC   - Resume Apixaban 5mg bid  - Plan for thoracentesis today   - Based on light criteria, likely exudative -> possible malignancy?   - Pleural Fluid: Gram stain/culture/fungal/cyto pending  - Record chest tube output 12 hours, even if output is zero.  - Continuous SpO2 monitoring. Maintain SpO2 >92%. Supplement with O2 therapy PRN.  - Encourage the use of incentive spirometer, cough/deep breathing exercises, OOB to chair, ambulate as tolerated.  - Thoracic Surgery following.  - Oncology/Heme consult?       # Dysphagia  -Family reported patient was coughing with diet for last few days  -SLP consulted  -Could be complication of previous stroke    # Pericardial effusion  -TTE reported moderate pericardial effusion  - Prior echo does not demonstrate pericarial effusion  - Cardiology consult appreciated   - Echo shows normal EF 50-55%  - Troponin is normal but upper end of range, continue to trend  - pBNP in 6/2024 was 6927,  repeated on 8/15 1543.   - Repeated Echo on probably Sunday (8/18) to evaluate worsening effusion.  - Follow up TSH, CPK  - There is no smith's triad on physical exam   - Recommend thoracentesis first and send pleural fluid for cytology to evaluate the etiology of the effusion. Concern there is underlying malignancy.  Eliquis will be on hold 24hrs prior if there's pericardiocentesis.   - C/w monitor on telemetry,  - Will start Lasix 40mg IVP possibly for 2 days       # A.fib with RVR  - Resume eliquis   - Pt's HR was around 100-120, w/ hypotension.   - Will stop the metoprolol and start Digoxin load for better heart rate control Digoxin 500mcg IVP x 1 and then Digoxin 250mcg IVP q 6hrs and then 125mg po q daily;   - Continue with Eliquis, once HR better controlled will then transition to Metoprolol  - Cardio following       # Pressure injury   -On injury  -ON buttock and hip, Stage II and III  - Daily wound care with sently and calcium alginate    Dispo: medically active, discharge pending    · Assessment	  83F PMHx Afib on apixaban, recurrent UTIs, chronic left sided nephrosis, hypothyroid, OA, HTN, HLD, CVA (residual left sided weakness), chronic walters BIBEMS from home for abdominal pain admitted for Sepsis 2/2 Acute UTI c/b chronic walters, fecal impaction, pleural and pericardial effusions, and AFIB RVR.    # Sepsis secondary to cathter associated UTI  -Follow up on culture  - Urine culture (8/17) klebsiella (+)  -S/p cathter changed in ED  - Continue with Zosyn    # Left pleural effusion  - S/P L PTC   - Resume Apixaban 5mg bid  - Plan for thoracentesis today   - Based on light criteria, likely exudative -> possible malignancy?   - Pleural Fluid: Gram stain/culture/fungal/cyto pending  - Record chest tube output 12 hours, even if output is zero.  - Continuous SpO2 monitoring. Maintain SpO2 >92%. Supplement with O2 therapy PRN.  - Encourage the use of incentive spirometer, cough/deep breathing exercises, OOB to chair, ambulate as tolerated.  - Thoracic Surgery following.      # Dysphagia  -Family reported patient was coughing with diet for last few days  -SLP consulted  -Could be complication of previous stroke    # Pericardial effusion  -TTE reported moderate pericardial effusion  - Prior echo does not demonstrate pericarial effusion  - Cardiology consult appreciated   - Echo shows normal EF 50-55%  - Troponin is normal but upper end of range, continue to trend  - pBNP in 6/2024 was 6927,  repeated on 8/15 1543.   - Repeated Echo on probably Sunday (8/18) to evaluate worsening effusion.  - Follow up TSH, CPK  - There is no smith's triad on physical exam   - Recommend thoracentesis first and send pleural fluid for cytology to evaluate the etiology of the effusion. Concern there is underlying malignancy.  Eliquis will be on hold 24hrs prior if there's pericardiocentesis.   - C/w monitor on telemetry,  - Will start Lasix 40mg IVP possibly for 2 days       # A.fib with RVR  - Resume eliquis   - Pt's HR was around 100-120, w/ hypotension.   - Will stop the metoprolol and start Digoxin load for better heart rate control Digoxin 500mcg IVP x 1 and then Digoxin 250mcg IVP q 6hrs and then 125mg po q daily;   - Continue with Eliquis, once HR better controlled will then transition to Metoprolol  - Cardio following       # Pressure injury   -On injury  -ON buttock and hip, Stage II and III  - Daily wound care with sently and calcium alginate    Dispo: medically active, discharge pending    · Assessment	  83F PMHx Afib on apixaban, recurrent UTIs, chronic left sided nephrosis, hypothyroid, OA, HTN, HLD, CVA (residual left sided weakness), chronic walters BIBEMS from home for abdominal pain admitted for Sepsis 2/2 Acute UTI c/b chronic walters, fecal impaction, pleural and pericardial effusions, and AFIB RVR.    # Sepsis secondary to cathter associated UTI  -Follow up on culture  - Urine culture (8/17) klebsiella (+)  -S/p catheter change in ED  - Continue with Zosyn    # Left pleural effusion  - S/P L PTC   - Resume Apixaban 5mg bid  - Plan for thoracentesis today   - Based on light criteria, likely exudative -> possible malignancy?   - Pleural Fluid: Gram stain/culture/fungal/cyto pending  - Record chest tube output 12 hours, even if output is zero.  - Continuous SpO2 monitoring. Maintain SpO2 >92%. Supplement with O2 therapy PRN.  - Encourage the use of incentive spirometer, cough/deep breathing exercises, OOB to chair, ambulate as tolerated.  - Thoracic Surgery following.      # Dysphagia  -Family reported patient was coughing with diet for last few days  -SLP consulted - minced and moist with thins    # Pericardial effusion  -TTE reported moderate pericardial effusion  - Prior echo does not demonstrate pericarial effusion  - Cardiology consult appreciated   - Echo shows normal EF 50-55%  - Troponin is normal but upper end of range, continue to trend  - pBNP in 6/2024 was 6927,  repeated on 8/15 1543.   - Repeated Echo on probably Sunday (8/18) to evaluate worsening effusion.  - Follow up TSH, CPK  - There is no smith's triad on physical exam   - Recommend thoracentesis first and send pleural fluid for cytology to evaluate the etiology of the effusion. Concern there is underlying malignancy.  Eliquis will be on hold 24hrs prior if there's pericardiocentesis.   - C/w monitor on telemetry,  - Will start Lasix 40mg IVP possibly for 2 days       # A.fib with RVR  - Resume eliquis   - Pt's HR was around 100-120, w/ hypotension.   - Will stop the metoprolol and start Digoxin load for better heart rate control Digoxin 500mcg IVP x 1 and then Digoxin 250mcg IVP q 6hrs and then 125mg po q daily;   - Continue with Eliquis, once HR better controlled will then transition to Metoprolol  - Cardio following       # Pressure injury   -On injury  -ON buttock and hip, Stage II and III  - Daily wound care with sently and calcium alginate    Dispo: medically active, discharge pending

## 2024-08-17 NOTE — DIETITIAN INITIAL EVALUATION ADULT - ADD RECOMMEND
1) Obtain daily weights to monitor trends.  2) Rx: MVI daily, vitamin C (500mg daily), and zinc sulfate (220mg daily x 10 days) to aid in wound healing.  3) Add glucerna TID to optimize po intake and provide an additional 220kcal, 10g protein per serving   4) Encourage HBV protein sources.  5) Continue diet per SLP recommendations.

## 2024-08-17 NOTE — DIETITIAN INITIAL EVALUATION ADULT - NSFNSGIIOFT_GEN_A_CORE
08-16-24 @ 07:01  -  08-17-24 @ 07:00  --------------------------------------------------------  OUT:    Chest Tube (mL): 1020 mL  Total OUT: 1020 mL    Total NET: -1020 mL      
Yes

## 2024-08-17 NOTE — DIETITIAN INITIAL EVALUATION ADULT - PERTINENT LABORATORY DATA
08-17    134<L>  |  102  |  20.0  ----------------------------<  145<H>  3.2<L>   |  19.0<L>  |  0.58    Ca    8.3<L>      17 Aug 2024 07:15  Phos  2.6     08-17  Mg     1.7     08-17    TPro  5.3<L>  /  Alb  2.0<L>  /  TBili  0.4  /  DBili  x   /  AST  18  /  ALT  16  /  AlkPhos  91  08-17  A1C with Estimated Average Glucose Result: 6.0 % (06-07-24 @ 04:00)

## 2024-08-17 NOTE — PROGRESS NOTE ADULT - SUBJECTIVE AND OBJECTIVE BOX
SUBJECTIVE:    Chief Complaint: Patient is a 83y old  Female who presents with a chief complaint of Acute UTI, Pleural Effusion, Pericardial Effusion, Fecal Impaction, AFIB RVR (16 Aug 2024 14:43)      Hospital Course:   83F PMHx Afib on apixaban, recurrent UTIs, chronic left sided nephrosis, hypothyroid, OA, HTN, HLD, CVA (residual left sided weakness), chronic walters BIBEMS from home for severe abdominal pain. Patient at baseline is A&Ox3, can answer all questions, knows medical hx well, has left sided weakness of UE/LE, unable to walk, Ursula lift out of bed. Last night the patient experienced stabbing LLQ abd pain w/o radiation, no worsening/remitting factors. Patient denies fever, chills, chest pain, cough, N/V/D and endorses no other symptoms. Unable to get in touch with family, patient did not know meds well, used Dr. Garibay, recommend calling family in AM. Patient was recently admitted to [facility] from 3/31 - 4/15/24 with UTI with sepsis complicated by A fib with RVR, BCx grew  coagulase-negative staph which was considered contamination rest of blood cultures including urine culture were all negative. Also, admitted on 6/4/24 with hyponatremia with a Na of 119 and hypotensive, Prior BCx grew Staphylococcus capitis and prior UCx grew Klebsiella pneumonia and Enterobacter cloacae.     In ED patient was found to be in Afib RVR. Rate control with improvement. CT chest/abd/pelvis showed pleural effusion, moderate pericardial effusion, fecal impaction, and pyelonephritis, as well as misplaced walters catheter in cervix. Thoracic consulted, planning thoracentesis once 24 hrs past last apixaban. Also, UA +, patient received 2L IVF bolus and vancomycin 1x and Cefepime 1x. Patient admitted to med/tele for sepsis 2/2 UTI c/b fecal impaciton, AFIB RVR, pleural and pericardial effusions.    INTERVAL HPI/OVERNIGHT EVENTS: Patient seen and examined at bedside at AM. No clinically acute event overnight. Pt had thoracentesis yesterday, S/p L PTC placed 8/15 with serous fluid 1L initial output. Denies any chest pain, palpitations, SOB, leg edema, N/V/D, or any other complaints. BP 90/63 this morning.     MEDICATIONS  (STANDING):  apixaban 5 milliGRAM(s) Oral two times a day  collagenase Ointment 1 Application(s) Topical daily  digoxin  Injectable 250 MICROGram(s) IV Push every 6 hours  furosemide   Injectable 40 milliGRAM(s) IV Push daily  levothyroxine 75 MICROGram(s) Oral daily  piperacillin/tazobactam IVPB.. 3.375 Gram(s) IV Intermittent every 8 hours  polyethylene glycol 3350 17 Gram(s) Oral two times a day  senna 2 Tablet(s) Oral at bedtime  simvastatin 20 milliGRAM(s) Oral at bedtime  tamsulosin 0.4 milliGRAM(s) Oral at bedtime    MEDICATIONS  (PRN):  acetaminophen     Tablet .. 650 milliGRAM(s) Oral every 6 hours PRN Temp greater or equal to 38C (100.4F), Mild Pain (1 - 3)  aluminum hydroxide/magnesium hydroxide/simethicone Suspension 30 milliLiter(s) Oral every 4 hours PRN Dyspepsia  melatonin 3 milliGRAM(s) Oral at bedtime PRN Insomnia  metoprolol tartrate Injectable 2.5 milliGRAM(s) IV Push every 6 hours PRN HR > 120  ondansetron Injectable 4 milliGRAM(s) IV Push every 8 hours PRN Nausea and/or Vomiting      Allergies    Cipro (Other)  all narcotics give severe vomitting and dizziness (Other; Vomiting)  Bactrim (Rash)  Levaquin (Unknown)  ampicillin (Stomach Upset)    Intolerances    Augmentin (Diarrhea)      REVIEW OF SYSTEMS:  All other review of systems negative, except as noted in HPI    OBJECTIVE:    Vital Signs Last 24 Hrs  T(C): 36.6 (17 Aug 2024 04:00), Max: 37.2 (17 Aug 2024 00:00)  T(F): 97.9 (17 Aug 2024 04:00), Max: 98.9 (17 Aug 2024 00:00)  HR: 98 (17 Aug 2024 04:00) (98 - 161)  BP: 129/73 (17 Aug 2024 04:00) (80/56 - 130/88)  BP(mean): --  RR: 18 (17 Aug 2024 04:00) (17 - 18)  SpO2: 96% (17 Aug 2024 04:00) (92% - 98%)    Parameters below as of 17 Aug 2024 04:00  Patient On (Oxygen Delivery Method): room air      PHYSICAL EXAM:  GENERAL: Pt lying in bed with improved breath, tired w/o anxious.    HEAD:  Atraumatic, Normocephalic  EYES: EOMI, PERRL, conjunctiva and sclera clear  ENT: Moist mucous membranes  NECK: Supple, No JVD  CHEST/LUNG: Improved lung sound on LL b/l on ausculation. No rales, rhonchi, wheezing, or rubs.   HEART: Irregular rate and rhythm; No murmurs, rubs, or gallops  ABDOMEN: Bowel sounds present; Soft, Nontender, Nondistended. No guarding or rigidity   EXTREMITIES:  2+ Peripheral Pulses, Pitting edema 1+ B/L. No clubbing, cyanosis.   NERVOUS SYSTEM:  Alert & Oriented X3, FROM x 4 extremities. No deficits   SKIN: No rashes or lesions        Lab/ Imaging:    LABS:                        9.3    16.38 )-----------( 389      ( 17 Aug 2024 07:15 )             30.0     08-17    134<L>  |  102  |  20.0  ----------------------------<  145<H>  3.2<L>   |  19.0<L>  |  0.58    Ca    8.3<L>      17 Aug 2024 07:15  Phos  2.6     08-17  Mg     1.7     08-17    TPro  5.3<L>  /  Alb  2.0<L>  /  TBili  0.4  /  DBili  x   /  AST  18  /  ALT  16  /  AlkPhos  91  08-17    PTT - ( 16 Aug 2024 08:40 )  PTT:56.4 sec  Urinalysis Basic - ( 17 Aug 2024 07:15 )    Color: x / Appearance: x / SG: x / pH: x  Gluc: 145 mg/dL / Ketone: x  / Bili: x / Urobili: x   Blood: x / Protein: x / Nitrite: x   Leuk Esterase: x / RBC: x / WBC x   Sq Epi: x / Non Sq Epi: x / Bacteria: x      CAPILLARY BLOOD GLUCOSE

## 2024-08-17 NOTE — PROGRESS NOTE ADULT - PROBLEM SELECTOR PLAN 1
Mod/Large left pleural effusion, compressive atelectasis, small right effusion  Chest CT with moderate pericardial effusion, TTE as above  Hemodynamically stable, no obvious signs of tamponade  S/p L PTC placed 8/15 with serous fluid 1L initial output  Maintain left pigtail to water seal.  CXR each AM while chest tube is in place.  Record chest tube output on pleurevac and document in flowsheet q12 hours, even if output is zero.  Continuous SpO2 monitoring.  Maintain SpO2 >92%. Supplement with O2 therapy PRN.  Encourage the use of incentive spirometer, cough/deep breathing exercises, OOB to chair, ambulate as tolerated.  Thoracic Surgery will continue to follow.  Rest of care per primary team. s/p Left pigtail insertion on 8/15 drained 1L of serous fluid   Maintain left pigtail to water seal for, possible D/C tomorrow if chest output minimal  Repeat CXR in AM while   Strict I/O  Maintain SPO2>90% on room air  Encourage the use of incentive spirometer, cough/deep breathing exercises, OOB to chair, ambulate as tolerated.  Continue care as per primary team  Thoracic Surgery  to follow.  Discussed plan with Dr. Alfonso in Thoracic surgery rounds.

## 2024-08-17 NOTE — DIETITIAN INITIAL EVALUATION ADULT - PERTINENT MEDS FT
MEDICATIONS  (STANDING):  senna 2 Tablet(s) Oral at bedtime  simvastatin 20 milliGRAM(s) Oral at bedtime  tamsulosin 0.4 milliGRAM(s) Oral at bedtime    MEDICATIONS  (PRN):  ondansetron Injectable 4 milliGRAM(s) IV Push every 8 hours PRN Nausea and/or Vomiting

## 2024-08-17 NOTE — DIETITIAN INITIAL EVALUATION ADULT - WEIGHT (LBS)
Left message for patient to return call to [facility] regarding INR result/instructions.    
Spoke with patient and daughter Ernestina via phone for follow up anticoagulation visit.   Lab draw for INR.  INR goal range: 2.0-3.0  DX: atrial fib  Last INR on 2/11/21 was 2.1.  Dose maintained.   Today's INR is 2.7 and is within goal range.    Current warfarin total weekly dose of 12.5 mg verified.  Informed the INR result is within therapeutic range and instructed to maintain current dose per protocol. Discussed dose and return date of 5 weeks, 4/29/21 for next INR.   See Anticoagulation flowsheet.    Dr. Lo is in the office today supervising the treatment.    Instructed to contact the clinic with any unusual bleeding or bruising, any changes in medications, diet, health status, lifestyle, or any other changes, questions or concerns. Verbalized understanding of all discussed.     
141

## 2024-08-17 NOTE — DIETITIAN INITIAL EVALUATION ADULT - NSFNSPHYEXAMSKINFT_GEN_A_CORE
Pressure Injury 1: sacrum, Stage III  Pressure Injury 2: coccyx, Stage II  Pressure Injury 3: none, none  Pressure Injury 4: none, none  Pressure Injury 5: none, none  Pressure Injury 6: none, none  Pressure Injury 7: none, none  Pressure Injury 8: none, none  Pressure Injury 9: none, none  Pressure Injury 10: none, none  Pressure Injury 11: none, none, Pressure Injury 1: sacrum, Stage III  Pressure Injury 2: coccyx, Stage II  Pressure Injury 3: none, none  Pressure Injury 4: none, none  Pressure Injury 5: none, none  Pressure Injury 6: none, none  Pressure Injury 7: none, none  Pressure Injury 8: none, none  Pressure Injury 9: none, none  Pressure Injury 10: none, none  Pressure Injury 11: none, none

## 2024-08-17 NOTE — DIETITIAN INITIAL EVALUATION ADULT - OTHER INFO
83y Female with a h/o Aifb on Eliquis, OA, HTN, HLD, CVA (residual left sided weakness). Multiple admission for UTI/sepsis, most recently on 6/4/24 with lethargy, poor PO intake and decreased urine output. Patient was admitted to MICU requiring vasopressors. Presents to ED from home where her daughter cares for her c/o diffuse abd pain since early in AM. ED workup notable for afib RVR, acute UTI, b/l pleural effusions L>R, mod pericardial effusion without clinical evidence of tamponade. Thoracic Sx consulted for effusion eval. Cardiology consulted for pericardial effusion.

## 2024-08-17 NOTE — PROGRESS NOTE ADULT - SUBJECTIVE AND OBJECTIVE BOX
[facility]                                                         CARDIOLOGY AT [facility]                                                                  [address] [facility], [place]-[address] [place] [address]- [facility]                                                         Telephone: [phone]. Fax:[phone]                                                                             PROGRESS NOTE    Reason for follow up: Afib with RVR and Pleural effusion   Update:   Patient s/p chest tube placement - 1.02L     Review of symptoms:   Cardiac:  No chest pain. No dyspnea. No palpitations.  Respiratory: no cough. No dyspnea  Gastrointestinal: No diarrhea. No abdominal pain. No bleeding.   Neuro: No focal neuro complaints.      Vitals:  T(C): 36.3 (08-17-24 @ 09:28), Max: 37.2 (08-17-24 @ 00:00)  HR: 84 (08-17-24 @ 09:28) (84 - 161)  BP: 92/63 (08-17-24 @ 09:28) (92/63 - 129/73)  RR: 18 (08-17-24 @ 09:28) (17 - 18)  SpO2: 96% (08-17-24 @ 09:28) (96% - 98%)  Wt(kg): --  I&O's Summary    16 Aug 2024 07:01  -  17 Aug 2024 07:00  --------------------------------------------------------  IN: 0 mL / OUT: 2020 mL / NET: -2020 mL      Weight (kg): 64 (08-15 @ 04:57)      PHYSICAL EXAM:  Appearance: Comfortable. No acute distress  HEENT:  Atraumatic. Normocephalic.  Normal oral mucosa  Neurologic: A & O x 3, no gross focal deficits.  Cardiovascular: RRR S1 S2, No murmur, no rubs/gallops. No JVD chest tube in place   Respiratory: Lungs clear to auscultation, unlabored   Gastrointestinal:  Soft, Non-tender, + BS  Lower Extremities: 2+ Peripheral Pulses, No clubbing, cyanosis, or edema  Psychiatry: Patient is calm. No agitation.   Skin: warm and dry.      CURRENT CARDIAC MEDICATIONS:  digoxin  Injectable 250 MICROGram(s) IV Push every 6 hours  furosemide   Injectable 40 milliGRAM(s) IV Push daily  metoprolol tartrate Injectable 2.5 milliGRAM(s) IV Push every 6 hours PRN        CURRENT OTHER MEDICATIONS:  piperacillin/tazobactam IVPB.. 3.375 Gram(s) IV Intermittent every 8 hours  acetaminophen     Tablet .. 650 milliGRAM(s) Oral every 6 hours PRN Temp greater or equal to 38C (100.4F), Mild Pain (1 - 3)  melatonin 3 milliGRAM(s) Oral at bedtime PRN Insomnia  ondansetron Injectable 4 milliGRAM(s) IV Push every 8 hours PRN Nausea and/or Vomiting  aluminum hydroxide/magnesium hydroxide/simethicone Suspension 30 milliLiter(s) Oral every 4 hours PRN Dyspepsia  polyethylene glycol 3350 17 Gram(s) Oral two times a day  senna 2 Tablet(s) Oral at bedtime  levothyroxine 75 MICROGram(s) Oral daily  simvastatin 20 milliGRAM(s) Oral at bedtime  apixaban 5 milliGRAM(s) Oral two times a day  collagenase Ointment 1 Application(s) Topical daily  tamsulosin 0.4 milliGRAM(s) Oral at bedtime        LABS:	 	                            9.3    16.38 )-----------( 389      ( 17 Aug 2024 07:15 )             30.0     08-17    134<L>  |  102  |  20.0  ----------------------------<  145<H>  3.2<L>   |  19.0<L>  |  0.58    Ca    8.3<L>      17 Aug 2024 07:15  Phos  2.6     08-17  Mg     1.7     08-17    TPro  5.3<L>  /  Alb  2.0<L>  /  TBili  0.4  /  DBili  x   /  AST  18  /  ALT  16  /  AlkPhos  91  08-17    PT/INR/PTT ( 16 Aug 2024 08:40 )                       :                       :      X            :       56.4                  .        .                   .              .           .       X           .                                       Lipid Profile:   HgA1c:   TSH: Thyroid Stimulating Hormone, Serum: 4.56 uIU/mL        TELEMETRY:   ECG: Afib      DIAGNOSTIC TESTING:  [ ] Echocardiogram:   < from: TTE Limited W or WO Ultrasound Enhancing Agent (08.17.24 @ 14:09) >   1. Limited followup study.   2. Left ventricular systolic function is normal with an ejection fraction visually estimated at 50 to 55 %.   3. Moderate pericardial effusion with no evidence of hemodynamic compromise (or echocardiographic evidence of cardiac tamponade).   4. Compared to the transthoracic echocardiogram performed on 8/15/2024, there have been no significant interval changes.    < end of copied text >    [ ]  Catheterization:  [ ] Stress Test:    OTHER:

## 2024-08-17 NOTE — PROGRESS NOTE ADULT - SUBJECTIVE AND OBJECTIVE BOX
Subjective:    VITAL SIGNS  Vital Signs Last 24 Hrs  T(C): 36.3 (08-17-24 @ 09:28), Max: 37.2 (08-17-24 @ 00:00)  T(F): 97.3 (08-17-24 @ 09:28), Max: 98.9 (08-17-24 @ 00:00)  HR: 84 (08-17-24 @ 09:28) (84 - 161)  BP: 92/63 (08-17-24 @ 09:28) (92/63 - 130/88)  RR: 18 (08-17-24 @ 09:28) (17 - 18)  SpO2: 96% (08-17-24 @ 09:28) (92% - 98%)  on (O2)              Telemetry:    LVEF:     MEDICATIONS  acetaminophen     Tablet .. 650 milliGRAM(s) Oral every 6 hours PRN  aluminum hydroxide/magnesium hydroxide/simethicone Suspension 30 milliLiter(s) Oral every 4 hours PRN  apixaban 5 milliGRAM(s) Oral two times a day  collagenase Ointment 1 Application(s) Topical daily  digoxin  Injectable 250 MICROGram(s) IV Push every 6 hours  furosemide   Injectable 40 milliGRAM(s) IV Push daily  levothyroxine 75 MICROGram(s) Oral daily  melatonin 3 milliGRAM(s) Oral at bedtime PRN  metoprolol tartrate Injectable 2.5 milliGRAM(s) IV Push every 6 hours PRN  ondansetron Injectable 4 milliGRAM(s) IV Push every 8 hours PRN  piperacillin/tazobactam IVPB.. 3.375 Gram(s) IV Intermittent every 8 hours  polyethylene glycol 3350 17 Gram(s) Oral two times a day  senna 2 Tablet(s) Oral at bedtime  simvastatin 20 milliGRAM(s) Oral at bedtime  tamsulosin 0.4 milliGRAM(s) Oral at bedtime      PHYSICAL EXAM  General: well nourished, well developed, no acute distress  Neurology: alert and oriented x 3, nonfocal, no gross deficits  Respiratory: clear to auscultation bilaterally  CV: regular rate and rhythm, normal S1, S2  Abdomen: soft, nontender, nondistended, positive bowel sounds, last bowel movement   Extremities: warm, well perfused. no edema. + DP pulses  Incisions: midline sternal incision, + mepilex, c/d/i. sternum stable.  Chest tubes:   Epicardial Wires:    > EPM (settings) / isolated    I&O's Detail    16 Aug 2024 07:01  -  17 Aug 2024 07:00  --------------------------------------------------------  IN:  Total IN: 0 mL    OUT:    Chest Tube (mL): 1020 mL    Voided (mL): 1000 mL  Total OUT: 2020 mL    Total NET: -2020 mL          Weights:  Daily     Daily   Admit Wt: Drug Dosing Weight  Height (cm): 162.6 (14 Aug 2024 13:07)  Weight (kg): 64 (15 Aug 2024 04:57)  BMI (kg/m2): 24.2 (15 Aug 2024 04:57)  BSA (m2): 1.69 (15 Aug 2024 04:57)    LABS  08-17    134<L>  |  102  |  20.0  ----------------------------<  145<H>  3.2<L>   |  19.0<L>  |  0.58    Ca    8.3<L>      17 Aug 2024 07:15  Phos  2.6     08-17  Mg     1.7     08-17    TPro  5.3<L>  /  Alb  2.0<L>  /  TBili  0.4  /  DBili  x   /  AST  18  /  ALT  16  /  AlkPhos  91  08-17                                 9.3    16.38 )-----------( 389      ( 17 Aug 2024 07:15 )             30.0          PTT - ( 16 Aug 2024 08:40 )  PTT:56.4 sec      Bilirubin Total: 0.4 mg/dL (08-17 @ 07:15)    CAPILLARY BLOOD GLUCOSE               Today's CXR:    Today's EKG:    PAST MEDICAL & SURGICAL HISTORY:  Osteoarthritis      Hypertension      Hiatal hernia      Hyperlipidemia      Polio      DVT (deep venous thrombosis)  2016, was on xarelto for 6 months      TIA (transient ischemic attack)  2004      S/P cholecystectomy      S/P dilation and curettage  2001, 1965, 1972, 1978      H/O total knee replacement, left  2003 revision 2014      S/P rhinoplasty      History of foot surgery  left foot due to polio, 1953      Neck mass  excision of neck mass 1942            Subjective: Patient resting comfortably in bed saturating well on room air. C/O pain at tube site. States breathing is better.     VITAL SIGNS  Vital Signs Last 24 Hrs  T(C): 36.3 (08-17-24 @ 09:28), Max: 37.2 (08-17-24 @ 00:00)  T(F): 97.3 (08-17-24 @ 09:28), Max: 98.9 (08-17-24 @ 00:00)  HR: 84 (08-17-24 @ 09:28) (84 - 161)  BP: 92/63 (08-17-24 @ 09:28) (92/63 - 130/88)  RR: 18 (08-17-24 @ 09:28) (17 - 18)  SpO2: 96% (08-17-24 @ 09:28) (92% - 98%)  on (O2)      I&O's Detail    16 Aug 2024 07:01  -  17 Aug 2024 07:00  --------------------------------------------------------  IN:  Total IN: 0 mL    OUT:    Chest Tube (mL): 1020 mL    Voided (mL): 1000 mL  Total OUT: 2020 mL    Total NET: -2020 mL                MEDICATIONS  acetaminophen     Tablet .. 650 milliGRAM(s) Oral every 6 hours PRN  aluminum hydroxide/magnesium hydroxide/simethicone Suspension 30 milliLiter(s) Oral every 4 hours PRN  apixaban 5 milliGRAM(s) Oral two times a day  collagenase Ointment 1 Application(s) Topical daily  digoxin  Injectable 250 MICROGram(s) IV Push every 6 hours  furosemide   Injectable 40 milliGRAM(s) IV Push daily  levothyroxine 75 MICROGram(s) Oral daily  melatonin 3 milliGRAM(s) Oral at bedtime PRN  metoprolol tartrate Injectable 2.5 milliGRAM(s) IV Push every 6 hours PRN  ondansetron Injectable 4 milliGRAM(s) IV Push every 8 hours PRN  piperacillin/tazobactam IVPB.. 3.375 Gram(s) IV Intermittent every 8 hours  polyethylene glycol 3350 17 Gram(s) Oral two times a day  senna 2 Tablet(s) Oral at bedtime  simvastatin 20 milliGRAM(s) Oral at bedtime  tamsulosin 0.4 milliGRAM(s) Oral at bedtime      PHYSICAL EXAM  General: well nourished, well developed, no acute distress  Respiratory: clear to auscultation bilaterally  CV: S1, S2  Chest tubes: L chest tube to WS, minimal output today, no air leak       Weights:  Daily     Daily   Admit Wt: Drug Dosing Weight  Height (cm): 162.6 (14 Aug 2024 13:07)  Weight (kg): 64 (15 Aug 2024 04:57)  BMI (kg/m2): 24.2 (15 Aug 2024 04:57)  BSA (m2): 1.69 (15 Aug 2024 04:57)    LABS  08-17    134<L>  |  102  |  20.0  ----------------------------<  145<H>  3.2<L>   |  19.0<L>  |  0.58    Ca    8.3<L>      17 Aug 2024 07:15  Phos  2.6     08-17  Mg     1.7     08-17    TPro  5.3<L>  /  Alb  2.0<L>  /  TBili  0.4  /  DBili  x   /  AST  18  /  ALT  16  /  AlkPhos  91  08-17                                 9.3    16.38 )-----------( 389      ( 17 Aug 2024 07:15 )             30.0          PTT - ( 16 Aug 2024 08:40 )  PTT:56.4 sec      Bilirubin Total: 0.4 mg/dL (08-17 @ 07:15)    CAPILLARY BLOOD GLUCOSE             PAST MEDICAL & SURGICAL HISTORY:  Osteoarthritis      Hypertension      Hiatal hernia      Hyperlipidemia      Polio      DVT (deep venous thrombosis)  2016, was on xarelto for 6 months      TIA (transient ischemic attack)  2004      S/P cholecystectomy      S/P dilation and curettage  2001, 1965, 1972, 1978      H/O total knee replacement, left  2003 revision 2014      S/P rhinoplasty      History of foot surgery  left foot due to polio, 1953      Neck mass  excision of neck mass 1942            Subjective: Patient resting comfortably in bed saturating well on room air. C/O pain at tube site. States breathing is better.     VITAL SIGNS  Vital Signs Last 24 Hrs  T(C): 36.3 (08-17-24 @ 09:28), Max: 37.2 (08-17-24 @ 00:00)  T(F): 97.3 (08-17-24 @ 09:28), Max: 98.9 (08-17-24 @ 00:00)  HR: 84 (08-17-24 @ 09:28) (84 - 161)  BP: 92/63 (08-17-24 @ 09:28) (92/63 - 130/88)  RR: 18 (08-17-24 @ 09:28) (17 - 18)  SpO2: 96% (08-17-24 @ 09:28) (92% - 98%)  on (O2)      I&O's Detail    16 Aug 2024 07:01  -  17 Aug 2024 07:00  --------------------------------------------------------  IN:  Total IN: 0 mL    OUT:    Chest Tube (mL): 1020 mL    Voided (mL): 1000 mL  Total OUT: 2020 mL    Total NET: -2020 mL                MEDICATIONS  acetaminophen     Tablet .. 650 milliGRAM(s) Oral every 6 hours PRN  aluminum hydroxide/magnesium hydroxide/simethicone Suspension 30 milliLiter(s) Oral every 4 hours PRN  apixaban 5 milliGRAM(s) Oral two times a day  collagenase Ointment 1 Application(s) Topical daily  digoxin  Injectable 250 MICROGram(s) IV Push every 6 hours  furosemide   Injectable 40 milliGRAM(s) IV Push daily  levothyroxine 75 MICROGram(s) Oral daily  melatonin 3 milliGRAM(s) Oral at bedtime PRN  metoprolol tartrate Injectable 2.5 milliGRAM(s) IV Push every 6 hours PRN  ondansetron Injectable 4 milliGRAM(s) IV Push every 8 hours PRN  piperacillin/tazobactam IVPB.. 3.375 Gram(s) IV Intermittent every 8 hours  polyethylene glycol 3350 17 Gram(s) Oral two times a day  senna 2 Tablet(s) Oral at bedtime  simvastatin 20 milliGRAM(s) Oral at bedtime  tamsulosin 0.4 milliGRAM(s) Oral at bedtime      PHYSICAL EXAM  General:  no acute distress  Respiratory: clear to auscultation bilaterally  CV: RRR, S1S2, no murmurs, rubs or gallops  Abdominal: Soft, NT, ND +BS, Last BM  Extremities: No edema, + peripheral pulses  Chest tubes: Left chest tube to WS, minimal output today, no air leak noted      Weights:  Daily     Daily   Admit Wt: Drug Dosing Weight  Height (cm): 162.6 (14 Aug 2024 13:07)  Weight (kg): 64 (15 Aug 2024 04:57)  BMI (kg/m2): 24.2 (15 Aug 2024 04:57)  BSA (m2): 1.69 (15 Aug 2024 04:57)    LABS  08-17    134<L>  |  102  |  20.0  ----------------------------<  145<H>  3.2<L>   |  19.0<L>  |  0.58    Ca    8.3<L>      17 Aug 2024 07:15  Phos  2.6     08-17  Mg     1.7     08-17    TPro  5.3<L>  /  Alb  2.0<L>  /  TBili  0.4  /  DBili  x   /  AST  18  /  ALT  16  /  AlkPhos  91  08-17                                 9.3    16.38 )-----------( 389      ( 17 Aug 2024 07:15 )             30.0          PTT - ( 16 Aug 2024 08:40 )  PTT:56.4 sec      Bilirubin Total: 0.4 mg/dL (08-17 @ 07:15)    CAPILLARY BLOOD GLUCOSE    < from: CT Chest w/ IV Cont (08.14.24 @ 17:51) >  ACC: [id number] EXAM:  CT ANGIO ABD PELV (W)AW IC   ORDERED BY: AIDA GONZALEZ     ACC: [id number] EXAM:  CT CHEST IC   ORDERED BY: AIDA GONZALEZ     PROCEDURE DATE:  08/14/2024          INTERPRETATION:  CLINICAL INFORMATION: Diffuse abdominalpain. Rapid A.   fib.    COMPARISON: CT chest abdomen and pelvis 6/19/2024    CONTRAST/COMPLICATIONS:  IV Contrast: IV contrast documented in unlinked concurrent exam   (accession [id number]), Omnipaque 350 (accession [id number])  90 cc   administered   10 cc discarded  Oral Contrast: NONE  Complications: None reported at time of study completion    PROCEDURE:  CT of the Chest, Abdomen and Pelvis was performed.  Dual phase, arterial and portal venous phase imaging was performed   through the abdomen.  Sagittal and coronal reformats were performed.    FINDINGS:  CHEST:  LUNGS AND LARGE AIRWAYS: Patent central airways. Complete left lower lobe   atelectasis and partial left upper lobe atelectasis adjacent to a pleural   effusion. Mild compressive atelectasis in the right lower lobe..  PLEURA: Moderate to large left pleural effusion. Small right pleural   effusion.  VESSELS: Aortic calcifications. Coronary artery calcifications. Air in   the right subclavian and internal jugular veins, likely iatrogenic.  HEART: Mild cardiomegaly. Moderate pericardial effusion.  MEDIASTINUM AND MARTY: No lymphadenopathy.  CHEST WALL AND LOWER NECK: Within normal limits.    ABDOMEN AND PELVIS: Images are degraded by respiratory motion artifact in   the upper abdomen.  LIVER: Subcentimeter indeterminate hypodense focus in segment 3.  BILE DUCTS: Normal caliber.  GALLBLADDER: Cholecystectomy.  SPLEEN: Within normal limits.  PANCREAS: Within normal limits.  ADRENALS: Within normal limits.  KIDNEYS/URETERS: Mild nonspecific bilateral urothelial enhancement.   Dilated left extrarenal pelvis and mild left hydronephrosis.   Subcentimeter indeterminate bilateral hypodense renal lesions are too   small to characterize.    BLADDER: Circumferential bladder wall thickening and perivesicular   infiltration. Air in the bladder lumen..  REPRODUCTIVE ORGANS: Malpositioned Msart catheter balloon in the cervix.    BOWEL: Rectal distention with stool with mild perirectal edema. Moderate   fecal load. No obstruction.  PERITONEUM/RETROPERITONEUM: Within normal limits.  VESSELS: Atherosclerotic changes. Extensive calcification of the ostia of   the celiac and superior mesenteric arteries causing moderate to severe   stenosis.  LYMPH NODES: No lymphadenopathy.  ABDOMINAL WALL: Within normal limits.  BONES: Within normal limits.    IMPRESSION:  Moderate to large left pleural effusion with complete left lower lobe and   partial left upper lobe atelectasis. Small right pleural effusion.    Moderate pericardial effusion.    Stable mild left hydronephrosis with bilateral urothelial enhancement   along with urinary bladder wall thickening and perivesicular   inflammation. Findings may represent acute cystitis and pyelitis. No CT   evidence for pyelonephritis.    Malpositioned Smart catheter balloon within the cervix.    Fecal impaction of the rectum. Mild perirectal edema is nonspecific.    --- End of Report ---            KIA VICKERS MD; Attending Radiologist  This document has been electronically signed. Aug 14 2024  7:44PM    < end of copied text >           PAST MEDICAL & SURGICAL HISTORY:  Osteoarthritis      Hypertension      Hiatal hernia      Hyperlipidemia      Polio      DVT (deep venous thrombosis)  2016, was on xarelto for 6 months      TIA (transient ischemic attack)  2004      S/P cholecystectomy      S/P dilation and curettage  2001, 1965, 1972, 1978      H/O total knee replacement, left  2003 revision 2014      S/P rhinoplasty      History of foot surgery  left foot due to polio, 1953      Neck mass  excision of neck mass 1942

## 2024-08-17 NOTE — DIETITIAN INITIAL EVALUATION ADULT - DIET TYPE
Add glucerna TID to optimize po intake and provide an additional 220kcal, 10g protein per serving/supplement (specify)

## 2024-08-17 NOTE — DIETITIAN INITIAL EVALUATION ADULT - ORAL INTAKE PTA/DIET HISTORY
Pt asleep upon visit. Chart reviewed. Spoke with pt nurse who reports pt with PO intake throughout admission <50% with small appetite. Breakfast tray untouched at bedside. Per nurse, pt family would like to add oral protein supplement to assist with protein intake. SLP saw pt 8/15 with recommendations for minced and moist w/ thin liquids. Last BM documented 8/15.

## 2024-08-17 NOTE — PROGRESS NOTE ADULT - PROBLEM SELECTOR PLAN 1
- prior echo does not demonstrate pericardial effusion  - Echo shows LVEF 50-55%  - troponin is normal but upper end of range, continue to trend  - pBNP in 6/2024 was 6927 and pt was not overloaded at that time, now it is significantly less 1500 and pt is overloaded. pBNP is sort of non-diagnostic   - ESR is 100 and CRP is 250 which is markedly elevated. Acute phase reactant secondary to infection?   - possibly underlying rheumatologic condition vs pericarditis, but in absence of chest pain, EKG without TN depression, and Trop normal, suspicion is lower  - for now there is no indication for pericardiocentesis- repeated TTE shows moderate pericardial effusion

## 2024-08-18 DIAGNOSIS — I50.31 ACUTE DIASTOLIC (CONGESTIVE) HEART FAILURE: ICD-10-CM

## 2024-08-18 LAB
ALBUMIN SERPL ELPH-MCNC: 1.6 G/DL — LOW (ref 3.3–5.2)
ALP SERPL-CCNC: 111 U/L — SIGNIFICANT CHANGE UP (ref 40–120)
ALT FLD-CCNC: 22 U/L — SIGNIFICANT CHANGE UP
ANION GAP SERPL CALC-SCNC: 18 MMOL/L — HIGH (ref 5–17)
AST SERPL-CCNC: 43 U/L — HIGH
BILIRUB SERPL-MCNC: 0.6 MG/DL — SIGNIFICANT CHANGE UP (ref 0.4–2)
BUN SERPL-MCNC: 18.3 MG/DL — SIGNIFICANT CHANGE UP (ref 8–20)
CALCIUM SERPL-MCNC: 8.6 MG/DL — SIGNIFICANT CHANGE UP (ref 8.4–10.5)
CHLORIDE SERPL-SCNC: 97 MMOL/L — SIGNIFICANT CHANGE UP (ref 96–108)
CO2 SERPL-SCNC: 17 MMOL/L — LOW (ref 22–29)
CREAT SERPL-MCNC: 0.58 MG/DL — SIGNIFICANT CHANGE UP (ref 0.5–1.3)
EGFR: 91 ML/MIN/1.73M2 — SIGNIFICANT CHANGE UP
GLUCOSE SERPL-MCNC: 141 MG/DL — HIGH (ref 70–99)
HCT VFR BLD CALC: 41.3 % — SIGNIFICANT CHANGE UP (ref 34.5–45)
HGB BLD-MCNC: 12.2 G/DL — SIGNIFICANT CHANGE UP (ref 11.5–15.5)
MAGNESIUM SERPL-MCNC: 1.5 MG/DL — LOW (ref 1.6–2.6)
MCHC RBC-ENTMCNC: 27.1 PG — SIGNIFICANT CHANGE UP (ref 27–34)
MCHC RBC-ENTMCNC: 29.5 GM/DL — LOW (ref 32–36)
MCV RBC AUTO: 91.6 FL — SIGNIFICANT CHANGE UP (ref 80–100)
PHOSPHATE SERPL-MCNC: 2.9 MG/DL — SIGNIFICANT CHANGE UP (ref 2.4–4.7)
PLATELET # BLD AUTO: 366 K/UL — SIGNIFICANT CHANGE UP (ref 150–400)
POTASSIUM SERPL-MCNC: 4.4 MMOL/L — SIGNIFICANT CHANGE UP (ref 3.5–5.3)
POTASSIUM SERPL-SCNC: 4.4 MMOL/L — SIGNIFICANT CHANGE UP (ref 3.5–5.3)
PROT SERPL-MCNC: 6.2 G/DL — LOW (ref 6.6–8.7)
RBC # BLD: 4.51 M/UL — SIGNIFICANT CHANGE UP (ref 3.8–5.2)
RBC # FLD: 16.1 % — HIGH (ref 10.3–14.5)
SODIUM SERPL-SCNC: 131 MMOL/L — LOW (ref 135–145)
WBC # BLD: 12.4 K/UL — HIGH (ref 3.8–10.5)
WBC # FLD AUTO: 12.4 K/UL — HIGH (ref 3.8–10.5)

## 2024-08-18 PROCEDURE — 71045 X-RAY EXAM CHEST 1 VIEW: CPT | Mod: 26,76

## 2024-08-18 PROCEDURE — 99233 SBSQ HOSP IP/OBS HIGH 50: CPT

## 2024-08-18 PROCEDURE — 99232 SBSQ HOSP IP/OBS MODERATE 35: CPT

## 2024-08-18 RX ORDER — SODIUM CHLORIDE 9 MG/ML
250 INJECTION INTRAMUSCULAR; INTRAVENOUS; SUBCUTANEOUS ONCE
Refills: 0 | Status: COMPLETED | OUTPATIENT
Start: 2024-08-18 | End: 2024-08-18

## 2024-08-18 RX ORDER — SODIUM CHLORIDE 9 MG/ML
500 INJECTION INTRAMUSCULAR; INTRAVENOUS; SUBCUTANEOUS ONCE
Refills: 0 | Status: COMPLETED | OUTPATIENT
Start: 2024-08-18 | End: 2024-08-18

## 2024-08-18 RX ORDER — KETOROLAC TROMETHAMINE 30 MG/ML
10 INJECTION, SOLUTION INTRAMUSCULAR ONCE
Refills: 0 | Status: DISCONTINUED | OUTPATIENT
Start: 2024-08-18 | End: 2024-08-18

## 2024-08-18 RX ORDER — ERTAPENEM SODIUM 1 G/1
INJECTION, POWDER, LYOPHILIZED, FOR SOLUTION INTRAMUSCULAR; INTRAVENOUS
Refills: 0 | Status: DISCONTINUED | OUTPATIENT
Start: 2024-08-18 | End: 2024-08-22

## 2024-08-18 RX ORDER — ERTAPENEM SODIUM 1 G/1
1000 INJECTION, POWDER, LYOPHILIZED, FOR SOLUTION INTRAMUSCULAR; INTRAVENOUS EVERY 24 HOURS
Refills: 0 | Status: DISCONTINUED | OUTPATIENT
Start: 2024-08-19 | End: 2024-08-22

## 2024-08-18 RX ORDER — FUROSEMIDE 40 MG
20 TABLET ORAL DAILY
Refills: 0 | Status: DISCONTINUED | OUTPATIENT
Start: 2024-08-19 | End: 2024-08-22

## 2024-08-18 RX ORDER — ERTAPENEM SODIUM 1 G/1
1000 INJECTION, POWDER, LYOPHILIZED, FOR SOLUTION INTRAMUSCULAR; INTRAVENOUS ONCE
Refills: 0 | Status: COMPLETED | OUTPATIENT
Start: 2024-08-18 | End: 2024-08-18

## 2024-08-18 RX ADMIN — Medication 40 MILLIGRAM(S): at 05:28

## 2024-08-18 RX ADMIN — Medication 20 MILLIGRAM(S): at 21:44

## 2024-08-18 RX ADMIN — Medication 500 MILLIGRAM(S): at 11:05

## 2024-08-18 RX ADMIN — SODIUM CHLORIDE 1000 MILLILITER(S): 9 INJECTION INTRAMUSCULAR; INTRAVENOUS; SUBCUTANEOUS at 00:42

## 2024-08-18 RX ADMIN — PIPERACILLIN SODIUM AND TAZOBACTAM SODIUM 25 GRAM(S): 3; .375 INJECTION, POWDER, FOR SOLUTION INTRAVENOUS at 04:45

## 2024-08-18 RX ADMIN — ACETAMINOPHEN 650 MILLIGRAM(S): 325 TABLET ORAL at 06:44

## 2024-08-18 RX ADMIN — POLYETHYLENE GLYCOL 3350 17 GRAM(S): 17 POWDER, FOR SOLUTION ORAL at 05:30

## 2024-08-18 RX ADMIN — Medication 75 MICROGRAM(S): at 05:29

## 2024-08-18 RX ADMIN — KETOROLAC TROMETHAMINE 10 MILLIGRAM(S): 30 INJECTION, SOLUTION INTRAMUSCULAR at 11:05

## 2024-08-18 RX ADMIN — PIPERACILLIN SODIUM AND TAZOBACTAM SODIUM 25 GRAM(S): 3; .375 INJECTION, POWDER, FOR SOLUTION INTRAVENOUS at 11:04

## 2024-08-18 RX ADMIN — COLLAGENASE SANTYL 1 APPLICATION(S): 250 OINTMENT TOPICAL at 11:04

## 2024-08-18 RX ADMIN — APIXABAN 5 MILLIGRAM(S): 5 TABLET, FILM COATED ORAL at 05:29

## 2024-08-18 RX ADMIN — ACETAMINOPHEN 650 MILLIGRAM(S): 325 TABLET ORAL at 07:00

## 2024-08-18 RX ADMIN — ERTAPENEM SODIUM 120 MILLIGRAM(S): 1 INJECTION, POWDER, LYOPHILIZED, FOR SOLUTION INTRAMUSCULAR; INTRAVENOUS at 17:19

## 2024-08-18 RX ADMIN — Medication 220 MILLIGRAM(S): at 11:05

## 2024-08-18 RX ADMIN — SODIUM CHLORIDE 500 MILLILITER(S): 9 INJECTION INTRAMUSCULAR; INTRAVENOUS; SUBCUTANEOUS at 04:20

## 2024-08-18 RX ADMIN — APIXABAN 5 MILLIGRAM(S): 5 TABLET, FILM COATED ORAL at 17:19

## 2024-08-18 RX ADMIN — DIGOXIN 125 MICROGRAM(S): 0.12 TABLET ORAL at 05:29

## 2024-08-18 RX ADMIN — Medication 25 GRAM(S): at 17:19

## 2024-08-18 RX ADMIN — TAMSULOSIN HYDROCHLORIDE 0.4 MILLIGRAM(S): 0.4 CAPSULE ORAL at 21:44

## 2024-08-18 NOTE — CHART NOTE - NSCHARTNOTEFT_GEN_A_CORE
Pt with BP systolic 80;s - lowest since admission. Pt was lethargic vs sleepy, reported feeling tired.  in setting of moderal effusion was cautiously given 250ml bolus NS with poor response. After 2 hrs given 500cc NS bolus with good response  feels "ok"  will monitor  continue current plan of care. Her am dose of IV lasix 40mg was given during the bolus.  Vital Signs Last 24 Hrs  T(C): 37.1 (18 Aug 2024 04:40), Max: 37.2 (17 Aug 2024 20:55)  T(F): 98.8 (18 Aug 2024 04:40), Max: 99 (17 Aug 2024 20:55)  HR: 98 (18 Aug 2024 04:40) (84 - 104)  RR: 18 (18 Aug 2024 04:40) (17 - 18)  SpO2: 91% (18 Aug 2024 04:40) (91% - 98%)    Parameters below as of 18 Aug 2024 00:15  Patient On (Oxygen Delivery Method): room air

## 2024-08-18 NOTE — PROGRESS NOTE ADULT - PROBLEM SELECTOR PLAN 1
- prior echo does not demonstrate pericardial effusion  - possibly underlying rheumatologic condition vs pericarditis, but in absence of chest pain, EKG without [place] depression, and Trop normal, suspicion is lower  - for now there is no indication for pericardiocentesis- repeated TTE shows moderate pericardial effusion.

## 2024-08-18 NOTE — PROGRESS NOTE ADULT - ASSESSMENT
· Assessment	  83F PMHx Afib on apixaban, recurrent UTIs, chronic left sided nephrosis, hypothyroid, OA, HTN, HLD, CVA (residual left sided weakness), chronic walters BIBEMS from home for abdominal pain admitted for Sepsis 2/2 Acute UTI c/b chronic walters, fecal impaction, pleural and pericardial effusions, and AFIB RVR.    # Sepsis secondary to  # Catheter associated UTI  Urine culture (8/14/24)) Klebsiella aerogenes  Blood cultures (8/15/24) Negative to date  S/p catheter change in Emergency Department  WBC improving daily, remains afebrile though hypotensive earlier  - Piperacillin-Tazobactam IV; change to Ertapenem given sensitivities to complete at least 7 days (total) for complicated UTI  - Walters care    # Left pleural effusion, moderate to large  # Right Pleural effusion, small  S/P L PTC 8/16/24 1000ml removed  - Resume Apixaban 5mg bid  - Plan for thoracentesis today   - Based on light criteria, likely exudative    - Pleural Fluid: Gram stain/culture/fungal/cyto pending  - Record chest tube output 12 hours, even if output is zero.  - Continuous SpO2 monitoring. Maintain SpO2 >92%. Supplement with O2 therapy PRN.  - Encourage the use of incentive spirometer, cough/deep breathing exercises, OOB to chair, ambulate as tolerated.  - Thoracic Surgery following. Chest tube removed 8/18/24    # Pericardial effusion  TTE reported moderate pericardial effusion  - Prior echo does not demonstrate pericardial effusion  - Cardiology consult appreciated; No tamponade or intervention currently  - Echo shows normal EF 50-55%  - Troponin is normal but upper end of range, continue to trend  - pBNP in 6/2024 was 6927,  repeated on 8/15 1543.   - Repeated Echo on probably Sunday (8/18) to evaluate worsening effusion.  - Follow up TSH, CPK  - There is no smith's triad on physical exam   - Recommend thoracentesis first and send pleural fluid for cytology to evaluate the etiology of the effusion. Concern there is underlying malignancy.  Eliquis will be on hold 24hrs prior if there's pericardiocentesis.   - C/w monitor on telemetry,  - Will start Lasix 40mg IVP possibly for 2 days; discontinued due to hypotension and need for IVF    # Dysphagia  -Family reported patient was coughing with diet for last few days  -SLP consulted - minced and moist with thins    # A.fib with RVR  - Pt's HR was around 100-120, w/ hypotension.   - Will stop the metoprolol and start Digoxin load for better heart rate control Digoxin 500mcg IVP x 1 and then Digoxin 250mcg IVP q 6hrs and then 125mg po q daily;   - Continue with Eliquis, once HR better controlled will then transition to Metoprolol  - Cardio following     # Pressure injury   -On injury  -ON buttock and hip, Stage II and III  - Daily wound care with Santyl and calcium alginate        Dispo: medically active, discharge pending    · Assessment	  83F PMHx Afib on apixaban, recurrent UTIs, chronic left sided nephrosis, hypothyroid, OA, HTN, HLD, CVA (residual left sided weakness), chronic walters BIBEMS from home for abdominal pain admitted for Sepsis 2/2 Acute UTI c/b chronic walters, fecal impaction, pleural and pericardial effusions, and AFIB RVR.    # Sepsis secondary to  # Catheter associated UTI  Urine culture (8/14/24)) Klebsiella aerogenes  Blood cultures (8/15/24) Negative to date  S/p catheter change in Emergency Department  WBC improving daily, remains afebrile though hypotensive earlier  - Piperacillin-Tazobactam IV; change to Ertapenem given sensitivities to complete at least 7 days (total) for complicated UTI  - Walters care    # Left pleural effusion, moderate to large  # Right Pleural effusion, small  S/P L PTC 8/16/24 1000ml removed  - Resume Apixaban 5mg bid  - Plan for thoracentesis today   - Based on light criteria, likely exudative    - Pleural Fluid: Gram stain/culture/fungal/cyto pending  - Record chest tube output 12 hours, even if output is zero.  - Continuous SpO2 monitoring. Maintain SpO2 >92%. Supplement with O2 therapy PRN.  - Encourage the use of incentive spirometer, cough/deep breathing exercises, OOB to chair, ambulate as tolerated.  - Thoracic Surgery following. Chest tube removed 8/18/24    # Pericardial effusion  TTE reported moderate pericardial effusion  - Prior echo does not demonstrate pericardial effusion  - Cardiology consult appreciated; No tamponade or intervention currently  - Echo shows normal EF 50-55%  - Troponin is normal but upper end of range, continue to trend  - pBNP in 6/2024 was 6927,  repeated on 8/15 1543.   - Repeated Echo on probably Sunday (8/18) to evaluate worsening effusion.  - Follow up TSH, CPK  - There is no smith's triad on physical exam   - Recommend thoracentesis first and send pleural fluid for cytology to evaluate the etiology of the effusion. Concern there is underlying malignancy.  Eliquis will be on hold 24hrs prior if there's pericardiocentesis.   - C/w monitor on telemetry,  - Will start Lasix 40mg IVP possibly for 2 days; discontinued due to hypotension and need for IVF    # Dysphagia  -Family reported patient was coughing with diet for last few days  -SLP consulted - minced and moist with thins    # A.fib with RVR  - Pt's HR was around 100-120, w/ hypotension.   - Will stop the metoprolol and start Digoxin load for better heart rate control Digoxin 500mcg IVP x 1 and then Digoxin 250mcg IVP q 6hrs and then 125mg po q daily;   - Continue with Eliquis, once HR better controlled will then transition to Metoprolol  - Cardio following     # Pressure injury   -On injury  -ON buttock and hip, Stage II and III  - Daily wound care with Santyl and calcium alginate      Disposition: medically active, likely Rehab upon stabilization

## 2024-08-18 NOTE — CHART NOTE - NSCHARTNOTEFT_GEN_A_CORE
83y Female with a h/o Aifb on Eliquis, OA, HTN, HLD, CVA (residual left sided weakness). Multiple admission for UTI/sepsis, most recently on 6/4/24 with lethargy, poor PO intake and decreased urine output. Patient was admitted to MICU requiring vasopressors. Completed course of IV Meropenem for Klebsiella and enterobacter UTI. Presents to ED from home where her daughter cares for her c/o diffuse abd pain since early in AM. ED workup notable for afib RVR, acute UTI, b/l pleural effusions L>R, mod pericardial effusion without clinical evidence of tamponade. Thoracic Sx consulted for effusion eval.  S/p L PTC placed 8/15 with 1L serous fluid initial output.    L PTC with minimal drainage for 24 hours. Tube removed at the bedside. Will f/u post removal CXR. No further thoracic surgery intervention at this time. Thoracic surgery will sign off, please reconsult as needed. Plan discussed with surgeons during AM rounds.

## 2024-08-18 NOTE — PROGRESS NOTE ADULT - SUBJECTIVE AND OBJECTIVE BOX
HOSPITALIST PROGRESS NOTE    PRADEEP FONTAINECARTER  [id number]  83yFemale    Patient is a 83y old  Female who presents with a chief complaint of Acute UTI, Pleural Effusion, Pericardial Effusion, Fecal Impaction, AFIB RVR (18 Aug 2024 11:47)      SUBJECTIVE:   Chart reviewed since last visit.    Hypotensive overnight - received small bolus.   Furosemide discontinued in morning    Patient seen and examined at bedside for UTI, Pericardial effusion and pleural effusion.  Denies any dizziness, dyspnea, chest pain, chills, abdominal pain      OBJECTIVE:  Vital Signs Last 24 Hrs  T(C): 36.8 (18 Aug 2024 09:20), Max: 37.2 (17 Aug 2024 20:55)  T(F): 98.2 (18 Aug 2024 09:20), Max: 99 (17 Aug 2024 20:55)  HR: 98 (18 Aug 2024 09:20) (95 - 104)  BP: 106/72 (18 Aug 2024 09:20) (79/50 - 110/71)   RR: 18 (18 Aug 2024 09:20) (17 - 18)  SpO2: 96% (18 Aug 2024 09:20) (91% - 98%)    Parameters below as of 18 Aug 2024 09:20  Patient On (Oxygen Delivery Method): room air    General: Obese, lying in bed, comfortable  HEENT:  dark teeth  NECK:  supple  CVS: regular rate and rhythm S1 S2  RESP:  Clear to ausculation  GI:  Soft nondistended nontender BS+  : suprapubic tenderness  MSK:  No edema. FROM. Joint swelling. Bilateral TKR scars well healed  CNS:  No gross focal or global deficit noted  INTEG:  warm dry skin  PSYCH:  Fair mood      MONITOR:  CAPILLARY BLOOD GLUCOSE            I&O's Summary    17 Aug 2024 07:01  -  18 Aug 2024 07:00  --------------------------------------------------------  IN: 0 mL / OUT: 1320 mL / NET: -1320 mL    18 Aug 2024 07:01  -  18 Aug 2024 16:26  --------------------------------------------------------  IN: 0 mL / OUT: 2000 mL / NET: -2000 mL                            12.2   12.40 )-----------( 366      ( 18 Aug 2024 07:12 )             41.3       08-18    131<L>  |  97  |  18.3  ----------------------------<  141<H>  4.4   |  17.0<L>  |  0.58    Ca    8.6      18 Aug 2024 07:12  Phos  2.9     08-18  Mg     1.5     08-18    TPro  6.2<L>  /  Alb  1.6<L>  /  TBili  0.6  /  DBili  x   /  AST  43<H>  /  ALT  22  /  AlkPhos  111  08-18        Urinalysis Basic - ( 18 Aug 2024 07:12 )    Color: x / Appearance: x / SG: x / pH: x  Gluc: 141 mg/dL / Ketone: x  / Bili: x / Urobili: x   Blood: x / Protein: x / Nitrite: x   Leuk Esterase: x / RBC: x / WBC x   Sq Epi: x / Non Sq Epi: x / Bacteria: x        Culture:    Culture - Urine (08.14.24 @ 23:30)   - Tobramycin: S <=2  - Trimethoprim/Sulfamethoxazole: S <=0.5/9.5  - Gentamicin: S <=2  - Imipenem: S <=1  - Levofloxacin: S <=0.5  - Meropenem: S <=1  - Nitrofurantoin: R >64 Should not be used to treat pyelonephritis  - Piperacillin/Tazobactam: S <=8 Treatment with Pipercillin/Tazobactam is not recommended in severe infections casued by Klebsiella aerogenes, Enterobacter cloacae complex, and Citrobacter freundii complex.  - Cefazolin: R 8  - Cefepime: S <=2  - Cefoxitin: R >16  - Ceftriaxone: S <=1 Enterobacter cloacae, Klebsiella aerogenes, and Citrobacter freundii may develop resistance during prolonged therapy.  - Ciprofloxacin: S <=0.25  - Ertapenem: S <=0.5  - Amoxicillin/Clavulanic Acid: R >16/8  - Ampicillin: R >16 These ampicillin results predict results for amoxicillin  - Ampicillin/Sulbactam: R 8/4  - Aztreonam: S <=4  Specimen Source: Clean Catch Clean Catch (Midstream)  Culture Results:   >100,000 CFU/ml Klebsiella aerogenes (Previously Enterobacter)  Organism Identification: Klebsiella aerogenes (Previously Enterobacter)  Organism: Klebsiella aerogenes (Previously Enterobacter)  Method Type: EDWIN    Culture - Blood (08.15.24 @ 02:00)   Specimen Source: .Blood Blood-Peripheral  Culture Results:   No growth at 72 Hours  Culture - Fungal, Body Fluid (08.16.24 @ 14:00)   Specimen Source: Pleural Fl Pleural Fluid  Culture Results:   Culture is being performed. Fungal cultures are held for 4 weeks.      TTE:    < from: TTE W or WO Ultrasound Enhancing Agent (08.15.24 @ 09:19) >  CONCLUSIONS:      1. Left ventricular systolic function is normal with an ejection fraction of 54 % by Maher's method of disks with an ejection fraction visually estimated at 50 to 55 %.   2. Normal right ventricular cavity size and normal right ventricular systolic function.   3. Mild mitral regurgitation.   4. Normal left and right atrial size.   5. Mild to moderate tricuspid regurgitation.   6. Estimated pulmonary artery systolic pressure is 33 mmHg, consistent with normal pulmonary artery pressure.   7. Moderate pericardial effusion noted adjacent to the right atrium and small pericardial effusion noted adjacent to the lateral left ventricle with no evidence of hemodynamic compromise (or echocardiographic evidence of cardiac tamponade).    < end of copied text >      < from: TTE Limited W or WO Ultrasound Enhancing Agent (08.17.24 @ 14:09) >  CONCLUSIONS:      1. Limited followup study.   2. Left ventricular systolic function is normal with an ejection fraction visually estimated at 50 to 55 %.   3. Moderate pericardial effusion with no evidence of hemodynamic compromise (or echocardiographic evidence of cardiac tamponade).   4. Compared to the transthoracic echocardiogram performed on 8/15/2024, there have been no significant interval changes.    ________________________    < end of copied text >    RADIOLOGY    < from: CT Chest w/ IV Cont (08.14.24 @ 17:51) >    FINDINGS:  CHEST:  LUNGS AND LARGE AIRWAYS: Patent central airways. Complete left lower lobe   atelectasis and partial left upper lobe atelectasis adjacent to a pleural   effusion. Mild compressive atelectasis in the right lower lobe..  PLEURA: Moderate to large left pleural effusion. Small right pleural   effusion.  VESSELS: Aortic calcifications. Coronary artery calcifications. Air in   the right subclavian and internal jugular veins, likely iatrogenic.  HEART: Mild cardiomegaly. Moderate pericardial effusion.  MEDIASTINUM AND MARTY: No lymphadenopathy.  CHEST WALL AND LOWER NECK: Within normal limits.    ABDOMEN AND PELVIS: Images are degraded by respiratory motion artifact in   the upper abdomen.  LIVER: Subcentimeter indeterminate hypodense focus in segment 3.  BILE DUCTS: Normal caliber.  GALLBLADDER: Cholecystectomy.  SPLEEN: Within normal limits.  PANCREAS: Within normal limits.  ADRENALS: Within normal limits.  KIDNEYS/URETERS: Mild nonspecific bilateral urothelial enhancement.   Dilated left extrarenal pelvis and mild left hydronephrosis.   Subcentimeter indeterminate bilateral hypodense renal lesions are too   small to characterize.    BLADDER: Circumferential bladder wall thickening and perivesicular   infiltration. Air in the bladder lumen..  REPRODUCTIVE ORGANS: Malpositioned Smart catheter balloon in the cervix.    BOWEL: Rectal distention with stool with mild perirectal edema. Moderate   fecal load. No obstruction.  PERITONEUM/RETROPERITONEUM: Within normal limits.  VESSELS: Atherosclerotic changes. Extensive calcification of the ostia of   the celiac and superior mesenteric arteries causing moderate to severe   stenosis.  LYMPH NODES: No lymphadenopathy.  ABDOMINAL WALL: Within normal limits.  BONES: Within normal limits.    IMPRESSION:  Moderate to large left pleural effusion with complete left lower lobe and   partial left upper lobe atelectasis. Small right pleural effusion.    Moderate pericardial effusion.    Stable mild left hydronephrosis with bilateral urothelial enhancement   along with urinary bladder wall thickening and perivesicular   inflammation. Findings may represent acute cystitis and pyelitis. No CT   evidence for pyelonephritis.    Malpositioned Smart catheter balloon within the cervix.    Fecal impaction of the rectum. Mild perirectal edema is nonspecific.    --- End of Report ---    < end of copied text >    < from: Xray Chest 1 View- PORTABLE-Routine (Xray Chest 1 View- PORTABLE-Routine in AM.) (08.17.24 @ 01:13) >  Findings/  Impression: Status post left selective catheter. Cardiomegaly. Small   bilateral pleural effusions. No pneumothorax. 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 Severe chronic dislocation deformity   of the right glenohumeral joint.    --- End of Report ---    < end of copied text >          MEDICATIONS  (STANDING):  apixaban 5 milliGRAM(s) Oral two times a day  ascorbic acid 500 milliGRAM(s) Oral daily  collagenase Ointment 1 Application(s) Topical daily  digoxin     Tablet 125 MICROGram(s) Oral daily  levothyroxine 75 MICROGram(s) Oral daily  magnesium sulfate  IVPB 2 Gram(s) IV Intermittent once  piperacillin/tazobactam IVPB.. 3.375 Gram(s) IV Intermittent every 8 hours  polyethylene glycol 3350 17 Gram(s) Oral two times a day  senna 2 Tablet(s) Oral at bedtime  simvastatin 20 milliGRAM(s) Oral at bedtime  tamsulosin 0.4 milliGRAM(s) Oral at bedtime  zinc sulfate 220 milliGRAM(s) Oral daily      MEDICATIONS  (PRN):  acetaminophen     Tablet .. 650 milliGRAM(s) Oral every 6 hours PRN Temp greater or equal to 38C (100.4F), Mild Pain (1 - 3)  aluminum hydroxide/magnesium hydroxide/simethicone Suspension 30 milliLiter(s) Oral every 4 hours PRN Dyspepsia  melatonin 3 milliGRAM(s) Oral at bedtime PRN Insomnia  metoprolol tartrate Injectable 2.5 milliGRAM(s) IV Push every 6 hours PRN HR > 120  ondansetron Injectable 4 milliGRAM(s) IV Push every 8 hours PRN Nausea and/or Vomiting

## 2024-08-18 NOTE — PROGRESS NOTE ADULT - SUBJECTIVE AND OBJECTIVE BOX
[facility]                                                         CARDIOLOGY AT [place] [address]                                                         Telephone: [phone]. Fax:[phone]                                                                             PROGRESS NOTE    Reason for follow up: afib, moderate pericardial effusion Acute HFpEF  Update: hypotensive this AM      Review of symptoms:   Cardiac:  No chest pain. No dyspnea. No palpitations.  Respiratory: no cough. No dyspnea  Gastrointestinal: No diarrhea. No abdominal pain. No bleeding.   Neuro: No focal neuro complaints.    Vitals:  T(C): 36.8 (08-18-24 @ 09:20), Max: 37.2 (08-17-24 @ 20:55)  HR: 98 (08-18-24 @ 09:20) (95 - 104)  BP: 106/72 (08-18-24 @ 09:20) (79/50 - 110/71)  RR: 18 (08-18-24 @ 09:20) (17 - 18)  SpO2: 96% (08-18-24 @ 09:20) (91% - 98%)  Wt(kg): --  I&O's Summary    17 Aug 2024 07:01  -  18 Aug 2024 07:00  --------------------------------------------------------  IN: 0 mL / OUT: 1320 mL / NET: -1320 mL    18 Aug 2024 07:01  -  18 Aug 2024 11:47  --------------------------------------------------------  IN: 0 mL / OUT: 2000 mL / NET: -2000 mL      Weight (kg): 64 (08-15 @ 04:57)    PHYSICAL EXAM:  Appearance: Comfortable. No acute distress  HEENT:  Atraumatic. Normocephalic.  Normal oral mucosa  Neurologic: A & O x 3, no gross focal deficits.  Cardiovascular: irregular S1 S2, No murmur, no rubs/gallops. No JVD  Respiratory: Lungs clear to auscultation, unlabored   Gastrointestinal:  Soft, Non-tender, + BS  Lower Extremities: 2+ Peripheral Pulses, No clubbing, cyanosis, or edema  Psychiatry: Patient is calm. No agitation.   Skin: warm and dry.    CURRENT CARDIAC MEDICATIONS:  digoxin     Tablet 125 MICROGram(s) Oral daily  metoprolol tartrate Injectable 2.5 milliGRAM(s) IV Push every 6 hours PRN      CURRENT OTHER MEDICATIONS:  piperacillin/tazobactam IVPB.. 3.375 Gram(s) IV Intermittent every 8 hours  acetaminophen     Tablet .. 650 milliGRAM(s) Oral every 6 hours PRN Temp greater or equal to 38C (100.4F), Mild Pain (1 - 3)  melatonin 3 milliGRAM(s) Oral at bedtime PRN Insomnia  ondansetron Injectable 4 milliGRAM(s) IV Push every 8 hours PRN Nausea and/or Vomiting  aluminum hydroxide/magnesium hydroxide/simethicone Suspension 30 milliLiter(s) Oral every 4 hours PRN Dyspepsia  polyethylene glycol 3350 17 Gram(s) Oral two times a day  senna 2 Tablet(s) Oral at bedtime  levothyroxine 75 MICROGram(s) Oral daily  simvastatin 20 milliGRAM(s) Oral at bedtime  apixaban 5 milliGRAM(s) Oral two times a day  ascorbic acid 500 milliGRAM(s) Oral daily  collagenase Ointment 1 Application(s) Topical daily  tamsulosin 0.4 milliGRAM(s) Oral at bedtime  zinc sulfate 220 milliGRAM(s) Oral daily, Stop order after: 10 Days      LABS:	 	                            12.2   12.40 )-----------( 366      ( 18 Aug 2024 07:12 )             41.3     08-18    131<L>  |  97  |  18.3  ----------------------------<  141<H>  4.4   |  17.0<L>  |  0.58    Ca    8.6      18 Aug 2024 07:12  Phos  2.9     08-18  Mg     1.5     08-18    TPro  6.2<L>  /  Alb  1.6<L>  /  TBili  0.6  /  DBili  x   /  AST  43<H>  /  ALT  22  /  AlkPhos  111  08-18    PT/INR/PTT ( 16 Aug 2024 08:40 )                       :                       :      X            :       56.4                  .        .                   .              .           .       X           .                                       Lipid Profile:   HgA1c:   TSH: Thyroid Stimulating Hormone, Serum: 4.56 uIU/mL      TELEMETRY: afib       DIAGNOSTIC TESTING:  [ ] Echocardiogram:   < from: TTE Limited W or WO Ultrasound Enhancing Agent (08.17.24 @ 14:09) >  CONCLUSIONS:      1. Limited followup study.   2. Left ventricular systolic function is normal with an ejection fraction visually estimated at 50 to 55 %.   3. Moderate pericardial effusion with no evidence of hemodynamic compromise (or echocardiographic evidence of cardiac tamponade).   4. Compared to the transthoracic echocardiogram performed on 8/15/2024, there have been no significant interval changes.    < end of copied text >

## 2024-08-18 NOTE — PROGRESS NOTE ADULT - NS ATTEND AMEND GEN_ALL_CORE FT
83y Female with a h/o Aifb on Eliquis, OA, HTN, HLD, CVA (residual left sided weakness). Multiple admission for UTI/sepsis, most recently on 6/4/24 with lethargy, poor PO intake and decreased urine output. Patient was admitted to MICU requiring vasopressors. Completed course of IV Meropenem for Klebsiella and enterobacter UTI. Presents to ED from home where her daughter cares for her c/o diffuse abd pain since early in AM. ED workup notable for afib RVR, acute UTI, b/l pleural effusions L>R, mod pericardial effusion without clinical evidence of tamponade. Thoracic Sx consulted for effusion eval. Cardiology consulted for pericardial effusion     Acute pericardial effusion.   - Echo shows LVEF 50-55%  - troponin is normal but upper end of range, continue to trend  - pBNP in 6/2024 was 6927 and pt was not overloaded at that time, now it is significantly less 1500 and pt is overloaded. pBNP is sort of non-diagnostic   - ESR is 100 and CRP is 250 which is markedly elevated. Acute phase reactant secondary to infection?   - possibly underlying rheumatologic condition vs pericarditis, but in absence of chest pain, EKG without MO depression, and Trop normal, suspicion is lower  - for now there is no indication for pericardiocentesis- repeated TTE shows moderate pericardial effusion.      Pleural effusion possibly secondary to infection or HFpEF   s/p chest tube placement -1.02L removed and last had 50 cc out   follow up with Thoracic surgery pending removal - s/p removal today       Acute on chronic heart failure with preserved ejection fraction (HFpEF).   - patient still volume overloaded and started on Lasix 40mg IVP q daily and now stopped due to hypotension   - will hold on any GDMT at this time due to hypotension and reassess once clinically stable to start ARNI/or ARB / BB / MRA and SLGTII inhibitors  - continue to monitor I/Os and keep K ~ 4 and Mag ~ 2.  can consider Lasix 20mg po q daily      Chronic atrial fibrillation.   - was in Afib with RVR and due to hypotension stopped BB and completed Digoxin load and on Digoxin 125mg po q daily and HR better controlled   - continue with telemetry monitoring   - rates have improved   - CHADS-VASc 7, on Eliquis 5mg po q 12hrs.    Eva Hodges D.O. [facility]  Cardiology/Vascular Cardiology -[facility]   Telephone # [phone]

## 2024-08-19 LAB
ALBUMIN SERPL ELPH-MCNC: 2.1 G/DL — LOW (ref 3.3–5.2)
ALP SERPL-CCNC: 96 U/L — SIGNIFICANT CHANGE UP (ref 40–120)
ALT FLD-CCNC: 14 U/L — SIGNIFICANT CHANGE UP
ANION GAP SERPL CALC-SCNC: 11 MMOL/L — SIGNIFICANT CHANGE UP (ref 5–17)
ANION GAP SERPL CALC-SCNC: 14 MMOL/L — SIGNIFICANT CHANGE UP (ref 5–17)
AST SERPL-CCNC: 13 U/L — SIGNIFICANT CHANGE UP
BILIRUB SERPL-MCNC: 0.3 MG/DL — LOW (ref 0.4–2)
BUN SERPL-MCNC: 16.5 MG/DL — SIGNIFICANT CHANGE UP (ref 8–20)
BUN SERPL-MCNC: 18.8 MG/DL — SIGNIFICANT CHANGE UP (ref 8–20)
CALCIUM SERPL-MCNC: 8 MG/DL — LOW (ref 8.4–10.5)
CALCIUM SERPL-MCNC: 8.1 MG/DL — LOW (ref 8.4–10.5)
CHLORIDE SERPL-SCNC: 101 MMOL/L — SIGNIFICANT CHANGE UP (ref 96–108)
CHLORIDE SERPL-SCNC: 102 MMOL/L — SIGNIFICANT CHANGE UP (ref 96–108)
CO2 SERPL-SCNC: 23 MMOL/L — SIGNIFICANT CHANGE UP (ref 22–29)
CO2 SERPL-SCNC: 25 MMOL/L — SIGNIFICANT CHANGE UP (ref 22–29)
CREAT SERPL-MCNC: 0.49 MG/DL — LOW (ref 0.5–1.3)
CREAT SERPL-MCNC: 0.5 MG/DL — SIGNIFICANT CHANGE UP (ref 0.5–1.3)
DIGOXIN SERPL-MCNC: 0.9 NG/ML — SIGNIFICANT CHANGE UP (ref 0.8–2)
EGFR: 94 ML/MIN/1.73M2 — SIGNIFICANT CHANGE UP
EGFR: 95 ML/MIN/1.73M2 — SIGNIFICANT CHANGE UP
GLUCOSE SERPL-MCNC: 124 MG/DL — HIGH (ref 70–99)
GLUCOSE SERPL-MCNC: 180 MG/DL — HIGH (ref 70–99)
HCT VFR BLD CALC: 31.1 % — LOW (ref 34.5–45)
HGB BLD-MCNC: 9.8 G/DL — LOW (ref 11.5–15.5)
MAGNESIUM SERPL-MCNC: 1.9 MG/DL — SIGNIFICANT CHANGE UP (ref 1.6–2.6)
MAGNESIUM SERPL-MCNC: 1.9 MG/DL — SIGNIFICANT CHANGE UP (ref 1.6–2.6)
MCHC RBC-ENTMCNC: 27.1 PG — SIGNIFICANT CHANGE UP (ref 27–34)
MCHC RBC-ENTMCNC: 31.5 GM/DL — LOW (ref 32–36)
MCV RBC AUTO: 86.1 FL — SIGNIFICANT CHANGE UP (ref 80–100)
NON-GYNECOLOGICAL CYTOLOGY STUDY: SIGNIFICANT CHANGE UP
PLATELET # BLD AUTO: 404 K/UL — HIGH (ref 150–400)
POTASSIUM SERPL-MCNC: 2.5 MMOL/L — CRITICAL LOW (ref 3.5–5.3)
POTASSIUM SERPL-MCNC: 3.5 MMOL/L — SIGNIFICANT CHANGE UP (ref 3.5–5.3)
POTASSIUM SERPL-SCNC: 2.5 MMOL/L — CRITICAL LOW (ref 3.5–5.3)
POTASSIUM SERPL-SCNC: 3.5 MMOL/L — SIGNIFICANT CHANGE UP (ref 3.5–5.3)
PROT SERPL-MCNC: 5.4 G/DL — LOW (ref 6.6–8.7)
RBC # BLD: 3.61 M/UL — LOW (ref 3.8–5.2)
RBC # FLD: 16 % — HIGH (ref 10.3–14.5)
SODIUM SERPL-SCNC: 137 MMOL/L — SIGNIFICANT CHANGE UP (ref 135–145)
SODIUM SERPL-SCNC: 138 MMOL/L — SIGNIFICANT CHANGE UP (ref 135–145)
WBC # BLD: 9.94 K/UL — SIGNIFICANT CHANGE UP (ref 3.8–10.5)
WBC # FLD AUTO: 9.94 K/UL — SIGNIFICANT CHANGE UP (ref 3.8–10.5)

## 2024-08-19 PROCEDURE — 99232 SBSQ HOSP IP/OBS MODERATE 35: CPT | Mod: FS

## 2024-08-19 PROCEDURE — 99233 SBSQ HOSP IP/OBS HIGH 50: CPT

## 2024-08-19 RX ORDER — POTASSIUM CHLORIDE 10 MEQ
20 TABLET, EXT RELEASE, PARTICLES/CRYSTALS ORAL
Refills: 0 | Status: DISCONTINUED | OUTPATIENT
Start: 2024-08-19 | End: 2024-08-19

## 2024-08-19 RX ORDER — POTASSIUM CHLORIDE 10 MEQ
40 TABLET, EXT RELEASE, PARTICLES/CRYSTALS ORAL EVERY 4 HOURS
Refills: 0 | Status: COMPLETED | OUTPATIENT
Start: 2024-08-19 | End: 2024-08-19

## 2024-08-19 RX ADMIN — Medication 40 MILLIEQUIVALENT(S): at 17:27

## 2024-08-19 RX ADMIN — Medication 75 MICROGRAM(S): at 05:34

## 2024-08-19 RX ADMIN — ACETAMINOPHEN 650 MILLIGRAM(S): 325 TABLET ORAL at 09:55

## 2024-08-19 RX ADMIN — Medication 50 MILLIEQUIVALENT(S): at 11:24

## 2024-08-19 RX ADMIN — ACETAMINOPHEN 650 MILLIGRAM(S): 325 TABLET ORAL at 08:58

## 2024-08-19 RX ADMIN — ERTAPENEM SODIUM 120 MILLIGRAM(S): 1 INJECTION, POWDER, LYOPHILIZED, FOR SOLUTION INTRAMUSCULAR; INTRAVENOUS at 17:26

## 2024-08-19 RX ADMIN — Medication 40 MILLIEQUIVALENT(S): at 08:59

## 2024-08-19 RX ADMIN — Medication 220 MILLIGRAM(S): at 11:58

## 2024-08-19 RX ADMIN — TAMSULOSIN HYDROCHLORIDE 0.4 MILLIGRAM(S): 0.4 CAPSULE ORAL at 22:03

## 2024-08-19 RX ADMIN — Medication 40 MILLIEQUIVALENT(S): at 11:58

## 2024-08-19 RX ADMIN — APIXABAN 5 MILLIGRAM(S): 5 TABLET, FILM COATED ORAL at 05:34

## 2024-08-19 RX ADMIN — COLLAGENASE SANTYL 1 APPLICATION(S): 250 OINTMENT TOPICAL at 11:57

## 2024-08-19 RX ADMIN — APIXABAN 5 MILLIGRAM(S): 5 TABLET, FILM COATED ORAL at 17:26

## 2024-08-19 RX ADMIN — Medication 500 MILLIGRAM(S): at 11:58

## 2024-08-19 RX ADMIN — Medication 2 TABLET(S): at 22:03

## 2024-08-19 RX ADMIN — Medication 20 MILLIGRAM(S): at 05:34

## 2024-08-19 RX ADMIN — Medication 20 MILLIGRAM(S): at 22:03

## 2024-08-19 RX ADMIN — DIGOXIN 125 MICROGRAM(S): 0.12 TABLET ORAL at 05:34

## 2024-08-19 RX ADMIN — Medication 50 MILLIEQUIVALENT(S): at 08:58

## 2024-08-19 RX ADMIN — Medication 100 GRAM(S): at 08:58

## 2024-08-19 NOTE — PROGRESS NOTE ADULT - NS ATTEND AMEND GEN_ALL_CORE FT
83y Female with a h/o Aifb on Eliquis, OA, HTN, HLD, CVA (residual left sided weakness). Multiple admission for UTI/sepsis, most recently on 6/4/24 with lethargy, poor PO intake and decreased urine output. Patient was admitted to MICU requiring vasopressors. Completed course of IV Meropenem for Klebsiella and enterobacter UTI. Presents to ED from home where her daughter cares for her c/o diffuse abd pain since early in AM. ED workup notable for afib RVR, acute UTI, b/l pleural effusions L>R, mod pericardial effusion without clinical evidence of tamponade. Thoracic Sx consulted for effusion eval. Cardiology consulted for pericardial effusion.     No indication at this time for pericardiocentesis. no signs of tamponade.  Would repeat limited echocardiogram prior to D/C as on Eliquis for Afib.  continue to PO lasix 20mg qday  farxiga 5mg qday on discharge, risks benefits alternatives of echo  c/w digoxin at this time, dig level appropriate  c/w apixiban  telemetry reviewed, controlled PAF.    please call with concerns, we will follow peripherally

## 2024-08-19 NOTE — PROGRESS NOTE ADULT - PROBLEM SELECTOR PLAN 1
- Prior echo does not demonstrate pericardial effusion  - Echo shows LVEF 50-55%, moderate effusion, stable on TTE 08/17/24.   - No indication at this time for pericardiocentesis.  - Would repeat limited echocardiogram prior to D/C as on Eliquis for Afib.

## 2024-08-19 NOTE — PHYSICAL THERAPY INITIAL EVALUATION ADULT - ADDITIONAL COMMENTS
Pt is a poor historian. Per chart - lives in an apartment with her daughter and has an aide 12 hours a day. Pt is dependent at baseline and bed bound. They have a osmar lift, hospital bed, and w/c.

## 2024-08-19 NOTE — PROGRESS NOTE ADULT - PROBLEM SELECTOR PROBLEM 3
Chronic atrial fibrillation
Chronic atrial fibrillation
Acute on chronic heart failure with preserved ejection fraction (HFpEF)

## 2024-08-19 NOTE — PROGRESS NOTE ADULT - ASSESSMENT
A/P: 83y Female with a h/o Aifb on Eliquis, OA, HTN, HLD, CVA (residual left sided weakness). Multiple admission for UTI/sepsis, most recently on 6/4/24 with lethargy, poor PO intake and decreased urine output. Patient was admitted to MICU requiring vasopressors. Completed course of IV Meropenem for Klebsiella and enterobacter UTI. Presents to ED from home where her daughter cares for her c/o diffuse abd pain since early in AM. ED workup notable for afib RVR, acute UTI, b/l pleural effusions L>R, mod pericardial effusion without clinical evidence of tamponade. Thoracic Sx consulted for effusion eval. Cardiology consulted for pericardial effusion.

## 2024-08-19 NOTE — PROGRESS NOTE ADULT - ASSESSMENT
· Assessment	  83F PMHx Afib on apixaban, recurrent UTIs, chronic left sided nephrosis, hypothyroid, OA, HTN, HLD, CVA (residual left sided weakness), chronic walters BIBEMS from home for abdominal pain admitted for Sepsis 2/2 Acute UTI c/b chronic walters, fecal impaction, pleural and pericardial effusions, and AFIB RVR.    # Sepsis secondary to  # Catheter associated UTI  Urine culture (8/14/24)) Klebsiella aerogenes  Blood cultures (8/15/24) Negative to date  S/p catheter change in Emergency Department  WBC improving daily, remains afebrile though hypotensive earlier  - S/P Piperacillin-Tazobactam IV for 4 days; change to Ertapenem given sensitivities to complete at least 7 days (total) for complicated UTI (8/15-8/21)  - Walters care    # Left pleural effusion, moderate to large  # Right Pleural effusion, small  - S/P L PTC 8/16/24 1000ml removed  - Resume Apixaban 5mg bid  - Based on light criteria, likely exudative    - Pleural Fluid: Gram stain/culture/fungal/cyto pending  - Continuous SpO2 monitoring. Maintain SpO2 >92%. Supplement with O2 therapy PRN.  - Encourage the use of incentive spirometer, cough/deep breathing exercises, OOB to chair, ambulate as tolerated.  - Thoracic Surgery following. Chest tube removed 8/18/24    # Pericardial effusion  TTE reported moderate pericardial effusion  - Prior echo does not demonstrate pericardial effusion  - Cardiology consult appreciated; No tamponade or intervention currently  - Echo shows normal EF 50-55%  - Troponin is normal but upper end of range, continue to trend  - pBNP in 6/2024 was 6927,  repeated on 8/15 1543.   - Repeated Echo on probably Sunday (8/18) to evaluate worsening effusion.  - TSH(4.56)   - There is no smith's triad on physical exam   - Recommend thoracentesis first and send pleural fluid for cytology to evaluate the etiology of the effusion. Concern there is underlying malignancy.  Eliquis will be on hold 24hrs prior if there's pericardiocentesis.   - C/w monitor on telemetry,  - Will start Lasix 40mg IVP possibly for 2 days; discontinued due to hypotension and need for IVF    # Hypokalemia:   - K (8/19) morning 2.5  - Given kcl 40meq PO q6h X3  - Given kcl 20 meq IV q2h X3  - Repeat BMP at 3pm (8/19).     # Dysphagia  -Family reported patient was coughing with diet for last few days  -SLP consulted - minced and moist with thins    # A.fib with RVR  - Pt's HR was around 100-120, w/ hypotension.   - Will stop the metoprolol and start Digoxin load for better heart rate control Digoxin 500mcg IVP x 1 and then Digoxin 250mcg IVP q 6hrs and then 125mg po q daily;   - Continue with Eliquis, once HR better controlled will then transition to Metoprolol  - Cardio following     # Pressure injury   -On injury  -ON buttock and hip, Stage II and III  - Daily wound care with Santyl and calcium alginate      Disposition: medically active, likely Rehab upon stabilization

## 2024-08-19 NOTE — PHYSICAL THERAPY INITIAL EVALUATION ADULT - PERTINENT HX OF CURRENT PROBLEM, REHAB EVAL
Patient is a 83y old  Female who presents with a chief complaint of Acute UTI, Pleural Effusion, Pericardial Effusion, Fecal Impaction, AFIB RVR

## 2024-08-19 NOTE — PROGRESS NOTE ADULT - PROBLEM SELECTOR PLAN 2
-New onset  -Appears euvolemic  - pBNP in 6/2024 was 6927 and pt was not overloaded at that time, now it is significantly less 1500 and pt is overloaded.  -Net negative  -Had hypotension this AM. S/p 750 IVF. IVP lasix stopped by primary team. To start lasix 20mg PO QD starting tomorrow
On Eliquis at home.  Currently on heparin gtt, held for procedure.  Pt may restart heparin gtt post procedure.  AC per primary team
- Echocardiogram with EF 50-55%, moderate pericardial effusion.   - Albumin is also low, contributing to fluid overload.   - Pigtail catheters were removed.   - Continue lasix 20mg PO qday.   - Unable to add further medications due to hypotension.   - Add Farxiga prior to discharge.   - Monitor on telemetry.    - Strict i/o and daily weights.    - Keep K > 4, Mg > 2.    - Monitor renal function with ongoing diuresis.
possibly secondary to infection or HFpEF   s/p chest tube placement -1.02L removed and last had 50 cc out   follow up with Thoracic surgery pending removal   CXR today shows still R pleural effusion and L chest tube in place

## 2024-08-19 NOTE — PROGRESS NOTE ADULT - PROBLEM SELECTOR PROBLEM 1
Acute pericardial effusion
Acute pericardial effusion
Pleural effusion
Pleural effusion
Acute pericardial effusion
Acute pericardial effusion

## 2024-08-19 NOTE — PHYSICAL THERAPY INITIAL EVALUATION ADULT - PASSIVE RANGE OF MOTION EXAMINATION, REHAB EVAL
no Passive ROM deficits were identified/bilateral upper extremity Passive ROM was WFL (within functional limits)/bilateral lower extremity Passive ROM was WFL (within functional limits)

## 2024-08-19 NOTE — PROGRESS NOTE ADULT - SUBJECTIVE AND OBJECTIVE BOX
SUBJECTIVE:    Chief Complaint: Patient is a 83y old  Female who presents with a chief complaint of Acute UTI, Pleural Effusion, Pericardial Effusion, Fecal Impaction, AFIB RVR (19 Aug 2024 07:18)      Hospital Course:     83F PMHx Afib on apixaban, recurrent UTIs, chronic left sided nephrosis, hypothyroid, OA, HTN, HLD, CVA (residual left sided weakness), chronic walters BIBEMS from home for severe abdominal pain. Patient at baseline is A&Ox3, can answer all questions, knows medical hx well, has left sided weakness of UE/LE, unable to walk, Ursula lift out of bed. Last night the patient experienced stabbing LLQ abd pain w/o radiation, no worsening/remitting factors. Patient denies fever, chills, chest pain, cough, N/V/D and endorses no other symptoms. Unable to get in touch with family, patient did not know meds well, used Dr. Garibay, recommend calling family in AM. Patient was recently admitted to [facility] from 3/31 - 4/15/24 with UTI with sepsis complicated by A fib with RVR, BCx grew  coagulase-negative staph which was considered contamination rest of blood cultures including urine culture were all negative. Also, admitted on 6/4/24 with hyponatremia with a Na of 119 and hypotensive, Prior BCx grew Staphylococcus capitis and prior UCx grew Klebsiella pneumonia and Enterobacter cloacae.     In ED patient was found to be in Afib RVR. Rate control with improvement. CT chest/abd/pelvis showed pleural effusion, moderate pericardial effusion, fecal impaction, and pyelonephritis, as well as misplaced walters catheter in cervix. Thoracic consulted, planning thoracentesis once 24 hrs past last apixaban. Also, UA +, patient received 2L IVF bolus and vancomycin 1x and Cefepime 1x. Patient admitted to med/tele for sepsis 2/2 UTI c/b fecal impaciton, AFIB RVR, pleural and pericardial effusions.      INTERVAL HPI/OVERNIGHT EVENTS: Patient seen and examined at bedside at AM. No clinically acute event overnight. S/P L PCT.   Denies any chest pain, palpitations, SOB, leg edema, N/V/D, or any other complaints.     MEDICATIONS  (STANDING):  apixaban 5 milliGRAM(s) Oral two times a day  ascorbic acid 500 milliGRAM(s) Oral daily  collagenase Ointment 1 Application(s) Topical daily  digoxin     Tablet 125 MICROGram(s) Oral daily  ertapenem  IVPB 1000 milliGRAM(s) IV Intermittent every 24 hours  ertapenem  IVPB      furosemide    Tablet 20 milliGRAM(s) Oral daily  levothyroxine 75 MICROGram(s) Oral daily  polyethylene glycol 3350 17 Gram(s) Oral two times a day  potassium chloride    Tablet ER 40 milliEquivalent(s) Oral every 4 hours  potassium chloride  20 mEq/100 mL IVPB 20 milliEquivalent(s) IV Intermittent every 2 hours  senna 2 Tablet(s) Oral at bedtime  simvastatin 20 milliGRAM(s) Oral at bedtime  tamsulosin 0.4 milliGRAM(s) Oral at bedtime  zinc sulfate 220 milliGRAM(s) Oral daily    MEDICATIONS  (PRN):  acetaminophen     Tablet .. 650 milliGRAM(s) Oral every 6 hours PRN Temp greater or equal to 38C (100.4F), Mild Pain (1 - 3)  aluminum hydroxide/magnesium hydroxide/simethicone Suspension 30 milliLiter(s) Oral every 4 hours PRN Dyspepsia  melatonin 3 milliGRAM(s) Oral at bedtime PRN Insomnia  metoprolol tartrate Injectable 2.5 milliGRAM(s) IV Push every 6 hours PRN HR > 120  ondansetron Injectable 4 milliGRAM(s) IV Push every 8 hours PRN Nausea and/or Vomiting      Allergies    Cipro (Other)  all narcotics give severe vomitting and dizziness (Other; Vomiting)  Bactrim (Rash)  Levaquin (Unknown)  ampicillin (Stomach Upset)    Intolerances    Augmentin (Diarrhea)      REVIEW OF SYSTEMS:  All other review of systems negative, except as noted in HPI    OBJECTIVE:    Vital Signs Last 24 Hrs  T(C): 36.4 (19 Aug 2024 09:55), Max: 37.3 (18 Aug 2024 17:06)  T(F): 97.5 (19 Aug 2024 09:55), Max: 99.2 (18 Aug 2024 17:06)  HR: 94 (19 Aug 2024 09:55) (81 - 104)  BP: 113/68 (19 Aug 2024 09:55) (101/61 - 113/76)  BP(mean): --  RR: 17 (19 Aug 2024 09:55) (17 - 18)  SpO2: 93% (19 Aug 2024 09:55) (93% - 100%)    Parameters below as of 19 Aug 2024 09:55  Patient On (Oxygen Delivery Method): room air        PHYSICAL EXAM:      Lab/ Imaging:    LABS:                        9.8    9.94  )-----------( 404      ( 19 Aug 2024 06:57 )             31.1     08-19    138  |  102  |  16.5  ----------------------------<  124<H>  2.5<LL>   |  23.0  |  0.49<L>    Ca    8.1<L>      19 Aug 2024 06:57  Phos  2.9     08-18  Mg     1.9     08-19    TPro  5.4<L>  /  Alb  2.1<L>  /  TBili  0.3<L>  /  DBili  x   /  AST  13  /  ALT  14  /  AlkPhos  96  08-19      Urinalysis Basic - ( 19 Aug 2024 06:57 )    Color: x / Appearance: x / SG: x / pH: x  Gluc: 124 mg/dL / Ketone: x  / Bili: x / Urobili: x   Blood: x / Protein: x / Nitrite: x   Leuk Esterase: x / RBC: x / WBC x   Sq Epi: x / Non Sq Epi: x / Bacteria: x      CAPILLARY BLOOD GLUCOSE           SUBJECTIVE:    Chief Complaint: Patient is a 83y old  Female who presents with a chief complaint of Acute UTI, Pleural Effusion, Pericardial Effusion, Fecal Impaction, AFIB RVR (19 Aug 2024 07:18)      Hospital Course:     83F PMHx Afib on apixaban, recurrent UTIs, chronic left sided nephrosis, hypothyroid, OA, HTN, HLD, CVA (residual left sided weakness), chronic walters BIBEMS from home for severe abdominal pain. Patient at baseline is A&Ox3, can answer all questions, knows medical hx well, has left sided weakness of UE/LE, unable to walk, Ursula lift out of bed. Last night the patient experienced stabbing LLQ abd pain w/o radiation, no worsening/remitting factors. Patient denies fever, chills, chest pain, cough, N/V/D and endorses no other symptoms. Unable to get in touch with family, patient did not know meds well, used Dr. Garibay, recommend calling family in AM. Patient was recently admitted to [facility] from 3/31 - 4/15/24 with UTI with sepsis complicated by A fib with RVR, BCx grew  coagulase-negative staph which was considered contamination rest of blood cultures including urine culture were all negative. Also, admitted on 6/4/24 with hyponatremia with a Na of 119 and hypotensive, Prior BCx grew Staphylococcus capitis and prior UCx grew Klebsiella pneumonia and Enterobacter cloacae.     In ED patient was found to be in Afib RVR. Rate control with improvement. CT chest/abd/pelvis showed pleural effusion, moderate pericardial effusion, fecal impaction, and pyelonephritis, as well as misplaced walters catheter in cervix. Thoracic consulted, planning thoracentesis once 24 hrs past last apixaban. Also, UA +, patient received 2L IVF bolus and vancomycin 1x and Cefepime 1x. Patient admitted to med/tele for sepsis 2/2 UTI c/b fecal impaciton, AFIB RVR, pleural and pericardial effusions.      INTERVAL HPI/OVERNIGHT EVENTS: Patient seen and examined at bedside at AM. No clinically acute event overnight. S/P L PCT. Denies any pain including chest pain, palpitations, SOB, leg edema, N/V/D, or any other complaints.     MEDICATIONS  (STANDING):  apixaban 5 milliGRAM(s) Oral two times a day  ascorbic acid 500 milliGRAM(s) Oral daily  collagenase Ointment 1 Application(s) Topical daily  digoxin     Tablet 125 MICROGram(s) Oral daily  ertapenem  IVPB 1000 milliGRAM(s) IV Intermittent every 24 hours  ertapenem  IVPB      furosemide    Tablet 20 milliGRAM(s) Oral daily  levothyroxine 75 MICROGram(s) Oral daily  polyethylene glycol 3350 17 Gram(s) Oral two times a day  potassium chloride    Tablet ER 40 milliEquivalent(s) Oral every 4 hours  potassium chloride  20 mEq/100 mL IVPB 20 milliEquivalent(s) IV Intermittent every 2 hours  senna 2 Tablet(s) Oral at bedtime  simvastatin 20 milliGRAM(s) Oral at bedtime  tamsulosin 0.4 milliGRAM(s) Oral at bedtime  zinc sulfate 220 milliGRAM(s) Oral daily    MEDICATIONS  (PRN):  acetaminophen     Tablet .. 650 milliGRAM(s) Oral every 6 hours PRN Temp greater or equal to 38C (100.4F), Mild Pain (1 - 3)  aluminum hydroxide/magnesium hydroxide/simethicone Suspension 30 milliLiter(s) Oral every 4 hours PRN Dyspepsia  melatonin 3 milliGRAM(s) Oral at bedtime PRN Insomnia  metoprolol tartrate Injectable 2.5 milliGRAM(s) IV Push every 6 hours PRN HR > 120  ondansetron Injectable 4 milliGRAM(s) IV Push every 8 hours PRN Nausea and/or Vomiting      Allergies    Cipro (Other)  all narcotics give severe vomitting and dizziness (Other; Vomiting)  Bactrim (Rash)  Levaquin (Unknown)  ampicillin (Stomach Upset)    Intolerances    Augmentin (Diarrhea)      REVIEW OF SYSTEMS:  All other review of systems negative, except as noted in HPI    OBJECTIVE:    Vital Signs Last 24 Hrs  T(C): 36.4 (19 Aug 2024 09:55), Max: 37.3 (18 Aug 2024 17:06)  T(F): 97.5 (19 Aug 2024 09:55), Max: 99.2 (18 Aug 2024 17:06)  HR: 94 (19 Aug 2024 09:55) (81 - 104)  BP: 113/68 (19 Aug 2024 09:55) (101/61 - 113/76)  BP(mean): --  RR: 17 (19 Aug 2024 09:55) (17 - 18)  SpO2: 93% (19 Aug 2024 09:55) (93% - 100%)    Parameters below as of 19 Aug 2024 09:55  Patient On (Oxygen Delivery Method): room air        PHYSICAL EXAM:  General: Obese, lying in bed, without distress   HEENT:  dark teeth  NECK:  supple  CVS: regular rate and rhythm S1 S2  RESP:  Clear to ausculation  GI:  Soft nondistended nontender BS+  MSK:  No edema. FROM. Joint swelling. Bilateral TKR scars well healed  CNS:  No gross focal or global deficit noted  INTEG:  warm dry skin  PSYCH:  Fair mood      Lab/ Imaging:    LABS:                        9.8    9.94  )-----------( 404      ( 19 Aug 2024 06:57 )             31.1     08-19    138  |  102  |  16.5  ----------------------------<  124<H>  2.5<LL>   |  23.0  |  0.49<L>    Ca    8.1<L>      19 Aug 2024 06:57  Phos  2.9     08-18  Mg     1.9     08-19    TPro  5.4<L>  /  Alb  2.1<L>  /  TBili  0.3<L>  /  DBili  x   /  AST  13  /  ALT  14  /  AlkPhos  96  08-19      Urinalysis Basic - ( 19 Aug 2024 06:57 )    Color: x / Appearance: x / SG: x / pH: x  Gluc: 124 mg/dL / Ketone: x  / Bili: x / Urobili: x   Blood: x / Protein: x / Nitrite: x   Leuk Esterase: x / RBC: x / WBC x   Sq Epi: x / Non Sq Epi: x / Bacteria: x    I&O's Summary    18 Aug 2024 07:01  -  19 Aug 2024 07:00  --------------------------------------------------------  IN: 250 mL / OUT: 2000 mL / NET: -1750 mL      Culture - Urine (08.14.24 @ 23:30)   - Tobramycin: S <=2  - Trimethoprim/Sulfamethoxazole: S <=0.5/9.5  - Gentamicin: S <=2  - Imipenem: S <=1  - Levofloxacin: S <=0.5  - Meropenem: S <=1  - Nitrofurantoin: R >64 Should not be used to treat pyelonephritis  - Piperacillin/Tazobactam: S <=8 Treatment with Pipercillin/Tazobactam is not recommended in severe infections casued by Klebsiella aerogenes, Enterobacter cloacae complex, and Citrobacter freundii complex.  - Cefazolin: R 8  - Cefepime: S <=2  - Cefoxitin: R >16  - Ceftriaxone: S <=1 Enterobacter cloacae, Klebsiella aerogenes, and Citrobacter freundii may develop resistance during prolonged therapy.  - Ciprofloxacin: S <=0.25  - Ertapenem: S <=0.5  - Amoxicillin/Clavulanic Acid: R >16/8  - Ampicillin: R >16 These ampicillin results predict results for amoxicillin  - Ampicillin/Sulbactam: R 8/4  - Aztreonam: S <=4  Specimen Source: Clean Catch Clean Catch (Midstream)  Culture Results:   >100,000 CFU/ml Klebsiella aerogenes (Previously Enterobacter)  Organism Identification: Klebsiella aerogenes (Previously Enterobacter)  Organism: Klebsiella aerogenes (Previously Enterobacter)  Method Type: EDWIN    Culture - Blood (08.15.24 @ 02:00)   Specimen Source: .Blood Blood-Peripheral  Culture Results:   No growth at 72 Hours  Culture - Fungal, Body Fluid (08.16.24 @ 14:00)   Specimen Source: Pleural Fl Pleural Fluid  Culture Results:   Culture is being performed. Fungal cultures are held for 4 weeks.        < from: TTE Limited W or WO Ultrasound Enhancing Agent (08.17.24 @ 14:09) >  CONCLUSIONS:      1. Limited followup study.   2. Left ventricular systolic function is normal with an ejection fraction visually estimated at 50 to 55 %.   3. Moderate pericardial effusion with no evidence of hemodynamic compromise (or echocardiographic evidence of cardiac tamponade).   4. Compared to the transthoracic echocardiogram performed on 8/15/2024, there have been no significant interval changes.    ________________________    < end of copied text >

## 2024-08-19 NOTE — PROGRESS NOTE ADULT - SUBJECTIVE AND OBJECTIVE BOX
[facility]                                                         CARDIOLOGY AT [facility]                                                                  [address], [place] [address]                                                         Telephone: [phone]. Fax:[phone]                                                                             PROGRESS NOTE    Reason for follow up: Afib, Pericardial Effusion  Update: Patient doing well, pleasantly confused. Patient's potassium very low this morning s/p supplementation.      Review of symptoms:   Cardiac:  No chest pain. No dyspnea. No palpitations.  Respiratory: no cough. No dyspnea  Gastrointestinal: No diarrhea. No abdominal pain. No bleeding.   Neuro: No focal neuro complaints.    Vitals:  T(C): 36.4 (08-19-24 @ 09:55), Max: 37.3 (08-18-24 @ 17:06)  HR: 94 (08-19-24 @ 09:55) (81 - 104)  BP: 113/68 (08-19-24 @ 09:55) (101/61 - 113/76)  RR: 17 (08-19-24 @ 09:55) (17 - 18)  SpO2: 93% (08-19-24 @ 09:55) (93% - 100%)  Wt(kg): --  I&O's Summary    18 Aug 2024 07:01  -  19 Aug 2024 07:00  --------------------------------------------------------  IN: 250 mL / OUT: 2000 mL / NET: -1750 mL      Weight (kg): 64 (08-15 @ 04:57)    PHYSICAL EXAM:  Appearance: Comfortable. No acute distress  HEENT:  Atraumatic. Normocephalic.  Normal oral mucosa  Neurologic: Pleasantly confused  Cardiovascular: Irregularly irregular, No murmur, no rubs/gallops. No JVD  Respiratory: Lungs clear to auscultation, unlabored   Gastrointestinal:  Soft, Non-tender, + BS  Lower Extremities: 2+ Peripheral Pulses, No clubbing, cyanosis, + edema  Psychiatry: Patient is calm. No agitation.   Skin: warm and dry.    CURRENT CARDIAC MEDICATIONS:  digoxin     Tablet 125 MICROGram(s) Oral daily  furosemide    Tablet 20 milliGRAM(s) Oral daily  metoprolol tartrate Injectable 2.5 milliGRAM(s) IV Push every 6 hours PRN      CURRENT OTHER MEDICATIONS:  ertapenem  IVPB      ertapenem  IVPB 1000 milliGRAM(s) IV Intermittent every 24 hours  acetaminophen     Tablet .. 650 milliGRAM(s) Oral every 6 hours PRN Temp greater or equal to 38C (100.4F), Mild Pain (1 - 3)  melatonin 3 milliGRAM(s) Oral at bedtime PRN Insomnia  ondansetron Injectable 4 milliGRAM(s) IV Push every 8 hours PRN Nausea and/or Vomiting  aluminum hydroxide/magnesium hydroxide/simethicone Suspension 30 milliLiter(s) Oral every 4 hours PRN Dyspepsia  polyethylene glycol 3350 17 Gram(s) Oral two times a day  senna 2 Tablet(s) Oral at bedtime  levothyroxine 75 MICROGram(s) Oral daily  simvastatin 20 milliGRAM(s) Oral at bedtime  apixaban 5 milliGRAM(s) Oral two times a day  ascorbic acid 500 milliGRAM(s) Oral daily  collagenase Ointment 1 Application(s) Topical daily  potassium chloride    Tablet ER 40 milliEquivalent(s) Oral every 4 hours, Stop order after: 3 Doses  potassium chloride  20 mEq/100 mL IVPB 20 milliEquivalent(s) IV Intermittent every 2 hours, Stop order after: 3 Doses  tamsulosin 0.4 milliGRAM(s) Oral at bedtime  zinc sulfate 220 milliGRAM(s) Oral daily, Stop order after: 10 Days      LABS:	 	                            9.8    9.94  )-----------( 404      ( 19 Aug 2024 06:57 )             31.1     08-19    138  |  102  |  16.5  ----------------------------<  124<H>  2.5<LL>   |  23.0  |  0.49<L>    Ca    8.1<L>      19 Aug 2024 06:57  Phos  2.9     08-18  Mg     1.9     08-19    TPro  5.4<L>  /  Alb  2.1<L>  /  TBili  0.3<L>  /  DBili  x   /  AST  13  /  ALT  14  /  AlkPhos  96  08-19    PT/INR/PTT ( 16 Aug 2024 08:40 )                       :                       :      X            :       56.4                  .        .                   .              .           .       X           .                                       Lipid Profile:   HgA1c:   TSH: Thyroid Stimulating Hormone, Serum: 4.56 uIU/mL      TELEMETRY: Rate controlled Afib, PVCs  ECG:    DIAGNOSTIC TESTING:  [ ] Echocardiogram:   < from: TTE Limited W or WO Ultrasound Enhancing Agent (08.17.24 @ 14:09) >  CONCLUSIONS:      1. Limited followup study.   2. Left ventricular systolic function is normal with an ejection fraction visually estimated at 50 to 55 %.   3. Moderate pericardial effusion with no evidence of hemodynamic compromise (or echocardiographic evidence of cardiac tamponade).   4. Compared to the transthoracic echocardiogram performed on 8/15/2024, there have been no significant interval changes.    < end of copied text >    [ ]  Catheterization:    [ ] Stress Test:      OTHER:

## 2024-08-19 NOTE — PROGRESS NOTE ADULT - PROBLEM SELECTOR PLAN 3
-Rates overall remain 100s with occasional episodes into 120s  -C/w dig, DOAC  -Was originally on metoprolol for rate control but was having intermittent hypotension. Since has been loaded with dig  -Follow up dig level in AM
- Currently mostly rate controlled.   - Digoxin loaded, currently on digoxin 125 mcg PO qday.   - Digoxin level 0.9.   - Can restart low dose metoprolol when BP allows.   - Continue Eliquis for AC.
- patient still volume overloaded and started on Lasix 40mg IVP q daily   - will hold on any GDMT at this time due to hypotension and reassess once clinically stable to start ARNI/or ARB / BB / MRA and SLGTII inhibitors  - continue to monitor I/Os and keep K ~ 4 and Mag ~ 2

## 2024-08-19 NOTE — PROGRESS NOTE ADULT - PROBLEM SELECTOR PROBLEM 2
Acute on chronic heart failure with preserved ejection fraction (HFpEF)
Acute heart failure with preserved ejection fraction (HFpEF)
Chronic atrial fibrillation
Pleural effusion

## 2024-08-20 ENCOUNTER — TRANSCRIPTION ENCOUNTER (OUTPATIENT)
Age: 84
End: 2024-08-20

## 2024-08-20 ENCOUNTER — RESULT REVIEW (OUTPATIENT)
Age: 84
End: 2024-08-20

## 2024-08-20 LAB
ANION GAP SERPL CALC-SCNC: 8 MMOL/L — SIGNIFICANT CHANGE UP (ref 5–17)
BUN SERPL-MCNC: 19.6 MG/DL — SIGNIFICANT CHANGE UP (ref 8–20)
CALCIUM SERPL-MCNC: 8.5 MG/DL — SIGNIFICANT CHANGE UP (ref 8.4–10.5)
CHLORIDE SERPL-SCNC: 105 MMOL/L — SIGNIFICANT CHANGE UP (ref 96–108)
CO2 SERPL-SCNC: 24 MMOL/L — SIGNIFICANT CHANGE UP (ref 22–29)
CREAT SERPL-MCNC: 0.49 MG/DL — LOW (ref 0.5–1.3)
CULTURE RESULTS: SIGNIFICANT CHANGE UP
EGFR: 95 ML/MIN/1.73M2 — SIGNIFICANT CHANGE UP
GLUCOSE SERPL-MCNC: 141 MG/DL — HIGH (ref 70–99)
HCT VFR BLD CALC: 28.6 % — LOW (ref 34.5–45)
HGB BLD-MCNC: 8.9 G/DL — LOW (ref 11.5–15.5)
MAGNESIUM SERPL-MCNC: 1.9 MG/DL — SIGNIFICANT CHANGE UP (ref 1.6–2.6)
MCHC RBC-ENTMCNC: 27.1 PG — SIGNIFICANT CHANGE UP (ref 27–34)
MCHC RBC-ENTMCNC: 31.1 GM/DL — LOW (ref 32–36)
MCV RBC AUTO: 86.9 FL — SIGNIFICANT CHANGE UP (ref 80–100)
PHOSPHATE SERPL-MCNC: 2.4 MG/DL — SIGNIFICANT CHANGE UP (ref 2.4–4.7)
PLATELET # BLD AUTO: 397 K/UL — SIGNIFICANT CHANGE UP (ref 150–400)
POTASSIUM SERPL-MCNC: 4.1 MMOL/L — SIGNIFICANT CHANGE UP (ref 3.5–5.3)
POTASSIUM SERPL-SCNC: 4.1 MMOL/L — SIGNIFICANT CHANGE UP (ref 3.5–5.3)
RBC # BLD: 3.29 M/UL — LOW (ref 3.8–5.2)
RBC # FLD: 16.3 % — HIGH (ref 10.3–14.5)
SODIUM SERPL-SCNC: 136 MMOL/L — SIGNIFICANT CHANGE UP (ref 135–145)
SPECIMEN SOURCE: SIGNIFICANT CHANGE UP
WBC # BLD: 9.17 K/UL — SIGNIFICANT CHANGE UP (ref 3.8–10.5)
WBC # FLD AUTO: 9.17 K/UL — SIGNIFICANT CHANGE UP (ref 3.8–10.5)

## 2024-08-20 PROCEDURE — 93325 DOPPLER ECHO COLOR FLOW MAPG: CPT | Mod: 26

## 2024-08-20 PROCEDURE — 93321 DOPPLER ECHO F-UP/LMTD STD: CPT | Mod: 26

## 2024-08-20 PROCEDURE — 93308 TTE F-UP OR LMTD: CPT | Mod: 26

## 2024-08-20 PROCEDURE — 99232 SBSQ HOSP IP/OBS MODERATE 35: CPT

## 2024-08-20 RX ADMIN — ERTAPENEM SODIUM 120 MILLIGRAM(S): 1 INJECTION, POWDER, LYOPHILIZED, FOR SOLUTION INTRAMUSCULAR; INTRAVENOUS at 17:31

## 2024-08-20 RX ADMIN — Medication 20 MILLIGRAM(S): at 05:27

## 2024-08-20 RX ADMIN — TAMSULOSIN HYDROCHLORIDE 0.4 MILLIGRAM(S): 0.4 CAPSULE ORAL at 22:44

## 2024-08-20 RX ADMIN — Medication 75 MICROGRAM(S): at 05:28

## 2024-08-20 RX ADMIN — COLLAGENASE SANTYL 1 APPLICATION(S): 250 OINTMENT TOPICAL at 13:26

## 2024-08-20 RX ADMIN — APIXABAN 5 MILLIGRAM(S): 5 TABLET, FILM COATED ORAL at 05:27

## 2024-08-20 RX ADMIN — Medication 20 MILLIGRAM(S): at 22:44

## 2024-08-20 RX ADMIN — Medication 220 MILLIGRAM(S): at 13:26

## 2024-08-20 RX ADMIN — Medication 500 MILLIGRAM(S): at 13:25

## 2024-08-20 RX ADMIN — APIXABAN 5 MILLIGRAM(S): 5 TABLET, FILM COATED ORAL at 17:29

## 2024-08-20 RX ADMIN — DIGOXIN 125 MICROGRAM(S): 0.12 TABLET ORAL at 05:27

## 2024-08-20 NOTE — DISCHARGE NOTE PROVIDER - NSDCMRMEDTOKEN_GEN_ALL_CORE_FT
apixaban 5 mg oral tablet: 1 tab(s) orally 2 times a day  collagenase 250 units/g topical ointment: 1 Apply topically to affected area 2 times a day  levothyroxine 75 mcg (0.075 mg) oral capsule: 1 cap(s) orally once a day  simvastatin 20 mg oral tablet: 1 tab(s) orally once a day (at bedtime)  simvastatin 20 mg oral tablet: 1 tab(s) orally once a day  tamsulosin 0.4 mg oral capsule: 1 cap(s) orally once a day (at bedtime)   apixaban 5 mg oral tablet: 1 tab(s) orally 2 times a day  ascorbic acid 500 mg oral tablet: 1 tab(s) orally once a day  collagenase 250 units/g topical ointment: 1 Apply topically to affected area 2 times a day  digoxin 125 mcg (0.125 mg) oral tablet: 1 tab(s) orally once a day  furosemide 20 mg oral tablet: 1 tab(s) orally once a day  levothyroxine 75 mcg (0.075 mg) oral capsule: 1 cap(s) orally once a day  polyethylene glycol 3350 oral powder for reconstitution: 17 gram(s) orally 2 times a day  senna leaf extract oral tablet: 2 tab(s) orally once a day (at bedtime)  simvastatin 20 mg oral tablet: 1 tab(s) orally once a day (at bedtime)  tamsulosin 0.4 mg oral capsule: 1 cap(s) orally once a day (at bedtime)  zinc sulfate 220 mg (as elemental zinc 50 mg) oral capsule: 1 cap(s) orally once a day   apixaban 5 mg oral tablet: 1 tab(s) orally 2 times a day  ascorbic acid 500 mg oral tablet: 1 tab(s) orally once a day  collagenase 250 units/g topical ointment: 1 Apply topically to affected area 2 times a day  digoxin 125 mcg (0.125 mg) oral tablet: 1 tab(s) orally once a day  Farxiga 5 mg oral tablet: 1 tab(s) orally once a day  furosemide 20 mg oral tablet: 1 tab(s) orally once a day  levothyroxine 75 mcg (0.075 mg) oral capsule: 1 cap(s) orally once a day  polyethylene glycol 3350 oral powder for reconstitution: 17 gram(s) orally 2 times a day  senna leaf extract oral tablet: 2 tab(s) orally once a day (at bedtime)  simvastatin 20 mg oral tablet: 1 tab(s) orally once a day (at bedtime)  tamsulosin 0.4 mg oral capsule: 1 cap(s) orally once a day (at bedtime)  zinc sulfate 220 mg (as elemental zinc 50 mg) oral capsule: 1 cap(s) orally once a day

## 2024-08-20 NOTE — DISCHARGE NOTE PROVIDER - NSDCFUADDINST_GEN_ALL_CORE_FT
May change or replace catheter if leaking or dislodged. May irrigate with 30-60cc NS PRN for clogging or sediment.

## 2024-08-20 NOTE — DISCHARGE NOTE PROVIDER - ATTENDING DISCHARGE PHYSICAL EXAMINATION:
PHYSICAL EXAM:  General: Obese, lying in bed, without distress   HEENT:  dark teeth  NECK:  supple  CVS: regular rate and rhythm S1 S2  RESP:  Clear to ausculation  GI:  Soft nondistended nontender BS+  MSK:  No edema. FROM. Joint swelling. Bilateral TKR scars well healed  CNS:  No gross focal or global deficit noted  INTEG:  warm dry skin  PSYCH:  Fair mood PHYSICAL EXAM:  General: Obese, lying in bed, without distress   HEENT:  dark teeth  NECK:  supple  CVS: regular rate and rhythm S1 S2  RESP:  Clear to ausculation  GI:  Soft nondistended nontender BS+   - Indwelling Smart catheter with light coloured urine in bag  MSK:  No edema. FROM. Joint swelling. Bilateral TKR scars well healed  CNS:  No gross focal or global deficit noted  INTEG:  warm dry skin  PSYCH:  Fair mood

## 2024-08-20 NOTE — DISCHARGE NOTE PROVIDER - DETAILS OF MALNUTRITION DIAGNOSIS/DIAGNOSES
This patient has been assessed with a concern for Malnutrition and was treated during this hospitalization for the following Nutrition diagnosis/diagnoses:     -  08/20/2024: Moderate protein-calorie malnutrition

## 2024-08-20 NOTE — PROGRESS NOTE ADULT - SUBJECTIVE AND OBJECTIVE BOX
SUBJECTIVE:    Chief Complaint: Patient is a 83y old  Female who presents with a chief complaint of Acute UTI, Pleural Effusion, Pericardial Effusion, Fecal Impaction, AFIB RVR (20 Aug 2024 14:08)      Hospital Course:   83F PMHx Afib on apixaban, recurrent UTIs, chronic left sided nephrosis, hypothyroid, OA, HTN, HLD, CVA (residual left sided weakness), chronic walters BIBEMS from home for severe abdominal pain. Patient at baseline is A&Ox3, can answer all questions, knows medical hx well, has left sided weakness of UE/LE, unable to walk, Ursula lift out of bed. Last night the patient experienced stabbing LLQ abd pain w/o radiation, no worsening/remitting factors. Patient denies fever, chills, chest pain, cough, N/V/D and endorses no other symptoms. Unable to get in touch with family, patient did not know meds well, used Dr. Garibay, recommend calling family in AM. Patient was recently admitted to [facility] from 3/31 - 4/15/24 with UTI with sepsis complicated by A fib with RVR, BCx grew  coagulase-negative staph which was considered contamination rest of blood cultures including urine culture were all negative. Also, admitted on 6/4/24 with hyponatremia with a Na of 119 and hypotensive, Prior BCx grew Staphylococcus capitis and prior UCx grew Klebsiella pneumonia and Enterobacter cloacae.     In ED patient was found to be in Afib RVR. Rate control with improvement. CT chest/abd/pelvis showed pleural effusion, moderate pericardial effusion, fecal impaction, and pyelonephritis, as well as misplaced walters catheter in cervix. Thoracic consulted, planning thoracentesis once 24 hrs past last apixaban. Also, UA +, patient received 2L IVF bolus and vancomycin 1x and Cefepime 1x. Patient admitted to med/tele for sepsis 2/2 UTI c/b fecal impaciton, AFIB RVR, pleural and pericardial effusions.    INTERVAL HPI/OVERNIGHT EVENTS: Patient seen and examined at bedside at AM. No clinically acute event overnight.   Denies any chest pain, palpitations, SOB, leg edema, N/V/D, or any other complaints.     MEDICATIONS  (STANDING):  apixaban 5 milliGRAM(s) Oral two times a day  ascorbic acid 500 milliGRAM(s) Oral daily  collagenase Ointment 1 Application(s) Topical daily  digoxin     Tablet 125 MICROGram(s) Oral daily  ertapenem  IVPB 1000 milliGRAM(s) IV Intermittent every 24 hours  ertapenem  IVPB      furosemide    Tablet 20 milliGRAM(s) Oral daily  levothyroxine 75 MICROGram(s) Oral daily  polyethylene glycol 3350 17 Gram(s) Oral two times a day  senna 2 Tablet(s) Oral at bedtime  simvastatin 20 milliGRAM(s) Oral at bedtime  tamsulosin 0.4 milliGRAM(s) Oral at bedtime  zinc sulfate 220 milliGRAM(s) Oral daily    MEDICATIONS  (PRN):  acetaminophen     Tablet .. 650 milliGRAM(s) Oral every 6 hours PRN Temp greater or equal to 38C (100.4F), Mild Pain (1 - 3)  aluminum hydroxide/magnesium hydroxide/simethicone Suspension 30 milliLiter(s) Oral every 4 hours PRN Dyspepsia  melatonin 3 milliGRAM(s) Oral at bedtime PRN Insomnia  metoprolol tartrate Injectable 2.5 milliGRAM(s) IV Push every 6 hours PRN HR > 120  ondansetron Injectable 4 milliGRAM(s) IV Push every 8 hours PRN Nausea and/or Vomiting      Allergies    Cipro (Other)  all narcotics give severe vomitting and dizziness (Other; Vomiting)  Bactrim (Rash)  Levaquin (Unknown)  ampicillin (Stomach Upset)    Intolerances    Augmentin (Diarrhea)      REVIEW OF SYSTEMS:  All other review of systems negative, except as noted in HPI    OBJECTIVE:    Vital Signs Last 24 Hrs  T(C): 36.8 (20 Aug 2024 10:35), Max: 37.1 (19 Aug 2024 20:45)  T(F): 98.2 (20 Aug 2024 10:35), Max: 98.7 (19 Aug 2024 20:45)  HR: 98 (20 Aug 2024 10:35) (92 - 103)  BP: 112/72 (20 Aug 2024 10:35) (101/68 - 129/76)  BP(mean): --  RR: 18 (20 Aug 2024 10:35) (17 - 18)  SpO2: 96% (20 Aug 2024 10:35) (93% - 99%)    Parameters below as of 20 Aug 2024 10:35  Patient On (Oxygen Delivery Method): room air        PHYSICAL EXAM:      Lab/ Imaging:    LABS:                        8.9    9.17  )-----------( 397      ( 20 Aug 2024 06:26 )             28.6     08-20    136  |  105  |  19.6  ----------------------------<  141<H>  4.1   |  24.0  |  0.49<L>    Ca    8.5      20 Aug 2024 06:26  Phos  2.4     08-20  Mg     1.9     08-20    TPro  5.4<L>  /  Alb  2.1<L>  /  TBili  0.3<L>  /  DBili  x   /  AST  13  /  ALT  14  /  AlkPhos  96  08-19      Urinalysis Basic - ( 20 Aug 2024 06:26 )    Color: x / Appearance: x / SG: x / pH: x  Gluc: 141 mg/dL / Ketone: x  / Bili: x / Urobili: x   Blood: x / Protein: x / Nitrite: x   Leuk Esterase: x / RBC: x / WBC x   Sq Epi: x / Non Sq Epi: x / Bacteria: x      CAPILLARY BLOOD GLUCOSE

## 2024-08-20 NOTE — DIETITIAN NUTRITION RISK NOTIFICATION - ADDITIONAL COMMENTS/DIETITIAN RECOMMENDATIONS
Continue diet as ordered as per SLP  Continue Glucerna shake TID to optimize po intake and provide an additional 220 kcal, 10g protein per serving   Continue Vit C, znso4  rec MVI  Encourage po intake, monitor diet tolerance, and provide assistance at meals as needed.

## 2024-08-20 NOTE — DISCHARGE NOTE PROVIDER - HOSPITAL COURSE
. 83F PMHx Afib on apixaban, recurrent UTIs with chronic walters, chronic left sided nephrosis, hypothyroid, OA, HTN, HLD, CVA (residual left sided weakness) BIBEMS from home for severe abdominal pain. After admission, pt           Patient at baseline is A&Ox3, can answer all questions, knows medical hx well, has left sided weakness of UE/LE, unable to walk, Ursula lift out of bed. Last night the patient experienced stabbing LLQ abd pain w/o radiation, no worsening/remitting factors. Patient denies fever, chills, chest pain, cough, N/V/D and endorses no other symptoms. Unable to get in touch with family, patient did not know meds well, used Dr. Garibay, recommend calling family in AM. Patient was recently admitted to [facility] from 3/31 - 4/15/24 with UTI with sepsis complicated by A fib with RVR, BCx grew  coagulase-negative staph which was considered contamination rest of blood cultures including urine culture were all negative. Also, admitted on 6/4/24 with hyponatremia with a Na of 119 and hypotensive, Prior BCx grew Staphylococcus capitis and prior UCx grew Klebsiella pneumonia and Enterobacter cloacae.     In ED patient was found to be in Afib RVR. Rate control with improvement. CT chest/abd/pelvis showed pleural effusion, moderate pericardial effusion, fecal impaction, and pyelonephritis, as well as misplaced walters catheter in cervix. Thoracic consulted, planning thoracentesis once 24 hrs past last apixaban. Also, UA +, patient received 2L IVF bolus and vancomycin 1x and Cefepime 1x. Patient admitted to med/tele for sepsis 2/2 UTI c/b fecal impaciton, AFIB RVR, pleural and pericardial effusions.   83F PMHx Afib on apixaban, recurrent UTIs with chronic walters, chronic left sided nephrosis, hypothyroid, OA, HTN, HLD, CVA (residual left sided weakness) BIBEMS from home for severe abdominal pain.  In ED patient was found to be in Afib RVR. CT chest/abd/pelvis showed pleural effusion, moderate pericardial effusion, fecal impaction, and pyelonephritis, as well as misplaced walters catheter in cervix. Thoracic consulted, planning thoracentesis once 24 hrs past last apixaban. Also, UA +, patient received 2L IVF bolus and vancomycin 1x and Cefepime 1x. Patient admitted to med/tele for sepsis 2/2 UTI c/b fecal impaciton, AFIB RVR, pleural and pericardial effusions.    During the hospital stay, pt given L PCT, chest tube removed on 8/18/24. Based on light criteria, likely exudative . Pleural fluid culture no growth, fungal pending. Pleural fluid cyto no malignant cells. Urine culture (8/14/24)) Klebsiella aerogenes. Blood cultures (8/15/24) Negative. ID consult appreciated. Cardiology following, repeated TTE shows improving pericardial effusion. EF 50-55%. Pt had one episode of abdominal pain, especially on RLQ and suprapubic areas. CT A/P no acute finding. Pt has finished the course of abx for UTI. Pt is medically stable, will have cardiology outpt follow up.         # Sepsis secondary to  # Catheter associated UTI  #Abdominal pain  # Left pleural effusion, moderate to large  # Right Pleural effusion, small  # Pericardial effusion  TTE reported moderate pericardial effusion  # Hypokalemia (resolved):   # Dysphagia  # A.fib with RVR  # Pressure injury      83F PMHx Afib on apixaban, recurrent UTIs with chronic walters, chronic left sided nephrosis, hypothyroid, OA, HTN, HLD, CVA (residual left sided weakness) BIBEMS from home for severe abdominal pain.  In ED patient was found to be in Afib RVR. CT chest/abd/pelvis showed pleural effusion, moderate pericardial effusion, fecal impaction, and pyelonephritis, as well as misplaced walters catheter in cervix. Thoracic consulted, planning thoracentesis once 24 hrs past last apixaban. Also, UA +, patient received 2L IVF bolus and vancomycin 1x and Cefepime 1x. Patient admitted to med/tele for sepsis 2/2 UTI with chronic walters, fecal impaciton, AFIB RVR, pleural and pericardial effusions.    During the hospital stay, pt given L PCT, chest tube removed on 8/18/24. Based on light criteria, likely exudative . Pleural fluid culture no growth, fungal pending. Pleural fluid cyto no malignant cells. Urine culture (8/14/24)) Klebsiella aerogenes. Blood cultures (8/15/24) Negative. ID consult appreciated. Cardiology following, repeated TTE shows improving pericardial effusion. EF 50-55%. Pt had one episode of abdominal pain, especially on RLQ and suprapubic areas. CT A/P no acute finding. Pt has finished the course of abx for UTI. Pt is medically stable, will have cardiology outpt follow up.         # Sepsis secondary to  # Catheter associated UTI  #Abdominal pain  # Left pleural effusion, moderate to large  # Right Pleural effusion, small  # Pericardial effusion  TTE reported moderate pericardial effusion  # Hypokalemia (resolved):   # Dysphagia  # A.fib with RVR  # Pressure injury      83F PMHx Afib on apixaban, recurrent UTIs with chronic walters, chronic left sided nephrosis, hypothyroid, OA, HTN, HLD, CVA (residual left sided weakness) BIBEMS from home for severe abdominal pain.  In ED patient was found to be in Afib RVR. CT chest/abd/pelvis showed pleural effusion, moderate pericardial effusion, fecal impaction, and pyelonephritis, as well as misplaced walters catheter in cervix. Thoracic consulted, planning thoracentesis once 24 hrs past last apixaban. Also, UA +, patient received 2L IVF bolus and vancomycin 1x and Cefepime 1x. Patient admitted to med/tele for sepsis 2/2 UTI with chronic walters, fecal impaction, AFIB RVR, pleural and pericardial effusions.    During the hospital stay, pt given L PCT, chest tube removed on 8/18/24. Based on light criteria, likely exudative . Pleural fluid culture no growth, fungal pending. Pleural fluid cyto no malignant cells. Urine culture (8/14/24)) Klebsiella aerogenes. Blood cultures (8/15/24) Negative. ID consult appreciated. Cardiology following, repeated TTE shows improving pericardial effusion. EF 50-55%. Pt had one episode of abdominal pain, especially on RLQ and suprapubic areas. CT A/P no acute finding. Pt has finished the course of abx for UTI. Pt is medically stable, will have cardiology outpt follow up.         # Sepsis secondary to  # Catheter associated UTI  #Abdominal pain  # Left pleural effusion, moderate to large  # Right Pleural effusion, small  # Pericardial effusion  TTE reported moderate pericardial effusion  # Hypokalemia (resolved):   # Dysphagia  # A.fib with RVR  # Pressure injury

## 2024-08-20 NOTE — PROGRESS NOTE ADULT - ASSESSMENT
· Assessment	  83F PMHx Afib on apixaban, recurrent UTIs, chronic left sided nephrosis, hypothyroid, OA, HTN, HLD, CVA (residual left sided weakness), chronic walters BIBEMS from home for abdominal pain admitted for Sepsis 2/2 Acute UTI c/b chronic walters, fecal impaction, pleural and pericardial effusions, and AFIB RVR.    # Sepsis secondary to  # Catheter associated UTI  Urine culture (8/14/24)) Klebsiella aerogenes  Blood cultures (8/15/24) Negative to date  S/p catheter change in Emergency Department  WBC improving daily, remains afebrile though hypotensive earlier  - S/P Piperacillin-Tazobactam IV for 4 days; change to Ertapenem given sensitivities to complete at least 7 days (total) for complicated UTI (8/15-8/21)  - Walters care    # Left pleural effusion, moderate to large  # Right Pleural effusion, small  - S/P L PTC. Chest tube removed 8/18/24  - C/w Apixaban 5mg bid  - Based on light criteria, likely exudative    - Pleural fluid culture no growth, fungal pending  - Pleural fluid cyto no malignant cells.   - Continuous SpO2 monitoring. Maintain SpO2 >92%. Supplement with O2 therapy PRN.  - Encourage the use of incentive spirometer, cough/deep breathing exercises, OOB to chair, ambulate as tolerated.      # Pericardial effusion  TTE reported moderate pericardial effusion  - Prior echo does not demonstrate pericardial effusion  - Cardiology consult appreciated; No tamponade or intervention currently  - Echo shows normal EF 50-55%  - Troponin is normal but upper end of range, continue to trend  - pBNP in 6/2024 was 6927,  repeated on 8/15 1543.   - Repeated Echo on 8/17 shows no worsening effusion.  - TSH(4.56)   - There is no smith's triad on physical exam   - C/w monitor on telemetry,  - c/w Lasix 20mg qd  - Will repeat Echo on 8/21 before discharge   - Cardio outpt follow up with Dr gary moreno in 1-2 weeks  - Unsure etiology of pericardia and pleural effusion -> no malignant cells on cyto study   - Rheumatology workup to exclude autoimmune disease.     # Hypokalemia (resolved):   - K (8/19) morning 2.5  - Given kcl 40meq PO q6h X3  - Given kcl 20 meq IV q2h X3  - Repeat BMP at 3pm (8/19) ->3.5  - 4.2 on 8/20     # Dysphagia  -Family reported patient was coughing with diet for last few days  -SLP consulted - minced and moist with thins    # LESTERfib with RVR  - Pt's HR was around 100-120, w/ hypotension.   - Will stop the metoprolol and start Digoxin load for better heart rate control Digoxin 500mcg IVP x 1 and then Digoxin 250mcg IVP q 6hrs and then 125mg po q daily;   - Continue with Eliquis, once HR better controlled will then transition to Metoprolol  - Cardio outpt follow up    # Pressure injury   -On injury  -ON buttock and hip, Stage II and III  - Daily wound care with Santyl and calcium alginate      Disposition: medically active, likely Rehab upon stabilization

## 2024-08-20 NOTE — DISCHARGE NOTE PROVIDER - NSDCFUADDAPPT_GEN_ALL_CORE_FT
APPTS ARE READY TO BE MADE: [X] YES    Best Family or Patient Contact (if needed):    Additional Information about above appointments (if needed):    1: Urology   2: Cardiology Dr gary moreno in 1-2 weeks  3: Rheumatology 1-2 weeks      Other comments or requests:    APPTS ARE READY TO BE MADE: [X] YES    Best Family or Patient Contact (if needed):    Additional Information about above appointments (if needed):    1: Urology   2: Cardiology Dr gary moreno in 1-2 weeks  3: Rheumatology 1-2 weeks      Other comments or requests:   Provided patient with provider referral information, however patient prefers to schedule the appointments on their own.   A [facility] providers office was contacted to secure an appointment, however the office will follow up with the patient/caregiver directly. Cardio

## 2024-08-20 NOTE — DISCHARGE NOTE PROVIDER - INSTRUCTIONS
Diet, Minced and Moist:   Supplement Feeding Modality:  Oral  Glucerna Shake Cans or Servings Per Day:  1       Frequency:  Three Times a day

## 2024-08-20 NOTE — DISCHARGE NOTE PROVIDER - NSDCCPCAREPLAN_GEN_ALL_CORE_FT
PRINCIPAL DISCHARGE DIAGNOSIS  Diagnosis: Acute UTI  Assessment and Plan of Treatment: # Sepsis secondary to Catheter associated UTI  Urine culture (8/14/24)) Klebsiella aerogenes  - Resolved      SECONDARY DISCHARGE DIAGNOSES  Diagnosis: Atrial fibrillation with RVR  Assessment and Plan of Treatment: - Continuie eliquis, digoxin, lasix, farxiga   - Can restart low dose metoprolol when BP allows.   -Cadiology outpt follow up        PRINCIPAL DISCHARGE DIAGNOSIS  Diagnosis: Acute UTI  Assessment and Plan of Treatment: - Sepsis secondary to chronic catheter associated UTI  - Urine culture (8/14/24)) Klebsiella aerogenes  - Resolved      SECONDARY DISCHARGE DIAGNOSES  Diagnosis: Atrial fibrillation with RVR  Assessment and Plan of Treatment: - Continuie eliquis, digoxin, lasix,  - Can restart low dose metoprolol when BP allows.   - Cadiology outpt follow up       Diagnosis: Abdominal pain  Assessment and Plan of Treatment: - New onset of RLQ abdominal pain  - CT A/P no acute finding    Diagnosis: Pleural effusion  Assessment and Plan of Treatment: # Left pleural effusion, moderate to large  # Right Pleural effusion, small  - S/P L PTC. Chest tube removed 8/18/24  - Continue Apixaban 5mg bid  - Based on light criteria, likely exudative    - Pleural fluid culture no growth, fungal pending  - Pleural fluid cyto no malignant cells.   - Encourage the use of incentive spirometer, cough/deep breathing exercises, OOB to chair, ambulate as tolerated.      Diagnosis: Pericardial effusion  Assessment and Plan of Treatment: - TTE reported moderate pericardial effusion  - Prior echo does not demonstrate pericardial effusion  - Cardiology consult appreciated; No tamponade or intervention currently  - Echo shows normal EF 50-55%  - Troponin is normal but upper end of range, continue to trend  - pBNP in 6/2024 was 6927,  repeated on 8/15 1543.   - Repeated Echo on 8/17 shows no worsening effusion.  - Repeated Echo on 8/20 shows improving pericardial effusion  - There is no smith's triad on physical exam   - c/w Lasix 20mg qd  - Cardio outpt follow up with Dr gary moreno in 1-2 weeks  - Unsure etiology of pericardia and pleural effusion -> no malignant cells on cyto study   - Rheumatology outpt follow up exclude autoimmune disease.    Diagnosis: Hypokalemia  Assessment and Plan of Treatment: - Resolved    Diagnosis: Dysphagia  Assessment and Plan of Treatment: - SLP consulted   - Minced and moist with thins    Diagnosis: Pressure injury of skin  Assessment and Plan of Treatment: -ON buttock and hip, Stage II and III  - Daily wound care with Santyl and calcium alginate    Diagnosis: HF (heart failure)  Assessment and Plan of Treatment: - Start  farxiga   - Follow up the discharge medication instructions   - follow up cardiology outpt

## 2024-08-20 NOTE — DISCHARGE NOTE PROVIDER - CATHETER HOME CARE ORDERS:
May change or replace catheter if leaking or dislodged/May irrigate with 30-60 cc NS prn for clogging or sediment/Special instructions/Other...

## 2024-08-20 NOTE — DIETITIAN NUTRITION RISK NOTIFICATION - TREATMENT: THE FOLLOWING DIET HAS BEEN RECOMMENDED
Diet, Minced and Moist:   Supplement Feeding Modality:  Oral  Glucerna Shake Cans or Servings Per Day:  1       Frequency:  Three Times a day (08-17-24 @ 17:29) [Active]

## 2024-08-20 NOTE — CHART NOTE - NSCHARTNOTEFT_GEN_A_CORE
Source: Patient [ ]  Family [ ]   other [ x]    Current Diet: minced and moist with Glucerna shake TID  Takes supplement well.    Patient reports [ ] nausea  [ ] vomiting [ ] diarrhea [ ] constipation  [ ]chewing problems [ ] swallowing issues  [ ] other:     PO intake:  < 50% [x ]   50-75%  [ ]   %  [ ]  other :    Source for PO intake [ ] Patient [ ] family [x ] chart [ ] staff [ ] other    Enteral /Parenteral Nutrition:     Current Weight: 141# 8/15  generalized 1+    % Weight Change     Pertinent Medications: MEDICATIONS  (STANDING):  apixaban 5 milliGRAM(s) Oral two times a day  ascorbic acid 500 milliGRAM(s) Oral daily  collagenase Ointment 1 Application(s) Topical daily  digoxin     Tablet 125 MICROGram(s) Oral daily  ertapenem  IVPB 1000 milliGRAM(s) IV Intermittent every 24 hours  ertapenem  IVPB      furosemide    Tablet 20 milliGRAM(s) Oral daily  levothyroxine 75 MICROGram(s) Oral daily  polyethylene glycol 3350 17 Gram(s) Oral two times a day  senna 2 Tablet(s) Oral at bedtime  simvastatin 20 milliGRAM(s) Oral at bedtime  tamsulosin 0.4 milliGRAM(s) Oral at bedtime  zinc sulfate 220 milliGRAM(s) Oral daily    MEDICATIONS  (PRN):  acetaminophen     Tablet .. 650 milliGRAM(s) Oral every 6 hours PRN Temp greater or equal to 38C (100.4F), Mild Pain (1 - 3)  aluminum hydroxide/magnesium hydroxide/simethicone Suspension 30 milliLiter(s) Oral every 4 hours PRN Dyspepsia  melatonin 3 milliGRAM(s) Oral at bedtime PRN Insomnia  metoprolol tartrate Injectable 2.5 milliGRAM(s) IV Push every 6 hours PRN HR > 120  ondansetron Injectable 4 milliGRAM(s) IV Push every 8 hours PRN Nausea and/or Vomiting    Pertinent Labs: CBC Full  -  ( 20 Aug 2024 06:26 )  WBC Count : 9.17 K/uL  RBC Count : 3.29 M/uL  Hemoglobin : 8.9 g/dL  Hematocrit : 28.6 %  Platelet Count - Automated : 397 K/uL  Mean Cell Volume : 86.9 fl  Mean Cell Hemoglobin : 27.1 pg  Mean Cell Hemoglobin Concentration : 31.1 gm/dL  Auto Neutrophil # : x  Auto Lymphocyte # : x  Auto Monocyte # : x  Auto Eosinophil # : x  Auto Basophil # : x  Auto Neutrophil % : x  Auto Lymphocyte % : x  Auto Monocyte % : x  Auto Eosinophil % : x  Auto Basophil % : x      08-20 Na136 mmol/L Glu 141 mg/dL<H> K+ 4.1 mmol/L Cr  0.49 mg/dL<L> BUN 19.6 mg/dL Phos 2.4 mg/dL Alb n/a   PAB n/a           Skin: stage 3 sacrum, stage 2 coccyx.     Nutrition focused physical exam not conducted - found signs of malnutrition [ ]absent [ ]present    Subcutaneous fat loss: [ ] Orbital fat pads region, [ ]Buccal fat region, [ ]Triceps region,  [ ]Ribs region    Muscle wasting: [ ]Temples region, [ ]Clavicle region, [ ]Shoulder region, [ ]Scapula region, [ ]Interosseous region,  [ ]thigh region, [ ]Calf region    Estimated Needs:   [x ] no change since previous assessment  [ ] recalculated:     Current Nutrition Diagnosis: Pt meets criteria for moderate acute malnutrition related to inadequate energy intake in setting of pericardial effusion, CVA, acute sepsis UTI, adv age as evidenced by <75%EER>7days, mild edema. Pt on minced and moist diet. Takes supplement well.    Recommendations:   Continue Glucerna shake TID to optimize po intake and provide an additional 220 kcal, 10g protein per serving   Continue zinc, Vit C  Rec MVI  Diet as recommended by SLP.    Monitoring and Evaluation:   [x ] PO intake [x ] Tolerance to diet prescription [X] Weights  [X] Follow up per protocol [X] Labs:

## 2024-08-21 ENCOUNTER — TRANSCRIPTION ENCOUNTER (OUTPATIENT)
Age: 84
End: 2024-08-21

## 2024-08-21 LAB
ANION GAP SERPL CALC-SCNC: 8 MMOL/L — SIGNIFICANT CHANGE UP (ref 5–17)
APPEARANCE UR: ABNORMAL
BACTERIA # UR AUTO: ABNORMAL /HPF
BILIRUB UR-MCNC: NEGATIVE — SIGNIFICANT CHANGE UP
BUN SERPL-MCNC: 17.7 MG/DL — SIGNIFICANT CHANGE UP (ref 8–20)
CALCIUM SERPL-MCNC: 8.5 MG/DL — SIGNIFICANT CHANGE UP (ref 8.4–10.5)
CAST: 4 /LPF — SIGNIFICANT CHANGE UP (ref 0–4)
CHLORIDE SERPL-SCNC: 104 MMOL/L — SIGNIFICANT CHANGE UP (ref 96–108)
CO2 SERPL-SCNC: 25 MMOL/L — SIGNIFICANT CHANGE UP (ref 22–29)
COLOR SPEC: YELLOW — SIGNIFICANT CHANGE UP
CREAT SERPL-MCNC: 0.49 MG/DL — LOW (ref 0.5–1.3)
CRP SERPL-MCNC: 155 MG/L — HIGH
CULTURE RESULTS: SIGNIFICANT CHANGE UP
DIFF PNL FLD: ABNORMAL
EGFR: 95 ML/MIN/1.73M2 — SIGNIFICANT CHANGE UP
ERYTHROCYTE [SEDIMENTATION RATE] IN BLOOD: 100 MM/HR — HIGH (ref 0–20)
GLUCOSE SERPL-MCNC: 103 MG/DL — HIGH (ref 70–99)
GLUCOSE UR QL: NEGATIVE MG/DL — SIGNIFICANT CHANGE UP
HCT VFR BLD CALC: 28.8 % — LOW (ref 34.5–45)
HGB BLD-MCNC: 8.8 G/DL — LOW (ref 11.5–15.5)
KETONES UR-MCNC: NEGATIVE MG/DL — SIGNIFICANT CHANGE UP
LEUKOCYTE ESTERASE UR-ACNC: ABNORMAL
MAGNESIUM SERPL-MCNC: 1.6 MG/DL — LOW (ref 1.8–2.6)
MCHC RBC-ENTMCNC: 27.1 PG — SIGNIFICANT CHANGE UP (ref 27–34)
MCHC RBC-ENTMCNC: 30.6 GM/DL — LOW (ref 32–36)
MCV RBC AUTO: 88.6 FL — SIGNIFICANT CHANGE UP (ref 80–100)
NITRITE UR-MCNC: NEGATIVE — SIGNIFICANT CHANGE UP
PH UR: 6.5 — SIGNIFICANT CHANGE UP (ref 5–8)
PLATELET # BLD AUTO: 400 K/UL — SIGNIFICANT CHANGE UP (ref 150–400)
POTASSIUM SERPL-MCNC: 4.2 MMOL/L — SIGNIFICANT CHANGE UP (ref 3.5–5.3)
POTASSIUM SERPL-SCNC: 4.2 MMOL/L — SIGNIFICANT CHANGE UP (ref 3.5–5.3)
PROT UR-MCNC: SIGNIFICANT CHANGE UP MG/DL
RBC # BLD: 3.25 M/UL — LOW (ref 3.8–5.2)
RBC # FLD: 16.2 % — HIGH (ref 10.3–14.5)
RBC CASTS # UR COMP ASSIST: 106 /HPF — HIGH (ref 0–4)
RHEUMATOID FACT SERPL-ACNC: 17 IU/ML — HIGH (ref 0–13)
SODIUM SERPL-SCNC: 137 MMOL/L — SIGNIFICANT CHANGE UP (ref 135–145)
SP GR SPEC: 1.01 — SIGNIFICANT CHANGE UP (ref 1–1.03)
SPECIMEN SOURCE: SIGNIFICANT CHANGE UP
SQUAMOUS # UR AUTO: 5 /HPF — SIGNIFICANT CHANGE UP (ref 0–5)
UROBILINOGEN FLD QL: 0.2 MG/DL — SIGNIFICANT CHANGE UP (ref 0.2–1)
VIT D25+D1,25 OH+D1,25 PNL SERPL-MCNC: 30.7 PG/ML — SIGNIFICANT CHANGE UP (ref 19.9–79.3)
WBC # BLD: 8.5 K/UL — SIGNIFICANT CHANGE UP (ref 3.8–10.5)
WBC # FLD AUTO: 8.5 K/UL — SIGNIFICANT CHANGE UP (ref 3.8–10.5)
WBC UR QL: 434 /HPF — HIGH (ref 0–5)
YEAST-LIKE CELLS: PRESENT

## 2024-08-21 PROCEDURE — 99232 SBSQ HOSP IP/OBS MODERATE 35: CPT

## 2024-08-21 PROCEDURE — 74177 CT ABD & PELVIS W/CONTRAST: CPT | Mod: 26

## 2024-08-21 RX ORDER — DICYCLOMINE HCL 20 MG
10 TABLET ORAL ONCE
Refills: 0 | Status: COMPLETED | OUTPATIENT
Start: 2024-08-21 | End: 2024-08-21

## 2024-08-21 RX ORDER — KETOROLAC TROMETHAMINE 30 MG/ML
15 INJECTION, SOLUTION INTRAMUSCULAR ONCE
Refills: 0 | Status: DISCONTINUED | OUTPATIENT
Start: 2024-08-21 | End: 2024-08-21

## 2024-08-21 RX ADMIN — Medication 2 TABLET(S): at 21:46

## 2024-08-21 RX ADMIN — Medication 500 MILLIGRAM(S): at 17:04

## 2024-08-21 RX ADMIN — Medication 20 MILLIGRAM(S): at 21:46

## 2024-08-21 RX ADMIN — APIXABAN 5 MILLIGRAM(S): 5 TABLET, FILM COATED ORAL at 17:04

## 2024-08-21 RX ADMIN — COLLAGENASE SANTYL 1 APPLICATION(S): 250 OINTMENT TOPICAL at 17:04

## 2024-08-21 RX ADMIN — Medication 10 MILLIGRAM(S): at 11:40

## 2024-08-21 RX ADMIN — Medication 20 MILLIGRAM(S): at 05:52

## 2024-08-21 RX ADMIN — Medication 25 GRAM(S): at 11:40

## 2024-08-21 RX ADMIN — KETOROLAC TROMETHAMINE 15 MILLIGRAM(S): 30 INJECTION, SOLUTION INTRAMUSCULAR at 09:32

## 2024-08-21 RX ADMIN — ERTAPENEM SODIUM 120 MILLIGRAM(S): 1 INJECTION, POWDER, LYOPHILIZED, FOR SOLUTION INTRAMUSCULAR; INTRAVENOUS at 17:04

## 2024-08-21 RX ADMIN — Medication 220 MILLIGRAM(S): at 17:04

## 2024-08-21 RX ADMIN — TAMSULOSIN HYDROCHLORIDE 0.4 MILLIGRAM(S): 0.4 CAPSULE ORAL at 21:56

## 2024-08-21 RX ADMIN — DIGOXIN 125 MICROGRAM(S): 0.12 TABLET ORAL at 05:52

## 2024-08-21 RX ADMIN — APIXABAN 5 MILLIGRAM(S): 5 TABLET, FILM COATED ORAL at 05:52

## 2024-08-21 RX ADMIN — Medication 75 MICROGRAM(S): at 05:52

## 2024-08-21 NOTE — DISCHARGE NOTE NURSING/CASE MANAGEMENT/SOCIAL WORK - NSDCVIVACCINE_GEN_ALL_CORE_FT
Influenza, seasonal, injectable; 2014/11/0 ; Gisel Chamberlain (RN);   influenza, injectable, quadrivalent, preservative free; 17-Nov-2020 16:50; Juan J Quintanilla (RN); [employer]; [id number] (Exp. Date: 30-Jun-2021); IntraMuscular; Deltoid Left.; 0.5 milliLiter(s); VIS (VIS Published: 15-Aug-2019, VIS Presented: 17-Nov-2020);   pneumococcal polysaccharide PPV23; 2009/10/0 ; Gisel Chamberlain (RN);

## 2024-08-21 NOTE — PROGRESS NOTE ADULT - ASSESSMENT
· Assessment	  83F PMHx Afib on apixaban, recurrent UTIs, chronic left sided nephrosis, hypothyroid, OA, HTN, HLD, CVA (residual left sided weakness), chronic walters BIBEMS from home for abdominal pain admitted for Sepsis 2/2 Acute UTI c/b chronic walters, fecal impaction, pleural and pericardial effusions, and AFIB RVR.    # Sepsis secondary to  # Catheter associated UTI  Urine culture (8/14/24)) Klebsiella aerogenes  Blood cultures (8/15/24) Negative to date  S/p catheter change in Emergency Department  WBC improving daily, remains afebrile though hypotensive earlier  - S/P Piperacillin-Tazobactam IV for 4 days; change to Ertapenem given sensitivities to complete at least 7 days (total) for complicated UTI (8/15-8/21)  - Walters care    # Left pleural effusion, moderate to large  # Right Pleural effusion, small  - S/P L PTC. Chest tube removed 8/18/24  - C/w Apixaban 5mg bid  - Based on light criteria, likely exudative    - Pleural fluid culture no growth, fungal pending  - Pleural fluid cyto no malignant cells.   - Continuous SpO2 monitoring. Maintain SpO2 >92%. Supplement with O2 therapy PRN.  - Encourage the use of incentive spirometer, cough/deep breathing exercises, OOB to chair, ambulate as tolerated.      # Pericardial effusion  TTE reported moderate pericardial effusion  - Prior echo does not demonstrate pericardial effusion  - Cardiology consult appreciated; No tamponade or intervention currently  - Echo shows normal EF 50-55%  - Troponin is normal but upper end of range, continue to trend  - pBNP in 6/2024 was 6927,  repeated on 8/15 1543.   - Repeated Echo on 8/17 shows no worsening effusion.  - TSH(4.56)   - There is no smith's triad on physical exam   - C/w monitor on telemetry,  - c/w Lasix 20mg qd  - Will repeat Echo on 8/21 before discharge   - Cardio outpt follow up with Dr gary moreno in 1-2 weeks  - Unsure etiology of pericardia and pleural effusion -> no malignant cells on cyto study   - Rheumatology workup to exclude autoimmune disease.     # Hypokalemia (resolved):   - K (8/19) morning 2.5  - Given kcl 40meq PO q6h X3  - Given kcl 20 meq IV q2h X3  - Repeat BMP at 3pm (8/19) ->3.5  - 4.2 on 8/20     # Dysphagia  -Family reported patient was coughing with diet for last few days  -SLP consulted - minced and moist with thins    # LESTERfib with RVR  - Pt's HR was around 100-120, w/ hypotension.   - Will stop the metoprolol and start Digoxin load for better heart rate control Digoxin 500mcg IVP x 1 and then Digoxin 250mcg IVP q 6hrs and then 125mg po q daily;   - Continue with Eliquis, once HR better controlled will then transition to Metoprolol  - Cardio outpt follow up    # Pressure injury   -On injury  -ON buttock and hip, Stage II and III  - Daily wound care with Santyl and calcium alginate      Disposition: medically active, likely Rehab upon stabilization     · Assessment	  83F PMHx Afib on apixaban, recurrent UTIs, chronic left sided nephrosis, hypothyroid, OA, HTN, HLD, CVA (residual left sided weakness), chronic walters BIBEMS from home for abdominal pain admitted for Sepsis 2/2 Acute UTI c/b chronic walters, fecal impaction, pleural and pericardial effusions, and AFIB RVR.    # Sepsis secondary to  # Catheter associated UTI  Urine culture (8/14/24)) Klebsiella aerogenes  Blood cultures (8/15/24) Negative to date  S/p catheter change in Emergency Department  WBC improving daily, remains afebrile though hypotensive earlier  - S/P Piperacillin-Tazobactam IV for 4 days; change to Ertapenem given sensitivities to complete at least 7 days (total) for complicated UTI (8/15-8/21)  - Walters care    #Abdominal pain  - New onset of RLQ abdominal pain  - CT A/P to r/o appendicitis colits or other inflammation (STAT)  - CT results pending     # Left pleural effusion, moderate to large  # Right Pleural effusion, small  - S/P L PTC. Chest tube removed 8/18/24  - C/w Apixaban 5mg bid  - Based on light criteria, likely exudative    - Pleural fluid culture no growth, fungal pending  - Pleural fluid cyto no malignant cells.   - Continuous SpO2 monitoring. Maintain SpO2 >92%. Supplement with O2 therapy PRN.  - Encourage the use of incentive spirometer, cough/deep breathing exercises, OOB to chair, ambulate as tolerated.      # Pericardial effusion  TTE reported moderate pericardial effusion  - Prior echo does not demonstrate pericardial effusion  - Cardiology consult appreciated; No tamponade or intervention currently  - Echo shows normal EF 50-55%  - Troponin is normal but upper end of range, continue to trend  - pBNP in 6/2024 was 6927,  repeated on 8/15 1543.   - Repeated Echo on 8/17 shows no worsening effusion.  - Repeated Echo on 8/20 shows improving pericardial effusion  - TSH(4.56)   - There is no smith's triad on physical exam   - C/w monitor on telemetry,  - c/w Lasix 20mg qd  - Cardio outpt follow up with Dr gary moreno in 1-2 weeks  - Unsure etiology of pericardia and pleural effusion -> no malignant cells on cyto study   - Rheumatology workup to exclude autoimmune disease.     # Hypokalemia (resolved):   - K (8/19) morning 2.5  - Given kcl 40meq PO q6h X3  - Given kcl 20 meq IV q2h X3  - Repeat BMP at 3pm (8/19) ->3.5  - 4.2 on 8/20     # Dysphagia  -Family reported patient was coughing with diet for last few days  -SLP consulted - minced and moist with thins    # A.fib with RVR  - Pt's HR was around 100-120, w/ hypotension.   - Will stop the metoprolol and start Digoxin load for better heart rate control Digoxin 500mcg IVP x 1 and then Digoxin 250mcg IVP q 6hrs and then 125mg po q daily;   - Continue with Eliquis, once HR better controlled will then transition to Metoprolol  - Cardio outpt follow up    # Pressure injury   -On injury  -ON buttock and hip, Stage II and III  - Daily wound care with Santyl and calcium alginate      Disposition: medically active, likely Rehab upon stabilization

## 2024-08-21 NOTE — PROGRESS NOTE ADULT - SUBJECTIVE AND OBJECTIVE BOX
SUBJECTIVE:    Chief Complaint: Patient is a 83y old  Female who presents with a chief complaint of Acute UTI, Pleural Effusion, Pericardial Effusion, Fecal Impaction, AFIB RVR (20 Aug 2024 15:19)      Hospital Course:     INTERVAL HPI/OVERNIGHT EVENTS: Patient seen and examined at bedside at AM. No clinically acute event overnight.   Denies any chest pain, palpitations, SOB, leg edema, N/V/D, or any other complaints.     MEDICATIONS  (STANDING):  apixaban 5 milliGRAM(s) Oral two times a day  ascorbic acid 500 milliGRAM(s) Oral daily  collagenase Ointment 1 Application(s) Topical daily  digoxin     Tablet 125 MICROGram(s) Oral daily  ertapenem  IVPB      ertapenem  IVPB 1000 milliGRAM(s) IV Intermittent every 24 hours  furosemide    Tablet 20 milliGRAM(s) Oral daily  levothyroxine 75 MICROGram(s) Oral daily  polyethylene glycol 3350 17 Gram(s) Oral two times a day  senna 2 Tablet(s) Oral at bedtime  simvastatin 20 milliGRAM(s) Oral at bedtime  tamsulosin 0.4 milliGRAM(s) Oral at bedtime  zinc sulfate 220 milliGRAM(s) Oral daily    MEDICATIONS  (PRN):  acetaminophen     Tablet .. 650 milliGRAM(s) Oral every 6 hours PRN Temp greater or equal to 38C (100.4F), Mild Pain (1 - 3)  aluminum hydroxide/magnesium hydroxide/simethicone Suspension 30 milliLiter(s) Oral every 4 hours PRN Dyspepsia  melatonin 3 milliGRAM(s) Oral at bedtime PRN Insomnia  metoprolol tartrate Injectable 2.5 milliGRAM(s) IV Push every 6 hours PRN HR > 120  ondansetron Injectable 4 milliGRAM(s) IV Push every 8 hours PRN Nausea and/or Vomiting      Allergies    Cipro (Other)  all narcotics give severe vomitting and dizziness (Other; Vomiting)  Bactrim (Rash)  Levaquin (Unknown)  ampicillin (Stomach Upset)    Intolerances    Augmentin (Diarrhea)      REVIEW OF SYSTEMS:  All other review of systems negative, except as noted in HPI    OBJECTIVE:    Vital Signs Last 24 Hrs  T(C): 36.3 (21 Aug 2024 05:04), Max: 36.8 (20 Aug 2024 10:35)  T(F): 97.3 (21 Aug 2024 05:04), Max: 98.2 (20 Aug 2024 10:35)  HR: 90 (21 Aug 2024 05:04) (86 - 103)  BP: 102/67 (21 Aug 2024 05:04) (101/68 - 144/72)  BP(mean): --  RR: 18 (21 Aug 2024 05:04) (17 - 18)  SpO2: 96% (21 Aug 2024 05:04) (93% - 97%)    Parameters below as of 21 Aug 2024 05:04  Patient On (Oxygen Delivery Method): room air        PHYSICAL EXAM:      Lab/ Imaging:    LABS:                        8.9    9.17  )-----------( 397      ( 20 Aug 2024 06:26 )             28.6     08-20    136  |  105  |  19.6  ----------------------------<  141<H>  4.1   |  24.0  |  0.49<L>    Ca    8.5      20 Aug 2024 06:26  Phos  2.4     08-20  Mg     1.9     08-20    TPro  5.4<L>  /  Alb  2.1<L>  /  TBili  0.3<L>  /  DBili  x   /  AST  13  /  ALT  14  /  AlkPhos  96  08-19      Urinalysis Basic - ( 20 Aug 2024 06:26 )    Color: x / Appearance: x / SG: x / pH: x  Gluc: 141 mg/dL / Ketone: x  / Bili: x / Urobili: x   Blood: x / Protein: x / Nitrite: x   Leuk Esterase: x / RBC: x / WBC x   Sq Epi: x / Non Sq Epi: x / Bacteria: x      CAPILLARY BLOOD GLUCOSE           SUBJECTIVE:    Chief Complaint: Patient is a 83y old  Female who presents with a chief complaint of Acute UTI, Pleural Effusion, Pericardial Effusion, Fecal Impaction, AFIB RVR (20 Aug 2024 15:19)      Hospital Course:     INTERVAL HPI/OVERNIGHT EVENTS: Patient seen and examined at bedside at AM. No clinically acute event overnight. Pt c/o abdominal pain, especial RLQ pain and urbano-pubic pain,  w/o radiation. Pt denies n/v. But admitted BM with soft,smash, brown stool. Denies any chest pain, palpitations, SOB, leg edema, N/V/D, or any other complaints.     MEDICATIONS  (STANDING):  apixaban 5 milliGRAM(s) Oral two times a day  ascorbic acid 500 milliGRAM(s) Oral daily  collagenase Ointment 1 Application(s) Topical daily  digoxin     Tablet 125 MICROGram(s) Oral daily  ertapenem  IVPB      ertapenem  IVPB 1000 milliGRAM(s) IV Intermittent every 24 hours  furosemide    Tablet 20 milliGRAM(s) Oral daily  levothyroxine 75 MICROGram(s) Oral daily  polyethylene glycol 3350 17 Gram(s) Oral two times a day  senna 2 Tablet(s) Oral at bedtime  simvastatin 20 milliGRAM(s) Oral at bedtime  tamsulosin 0.4 milliGRAM(s) Oral at bedtime  zinc sulfate 220 milliGRAM(s) Oral daily    MEDICATIONS  (PRN):  acetaminophen     Tablet .. 650 milliGRAM(s) Oral every 6 hours PRN Temp greater or equal to 38C (100.4F), Mild Pain (1 - 3)  aluminum hydroxide/magnesium hydroxide/simethicone Suspension 30 milliLiter(s) Oral every 4 hours PRN Dyspepsia  melatonin 3 milliGRAM(s) Oral at bedtime PRN Insomnia  metoprolol tartrate Injectable 2.5 milliGRAM(s) IV Push every 6 hours PRN HR > 120  ondansetron Injectable 4 milliGRAM(s) IV Push every 8 hours PRN Nausea and/or Vomiting      Allergies    Cipro (Other)  all narcotics give severe vomitting and dizziness (Other; Vomiting)  Bactrim (Rash)  Levaquin (Unknown)  ampicillin (Stomach Upset)    Intolerances    Augmentin (Diarrhea)      REVIEW OF SYSTEMS:  All other review of systems negative, except as noted in HPI    OBJECTIVE:    Vital Signs Last 24 Hrs  T(C): 36.3 (21 Aug 2024 05:04), Max: 36.8 (20 Aug 2024 10:35)  T(F): 97.3 (21 Aug 2024 05:04), Max: 98.2 (20 Aug 2024 10:35)  HR: 90 (21 Aug 2024 05:04) (86 - 103)  BP: 102/67 (21 Aug 2024 05:04) (101/68 - 144/72)  BP(mean): --  RR: 18 (21 Aug 2024 05:04) (17 - 18)  SpO2: 96% (21 Aug 2024 05:04) (93% - 97%)    Parameters below as of 21 Aug 2024 05:04  Patient On (Oxygen Delivery Method): room air        PHYSICAL EXAM:      Lab/ Imaging:    LABS:                        8.9    9.17  )-----------( 397      ( 20 Aug 2024 06:26 )             28.6     08-20    136  |  105  |  19.6  ----------------------------<  141<H>  4.1   |  24.0  |  0.49<L>    Ca    8.5      20 Aug 2024 06:26  Phos  2.4     08-20  Mg     1.9     08-20    TPro  5.4<L>  /  Alb  2.1<L>  /  TBili  0.3<L>  /  DBili  x   /  AST  13  /  ALT  14  /  AlkPhos  96  08-19      Urinalysis Basic - ( 20 Aug 2024 06:26 )    Color: x / Appearance: x / SG: x / pH: x  Gluc: 141 mg/dL / Ketone: x  / Bili: x / Urobili: x   Blood: x / Protein: x / Nitrite: x   Leuk Esterase: x / RBC: x / WBC x   Sq Epi: x / Non Sq Epi: x / Bacteria: x      CAPILLARY BLOOD GLUCOSE           SUBJECTIVE:    Chief Complaint: Patient is a 83y old  Female who presents with a chief complaint of Acute UTI, Pleural Effusion, Pericardial Effusion, Fecal Impaction, AFIB RVR (20 Aug 2024 15:19)      Hospital Course:     INTERVAL HPI/OVERNIGHT EVENTS: Patient seen and examined at bedside at AM. No clinically acute event overnight. Pt c/o abdominal pain, especial RLQ pain and urbano-pubic pain,  w/o radiation. Pt denies n/v. But admitted one episode of BM with soft, smashed, brown stool. Denies any chest pain, palpitations, SOB, leg edema, N/V, or any other complaints.     MEDICATIONS  (STANDING):  apixaban 5 milliGRAM(s) Oral two times a day  ascorbic acid 500 milliGRAM(s) Oral daily  collagenase Ointment 1 Application(s) Topical daily  digoxin     Tablet 125 MICROGram(s) Oral daily  ertapenem  IVPB      ertapenem  IVPB 1000 milliGRAM(s) IV Intermittent every 24 hours  furosemide    Tablet 20 milliGRAM(s) Oral daily  levothyroxine 75 MICROGram(s) Oral daily  polyethylene glycol 3350 17 Gram(s) Oral two times a day  senna 2 Tablet(s) Oral at bedtime  simvastatin 20 milliGRAM(s) Oral at bedtime  tamsulosin 0.4 milliGRAM(s) Oral at bedtime  zinc sulfate 220 milliGRAM(s) Oral daily    MEDICATIONS  (PRN):  acetaminophen     Tablet .. 650 milliGRAM(s) Oral every 6 hours PRN Temp greater or equal to 38C (100.4F), Mild Pain (1 - 3)  aluminum hydroxide/magnesium hydroxide/simethicone Suspension 30 milliLiter(s) Oral every 4 hours PRN Dyspepsia  melatonin 3 milliGRAM(s) Oral at bedtime PRN Insomnia  metoprolol tartrate Injectable 2.5 milliGRAM(s) IV Push every 6 hours PRN HR > 120  ondansetron Injectable 4 milliGRAM(s) IV Push every 8 hours PRN Nausea and/or Vomiting      Allergies    Cipro (Other)  all narcotics give severe vomitting and dizziness (Other; Vomiting)  Bactrim (Rash)  Levaquin (Unknown)  ampicillin (Stomach Upset)    Intolerances    Augmentin (Diarrhea)      REVIEW OF SYSTEMS:  All other review of systems negative, except as noted in HPI    OBJECTIVE:    Vital Signs Last 24 Hrs  T(C): 36.3 (21 Aug 2024 05:04), Max: 36.8 (20 Aug 2024 10:35)  T(F): 97.3 (21 Aug 2024 05:04), Max: 98.2 (20 Aug 2024 10:35)  HR: 90 (21 Aug 2024 05:04) (86 - 103)  BP: 102/67 (21 Aug 2024 05:04) (101/68 - 144/72)  BP(mean): --  RR: 18 (21 Aug 2024 05:04) (17 - 18)  SpO2: 96% (21 Aug 2024 05:04) (93% - 97%)    Parameters below as of 21 Aug 2024 05:04  Patient On (Oxygen Delivery Method): room air        PHYSICAL EXAM:      Lab/ Imaging:    LABS:                        8.9    9.17  )-----------( 397      ( 20 Aug 2024 06:26 )             28.6     08-20    136  |  105  |  19.6  ----------------------------<  141<H>  4.1   |  24.0  |  0.49<L>    Ca    8.5      20 Aug 2024 06:26  Phos  2.4     08-20  Mg     1.9     08-20    TPro  5.4<L>  /  Alb  2.1<L>  /  TBili  0.3<L>  /  DBili  x   /  AST  13  /  ALT  14  /  AlkPhos  96  08-19      Urinalysis Basic - ( 20 Aug 2024 06:26 )    Color: x / Appearance: x / SG: x / pH: x  Gluc: 141 mg/dL / Ketone: x  / Bili: x / Urobili: x   Blood: x / Protein: x / Nitrite: x   Leuk Esterase: x / RBC: x / WBC x   Sq Epi: x / Non Sq Epi: x / Bacteria: x      CAPILLARY BLOOD GLUCOSE

## 2024-08-21 NOTE — DISCHARGE NOTE NURSING/CASE MANAGEMENT/SOCIAL WORK - PATIENT PORTAL LINK FT
You can access the FollowMyHealth Patient Portal offered by [facility] by registering at the following website: [email or web address]. By joining [facility]’s FollowMyHealth portal, you will also be able to view your health information using other applications (apps) compatible with our system.

## 2024-08-21 NOTE — PROGRESS NOTE ADULT - ATTENDING COMMENTS
83 year old female with Hypertension, Dyslipidemia, Hypothyroidism, DVT and CVA presented with abdominal pain.  Diagnostic work up significant for pericardial and pleural effusion. TTE confirms moderate pericardial effusion without any tamponade physiology.   Thoracocentesis (L) with removal 1000ml; Exudate. Repeat TTE without any tamponade    Patient interviewed and examined at bedside today.   Complaining of lower abdominal pain earlier, pending CT    Denies any chest pain, dyspnea, abdominal pain at time of examination later.  Had bowel movement    Vital sign stable  Elderly female lying comfortable in bed  Anicteric sclera. Mild facial erythema  regular rate and rhythm S1 S2  Soft abdomen, no suprapubic tenderness  No edema    WBC 16-->12-->9-->8.9  Hgb 9.3-->9.8-->8.9  K 3.2-->2.5-->3.5-->4.1  Mg 1.6  TTE with EF 53%, moderate pericardial effusion. Stable on repeat imaging,. Improved on repeat imaging  Chest X-Ray Bilateral pleural effusion  CT chest/Abd confirm effusion and stable Left Hydronephrosis    # Sepsis. Resolved  # CAUTI. Klebsiella aerogenes on urine culture. Blood culture (-)  # Pericardial effusion, moderate. No tamponade  # Pleural effusion, Bilateral. Exudative s/p Left thoracocentesis  # Anemia, Normocytic  # Hypokalemia  # Hypertension  # Chronic Left Hydronephrosis  # Hypothyroidism  - IV antibiotics, changed to Ertapenem. Complete 1 week 8/21/24  - Smart care  - Serial TTE, Cardiology follow up  - Serial imaging   - Monitor Hgb  - Replace lytes  - Apixaban  - CT abdomen    Disposition -  Home with services in next 24-48 hours pending CT
83 year old female with Hypertension, Dyslipidemia, Hypothyroidism, DVT and CVA presented with abdominal pain.  Diagnostic work up significant for pericardial and pleural effusion. TTE confirms moderate pericardial effusion without any tamponade physiology.   Thoracocentesis (L) with removal 1000ml; Exudate.     Patient interviewed and examined at bedside today.   Abdominal pain. (LUQ at chest tube site)  Denies any chest pain, dyspnea    Borderline hypotension, tachycardia  Elderly female lying comfortable in bed  Anicteric sclera  regular rate and rhythm S1 S2  Left chest tube in place  Soft abdomen, no suprapubic tenderness  No edema    WBC 16 (worse)  Hgb 9.3  K 3.2  TTE with EF 53% ,moderate pericardial effusion  Chest X-Ray Bilateral pleural effusion  CT chest/Abd confirm effusion and stable Left Hydronephrosis    # Sepsis. Resolved  # CAUTI. Klebsiella aerogenes on urine culture. Blood culture (-)  # Pericardial effusion, moderate. No tamponade  # Pleural effusion, Bilateral. Exudative s/p Left thoracocentesis  # Anemia, Normocytic  # Hypokalemia  # Hypertension  # Chronic Left Hydronephrosis  # Hypothyroidism  - IV antibiotics, follow culture sensitivities  - Smart care  - Serial TTE, Cardiology follow up  - Chest tube care, serial imaging, CTS follow up  - Monitor Hgb  - Replace lytes  - Apixaban    Disposition - Pending clinical course
83 year old female with Hypertension, Dyslipidemia, Hypothyroidism, DVT and CVA presented with abdominal pain.  Diagnostic work up significant for pericardial and pleural effusion. TTE confirms moderate pericardial effusion without any tamponade physiology.   Thoracocentesis (L) with removal 1000ml; Exudate. Repeat TTE without any tamponade    Patient interviewed and examined at bedside today.   Denies any chest pain, dyspnea, abdominal pain    Borderline hypotension, tachycardia  Elderly female lying comfortable in bed  Anicteric sclera. Mild facial erythema  regular rate and rhythm S1 S2  Soft abdomen, no suprapubic tenderness  No edema    WBC 16-->12-->9-->8.9  Hgb 9.3-->9.8-->8.9  K 3.2-->2.5-->3.5-->4.1  TTE with EF 53%, moderate pericardial effusion. Stable on repeat imaging  Chest X-Ray Bilateral pleural effusion  CT chest/Abd confirm effusion and stable Left Hydronephrosis    # Sepsis. Resolved  # CAUTI. Klebsiella aerogenes on urine culture. Blood culture (-)  # Pericardial effusion, moderate. No tamponade  # Pleural effusion, Bilateral. Exudative s/p Left thoracocentesis  # Anemia, Normocytic  # Hypokalemia  # Hypertension  # Chronic Left Hydronephrosis  # Hypothyroidism  - IV antibiotics, changed to Ertapenem. Complete 1 week 8/21/24  - Smart care  - Serial TTE, Cardiology follow up  - Serial imaging   - Monitor Hgb  - Replace lytes  - Apixaban    Disposition -  Home with services in next 24-48 hours
83 year old female with Hypertension, Dyslipidemia, Hypothyroidism, DVT and CVA presented with abdominal pain.  Diagnostic work up significant for pericardial and pleural effusion. TTE confirms moderate pericardial effusion without any tamponade physiology.   Thoracocentesis (L) with removal 1000ml; Exudate. Repeat TTE without any tamponade    Patient interviewed and examined at bedside today.   Abdominal pain. (LUQ at chest tube site)  Denies any chest pain, dyspnea    Borderline hypotension, tachycardia  Elderly female lying comfortable in bed  Anicteric sclera. Mild facial erythema  regular rate and rhythm S1 S2  Soft abdomen, no suprapubic tenderness  No edema    WBC 16-->12-->9  Hgb 9.3-->9.8  K 3.2-->2.5-->3.5  TTE with EF 53% ,moderate pericardial effusion  Chest X-Ray Bilateral pleural effusion  CT chest/Abd confirm effusion and stable Left Hydronephrosis    # Sepsis. Resolved  # CAUTI. Klebsiella aerogenes on urine culture. Blood culture (-)  # Pericardial effusion, moderate. No tamponade  # Pleural effusion, Bilateral. Exudative s/p Left thoracocentesis  # Anemia, Normocytic  # Hypokalemia  # Hypertension  # Chronic Left Hydronephrosis  # Hypothyroidism  - IV antibiotics, changed to Ertapenem. Complete 1 week  - Smart care  - Serial TTE, Cardiology follow up  - Serial imaging   - Monitor Hgb  - Replace lytes  - Apixaban    Disposition - Pending clinical improvement. Home with services at discharge anticipated.
Agree with above   Patient seen and examined together with medical residents. Awake, alert, but not able to provide a meaningful history.   Vital signs,  labs and imaging  reviewed. Awaiting to get thoracentesis for pleural effusion. Discussed with cardiology, will continue with Iv furosemide.    Assessment and plan discussed with residents and cardiology team

## 2024-08-22 VITALS
SYSTOLIC BLOOD PRESSURE: 111 MMHG | TEMPERATURE: 99 F | RESPIRATION RATE: 18 BRPM | HEART RATE: 88 BPM | OXYGEN SATURATION: 94 % | DIASTOLIC BLOOD PRESSURE: 54 MMHG

## 2024-08-22 LAB
ACE SERPL-CCNC: 33 U/L — SIGNIFICANT CHANGE UP (ref 14–82)
ANA PAT FLD IF-IMP: ABNORMAL
ANA TITR SER: ABNORMAL
ANION GAP SERPL CALC-SCNC: 10 MMOL/L — SIGNIFICANT CHANGE UP (ref 5–17)
BUN SERPL-MCNC: 17.2 MG/DL — SIGNIFICANT CHANGE UP (ref 8–20)
CALCIUM SERPL-MCNC: 8.4 MG/DL — SIGNIFICANT CHANGE UP (ref 8.4–10.5)
CHLORIDE SERPL-SCNC: 104 MMOL/L — SIGNIFICANT CHANGE UP (ref 96–108)
CO2 SERPL-SCNC: 24 MMOL/L — SIGNIFICANT CHANGE UP (ref 22–29)
CREAT SERPL-MCNC: 0.48 MG/DL — LOW (ref 0.5–1.3)
EGFR: 95 ML/MIN/1.73M2 — SIGNIFICANT CHANGE UP
GLUCOSE SERPL-MCNC: 107 MG/DL — HIGH (ref 70–99)
HCT VFR BLD CALC: 28.7 % — LOW (ref 34.5–45)
HGB BLD-MCNC: 8.9 G/DL — LOW (ref 11.5–15.5)
MAGNESIUM SERPL-MCNC: 2.2 MG/DL — SIGNIFICANT CHANGE UP (ref 1.6–2.6)
MCHC RBC-ENTMCNC: 27 PG — SIGNIFICANT CHANGE UP (ref 27–34)
MCHC RBC-ENTMCNC: 31 GM/DL — LOW (ref 32–36)
MCV RBC AUTO: 87 FL — SIGNIFICANT CHANGE UP (ref 80–100)
PLATELET # BLD AUTO: 420 K/UL — HIGH (ref 150–400)
POTASSIUM SERPL-MCNC: 4.1 MMOL/L — SIGNIFICANT CHANGE UP (ref 3.5–5.3)
POTASSIUM SERPL-SCNC: 4.1 MMOL/L — SIGNIFICANT CHANGE UP (ref 3.5–5.3)
RBC # BLD: 3.3 M/UL — LOW (ref 3.8–5.2)
RBC # FLD: 16 % — HIGH (ref 10.3–14.5)
SODIUM SERPL-SCNC: 138 MMOL/L — SIGNIFICANT CHANGE UP (ref 135–145)
WBC # BLD: 9.84 K/UL — SIGNIFICANT CHANGE UP (ref 3.8–10.5)
WBC # FLD AUTO: 9.84 K/UL — SIGNIFICANT CHANGE UP (ref 3.8–10.5)

## 2024-08-22 PROCEDURE — 99239 HOSP IP/OBS DSCHRG MGMT >30: CPT

## 2024-08-22 RX ORDER — POLYETHYLENE GLYCOL 3350 17 G/17G
17 POWDER, FOR SOLUTION ORAL
Qty: 1020 | Refills: 0
Start: 2024-08-22 | End: 2024-09-20

## 2024-08-22 RX ORDER — ZINC SULFATE 50(220)MG
1 CAPSULE ORAL
Qty: 30 | Refills: 0
Start: 2024-08-22 | End: 2024-09-20

## 2024-08-22 RX ORDER — SIMVASTATIN 10 MG
1 TABLET ORAL
Qty: 0 | Refills: 0 | DISCHARGE
Start: 2024-08-22

## 2024-08-22 RX ORDER — ASCORBIC ACID/ASCORBATE SODIUM 500 MG
1 TABLET,CHEWABLE ORAL
Qty: 30 | Refills: 0
Start: 2024-08-22 | End: 2024-09-20

## 2024-08-22 RX ORDER — FUROSEMIDE 40 MG
1 TABLET ORAL
Qty: 30 | Refills: 0
Start: 2024-08-22 | End: 2024-09-20

## 2024-08-22 RX ORDER — SENNA 187 MG
2 TABLET ORAL
Qty: 60 | Refills: 0
Start: 2024-08-22 | End: 2024-09-20

## 2024-08-22 RX ORDER — DIGOXIN 0.12 MG/1
1 TABLET ORAL
Qty: 30 | Refills: 0
Start: 2024-08-22 | End: 2024-09-20

## 2024-08-22 RX ORDER — SIMVASTATIN 10 MG
1 TABLET ORAL
Refills: 0 | DISCHARGE

## 2024-08-22 RX ORDER — DAPAGLIFLOZIN 10 MG/1
1 TABLET, FILM COATED ORAL
Qty: 30 | Refills: 0
Start: 2024-08-22 | End: 2024-09-20

## 2024-08-22 RX ADMIN — Medication 20 MILLIGRAM(S): at 21:36

## 2024-08-22 RX ADMIN — Medication 2 TABLET(S): at 21:36

## 2024-08-22 RX ADMIN — POLYETHYLENE GLYCOL 3350 17 GRAM(S): 17 POWDER, FOR SOLUTION ORAL at 05:25

## 2024-08-22 RX ADMIN — APIXABAN 5 MILLIGRAM(S): 5 TABLET, FILM COATED ORAL at 18:08

## 2024-08-22 RX ADMIN — Medication 75 MICROGRAM(S): at 05:25

## 2024-08-22 RX ADMIN — Medication 500 MILLIGRAM(S): at 18:08

## 2024-08-22 RX ADMIN — APIXABAN 5 MILLIGRAM(S): 5 TABLET, FILM COATED ORAL at 05:25

## 2024-08-22 RX ADMIN — Medication 220 MILLIGRAM(S): at 18:08

## 2024-08-22 RX ADMIN — DIGOXIN 125 MICROGRAM(S): 0.12 TABLET ORAL at 05:25

## 2024-08-22 RX ADMIN — Medication 20 MILLIGRAM(S): at 05:25

## 2024-08-22 RX ADMIN — COLLAGENASE SANTYL 1 APPLICATION(S): 250 OINTMENT TOPICAL at 18:08

## 2024-08-22 RX ADMIN — TAMSULOSIN HYDROCHLORIDE 0.4 MILLIGRAM(S): 0.4 CAPSULE ORAL at 21:36

## 2024-08-22 NOTE — PROGRESS NOTE ADULT - PROVIDER SPECIALTY LIST ADULT
Internal Medicine
Thoracic Surgery
Internal Medicine
Cardiology
Cardiology
Thoracic Surgery
Cardiology
Cardiology

## 2024-08-22 NOTE — PROGRESS NOTE ADULT - REASON FOR ADMISSION
Acute UTI, Pleural Effusion, Pericardial Effusion, Fecal Impaction, AFIB RVR

## 2024-08-22 NOTE — PROGRESS NOTE ADULT - SUBJECTIVE AND OBJECTIVE BOX
SUBJECTIVE:    Chief Complaint: Patient is a 83y old  Female who presents with a chief complaint of Acute UTI, Pleural Effusion, Pericardial Effusion, Fecal Impaction, AFIB RVR (21 Aug 2024 06:47)      Hospital Course:     INTERVAL HPI/OVERNIGHT EVENTS: Patient seen and examined at bedside at AM. No clinically acute event overnight. Pt does not complain RLQ pain, but persistent suprapubic discomfort.   Denies any chest pain, palpitations, SOB, leg edema, N/V/D, or any other complaints.     MEDICATIONS  (STANDING):  apixaban 5 milliGRAM(s) Oral two times a day  ascorbic acid 500 milliGRAM(s) Oral daily  collagenase Ointment 1 Application(s) Topical daily  digoxin     Tablet 125 MICROGram(s) Oral daily  furosemide    Tablet 20 milliGRAM(s) Oral daily  levothyroxine 75 MICROGram(s) Oral daily  polyethylene glycol 3350 17 Gram(s) Oral two times a day  senna 2 Tablet(s) Oral at bedtime  simvastatin 20 milliGRAM(s) Oral at bedtime  tamsulosin 0.4 milliGRAM(s) Oral at bedtime  zinc sulfate 220 milliGRAM(s) Oral daily    MEDICATIONS  (PRN):  acetaminophen     Tablet .. 650 milliGRAM(s) Oral every 6 hours PRN Temp greater or equal to 38C (100.4F), Mild Pain (1 - 3)  aluminum hydroxide/magnesium hydroxide/simethicone Suspension 30 milliLiter(s) Oral every 4 hours PRN Dyspepsia  melatonin 3 milliGRAM(s) Oral at bedtime PRN Insomnia  metoprolol tartrate Injectable 2.5 milliGRAM(s) IV Push every 6 hours PRN HR > 120  ondansetron Injectable 4 milliGRAM(s) IV Push every 8 hours PRN Nausea and/or Vomiting      Allergies    Cipro (Other)  all narcotics give severe vomitting and dizziness (Other; Vomiting)  Bactrim (Rash)  Levaquin (Unknown)  ampicillin (Stomach Upset)    Intolerances    Augmentin (Diarrhea)      REVIEW OF SYSTEMS:  All other review of systems negative, except as noted in HPI    OBJECTIVE:    Vital Signs Last 24 Hrs  T(C): 36.3 (22 Aug 2024 19:46), Max: 36.9 (22 Aug 2024 16:30)  T(F): 97.3 (22 Aug 2024 19:46), Max: 98.4 (22 Aug 2024 16:30)  HR: 93 (22 Aug 2024 19:46) (66 - 96)  BP: 116/73 (22 Aug 2024 19:46) (109/70 - 116/73)  BP(mean): --  RR: 18 (22 Aug 2024 19:46) (18 - 18)  SpO2: 99% (22 Aug 2024 19:46) (96% - 99%)    Parameters below as of 22 Aug 2024 19:46  Patient On (Oxygen Delivery Method): room air      PHYSICAL EXAM:  GENERAL: Pt lying in bed with improved breath, w/o anxious.    HEAD:  Atraumatic, Normocephalic  EYES: EOMI, PERRL, conjunctiva and sclera clear  ENT: Moist mucous membranes  NECK: Supple, No JVD  CHEST/LUNG: Improved lung sound on LL b/l on ausculation. No rales, rhonchi, wheezing, or rubs.   HEART: Irregular rate and rhythm; No murmurs, rubs, or gallops  ABDOMEN: Bowel sounds present; Soft, Nontender, Nondistended. No guarding or rigidity   EXTREMITIES:  2+ Peripheral Pulses. No pitting edema, clubbing, cyanosis.   NERVOUS SYSTEM:  Alert & Oriented X3, FROM x 4 extremities. No deficits   SKIN: No rashes or lesions      Lab/ Imaging:    LABS:                        8.9    9.84  )-----------( 420      ( 22 Aug 2024 06:05 )             28.7     08-22    138  |  104  |  17.2  ----------------------------<  107<H>  4.1   |  24.0  |  0.48<L>    Ca    8.4      22 Aug 2024 06:05  Mg     2.2     08-22        Urinalysis Basic - ( 22 Aug 2024 06:05 )    Color: x / Appearance: x / SG: x / pH: x  Gluc: 107 mg/dL / Ketone: x  / Bili: x / Urobili: x   Blood: x / Protein: x / Nitrite: x   Leuk Esterase: x / RBC: x / WBC x   Sq Epi: x / Non Sq Epi: x / Bacteria: x      CAPILLARY BLOOD GLUCOSE

## 2024-08-22 NOTE — PROGRESS NOTE ADULT - ASSESSMENT
· Assessment	  83F PMHx Afib on apixaban, recurrent UTIs, chronic left sided nephrosis, hypothyroid, OA, HTN, HLD, CVA (residual left sided weakness), chronic walters BIBEMS from home for abdominal pain admitted for Sepsis 2/2 Acute UTI c/b chronic walters, fecal impaction, pleural and pericardial effusions, and AFIB RVR.    # Sepsis secondary to  # Catheter associated UTI  - Urine culture (8/14/24)) Klebsiella aerogenes  - Blood cultures (8/15/24) Negative to date  - S/p catheter change in Emergency Department  - WBC improving daily, remains afebrile though hypotensive earlier  - S/P Piperacillin-Tazobactam IV for 4 days; change to Ertapenem given sensitivities to complete at least 7 days (total) for complicated UTI (8/15-8/21)  - Walters care    #Abdominal pain  - New onset of RLQ abdominal pain  - CT A/P to r/o appendicitis colits or other inflammation (STAT)  - CT results pending     # Left pleural effusion, moderate to large  # Right Pleural effusion, small  - S/P L PTC. Chest tube removed 8/18/24  - C/w Apixaban 5mg bid  - Based on light criteria, likely exudative    - Pleural fluid culture no growth, fungal pending  - Pleural fluid cyto no malignant cells.   - Continuous SpO2 monitoring. Maintain SpO2 >92%. Supplement with O2 therapy PRN.  - Encourage the use of incentive spirometer, cough/deep breathing exercises, OOB to chair, ambulate as tolerated.      # Pericardial effusion  TTE reported moderate pericardial effusion  - Prior echo does not demonstrate pericardial effusion  - Cardiology consult appreciated; No tamponade or intervention currently  - Echo shows normal EF 50-55%  - Troponin is normal but upper end of range, continue to trend  - pBNP in 6/2024 was 6927,  repeated on 8/15 1543.   - Repeated Echo on 8/17 shows no worsening effusion.  - Repeated Echo on 8/20 shows improving pericardial effusion  - TSH(4.56)   - There is no smith's triad on physical exam   - C/w monitor on telemetry,  - c/w Lasix 20mg qd  - Cardio outpt follow up with Dr gary moreno in 1-2 weeks  - Unsure etiology of pericardia and pleural effusion -> no malignant cells on cyto study   - Rheumatology workup to exclude autoimmune disease.     # Hypokalemia (resolved):   - K (8/19) morning 2.5  - Given kcl 40meq PO q6h X3  - Given kcl 20 meq IV q2h X3  - Repeat BMP at 3pm (8/19) ->3.5  - 4.2 on 8/20     # Dysphagia  -Family reported patient was coughing with diet for last few days  -SLP consulted - minced and moist with thins    # A.fib with RVR  - Pt's HR was around 100-120, w/ hypotension.   - Will stop the metoprolol and start Digoxin load for better heart rate control Digoxin 500mcg IVP x 1 and then Digoxin 250mcg IVP q 6hrs and then 125mg po q daily;   - Continue with Eliquis, once HR better controlled will then transition to Metoprolol  - Cardio outpt follow up    # Pressure injury   -On injury  -ON buttock and hip, Stage II and III  - Daily wound care with Santyl and calcium alginate      Disposition: medically stable, discharge today.

## 2024-09-14 LAB
CULTURE RESULTS: SIGNIFICANT CHANGE UP
SPECIMEN SOURCE: SIGNIFICANT CHANGE UP

## 2024-09-29 PROCEDURE — 86140 C-REACTIVE PROTEIN: CPT

## 2024-09-29 PROCEDURE — 36415 COLL VENOUS BLD VENIPUNCTURE: CPT

## 2024-09-29 PROCEDURE — 84484 ASSAY OF TROPONIN QUANT: CPT

## 2024-09-29 PROCEDURE — 86901 BLOOD TYPING SEROLOGIC RH(D): CPT

## 2024-09-29 PROCEDURE — 87086 URINE CULTURE/COLONY COUNT: CPT

## 2024-09-29 PROCEDURE — 93321 DOPPLER ECHO F-UP/LMTD STD: CPT

## 2024-09-29 PROCEDURE — 85730 THROMBOPLASTIN TIME PARTIAL: CPT

## 2024-09-29 PROCEDURE — 96375 TX/PRO/DX INJ NEW DRUG ADDON: CPT

## 2024-09-29 PROCEDURE — 83605 ASSAY OF LACTIC ACID: CPT

## 2024-09-29 PROCEDURE — 81001 URINALYSIS AUTO W/SCOPE: CPT

## 2024-09-29 PROCEDURE — 86038 ANTINUCLEAR ANTIBODIES: CPT

## 2024-09-29 PROCEDURE — 0225U NFCT DS DNA&RNA 21 SARSCOV2: CPT

## 2024-09-29 PROCEDURE — 87040 BLOOD CULTURE FOR BACTERIA: CPT

## 2024-09-29 PROCEDURE — C1729: CPT

## 2024-09-29 PROCEDURE — 83615 LACTATE (LD) (LDH) ENZYME: CPT

## 2024-09-29 PROCEDURE — 87015 SPECIMEN INFECT AGNT CONCNTJ: CPT

## 2024-09-29 PROCEDURE — 87899 AGENT NOS ASSAY W/OPTIC: CPT

## 2024-09-29 PROCEDURE — 93306 TTE W/DOPPLER COMPLETE: CPT

## 2024-09-29 PROCEDURE — 86850 RBC ANTIBODY SCREEN: CPT

## 2024-09-29 PROCEDURE — 89051 BODY FLUID CELL COUNT: CPT

## 2024-09-29 PROCEDURE — 82164 ANGIOTENSIN I ENZYME TEST: CPT

## 2024-09-29 PROCEDURE — 88305 TISSUE EXAM BY PATHOLOGIST: CPT

## 2024-09-29 PROCEDURE — 92610 EVALUATE SWALLOWING FUNCTION: CPT

## 2024-09-29 PROCEDURE — 85652 RBC SED RATE AUTOMATED: CPT

## 2024-09-29 PROCEDURE — 87205 SMEAR GRAM STAIN: CPT

## 2024-09-29 PROCEDURE — 84100 ASSAY OF PHOSPHORUS: CPT

## 2024-09-29 PROCEDURE — 84157 ASSAY OF PROTEIN OTHER: CPT

## 2024-09-29 PROCEDURE — 99291 CRITICAL CARE FIRST HOUR: CPT | Mod: 25

## 2024-09-29 PROCEDURE — 82042 OTHER SOURCE ALBUMIN QUAN EA: CPT

## 2024-09-29 PROCEDURE — 96374 THER/PROPH/DIAG INJ IV PUSH: CPT

## 2024-09-29 PROCEDURE — 83735 ASSAY OF MAGNESIUM: CPT

## 2024-09-29 PROCEDURE — 80053 COMPREHEN METABOLIC PANEL: CPT

## 2024-09-29 PROCEDURE — 86900 BLOOD TYPING SEROLOGIC ABO: CPT

## 2024-09-29 PROCEDURE — 83880 ASSAY OF NATRIURETIC PEPTIDE: CPT

## 2024-09-29 PROCEDURE — 88112 CYTOPATH CELL ENHANCE TECH: CPT

## 2024-09-29 PROCEDURE — 74177 CT ABD & PELVIS W/CONTRAST: CPT | Mod: MC

## 2024-09-29 PROCEDURE — 82550 ASSAY OF CK (CPK): CPT

## 2024-09-29 PROCEDURE — 82945 GLUCOSE OTHER FLUID: CPT

## 2024-09-29 PROCEDURE — 71260 CT THORAX DX C+: CPT | Mod: MC

## 2024-09-29 PROCEDURE — 87102 FUNGUS ISOLATION CULTURE: CPT

## 2024-09-29 PROCEDURE — 93325 DOPPLER ECHO COLOR FLOW MAPG: CPT

## 2024-09-29 PROCEDURE — 71045 X-RAY EXAM CHEST 1 VIEW: CPT

## 2024-09-29 PROCEDURE — 84443 ASSAY THYROID STIM HORMONE: CPT

## 2024-09-29 PROCEDURE — 93005 ELECTROCARDIOGRAM TRACING: CPT

## 2024-09-29 PROCEDURE — 80162 ASSAY OF DIGOXIN TOTAL: CPT

## 2024-09-29 PROCEDURE — 87077 CULTURE AEROBIC IDENTIFY: CPT

## 2024-09-29 PROCEDURE — 82962 GLUCOSE BLOOD TEST: CPT

## 2024-09-29 PROCEDURE — 87070 CULTURE OTHR SPECIMN AEROBIC: CPT

## 2024-09-29 PROCEDURE — 85027 COMPLETE CBC AUTOMATED: CPT

## 2024-09-29 PROCEDURE — 82652 VIT D 1 25-DIHYDROXY: CPT

## 2024-09-29 PROCEDURE — 80048 BASIC METABOLIC PNL TOTAL CA: CPT

## 2024-09-29 PROCEDURE — 82803 BLOOD GASES ANY COMBINATION: CPT

## 2024-09-29 PROCEDURE — 74174 CTA ABD&PLVS W/CONTRAST: CPT | Mod: MC

## 2024-09-29 PROCEDURE — 92526 ORAL FUNCTION THERAPY: CPT

## 2024-09-29 PROCEDURE — 83986 ASSAY PH BODY FLUID NOS: CPT

## 2024-09-29 PROCEDURE — 93308 TTE F-UP OR LMTD: CPT

## 2024-09-29 PROCEDURE — 87186 SC STD MICRODIL/AGAR DIL: CPT

## 2024-09-29 PROCEDURE — 86431 RHEUMATOID FACTOR QUANT: CPT

## 2024-09-29 PROCEDURE — 87449 NOS EACH ORGANISM AG IA: CPT

## 2024-09-29 PROCEDURE — 85610 PROTHROMBIN TIME: CPT

## 2024-09-29 PROCEDURE — 85025 COMPLETE CBC W/AUTO DIFF WBC: CPT

## 2024-09-29 PROCEDURE — 87075 CULTR BACTERIA EXCEPT BLOOD: CPT

## 2024-12-30 NOTE — ED PROVIDER NOTE - MUSCULOSKELETAL, MLM
Spine appears normal, range of motion is not limited, no muscle or joint tenderness Daughter - Luz Elena

## 2025-01-03 ENCOUNTER — APPOINTMENT (OUTPATIENT)
Dept: NEUROLOGY | Facility: CLINIC | Age: 85
End: 2025-01-03

## 2025-01-13 NOTE — DISCHARGE NOTE ADULT - PATIENT PORTAL LINK FT
Contacted Linn Aguayo regarding Dietitian follow up. Unable to leave voicemail message with call back number as patient's voicemail is not yet set up. RD will follow up as appropriate.       Lily Peterson RDN, LD  [phone]    
You can access the [facility], offered by [facility], by registering with the following website: [email or web address]

## 2025-05-26 NOTE — DIETITIAN INITIAL EVALUATION ADULT - NSICDXPASTSURGICALHX_GEN_ALL_CORE_FT
PAST SURGICAL HISTORY:  H/O total knee replacement, left 2003 revision 2014    History of foot surgery left foot due to polio, 1953    Neck mass excision of neck mass 1942    S/P cholecystectomy     S/P dilation and curettage 2001, 1965, 1972, 1978    S/P rhinoplasty     
Yes

## 2025-06-05 NOTE — PATIENT PROFILE ADULT - INTERNATIONAL TRAVEL
I offered to discuss advanced care planning, including how to pick a person who would make decisions for you if you were unable to make them for yourself, called a health care power of , and what kind of decisions you might make such as use of life sustaining treatments such as ventilators and tube feeding when faced with a life limiting illness recorded on a living will that they will need to know. (How you want to be cared for as you near the end of your natural life)     X Patient is interested in learning more about how to make advanced directives.  I provided them paperwork and offered to discuss this with them.   No

## 2025-09-05 ENCOUNTER — EMERGENCY (EMERGENCY)
Facility: HOSPITAL | Age: 85
LOS: 1 days | End: 2025-09-05
Attending: STUDENT IN AN ORGANIZED HEALTH CARE EDUCATION/TRAINING PROGRAM
Payer: MEDICARE

## 2025-09-05 VITALS
OXYGEN SATURATION: 98 % | RESPIRATION RATE: 16 BRPM | SYSTOLIC BLOOD PRESSURE: 115 MMHG | TEMPERATURE: 98 F | DIASTOLIC BLOOD PRESSURE: 68 MMHG | HEART RATE: 68 BPM

## 2025-09-05 VITALS
HEART RATE: 102 BPM | DIASTOLIC BLOOD PRESSURE: 84 MMHG | OXYGEN SATURATION: 100 % | TEMPERATURE: 98 F | SYSTOLIC BLOOD PRESSURE: 166 MMHG | WEIGHT: 139.99 LBS | RESPIRATION RATE: 16 BRPM

## 2025-09-05 DIAGNOSIS — Z96.652 PRESENCE OF LEFT ARTIFICIAL KNEE JOINT: Chronic | ICD-10-CM

## 2025-09-05 DIAGNOSIS — Z98.89 OTHER SPECIFIED POSTPROCEDURAL STATES: Chronic | ICD-10-CM

## 2025-09-05 DIAGNOSIS — Z41.1 ENCOUNTER FOR COSMETIC SURGERY: Chronic | ICD-10-CM

## 2025-09-05 DIAGNOSIS — R22.1 LOCALIZED SWELLING, MASS AND LUMP, NECK: Chronic | ICD-10-CM

## 2025-09-05 DIAGNOSIS — Z90.49 ACQUIRED ABSENCE OF OTHER SPECIFIED PARTS OF DIGESTIVE TRACT: Chronic | ICD-10-CM

## 2025-09-05 LAB
ALBUMIN SERPL ELPH-MCNC: 3.2 G/DL — LOW (ref 3.3–5.2)
ALP SERPL-CCNC: 113 U/L — SIGNIFICANT CHANGE UP (ref 40–120)
ALT FLD-CCNC: 14 U/L — SIGNIFICANT CHANGE UP
ANION GAP SERPL CALC-SCNC: 16 MMOL/L — SIGNIFICANT CHANGE UP (ref 5–17)
APPEARANCE UR: ABNORMAL
AST SERPL-CCNC: 15 U/L — SIGNIFICANT CHANGE UP
BACTERIA # UR AUTO: NEGATIVE /HPF — SIGNIFICANT CHANGE UP
BASOPHILS # BLD AUTO: 0.08 K/UL — SIGNIFICANT CHANGE UP (ref 0–0.2)
BASOPHILS NFR BLD AUTO: 0.4 % — SIGNIFICANT CHANGE UP (ref 0–2)
BILIRUB SERPL-MCNC: 0.4 MG/DL — SIGNIFICANT CHANGE UP (ref 0.4–2)
BILIRUB UR-MCNC: NEGATIVE — SIGNIFICANT CHANGE UP
BUN SERPL-MCNC: 22 MG/DL — HIGH (ref 8–20)
CALCIUM SERPL-MCNC: 9.6 MG/DL — SIGNIFICANT CHANGE UP (ref 8.4–10.5)
CAST: 0 /LPF — SIGNIFICANT CHANGE UP (ref 0–4)
CHLORIDE SERPL-SCNC: 98 MMOL/L — SIGNIFICANT CHANGE UP (ref 96–108)
CO2 SERPL-SCNC: 21 MMOL/L — LOW (ref 22–29)
COLOR SPEC: ABNORMAL
CREAT SERPL-MCNC: 0.48 MG/DL — LOW (ref 0.5–1.3)
DIFF PNL FLD: ABNORMAL
EGFR: 95 ML/MIN/1.73M2 — SIGNIFICANT CHANGE UP
EGFR: 95 ML/MIN/1.73M2 — SIGNIFICANT CHANGE UP
EOSINOPHIL # BLD AUTO: 0.23 K/UL — SIGNIFICANT CHANGE UP (ref 0–0.5)
EOSINOPHIL NFR BLD AUTO: 1.2 % — SIGNIFICANT CHANGE UP (ref 0–6)
GLUCOSE SERPL-MCNC: 186 MG/DL — HIGH (ref 70–99)
GLUCOSE UR QL: >=1000 MG/DL
HCT VFR BLD CALC: 41.5 % — SIGNIFICANT CHANGE UP (ref 34.5–45)
HGB BLD-MCNC: 13.4 G/DL — SIGNIFICANT CHANGE UP (ref 11.5–15.5)
IMM GRANULOCYTES # BLD AUTO: 0.09 K/UL — HIGH (ref 0–0.07)
IMM GRANULOCYTES NFR BLD AUTO: 0.5 % — SIGNIFICANT CHANGE UP (ref 0–0.9)
KETONES UR QL: NEGATIVE MG/DL — SIGNIFICANT CHANGE UP
LEUKOCYTE ESTERASE UR-ACNC: ABNORMAL
LYMPHOCYTES # BLD AUTO: 3.89 K/UL — HIGH (ref 1–3.3)
LYMPHOCYTES NFR BLD AUTO: 20.8 % — SIGNIFICANT CHANGE UP (ref 13–44)
MCHC RBC-ENTMCNC: 27.8 PG — SIGNIFICANT CHANGE UP (ref 27–34)
MCHC RBC-ENTMCNC: 32.3 G/DL — SIGNIFICANT CHANGE UP (ref 32–36)
MCV RBC AUTO: 86.1 FL — SIGNIFICANT CHANGE UP (ref 80–100)
MONOCYTES # BLD AUTO: 1.34 K/UL — HIGH (ref 0–0.9)
MONOCYTES NFR BLD AUTO: 7.2 % — SIGNIFICANT CHANGE UP (ref 2–14)
NEUTROPHILS # BLD AUTO: 13.03 K/UL — HIGH (ref 1.8–7.4)
NEUTROPHILS NFR BLD AUTO: 69.9 % — SIGNIFICANT CHANGE UP (ref 43–77)
NITRITE UR-MCNC: NEGATIVE — SIGNIFICANT CHANGE UP
NRBC # BLD AUTO: 0 K/UL — SIGNIFICANT CHANGE UP (ref 0–0)
NRBC # FLD: 0 K/UL — SIGNIFICANT CHANGE UP (ref 0–0)
NRBC BLD AUTO-RTO: 0 /100 WBCS — SIGNIFICANT CHANGE UP (ref 0–0)
PH UR: 7 — SIGNIFICANT CHANGE UP (ref 5–8)
PLATELET # BLD AUTO: 311 K/UL — SIGNIFICANT CHANGE UP (ref 150–400)
PMV BLD: 9.9 FL — SIGNIFICANT CHANGE UP (ref 7–13)
POTASSIUM SERPL-MCNC: 3.4 MMOL/L — LOW (ref 3.5–5.3)
POTASSIUM SERPL-SCNC: 3.4 MMOL/L — LOW (ref 3.5–5.3)
PROT SERPL-MCNC: 7.1 G/DL — SIGNIFICANT CHANGE UP (ref 6.6–8.7)
PROT UR-MCNC: 100 MG/DL
RBC # BLD: 4.82 M/UL — SIGNIFICANT CHANGE UP (ref 3.8–5.2)
RBC # FLD: 15.3 % — HIGH (ref 10.3–14.5)
RBC CASTS # UR COMP ASSIST: 231 /HPF — HIGH (ref 0–4)
SODIUM SERPL-SCNC: 135 MMOL/L — SIGNIFICANT CHANGE UP (ref 135–145)
SP GR SPEC: 1.01 — SIGNIFICANT CHANGE UP (ref 1–1.03)
SQUAMOUS # UR AUTO: 0 /HPF — SIGNIFICANT CHANGE UP (ref 0–5)
UROBILINOGEN FLD QL: 0.2 MG/DL — SIGNIFICANT CHANGE UP (ref 0.2–1)
WBC # BLD: 18.66 K/UL — HIGH (ref 3.8–10.5)
WBC # FLD AUTO: 18.66 K/UL — HIGH (ref 3.8–10.5)
WBC UR QL: 374 /HPF — HIGH (ref 0–5)

## 2025-09-05 PROCEDURE — 99285 EMERGENCY DEPT VISIT HI MDM: CPT | Mod: GC

## 2025-09-05 PROCEDURE — 81001 URINALYSIS AUTO W/SCOPE: CPT

## 2025-09-05 PROCEDURE — 36415 COLL VENOUS BLD VENIPUNCTURE: CPT

## 2025-09-05 PROCEDURE — 87040 BLOOD CULTURE FOR BACTERIA: CPT

## 2025-09-05 PROCEDURE — 85025 COMPLETE CBC W/AUTO DIFF WBC: CPT

## 2025-09-05 PROCEDURE — 74177 CT ABD & PELVIS W/CONTRAST: CPT | Mod: 26

## 2025-09-05 PROCEDURE — 96374 THER/PROPH/DIAG INJ IV PUSH: CPT | Mod: XU

## 2025-09-05 PROCEDURE — 99284 EMERGENCY DEPT VISIT MOD MDM: CPT | Mod: 25

## 2025-09-05 PROCEDURE — 96375 TX/PRO/DX INJ NEW DRUG ADDON: CPT

## 2025-09-05 PROCEDURE — 74177 CT ABD & PELVIS W/CONTRAST: CPT

## 2025-09-05 PROCEDURE — 87086 URINE CULTURE/COLONY COUNT: CPT

## 2025-09-05 PROCEDURE — 80053 COMPREHEN METABOLIC PANEL: CPT

## 2025-09-05 RX ORDER — CEFTRIAXONE 500 MG/1
1000 INJECTION, POWDER, FOR SOLUTION INTRAMUSCULAR; INTRAVENOUS ONCE
Refills: 0 | Status: COMPLETED | OUTPATIENT
Start: 2025-09-05 | End: 2025-09-05

## 2025-09-05 RX ORDER — CEFPODOXIME PROXETIL 200 MG/1
1 TABLET, FILM COATED ORAL
Qty: 14 | Refills: 0
Start: 2025-09-05 | End: 2025-09-11

## 2025-09-05 RX ORDER — ACETAMINOPHEN 500 MG/5ML
1000 LIQUID (ML) ORAL ONCE
Refills: 0 | Status: COMPLETED | OUTPATIENT
Start: 2025-09-05 | End: 2025-09-05

## 2025-09-05 RX ADMIN — Medication 400 MILLIGRAM(S): at 04:46

## 2025-09-05 RX ADMIN — CEFTRIAXONE 1000 MILLIGRAM(S): 500 INJECTION, POWDER, FOR SOLUTION INTRAMUSCULAR; INTRAVENOUS at 05:36

## 2025-09-06 LAB
CULTURE RESULTS: NO GROWTH — SIGNIFICANT CHANGE UP
SPECIMEN SOURCE: SIGNIFICANT CHANGE UP

## 2025-09-10 LAB
CULTURE RESULTS: SIGNIFICANT CHANGE UP
CULTURE RESULTS: SIGNIFICANT CHANGE UP
SPECIMEN SOURCE: SIGNIFICANT CHANGE UP
SPECIMEN SOURCE: SIGNIFICANT CHANGE UP